# Patient Record
Sex: FEMALE | Race: WHITE | NOT HISPANIC OR LATINO | Employment: PART TIME | URBAN - NONMETROPOLITAN AREA
[De-identification: names, ages, dates, MRNs, and addresses within clinical notes are randomized per-mention and may not be internally consistent; named-entity substitution may affect disease eponyms.]

---

## 2017-01-09 ENCOUNTER — ANTICOAGULATION - GICH (OUTPATIENT)
Dept: INTERNAL MEDICINE | Facility: OTHER | Age: 57
End: 2017-01-09

## 2017-01-09 DIAGNOSIS — Z79.01 LONG TERM CURRENT USE OF ANTICOAGULANT: ICD-10-CM

## 2017-01-09 LAB — INR - HISTORICAL: 2.2

## 2017-01-11 ENCOUNTER — HISTORY (OUTPATIENT)
Dept: FAMILY MEDICINE | Facility: OTHER | Age: 57
End: 2017-01-11

## 2017-01-11 ENCOUNTER — OFFICE VISIT - GICH (OUTPATIENT)
Dept: FAMILY MEDICINE | Facility: OTHER | Age: 57
End: 2017-01-11

## 2017-01-11 DIAGNOSIS — R30.0 DYSURIA: ICD-10-CM

## 2017-01-11 DIAGNOSIS — R31.9 HEMATURIA: ICD-10-CM

## 2017-01-11 DIAGNOSIS — N39.0 URINARY TRACT INFECTION: ICD-10-CM

## 2017-01-11 LAB
BACTERIA URINE: ABNORMAL BACTERIA/HPF
BILIRUB UR QL: NEGATIVE
CLARITY, URINE: ABNORMAL CLARITY
COLOR UR: YELLOW COLOR
EPITHELIAL CELLS: ABNORMAL EPI/HPF
GLUCOSE URINE: NEGATIVE MG/DL
KETONES UR QL: NEGATIVE MG/DL
LEUKOCYTE ESTERASE URINE: ABNORMAL
NITRITE UR QL STRIP: POSITIVE
OCCULT BLOOD,URINE - HISTORICAL: ABNORMAL
PH UR: 6 [PH]
PROTEIN QUALITATIVE,URINE - HISTORICAL: NEGATIVE MG/DL
RBC - HISTORICAL: ABNORMAL /HPF
SP GR UR STRIP: >=1.03
UROBILINOGEN,QUALITATIVE - HISTORICAL: NORMAL EU/DL
WBC - HISTORICAL: ABNORMAL /HPF

## 2017-01-13 LAB
CULTURE - HISTORICAL: ABNORMAL
SUSCEPTIBILITY RESULT - HISTORICAL: ABNORMAL
SUSCEPTIBILITY RESULT - HISTORICAL: ABNORMAL

## 2017-01-19 ENCOUNTER — HOSPITAL ENCOUNTER (OUTPATIENT)
Dept: RADIOLOGY | Facility: OTHER | Age: 57
End: 2017-01-19
Attending: NURSE PRACTITIONER

## 2017-01-19 ENCOUNTER — HISTORY (OUTPATIENT)
Dept: FAMILY MEDICINE | Facility: OTHER | Age: 57
End: 2017-01-19

## 2017-01-19 ENCOUNTER — OFFICE VISIT - GICH (OUTPATIENT)
Dept: FAMILY MEDICINE | Facility: OTHER | Age: 57
End: 2017-01-19

## 2017-01-19 DIAGNOSIS — R35.0 FREQUENCY OF MICTURITION: ICD-10-CM

## 2017-01-19 DIAGNOSIS — N20.0 CALCULUS OF KIDNEY: ICD-10-CM

## 2017-01-19 DIAGNOSIS — N30.00 ACUTE CYSTITIS WITHOUT HEMATURIA: ICD-10-CM

## 2017-01-19 LAB
BACTERIA URINE: ABNORMAL BACTERIA/HPF
BILIRUB UR QL: NEGATIVE
CALCIUM OXALATE CRYSTALS - HISTORICAL: PRESENT
CLARITY, URINE: CLEAR CLARITY
COLOR UR: YELLOW COLOR
EPITHELIAL CELLS: ABNORMAL EPI/HPF
GLUCOSE URINE: NEGATIVE MG/DL
KETONES UR QL: NEGATIVE MG/DL
LEUKOCYTE ESTERASE URINE: NEGATIVE
NITRITE UR QL STRIP: NEGATIVE
OCCULT BLOOD,URINE - HISTORICAL: ABNORMAL
PH UR: 5.5 [PH]
PROTEIN QUALITATIVE,URINE - HISTORICAL: ABNORMAL MG/DL
RBC - HISTORICAL: ABNORMAL /HPF
SP GR UR STRIP: >=1.03
UROBILINOGEN,QUALITATIVE - HISTORICAL: NORMAL EU/DL
WBC - HISTORICAL: ABNORMAL /HPF

## 2017-01-25 ENCOUNTER — HISTORY (OUTPATIENT)
Dept: UROLOGY | Facility: OTHER | Age: 57
End: 2017-01-25

## 2017-01-25 ENCOUNTER — OFFICE VISIT - GICH (OUTPATIENT)
Dept: UROLOGY | Facility: OTHER | Age: 57
End: 2017-01-25

## 2017-01-25 ENCOUNTER — HOSPITAL ENCOUNTER (OUTPATIENT)
Dept: RADIOLOGY | Facility: OTHER | Age: 57
End: 2017-01-25
Attending: UROLOGY

## 2017-01-25 DIAGNOSIS — R31.29 OTHER MICROSCOPIC HEMATURIA: ICD-10-CM

## 2017-01-25 DIAGNOSIS — N20.0 CALCULUS OF KIDNEY: ICD-10-CM

## 2017-01-29 ENCOUNTER — COMMUNICATION - GICH (OUTPATIENT)
Dept: INTERNAL MEDICINE | Facility: OTHER | Age: 57
End: 2017-01-29

## 2017-01-29 DIAGNOSIS — I82.409 ACUTE EMBOLISM AND THROMBOSIS OF DEEP VEIN OF LOWER EXTREMITY (H): ICD-10-CM

## 2017-02-05 ENCOUNTER — HISTORY (OUTPATIENT)
Dept: FAMILY MEDICINE | Facility: OTHER | Age: 57
End: 2017-02-05

## 2017-02-05 ENCOUNTER — OFFICE VISIT - GICH (OUTPATIENT)
Dept: FAMILY MEDICINE | Facility: OTHER | Age: 57
End: 2017-02-05

## 2017-02-05 DIAGNOSIS — R30.0 DYSURIA: ICD-10-CM

## 2017-02-05 DIAGNOSIS — N20.0 CALCULUS OF KIDNEY: ICD-10-CM

## 2017-02-05 LAB
BACTERIA URINE: ABNORMAL BACTERIA/HPF
BILIRUB UR QL: NEGATIVE
CLARITY, URINE: ABNORMAL CLARITY
COLOR UR: YELLOW COLOR
EPITHELIAL CELLS: ABNORMAL EPI/HPF
GLUCOSE URINE: NEGATIVE MG/DL
KETONES UR QL: NEGATIVE MG/DL
LEUKOCYTE ESTERASE URINE: ABNORMAL
NITRITE UR QL STRIP: NEGATIVE
OCCULT BLOOD,URINE - HISTORICAL: ABNORMAL
PH UR: 6 [PH]
PROTEIN QUALITATIVE,URINE - HISTORICAL: NEGATIVE MG/DL
RBC - HISTORICAL: ABNORMAL /HPF
SP GR UR STRIP: 1.01
UROBILINOGEN,QUALITATIVE - HISTORICAL: NORMAL EU/DL
WBC - HISTORICAL: ABNORMAL /HPF

## 2017-02-06 ENCOUNTER — HISTORY (OUTPATIENT)
Dept: UROLOGY | Facility: OTHER | Age: 57
End: 2017-02-06

## 2017-02-06 ENCOUNTER — OFFICE VISIT - GICH (OUTPATIENT)
Dept: UROLOGY | Facility: OTHER | Age: 57
End: 2017-02-06

## 2017-02-06 ENCOUNTER — ANTICOAGULATION - GICH (OUTPATIENT)
Dept: INTERNAL MEDICINE | Facility: OTHER | Age: 57
End: 2017-02-06

## 2017-02-06 DIAGNOSIS — N39.0 URINARY TRACT INFECTION: ICD-10-CM

## 2017-02-06 DIAGNOSIS — N20.0 CALCULUS OF KIDNEY: ICD-10-CM

## 2017-02-06 DIAGNOSIS — R30.0 DYSURIA: ICD-10-CM

## 2017-02-06 DIAGNOSIS — Z79.01 LONG TERM CURRENT USE OF ANTICOAGULANT: ICD-10-CM

## 2017-02-06 LAB — INR - HISTORICAL: 2.4

## 2017-02-07 ENCOUNTER — COMMUNICATION - GICH (OUTPATIENT)
Dept: UROLOGY | Facility: OTHER | Age: 57
End: 2017-02-07

## 2017-02-07 LAB
CULTURE - HISTORICAL: ABNORMAL
CULTURE - HISTORICAL: ABNORMAL
SUSCEPTIBILITY RESULT - HISTORICAL: ABNORMAL

## 2017-02-15 ENCOUNTER — OFFICE VISIT - GICH (OUTPATIENT)
Dept: INTERNAL MEDICINE | Facility: OTHER | Age: 57
End: 2017-02-15

## 2017-02-15 ENCOUNTER — AMBULATORY - GICH (OUTPATIENT)
Dept: LAB | Facility: OTHER | Age: 57
End: 2017-02-15

## 2017-02-15 ENCOUNTER — COMMUNICATION - GICH (OUTPATIENT)
Dept: INTERNAL MEDICINE | Facility: OTHER | Age: 57
End: 2017-02-15

## 2017-02-15 ENCOUNTER — HISTORY (OUTPATIENT)
Dept: INTERNAL MEDICINE | Facility: OTHER | Age: 57
End: 2017-02-15

## 2017-02-15 DIAGNOSIS — N30.01 ACUTE CYSTITIS WITH HEMATURIA: ICD-10-CM

## 2017-02-15 DIAGNOSIS — R30.0 DYSURIA: ICD-10-CM

## 2017-02-15 LAB
BACTERIA URINE: ABNORMAL BACTERIA/HPF
BILIRUB UR QL: ABNORMAL
CALCIUM OXALATE CRYSTALS - HISTORICAL: PRESENT
CLARITY, URINE: CLEAR CLARITY
COLOR UR: ABNORMAL COLOR
EPITHELIAL CELLS: ABNORMAL EPI/HPF
GLUCOSE URINE: ABNORMAL MG/DL
KETONES UR QL: ABNORMAL MG/DL
LEUKOCYTE ESTERASE URINE: ABNORMAL
NITRITE UR QL STRIP: ABNORMAL
OCCULT BLOOD,URINE - HISTORICAL: ABNORMAL
PH UR: ABNORMAL [PH]
PROTEIN QUALITATIVE,URINE - HISTORICAL: ABNORMAL MG/DL
RBC - HISTORICAL: >100 /HPF
SP GR UR STRIP: ABNORMAL
UROBILINOGEN,QUALITATIVE - HISTORICAL: ABNORMAL EU/DL
WBC - HISTORICAL: ABNORMAL /HPF

## 2017-02-17 ENCOUNTER — HISTORY (OUTPATIENT)
Dept: INTERNAL MEDICINE | Facility: OTHER | Age: 57
End: 2017-02-17

## 2017-02-17 ENCOUNTER — OFFICE VISIT - GICH (OUTPATIENT)
Dept: INTERNAL MEDICINE | Facility: OTHER | Age: 57
End: 2017-02-17

## 2017-02-17 DIAGNOSIS — R30.0 DYSURIA: ICD-10-CM

## 2017-02-17 DIAGNOSIS — N76.0 ACUTE VAGINITIS: ICD-10-CM

## 2017-02-17 DIAGNOSIS — B96.89 OTHER SPECIFIED BACTERIAL AGENTS AS THE CAUSE OF DISEASES CLASSIFIED ELSEWHERE: ICD-10-CM

## 2017-02-17 DIAGNOSIS — Z12.4 ENCOUNTER FOR SCREENING FOR MALIGNANT NEOPLASM OF CERVIX: ICD-10-CM

## 2017-02-17 DIAGNOSIS — R10.2 PELVIC AND PERINEAL PAIN: ICD-10-CM

## 2017-02-17 LAB
BACTERIA URINE: ABNORMAL BACTERIA/HPF
BILIRUB UR QL: NEGATIVE
CLARITY, URINE: ABNORMAL CLARITY
COLOR UR: YELLOW COLOR
EPITHELIAL CELLS: ABNORMAL EPI/HPF
GLUCOSE URINE: NEGATIVE MG/DL
KETONES UR QL: NEGATIVE MG/DL
LEUKOCYTE ESTERASE URINE: NEGATIVE
NITRITE UR QL STRIP: NEGATIVE
OCCULT BLOOD,URINE - HISTORICAL: ABNORMAL
PH UR: 5.5 [PH]
PROTEIN QUALITATIVE,URINE - HISTORICAL: ABNORMAL MG/DL
RBC - HISTORICAL: >100 /HPF
SP GR UR STRIP: >=1.03
UROBILINOGEN,QUALITATIVE - HISTORICAL: NORMAL EU/DL
WBC - HISTORICAL: ABNORMAL /HPF

## 2017-02-17 ASSESSMENT — PATIENT HEALTH QUESTIONNAIRE - PHQ9: SUM OF ALL RESPONSES TO PHQ QUESTIONS 1-9: 6

## 2017-03-01 ENCOUNTER — COMMUNICATION - GICH (OUTPATIENT)
Dept: INTERNAL MEDICINE | Facility: OTHER | Age: 57
End: 2017-03-01

## 2017-03-01 DIAGNOSIS — Z00.00 ENCOUNTER FOR GENERAL ADULT MEDICAL EXAMINATION WITHOUT ABNORMAL FINDINGS: ICD-10-CM

## 2017-03-01 LAB — HPV RESULTS - HISTORICAL: NEGATIVE

## 2017-03-06 ENCOUNTER — ANTICOAGULATION - GICH (OUTPATIENT)
Dept: INTERNAL MEDICINE | Facility: OTHER | Age: 57
End: 2017-03-06

## 2017-03-06 ENCOUNTER — HISTORY (OUTPATIENT)
Dept: UROLOGY | Facility: OTHER | Age: 57
End: 2017-03-06

## 2017-03-06 ENCOUNTER — OFFICE VISIT - GICH (OUTPATIENT)
Dept: UROLOGY | Facility: OTHER | Age: 57
End: 2017-03-06

## 2017-03-06 DIAGNOSIS — R31.9 HEMATURIA: ICD-10-CM

## 2017-03-06 DIAGNOSIS — Z79.01 LONG TERM CURRENT USE OF ANTICOAGULANT: ICD-10-CM

## 2017-03-06 LAB
BACTERIA URINE: ABNORMAL BACTERIA/HPF
BILIRUB UR QL: ABNORMAL
CLARITY, URINE: ABNORMAL CLARITY
COLOR UR: YELLOW COLOR
EPITHELIAL CELLS: ABNORMAL EPI/HPF
GLUCOSE URINE: NEGATIVE MG/DL
INR - HISTORICAL: 2.1
KETONES UR QL: NEGATIVE MG/DL
LEUKOCYTE ESTERASE URINE: NEGATIVE
NITRITE UR QL STRIP: NEGATIVE
OCCULT BLOOD,URINE - HISTORICAL: NEGATIVE
PH UR: 5.5 [PH]
PROTEIN QUALITATIVE,URINE - HISTORICAL: NEGATIVE MG/DL
RBC - HISTORICAL: ABNORMAL /HPF
SP GR UR STRIP: >=1.03
UROBILINOGEN,QUALITATIVE - HISTORICAL: NORMAL EU/DL
WBC - HISTORICAL: ABNORMAL /HPF

## 2017-04-03 ENCOUNTER — ANTICOAGULATION - GICH (OUTPATIENT)
Dept: INTERNAL MEDICINE | Facility: OTHER | Age: 57
End: 2017-04-03

## 2017-04-03 DIAGNOSIS — Z79.01 LONG TERM CURRENT USE OF ANTICOAGULANT: ICD-10-CM

## 2017-04-03 LAB — INR - HISTORICAL: 2.2

## 2017-04-27 ENCOUNTER — COMMUNICATION - GICH (OUTPATIENT)
Dept: INTERNAL MEDICINE | Facility: OTHER | Age: 57
End: 2017-04-27

## 2017-04-27 DIAGNOSIS — I82.409 ACUTE EMBOLISM AND THROMBOSIS OF DEEP VEIN OF LOWER EXTREMITY (H): ICD-10-CM

## 2017-05-01 ENCOUNTER — ANTICOAGULATION - GICH (OUTPATIENT)
Dept: INTERNAL MEDICINE | Facility: OTHER | Age: 57
End: 2017-05-01

## 2017-05-01 DIAGNOSIS — Z79.01 LONG TERM CURRENT USE OF ANTICOAGULANT: ICD-10-CM

## 2017-05-01 LAB — INR - HISTORICAL: 2.1

## 2017-05-24 ENCOUNTER — AMBULATORY - GICH (OUTPATIENT)
Dept: PEDIATRICS | Facility: OTHER | Age: 57
End: 2017-05-24

## 2017-05-26 ENCOUNTER — COMMUNICATION - GICH (OUTPATIENT)
Dept: INTERNAL MEDICINE | Facility: OTHER | Age: 57
End: 2017-05-26

## 2017-05-26 DIAGNOSIS — F33.0 MAJOR DEPRESSIVE DISORDER, RECURRENT, MILD (H): ICD-10-CM

## 2017-06-05 ENCOUNTER — ANTICOAGULATION - GICH (OUTPATIENT)
Dept: INTERNAL MEDICINE | Facility: OTHER | Age: 57
End: 2017-06-05

## 2017-06-05 DIAGNOSIS — Z79.01 LONG TERM CURRENT USE OF ANTICOAGULANT: ICD-10-CM

## 2017-06-05 LAB — INR - HISTORICAL: 2.3

## 2017-06-07 ENCOUNTER — COMMUNICATION - GICH (OUTPATIENT)
Dept: INTERNAL MEDICINE | Facility: OTHER | Age: 57
End: 2017-06-07

## 2017-06-07 DIAGNOSIS — I10 ESSENTIAL (PRIMARY) HYPERTENSION: ICD-10-CM

## 2017-07-10 ENCOUNTER — ANTICOAGULATION - GICH (OUTPATIENT)
Dept: INTERNAL MEDICINE | Facility: OTHER | Age: 57
End: 2017-07-10

## 2017-07-10 DIAGNOSIS — Z79.01 LONG TERM CURRENT USE OF ANTICOAGULANT: ICD-10-CM

## 2017-07-10 LAB — INR - HISTORICAL: 2.1

## 2017-07-28 ENCOUNTER — COMMUNICATION - GICH (OUTPATIENT)
Dept: INTERNAL MEDICINE | Facility: OTHER | Age: 57
End: 2017-07-28

## 2017-07-28 DIAGNOSIS — I82.409 ACUTE EMBOLISM AND THROMBOSIS OF DEEP VEIN OF LOWER EXTREMITY (H): ICD-10-CM

## 2017-07-31 ENCOUNTER — OFFICE VISIT - GICH (OUTPATIENT)
Dept: INTERNAL MEDICINE | Facility: OTHER | Age: 57
End: 2017-07-31

## 2017-07-31 ENCOUNTER — HISTORY (OUTPATIENT)
Dept: INTERNAL MEDICINE | Facility: OTHER | Age: 57
End: 2017-07-31

## 2017-07-31 DIAGNOSIS — I71.20 THORACIC AORTIC ANEURYSM WITHOUT RUPTURE (H): ICD-10-CM

## 2017-07-31 DIAGNOSIS — L65.9 NONSCARRING HAIR LOSS: ICD-10-CM

## 2017-07-31 DIAGNOSIS — E83.52 HYPERCALCEMIA: ICD-10-CM

## 2017-07-31 DIAGNOSIS — R53.83 OTHER FATIGUE: ICD-10-CM

## 2017-07-31 DIAGNOSIS — E66.01 MORBID (SEVERE) OBESITY DUE TO EXCESS CALORIES (H): ICD-10-CM

## 2017-07-31 LAB
CALCIUM SERPL-MCNC: 9.8 MG/DL (ref 8.6–10.3)
HEMOGLOBIN: 14.8 G/DL (ref 12–16)
MCV RBC AUTO: 92 FL (ref 80–100)
TSH - HISTORICAL: 2 UIU/ML (ref 0.34–5.6)

## 2017-07-31 ASSESSMENT — ANXIETY QUESTIONNAIRES
6. BECOMING EASILY ANNOYED OR IRRITABLE: NOT AT ALL
5. BEING SO RESTLESS THAT IT IS HARD TO SIT STILL: NOT AT ALL
3. WORRYING TOO MUCH ABOUT DIFFERENT THINGS: SEVERAL DAYS
1. FEELING NERVOUS, ANXIOUS, OR ON EDGE: SEVERAL DAYS
2. NOT BEING ABLE TO STOP OR CONTROL WORRYING: NOT AT ALL
4. TROUBLE RELAXING: SEVERAL DAYS
7. FEELING AFRAID AS IF SOMETHING AWFUL MIGHT HAPPEN: NOT AT ALL
GAD7 TOTAL SCORE: 3

## 2017-08-04 ENCOUNTER — AMBULATORY - GICH (OUTPATIENT)
Dept: SCHEDULING | Facility: OTHER | Age: 57
End: 2017-08-04

## 2017-08-09 ENCOUNTER — COMMUNICATION - GICH (OUTPATIENT)
Dept: INTERNAL MEDICINE | Facility: OTHER | Age: 57
End: 2017-08-09

## 2017-08-09 ENCOUNTER — ANTICOAGULATION - GICH (OUTPATIENT)
Dept: INTERNAL MEDICINE | Facility: OTHER | Age: 57
End: 2017-08-09

## 2017-08-09 DIAGNOSIS — Z79.01 LONG TERM CURRENT USE OF ANTICOAGULANT: ICD-10-CM

## 2017-08-09 LAB — INR - HISTORICAL: 2.7

## 2017-08-17 ENCOUNTER — COMMUNICATION - GICH (OUTPATIENT)
Dept: INTERNAL MEDICINE | Facility: OTHER | Age: 57
End: 2017-08-17

## 2017-09-08 ENCOUNTER — COMMUNICATION - GICH (OUTPATIENT)
Dept: INTERNAL MEDICINE | Facility: OTHER | Age: 57
End: 2017-09-08

## 2017-09-08 DIAGNOSIS — E87.6 HYPOKALEMIA: ICD-10-CM

## 2017-09-08 DIAGNOSIS — R60.0 LOCALIZED EDEMA: ICD-10-CM

## 2017-09-21 ENCOUNTER — ANTICOAGULATION - GICH (OUTPATIENT)
Dept: INTERNAL MEDICINE | Facility: OTHER | Age: 57
End: 2017-09-21

## 2017-09-21 DIAGNOSIS — Z79.01 LONG TERM CURRENT USE OF ANTICOAGULANT: ICD-10-CM

## 2017-09-21 LAB — INR - HISTORICAL: 2.6

## 2017-11-02 ENCOUNTER — ANTICOAGULATION - GICH (OUTPATIENT)
Dept: INTERNAL MEDICINE | Facility: OTHER | Age: 57
End: 2017-11-02

## 2017-11-02 DIAGNOSIS — Z79.01 LONG TERM CURRENT USE OF ANTICOAGULANT: ICD-10-CM

## 2017-11-02 LAB — INR - HISTORICAL: 2.2

## 2017-11-03 ENCOUNTER — COMMUNICATION - GICH (OUTPATIENT)
Dept: INTERNAL MEDICINE | Facility: OTHER | Age: 57
End: 2017-11-03

## 2017-11-03 DIAGNOSIS — I82.409 ACUTE EMBOLISM AND THROMBOSIS OF DEEP VEIN OF LOWER EXTREMITY (H): ICD-10-CM

## 2017-11-07 ENCOUNTER — AMBULATORY - GICH (OUTPATIENT)
Dept: FAMILY MEDICINE | Facility: OTHER | Age: 57
End: 2017-11-07

## 2017-11-07 DIAGNOSIS — Z23 ENCOUNTER FOR IMMUNIZATION: ICD-10-CM

## 2017-11-25 ENCOUNTER — COMMUNICATION - GICH (OUTPATIENT)
Dept: INTERNAL MEDICINE | Facility: OTHER | Age: 57
End: 2017-11-25

## 2017-11-25 DIAGNOSIS — F33.0 MAJOR DEPRESSIVE DISORDER, RECURRENT, MILD (H): ICD-10-CM

## 2017-12-05 ENCOUNTER — COMMUNICATION - GICH (OUTPATIENT)
Dept: INTERNAL MEDICINE | Facility: OTHER | Age: 57
End: 2017-12-05

## 2017-12-05 DIAGNOSIS — I10 ESSENTIAL (PRIMARY) HYPERTENSION: ICD-10-CM

## 2017-12-10 ENCOUNTER — COMMUNICATION - GICH (OUTPATIENT)
Dept: INTERNAL MEDICINE | Facility: OTHER | Age: 57
End: 2017-12-10

## 2017-12-10 DIAGNOSIS — I10 ESSENTIAL (PRIMARY) HYPERTENSION: ICD-10-CM

## 2017-12-10 DIAGNOSIS — E87.6 HYPOKALEMIA: ICD-10-CM

## 2017-12-10 DIAGNOSIS — R60.0 LOCALIZED EDEMA: ICD-10-CM

## 2017-12-14 ENCOUNTER — ANTICOAGULATION - GICH (OUTPATIENT)
Dept: INTERNAL MEDICINE | Facility: OTHER | Age: 57
End: 2017-12-14

## 2017-12-14 DIAGNOSIS — Z79.01 LONG TERM CURRENT USE OF ANTICOAGULANT: ICD-10-CM

## 2017-12-14 LAB — INR - HISTORICAL: 2.3

## 2017-12-27 NOTE — PROGRESS NOTES
Patient Information     Patient Name MRN Trisha Vasquez [id number] Female 1960      Progress Notes by Maria Guadalupe Ramon DO at 2017  8:40 AM     Author:  Maria Guadalupe Ramon DO Service:  (none) Author Type:  PHYS- Osteopathic     Filed:  2017 10:50 AM Encounter Date:  2017 Status:  Signed     :  Maria Guadalupe Ramon DO (PHYS- Osteopathic)            Chief Complaint     Patient presents with       Follow Up      weight management, thyroid lab        Subjective:   Ms. Fernando is a 56 y.o. female  seen for the acute concern today of continued weight problems.  Although she is concerned she may have gained weight she is actually down 2 pounds.  We did discuss that this is likely stable.  She has been wanting to lose weight and has struggled with this.  She is interested in possibly trying medications.  She is hesitant about trying phentermine due to history of aortic aneurysm and the possibility of cardiac disease related to this.  She would like to try contrary if it is approved.  I do think this would be beneficial for her.     She also has some concerns about increased fatigue.  She has also noted some hair loss.  We have not checked her thyroid since .  Denies any changes in bowels.    She was noted to be hypercalcemic on her last labs in December.  Other than the fatigue she denies any symptoms of hypercalcemia although did have a kidney stone earlier this spring.    She  reports that she quit smoking about 26 years ago. Her smoking use included Cigarettes. She has a 12.00 pack-year smoking history. She has never used smokeless tobacco.    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    .      ROS:   Pertinent ROS was performed and was negative, including for fevers, chills, chest pain, shortness of breath, increased lower extremity edema, and changes in bowel or bladder. No other concerns, with exception of HPI above.      Objective:    /70  Pulse 76  Temp 97.3  F (36.3  C) (Tympanic)  Resp 20  Ht 1.727 m (5' 8\")  Wt (!) 159 kg (350 lb 9 oz)  Breastfeeding? No  BMI 53.3 kg/m2  GEN: Vitals reviewed.  Patient is in no acute distress. Cooperative with exam.  Obese.  HEENT: Normocephalic atraumatic.  Pupils equally round.  No scleral icterus, no conjunctival erythema. Oropharynx with no erythema or exudates. Dentition adequate.  CV: Heart regular in rate and rhythm with no murmur.    LUNGS: Lungs clear to auscultation bilaterally.  Chest rise equal bilaterally.  No accessory muscle use.  SKIN: Warm and dry to touch.  No rash on face, arms and legs.  EXT: No clubbing or cyanosis.  trace bilateral peripheral edema.    LABS: 7/31/2017 - Personally ordered/reviewed  Results for orders placed or performed in visit on 07/31/17      CALCIUM      Result  Value Ref Range    CALCIUM 9.8 8.6 - 10.3 mg/dL   HEMOGLOBIN      Result  Value Ref Range    HEMOGLOBIN                14.8 12.0 - 16.0 g/dL    MCV                       92 80 - 100 fL           Assessment/Plan:   1. Morbid obesity due to excess calories (HC)  - at this time we will try to start contrave.  This will likely require prior authorization.  I do think she needs the contrave instead of phentermine due to her history of aortic aneurysm and concern for " cardiac side effects.  We will also check a thyroid to be sure this is not contributing to her weight.  - naltrexone-bupropion 8-90 mg (CONTRAVE) 8-90 mg Extended-Release tablet; Take 2 tablets by mouth 2 times daily. Taper up  Dispense: 120 tablet; Refill: 0  - TSH    2. Ascending aortic aneurysm (HCC) - MRI with cards in 12/2016 and then based on size; if < 4.5 annual exam okay   - given her history of aneurysm she did have an MR that showed improvement when compared to the echo.  At that time no further recommendation for repeat imaging was made.  I will discuss this personally with cardiology locally to see if they would consider any need for repeat imaging this year.  If so we will repeat an MR in December.    3. Hair loss  - at this time it may be due to nutritional deficiency versus medications.  The thyroid is possible we will check a TSH today.  She is to call she has any new or changing symptoms.  - TSH    4. Fatigue, unspecified type  - etiology unknown although I suspect this is secondary to medications and wait.  Hemoglobin is normal.  TSH is still pending and thyroid may be contributing.  - HEMOGLOBIN    5. Hypercalcemia  - recheck today is normal.  Continue to monitor at least yearly.  - CALCIUM       BONITA OLIVER DO   7/31/2017 10:42 AM    This document was prepared using voice generated softwear. While every attempt was made for accuracy, grammatical errors may exist.

## 2017-12-28 NOTE — TELEPHONE ENCOUNTER
Patient Information     Patient Name MRN Trisha Vasquez [id number] Female 1960      Telephone Encounter by Teagan Harrison at 2017  4:13 PM     Author:  Teagan Harrison Service:  (none) Author Type:  (none)     Filed:  2017  4:16 PM Encounter Date:  2017 Status:  Signed     :  Teagan Harrison            Notified patient [employer] prior authorization specialist stated this will not be covered by her Insurance.  If she would like to check prices and have prescription sent to a different pharmacy, she will call and let us know where to send.    Teagan Harrison LPN..................2017  4:15 PM

## 2017-12-28 NOTE — TELEPHONE ENCOUNTER
Patient Information     Patient Name MRTrisha Umanzor [id number] Female 1960      Telephone Encounter by Brii Gates RN at 2017  9:04 AM     Author:  Brii Gates RN Service:  (none) Author Type:  NURS- Registered Nurse     Filed:  2017  9:05 AM Encounter Date:  2017 Status:  Signed     :  Brii Gates RN (NURS- Registered Nurse)            Depression-in adults 18 and over  Serotonin/Norepinephrine Reuptake Inhibitors    Office visit in the past 12 months or as indicated in chart.  Should have clinic visit 1-2 months after initial prescription.    Last visit with BONITA OLIVER was on: 2017 in [facility]  Next visit with BONITA OLIVER is on: No future appointment listed with this provider  Next visit with Internal Medicine is on: 2017 in [facility]    Max refills 12 months from last office visit or per providers notes.    PHQ Depression Screening 2017   Date of PHQ exam (doc flow) 2017   1. Lack of interest/pleasure 1 - Several days 1 - Several days   2. Feeling down/depressed 1 - Several days 1 - Several days   PHQ-2 TOTAL SCORE 2 2   3. Trouble sleeping 1 - Several days 1 - Several days   4. Decreased energy 1 - Several days 1 - Several days   5. Appetite change 1 - Several days 2 - More than half the days   6. Feelings of failure 1 - Several days 1 - Several days   7. Trouble concentrating 0 - Not at all 0 - Not at all   8. Activity level 0 - Not at all 0 - Not at all   9. Hurting yourself 0 - Not at all 0 - Not at all   PHQ-9 TOTAL SCORE 6 7   PHQ-9 Severity Level mild mild   Functional Impairment not difficult at all not difficult at all   Some recent data might be hidden         Prescription refilled per RN Medication Refill Policy.................... BRII GATES RN ....................  2017   9:05 AM

## 2017-12-28 NOTE — TELEPHONE ENCOUNTER
Patient Information     Patient Name MRN Trisha Vasquez [id number] Female 1960      Telephone Encounter by Smitha Nair RN at 11/3/2017  8:33 AM     Author:  Smitha Nair RN Service:  (none) Author Type:  NURS- Registered Nurse     Filed:  11/3/2017  8:34 AM Encounter Date:  11/3/2017 Status:  Signed     :  Smitha Nair RN (NURS- Registered Nurse)            Anticoagulant    Office visit in the past 12 months.    Last visit with BONITA OLIVER was on: 2017 in [facility]  Next visit with BONITA OLIVER is on: No future appointment listed with this provider  Next visit with Internal Medicine is on: 2017 in [facility]    Lab tests:  PT/INR at least monthly    INR (no units)    Date Value   2017 2.2 (H)     HEMOGLOBIN                (g/dL)    Date Value   2017 14.8         Prescription refilled per RN Medication Refill Policy.................... Smitha Nair RN ....................  11/3/2017   8:33 AM

## 2017-12-28 NOTE — TELEPHONE ENCOUNTER
Patient Information     Patient Name MRN Trisha Vasquez [id number] Female 1960      Telephone Encounter by Maria Guadalupe Ramon DO at 8/10/2017  7:42 AM     Author:  Maria Guadalupe Ramon DO Service:  (none) Author Type:  PHYS- Osteopathic     Filed:  8/10/2017  7:42 AM Encounter Date:  2017 Status:  Signed     :  Maria Guadalupe Ramon DO (PHYS- Osteopathic)            Noted.  Please have patient contact me if she has any interest in other medications than the contrave or has other questions or concerns.

## 2017-12-28 NOTE — TELEPHONE ENCOUNTER
Patient Information     Patient Name MRN Trisha Vasquez [id number] Female 1960      Telephone Encounter by Lisbeth Stanton at 2017 11:12 AM     Author:  Lisbeth Stanton Service:  (none) Author Type:  (none)     Filed:  2017 11:15 AM Encounter Date:  2017 Status:  Signed     :  Lisbeth Stanton            Called and left message for patient.  Need to confirm which Insurance company she has for her prescription medications.    Lisbeth Stanton LPN 2017 11:15 AM

## 2017-12-28 NOTE — TELEPHONE ENCOUNTER
Patient Information     Patient Name MRN Trisha Vasquez [id number] Female 1960      Telephone Encounter by Maria Guadalupe Ramon DO at 2017  2:38 PM     Author:  Maria Guadalupe Ramon DO Service:  (none) Author Type:  PHYS- Osteopathic     Filed:  2017  2:39 PM Encounter Date:  2017 Status:  Signed     :  Maria Guadalupe Ramon DO (PHYS- Osteopathic)            I attempted to call patient to discuss this option.  I am hesitant to give her the medication separately however would like to discuss it with her and the phone.  I will attempt to call her next week.

## 2017-12-28 NOTE — TELEPHONE ENCOUNTER
Patient Information     Patient Name MRN Trisha Vasquez [id number] Female 1960      Telephone Encounter by Teagan Harrisno at 2017  4:44 PM     Author:  Teagan Harrison Service:  (none) Author Type:  (none)     Filed:  2017  4:45 PM Encounter Date:  2017 Status:  Signed     :  Teagan Harrison            Did patient decide to follow thru with any medication choice.  Please advise.  Teagan Harrison LPN..................2017  4:45 PM

## 2017-12-28 NOTE — TELEPHONE ENCOUNTER
Patient Information     Patient Name MRN Trisha Vasquez [id number] Female 1960      Telephone Encounter by Lacey Zayas RN at 2017  8:27 AM     Author:  Lacey Zayas RN Service:  (none) Author Type:  NURS- Registered Nurse     Filed:  2017  8:28 AM Encounter Date:  2017 Status:  Signed     :  Lacey Zayas RN (NURS- Registered Nurse)            Anticoagulant    Office visit in the past 12 months.    Last visit with BONITA OLIVER was on: 2017 in [facility]  Next visit with BONITA OLIVER is on: 2017 in [facility]  Next visit with Internal Medicine is on: 2017 in [facility]    Lab tests:  PT/INR at least monthly    INR (no units)    Date Value   07/10/2017 2.1 (H)     HEMOGLOBIN                (g/dL)    Date Value   2016 14.4     Prescription refilled per RN Medication Refill Policy.................... LACEY ZAYAS RN ....................  2017   8:27 AM

## 2017-12-28 NOTE — TELEPHONE ENCOUNTER
Patient Information     Patient Name MRN Trisha Vasquez [id number] Female 1960      Telephone Encounter by Poonam Ervin RN at 2017  3:40 PM     Author:  Poonam Ervin RN Service:  (none) Author Type:  NURS- Registered Nurse     Filed:  2017  3:48 PM Encounter Date:  2017 Status:  Signed     :  Poonam Ervin RN (NURS- Registered Nurse)            Diuretics (may be prescribed for edema)    Office visit in the past 12 months or per provider note.    Last visit with BONITA OLIVER was on: 2017 in [facility]  Next visit with BONITA OLIVER is on: No future appointment listed with this provider  Next visit with Internal Medicine is on: 2017 in [facility]    Lab testing requirements:  Creatinine and Potassium annually, if ordering lab, order BMP.  CREATININE (mg/dL)    Date Value   2016 0.84     POTASSIUM (mmol/L)    Date Value   2016 3.5     BP Readings from Last 4 Encounters:    17 126/70   17 122/80   17 142/80   17 136/88       Review last provider visit note.  If BP reviewed and plan is noted, can refill.  Max refill for 12 months from last office visit or per provider note.  Office visit in the past 12 months or per provider note.    Last visit with BONITA OLIVER was on: 2017 in [facility]  Next visit with BONITA OLIVER is on: No future appointment listed with this provider  Next visit with Internal Medicine is on: 2017 in [facility]    Lab test requirements:  Annual potassium level.  POTASSIUM (mmol/L)    Date Value   2016 3.5       Max refill for 12 months from last office visit or per provider note.  Prescription refilled  Till 2017 due to lab work per RN Medication Refill Policy.................... Poonam Ervin RN ....................  2017   3:44 PM

## 2017-12-28 NOTE — TELEPHONE ENCOUNTER
Patient Information     Patient Name MRTrisha Umanzor [id number] Female 1960      Telephone Encounter by Ramona Araiza LPN at 2017 12:36 PM     Author:  Ramona Araiza LPN Service:  (none) Author Type:  NURS- Licensed Practical Nurse     Filed:  2017 12:37 PM Encounter Date:  2017 Status:  Signed     :  Ramona Araiza LPN (NURS- Licensed Practical Nurse)            Left a message for the patient to call back.  Ramona Araiza LPN......2017  12:37 PM

## 2017-12-28 NOTE — TELEPHONE ENCOUNTER
Patient Information     Patient Name MRN Trisha Vasquez [id number] Female 1960      Telephone Encounter by Lisbeth Stanton at 2017  2:27 PM     Author:  Lisbeth Stanton Service:  (none) Author Type:  (none)     Filed:  2017  2:34 PM Encounter Date:  2017 Status:  Signed     :  Lisbeth Stanton            Writer called [employer] and spoke with YURSA Martínez specialist, anti Obesity drugs are not covered by their plan

## 2017-12-28 NOTE — TELEPHONE ENCOUNTER
Patient Information     Patient Name MRN Trisha Vasquez [id number] Female 1960      Telephone Encounter by Lisbeth Stanton at 2017 11:35 AM     Author:  Lisbeth Stanton Service:  (none) Author Type:  (none)     Filed:  2017 11:37 AM Encounter Date:  2017 Status:  Signed     :  Lisbeth Stanton            Patient called back to clinic.  Writer confirmed insurance company - [facility]

## 2017-12-28 NOTE — PATIENT INSTRUCTIONS
Patient Information     Patient Name MRN Trisha Vasquez [id number] Female 1960      Patient Instructions by Smitha Nair RN at 2017 11:00 AM     Author:  Smitha Nair RN Service:  (none) Author Type:  NURS- Registered Nurse     Filed:  2017 11:03 AM Encounter Date:  2017 Status:  Signed     :  Smitha Nair RN (NURS- Registered Nurse)            2017 Details    Sun Mon Tue Wed Thu Fri Sat       1               2               3               4               5                 6               7               8               9      5 mg   See details      10      5 mg         11      5 mg         12      5 mg           13      5 mg         14      5 mg         15      5 mg         16      5 mg         17      5 mg         18      5 mg         19      5 mg           20      5 mg         21      5 mg         22      5 mg         23      5 mg         24      5 mg         25      5 mg         26      5 mg           27      5 mg         28      5 mg         29      5 mg         30      5 mg         31      5 mg            Date Details    This INR check               How to take your warfarin dose     To take:  5 mg Take one of the 5 mg tablets.           2017 Details    Sun Mon Tue Wed Thu Fri Sat          1      5 mg         2      5 mg           3      5 mg         4      5 mg         5      5 mg         6      5 mg         7      5 mg         8      5 mg         9      5 mg           10      5 mg         11      5 mg         12      5 mg         13      5 mg         14      5 mg         15      5 mg         16      5 mg           17      5 mg         18      5 mg         19      5 mg         20      5 mg         21               22               23                 24               25               26               27               28               29               30                Date Details   No additional details    Date of next INR:  2017          How to take your warfarin dose     To take:  5 mg Take one of the 5 mg tablets.             Description          Continue same Warfarin dose and recheck in 6 weeks. Smitha Nair RN    8/9/2017  11:03 AM                                                                Anticoagulation Summary as of 8/9/2017     INR goal 2.0-3.0    Today's INR 2.7    Next INR check 9/20/2017          Call your Anticoagulation Clinic at Dept: [phone]   if:   1. Any medications are started, stopped, or there is a change in dose.  2. You experience any bleeding that is not easily stopped or if it is recurrent.  3. You notice an increase in bruising or any bruising that does not heal.  4. You are scheduled for surgery, colonoscopy, dental extraction or any other procedure where you may need to stop your Coumadin (warfarin).

## 2017-12-28 NOTE — TELEPHONE ENCOUNTER
Patient Information     Patient Name MRN Trisha Vasquez [id number] Female 1960      Telephone Encounter by Maria Guadalupe Ramon DO at 2017  5:29 PM     Author:  Maria Guadalupe Ramon DO Service:  (none) Author Type:  PHYS- Osteopathic     Filed:  2017  5:29 PM Encounter Date:  2017 Status:  Signed     :  Maria Guadalupe Ramon DO (PHYS- Osteopathic)            I will look into the dosing of this and call patient on Monday.

## 2017-12-28 NOTE — TELEPHONE ENCOUNTER
Patient Information     Patient Name MRN Trisha Vasquez [id number] Female 1960      Telephone Encounter by Lyle Dial RN at 2017  9:56 AM     Author:  Lyle Dial RN Service:  (none) Author Type:  NURS- Registered Nurse     Filed:  2017  9:57 AM Encounter Date:  2017 Status:  Signed     :  Lyle Dial RN (NURS- Registered Nurse)            Angiotensin Receptor Blockers (ARB)    Office visit in the past 12 months or per provider note.    Last visit with BONITA OLIVER was on: 2017 in [facility]  Next visit with BONITA OLIVER is on: No future appointment listed with this provider  Next visit with Internal Medicine is on: 07/10/2017 in [facility]    Lab test requirements:  Creatinine and Potassium annually, if ordering lab, order BMP.  CREATININE (mg/dL)    Date Value   2016 0.84     POTASSIUM (mmol/L)    Date Value   2016 3.5       Max refill for 12 months from last office visit or per provider note  Prescription refilled per RN Medication Refill Policy.................... LYLE DIAL RN ....................  2017   9:56 AM

## 2017-12-28 NOTE — TELEPHONE ENCOUNTER
Patient Information     Patient Name MRN Trisha Vasquez [id number] Female 1960      Telephone Encounter by Maria Guadalupe Ramon DO at 2017  5:14 PM     Author:  Maria Guadalupe Ramon DO Service:  (none) Author Type:  PHYS- Osteopathic     Filed:  2017  5:22 PM Encounter Date:  2017 Status:  Signed     :  Maria Guadalupe Ramon DO (PHYS- Osteopathic)            Patient called.  She is looking to pay out of pocket for contrite and is not interested in any other medications.  She will call if she has any further questions or concerns.  She was instructed to set up a follow-up one month into the medication if she does take it.

## 2017-12-28 NOTE — TELEPHONE ENCOUNTER
Patient Information     Patient Name MRN Trisha Vasquez [id number] Female 1960      Telephone Encounter by Teagan Harrison at 8/10/2017 10:50 AM     Author:  Teagan Harrison Service:  (none) Author Type:  (none)     Filed:  8/10/2017 10:51 AM Encounter Date:  2017 Status:  Signed     :  Teagan Harrison            Notified patient if she would like refill or to try any other medications to please let us know.  She will call with any questions or concerns.  Teagan Harrison LPN..................8/10/2017  10:51 AM

## 2017-12-28 NOTE — TELEPHONE ENCOUNTER
Patient Information     Patient Name MRN Trisha Vasquez [id number] Female 1960      Telephone Encounter by Maria Guadalupe Ramon DO at 2017  2:05 PM     Author:  Maria Guadalupe Ramon DO Service:  (none) Author Type:  PHYS- Osteopathic     Filed:  2017  2:05 PM Encounter Date:  2017 Status:  Signed     :  Maria Guadalupe Ramon DO (PHYS- Osteopathic)            Please call [employer] and ask what other medications would be covered .  Then call patient and ask which she would prefer to do.  Options are to pay for it out of pocket or try a different medication.

## 2017-12-28 NOTE — TELEPHONE ENCOUNTER
Patient Information     Patient Name MRTrisha Umanzor [id number] Female 1960      Telephone Encounter by Ramona Araiza LPN at 2017 12:42 PM     Author:  Ramona Araiza LPN Service:  (none) Author Type:  NURS- Licensed Practical Nurse     Filed:  2017 12:43 PM Encounter Date:  2017 Status:  Signed     :  Ramona Araiza LPN (NURS- Licensed Practical Nurse)            Contacted the patient she stated after using a savings card for her contrave it will still cost her 200 dollars a month. She spoke to her pharmacist and he suggest that the two medications that make up contrave be ordered seperately and her insurance might pay for that instead.  Ramona Araiza LPN......2017  12:43 PM

## 2017-12-29 NOTE — PATIENT INSTRUCTIONS
Patient Information     Patient Name MRTrisha Umanzor [id number] Female 1960      Patient Instructions by Smitha Nair RN at 2017 12:30 PM     Author:  Smitha Nair RN Service:  (none) Author Type:  NURS- Registered Nurse     Filed:  2017 12:32 PM Encounter Date:  2017 Status:  Signed     :  Smitha Nair RN (NURS- Registered Nurse)            2017 Details    Sun Mon Tue Wed Thu Fri Sat          1               2                 3               4               5               6               7               8               9                 10               11               12               13               14               15               16                 17               18               19               20               21      5 mg   See details      22      5 mg         23      5 mg           24      5 mg         25      5 mg         26      5 mg         27      5 mg         28      5 mg         29      5 mg         30      5 mg          Date Details    This INR check               How to take your warfarin dose     To take:  5 mg Take one of the 5 mg tablets.           2017 Details    Sun Mon Tue Wed Thu Fri Sat     1      5 mg         2      5 mg         3      5 mg         4      5 mg         5      5 mg         6      5 mg         7      5 mg           8      5 mg         9      5 mg         10      5 mg         11      5 mg         12      5 mg         13      5 mg         14      5 mg           15      5 mg         16      5 mg         17      5 mg         18      5 mg         19      5 mg         20      5 mg         21      5 mg           22      5 mg         23      5 mg         24      5 mg         25      5 mg         26      5 mg         27      5 mg         28      5 mg           29      5 mg         30      5 mg         31      5 mg              Date Details   No additional details            How to take your warfarin  dose     To take:  5 mg Take one of the 5 mg tablets.           November 2017 Details    Sun Mon Tue Wed Thu Fri Sat        1      5 mg         2      5 mg         3               4                 5               6               7               8               9               10               11                 12               13               14               15               16               17               18                 19               20               21               22               23               24               25                 26               27               28               29               30                  Date Details   No additional details    Date of next INR:  11/2/2017         How to take your warfarin dose     To take:  5 mg Take one of the 5 mg tablets.             Description          Continue same Warfarin dose and recheck in 6 weeks. Smitha Nair RN    9/21/2017  12:32 PM                                                                  Anticoagulation Summary as of 9/21/2017     INR goal 2.0-3.0    Today's INR 2.6    Next INR check 11/2/2017          Call your Anticoagulation Clinic at Dept: [phone]   if:   1. Any medications are started, stopped, or there is a change in dose.  2. You experience any bleeding that is not easily stopped or if it is recurrent.  3. You notice an increase in bruising or any bruising that does not heal.  4. You are scheduled for surgery, colonoscopy, dental extraction or any other procedure where you may need to stop your Coumadin (warfarin).

## 2017-12-29 NOTE — PATIENT INSTRUCTIONS
Patient Information     Patient Name MRN Trisha Vasquez [id number] Female 1960      Patient Instructions by Smitha Nair RN at 2017 12:45 PM     Author:  Smitha Nair RN Service:  (none) Author Type:  NURS- Registered Nurse     Filed:  2017 12:53 PM Encounter Date:  2017 Status:  Signed     :  Smitha Nair RN (NURS- Registered Nurse)            2017 Details    Sun Mon Tue Wed Thu Fri Sat        1               2      5 mg   See details      3      5 mg         4      5 mg           5      5 mg         6      5 mg         7      5 mg         8      5 mg         9      5 mg         10      5 mg         11      5 mg           12      5 mg         13      5 mg         14      5 mg         15      5 mg         16      5 mg         17      5 mg         18      5 mg           19      5 mg         20      5 mg         21      5 mg         22      5 mg         23      5 mg         24      5 mg         25      5 mg           26      5 mg         27      5 mg         28      5 mg         29      5 mg         30      5 mg            Date Details    This INR check               How to take your warfarin dose     To take:  5 mg Take one of the 5 mg tablets.           2017 Details    Sun Mon Tue Wed Thu Fri Sat          1      5 mg         2      5 mg           3      5 mg         4      5 mg         5      5 mg         6      5 mg         7      5 mg         8      5 mg         9      5 mg           10      5 mg         11      5 mg         12      5 mg         13      5 mg         14      5 mg         15               16                 17               18               19               20               21               22               23                 24               25               26               27               28               29               30                 31                      Date Details   No additional details    Date of next INR:   12/14/2017         How to take your warfarin dose     To take:  5 mg Take one of the 5 mg tablets.             Description          Continue same Warfarin dose and recheck in 6 weeks.Smitha Nair RN    11/2/2017  12:53 PM                                                                Anticoagulation Summary as of 11/2/2017     INR goal 2.0-3.0    Today's INR 2.2    Next INR check 12/14/2017          Call your Anticoagulation Clinic at Dept: [phone]   if:   1. Any medications are started, stopped, or there is a change in dose.  2. You experience any bleeding that is not easily stopped or if it is recurrent.  3. You notice an increase in bruising or any bruising that does not heal.  4. You are scheduled for surgery, colonoscopy, dental extraction or any other procedure where you may need to stop your Coumadin (warfarin).

## 2017-12-29 NOTE — PATIENT INSTRUCTIONS
Patient Information     Patient Name MRTrisha Umanzor [id number] Female 1960      Patient Instructions by Brii Magaña RN at 2017 12:00 PM     Author:  Brii Magaña RN  Service:  (none) Author Type:  NURS- Registered Nurse     Filed:  2017 11:55 AM  Encounter Date:  2017 Status:  Addendum     :  Brii Magaña RN (NURS- Registered Nurse)        Related Notes: Original Note by Brii Magaña RN (NURS- Registered Nurse) filed at 2017 11:54 AM            2017 Details    Sun Mon Tue Wed Thu Fri Sat         1               2               3                 4               5      5 mg   See details      6      5 mg         7      5 mg         8      5 mg         9      5 mg         10      5 mg           11      5 mg         12      5 mg         13      5 mg         14      5 mg         15      5 mg         16      5 mg         17      5 mg           18      5 mg         19      5 mg         20      5 mg         21      5 mg         22      5 mg         23      5 mg         24      5 mg           25      5 mg         26      5 mg         27      5 mg         28      5 mg         29      5 mg         30      5 mg           Date Details    This INR check               How to take your warfarin dose     To take:  5 mg Take one of the 5 mg tablets.           2017 Details    Sun Mon Tue Wed Thu Fri Sat           1      5 mg           2      5 mg         3      5 mg         4      5 mg         5      5 mg         6      5 mg         7      5 mg         8      5 mg           9      5 mg         10      5 mg         11               12               13               14               15                 16               17               18               19               20               21               22                 23               24               25               26               27               28               29                 30               31                      Date Details   No additional details    Date of next INR:  7/10/2017         How to take your warfarin dose     To take:  5 mg Take one of the 5 mg tablets.             Description          Continue same Warfarin dose and recheck in 1 month. BULL GATES RN ....................  6/5/2017   11:53 AM                                                              Anticoagulation Summary as of 6/5/2017     INR goal 2.0-3.0    Today's INR 2.3    Next INR check 7/10/2017          Call your Anticoagulation Clinic at Dept: [phone]   if:   1. Any medications are started, stopped, or there is a change in dose.  2. You experience any bleeding that is not easily stopped or if it is recurrent.  3. You notice an increase in bruising or any bruising that does not heal.  4. You are scheduled for surgery, colonoscopy, dental extraction or any other procedure where you may need to stop your Coumadin (warfarin).

## 2017-12-29 NOTE — PATIENT INSTRUCTIONS
Patient Information     Patient Name MRN Trisha Vasquez [id number] Female 1960      Patient Instructions by Smitha Nair RN at 7/10/2017 11:15 AM     Author:  Smitha Nair RN Service:  (none) Author Type:  NURS- Registered Nurse     Filed:  7/10/2017 11:20 AM Encounter Date:  7/10/2017 Status:  Signed     :  Smitha Nair RN (NURS- Registered Nurse)            2017 Details    Sun Mon Tue Wed Thu Fri Sat           1                 2               3               4               5               6               7               8                 9               10      5 mg   See details      11      5 mg         12      5 mg         13      5 mg         14      5 mg         15      5 mg           16      5 mg         17      5 mg         18      5 mg         19      5 mg         20      5 mg         21      5 mg         22      5 mg           23      5 mg         24      5 mg         25      5 mg         26      5 mg         27      5 mg         28      5 mg         29      5 mg           30      5 mg         31      5 mg               Date Details   07/10 This INR check               How to take your warfarin dose     To take:  5 mg Take one of the 5 mg tablets.           2017 Details    Sun Mon Tue Wed Thu Fri Sat       1      5 mg         2      5 mg         3      5 mg         4      5 mg         5      5 mg           6      5 mg         7      5 mg         8               9               10               11               12                 13               14               15               16               17               18               19                 20               21               22               23               24               25               26                 27               28               29               30               31                  Date Details   No additional details    Date of next INR:  2017         How to take your warfarin  dose     To take:  5 mg Take one of the 5 mg tablets.             Description          Continue same Warfarin dose and recheck in 1 month.Smitha Nair RN    7/10/2017  11:20 AM                                                              Anticoagulation Summary as of 7/10/2017     INR goal 2.0-3.0    Today's INR 2.1    Next INR check 8/7/2017          Call your Anticoagulation Clinic at Dept: [phone]   if:   1. Any medications are started, stopped, or there is a change in dose.  2. You experience any bleeding that is not easily stopped or if it is recurrent.  3. You notice an increase in bruising or any bruising that does not heal.  4. You are scheduled for surgery, colonoscopy, dental extraction or any other procedure where you may need to stop your Coumadin (warfarin).

## 2017-12-30 NOTE — NURSING NOTE
Patient Information     Patient Name MRN Trisha Vasquez [id number] Female 1960      Nursing Note by Teagan Harrison at 2017  8:40 AM     Author:  Teagan Harrison Service:  (none) Author Type:  (none)     Filed:  2017  8:55 AM Encounter Date:  2017 Status:  Signed     :  Teagan Harrison            Patient presents to clinic for follow up with weight management.  Also, she would like thyroid levels checked today.    Teagan Harrison LPN..................2017  8:51 AM

## 2018-01-02 NOTE — NURSING NOTE
Patient Information     Patient Name MRN Trisha Vasquez [id number] Female 1960      Nursing Note by Angelique Vaughn at 2017 12:45 PM     Author:  Angelique Vaughn Service:  (none) Author Type:  (none)     Filed:  2017  1:01 PM Encounter Date:  2017 Status:  Signed     :  Angelique Vaughn            Patient presents to the clinic today for  Possible UTI.   Angelique Vaughn CMA ([facility])........2017 12:53 PM

## 2018-01-02 NOTE — PATIENT INSTRUCTIONS
Patient Information     Patient Name MRN Trisha Vasquez [id number] Female 1960      Patient Instructions by Smitha Nair RN at 2017 12:30 PM     Author:  Smitha Nair RN Service:  (none) Author Type:  NURS- Registered Nurse     Filed:  2017 12:23 PM Encounter Date:  2017 Status:  Signed     :  Smitha Nair RN (NURS- Registered Nurse)            2017 Details    Sun Mon Tue Wed Thu Fri Sat     1               2               3               4               5               6               7                 8               9      5 mg   See details      10      5 mg         11      5 mg         12      5 mg         13      5 mg         14      5 mg           15      5 mg         16      5 mg         17      5 mg         18      5 mg         19      5 mg         20      5 mg         21      5 mg           22      5 mg         23      5 mg         24      5 mg         25      5 mg         26      5 mg         27      5 mg         28      5 mg           29      5 mg         30      5 mg         31      5 mg              Date Details    This INR check               How to take your warfarin dose     To take:  5 mg Take one of the 5 mg tablets.           2017 Details    Sun Mon Tue Wed Thu Fri Sat        1      5 mg         2      5 mg         3      5 mg         4      5 mg           5      5 mg         6      5 mg         7               8               9               10               11                 12               13               14               15               16               17               18                 19               20               21               22               23               24               25                 26               27               28                    Date Details   No additional details    Date of next INR:  2017         How to take your warfarin dose     To take:  5 mg Take one of the 5 mg tablets.              Description          Continue same Warfarin dose and recheck in 1 month.  Smitha Nair RN   1/9/2017    12:23 PM                                                        Anticoagulation Summary as of 1/9/2017     INR goal 2.0-3.0    Today's INR 2.2    Next INR check 2/6/2017          Call your Anticoagulation Clinic at Dept: [phone]   if:   1. Any medications are started, stopped, or there is a change in dose.  2. You experience any bleeding that is not easily stopped or if it is recurrent.  3. You notice an increase in bruising or any bruising that does not heal.  You are scheduled for surgery, colonoscopy, dental extraction or any other procedure where you may need to stop your Coumadin (warfarin).

## 2018-01-03 NOTE — PROGRESS NOTES
Patient Information     Patient Name MRN Trisha Vasquez [id number] Female 1960      Progress Notes by Judith Bah NP at 2017  1:00 PM     Author:  Judith Bah NP Service:  (none) Author Type:  PHYS- Nurse Practitioner     Filed:  2017  4:20 PM Encounter Date:  2017 Status:  Signed     :  Judith Bah NP (PHYS- Nurse Practitioner)            HPI:    Trisha Fernando is a 56 y.o. female who presents to clinic today for dysuria. She was seen 2017 for UTI, tx with bactrim x5 days and UC showed susceptible to bactrim. She reports sx improved and then restarted 2 days later. She has not had recurrent UTI's. This past 6 months has had about 3 infections. Currently she is having burning with urination, decreased output, bladder spasms, frequency and pelvic/back pain. No fevers. She took some AZO yesterday for sx. Hx of kidney stones. No vaginal discharge or drainage.     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 Negative       Scan-ct interpretation   08     CT of abdomen  and pelvis. Fatty liver infiltration, 5 mm non-obstructing stone       Lipoma resection   05/24/2011     Right Forearm Lipoma Excision        Lithotripsy        lithotrypsy, year?       Skin/subcutaneous surgery   2008     Left foot soft tissue resection; dr. Lang       Colonoscopy   06/09/2011     polyp ( Dr. Sutton)  follow up 3 years       Knee arthroscopy   12/2013     Arthroscopy, Knee left Dr Jones       Pr lap cholecystectomy   12/12/2014             Social History        Substance Use Topics          Smoking status:   Former Smoker      Packs/day:  1.00      Years:  12.00      Types:  Cigarettes      Quit date:  9/4/1990      Smokeless tobacco:   Never Used      Alcohol use   1.2 oz/week     2 Standard drinks or equivalent per week        Comment: socially       Current Outpatient Prescriptions       Medication  Sig Dispense Refill     cholecalciferol (VITAMIN D3) 2,000 unit capsule Take 1 capsule by mouth once daily.  0     diphenhydrAMINE (BENADRYL) 25 mg capsule Take 1 capsule by mouth at bedtime.  0     EPINEPHrine (EPIPEN) 0.3 mg/0.3 mL (1:1,000) injection Inject 0.3 mg intramuscular one time if needed for Allergic Reaction. 1 Each 0     furosemide (LASIX) 20 mg tablet TAKE ONE TABLET BY MOUTH EVERY MORNING 90 tablet 2     KLOR-CON 10 10 mEq Controlled-Release tablet TAKE ONE TABLET BY MOUTH ONE TIME DAILY WITH MEALS 90 tablet 2     loratadine (CLARITIN) 10 mg tablet Take 10 mg by mouth once daily. Indications: ALLERGIC RHINITIS       LORazepam (ATIVAN) 0.5 mg tab        losartan (COZAAR) 25 mg tablet TAKE ONE TABLET BY MOUTH ONE TIME DAILY 90 tablet 2     metoprolol tartrate (LOPRESSOR) 25 mg tablet Take 0.5 tablets by mouth 2 times daily.  0     miscellaneous medical supply (BLOOD PRESSURE CUFF) misc As directed. 1 Each 0     MISCELLANEOUS MEDICAL SUPPLY (GRADUATED COMPRESSION STOCKINGS) For personal use. Length: calf Strength: 16-20 mmHg Circumference in cm: For calf: Ankle 15cm, Calf 30cm, Ankle  to calf length 40cm. 1 Packet 1     omega-3 fatty acids-vitamin E (FISH OIL) 1,000 mg cap Take  by mouth once daily.  0     trimethoprim-sulfamethoxazole, 160-800 mg, (BACTRIM DS, SEPTRA DS) tablet Take 1 tablet by mouth 2 times daily. 6 tablet 0     venlafaxine (EFFEXOR XR) 150 mg Extended-Release capsule        warfarin (COUMADIN) 5 mg tablet Take and 5 mg daily 108 tablet 0     No current facility-administered medications for this visit.      Medications have been reviewed by me and are current to the best of my knowledge and ability.    Allergies      Allergen   Reactions     Lisinopril  Angioedema     Swelling       Adhesive Tape-Silicones  Rash and Itching     Mastisol Adhesive [Gum Agpseq-Bakjth-Hnvr-Alcohol]  Rash and Itching       ROS:  Pertinent positives and negatives are noted in HPI.    EXAM:  General appearance: well appearing overweight female, in no acute distress  Respiratory: clear to auscultation bilaterally  Cardiac: RRR with no murmurs  Abdomen: soft, nontender, no no CVAT  Psychological: normal affect, alert and pleasant  Xray: xray independently reviewed and no acute fingings appreciated; pending radiology over-read  Lab:   Results for orders placed or performed in visit on 01/19/17      URINALYSIS W REFLEX MICROSCOPIC IF POSITIVE      Result  Value Ref Range    COLOR                     Yellow Yellow Color    CLARITY                   Clear Clear Clarity    SPECIFIC GRAVITY,URINE    >=1.030 (A) 1.010, 1.015, 1.020, 1.025                    PH,URINE                  5.5 6.0, 7.0, 8.0, 5.5, 6.5, 7.5, 8.5                    UROBILINOGEN,QUALITATIVE  Normal Normal EU/dl    PROTEIN, URINE Trace (A) Negative mg/dL    GLUCOSE, URINE Negative Negative mg/dL    KETONES,URINE             Negative Negative mg/dL    BILIRUBIN,URINE           Negative Negative                    OCCULT BLOOD,URINE        Small (A) Negative                    NITRITE                   Negative Negative                     LEUKOCYTE ESTERASE        Negative Negative                   URINALYSIS MICROSCOPIC      Result  Value Ref Range    RBC 3-5 (A) 0-2, None Seen /HPF    WBC 0-2 0-2, 3-5, None Seen /HPF    BACTERIA                  Few None Seen, Rare, Occasional, Few Bacteria/HPF    EPITHELIAL CELLS          Few None Seen, Few Epi/HPF    CALCIUM OXALATE CRYSTALS  Present (A) (none)                         ASSESSMENT/PLAN:    ICD-10-CM    1. Urine frequency R35.0 URINALYSIS W REFLEX MICROSCOPIC IF POSITIVE      URINALYSIS W REFLEX MICROSCOPIC IF POSITIVE      URINALYSIS MICROSCOPIC      URINALYSIS MICROSCOPIC      XR ABDOMEN 2 VIEW FLAT AND UPRIGHT OR DECUBITUS      URINE CULTURE      NC MEASURE POST VOID RESID US NON IMAGING      URINE CULTURE   2. Acute cystitis without hematuria N30.00 nitrofurantoin macrocrystals/monohydrate (MACROBID) 100 mg capsule      AMB CONSULT TO UROLOGY   3. Kidney stone on right side N20.0 AMB CONSULT TO UROLOGY   Xray appears normal, UA is improved from UA last week. Given sx, suspect possible infection continues. Will tx with macrobid and UC pending. After patient left, radiology report reviewed. I called patient and notified her of probable kidney stone. Encouraged her to continue with abx. Referred to urology for further evaluation of kidney stone. Reviewed need to complete all antibiotics. Discussed typical course of illness, symptomatic treatment and when to return to clinic. Patient in agreement with plan and all questions were answered.         PROCEDURE:  XR ABDOMEN 2 VIEW FLAT AND UPRIGHT OR DECUBITUS    HISTORY:  Urine frequency.    TECHNIQUE:  AP and supine radiographs of the abdomen.    COMPARISON:  CTA 07/29/2008.    FINDINGS:     The lung bases are clear. No subdiaphragmatic air is seen.    No dilated loops of small bowel or air-fluid levels are seen. Clips project over the right upper quadrant. A 9 mm calcification is questioned projecting over the right pelvis, not clearly seen on the  prior CT from 2008.    IMPRESSION:    No obstruction or free air. Question new right pelvic calcification measuring 9 mm. The appearance is nonspecific, however, a system calculus is in the differential. If there is hematuria, consider CT.    Electronically Signed By: Corey Salazar on 1/19/2017 2:26 PM  Patient Instructions   Antibiotics per order.  Notify protime clinic of antibiotic use  Drink plenty of fluids.   Return to clinic if any fevers, vomiting, or flank pain develops as this may be a sign the infection has moved to your kidney's.  We have initiated a urine culture. If any changes are needed in your antibiotics, we will notify you when the results have returned.

## 2018-01-03 NOTE — PROGRESS NOTES
Patient Information     Patient Name MRN Trisha Vasquez [id number] Female 1960      Progress Notes by Maria Guadalupe Ramon DO at 2/15/2017  1:10 PM     Author:  Maria Guadalupe Ramon DO Service:  (none) Author Type:  PHYS- Osteopathic     Filed:  2/15/2017  5:44 PM Encounter Date:  2/15/2017 Status:  Signed     :  Maria Guadalupe Ramon DO (PHYS- Osteopathic)            Chief Complaint     Patient presents with       Follow Up      pain with urination        Subjective:   Ms. Fernando is a 56 y.o. female  seen for the acute concern today of repeat urinary symptoms.  She has been seen multiple times in the last couple months for urinary tract infections.  She has had 5 infections in the past 2 months.  She was seen by [facility] for the majority of these.  She was followed up in urology clinic due to the recurrence of this and imaging was done that showed nonobstructive stones.  It was recommended that if she continues to have symptoms or other issues that she follow-up for possible cystoscopy.  She states that today she would like to have that done.  She will follow up with urology.  She has significant discomfort at the os of the urethra.  She is curious if other things could be occurring.  She has not had any vaginal exam.    She  reports that she quit smoking about 26 years ago. Her smoking use included Cigarettes. She has a 12.00 pack-year smoking history. She has never used smokeless tobacco.    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      ROS:   Pertinent ROS was performed and was negative, including for objective fever, chills, chest pain, shortness of breath, increased lower extremity edema. No other concerns, with exception of HPI above.      Objective:    Pulse 84  Temp 98.6  F (37  C) (Tympanic)  Resp 20  Breastfeeding? No  GEN: Vitals reviewed.  Patient is in no acute distress. Cooperative with exam.  ABD:  Obese, Soft, significantly tender in the suprapubic region, and nondistended.  No rebound. Bowel sounds positive.  SKIN: Warm and dry to touch.  No rash on face, arms and legs.  EXT: No clubbing or cyanosis.  trace peripheral edema.    UA done today and is unable to interpret due to likely use a pyridium.  It did show red blood cells, bacteria.     Assessment/Plan:   1. Acute cystitis with hematuria  - exam consistent with cystitis with suprapubic tenderness, significant pain and overall lethargy.  - will add on urine culture  - will treat with antibiotics; injection of Rocephin given today  - we will see her back on Friday to do a pelvic exam.  - educated on prevention including urination after intercourse, avoidance of bubble baths, douching, scented underware/pads/cosemetics  - may drink cranberry juice to help prevent  - Return/call as needed for follow-up should any new symptoms develop, for worsening of current symptoms or if symptoms do not resolve with above plan.  - cefTRIAXone injection (ROCEPHIN) 2 g; Inject 2 g intramuscular one time.      Return in about 2 days (around 2/17/2017) for urine symptoms. We will do a pelvic exam at that time with Pap smear to be sure that there is no other abnormality.       BONITA OLIVER, DO   2/15/2017 5:37 PM    This document was prepared using voice generated  softwear. While every attempt was made for accuracy, grammatical errors may exist.

## 2018-01-03 NOTE — PROGRESS NOTES
Patient Information     Patient Name MRN Sex Trisha Nj [id number] Female 1960      Progress Notes by Sandoval Self MD at 3/6/2017  1:30 PM     Author:  Sandoval Self MD Service:  (none) Author Type:  Physician     Filed:  3/6/2017  2:30 PM Encounter Date:  3/6/2017 Status:  Signed     :  Sandoval Self MD (Physician)            Type of Visit  EST    Chief Complaint  hematuria    HPI  Ms. Fernando is a 56 y.o. female who follows up with previous left lower abdominal/pelvic pain and hematuria.  I saw her last 6 weeks ago and we elected to follow up in 6 weeks with a UA to evaluate for indication for hematuria work up.  She was diagnosed with BV at that time and took medication.  She feels much better and has no complaints today.  No gross hematuria in the interim.  She feels better today than she has in months.      Family History  Family History       Problem   Relation Age of Onset     Other  Father      accidental death/ gallbladder disease       Other  Mother      HX of kidney stones and blood clots/ gallbladder disease; Alzhemier Disease       Cancer-colon  Maternal Uncle 50     Cancer-colon  Maternal Uncle 50     Other  Maternal Uncle      HX of kidney stones       Other  Brother      HX of kidney stones and blood clots       Heart Disease  Paternal Grandfather      ASCHD       Other  Paternal Aunt      Parkinson's         Review of Systems  I personally reviewed the ROS with the patient.    Nursing Notes:   Linda Davis  3/6/2017  1:33 PM  Signed  Here for 6 week follow up on U/A.  Review of Systems:    Weight loss:    No     Recent fever/chills:  No   Night sweats:   Yes  Current skin rash:  No   Recent hair loss:  No  Heat intolerance:  No   Cold intolerance:  No  Chest pain:   No   Palpitations:   No  Shortness of breath:  Yes   Wheezing:   Yes  Constipation:    No   Diarrhea:   No   Nausea:   No   Vomiting:   No   Kidney/side pain:  No   Back pain:   Yes  Frequent  headaches:  No   Dizziness:     No  Leg swelling:   No   Calf pain:    No      Linda Ryan LPN  3/6/2017  1:30 PM          Physical Exam  Vitals:     03/06/17 1329   BP: 122/80   Resp: 14   Weight: (!) 159.7 kg (352 lb)     Constitutional: NAD, WDWN  Head: NCAT  Eyes: Conjunctivae normal  Cardiovascular: Regular rate  Pulmonary/Chest: Respirations are even and non-labored bilaterally  Abdominal: Soft with no distension, tenderness, masses, guarding.    - left CVA tenderness    - right CVA tenderness  Neurological: A + O x 3,  cranial nerves II-XII grossly intact  Extremities: VICKIE x 4, warm, no clubbing, no cyanosis  Skin: Pink, warm, dry with no rash  Psychiatric:  Normal mood and affect  Genitourinary: Nonpalpable bladder    Labs  CREATININE (mg/dL)    Date Value   12/02/2016 0.84     Results for JULIETTE LANIER (MRN [id number]) as of 3/6/2017 13:37   3/6/2017 13:26   COLOR                     Yellow   CLARITY                   Slightly Cloudy (A)   SPECIFIC GRAVITY,URINE    >=1.030 (A)   PH,URINE                  5.5   UROBILINOGEN,QUALITATIVE  Normal   PROTEIN, URINE Negative   GLUCOSE, URINE Negative   KETONES,URINE             Negative   BILIRUBIN,URINE           Abnormal (A)   OCCULT BLOOD,URINE        Negative   NITRITE                   Negative   LEUKOCYTE ESTERASE        Negative   RBC 0-2   WBC 0-2   BACTERIA Few   EPITHELIAL CELLS Many (A)       Urine Culture  2/17/2017  Negative    Imaging  CT Stone  1/25/2017  IMPRESSION: Nonobstructive renal calculi bilaterally. No ureteral or bladder calculi. Specifically, the questioned calcification on prior x-rays is not identified on CT.    ^^^^^^^^^^^^^^^^^^^^^^^^^^^^^^^^^^^^^^^^^^^^^^^^^^^^^^^^^^^^^^^^^^    KUB  1/19/2017  I personally reviewed and interpreted the images and report.  IMPRESSION:  No obstruction or free air. Question new right pelvic calcification measuring 9 mm. The appearance is nonspecific, however, a system calculus is in the  differential. If there is hematuria, consider CT.    Assessment & Plan  Ms. Fernando is a 56 y.o. female who follows up with resolved urinary symptoms and a negative UA today.  Given the resolution of her symptoms and hematuria we will continue with observation at this point.  If she develops hematuria either gross hematuria or microhematuria in the future she will see me back to discuss a workup.

## 2018-01-03 NOTE — NURSING NOTE
Patient Information     Patient Name MRN Trisha Vasquez [id number] Female 1960      Nursing Note by Linda Davis at 2017  2:00 PM     Author:  Linda Davis Service:  (none) Author Type:  (none)     Filed:  2017  2:25 PM Encounter Date:  2017 Status:  Signed     :  Linda Davis            Here for kidney stone.  Review of Systems:    Weight loss:    No     Recent fever/chills:  Yes   Night sweats:   Yes  Current skin rash:  No   Recent hair loss:  No  Heat intolerance:  No   Cold intolerance:  No  Chest pain:   No   Palpitations:   No  Shortness of breath:  Yes   Wheezing:   Yes  Constipation:    No   Diarrhea:   No   Nausea:   Yes   Vomiting:   No   Kidney/side pain:  No   Back pain:   Yes  Frequent headaches:  No   Dizziness:     No  Leg swelling:   Yes   Calf pain:    No        Parents, brothers or sisters with history of kidney cancer?   No  Parents, brothers or sisters with history of bladder cancer: No      Linda Davis LPN  2017  2:22 PM

## 2018-01-03 NOTE — NURSING NOTE
Patient Information     Patient Name MRN Sex Trisha Nj [id number] Female 1960      Nursing Note by Teagan Harrison at 2017  2:30 PM     Author:  Teagan Harrison Service:  (none) Author Type:  (none)     Filed:  2017  2:40 PM Encounter Date:  2017 Status:  Signed     :  Teagan Harrison            Patient presents to clinic for follow up with ongoing urinary problems.  She has completed the Bactrim DS antibiotic treatment course.    Teagan Harrison LPN..................2017  2:34 PM

## 2018-01-03 NOTE — PATIENT INSTRUCTIONS
Patient Information     Patient Name MRN Trisha Vasquez [id number] Female 1960      Patient Instructions by Smitha Nair RN at 2017 12:30 PM     Author:  Smitha Nair RN  Service:  (none) Author Type:  NURS- Registered Nurse     Filed:  2017 12:16 PM  Encounter Date:  2017 Status:  Addendum     :  Smitha Nair RN (NURS- Registered Nurse)        Related Notes: Original Note by Smitha Nair RN (NURS- Registered Nurse) filed at 2017 12:15 PM            2017 Details    Sun Mon Tue Wed Thu Fri Sat        1               2               3               4                 5               6      5 mg   See details      7      5 mg         8      5 mg         9      5 mg         10      5 mg         11      5 mg           12      5 mg         13      5 mg         14      5 mg         15      5 mg         16      5 mg         17      5 mg         18      5 mg           19      5 mg         20      5 mg         21      5 mg         22      5 mg         23      5 mg         24      5 mg         25      5 mg           26      5 mg         27      5 mg         28      5 mg              Date Details    This INR check               How to take your warfarin dose     To take:  5 mg Take one of the 5 mg tablets.           2017 Details    Sun Mon Tue Wed Thu Fri Sat        1      5 mg         2      5 mg         3      5 mg         4      5 mg           5      5 mg         6      5 mg         7               8               9               10               11                 12               13               14               15               16               17               18                 19               20               21               22               23               24               25                 26               27               28               29               30               31                 Date Details   No additional details    Date  of next INR:  3/6/2017         How to take your warfarin dose     To take:  5 mg Take one of the 5 mg tablets.             Description          Continue same Warfarin dose and recheck in 1 month.  Smitha Nair RN    2/6/2017  12:13 PM                                                        Anticoagulation Summary as of 2/6/2017     INR goal 2.0-3.0    Today's INR 2.4    Next INR check 3/6/2017          Call your Anticoagulation Clinic at Dept: [phone]   if:   1. Any medications are started, stopped, or there is a change in dose.  2. You experience any bleeding that is not easily stopped or if it is recurrent.  3. You notice an increase in bruising or any bruising that does not heal.  You are scheduled for surgery, colonoscopy, dental extraction or any other procedure where you may need to stop your Coumadin (warfarin).

## 2018-01-03 NOTE — NURSING NOTE
Patient Information     Patient Name MRN Trisha Vasquez [id number] Female 1960      Nursing Note by Linda Davis at 3/6/2017  1:30 PM     Author:  Linda Davis Service:  (none) Author Type:  (none)     Filed:  3/6/2017  1:33 PM Encounter Date:  3/6/2017 Status:  Signed     :  Linda Davis            Here for 6 week follow up on U/A.  Review of Systems:    Weight loss:    No     Recent fever/chills:  No   Night sweats:   Yes  Current skin rash:  No   Recent hair loss:  No  Heat intolerance:  No   Cold intolerance:  No  Chest pain:   No   Palpitations:   No  Shortness of breath:  Yes   Wheezing:   Yes  Constipation:    No   Diarrhea:   No   Nausea:   No   Vomiting:   No   Kidney/side pain:  No   Back pain:   Yes  Frequent headaches:  No   Dizziness:     No  Leg swelling:   No   Calf pain:    No      Linda Davis LPN  3/6/2017  1:30 PM

## 2018-01-03 NOTE — PROGRESS NOTES
Patient Information     Patient Name MRN Sex Trisha Nj [id number] Female 1960      Progress Notes by Niecy Zapata NP at 2017 12:45 PM     Author:  Niecy Zapata NP Service:  (none) Author Type:  PHYS- Nurse Practitioner     Filed:  2017  2:16 PM Encounter Date:  2017 Status:  Signed     :  Niecy Zapata NP (PHYS- Nurse Practitioner)            Nursing Notes:   Angelique Vaughn  2017  1:01 PM  Signed  Patient presents to the clinic today for  Possible UTI.   Angelique Vaughn CMA ([facility])........2017 12:53 PM     HPI:  Trisha Fernando is a 56 y.o. female who presents for bladder concerns. Symptoms include bladder spasm, suprapubic discomfort and pressure, right low back pain, frequency, urgency, and malaise for the past 4-5 days.  No fevers.  No chills or sweats.  No nausea or vomiting.  Appetite normal.  No diarrhea or constipation.  No vaginal discharge or itching. No pregnancy concerns.  Tried cranberry juice.  She had a UTI a month ago, started on Macrobid, then switched to Bactrim.         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        Past Surgical History       Procedure   Laterality Date      section   ,      Tonsillectomy        Breast biopsy        Right for fiboadenoma        Breast reduction   06/30/03     Scan-ct interpretation   2008     CT of chest. Negative       Scan-ct interpretation   07/29/08     CT of abdomen and pelvis. Fatty liver infiltration, 5 mm non-obstructing stone       Lipoma resection   05/24/2011     Right Forearm Lipoma Excision        Lithotripsy        lithotrypsy, year?       Skin/subcutaneous surgery   2008     Left foot soft tissue resection; dr. Lang       Colonoscopy   06/09/2011     polyp ( Dr. Sutton)  follow up 3 years       Knee arthroscopy   12/2013     Arthroscopy, Knee left Dr Jones       Pr lap cholecystectomy   12/12/2014               Social History        Substance Use Topics          Smoking status:   Former Smoker      Packs/day:  1.00      Years:  12.00      Types:  Cigarettes      Quit date:  9/4/1990      Smokeless tobacco:   Never Used      Alcohol use   1.2 oz/week     2 Standard drinks or equivalent per week        Comment: socially         Current Outpatient Prescriptions       Medication  Sig Dispense Refill     cholecalciferol (VITAMIN D3) 2,000 unit capsule Take 1 capsule by mouth once daily.  0     diphenhydrAMINE (BENADRYL) 25 mg capsule Take 1 capsule by mouth at bedtime.  0     EPINEPHrine (EPIPEN) 0.3 mg/0.3 mL (1:1,000) injection Inject 0.3 mg intramuscular one time if needed for Allergic Reaction. 1 Each 0     furosemide (LASIX) 20 mg tablet TAKE ONE TABLET BY MOUTH EVERY MORNING 90 tablet 2     KLOR-CON 10 10 mEq Controlled-Release tablet TAKE ONE TABLET BY MOUTH ONE TIME DAILY WITH MEALS 90 tablet 2     loratadine (CLARITIN) 10 mg tablet Take 10 mg by mouth once daily. Indications: ALLERGIC RHINITIS       LORazepam (ATIVAN) 0.5 mg tab        losartan (COZAAR) 25 mg tablet TAKE ONE TABLET BY MOUTH ONE TIME DAILY 90 tablet 2     metoprolol tartrate (LOPRESSOR) 25 mg tablet Take 0.5 tablets by mouth 2 times daily.  0     miscellaneous medical supply (BLOOD PRESSURE CUFF) misc As directed. 1 Each 0     MISCELLANEOUS MEDICAL  "SUPPLY (GRADUATED COMPRESSION STOCKINGS) For personal use. Length: calf Strength: 16-20 mmHg Circumference in cm: For calf: Ankle 15cm, Calf 30cm, Ankle to calf length 40cm. 1 Packet 1     omega-3 fatty acids-vitamin E (FISH OIL) 1,000 mg cap Take  by mouth once daily.  0     trimethoprim-sulfamethoxazole, 160-800 mg, (BACTRIM DS, SEPTRA DS) tablet Take 1 tablet by mouth 2 times daily. 6 tablet 0     venlafaxine (EFFEXOR XR) 150 mg Extended-Release capsule        warfarin (COUMADIN) 5 mg tablet Take and 5 mg daily 108 tablet 0     No current facility-administered medications for this visit.      Medications have been reviewed by me and are current to the best of my knowledge and ability.      Allergies      Allergen   Reactions     Lisinopril  Angioedema     Swelling       Adhesive Tape-Silicones  Rash and Itching     Mastisol Adhesive [Gum Uzphgr-Yjhrjk-Uktc-Alcohol]  Rash and Itching       REVIEW OF SYSTEMS:  Refer to HPI.      EXAM:   Vitals:    /80  Pulse 68  Temp 97.2  F (36.2  C) (Tympanic)  Ht 1.727 m (5' 8\")  Breastfeeding? No    General Appearance: Pleasant, alert, appropriate appearance for age. No acute distress  Chest/Respiratory Exam: Normal chest wall and respirations. Clear to auscultation.  Cardiovascular Exam: Regular rate and rhythm. S1, S2, no murmur  Gastrointestinal Exam: Soft, no masses or organomegaly. Abdomen non-tender. Normal BS x 4. Suprapubic tenderness. No CVA tenderness to palpation. No rebound tenderness or guarding.  Psychiatric Exam: Alert and oriented - appropriate affect.    Labs:   Results for orders placed or performed in visit on 01/11/17      URINALYSIS W REFLEX MICROSCOPIC IF POSITIVE      Result  Value Ref Range    COLOR                     Yellow Yellow Color    CLARITY                   Slightly Cloudy (A) Clear Clarity    SPECIFIC GRAVITY,URINE    >=1.030 (A) 1.010, 1.015, 1.020, 1.025                    PH,URINE                  6.0 6.0, 7.0, 8.0, 5.5, 6.5, 7.5, 8.5 "                    UROBILINOGEN,QUALITATIVE  Normal Normal EU/dl    PROTEIN, URINE Negative Negative mg/dL    GLUCOSE, URINE Negative Negative mg/dL    KETONES,URINE             Negative Negative mg/dL    BILIRUBIN,URINE           Negative Negative                    OCCULT BLOOD,URINE        Trace (A) Negative                    NITRITE                   Positive (A) Negative                    LEUKOCYTE ESTERASE        Trace (A) Negative                   URINALYSIS MICROSCOPIC      Result  Value Ref Range    RBC 3-5 (A) 0-2, None Seen /HPF    WBC 6-10 (A) 0-2, 3-5, None Seen /HPF    BACTERIA                  Moderate (A) None Seen, Rare, Occasional, Few Bacteria/HPF    EPITHELIAL CELLS          Few None Seen, Few Epi/HPF       ASSESSMENT AND PLAN:      ICD-10-CM    1. Dysuria R30.0 URINALYSIS W REFLEX MICROSCOPIC IF POSITIVE      URINALYSIS W REFLEX MICROSCOPIC IF POSITIVE      URINALYSIS MICROSCOPIC      URINALYSIS MICROSCOPIC   2. Urinary tract infection with hematuria, site unspecified N39.0 trimethoprim-sulfamethoxazole, 160-800 mg, (BACTRIM DS, SEPTRA DS) tablet     R31.9 URINE CULTURE         Urinalysis - positive nitrite, WBCs, RBCs, and bacteria present  Urine culture pending  Bactrim 160-800 mg BID x 5 days   Encouraged fluids and frequent bladder emptying.  May use Pyridium OTC PRN.   Call or return to clinic PRN if these symptoms worsen or fail to improve as anticipated. Will call if culture warrants change of abx.         Patient Instructions   Antibiotic has been sent to pharmacy. Please take full course of antibiotic even if symptoms have completely resolved. This helps prevent against antibiotic resistance.     Bactrim twice daily x 5 days    We will culture the urine to see what bacteria grows out of the urine.  We will call the patient if a change of antibiotic is necessary per the culture.      Patient was instructed in increase fluids including water and cranberry juice.      Monitor for  fevers, chills, back pain.  Return to clinic after antibiotic completion if symptoms are not resolved.  Call clinic if symptoms change/worsen.          KALIA JACK NP..................1/11/2017 12:59 PM

## 2018-01-03 NOTE — TELEPHONE ENCOUNTER
Patient Information     Patient Name MRN Trisha Vasquez [id number] Female 1960      Telephone Encounter by Maria Guadalupe Ramon DO at 2/15/2017  3:38 PM     Author:  Maria Guadalupe Ramon DO Service:  (none) Author Type:  PHYS- Osteopathic     Filed:  2/15/2017  3:42 PM Encounter Date:  2/15/2017 Status:  Signed     :  Maria Guadalupe Ramon DO (PHYS- Osteopathic)            I would recommend that she come now and I can fit her in before the end of the day.  Order placed for a UA.

## 2018-01-03 NOTE — PATIENT INSTRUCTIONS
Patient Information     Patient Name MRN Trisha Vasquez [id number] Female 1960      Patient Instructions by Smitha Nair RN at 3/6/2017  1:00 PM     Author:  Smitha Nair RN Service:  (none) Author Type:  NURS- Registered Nurse     Filed:  3/6/2017  1:05 PM Encounter Date:  3/6/2017 Status:  Signed     :  Smitha Nair RN (NURS- Registered Nurse)            2017 Details    Sun Mon Tue Wed Thu Fri Sat        1               2               3               4                 5               6      5 mg   See details      7      5 mg         8      5 mg         9      5 mg         10      5 mg         11      5 mg           12      5 mg         13      5 mg         14      5 mg         15      5 mg         16      5 mg         17      5 mg         18      5 mg           19      5 mg         20      5 mg         21      5 mg         22      5 mg         23      5 mg         24      5 mg         25      5 mg           26      5 mg         27      5 mg         28      5 mg         29      5 mg         30      5 mg         31      5 mg           Date Details    This INR check               How to take your warfarin dose     To take:  5 mg Take one of the 5 mg tablets.           2017 Details    Sun Mon Tue Wed Thu Fri Sat           1      5 mg           2      5 mg         3      5 mg         4               5               6               7               8                 9               10               11               12               13               14               15                 16               17               18               19               20               21               22                 23               24               25               26               27               28               29                 30                      Date Details   No additional details    Date of next INR:  4/3/2017         How to take your warfarin dose     To take:  5  mg Take one of the 5 mg tablets.             Description          Continue same Warfarin dose and recheck in 1 month.  Smitha Nair RN    3/6/2017  1:05 PM                                                          Anticoagulation Summary as of 3/6/2017     INR goal 2.0-3.0    Today's INR 2.1    Next INR check 4/3/2017          Call your Anticoagulation Clinic at Dept: [phone]   if:   1. Any medications are started, stopped, or there is a change in dose.  2. You experience any bleeding that is not easily stopped or if it is recurrent.  3. You notice an increase in bruising or any bruising that does not heal.  You are scheduled for surgery, colonoscopy, dental extraction or any other procedure where you may need to stop your Coumadin (warfarin).

## 2018-01-03 NOTE — PATIENT INSTRUCTIONS
Patient Information     Patient Name MRN Trisha Vasquez [id number] Female 1960      Patient Instructions by Maria Guadalupe Ramon DO at 2/15/2017  1:10 PM     Author:  Maria Guadalupe Ramon DO Service:  (none) Author Type:  PHYS- Osteopathic     Filed:  2/15/2017  5:44 PM Encounter Date:  2/15/2017 Status:  Signed     :  Maria Guadalupe Ramon DO (PHYS- Osteopathic)            Your symptoms are most consistent with a bacterial bladder infection.    Increase water intake. May try cranberry juice daily to help prevent    To prevent UTI's I would recommend the following: urination after intercourse, avoidance of bubble baths, douching, avoid scented underware/pads/cosemetics    Call if symptoms do not improve in a couple days or if you develop any medication reactions.    General  - get extra rest  - drink plenty of fluid  - avoid tobacco products    You will need to be evaluated if you start to experience:   Fever higher than 102.5 F (39.2 C)   Worsening of current symptoms  Trouble breathing, chest pain or a stiff neck    * If you are a smoker, try to quit *

## 2018-01-03 NOTE — NURSING NOTE
Patient Information     Patient Name MRN Trisha Vasquez [id number] Female 1960      Nursing Note by Linda Davis at 2017 11:45 AM     Author:  Linda Davis Service:  (none) Author Type:  (none)     Filed:  2017 11:55 AM Encounter Date:  2017 Status:  Signed     :  Linda Davis            Here for Dysuria.  Review of Systems:    Weight loss:    No     Recent fever/chills:  No   Night sweats:   No  Current skin rash:  No   Recent hair loss:  No  Heat intolerance:  No   Cold intolerance:  No  Chest pain:   No   Palpitations:   No  Shortness of breath:  No   Wheezing:   Yes  Constipation:    No   Diarrhea:   No   Nausea:   Yes   Vomiting:   No   Kidney/side pain:  No   Back pain:   Yes  Frequent headaches:  No   Dizziness:     No  Leg swelling:   No   Calf pain:    No      Linda Davis LPN  2017  11:49 AM

## 2018-01-03 NOTE — PROGRESS NOTES
Patient Information     Patient Name MRN Sex Trisha Nj [id number] Female 1960      Progress Notes by Judith Bah NP at 2017  1:00 PM     Author:  Judith Bah NP Service:  (none) Author Type:  PHYS- Nurse Practitioner     Filed:  2017  3:02 PM Encounter Date:  2017 Status:  Signed     :  Judith Bah NP (PHYS- Nurse Practitioner)            HPI:    Trisha Fernando is a 56 y.o. female who presents to clinic today for urinary concerns. She has been seen multiple times fur urinary sx. She has had f/u with urology for kidney stones and CT scan done. Today is having pelvic pressure and low back pain. She has had ongoing sx, worse today and this past week. She does have burning with urinary. No fevers. She not taken anything for sx today. Has been using Aleve, tylenol and tylenol #3.  Has urology f/u on 3/6/2017.     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 Negative       Scan-ct interpretation   08     CT of abdomen and pelvis. Fatty liver infiltration, 5 mm non-obstructing  stone       Lipoma resection   05/24/2011     Right Forearm Lipoma Excision        Lithotripsy        lithotrypsy, year?       Skin/subcutaneous surgery   2008     Left foot soft tissue resection; dr. Lang       Colonoscopy   06/09/2011     polyp ( Dr. Sutton)  follow up 3 years       Knee arthroscopy   12/2013     Arthroscopy, Knee left Dr Jones       Pr lap cholecystectomy   12/12/2014             Social History        Substance Use Topics          Smoking status:   Former Smoker      Packs/day:  1.00      Years:  12.00      Types:  Cigarettes      Quit date:  9/4/1990      Smokeless tobacco:   Never Used      Alcohol use   1.2 oz/week     2 Standard drinks or equivalent per week        Comment: socially       Current Outpatient Prescriptions       Medication  Sig Dispense Refill     cholecalciferol (VITAMIN D3) 2,000 unit capsule Take 1 capsule by mouth once daily.  0     diphenhydrAMINE (BENADRYL) 25 mg capsule Take 1 capsule by mouth at bedtime.  0     EPINEPHrine (EPIPEN) 0.3 mg/0.3 mL (1:1,000) injection Inject 0.3 mg intramuscular one time if needed for Allergic Reaction. 1 Each 0     furosemide (LASIX) 20 mg tablet TAKE ONE TABLET BY MOUTH EVERY MORNING 90 tablet 2     KLOR-CON 10 10 mEq Controlled-Release tablet TAKE ONE TABLET BY MOUTH ONE TIME DAILY WITH MEALS 90 tablet 2     loratadine (CLARITIN) 10 mg tablet Take 10 mg by mouth once daily. Indications: ALLERGIC RHINITIS       LORazepam (ATIVAN) 0.5 mg tab        losartan (COZAAR) 25 mg tablet TAKE ONE TABLET BY MOUTH ONE TIME DAILY 90 tablet 2     metoprolol tartrate (LOPRESSOR) 25 mg tablet Take 0.5 tablets by mouth 2 times daily.  0     miscellaneous medical supply (BLOOD PRESSURE CUFF) misc As directed. 1 Each 0     MISCELLANEOUS MEDICAL SUPPLY (GRADUATED COMPRESSION STOCKINGS) For personal use. Length: calf Strength: 16-20 mmHg Circumference in cm: For calf: Ankle 15cm, Calf 30cm, Ankle to calf length 40cm. 1 Packet 1     omega-3 fatty  acids-vitamin E (FISH OIL) 1,000 mg cap Take  by mouth once daily.  0     venlafaxine (EFFEXOR XR) 150 mg Extended-Release capsule        warfarin (COUMADIN) 5 mg tablet Take 5 mg daily or as directed by the protime clinic. 90 tablet 0     warfarin (COUMADIN) 5 mg tablet Take and 5 mg daily 108 tablet 0     No current facility-administered medications for this visit.      Medications have been reviewed by me and are current to the best of my knowledge and ability.    Allergies      Allergen   Reactions     Lisinopril  Angioedema     Swelling       Adhesive Tape-Silicones  Rash and Itching     Mastisol Adhesive [Gum Soeyjh-Eevukz-Yxrz-Alcohol]  Rash and Itching       ROS:  Pertinent positives and negatives are noted in HPI.    EXAM:  General appearance: well appearing female in no acute distress  Respiratory: clear to auscultation bilaterally  Cardiac: RRR with no murmurs  Abdomen: soft, nontender, no masses or organomegally, no CVAT   Psychological: normal affect, alert and pleasant  Lab:   Results for orders placed or performed in visit on 02/05/17      URINALYSIS W REFLEX MICROSCOPIC IF POSITIVE      Result  Value Ref Range    COLOR                     Yellow Yellow Color    CLARITY                   Slightly Cloudy (A) Clear Clarity    SPECIFIC GRAVITY,URINE    1.010 1.010, 1.015, 1.020, 1.025                    PH,URINE                  6.0 6.0, 7.0, 8.0, 5.5, 6.5, 7.5, 8.5                    UROBILINOGEN,QUALITATIVE  Normal Normal EU/dl    PROTEIN, URINE Negative Negative mg/dL    GLUCOSE, URINE Negative Negative mg/dL    KETONES,URINE             Negative Negative mg/dL    BILIRUBIN,URINE           Negative Negative                    OCCULT BLOOD,URINE        Trace (A) Negative                    NITRITE                   Negative Negative                    LEUKOCYTE ESTERASE        Moderate (A) Negative                   URINALYSIS MICROSCOPIC      Result  Value Ref Range    RBC 0-2 0-2, None Seen /HPF     WBC 11-25 (A) 0-2, 3-5, None Seen /HPF    BACTERIA                  None Seen None Seen, Rare, Occasional, Few Bacteria/HPF    EPITHELIAL CELLS          None Seen None Seen, Few Epi/HPF         ASSESSMENT/PLAN:    ICD-10-CM    1. Dysuria R30.0 URINALYSIS W REFLEX MICROSCOPIC IF POSITIVE      URINALYSIS W REFLEX MICROSCOPIC IF POSITIVE      URINALYSIS MICROSCOPIC      URINALYSIS MICROSCOPIC      URINE CULTURE      phenazopyridine (PYRIDIUM) 200 mg tablet      AMB CONSULT TO UROLOGY      URINE CULTURE   2. Kidney stones N20.0 AMB CONSULT TO UROLOGY   UA with trace blood, this is consistent for her. She has no bacteria but shows WBC. UC ordered. Recommend UC results before tx with abx. Given Rx for pyridium for sx until results. She should f/u with urology this next week for ongoing sx. All questions were answered and she is in agreement with plan.     Patient Instructions   Urine culture pending  Use pyridium as ordered  Follow up with urology next week

## 2018-01-03 NOTE — NURSING NOTE
"Patient Information     Patient Name MRN Trisha Vasquez [id number] Female 1960      Nursing Note by Melissa Velazquez RN at 2017  2:00 PM     Author:  Melissa Velazquez RN Service:  (none) Author Type:  NURS- Registered Nurse     Filed:  2017  2:09 PM Encounter Date:  2017 Status:  Signed     :  Melissa Velazquez RN (NURS- Registered Nurse)            Per Sandoval Self MD: \"Slight change the plan is follow up in 6 weeks with urinalysis given her microhematuria- to assure it results.  Please call her and let her know\"  Patient notified of this and appointment scheduled for 3/6/2017 at 1:30.  Melissa Velazquez RN.........2017...2:09 PM         "

## 2018-01-03 NOTE — ADDENDUM NOTE
Patient Information     Patient Name MRN Trisha Vasquez [id number] Female 1960      Addendum Note by Sandoval Betancourt MD at 2017  3:08 PM     Author:  Sandoval Betancourt MD Service:  (none) Author Type:  Physician     Filed:  2017  3:08 PM Encounter Date:  2017 Status:  Signed     :  Sandoval Betancourt MD (Physician)       Addended by: SANDOVAL BETANCOURT on: 2017 03:08 PM        Modules accepted: Orders

## 2018-01-03 NOTE — TELEPHONE ENCOUNTER
Patient Information     Patient Name MRN Trisha Vasquez [id number] Female 1960      Telephone Encounter by Maria Guadalupe Ramon DO at 2/15/2017  4:11 PM     Author:  Maria Guadalupe Ramon DO Service:  (none) Author Type:  PHYS- Osteopathic     Filed:  2/15/2017  4:11 PM Encounter Date:  2/15/2017 Status:  Signed     :  Maria Guadalupe Ramon DO (PHYS- Osteopathic)            Noted.

## 2018-01-03 NOTE — PATIENT INSTRUCTIONS
Patient Information     Patient Name Trisha Burton [id number] Female 1960      Patient Instructions by Judith Bah NP at 2017  1:00 PM     Author:  Judith Bah NP Service:  (none) Author Type:  PHYS- Nurse Practitioner     Filed:  2017  1:34 PM Encounter Date:  2017 Status:  Signed     :  Judith Bah NP (PHYS- Nurse Practitioner)            Urine culture pending  Use pyridium as ordered  Follow up with urology next week

## 2018-01-03 NOTE — TELEPHONE ENCOUNTER
Patient Information     Patient Name MRN Trisha Vasquez [id number] Female 1960      Telephone Encounter by Maria Guadalupe Ramon DO at 3/1/2017  5:40 PM     Author:  Maria Guadalupe Ramon DO Service:  (none) Author Type:  PHYS- Osteopathic     Filed:  3/1/2017  5:44 PM Encounter Date:  3/1/2017 Status:  Signed     :  Maria Guadalupe Ramon DO (PHYS- Osteopathic)            Patient called with results of Pap smear.  She will need repeat in  given her positive cytology with negative HPV.    She is not having any symptoms of yeast at this time.  We will not treat currently but she is to call if any symptoms recur.

## 2018-01-03 NOTE — NURSING NOTE
Patient Information     Patient Name MRN Trisha Vasquez [id number] Female 1960      Nursing Note by Brii Sin at 2017  1:00 PM     Author:  Brii Sin Service:  (none) Author Type:  (none)     Filed:  2017  1:08 PM Encounter Date:  2017 Status:  Signed     :  Brii Sin            Patient presents today for burning with urination, urgency, bladder spasms. Patient states she finished her antibiotic  and symptoms returned Tuesday.  Brii Sin LPN ....................  2017   1:01 PM

## 2018-01-03 NOTE — TELEPHONE ENCOUNTER
Patient Information     Patient Name MRTrisha Umanzor [id number] Female 1960      Telephone Encounter by Linda Davis at 2017  3:16 PM     Author:  Linda Davis Service:  (none) Author Type:  (none)     Filed:  2017  3:18 PM Encounter Date:  2017 Status:  Signed     :  Linda Davis            Per Sandoval Self MD Positive U/C. RX sent to [facility]. Left message to call back.  Linda Davis LPN  2017  3:18 PM

## 2018-01-03 NOTE — NURSING NOTE
Patient Information     Patient Name MRTrisha Umanzor [id number] Female 1960      Nursing Note by Courtney Soni at 2017  1:00 PM     Author:  Courtney Soni Service:  (none) Author Type:  NURS- Licensed Practical Nurse     Filed:  2017  1:25 PM Encounter Date:  2017 Status:  Signed     :  Courtney Soni            Patient presents to the clinic today with complaints of frequency and urgency with urination, back pain and pelvic pain x 1 week.  Urine analysis initiated per verbal order that was read back and verified.  Courtney Soni LPN .............2017  1:15 PM

## 2018-01-03 NOTE — TELEPHONE ENCOUNTER
Patient Information     Patient Name MRN Trisha Vasquez [id number] Female 1960      Telephone Encounter by Maria D Darling at 2/15/2017  3:14 PM     Author:  Maria D Darling Service:  (none) Author Type:  (none)     Filed:  2/15/2017  3:16 PM Encounter Date:  2/15/2017 Status:  Signed     :  Maria D Darling            Patient has lower bladder and urethra pain.  Patient is requesting a work in appointment.

## 2018-01-03 NOTE — TELEPHONE ENCOUNTER
Patient Information     Patient Name MRN Sex Trisha Nj [id number] Female 1960      Telephone Encounter by Teagan Harrison at 2/15/2017  3:47 PM     Author:  Teagan Harrison Service:  (none) Author Type:  (none)     Filed:  2/15/2017  3:49 PM Encounter Date:  2/15/2017 Status:  Signed     :  Teagan Harrison            Patient requesting to be seen for urethra pain.  No openings in Maria Guadalupe Ramon DO schedule at this time.  Assisted with scheduling appointment for 7:50am tomorrow morning 17.  She will drop off urine sample today and be notified of results with possible antibiotic treatment.    Teagan Harrison LPN..................2/15/2017  3:49 PM

## 2018-01-03 NOTE — TELEPHONE ENCOUNTER
Patient Information     Patient Name MRTrisha Umanzor [id number] Female 1960      Telephone Encounter by Linda Davis at 2017  8:26 AM     Author:  Linda Davis Service:  (none) Author Type:  (none)     Filed:  2017  8:26 AM Encounter Date:  2017 Status:  Signed     :  Linda Davis            Spoke with Pt and she will  the RX.  Linda Davis LPN  2017  8:26 AM

## 2018-01-03 NOTE — PATIENT INSTRUCTIONS
Patient Information     Patient Name MRN Trisha Vasquez [id number] Female 1960      Patient Instructions by Niecy Zapata NP at 2017 12:45 PM     Author:  Niecy Zapata NP Service:  (none) Author Type:  PHYS- Nurse Practitioner     Filed:  2017  1:07 PM Encounter Date:  2017 Status:  Signed     :  Niecy Zapata NP (PHYS- Nurse Practitioner)            Antibiotic has been sent to pharmacy. Please take full course of antibiotic even if symptoms have completely resolved. This helps prevent against antibiotic resistance.     Bactrim twice daily x 5 days    We will culture the urine to see what bacteria grows out of the urine.  We will call the patient if a change of antibiotic is necessary per the culture.      Patient was instructed in increase fluids including water and cranberry juice.      Monitor for fevers, chills, back pain.  Return to clinic after antibiotic completion if symptoms are not resolved.  Call clinic if symptoms change/worsen.

## 2018-01-03 NOTE — PATIENT INSTRUCTIONS
Patient Information     Patient Name MRN Trisha Vasquez [id number] Female 1960      Patient Instructions by Maria Guadalupe Ramon DO at 2017  2:30 PM     Author:  Maria Guadalupe Ramon DO Service:  (none) Author Type:  PHYS- Osteopathic     Filed:  2017  3:01 PM Encounter Date:  2017 Status:  Signed     :  Maria Guadalupe Ramon DO (PHYS- Osteopathic)            - Return/call as needed for follow-up should any new symptoms develop, for worsening of current symptoms or if symptoms do not resolve

## 2018-01-03 NOTE — PATIENT INSTRUCTIONS
Patient Information     Patient Name MRTrisha Umanzor [id number] Female 1960      Patient Instructions by Judith Bah NP at 2017  1:00 PM     Author:  Judith Bah NP Service:  (none) Author Type:  PHYS- Nurse Practitioner     Filed:  2017  2:10 PM Encounter Date:  2017 Status:  Signed     :  Judith Bah NP (PHYS- Nurse Practitioner)            Antibiotics per order.  Notify protime clinic of antibiotic use  Drink plenty of fluids.   Return to clinic if any fevers, vomiting, or flank pain develops as this may be a sign the infection has moved to your kidney's.  We have initiated a urine culture. If any changes are needed in your antibiotics, we will notify you when the results have returned.

## 2018-01-03 NOTE — PROGRESS NOTES
Patient Information     Patient Name MRN Sex Trisha Nj [id number] Female 1960      Progress Notes by Sandoval Self MD at 2017  2:00 PM     Author:  Sandoval Self MD Service:  (none) Author Type:  Physician     Filed:  2017  4:08 PM Encounter Date:  2017 Status:  Signed     :  Sandoval Self MD (Physician)            I was asked to see this patient by Dr Bah and provide my opinion about the following:  Kidney stone    Type of Visit  Consult    Chief Complaint  Kidney stone    HPI  Ms. Fernando is a 56 y.o. female who presents with questionable right ureteral stone on KUB.  The patient initially presented to [facility] 1 week ago with multiple voiding complaints.  At that time the patient underwent a renal ultrasound which revealed a possible 9 mm stone.  The patient currently denies fevers or chills.  The patient currently denies nausea or vomiting.  Her urinary symptoms which include urgency and frequency have persisted over the course of the last 3 weeks in spite of being on culture proven sensitive antibiotic regimen.  She denies fevers or chills.  Her pain is bilateral low pelvic pain.      Past Medical History  She  has a past medical history of Daytime somnolence (2015); Depression, major, recurrent, mild (HC); DUB (dysfunctional uterine bleeding); Factor 5 Leiden mutation, heterozygous (HC); History of DVT (deep vein thrombosis) (2008); Hyperlipidemia; Hypertension; Lipoma (2011); Nephrolithiasis; Obesity; Postoperative pain, acute, knee (2013); Sarcoma (HC) (); and Thoracic aortic aneurysm (HC).  Patient Active Problem List     Diagnosis  Code     Anticoagulant long-term use Z79.01     Anticoagulation monitoring, INR range 2-3 Z79.01     Varicose veins of both legs with edema I83.893     Depression, major, recurrent, mild (HC) F33.0     Daytime somnolence R40.0     Health care maintenance Z00.00     Obesity E66.9     Hypertension I10      Ascending aortic aneurysm (HCC) - MRI with cards in 2016 and then based on size; if < 4.5 annual exam okay  I71.2       Past Surgical History  She  has a past surgical history that includes  section (, ); tonsillectomy; breast biopsy; breast reduction (03); scan-ct interpretation (); scan-ct interpretation (08); lipoma resection (2011); lithotripsy; skin/subcutaneous surgery (); colonoscopy (2011); knee arthroscopy (2013); and lap cholecystectomy (2014).    Medications  She has a current medication list which includes the following prescription(s): cholecalciferol, diphenhydramine, epinephrine, furosemide, klor-con 10, loratadine, lorazepam, losartan, metoprolol tartrate, miscellaneous medical supply, graduated compression stockings, nitrofurantoin macrocrystals/monohydrate, omega-3 fatty acids-vitamin e, venlafaxine, and warfarin.    Allergies  Allergies      Allergen   Reactions     Lisinopril  Angioedema     Swelling       Adhesive Tape-Silicones  Rash and Itching     Mastisol Adhesive [Gum Sychxv-Noyhjv-Tyjb-Alcohol]  Rash and Itching       Social History  She  reports that she quit smoking about 26 years ago. Her smoking use included Cigarettes. She has a 12.00 pack-year smoking history. She has never used smokeless tobacco. She reports that she drinks about 1.2 oz of alcohol per week  She reports that she does not use illicit drugs.  No drug abuse.    Family History  Family History       Problem   Relation Age of Onset     Other  Father      accidental death/ gallbladder disease       Other  Mother      HX of kidney stones and blood clots/ gallbladder disease; Alzhemier Disease       Cancer-colon  Maternal Uncle 50     Cancer-colon  Maternal Uncle 50     Other  Maternal Uncle      HX of kidney stones       Other  Brother      HX of kidney stones and blood clots       Heart Disease  Paternal Grandfather      ASCHD       Other  Paternal Aunt       "Parkinson's         Review of Systems  I personally reviewed the ROS with the patient.    Nursing Notes:   Ryan, Linda  1/25/2017  2:25 PM  Signed  Here for kidney stone.  Review of Systems:    Weight loss:    No     Recent fever/chills:  Yes   Night sweats:   Yes  Current skin rash:  No   Recent hair loss:  No  Heat intolerance:  No   Cold intolerance:  No  Chest pain:   No   Palpitations:   No  Shortness of breath:  Yes   Wheezing:   Yes  Constipation:    No   Diarrhea:   No   Nausea:   Yes   Vomiting:   No   Kidney/side pain:  No   Back pain:   Yes  Frequent headaches:  No   Dizziness:     No  Leg swelling:   Yes   Calf pain:    No        Parents, brothers or sisters with history of kidney cancer?   No  Parents, brothers or sisters with history of bladder cancer: No      Linda Davis LPN  1/25/2017  2:22 PM        Physical Exam  Vitals:     01/25/17 1421   BP: 152/94   Resp: 14   Weight: (!) 158.3 kg (349 lb)   Height: 1.73 m (5' 8.11\")     Constitutional: NAD, WDWN  Head: NCAT  Eyes: Conjunctivae normal  Cardiovascular: Regular rate  Pulmonary/Chest: Respirations are even and non-labored bilaterally  Abdominal: Soft with no distension, tenderness, masses, guarding.    - left CVA tenderness    - right CVA tenderness  Neurological: A + O x 3,  cranial nerves II-XII grossly intact  Extremities: VICKIE x 4, warm, no clubbing, no cyanosis  Skin: Pink, warm, dry with no rash  Psychiatric:  Normal mood and affect  Genitourinary: Nonpalpable bladder    Labs  CREATININE (mg/dL)    Date Value   12/02/2016 0.84     Results for JULIETTE LANIER (MRN [id number]) as of 1/25/2017 14:25   1/11/2017 12:37 1/19/2017 13:09   COLOR                     Yellow Yellow   CLARITY                   Slightly Cloudy (A) Clear   SPECIFIC GRAVITY,URINE    >=1.030 (A) >=1.030 (A)   PH,URINE                  6.0 5.5   UROBILINOGEN,QUALITATIVE  Normal Normal   PROTEIN, URINE Negative Trace (A)   GLUCOSE, URINE Negative Negative "   KETONES,URINE             Negative Negative   BILIRUBIN,URINE           Negative Negative   OCCULT BLOOD,URINE        Trace (A) Small (A)   NITRITE                   Positive (A) Negative   LEUKOCYTE ESTERASE        Trace (A) Negative   RBC 3-5 (A) 3-5 (A)   WBC 6-10 (A) 0-2   BACTERIA Moderate (A) Few   EPITHELIAL CELLS Few Few   CALCIUM OXALATE CRYSTALS   Present (A)       Imaging  KUB  1/19/2017  I personally reviewed and interpreted the images and report.  IMPRESSION:  No obstruction or free air. Question new right pelvic calcification measuring 9 mm. The appearance is nonspecific, however, a system calculus is in the differential. If there is hematuria, consider CT.    Assessment & Plan  Ms. Fernando is a 56 y.o. female who presents with persistent urinary symptoms, a KUB suggesting a possible ureteral stone however CT scan today rules it out.  She does have microhematuria on urinalysis today however she is low risk.  Reviewed her imaging today.  I explained my concern level for possible persistent infection is extremely well considering she was on culture specific antibiotics, follow-up culture was negative and follow-up urinalysis was negative for white blood cells.  She has a nonobstructing stone in the left kidney that is of moderate size.  The possible left ureteral stone on CT scan was a calcification in the small intestine.  There is a small calcification adjacent to the distal left ureter near the uterus.  This does not appear to be in the ureter.  Follow-up in 6 weeks with a urinalysis to assure hematuria resolves

## 2018-01-03 NOTE — PROGRESS NOTES
Patient Information     Patient Name MRN Trisha Vasquez [id number] Female 1960      Progress Notes by Maria Guadalupe Ramon DO at 2017  2:30 PM     Author:  Maria Guadalupe Ramon DO Service:  (none) Author Type:  PHYS- Osteopathic     Filed:  2017  3:36 PM Encounter Date:  2017 Status:  Signed     :  Maria Guadalupe Ramon DO (PHYS- Osteopathic)            Chief Complaint     Patient presents with       Follow Up      urinary problems        Subjective:   Ms. Fernando is a 56 y.o. female  seen for the acute concern today of continued issues with urinary symptoms.  She has been having significant suprapubic pain.  She also has dysuria primarily at the very end of her urination.  She has seen urology along with multiple visits to urgent care.  I saw her 2 days ago at which time she had significant symptoms of cystitis.  We did give her an injection of Rocephin.  She did notice some improvement in her symptoms through Wednesday night and all day Thursday however this morning feels that her symptoms are returning.  She did stop taking Pyridium after the last UA was unreadable.    We discussed 2 days ago that likely we should do a pelvic exam to be sure there is no abnormality.  She has returned today for this.    She is due for a pap smear.  It was last done in 2013 and was ASCUS with negative HPV.  She is post menopausal.    She  reports that she quit smoking about 26 years ago. Her smoking use included Cigarettes. She has a 12.00 pack-year smoking history. She has never used smokeless tobacco.    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      ROS:   Pertinent ROS was performed and was negative, including for fever, chills, hematuria. No other concerns, with exception of HPI above.      Objective:    /80  Pulse 84  Temp 97.9  F (36.6  C) (Tympanic)  Resp 20  Ht 1.727 m (5' 8\")  Wt (!) 158.3 kg (349 lb)  Breastfeeding? No  BMI 53.07 kg/m2  GEN: Vitals reviewed.  Patient is in no acute distress. Cooperative with exam.  ABD:  Obese, Soft, tender suprapubically, and nondistended.    : Normal female external genitalia.  No vulvar lesion or mass. Cervix visualized.  Pap smear obtained.  BUS wnl.  Normal urethra.  No significant abnormality of discharge.  SKIN: Warm and dry to touch.  No rash on face, arms and legs.  EXT: No clubbing or cyanosis.  No peripheral edema.    LABS: 2/17/2017 - Personally ordered/reviewed  Results for orders placed or performed in visit on 02/17/17      WET PREP GENITAL      Result  Value Ref Range    TRICHOMONAS               None Seen None Seen    YEAST                     None Seen None Seen    CLUE CELLS                Present (A) None Seen   URINALYSIS W REFLEX MICROSCOPIC IF POSITIVE      Result  Value Ref Range    COLOR                     Yellow Yellow Color    CLARITY                   Cloudy (A) Clear Clarity    SPECIFIC GRAVITY,URINE    >=1.030 (A) 1.010, 1.015, 1.020, 1.025                    PH,URINE                  5.5 6.0, 7.0, 8.0, 5.5, 6.5, 7.5, 8.5                    UROBILINOGEN,QUALITATIVE  Normal Normal EU/dl    PROTEIN, URINE Trace (A) Negative mg/dL    GLUCOSE, URINE Negative Negative mg/dL    KETONES,URINE             Negative Negative mg/dL    " BILIRUBIN,URINE           Negative Negative                    OCCULT BLOOD,URINE        Large (A) Negative                    NITRITE                   Negative Negative                    LEUKOCYTE ESTERASE        Negative Negative                   URINALYSIS MICROSCOPIC      Result  Value Ref Range    RBC >100 (A) 0-2, None Seen /HPF    WBC None Seen 0-2, 3-5, None Seen /HPF    BACTERIA                  Few None Seen, Rare, Occasional, Few Bacteria/HPF    EPITHELIAL CELLS          Many (A) None Seen, Few Epi/HPF            Assessment/Plan:   1. Suprapubic pain  - UA does not show any bacteria however does have significant red blood cells.  - Follow up with urology for possible cystoscopy.  - URINALYSIS W REFLEX MICROSCOPIC IF POSITIVE; Future    2. Dysuria  - wet prep obtained today and shows bacterial vaginosis.  No indication of yeast.  Metronidazole sent in for 7 days.  - WET PREP GENITAL    3. Encounter for Papanicolaou smear of cervix  - GYN THIN PREP PAP SCREEN IMAGED      Return in about 1 month (around 3/17/2017) for Discuss Weight.    Patient Instructions   - Return/call as needed for follow-up should any new symptoms develop, for worsening of current symptoms or if symptoms do not resolve          BONITA OLIVER,    2/17/2017 3:02 PM    This document was prepared using voice generated softwear. While every attempt was made for accuracy, grammatical errors may exist.

## 2018-01-03 NOTE — PROGRESS NOTES
Patient Information     Patient Name MRN Sex Trisha Nj [id number] Female 1960      Progress Notes by Sandoval Self MD at 2017 11:45 AM     Author:  Sandoval Self MD Service:  (none) Author Type:  Physician     Filed:  2017  2:13 PM Encounter Date:  2017 Status:  Signed     :  Sandoval Self MD (Physician)            Type of Visit  EST    Chief Complaint  Kidney stone    HPI  Ms. Fernando is a 56 y.o. female who follows up with persistent left lower abdominal/pelvic pain.  I saw her 2 weeks ago and CT scan at that time was negative.  There was a small calcification adjacent to the ureter in the pelvis however this did not appear to be within the ureter itself.  She was recently seen in clinic and underwent UA and urine culture.  She previously was having low-grade microhematuria over recent urinalysis was negative for blood.  Her urine culture is pending.  She denies fevers or chills.  Her pain is bilateral low pelvic pain (left greater than right).      Family History  Family History       Problem   Relation Age of Onset     Other  Father      accidental death/ gallbladder disease       Other  Mother      HX of kidney stones and blood clots/ gallbladder disease; Alzhemier Disease       Cancer-colon  Maternal Uncle 50     Cancer-colon  Maternal Uncle 50     Other  Maternal Uncle      HX of kidney stones       Other  Brother      HX of kidney stones and blood clots       Heart Disease  Paternal Grandfather      ASCHD       Other  Paternal Aunt      Parkinson's         Review of Systems  I personally reviewed the ROS with the patient.    Nursing Notes:   Linda Davis  2017 11:55 AM  Signed  Here for Dysuria.  Review of Systems:    Weight loss:    No     Recent fever/chills:  No   Night sweats:   No  Current skin rash:  No   Recent hair loss:  No  Heat intolerance:  No   Cold intolerance:  No  Chest pain:   No   Palpitations:   No  Shortness of  breath:  No   Wheezing:   Yes  Constipation:    No   Diarrhea:   No   Nausea:   Yes   Vomiting:   No   Kidney/side pain:  No   Back pain:   Yes  Frequent headaches:  No   Dizziness:     No  Leg swelling:   No   Calf pain:    No      Linda Davis LPN  2/6/2017  11:49 AM              Physical Exam  Vitals:     02/06/17 1148   BP: 136/88   Resp: 14   Weight: (!) 158.3 kg (349 lb)     Constitutional: NAD, WDWN  Head: NCAT  Eyes: Conjunctivae normal  Cardiovascular: Regular rate  Pulmonary/Chest: Respirations are even and non-labored bilaterally  Abdominal: Soft with no distension, tenderness, masses, guarding.    + left CVA tenderness    - right CVA tenderness  Neurological: A + O x 3,  cranial nerves II-XII grossly intact  Extremities: VICKIE x 4, warm, no clubbing, no cyanosis  Skin: Pink, warm, dry with no rash  Psychiatric:  Normal mood and affect  Genitourinary: Nonpalpable bladder    Labs  CREATININE (mg/dL)    Date Value   12/02/2016 0.84       Results for JULIETTE LANIER (MRN [id number]) as of 2/6/2017 14:11   2/5/2017 13:16   COLOR                     Yellow   CLARITY                   Slightly Cloudy (A)   SPECIFIC GRAVITY,URINE    1.010   PH,URINE                  6.0   UROBILINOGEN,QUALITATIVE  Normal   PROTEIN, URINE Negative   GLUCOSE, URINE Negative   KETONES,URINE             Negative   BILIRUBIN,URINE           Negative   OCCULT BLOOD,URINE        Trace (A)   NITRITE                   Negative   LEUKOCYTE ESTERASE        Moderate (A)   RBC 0-2   WBC 11-25 (A)   BACTERIA None Seen   EPITHELIAL CELLS None Seen       Imaging  CT Stone  1/25/2017  IMPRESSION: Nonobstructive renal calculi bilaterally. No ureteral or bladder calculi. Specifically, the questioned calcification on prior x-rays is not identified on CT.    ^^^^^^^^^^^^^^^^^^^^^^^^^^^^^^^^^^^^^^^^^^^^^^^^^^^^^^^^^^^^^^^^^^    KUB  1/19/2017  I personally reviewed and interpreted the images and report.  IMPRESSION:  No obstruction or free air.  Question new right pelvic calcification measuring 9 mm. The appearance is nonspecific, however, a system calculus is in the differential. If there is hematuria, consider CT.    Assessment & Plan  Ms. Fernando is a 56 y.o. female who presents with persistent urinary symptoms and a recent negative CT scan.  She previously had microhematuria and the most recent urinalysis revealed no blood present.  She is very concerned about her symptoms that she has had no symptom relief in the last few weeks.  We are currently awaiting results of urine culture which will likely be finalized tomorrow.  If her urine culture is negative I will consider cystoscopy with retrograde pyelogram, the calcification adjacent to the ureter previously seen may possibly be a ureteral stone.

## 2018-01-03 NOTE — NURSING NOTE
Patient Information     Patient Name MRN Trisha Vasquez [id number] Female 1960      Nursing Note by Teagan Harrison at 2/15/2017  1:10 PM     Author:  Teagan Harrison Service:  (none) Author Type:  (none)     Filed:  2/15/2017  5:33 PM Encounter Date:  2/15/2017 Status:  Signed     :  Teagan Harrison            Patient presents to clinic experiencing ongoing pain with urination.  Teagan Harrison LPN..................2/15/2017  5:24 PM

## 2018-01-04 NOTE — TELEPHONE ENCOUNTER
Patient Information     Patient Name MRN Trisha Vasquez [id number] Female 1960      Telephone Encounter by Lyle Dial RN at 2017  8:37 AM     Author:  Lyle Dial RN Service:  (none) Author Type:  NURS- Registered Nurse     Filed:  2017  8:37 AM Encounter Date:  2017 Status:  Signed     :  Lyle Dial RN (NURS- Registered Nurse)            Anticoagulant    Office visit in the past 12 months.    Last visit with BONITA OLIVER was on: 2017 in [facility]  Next visit with BONITA OLIVER is on: No future appointment listed with this provider  Next visit with Internal Medicine is on: 2017 in [facility]    Lab tests:  PT/INR at least monthly    INR (no units)    Date Value   2017 2.2 (H)     HEMOGLOBIN                (g/dL)    Date Value   2016 14.4     No components found for: CBC      Max refills 6 months.  Prescription refilled per RN Medication Refill Policy.................... LYLE DIAL RN ....................  2017   8:37 AM

## 2018-01-04 NOTE — PATIENT INSTRUCTIONS
Patient Information     Patient Name MRN Trisha Vasquez [id number] Female 1960      Patient Instructions by Smitha Nair RN at 4/3/2017 12:30 PM     Author:  Smitha Nair RN Service:  (none) Author Type:  NURS- Registered Nurse     Filed:  4/3/2017 12:25 PM Encounter Date:  4/3/2017 Status:  Signed     :  Smitha Nair RN (NURS- Registered Nurse)            2017 Details    Sun Mon Tue Wed Thu Fri Sat           1                 2               3      5 mg   See details      4      5 mg         5      5 mg         6      5 mg         7      5 mg         8      5 mg           9      5 mg         10      5 mg         11      5 mg         12      5 mg         13      5 mg         14      5 mg         15      5 mg           16      5 mg         17      5 mg         18      5 mg         19      5 mg         20      5 mg         21      5 mg         22      5 mg           23      5 mg         24      5 mg         25      5 mg         26      5 mg         27      5 mg         28      5 mg         29      5 mg           30      5 mg                Date Details    This INR check               How to take your warfarin dose     To take:  5 mg Take one of the 5 mg tablets.           May 2017 Details    Sun Mon Tue Wed Thu Fri Sat      1      5 mg         2               3               4               5               6                 7               8               9               10               11               12               13                 14               15               16               17               18               19               20                 21               22               23               24               25               26               27                 28               29               30               31                   Date Details   No additional details    Date of next INR:  2017         How to take your warfarin dose     To take:  5  mg Take one of the 5 mg tablets.             Description          Continue same Warfarin dose and recheck in 1 month.  Smitha Nair RN    4/3/2017  12:24 PM                                                          Anticoagulation Summary as of 4/3/2017     INR goal 2.0-3.0    Today's INR 2.2    Next INR check 5/1/2017          Call your Anticoagulation Clinic at Dept: [phone]   if:   1. Any medications are started, stopped, or there is a change in dose.  2. You experience any bleeding that is not easily stopped or if it is recurrent.  3. You notice an increase in bruising or any bruising that does not heal.  You are scheduled for surgery, colonoscopy, dental extraction or any other procedure where you may need to stop your Coumadin (warfarin).

## 2018-01-04 NOTE — PATIENT INSTRUCTIONS
Patient Information     Patient Name MRN Trisha Vasquez [id number] Female 1960      Patient Instructions by Smitha Nair RN at 2017 12:30 PM     Author:  Smitha Nair RN Service:  (none) Author Type:  NURS- Registered Nurse     Filed:  2017 12:30 PM Encounter Date:  2017 Status:  Signed     :  Smitha Nair RN (NURS- Registered Nurse)            May 2017 Details    Sun Mon Tue Wed Thu Fri Sat      1      5 mg   See details      2      5 mg         3      5 mg         4      5 mg         5      5 mg         6      5 mg           7      5 mg         8      5 mg         9      5 mg         10      5 mg         11      5 mg         12      5 mg         13      5 mg           14      5 mg         15      5 mg         16      5 mg         17      5 mg         18      5 mg         19      5 mg         20      5 mg           21      5 mg         22      5 mg         23      5 mg         24      5 mg         25      5 mg         26      5 mg         27      5 mg           28      5 mg         29      5 mg         30      5 mg         31      5 mg             Date Details    This INR check               How to take your warfarin dose     To take:  5 mg Take one of the 5 mg tablets.           2017 Details    Sun Mon Tue Wed Thu Fri Sat         1      5 mg         2      5 mg         3      5 mg           4      5 mg         5      5 mg         6               7               8               9               10                 11               12               13               14               15               16               17                 18               19               20               21               22               23               24                 25               26               27               28               29               30                 Date Details   No additional details    Date of next INR:  2017         How to take your warfarin dose      To take:  5 mg Take one of the 5 mg tablets.             Description          Continue same Warfarin dose and recheck in 1 month.  Smitha Nair RN    5/1/2017  12:30 PM                                                            Anticoagulation Summary as of 5/1/2017     INR goal 2.0-3.0    Today's INR 2.1    Next INR check 6/5/2017          Call your Anticoagulation Clinic at Dept: [phone]   if:   1. Any medications are started, stopped, or there is a change in dose.  2. You experience any bleeding that is not easily stopped or if it is recurrent.  3. You notice an increase in bruising or any bruising that does not heal.  4. You are scheduled for surgery, colonoscopy, dental extraction or any other procedure where you may need to stop your Coumadin (warfarin).

## 2018-01-05 NOTE — TELEPHONE ENCOUNTER
Patient Information     Patient Name MRN Trisha Vasquez [id number] Female 1960      Telephone Encounter by Héctor Agosto RN at 2017  1:49 PM     Author:  Héctor Agosto RN Service:  (none) Author Type:  NURS- Registered Nurse     Filed:  2017  1:57 PM Encounter Date:  2017 Status:  Signed     :  Héctor Agosto RN (NURS- Registered Nurse)            Writer contacted patient after chart review. It looks as though per chart review that patient and clinic staff have been working together to get dosing of her effexor clarified so that it can be sent in to patient's pharmacy by her PCP.     Patient reports that she is currently prescribed Effexor  mg capsule-Take one capsule by mouth daily.     Patient would like rx sent in to [facility] in [facility] in [place].     Patient reports that Dr. Ramon used to order this medication for her, however she was seeing a therapist who was ordering the rx for her recently. Patient was notified that that provider will no longer prescribe her rxs. So, patient would like PCP to refill her effexor. Patient states she has 8 days remaining. PCP is out of the office until 17. Patient feels as though this request can wait. Writer will edna up rx as requested and reported per patient. Rx is listed as historical in patient's chart on her active med list, so writer is unable to fill rx. Will route request to PCP for her consideration/approval when she returns. Patient happy with this plan.    This is a Refill request from: Patient  Name of Medication: Effexor ER  Quantity requested: 90 tabs with 1 refill  Last fill date: Unknown  Due for refill: Unknown  Last visit with BONITA RAMON was on: 2017 in [facility]  PCP:  BONITA RAMON DO  Controlled Substance Agreement:  N/A   Diagnosis r/t this medication request: Depression     Unable to complete prescription refill per RN Medication Refill Policy.................... Héctor Agosto RN  ....................  5/26/2017   1:53 PM

## 2018-01-05 NOTE — TELEPHONE ENCOUNTER
Patient Information     Patient Name MRN Trisha Vasquez [id number] Female 1960      Telephone Encounter by Maria Guadalupe Ramon DO at 2017  9:10 PM     Author:  Maria Guadalupe Ramon DO Service:  (none) Author Type:  PHYS- Osteopathic     Filed:  2017  9:14 PM Encounter Date:  2017 Status:  Signed     :  Maria Guadalupe Ramon DO (PHYS- Osteopathic)            Patient's medication is renewed.  Please have her set up a follow up of her depression in the next month or so since I will be prescribing this on going.  I believe we are also supposed to touch base on her weight and we can cover this at the same time I have new information after my recent conference.

## 2018-01-05 NOTE — TELEPHONE ENCOUNTER
Patient Information     Patient Name MRN Trisha Vasquez [id number] Female 1960      Telephone Encounter by Mari Foster at 2017  8:59 AM     Author:  Mari Foster Service:  (none) Author Type:  (none)     Filed:  2017  9:00 AM Encounter Date:  2017 Status:  Signed     :  Mari Foster            Spoke with patient informed of below. Mari Foster LPN .......................2017  9:00 AM

## 2018-01-05 NOTE — TELEPHONE ENCOUNTER
Patient Information     Patient Name MRN Trisha Vasquez [id number] Female 1960      Telephone Encounter by Mari Foster at 2017  8:54 AM     Author:  Mari Foster Service:  (none) Author Type:  (none)     Filed:  2017  8:54 AM Encounter Date:  2017 Status:  Signed     :  Mari Foster            LMTCB Mari Foster LPN .......................2017  8:54 AM

## 2018-01-05 NOTE — TELEPHONE ENCOUNTER
Patient Information     Patient Name MRN Trisha Vasquez [id number] Female 1960      Telephone Encounter by Héctor Agosto RN at 2017  1:58 PM     Author:  Héctor Agosto RN Service:  (none) Author Type:  NURS- Registered Nurse     Filed:  2017  1:58 PM Encounter Date:  2017 Status:  Signed     :  Héctor Agosto RN (NURS- Registered Nurse)            See refill encounter dated 17. Héctor Agosto RN ....................  2017   1:58 PM

## 2018-01-25 ENCOUNTER — OFFICE VISIT - GICH (OUTPATIENT)
Dept: INTERNAL MEDICINE | Facility: OTHER | Age: 58
End: 2018-01-25

## 2018-01-25 ENCOUNTER — AMBULATORY - GICH (OUTPATIENT)
Dept: SCHEDULING | Facility: OTHER | Age: 58
End: 2018-01-25

## 2018-01-25 ENCOUNTER — HISTORY (OUTPATIENT)
Dept: INTERNAL MEDICINE | Facility: OTHER | Age: 58
End: 2018-01-25

## 2018-01-25 DIAGNOSIS — R60.0 LOCALIZED EDEMA: ICD-10-CM

## 2018-01-25 DIAGNOSIS — Z12.31 ENCOUNTER FOR SCREENING MAMMOGRAM FOR MALIGNANT NEOPLASM OF BREAST: ICD-10-CM

## 2018-01-25 DIAGNOSIS — Z79.899 OTHER LONG TERM (CURRENT) DRUG THERAPY: ICD-10-CM

## 2018-01-25 DIAGNOSIS — E87.6 HYPOKALEMIA: ICD-10-CM

## 2018-01-25 DIAGNOSIS — F33.0 MAJOR DEPRESSIVE DISORDER, RECURRENT, MILD (H): ICD-10-CM

## 2018-01-25 DIAGNOSIS — E66.01 MORBID (SEVERE) OBESITY DUE TO EXCESS CALORIES (H): ICD-10-CM

## 2018-01-25 DIAGNOSIS — I10 ESSENTIAL (PRIMARY) HYPERTENSION: ICD-10-CM

## 2018-01-25 LAB
A/G RATIO - HISTORICAL: 1.6 (ref 1–2)
ALBUMIN SERPL-MCNC: 4.1 G/DL (ref 3.5–5.7)
ALP SERPL-CCNC: 79 IU/L (ref 34–104)
ALT (SGPT) - HISTORICAL: 25 IU/L (ref 7–52)
ANION GAP - HISTORICAL: 10 (ref 5–18)
AST SERPL-CCNC: 18 IU/L (ref 13–39)
BILIRUB SERPL-MCNC: 0.4 MG/DL (ref 0.3–1)
BUN SERPL-MCNC: 15 MG/DL (ref 7–25)
BUN/CREAT RATIO - HISTORICAL: 19
CALCIUM SERPL-MCNC: 9.7 MG/DL (ref 8.6–10.3)
CHLORIDE SERPLBLD-SCNC: 105 MMOL/L (ref 98–107)
CO2 SERPL-SCNC: 26 MMOL/L (ref 21–31)
CREAT SERPL-MCNC: 0.8 MG/DL (ref 0.7–1.3)
ERYTHROCYTE [DISTWIDTH] IN BLOOD BY AUTOMATED COUNT: 13.3 % (ref 11.5–15.5)
GFR IF NOT AFRICAN AMERICAN - HISTORICAL: >60 ML/MIN/1.73M2
GLOBULIN - HISTORICAL: 2.6 G/DL (ref 2–3.7)
GLUCOSE SERPL-MCNC: 133 MG/DL (ref 70–105)
HCT VFR BLD AUTO: 46.3 % (ref 33–51)
HEMOGLOBIN: 15.3 G/DL (ref 12–16)
MCH RBC QN AUTO: 31 PG (ref 26–34)
MCHC RBC AUTO-ENTMCNC: 33 G/DL (ref 32–36)
MCV RBC AUTO: 94 FL (ref 80–100)
PLATELET # BLD AUTO: 204 THOU/CU MM (ref 140–440)
PMV BLD: 11.2 FL (ref 6.5–11)
POTASSIUM SERPL-SCNC: 4 MMOL/L (ref 3.5–5.1)
PROT SERPL-MCNC: 6.7 G/DL (ref 6.4–8.9)
RED BLOOD COUNT - HISTORICAL: 4.93 MIL/CU MM (ref 4–5.2)
SODIUM SERPL-SCNC: 141 MMOL/L (ref 133–143)
WHITE BLOOD COUNT - HISTORICAL: 7.6 THOU/CU MM (ref 4.5–11)

## 2018-01-25 ASSESSMENT — ANXIETY QUESTIONNAIRES
3. WORRYING TOO MUCH ABOUT DIFFERENT THINGS: SEVERAL DAYS
7. FEELING AFRAID AS IF SOMETHING AWFUL MIGHT HAPPEN: NOT AT ALL
6. BECOMING EASILY ANNOYED OR IRRITABLE: SEVERAL DAYS
GAD7 TOTAL SCORE: 5
2. NOT BEING ABLE TO STOP OR CONTROL WORRYING: SEVERAL DAYS
4. TROUBLE RELAXING: SEVERAL DAYS
5. BEING SO RESTLESS THAT IT IS HARD TO SIT STILL: NOT AT ALL
1. FEELING NERVOUS, ANXIOUS, OR ON EDGE: SEVERAL DAYS

## 2018-01-25 ASSESSMENT — PATIENT HEALTH QUESTIONNAIRE - PHQ9: SUM OF ALL RESPONSES TO PHQ QUESTIONS 1-9: 12

## 2018-01-27 VITALS
TEMPERATURE: 97.9 F | HEART RATE: 84 BPM | RESPIRATION RATE: 20 BRPM | TEMPERATURE: 98.6 F | SYSTOLIC BLOOD PRESSURE: 115 MMHG | DIASTOLIC BLOOD PRESSURE: 94 MMHG | RESPIRATION RATE: 14 BRPM | SYSTOLIC BLOOD PRESSURE: 142 MMHG | WEIGHT: 293 LBS | SYSTOLIC BLOOD PRESSURE: 152 MMHG | TEMPERATURE: 98.6 F | RESPIRATION RATE: 20 BRPM | BODY MASS INDEX: 44.41 KG/M2 | HEART RATE: 64 BPM | WEIGHT: 293 LBS | DIASTOLIC BLOOD PRESSURE: 80 MMHG | DIASTOLIC BLOOD PRESSURE: 86 MMHG | BODY MASS INDEX: 44.41 KG/M2 | HEIGHT: 68 IN | HEIGHT: 68 IN | HEART RATE: 84 BPM

## 2018-01-27 VITALS
DIASTOLIC BLOOD PRESSURE: 88 MMHG | BODY MASS INDEX: 53.07 KG/M2 | SYSTOLIC BLOOD PRESSURE: 136 MMHG | WEIGHT: 293 LBS | RESPIRATION RATE: 14 BRPM

## 2018-01-27 VITALS
TEMPERATURE: 97.3 F | BODY MASS INDEX: 53.52 KG/M2 | WEIGHT: 293 LBS | BODY MASS INDEX: 44.41 KG/M2 | HEIGHT: 68 IN | DIASTOLIC BLOOD PRESSURE: 70 MMHG | WEIGHT: 293 LBS | TEMPERATURE: 98.9 F | BODY MASS INDEX: 44.41 KG/M2 | SYSTOLIC BLOOD PRESSURE: 134 MMHG | DIASTOLIC BLOOD PRESSURE: 86 MMHG | WEIGHT: 293 LBS | SYSTOLIC BLOOD PRESSURE: 122 MMHG | DIASTOLIC BLOOD PRESSURE: 80 MMHG | SYSTOLIC BLOOD PRESSURE: 126 MMHG | RESPIRATION RATE: 20 BRPM | RESPIRATION RATE: 14 BRPM | HEIGHT: 68 IN | HEART RATE: 76 BPM

## 2018-01-27 VITALS
DIASTOLIC BLOOD PRESSURE: 80 MMHG | HEIGHT: 68 IN | TEMPERATURE: 97.2 F | SYSTOLIC BLOOD PRESSURE: 138 MMHG | HEART RATE: 68 BPM

## 2018-01-31 ENCOUNTER — COMMUNICATION - GICH (OUTPATIENT)
Dept: INTERNAL MEDICINE | Facility: OTHER | Age: 58
End: 2018-01-31

## 2018-01-31 DIAGNOSIS — I82.409 ACUTE EMBOLISM AND THROMBOSIS OF DEEP VEIN OF LOWER EXTREMITY (H): ICD-10-CM

## 2018-01-31 ASSESSMENT — PATIENT HEALTH QUESTIONNAIRE - PHQ9
SUM OF ALL RESPONSES TO PHQ QUESTIONS 1-9: 6
SUM OF ALL RESPONSES TO PHQ QUESTIONS 1-9: 7

## 2018-01-31 ASSESSMENT — ANXIETY QUESTIONNAIRES: GAD7 TOTAL SCORE: 3

## 2018-02-01 ENCOUNTER — HOSPITAL ENCOUNTER (OUTPATIENT)
Dept: RADIOLOGY | Facility: OTHER | Age: 58
End: 2018-02-01
Attending: INTERNAL MEDICINE

## 2018-02-01 ENCOUNTER — ANTICOAGULATION - GICH (OUTPATIENT)
Dept: INTERNAL MEDICINE | Facility: OTHER | Age: 58
End: 2018-02-01

## 2018-02-01 ENCOUNTER — HISTORY (OUTPATIENT)
Dept: RADIOLOGY | Facility: OTHER | Age: 58
End: 2018-02-01

## 2018-02-01 DIAGNOSIS — Z79.01 LONG TERM CURRENT USE OF ANTICOAGULANT: ICD-10-CM

## 2018-02-01 DIAGNOSIS — Z12.31 ENCOUNTER FOR SCREENING MAMMOGRAM FOR MALIGNANT NEOPLASM OF BREAST: ICD-10-CM

## 2018-02-01 DIAGNOSIS — Z79.01 LONG-TERM (CURRENT) USE OF ANTICOAGULANTS: Primary | ICD-10-CM

## 2018-02-01 LAB — INR - HISTORICAL: 2.9

## 2018-02-04 ENCOUNTER — HEALTH MAINTENANCE LETTER (OUTPATIENT)
Age: 58
End: 2018-02-04

## 2018-02-08 ENCOUNTER — DOCUMENTATION ONLY (OUTPATIENT)
Dept: FAMILY MEDICINE | Facility: OTHER | Age: 58
End: 2018-02-08

## 2018-02-08 PROBLEM — E66.9 OBESITY: Status: ACTIVE | Noted: 2018-02-08

## 2018-02-08 PROBLEM — F33.0 DEPRESSION, MAJOR, RECURRENT, MILD (H): Status: ACTIVE | Noted: 2018-02-08

## 2018-02-08 RX ORDER — ACETAMINOPHEN 160 MG
2000 TABLET,DISINTEGRATING ORAL DAILY
COMMUNITY
Start: 2016-03-03

## 2018-02-08 RX ORDER — NEBULIZER AND COMPRESSOR
EACH MISCELLANEOUS
COMMUNITY
Start: 2016-05-26 | End: 2021-09-16

## 2018-02-08 RX ORDER — LORAZEPAM 0.5 MG/1
0.5 TABLET ORAL EVERY 6 HOURS PRN
COMMUNITY
Start: 2016-01-13

## 2018-02-08 RX ORDER — METOPROLOL TARTRATE 25 MG/1
0.5 TABLET, FILM COATED ORAL 2 TIMES DAILY
COMMUNITY
Start: 2017-12-08 | End: 2018-03-01

## 2018-02-08 RX ORDER — POTASSIUM CHLORIDE 750 MG/1
1 TABLET, EXTENDED RELEASE ORAL
COMMUNITY
Start: 2017-12-13 | End: 2019-03-08

## 2018-02-08 RX ORDER — CHLORAL HYDRATE 500 MG
1 CAPSULE ORAL DAILY
COMMUNITY
Start: 2012-08-27

## 2018-02-08 RX ORDER — DIPHENHYDRAMINE HCL 25 MG
25 CAPSULE ORAL AT BEDTIME
COMMUNITY
Start: 2016-03-03 | End: 2018-06-22

## 2018-02-08 RX ORDER — METOPROLOL TARTRATE 25 MG/1
12.5 TABLET, FILM COATED ORAL 2 TIMES DAILY
COMMUNITY
Start: 2016-10-06 | End: 2018-06-22

## 2018-02-08 RX ORDER — LOSARTAN POTASSIUM 25 MG/1
25 TABLET ORAL DAILY
COMMUNITY
Start: 2017-12-13 | End: 2018-06-22

## 2018-02-08 RX ORDER — FUROSEMIDE 20 MG
20 TABLET ORAL EVERY MORNING
COMMUNITY
Start: 2017-12-13 | End: 2018-06-22

## 2018-02-08 RX ORDER — EPINEPHRINE 0.3 MG/.3ML
0.3 INJECTION SUBCUTANEOUS
COMMUNITY
Start: 2015-09-08 | End: 2018-06-22

## 2018-02-08 RX ORDER — WARFARIN SODIUM 5 MG/1
5 TABLET ORAL DAILY
COMMUNITY
Start: 2017-11-03 | End: 2018-05-03

## 2018-02-08 RX ORDER — VENLAFAXINE HYDROCHLORIDE 150 MG/1
150 CAPSULE, EXTENDED RELEASE ORAL
COMMUNITY
Start: 2017-11-27 | End: 2019-04-01

## 2018-02-09 VITALS
BODY MASS INDEX: 44.41 KG/M2 | WEIGHT: 293 LBS | DIASTOLIC BLOOD PRESSURE: 96 MMHG | HEART RATE: 80 BPM | HEIGHT: 68 IN | SYSTOLIC BLOOD PRESSURE: 130 MMHG | TEMPERATURE: 97.4 F

## 2018-02-10 ASSESSMENT — PATIENT HEALTH QUESTIONNAIRE - PHQ9: SUM OF ALL RESPONSES TO PHQ QUESTIONS 1-9: 12

## 2018-02-10 ASSESSMENT — ANXIETY QUESTIONNAIRES: GAD7 TOTAL SCORE: 5

## 2018-02-11 ENCOUNTER — HEALTH MAINTENANCE LETTER (OUTPATIENT)
Age: 58
End: 2018-02-11

## 2018-02-11 PROBLEM — I82.409 ACUTE EMBOLISM AND THROMBOSIS OF UNSPECIFIED DEEP VEINS OF UNSPECIFIED LOWER EXTREMITY: Status: ACTIVE | Noted: 2018-02-11

## 2018-02-11 PROBLEM — Z79.01 LONG TERM (CURRENT) USE OF ANTICOAGULANTS: Status: ACTIVE | Noted: 2018-02-11

## 2018-02-12 NOTE — TELEPHONE ENCOUNTER
Patient Information     Patient Name MRTrisha Umanzor [id number] Female 1960      Telephone Encounter by Jil Gibbons RN at 2017 11:02 AM     Author:  Jil Gibbons RN Service:  (none) Author Type:  NURS- Registered Nurse     Filed:  2017 11:07 AM Encounter Date:  2017 Status:  Signed     :  Jil Gibbons RN (NURS- Registered Nurse)            metoprolol tartrate (LOPRESSOR) 25 mg tablet  TAKE 1 TAB BY MOUTH TWO TIMES A DAY.       Disp: 62 tablet Refills: 6    Class: eRx Start: 2017    To be filled at: [facility] [address] [place] [facility], [place] [address] [address].Phone: [phone]    Last visit with BONITA OLIVER was on: 2017 in [facility]  PCP:  BONITA OLIVER DO  Medication noted as historical on current medication list and instructions are noted as take 0.5 tablets by mouth 2 times daily  Attempted to contact patient to verify and left message to return call.    Unable to complete prescription refill per RN Medication Refill Policy.................... JIL GIBBONS RN ....................  2017   11:03 AM

## 2018-02-12 NOTE — TELEPHONE ENCOUNTER
Patient Information     Patient Name MRN Trisha Vasquez [id number] Female 1960      Telephone Encounter by Teagan Bah RN at 2017  9:04 AM     Author:  Teagan Bah RN  Service:  (none) Author Type:  NURS- Registered Nurse     Filed:  2017 11:19 AM  Encounter Date:  2017 Status:  Addendum     :  Teagan Bah RN (NURS- Registered Nurse)        Related Notes: Original Note by Teagan Bah RN (NURS- Registered Nurse) filed at 2017 11:17 AM            Left message for patient to call back on cell phone voicemail. Patient returned call and stated that medication was originally ordered by her cardiologist who has since moved practice and is no longer able to refill her cardiac medications. Per patient, she has always halved pills where scored and taken only 1/2 pill two times daily. Pharmacy request for 1 tab two times daily is incorrect, patient would like instructions changed to reflect amount she states cardiologist told her to take and she feels has been working for her. Per medication list in chart, medication refilled as reviewed by BONITA OLIVER DO at last office visit.   Teagan Bah RN ....................  2017   9:04 AM

## 2018-02-12 NOTE — PATIENT INSTRUCTIONS
Patient Information     Patient Name MRTrisha Umanzor [id number] Female 1960      Patient Instructions by Teagan Wharton RN at 2017 12:45 PM     Author:  Teagan Wharton RN Service:  (none) Author Type:  NURS- Registered Nurse     Filed:  2017 12:37 PM Encounter Date:  2017 Status:  Signed     :  Teagan Wharton RN (NURS- Registered Nurse)            2017 Details    Sun Mon Tue Wed Thu Fri Sat          1               2                 3               4               5               6               7               8               9                 10               11               12               13               14      5 mg   See details      15      5 mg         16      5 mg           17      5 mg         18      5 mg         19      5 mg         20      5 mg         21      5 mg         22      5 mg         23      5 mg           24      5 mg         25      5 mg         26      5 mg         27      5 mg         28      5 mg         29      5 mg         30      5 mg           31      5 mg                Date Details    This INR check               How to take your warfarin dose     To take:  5 mg Take one of the 5 mg tablets.           2018 Details    Sun Mon Tue Wed Thu Fri Sat      1      5 mg         2      5 mg         3      5 mg         4      5 mg         5      5 mg         6      5 mg           7      5 mg         8      5 mg         9      5 mg         10      5 mg         11      5 mg         12      5 mg         13      5 mg           14      5 mg         15      5 mg         16      5 mg         17      5 mg         18      5 mg         19      5 mg         20      5 mg           21      5 mg         22      5 mg         23      5 mg         24      5 mg       25      5 mg         26               27                 28               29               30               31                   Date Details   No additional details     Date of next INR:  1/25/2018         How to take your warfarin dose     To take:  5 mg Take one of the 5 mg tablets.             Description          Continue same Warfarin dose and recheck in 6 weeks.Teagan Wharton RN .............. 12/14/2017  12:37 PM                                                                  Anticoagulation Summary as of 12/14/2017     INR goal 2.0-3.0    Today's INR 2.3    Next INR check 1/25/2018          Call your Anticoagulation Clinic at Dept: [phone]   if:   1. Any medications are started, stopped, or there is a change in dose.  2. You experience any bleeding that is not easily stopped or if it is recurrent.  3. You notice an increase in bruising or any bruising that does not heal.  4. You are scheduled for surgery, colonoscopy, dental extraction or any other procedure where you may need to stop your Coumadin (warfarin).

## 2018-02-12 NOTE — TELEPHONE ENCOUNTER
Patient Information     Patient Name MRN Sex Trisha Nj [id number] Female 1960      Telephone Encounter by Teagan Bah RN at 2017 11:18 AM     Author:  Teagan Bah RN  Service:  (none) Author Type:  NURS- Registered Nurse     Filed:  2017  3:18 PM  Encounter Date:  12/10/2017 Status:  Addendum     :  Teagan Bah RN (NURS- Registered Nurse)        Related Notes: Original Note by Teagan Bah RN (NURS- Registered Nurse) filed at 2017 11:33 AM            Patient has not had an annual medication review with labs including potassium and renal function since 2016. The last office visit was 70 for follow up on weight management. Reminder letter sent to patient to set up appointment. Per RN protocol can not complete refills of these medications without annual labs, routed to BONITA OLIVER DO for consideration of requests.   Unable to complete prescription refill per RN Medication Refill Policy.................... Teagan Bah RN ....................  2017   11:29 AM    Refill request from: [facility]    Medication: KLOR-CON 10 10 mEq Controlled-Release tablet  Prescription #:[id number]    Qty:90 tablet   Ref:0  Start:2017   End:              Route:Oral                  EDDA:No   Class:eRx    Sig:TAKE ONE TABLET BY MOUTH ONE TIME DAILY WITH MEALS    Medication: Furosemide (LASIX) 20 mg tablet  Prescription #:[id number]    Qty:90 tablet   Ref:0  Start:2017   End:              Route:Oral                  EDDA:No   Class:eRx    Sig:TAKE ONE TABLET BY MOUTH EVERY MORNING    Medication: Losartan (COZAAR) 25 mg tablet  Prescription #:[id number]    Qty:90 tablet   Ref:1  Start:2017    End:              Route:Oral                  EDDA:No   Class:eRx    Sig:TAKE ONE TABLET BY MOUTH ONE TIME DAILY    Quantity requested: 90  Last visit with BONITA OLIVER was on: 2017 in [facility]  PCP:  BONITA OLIVER DO  Diagnosis r/t this medication  request: Hypokalemia/Bilateral lower edema/Hypertension    Unable to complete prescription refill per RN Medication Refill Policy.................... Teagan Bah RN ....................  12/12/2017   11:25 AM

## 2018-02-12 NOTE — TELEPHONE ENCOUNTER
Patient Information     Patient Name MRN Trisha Vasquez [id number] Female 1960      Telephone Encounter by Maria Guadalupe Ramon DO at 2017  3:04 PM     Author:  Maria Guadalupe Ramon DO Service:  (none) Author Type:  PHYS- Osteopathic     Filed:  2017  3:06 PM Encounter Date:  12/10/2017 Status:  Signed     :  Maria Guadalupe Ramon DO (PHYS- Osteopathic)            Meds refilled.  Patient should set up an appointment to see me in the next month or so for her physical.

## 2018-02-12 NOTE — TELEPHONE ENCOUNTER
Patient Information     Patient Name MRN Trisha Vasquez [id number] Female 1960      Telephone Encounter by Teagan Harrison at 12/15/2017  9:35 AM     Author:  Teagan Harrison Service:  (none) Author Type:  (none)     Filed:  12/15/2017  9:37 AM Encounter Date:  12/10/2017 Status:  Signed     :  Teagan Harrison            Patient notified refills have been sent to her pharmacy and she is due for her annual exam.  Assisted in scheduling physical appointment on  at 12:40.  Teagan Harrison LPN..................12/15/2017  9:37 AM

## 2018-02-13 NOTE — TELEPHONE ENCOUNTER
Patient Information     Patient Name MRN Trisha Vasquez [id number] Female 1960      Telephone Encounter by Lacey Zayas RN at 2018 10:39 AM     Author:  Lacey Zayas RN Service:  (none) Author Type:  NURS- Registered Nurse     Filed:  2018 10:40 AM Encounter Date:  2018 Status:  Signed     :  Lacey Zayas RN (NURS- Registered Nurse)            Anticoagulant    Office visit in the past 12 months.    Last visit with BONITA OLIVER was on: 2018 in [facility]  Next visit with BONITA OILVER is on: No future appointment listed with this provider  Next visit with Internal Medicine is on: 2018 in [facility]    Lab tests:  PT/INR at least monthly    INR (no units)    Date Value   2017 2.3 (H)     HEMOGLOBIN                (g/dL)    Date Value   2018 15.3     Prescription refilled per RN Medication Refill Policy.................... LACEY ZAYAS RN ....................  2018   10:40 AM

## 2018-02-13 NOTE — NURSING NOTE
Patient Information     Patient Name MRN Trisha Vasquez [id number] Female 1960      Nursing Note by Kerry Waddell at 2018  2:20 PM     Author:  Kerry Waddell Service:  (none) Author Type:  (none)     Filed:  2018  2:40 PM Encounter Date:  2018 Status:  Signed     :  Kerry Waddell            Patient is here today for her yearly physical. She said she has no concerns today. Kerry Waddell LPN......................2018 2:25 PM

## 2018-02-13 NOTE — PROGRESS NOTES
Patient Information     Patient Name MRN Trisha Vasquez [id number] Female 1960      Progress Notes by Maria Guadalupe Ramon DO at 2018  2:20 PM     Author:  Maria Guadalupe Ramon DO Service:  (none) Author Type:  PHYS- Osteopathic     Filed:  2018  5:23 PM Encounter Date:  2018 Status:  Signed     :  Maria Guadalupe Ramon DO (PHYS- Osteopathic)            Please see H&P from same date.

## 2018-02-13 NOTE — PATIENT INSTRUCTIONS
Patient Information     Patient Name MRN Trisha Vasquez [id number] Female 1960      Patient Instructions by Lacey Zayas RN at 2018  3:45 PM     Author:  Lacey Zayas RN Service:  (none) Author Type:  NURS- Registered Nurse     Filed:  2018  3:39 PM Encounter Date:  2018 Status:  Signed     :  Lacey Zayas RN (NURS- Registered Nurse)            2018 Details    Sun Mon  Fri Sat         1      5 mg   See details      2      5 mg         3      5 mg           4      5 mg         5      5 mg         6      5 mg         7      5 mg         8      5 mg         9      5 mg         10      5 mg           11      5 mg         12      5 mg         13      5 mg         14      5 mg         15      5 mg         16               17                 18               19               20               21               22               23               24                 25               26               27               28                   Date Details    This INR check       Date of next INR:  2/15/2018         How to take your warfarin dose     To take:  5 mg Take one of the 5 mg tablets.             Description          Continue same Warfarin dose and recheck in 2 weeks.  LACEY ZAYAS RN ....................  2018   3:39 PM                                                                Anticoagulation Summary as of 2018     INR goal 2.0-3.0    Today's INR 2.9    Next INR check 2/15/2018          Call your Anticoagulation Clinic at Dept: [phone]   if:   1. Any medications are started, stopped, or there is a change in dose.  2. You experience any bleeding that is not easily stopped or if it is recurrent.  3. You notice an increase in bruising or any bruising that does not heal.  4. You are scheduled for surgery, colonoscopy, dental extraction or any other procedure where you may need to stop your Coumadin (warfarin).

## 2018-02-13 NOTE — PROGRESS NOTES
Patient Information     Patient Name MRN Trisha Vasquez [id number] Female 1960      Progress Notes by Adrienne Vazquez R.T. ([facility]) at 2018  3:10 PM     Author:  [facility]. [facility] Service:  (none) Author Type:  [employer] - Registered Radiologic Technologist     Filed:  2018  3:10 PM Date of Service:  2018  3:10 PM Status:  Signed     :  Adrienne Vazquez R.T. ([facility]) ([facility] - Registered Radiologic Technologist)            Falls Risk Criteria:    Age 65 and older or under age 4        Sensory deficits    Poor vision    Use of ambulatory aides    Impaired judgment    Unable to walk independently    Meets High Risk criteria for falls:  no

## 2018-02-13 NOTE — H&P
Patient Information     Patient Name MRN Trisha Vasquez [id number] Female 1960      H&P by Maria Guadalupe Ramon DO at 2018  2:20 PM     Author:  Maria Guadalupe Ramon DO Service:  (none) Author Type:  PHYS- Osteopathic     Filed:  2018  5:23 PM Encounter Date:  2018 Status:  Signed     :  Maria Guadalupe Ramon DO (PHYS- Osteopathic)            Chief Complaint     Patient presents with       Physical      annual physical       HPI: Ms. Fernando is a 57 y.o. female who presents today for yearly physical.     She has continued to have significant depression.  Most of this is related to her weight.  She is currently on Effexor 150 mg daily.  She denies any side effects from this.  She has gained approximately 5 pounds over the last year.  We did discuss that this is not as significant as it could be.  We did discuss the importance however of losing weight.  She did get a prescription for contrave last summer however has not filled this yet.  It will cost her quite a bit of money however she is not wanting to do it despite this.  We did discuss options including cheaper options such as phentermine however due to the side affect profile of this she is not interested.    She does have a history of lower extremity edema.  She is on furosemide for this.  She does feel that this has been helpful.  She also has hypokalemia related to her furosemide.  She is taking potassium.  She denies any side effects.  For her hypertension she has been on losartan and metoprolol in addition to the furosemide.  She denies any side effects from these medications.  Her blood pressures at home have been good.    She is due for a mammogram.  She is up-to-date on her vaccines.  She does report today that her  was recently diagnosed with heart failure with an ejection fraction of 23%.  She has been having a lot of stress related to this.    History is discussed and updated on 2018 with patient.  It is current to the  best of my knowledge as below.    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         Past Surgical History:      Procedure  Laterality Date     BREAST BIOPSY      Right for fiboadenoma       BREAST REDUCTION  03      SECTION  ,      COLONOSCOPY  2011    polyp ( Dr. Sutton)  follow up 3 years       KNEE ARTHROSCOPY  2013    Arthroscopy, Knee left Dr Jones       LIPOMA RESECTION  2011    Right Forearm Lipoma Excision        LITHOTRIPSY      lithotrypsy, year?       SC LAP CHOLECYSTECTOMY  2014            SCAN-CT INTERPRETATION      CT of chest. Negative       SCAN-CT INTERPRETATION  08    CT of abdomen and pelvis. Fatty liver infiltration, 5 mm non-obstructing stone       SKIN/SUBCUTANEOUS SURGERY      Left foot soft tissue resection; dr. Lang       TONSILLECTOMY           Current Outpatient Prescriptions     Medication  Sig     cholecalciferol (VITAMIN D3) 2,000 unit capsule Take 1 capsule by mouth once daily.     diphenhydrAMINE (BENADRYL) 25 mg capsule Take 1 capsule by mouth at bedtime.     EPINEPHrine (EPIPEN) 0.3 mg/0.3 mL (1:1,000) injection Inject 0.3 mg intramuscular one time if needed for Allergic Reaction.     furosemide (LASIX) 20 mg tablet Take 1 tablet by mouth every morning.     LORazepam (ATIVAN) 0.5 mg tab       losartan (COZAAR) 25 mg tablet Take 1 tablet by mouth once daily.     metoprolol tartrate (LOPRESSOR) 25 mg tablet Take 1/2 tablet by mouth two times a day.     miscellaneous medical supply (BLOOD PRESSURE CUFF) misc As directed.     MISCELLANEOUS MEDICAL SUPPLY (GRADUATED COMPRESSION STOCKINGS) For personal use. Length: calf Strength: 16-20 mmHg Circumference in cm: For calf: Ankle 15cm, Calf 30cm, Ankle to calf length 40cm.     naltrexone-bupropion 8-90 mg (CONTRAVE) 8-90 mg Extended-Release tablet Take 2 tablets by mouth 2 times daily. Taper up     omega-3 fatty acids-vitamin E (FISH OIL) 1,000 mg cap Take  by mouth once daily.     potassium chloride (KLOR-CON 10) 10 mEq Controlled-Release tablet Take 1 tablet by mouth once daily with a meal.     venlafaxine (EFFEXOR XR) 150 mg Extended-Release capsule Take 1 capsule by mouth once daily with a meal.     warfarin (COUMADIN) 5 mg tablet TAKE 5 MG DAILY OR AS DIRECTED BY THE [facility].     No current facility-administered medications for this visit.      Medications have been reviewed by me and are current to the best of my knowledge and ability.       Allergies      Allergen   Reactions     Lisinopril  Angioedema and Edema     Caused Angioedema  Swelling       Adhesive Tape-Silicones  Rash and Itching     Mastisol Adhesive [Gum Eiggza-Mygcpo-Rpea-Alcohol]  Rash and Itching        Family History       Problem   Relation Age of Onset     Other  Father      accidental death/ gallbladder disease       Other  Mother      HX of kidney stones and blood clots/ gallbladder disease       Dementia  Mother      No Known Problems  Brother      No Known Problems  Daughter      No Known Problems  Daughter      Cancer-colon  Maternal Uncle 50     Cancer-colon  Maternal Uncle 50     Other  Maternal Uncle      HX of kidney stones       Other  Brother      HX of kidney stones and blood clots       Heart Disease  Paternal Grandfather      ASCHD       Other  Paternal Aunt       Parkinson's       No Known Problems  Brother      No Known Problems  Brother        Family Status     Relation  Status     Father     Accidental death, gall bladder disease      Mother Alive    gall bladder disease      Brother Alive     Maternal Uncle     with colon cancer, 1  in his 50's,1 diagnosed in 50's      Daughter Alive     Daughter Alive     Maternal Uncle      Maternal Uncle      Brother Alive     Paternal Grandfather      Paternal Aunt      Brother Alive     Brother Alive        Social History     Social History        Marital status:       Spouse name: N/A     Number of children:  N/A     Years of education:  N/A     Occupational History       [employer]     Social History Main Topics          Smoking status:   Former Smoker      Packs/day:  1.00      Years:  12.00      Types:  Cigarettes      Quit date:  1990      Smokeless tobacco:   Never Used      Alcohol use   1.2 oz/week     2 Standard drinks or equivalent per week        Comment: socially       Drug use:   No      Sexual activity:   Not Currently      Partners:  Male      Other Topics   Concern      Service  No     Blood Transfusions  No     Caffeine Concern  No     Occupational Exposure  No     Hobby Hazards  No     Sleep Concern  No     Stress Concern  Yes     Weight Concern  Yes     Special Diet  Yes     Back Care  No     Exercise  Yes     1-2 times weekly      Bike Helmet  No     Seat Belt  Yes     100%      Self-Exams  Yes     Social History Narrative     .  Two children, Shadia ([place]) and Deborah (graduate 2018, college at [employer]);  - [place]                          CARO  GEN: -fevers/-chills/-night sweats/+wt change - 5 pound increase in one year   EARS: -hearing changes/-tinnitus  NOSE: -drainage/-congestion  MOUTH/THROAT: - sore throat/-dysphagia/-sores  LUNGS: -sob/-cough; + she has had some wheezing off and on since being sick  "last fall   CARDIOVASCULAR: -cp/-palpitations  GI: -pain/-diarrhea/-constipation/-bloody stools  : -dysuria/-hematuria  ENDOCRINE: -skin or hair changes  HEMATOLOGIC/LYMPHATIC: -swollen nodes/-easy bruising  SKIN: -rashes/-lesions  MSK/RHEUM: + Occasional joint pain/-swelling  NEURO: -weakness/-parasthesias  PSYCH: -anhedonia/+depression/+anxiety       EXAM:   /92 (Cuff Site: Right Arm, Position: Sitting, Cuff Size: Adult Large)  Pulse 80  Temp 97.4  F (36.3  C) (Tympanic)  Ht 1.715 m (5' 7.5\")  Wt (!) 160.6 kg (354 lb)  Breastfeeding? No  BMI 54.63 kg/m2  Estimated body mass index is 54.63 kg/(m^2) as calculated from the following:    Height as of this encounter: 1.715 m (5' 7.5\").    Weight as of this encounter: 160.6 kg (354 lb).      GEN: Vitals reviewed.  Healthy appearing. Patient is in no acute distress. Cooperative with exam.  HEENT: Normocephalic atraumatic.  Normal external eye, conjunctiva, lids, cornea with no scleral icterus or conjunctival erythema. Pupils equally round. Oropharynx with no erythema or exudates. Dentition adequate.    NECK: Supple; no thyromegaly or masses noted.  No cervical or supraclavicular lymphadenopathy.  CV: Heart regular in rate and rhythm with no murmur.    LUNGS: Lungs clear to auscultation bilaterally.  Chest rise equal bilaterally.  No accessory muscle use.  ABD:  Obese, nondistended  SKIN: Warm and dry to touch.  No rash on face, arms and legs.  EXT: No clubbing or cyanosis.  1+ bilateral lower extremity peripheral edema.  NEURO: Alert and oriented to person, place, and time.  Cranial nerves II-XII grossly intact with no focal or lateralizing deficits.  Muscle tone normal.  Gait normal. No tremor. Sensation intact to light touch.  MSK: ROM of upper and lower ext symmetric and full.  PSYCH: Affect appropriate. Speech fluent. Answers questions appropriately and thought process normal.    LABS: 1/25/2018 - Personally ordered/reviewed  Results for orders placed " or performed in visit on 01/25/18      COMP METABOLIC PANEL      Result  Value Ref Range    SODIUM 141 133 - 143 mmol/L    POTASSIUM 4.0 3.5 - 5.1 mmol/L    CHLORIDE 105 98 - 107 mmol/L    CO2,TOTAL 26 21 - 31 mmol/L    ANION GAP 10 5 - 18                    GLUCOSE 133 (H) 70 - 105 mg/dL    CALCIUM 9.7 8.6 - 10.3 mg/dL    BUN 15 7 - 25 mg/dL    CREATININE 0.80 0.70 - 1.30 mg/dL    BUN/CREAT RATIO           19                    GFR if African American >60 >60 ml/min/1.73m2    GFR if not African American >60 >60 ml/min/1.73m2    ALBUMIN 4.1 3.5 - 5.7 g/dL    PROTEIN,TOTAL 6.7 6.4 - 8.9 g/dL    GLOBULIN                  2.6 2.0 - 3.7 g/dL    A/G RATIO 1.6 1.0 - 2.0                    BILIRUBIN,TOTAL 0.4 0.3 - 1.0 mg/dL    ALK PHOSPHATASE 79 34 - 104 IU/L    ALT (SGPT) 25 7 - 52 IU/L    AST (SGOT) 18 13 - 39 IU/L   CBC W PLT NO DIFF      Result  Value Ref Range    WHITE BLOOD COUNT         7.6 4.5 - 11.0 thou/cu mm    RED BLOOD COUNT           4.93 4.00 - 5.20 mil/cu mm    HEMOGLOBIN                15.3 12.0 - 16.0 g/dL    HEMATOCRIT                46.3 33.0 - 51.0 %    MCV                       94 80 - 100 fL    MCH                       31.0 26.0 - 34.0 pg    MCHC                      33.0 32.0 - 36.0 g/dL    RDW                       13.3 11.5 - 15.5 %    PLATELET COUNT            204 140 - 440 thou/cu mm    MPV                       11.2 (H) 6.5 - 11.0 fL             ASSESSMENT AND PLAN:    1. Depression, major, recurrent, mild (HC)  - at this time we will continue on her Effexor.  We did discuss that if her mood improves significantly with contrave we can consider addition of Wellbutrin long-term.  - venlafaxine (EFFEXOR XR) 150 mg Extended-Release capsule; Take 1 capsule by mouth once daily with a meal.  Dispense: 90 capsule; Refill: 4    2. Hypertension  - Blood pressure today of 130/96  is not at the goal of <130/80.  - Continue current regimen at this time.  Instructed to check BP at home.  - Cautioned  patient to monitor with antibiotics, herbals and any OTC medications  - electrolytes and renal function done and okay  - metoprolol tartrate (LOPRESSOR) 25 mg tablet; Take 1/2 tablet by mouth two times a day.  Dispense: 45 tablet; Refill: 4  - losartan (COZAAR) 25 mg tablet; Take 1 tablet by mouth once daily.  Dispense: 90 tablet; Refill: 2    3. Hypokalemia  - potassium is stable.  Medication renewed.  - potassium chloride (KLOR-CON 10) 10 mEq Controlled-Release tablet; Take 1 tablet by mouth once daily with a meal.  Dispense: 90 tablet; Refill: 4    4. Bilateral edema of lower extremity  - stable at this time.  She will continue on furosemide daily.  - furosemide (LASIX) 20 mg tablet; Take 1 tablet by mouth every morning.  Dispense: 90 tablet; Refill: 2    5. Morbid obesity due to excess calories (HC)  - we did talk about various options today.  She will start her contrary to give this a try.  We did discuss referral to gastric bypass.  She is not interested at this time however we did discuss that this may be the best way to get improved outcomes in the long run.  She will let me know if she is interested in any point.    6. Encounter for screening mammogram for breast cancer  - XR MAMMO BILAT SCREENING; Future    7. High risk medication use  - COMP METABOLIC PANEL  - CBC W PLT NO DIFF          Return in about 1 month (around 2/25/2018) for Recheck/Follow Up, Discuss Weight.        BONITA OLIVER,    1/25/2018 3:15 PM    This document was prepared using voice generated softwear. While every attempt was made for accuracy, grammatical errors may exist.

## 2018-02-15 ENCOUNTER — DOCUMENTATION ONLY (OUTPATIENT)
Dept: FAMILY MEDICINE | Facility: OTHER | Age: 58
End: 2018-02-15

## 2018-02-19 ENCOUNTER — ANTICOAGULATION THERAPY VISIT (OUTPATIENT)
Dept: ANTICOAGULATION | Facility: OTHER | Age: 58
End: 2018-02-19
Attending: INTERNAL MEDICINE
Payer: COMMERCIAL

## 2018-02-19 LAB — INR POINT OF CARE: 2.6 (ref 0.86–1.14)

## 2018-02-19 PROCEDURE — 36416 COLLJ CAPILLARY BLOOD SPEC: CPT

## 2018-02-19 PROCEDURE — 99207 ZZC NO CHARGE NURSE ONLY: CPT

## 2018-02-19 PROCEDURE — 85610 PROTHROMBIN TIME: CPT | Mod: QW

## 2018-02-19 NOTE — MR AVS SNAPSHOT
Trisha NATALIA Fernando   2/19/2018 11:30 AM   Anticoagulation Therapy Visit    Description:  57 year old female   Provider:  [facility]   Department:  [facility]           INR as of 2/19/2018     Today's INR 2.6      Anticoagulation Summary as of 2/19/2018     INR goal 2.0-3.0   Today's INR 2.6   Full instructions 5 mg every day   Next INR check 3/19/2018    Indications   Long term (current) use of anticoagulants [Z79.01]  Acute embolism and thrombosis of unspecified deep veins of unspecified lower extremity [I82.409]         Description     Continue same dose of Coumadin/Warfarinand recheck INR in 4 weeks.  Maria D Zayas ....................  2/19/2018   11:38 AM        February 2018 Details    Sun Mon Tue Wed Thu Fri Sat         1               2               3                 4               5               6               7               8               9               10                 11               12               13               14               15               16               17                 18               19      5 mg   See details      20      5 mg         21      5 mg         22      5 mg         23      5 mg         24      5 mg           25      5 mg         26      5 mg         27      5 mg         28      5 mg             Date Details   02/19 This INR check               How to take your warfarin dose     To take:  5 mg Take 1 of the 5 mg tablets.           March 2018 Details    Sun Mon Tue Wed Thu Fri Sat         1      5 mg         2      5 mg         3      5 mg           4      5 mg         5      5 mg         6      5 mg         7      5 mg         8      5 mg         9      5 mg         10      5 mg           11      5 mg         12      5 mg         13      5 mg         14      5 mg         15      5 mg         16      5 mg         17      5 mg           18      5 mg         19            20               21               22               23               24                  25               26               27               28               29               30               31                Date Details   No additional details    Date of next INR:  3/19/2018         How to take your warfarin dose     To take:  5 mg Take 1 of the 5 mg tablets.

## 2018-02-19 NOTE — PROGRESS NOTES
ANTICOAGULATION FOLLOW-UP CLINIC VISIT    Patient Name:  Trisha Fernando  Date:  2/19/2018  Contact Type:  Face to Face    SUBJECTIVE:     Patient Findings     Positives No Problem Findings           OBJECTIVE    INR Protime   Date Value Ref Range Status   02/19/2018 2.6 (A) 0.86 - 1.14 Final       ASSESSMENT / PLAN  INR assessment THER    Recheck INR In: 4 WEEKS    INR Location Clinic      Anticoagulation Summary as of 2/19/2018     INR goal 2.0-3.0   Today's INR 2.6   Maintenance plan 5 mg (5 mg x 1) every day   Full instructions 5 mg every day   Weekly total 35 mg   No change documented Maria D Zayas RN   Next INR check 3/19/2018   Priority INR   Target end date     Indications   Long term (current) use of anticoagulants [Z79.01]  Acute embolism and thrombosis of unspecified deep veins of unspecified lower extremity [I82.409]         Anticoagulation Episode Summary     INR check location     Preferred lab     Send INR reminders to [facility] INR    Comments             See the Encounter Report to view Anticoagulation Flowsheet and Dosing Calendar (Go to Encounters tab in chart review, and find the Anticoagulation Therapy Visit)        Maria D Zayas, SINDHU

## 2018-03-01 ENCOUNTER — OFFICE VISIT (OUTPATIENT)
Dept: INTERNAL MEDICINE | Facility: OTHER | Age: 58
End: 2018-03-01
Attending: FAMILY MEDICINE
Payer: COMMERCIAL

## 2018-03-01 VITALS
WEIGHT: 293 LBS | DIASTOLIC BLOOD PRESSURE: 82 MMHG | HEART RATE: 68 BPM | SYSTOLIC BLOOD PRESSURE: 128 MMHG | BODY MASS INDEX: 53.98 KG/M2

## 2018-03-01 PROCEDURE — 99213 OFFICE O/P EST LOW 20 MIN: CPT | Performed by: INTERNAL MEDICINE

## 2018-03-01 ASSESSMENT — PAIN SCALES - GENERAL: PAINLEVEL: NO PAIN (0)

## 2018-03-01 ASSESSMENT — PATIENT HEALTH QUESTIONNAIRE - PHQ9: 5. POOR APPETITE OR OVEREATING: NOT AT ALL

## 2018-03-01 ASSESSMENT — ANXIETY QUESTIONNAIRES
5. BEING SO RESTLESS THAT IT IS HARD TO SIT STILL: NOT AT ALL
6. BECOMING EASILY ANNOYED OR IRRITABLE: NOT AT ALL
3. WORRYING TOO MUCH ABOUT DIFFERENT THINGS: NOT AT ALL
IF YOU CHECKED OFF ANY PROBLEMS ON THIS QUESTIONNAIRE, HOW DIFFICULT HAVE THESE PROBLEMS MADE IT FOR YOU TO DO YOUR WORK, TAKE CARE OF THINGS AT HOME, OR GET ALONG WITH OTHER PEOPLE: NOT DIFFICULT AT ALL
GAD7 TOTAL SCORE: 1
7. FEELING AFRAID AS IF SOMETHING AWFUL MIGHT HAPPEN: NOT AT ALL
2. NOT BEING ABLE TO STOP OR CONTROL WORRYING: NOT AT ALL
1. FEELING NERVOUS, ANXIOUS, OR ON EDGE: SEVERAL DAYS

## 2018-03-01 NOTE — PROGRESS NOTES
Chief Complaint   Patient presents with     RECHECK     Weight         HPI: Ms. Fernando is a 57 year old female who presents today for follow up of weight.  She has started on her contraceptive approximately 1 month ago.  She has been on the max dose for the last 2 weeks.  She has not found that this is significantly curb her cravings.  She has not been checking her weight at home.  She has noted some dry mouth and fatigue with the medication.  She has lost approximately 5 pounds.      She  reports that she quit smoking about 27 years ago. Her smoking use included Cigarettes. She has a 12.00 pack-year smoking history. She has never used smokeless tobacco.    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      ROS  Pertinent ROS was performed and was negative, including for fever, chills, chest pain, shortness of breath, increased lower extremity edema, changes in bowel or bladder, blood in the stool, difficulty swallowing, sores in the mouth. No other concerns, with exception of HPI above.      EXAM:   /82 (BP Location: Right arm, Patient Position: Sitting, Cuff Size: Adult Large)  Pulse 68  Wt (!) 349 lb 12.8 oz (158.7 kg)  Breastfeeding? No  BMI 53.98  "kg/m2    Estimated body mass index is 53.98 kg/(m^2) as calculated from the following:    Height as of 1/25/18: 5' 7.5\" (1.715 m).    Weight as of this encounter: 349 lb 12.8 oz (158.7 kg).      GEN: Vitals reviewed. Healthy appearing. Patient is in no acute distress. Cooperative with exam.  HEENT: Normocephalic atraumatic.  Pupils equally round.  No scleral icterus, no conjunctivalerythema.   LUNGS: Chest rise equal bilaterally.  No accessory muscle use.  ABD:  Obese.  SKIN: Warm and dry to touch.  No rash on face, arms and legs.  EXT: No clubbing or cyanosis.   Trace bilateral lower extremity peripheral edema.  PSYCH: Mood is good.  Affect appropriate. Speech fluent. Answers questions appropriately and thought process normal.     ASSESSMENT AND PLAN:    1. Class 3 obesity due to excess calories without serious comorbidity in adult, unspecified BMI  -Patient would like to continue on her medication at this time.  She will continue for at least 1 more month.  She is provided a prescription.  She is to call if she has any new side effects.  We will plan to touch base in approximately 3-4 weeks and see how her weight is doing.  She is to call with any questions or concerns.  - naltrexone-bupropion (CONTRAVE) 8-90 MG per 12 hr tablet; Take 2 tablets by mouth 2 times daily . Taper up  Dispense: 120 tablet; Refill: 1              BONITA OLIVER DO   3/1/2018 5:53 PM    This document was prepared using voicegenerated softwear. While every attempt was made for accuracy, grammatical errors may exist.             "

## 2018-03-01 NOTE — NURSING NOTE
Patient presents today for follow up weight check.    Genoveva Chirinos LPN on 3/1/2018 at 3:46 PM

## 2018-03-01 NOTE — MR AVS SNAPSHOT
After Visit Summary   3/1/2018    Trisha Fernando    MRN: [id number]           Patient Information     Date Of Birth          1960        Visit Information        Provider Department      3/1/2018 3:40 PM Maria Guadalupe Ramon DO [facility]        Today's Diagnoses     Class 3 obesity due to excess calories without serious comorbidity in adult, unspecified BMI    -  1       Follow-ups after your visit        Your next 10 appointments already scheduled     Mar 19, 2018 12:15 PM CDT   Anticoagulation Visit with GH ANTI COAG 2   [facility] ([facility])    [address]  [place] [address] [phone]              Who to contact     If you have questions or need follow up information about today's clinic visit or your schedule please contact [facility] directly at [phone].  Normal or non-critical lab and imaging results will be communicated to you by [employer], letter or phone within 4 business days after the clinic has received the results. If you do not hear from us within 7 days, please contact the clinic through [employer] or phone. If you have a critical or abnormal lab result, we will notify you by phone as soon as possible.  Submit refill requests through [facility] or call your pharmacy and they will forward the refill request to us. Please allow 3 business days for your refill to be completed.          Additional Information About Your Visit        MyChart Information     [facility] gives you secure access to your electronic health record. If you see a primary care provider, you can also send messages to your care team and make appointments. If you have questions, please call your primary care clinic.  If you do not have a primary care provider, please call [phone] and they will assist you.        Care EveryWhere ID     This is your Care EveryWhere ID. This could be used by other organizations  to access your [facility] medical records  [id number]        Your Vitals Were     Pulse Breastfeeding? BMI (Body Mass Index)             68 No 53.98 kg/m2          Blood Pressure from Last 3 Encounters:   03/01/18 128/82   01/25/18 (!) 130/96   07/31/17 126/70    Weight from Last 3 Encounters:   03/01/18 (!) 349 lb 12.8 oz (158.7 kg)   01/25/18 (!) 354 lb (160.6 kg)   07/31/17 (!) 350 lb 9 oz (159 kg)              Today, you had the following     No orders found for display         Where to get your medicines      Some of these will need a paper prescription and others can be bought over the counter.  Ask your nurse if you have questions.     Bring a paper prescription for each of these medications     naltrexone-bupropion 8-90 MG per 12 hr tablet          Primary Care Provider Office Phone # Fax #    Maria Guadalupe Ramon  [phone] [address] [facility] [address]        Equal Access to Services     [facility]: Hadii aad ku hadasho Soomaali, waaxda luqadaha, qaybta kaalmada adeegyada, waxay daljit salgado . So [facility] [phone].    ATENCIÓN: Si habla español, tiene a rolon disposición servicios gratuitos de asistencia lingüística. Llame al [phone].    We comply with applicable federal civil rights laws and [place] laws. We do not discriminate on the basis of race, color, national origin, age, disability, sex, sexual orientation, or gender identity.            Thank you!     Thank you for choosing [facility] AND [facility]  for your care. Our goal is always to provide you with excellent care. Hearing back from our patients is one way we can continue to improve our services. Please take a few minutes to complete the written survey that you may receive in the mail after your visit with us. Thank you!             Your Updated Medication List - Protect others around you: Learn how to safely use, store and throw away your medicines at www.disposemymeds.org.           This list is accurate as of 3/1/18  4:23 PM.  Always use your most recent med list.                   Brand Name Dispense Instructions for use Diagnosis    Adult Blood Pressure Cuff Lg Kit      As directed.        diphenhydrAMINE 25 MG capsule    BENADRYL     Take 25 mg by mouth At Bedtime        EPINEPHrine 0.3 MG/0.3ML injection 2-pack    EPIPEN/ADRENACLICK/or ANY BX GENERIC EQUIV     Inject 0.3 mg into the muscle once as needed for allergic reaction.        fish oil-omega-3 fatty acids 1000 MG capsule      Take by mouth daily        furosemide 20 MG tablet    LASIX     Take 20 mg by mouth every morning        KLOR-CON 10 MEQ tablet   Generic drug:  potassium chloride      Take 1 tablet by mouth daily with food        LORazepam 0.5 MG tablet    ATIVAN          losartan 25 MG tablet    COZAAR     Take 25 mg by mouth daily        Medical Compression Stockings Misc      For personal use. Length: calf Strength: 16-20 mmHg Circumference in cm: For calf: Ankle 15cm, Calf 30cm, Ankle to calf length 40cm.        metoprolol tartrate 25 MG tablet    LOPRESSOR     Take 12.5 mg by mouth 2 times daily        naltrexone-bupropion 8-90 MG per 12 hr tablet    CONTRAVE    120 tablet    Take 2 tablets by mouth 2 times daily . Taper up    Class 3 obesity due to excess calories without serious comorbidity in adult, unspecified BMI       venlafaxine 150 MG 24 hr capsule    EFFEXOR-XR     Take 150 mg by mouth daily with food        vitamin D3 2000 UNITS Caps      Take 2,000 Units by mouth daily        warfarin 5 MG tablet    COUMADIN     Take 5 mg by mouth daily or as directed by protime clinic.

## 2018-03-02 ASSESSMENT — ANXIETY QUESTIONNAIRES: GAD7 TOTAL SCORE: 1

## 2018-03-02 ASSESSMENT — PATIENT HEALTH QUESTIONNAIRE - PHQ9: SUM OF ALL RESPONSES TO PHQ QUESTIONS 1-9: 10

## 2018-03-19 ENCOUNTER — ANTICOAGULATION THERAPY VISIT (OUTPATIENT)
Dept: ANTICOAGULATION | Facility: OTHER | Age: 58
End: 2018-03-19
Attending: INTERNAL MEDICINE
Payer: COMMERCIAL

## 2018-03-19 LAB — INR POINT OF CARE: 3 (ref 2–3)

## 2018-03-19 PROCEDURE — 36416 COLLJ CAPILLARY BLOOD SPEC: CPT

## 2018-03-19 PROCEDURE — 85610 PROTHROMBIN TIME: CPT | Mod: QW

## 2018-03-19 NOTE — PROGRESS NOTES
ANTICOAGULATION FOLLOW-UP CLINIC VISIT    Patient Name:  Trisha Fernando  Date:  3/19/2018  Contact Type:  Face to Face    SUBJECTIVE:     Patient Findings     Positives No Problem Findings           OBJECTIVE    INR Protime   Date Value Ref Range Status   03/19/2018 3.0 2.0 - 3.0 Final       ASSESSMENT / PLAN  INR assessment THER    Recheck INR In: 4 WEEKS    INR Location Clinic      Anticoagulation Summary as of 3/19/2018     INR goal 2.0-3.0   Today's INR 3.0   Maintenance plan 5 mg (5 mg x 1) every day   Full instructions 5 mg every day   Weekly total 35 mg   No change documented Maria D Zayas RN   Next INR check 4/16/2018   Priority INR   Target end date Indefinite    Indications   Long term (current) use of anticoagulants [Z79.01]  Acute embolism and thrombosis of unspecified deep veins of unspecified lower extremity [I82.409]         Anticoagulation Episode Summary     INR check location     Preferred lab     Send INR reminders to  INR    Comments       Anticoagulation Care Providers     Provider Role Specialty Phone number    Maria Guadalupe Ramon DO [facility] Internal Medicine [phone]            See the Encounter Report to view Anticoagulation Flowsheet and Dosing Calendar (Go to Encounters tab in chart review, and find the Anticoagulation Therapy Visit)        Maria D Zayas, SINDHU

## 2018-03-19 NOTE — MR AVS SNAPSHOT
rTisha FISHER Kassie   3/19/2018 12:15 PM   Anticoagulation Therapy Visit    Description:  57 year old female   Provider:  [facility]TI COAG 2   Department:  [facility]           INR as of 3/19/2018     Today's INR 3.0      Anticoagulation Summary as of 3/19/2018     INR goal 2.0-3.0   Today's INR 3.0   Full instructions 5 mg every day   Next INR check 4/16/2018    Indications   Long term (current) use of anticoagulants [Z79.01]  Acute embolism and thrombosis of unspecified deep veins of unspecified lower extremity [I82.409]         Description     Continue same dose of Coumadin/Warfarinand recheck INR in 4 weeks.  Maria D Zayas ....................  3/19/2018   12:28 PM          March 2018 Details    Sun Mon Tue Wed Thu Fri Sat         1               2               3                 4               5               6               7               8               9               10                 11               12               13               14               15               16               17                 18               19      5 mg   See details      20      5 mg         21      5 mg         22      5 mg         23      5 mg         24      5 mg           25      5 mg         26      5 mg         27      5 mg         28      5 mg         29      5 mg         30      5 mg         31      5 mg          Date Details   03/19 This INR check               How to take your warfarin dose     To take:  5 mg Take 1 of the 5 mg tablets.           April 2018 Details    Sun Mon Tue Wed Thu Fri Sat     1      5 mg         2      5 mg         3      5 mg         4      5 mg         5      5 mg         6      5 mg         7      5 mg           8      5 mg         9      5 mg         10      5 mg         11      5 mg         12      5 mg         13      5 mg         14      5 mg           15      5 mg         16            17               18               19               20               21                 22                23               24               25               26               27               28                 29               30                     Date Details   No additional details    Date of next INR:  4/16/2018         How to take your warfarin dose     To take:  5 mg Take 1 of the 5 mg tablets.

## 2018-04-16 ENCOUNTER — ANTICOAGULATION THERAPY VISIT (OUTPATIENT)
Dept: ANTICOAGULATION | Facility: OTHER | Age: 58
End: 2018-04-16
Attending: INTERNAL MEDICINE
Payer: COMMERCIAL

## 2018-04-16 LAB — INR POINT OF CARE: 2.9 (ref 2–3)

## 2018-04-16 PROCEDURE — 85610 PROTHROMBIN TIME: CPT | Mod: QW

## 2018-04-16 PROCEDURE — 36416 COLLJ CAPILLARY BLOOD SPEC: CPT

## 2018-04-16 NOTE — PROGRESS NOTES
ANTICOAGULATION FOLLOW-UP CLINIC VISIT    Patient Name:  Trisha Fernando  Date:  4/16/2018  Contact Type:  Face to Face    SUBJECTIVE:     Patient Findings     Positives Change in medications (stopped Contrave), No Problem Findings           OBJECTIVE    INR Protime   Date Value Ref Range Status   04/16/2018 2.9 2.0 - 3.0 Final       ASSESSMENT / PLAN  INR assessment THER    Recheck INR In: 4 WEEKS    INR Location Clinic      Anticoagulation Summary as of 4/16/2018     INR goal 2.0-3.0   Today's INR 2.9   Maintenance plan 5 mg (5 mg x 1) every day   Full instructions 5 mg every day   Weekly total 35 mg   No change documented Maria D Zayas RN   Next INR check 5/14/2018   Priority INR   Target end date Indefinite    Indications   Long term (current) use of anticoagulants [Z79.01]  Acute embolism and thrombosis of unspecified deep veins of unspecified lower extremity [I82.409]         Anticoagulation Episode Summary     INR check location     Preferred lab     Send INR reminders to  INR    Comments       Anticoagulation Care Providers     Provider Role Specialty Phone number    Maria Guadalupe Ramon,  [facility] Internal Medicine [phone]            See the Encounter Report to view Anticoagulation Flowsheet and Dosing Calendar (Go to Encounters tab in chart review, and find the Anticoagulation Therapy Visit)        Maria D Zayas, SINDHU

## 2018-04-16 NOTE — MR AVS SNAPSHOT
Trisha NATALIA Fernando   4/16/2018 12:15 PM   Anticoagulation Therapy Visit    Description:  57 year old female   Provider:  [facility] ANTI COAG 2   Department:  [facility]           INR as of 4/16/2018     Today's INR 2.9      Anticoagulation Summary as of 4/16/2018     INR goal 2.0-3.0   Today's INR 2.9   Full instructions 5 mg every day   Next INR check 5/14/2018    Indications   Long term (current) use of anticoagulants [Z79.01]  Acute embolism and thrombosis of unspecified deep veins of unspecified lower extremity [I82.409]         Description     Continue same dose of Coumadin/Warfarinand recheck INR in 4 weeks.  Maria D Zayas ....................  4/16/2018   12:25 PM          April 2018 Details    Sun Mon Tue Wed Thu Fri Sat     1               2               3               4               5               6               7                 8               9               10               11               12               13               14                 15               16      5 mg   See details      17      5 mg         18      5 mg         19      5 mg         20      5 mg         21      5 mg           22      5 mg         23      5 mg         24      5 mg         25      5 mg         26      5 mg         27      5 mg         28      5 mg           29      5 mg         30      5 mg               Date Details   04/16 This INR check               How to take your warfarin dose     To take:  5 mg Take 1 of the 5 mg tablets.           May 2018 Details    Sun Mon Tue Wed Thu Fri Sat       1      5 mg         2      5 mg         3      5 mg         4      5 mg         5      5 mg           6      5 mg         7      5 mg         8      5 mg         9      5 mg         10      5 mg         11      5 mg         12      5 mg           13      5 mg         14            15               16               17               18               19                 20               21               22               23                24               25               26                 27               28               29               30               31                  Date Details   No additional details    Date of next INR:  5/14/2018         How to take your warfarin dose     To take:  5 mg Take 1 of the 5 mg tablets.

## 2018-05-03 DIAGNOSIS — I74.9 EMBOLISM AND THROMBOSIS (H): Primary | ICD-10-CM

## 2018-05-03 DIAGNOSIS — Z79.01 ANTICOAGULATION MONITORING, INR RANGE 2-3: ICD-10-CM

## 2018-05-03 RX ORDER — WARFARIN SODIUM 5 MG/1
TABLET ORAL
Qty: 90 TABLET | Refills: 1 | Status: SHIPPED | OUTPATIENT
Start: 2018-05-03 | End: 2018-10-29

## 2018-05-03 NOTE — TELEPHONE ENCOUNTER
Last INR 2.9:  04/16/18  Last office visit with Dr. Ramon:  01/01/18  Prescription approved per [facility] Refill Protocol.

## 2018-05-21 ENCOUNTER — ANTICOAGULATION THERAPY VISIT (OUTPATIENT)
Dept: ANTICOAGULATION | Facility: OTHER | Age: 58
End: 2018-05-21
Attending: INTERNAL MEDICINE
Payer: COMMERCIAL

## 2018-05-21 DIAGNOSIS — I74.9 EMBOLISM AND THROMBOSIS (H): ICD-10-CM

## 2018-05-21 LAB — INR POINT OF CARE: 2.3 (ref 0.86–1.14)

## 2018-05-21 PROCEDURE — 85610 PROTHROMBIN TIME: CPT | Mod: QW

## 2018-05-21 PROCEDURE — 99207 ZZC NO CHARGE NURSE ONLY: CPT

## 2018-05-21 PROCEDURE — 36416 COLLJ CAPILLARY BLOOD SPEC: CPT

## 2018-05-21 NOTE — PROGRESS NOTES
ANTICOAGULATION FOLLOW-UP CLINIC VISIT    Patient Name:  Trisha Fernando  Date:  5/21/2018  Contact Type:  Face to Face    SUBJECTIVE:     Patient Findings     Positives OTC meds    Comments States she took Aleve this morning for a backache. Teagan Bah, RN on 5/21/2018 at 12:09 PM             OBJECTIVE    INR Protime   Date Value Ref Range Status   05/21/2018 2.3 (A) 0.86 - 1.14 Final       ASSESSMENT / PLAN  INR assessment THER    Recheck INR In: 4 WEEKS    INR Location Clinic      Anticoagulation Summary as of 5/21/2018     INR goal 2.0-3.0   Today's INR 2.3   Maintenance plan 5 mg (5 mg x 1) every day   Full instructions 5 mg every day   Weekly total 35 mg   No change documented Teagan Bah, RN   Next INR check 6/18/2018   Priority INR   Target end date Indefinite    Indications   Long term (current) use of anticoagulants [Z79.01]  Embolism and thrombosis (H) [I74.9]         Anticoagulation Episode Summary     INR check location     Preferred lab     Send INR reminders to  INR    Comments       Anticoagulation Care Providers     Provider Role Specialty Phone number    Maria Guadalupe Ramon DO [facility] Internal Medicine [phone]            See the Encounter Report to view Anticoagulation Flowsheet and Dosing Calendar (Go to Encounters tab in chart review, and find the Anticoagulation Therapy Visit)        Teagan Bah, RN

## 2018-05-21 NOTE — MR AVS SNAPSHOT
Trisha Fernando   5/21/2018 12:00 PM   Anticoagulation Therapy Visit    Description:  57 year old female   Provider:  [facility] ANTI COAG 2   Department:  [facility]           INR as of 5/21/2018     Today's INR 2.3      Anticoagulation Summary as of 5/21/2018     INR goal 2.0-3.0   Today's INR 2.3   Full instructions 5 mg every day   Next INR check 6/18/2018    Indications   Long term (current) use of anticoagulants [Z79.01]  Embolism and thrombosis (H) [I74.9]         Description     Continue same dose of 5mg daily, and return in 4 weeks for recheck of INR. Teagan Bah, RN on 5/21/2018 at 12:07 PM        May 2018 Details    Sun Mon Tue Wed Thu Fri Sat       1               2               3               4               5                 6               7               8               9               10               11               12                 13               14               15               16               17               18               19                 20               21      5 mg   See details      22      5 mg         23      5 mg         24      5 mg         25      5 mg         26      5 mg           27      5 mg         28      5 mg         29      5 mg         30      5 mg         31      5 mg            Date Details   05/21 This INR check               How to take your warfarin dose     To take:  5 mg Take 1 of the 5 mg tablets.           June 2018 Details    Sun Mon Tue Wed Thu Fri Sat          1      5 mg         2      5 mg           3      5 mg         4      5 mg         5      5 mg         6      5 mg         7      5 mg         8      5 mg         9      5 mg           10      5 mg         11      5 mg         12      5 mg         13      5 mg         14      5 mg         15      5 mg         16      5 mg           17      5 mg         18            19               20               21               22               23                 24               25               26                27               28               29               30                Date Details   No additional details    Date of next INR:  6/18/2018         How to take your warfarin dose     To take:  5 mg Take 1 of the 5 mg tablets.

## 2018-06-18 ENCOUNTER — ANTICOAGULATION THERAPY VISIT (OUTPATIENT)
Dept: ANTICOAGULATION | Facility: OTHER | Age: 58
End: 2018-06-18
Attending: INTERNAL MEDICINE
Payer: COMMERCIAL

## 2018-06-18 DIAGNOSIS — I74.9 EMBOLISM AND THROMBOSIS (H): ICD-10-CM

## 2018-06-18 LAB — INR POINT OF CARE: 2.5 (ref 2–3)

## 2018-06-18 PROCEDURE — 85610 PROTHROMBIN TIME: CPT | Mod: QW

## 2018-06-18 PROCEDURE — 36416 COLLJ CAPILLARY BLOOD SPEC: CPT

## 2018-06-18 NOTE — PROGRESS NOTES
ANTICOAGULATION FOLLOW-UP CLINIC VISIT    Patient Name:  Trisha Fernando  Date:  6/18/2018  Contact Type:  Face to Face    SUBJECTIVE:     Patient Findings     Positives No Problem Findings           OBJECTIVE    INR Protime   Date Value Ref Range Status   06/18/2018 2.5 2.0 - 3.0 Final       ASSESSMENT / PLAN  INR assessment THER    Recheck INR In: 6 WEEKS    INR Location Clinic      Anticoagulation Summary as of 6/18/2018     INR goal 2.0-3.0   Today's INR 2.5   Warfarin maintenance plan 5 mg (5 mg x 1) every day   Full warfarin instructions 5 mg every day   Weekly warfarin total 35 mg   No change documented Maria D Zayas RN   Next INR check 7/30/2018   Priority INR   Target end date Indefinite    Indications   Long term (current) use of anticoagulants [Z79.01]  Embolism and thrombosis (H) [I74.9]         Anticoagulation Episode Summary     INR check location     Preferred lab     Send INR reminders to  INR    Comments       Anticoagulation Care Providers     Provider Role Specialty Phone number    Maria Guadalupe Ramon DO [facility] Internal Medicine [phone]            See the Encounter Report to view Anticoagulation Flowsheet and Dosing Calendar (Go to Encounters tab in chart review, and find the Anticoagulation Therapy Visit)        Maria D Zayas RN

## 2018-06-18 NOTE — MR AVS SNAPSHOT
Trisha Fernando   6/18/2018 12:15 PM   Anticoagulation Therapy Visit    Description:  57 year old female   Provider:  [facility] ANTI COAG 2   Department:  [facility]           INR as of 6/18/2018     Today's INR 2.5      Anticoagulation Summary as of 6/18/2018     INR goal 2.0-3.0   Today's INR 2.5   Full warfarin instructions 5 mg every day   Next INR check 7/30/2018    Indications   Long term (current) use of anticoagulants [Z79.01]  Embolism and thrombosis (H) [I74.9]         Description     Continue same dose of Coumadin/Warfarinand recheck INR in 6 weeks.  Mraia D Zayas ....................  6/18/2018   12:21 PM  Reviewed reasons to check INR between appointments, such as medication changes.         June 2018 Details    Sun Mon Tue Wed Thu Fri Sat          1               2                 3               4               5               6               7               8               9                 10               11               12               13               14               15               16                 17               18      5 mg   See details      19      5 mg         20      5 mg         21      5 mg         22      5 mg         23      5 mg           24      5 mg         25      5 mg         26      5 mg         27      5 mg         28      5 mg         29      5 mg         30      5 mg          Date Details   06/18 This INR check               How to take your warfarin dose     To take:  5 mg Take 1 of the 5 mg tablets.           July 2018 Details    Sun Mon Tue Wed Thu Fri Sat     1      5 mg         2      5 mg         3      5 mg         4      5 mg         5      5 mg         6      5 mg         7      5 mg           8      5 mg         9      5 mg         10      5 mg         11      5 mg         12      5 mg         13      5 mg         14      5 mg           15      5 mg         16      5 mg         17      5 mg         18      5 mg         19      5 mg         20      5 mg          21      5 mg           22      5 mg         23      5 mg         24      5 mg         25      5 mg         26      5 mg         27      5 mg         28      5 mg           29      5 mg         30            31                    Date Details   No additional details    Date of next INR:  7/30/2018         How to take your warfarin dose     To take:  5 mg Take 1 of the 5 mg tablets.

## 2018-06-19 ENCOUNTER — HOSPITAL ENCOUNTER (EMERGENCY)
Facility: OTHER | Age: 58
Discharge: HOME | End: 2018-06-19
Attending: EMERGENCY MEDICINE | Admitting: EMERGENCY MEDICINE
Payer: COMMERCIAL

## 2018-06-19 ENCOUNTER — APPOINTMENT (OUTPATIENT)
Dept: GENERAL RADIOLOGY | Facility: OTHER | Age: 58
End: 2018-06-19
Attending: EMERGENCY MEDICINE
Payer: COMMERCIAL

## 2018-06-19 VITALS
WEIGHT: 293 LBS | SYSTOLIC BLOOD PRESSURE: 108 MMHG | HEART RATE: 73 BPM | DIASTOLIC BLOOD PRESSURE: 75 MMHG | OXYGEN SATURATION: 98 % | BODY MASS INDEX: 53.85 KG/M2 | TEMPERATURE: 97.4 F | RESPIRATION RATE: 13 BRPM

## 2018-06-19 DIAGNOSIS — R07.9 ACUTE CHEST PAIN: ICD-10-CM

## 2018-06-19 LAB
ALBUMIN SERPL-MCNC: 4.1 G/DL (ref 3.5–5.7)
ALP SERPL-CCNC: 72 U/L (ref 34–104)
ALT SERPL W P-5'-P-CCNC: 27 U/L (ref 7–52)
ANION GAP SERPL CALCULATED.3IONS-SCNC: 11 MMOL/L (ref 3–14)
AST SERPL W P-5'-P-CCNC: 20 U/L (ref 13–39)
BASOPHILS # BLD AUTO: 0 10E9/L (ref 0–0.2)
BASOPHILS NFR BLD AUTO: 0.5 %
BILIRUB SERPL-MCNC: 0.5 MG/DL (ref 0.3–1)
BUN SERPL-MCNC: 15 MG/DL (ref 7–25)
CALCIUM SERPL-MCNC: 9.8 MG/DL (ref 8.6–10.3)
CHLORIDE SERPL-SCNC: 102 MMOL/L (ref 98–107)
CO2 SERPL-SCNC: 25 MMOL/L (ref 21–31)
CREAT SERPL-MCNC: 0.78 MG/DL (ref 0.6–1.2)
DIFFERENTIAL METHOD BLD: ABNORMAL
EOSINOPHIL # BLD AUTO: 0 10E9/L (ref 0–0.7)
EOSINOPHIL NFR BLD AUTO: 0.2 %
ERYTHROCYTE [DISTWIDTH] IN BLOOD BY AUTOMATED COUNT: 13.6 % (ref 10–15)
GFR SERPL CREATININE-BSD FRML MDRD: 76 ML/MIN/1.7M2
GLUCOSE SERPL-MCNC: 99 MG/DL (ref 70–105)
HCT VFR BLD AUTO: 47.1 % (ref 35–47)
HGB BLD-MCNC: 15.5 G/DL (ref 11.7–15.7)
IMM GRANULOCYTES # BLD: 0 10E9/L (ref 0–0.4)
IMM GRANULOCYTES NFR BLD: 0.2 %
INR PPP: 2.1 (ref 0–1.3)
LYMPHOCYTES # BLD AUTO: 2.9 10E9/L (ref 0.8–5.3)
LYMPHOCYTES NFR BLD AUTO: 45.8 %
MCH RBC QN AUTO: 31.1 PG (ref 26.5–33)
MCHC RBC AUTO-ENTMCNC: 32.9 G/DL (ref 31.5–36.5)
MCV RBC AUTO: 94 FL (ref 78–100)
MONOCYTES # BLD AUTO: 0.6 10E9/L (ref 0–1.3)
MONOCYTES NFR BLD AUTO: 9.3 %
NEUTROPHILS # BLD AUTO: 2.8 10E9/L (ref 1.6–8.3)
NEUTROPHILS NFR BLD AUTO: 44 %
PLATELET # BLD AUTO: 207 10E9/L (ref 150–450)
POTASSIUM SERPL-SCNC: 4 MMOL/L (ref 3.5–5.1)
PROT SERPL-MCNC: 7.1 G/DL (ref 6.4–8.9)
RBC # BLD AUTO: 4.99 10E12/L (ref 3.8–5.2)
SODIUM SERPL-SCNC: 138 MMOL/L (ref 134–144)
TROPONIN I SERPL-MCNC: <0.03 UG/L (ref 0–0.03)
TROPONIN I SERPL-MCNC: <0.03 UG/L (ref 0–0.03)
WBC # BLD AUTO: 6.4 10E9/L (ref 4–11)

## 2018-06-19 PROCEDURE — 84484 ASSAY OF TROPONIN QUANT: CPT | Performed by: EMERGENCY MEDICINE

## 2018-06-19 PROCEDURE — 93005 ELECTROCARDIOGRAM TRACING: CPT | Performed by: EMERGENCY MEDICINE

## 2018-06-19 PROCEDURE — 99285 EMERGENCY DEPT VISIT HI MDM: CPT | Mod: Z6 | Performed by: EMERGENCY MEDICINE

## 2018-06-19 PROCEDURE — 93010 ELECTROCARDIOGRAM REPORT: CPT | Performed by: INTERNAL MEDICINE

## 2018-06-19 PROCEDURE — 99285 EMERGENCY DEPT VISIT HI MDM: CPT | Mod: 25 | Performed by: EMERGENCY MEDICINE

## 2018-06-19 PROCEDURE — 85610 PROTHROMBIN TIME: CPT | Performed by: EMERGENCY MEDICINE

## 2018-06-19 PROCEDURE — 85025 COMPLETE CBC W/AUTO DIFF WBC: CPT | Performed by: EMERGENCY MEDICINE

## 2018-06-19 PROCEDURE — 36415 COLL VENOUS BLD VENIPUNCTURE: CPT | Performed by: EMERGENCY MEDICINE

## 2018-06-19 PROCEDURE — 80053 COMPREHEN METABOLIC PANEL: CPT | Performed by: EMERGENCY MEDICINE

## 2018-06-19 PROCEDURE — 71046 X-RAY EXAM CHEST 2 VIEWS: CPT

## 2018-06-19 ASSESSMENT — ENCOUNTER SYMPTOMS
SHORTNESS OF BREATH: 0
ABDOMINAL PAIN: 0
NAUSEA: 0
COUGH: 0
VOMITING: 0
WEAKNESS: 0
FATIGUE: 0
LIGHT-HEADEDNESS: 0
DIZZINESS: 0
FEVER: 0

## 2018-06-19 NOTE — ED TRIAGE NOTES
Pt here by herself with c/o intermittent chest pain, last time at 11 AM, that lasted about 15 seconds, VSS, pt is currently pain free, pt brought back into ER to be evaluated

## 2018-06-19 NOTE — DISCHARGE INSTRUCTIONS
1.  If you are having persistent chest pain or shortness of breath return to ER  2.  Otherwise follow-up with your primary care physician in 1-2 days for further discussion regarding your recurrent chest pains

## 2018-06-19 NOTE — ED AVS SNAPSHOT
[facility]    [address]    [place] [address]    Phone:  [phone]    Fax:  [phone]                                       Trisha Fernando   MRN: [id number]    Department:  [facility]   Date of Visit:  6/19/2018           After Visit Summary Signature Page     I have received my discharge instructions, and my questions have been answered. I have discussed any challenges I see with this plan with the nurse or doctor.    ..........................................................................................................................................  Patient/Patient Representative Signature      ..........................................................................................................................................  Patient Representative Print Name and Relationship to Patient    ..................................................               ................................................  Date                                            Time    ..........................................................................................................................................  Reviewed by Signature/Title    ...................................................              ..............................................  Date                                                            Time

## 2018-06-19 NOTE — ED AVS SNAPSHOT
[facility]    [address] [facility] [address]    Phone:  [phone]    Fax:  [phone]                                       Trisha Fernando   MRN: [id number]    Department:  [facility]   Date of Visit:  6/19/2018           Patient Information     Date Of Birth          1960        Your diagnoses for this visit were:     Acute chest pain        You were seen by Yared Nelson MD.        Discharge Instructions       1.  If you are having persistent chest pain or shortness of breath return to ER  2.  Otherwise follow-up with your primary care physician in 1-2 days for further discussion regarding your recurrent chest pains    Your next 10 appointments already scheduled     Jul 30, 2018 12:15 PM CDT   Anticoagulation Visit with  ANTI COAG 2   [facility]    [address] [place] [id number] [phone]              24 Hour Appointment Hotline     To schedule an appointment at [facility], please call [phone]. If you don't have a family doctor or clinic, we will help you find one. [facility] are conveniently located to serve the needs of you and your family.           Review of your medicines      Our records show that you are taking the medicines listed below. If these are incorrect, please call your family doctor or clinic.        Dose / Directions Last dose taken    Adult Blood Pressure Cuff Lg Kit        As directed.   Refills:  0        diphenhydrAMINE 25 MG capsule   Commonly known as:  BENADRYL   Dose:  25 mg        Take 25 mg by mouth At Bedtime   Refills:  0        EPINEPHrine 0.3 MG/0.3ML injection 2-pack   Commonly known as:  EPIPEN/ADRENACLICK/or ANY BX GENERIC EQUIV   Dose:  0.3 mg        Inject 0.3 mg into the muscle once as needed for allergic reaction.   Refills:  0        fish oil-omega-3 fatty acids 1000 MG capsule        Take by mouth daily    Refills:  0        furosemide 20 MG tablet   Commonly known as:  LASIX   Dose:  20 mg        Take 20 mg by mouth every morning   Refills:  0        KLOR-CON 10 MEQ tablet   Dose:  1 tablet   Generic drug:  potassium chloride        Take 1 tablet by mouth daily with food   Refills:  0        LORazepam 0.5 MG tablet   Commonly known as:  ATIVAN        Refills:  0        losartan 25 MG tablet   Commonly known as:  COZAAR   Dose:  25 mg        Take 25 mg by mouth daily   Refills:  0        Medical Compression Stockings Misc        For personal use. Length: calf Strength: 16-20 mmHg Circumference in cm: For calf: Ankle 15cm, Calf 30cm, Ankle to calf length 40cm.   Refills:  0        metoprolol tartrate 25 MG tablet   Commonly known as:  LOPRESSOR   Dose:  12.5 mg        Take 12.5 mg by mouth 2 times daily   Refills:  0        naltrexone-bupropion 8-90 MG per 12 hr tablet   Commonly known as:  CONTRAVE   Dose:  2 tablet   Quantity:  120 tablet        Take 2 tablets by mouth 2 times daily . Taper up   Refills:  1        venlafaxine 150 MG 24 hr capsule   Commonly known as:  EFFEXOR-XR   Dose:  150 mg        Take 150 mg by mouth daily with food   Refills:  0        vitamin D3 2000 units Caps   Dose:  2000 Units        Take 2,000 Units by mouth daily   Refills:  0        warfarin 5 MG tablet   Commonly known as:  COUMADIN   Quantity:  90 tablet        TAKE 5 MG DAILY OR AS DIRECTED BY THE PROTIME CLINIC.   Refills:  1                Procedures and tests performed during your visit     Procedure/Test Number of Times Performed    CBC with platelets differential 1    Comprehensive metabolic panel 1    EKG 12-lead, tracing only 1    INR 1    Troponin I 2    XR Chest 2 Views 1      Orders Needing Specimen Collection     None      Pending Results     No orders found from 6/17/2018 to 6/20/2018.            Pending Culture Results     No orders found from 6/17/2018 to 6/20/2018.            Pending Results Instructions     If you  had any lab results that were not finalized at the time of your Discharge, you can call the ED Lab Result RN at [phone]. You will be contacted by this team for any positive Lab results or changes in treatment. The nurses are available 7 days a week from 10A to 6:30P.  You can leave a message 24 hours per day and they will return your call.        Thank you for choosing [facility]       Thank you for choosing [facility] for your care. Our goal is always to provide you with excellent care. Hearing back from our patients is one way we can continue to improve our services. Please take a few minutes to complete the written survey that you may receive in the mail after you visit with us. Thank you!        [employer] Information     Powered gives you secure access to your electronic health record. If you see a primary care provider, you can also send messages to your care team and make appointments. If you have questions, please call your primary care clinic.  If you do not have a primary care provider, please call [phone] and they will assist you.        Care EveryWhere ID     This is your Care EveryWhere ID. This could be used by other organizations to access your [facility] medical records  [id number]        Equal Access to Services     SHELBY KENNEDY : Nayan Mirza, johnson mccord, izabella goss. So [facility] [phone].    ATENCIÓN: Si habla español, tiene a rolon disposición servicios gratuitos de asistencia lingüística. Stephanie al [phone].    We comply with applicable federal civil rights laws and [place] laws. We do not discriminate on the basis of race, color, national origin, age, disability, sex, sexual orientation, or gender identity.            After Visit Summary       This is your record. Keep this with you and show to your community pharmacist(s) and doctor(s) at your next visit.

## 2018-06-19 NOTE — ED PROVIDER NOTES
History   No chief complaint on file.    HPI Comments: This is a 57-year-old female who denies any significant past medical history coming into the emergency room with her  with complaint of acute but brief (lasting only 15 seconds) substernal low chest pain which started around 11 AM this morning while sitting which she described as heaviness or someone sitting on her chest associated with sweatiness but no shortness of breath, nausea or vomiting, palpitations or fatigue.  She states she had a similar episode or heavy substernal chest pain again while sitting on Sunday.  After the pain dissipated she did not feel fatigued noted she develop exertional shortness of breath.  Patient denies history of arrhythmia.  She does report she was told once patient on echocardiography that she had ascending aortic aneurysm approximately a year and half ago.  However she says since then she went to [facility] and had a MRI of her chest and was told that she did not have aortic aneurysm and that the echocardiography on which the report was patient was poor one.  No fever, cough or chills reported.       Problem List:    Patient Active Problem List    Diagnosis Date Noted     Long term (current) use of anticoagulants 02/11/2018     Priority: Medium     Embolism and thrombosis (H) 02/11/2018     Priority: Medium     Depression, major, recurrent, mild (H) 02/08/2018     Priority: Medium     Obesity 02/08/2018     Priority: Medium     Hypertension 05/26/2016     Priority: Medium     Health care maintenance 03/03/2016     Priority: Medium     Overview:   Colonoscopy: Done in 2013 and positive for precancerous polyp, repeat 2016   Breast Exam/Mammography: last mammogram September 2015 and normal, repeat fall of 2016   Pap smear: ASCUS and neg HPV in 2/2017, repeat in 3 years   DEXA:  age 64  Immunizations: Up-to-date  Lipids/Annual Exam: Done last in December 2014 and normal, repeat this year and yearly with obesity  Hepatitis C  screen: will discuss       Daytime somnolence 2015     Priority: Medium     Varicose veins of both legs with edema 2015     Priority: Medium     Anticoagulation monitoring, INR range 2-3 06/10/2015     Priority: Medium     Anticoagulant long-term use 2014     Priority: Medium     Overview:   For factor V;  History of recurrent DVT          Past Medical History:    Past Medical History:   Diagnosis Date     Activated protein C resistance (H)      Benign lipomatous neoplasm      Calculus of kidney      Essential (primary) hypertension      Hyperlipidemia      Major depressive disorder, recurrent, mild (H)      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, unspecified (CODE) (H)      Obesity      Pain in knee      Personal history of other venous thrombosis and embolism (CODE)      Somnolence      Thoracic aortic aneurysm without rupture (H)        Past Surgical History:    Past Surgical History:   Procedure Laterality Date     ARTHROSCOPY KNEE      2013,Arthroscopy, Knee left Dr Jones     BIOPSY BREAST      Right for fiboadenoma      SECTION      ,      COLONOSCOPY      2011,polyp ( Dr. Sutton)  follow up 3 years     MAMMOPLASTY REDUCTION      03     OTHER SURGICAL HISTORY      ,2073,SCAN-CT INTERPRETATION,CT of chest. Negative     OTHER SURGICAL HISTORY      08,684938,SCAN-CT INTERPRETATION,CT of abdomen and pelvis. Fatty liver infiltration, 5 mm non-obstructing stone     OTHER SURGICAL HISTORY      2011,QVI199,LIPOMA RESECTION,Right Forearm Lipoma Excision     OTHER SURGICAL HISTORY      ,LITHOTRIPSY,lithotrypsy, year?     OTHER SURGICAL HISTORY      ,00704.0,SKIN/SUBCUTANEOUS SURGERY,Left foot soft tissue resection; dr. Lang     OTHER SURGICAL HISTORY      2014,28179.0,AK LAP CHOLECYSTECTOMY     TONSILLECTOMY      No Comments Provided       Family History:    Family History   Problem Relation Age of Onset     Other - See  Comments Father      accidental death/ gallbladder disease     Other - See Comments Mother      HX of kidney stones and blood clots/ gallbladder disease     Dementia Mother      Dementia     Colon Cancer Maternal Uncle 50     Cancer-colon     Colon Cancer Maternal Uncle 50     Cancer-colon     Other - See Comments Maternal Uncle      HX of kidney stones     HEART DISEASE Paternal Grandfather      Heart Disease,ASCHD     Other - See Comments Brother      No Known Problems     Other - See Comments Daughter      No Known Problems     Other - See Comments Daughter      No Known Problems     Other - See Comments Brother      HX of kidney stones and blood clots     Other - See Comments Paternal Aunt      Parkinson's     Other - See Comments Brother      No Known Problems     Other - See Comments Brother      No Known Problems       Social History:  Marital Status:   [2]  Social History   Substance Use Topics     Smoking status: Former Smoker     Packs/day: 1.00     Years: 12.00     Types: Cigarettes     Quit date: 9/4/1990     Smokeless tobacco: Never Used     Alcohol use 1.2 oz/week      Comment: Alcoholic Drinks/day: socially        Medications:      Blood Pressure Monitoring (ADULT BLOOD PRESSURE CUFF LG) KIT   Cholecalciferol (VITAMIN D3) 2000 UNITS CAPS   diphenhydrAMINE (BENADRYL) 25 MG capsule   Elastic Bandages & Supports (MEDICAL COMPRESSION STOCKINGS) MISC   EPINEPHrine (EPIPEN/ADRENACLICK/OR ANY BX GENERIC EQUIV) 0.3 MG/0.3ML injection 2-pack   fish oil-omega-3 fatty acids 1000 MG capsule   furosemide (LASIX) 20 MG tablet   LORazepam (ATIVAN) 0.5 MG tablet   losartan (COZAAR) 25 MG tablet   metoprolol tartrate (LOPRESSOR) 25 MG tablet   naltrexone-bupropion (CONTRAVE) 8-90 MG per 12 hr tablet   potassium chloride (KLOR-CON) 10 MEQ tablet   venlafaxine (EFFEXOR-XR) 150 MG 24 hr capsule   warfarin (COUMADIN) 5 MG tablet         Review of Systems   Constitutional: Negative for fatigue and fever.    Respiratory: Negative for cough and shortness of breath.    Cardiovascular: Positive for chest pain.   Gastrointestinal: Negative for abdominal pain, nausea and vomiting.   Neurological: Negative for dizziness, weakness and light-headedness.   All other systems reviewed and are negative.      Physical Exam   BP: (!) 174/103  Pulse: 73  Heart Rate: 71  Temp: 97.4  F (36.3  C)  Resp: 16  Weight: (!) 158.3 kg (349 lb)  SpO2: 97 %      Physical Exam   Constitutional: She is oriented to person, place, and time. She appears well-developed and well-nourished. No distress.   Cardiovascular: Normal rate, regular rhythm, normal heart sounds and intact distal pulses.    Pulmonary/Chest: Effort normal and breath sounds normal. No respiratory distress. She has no wheezes. She has no rales. She exhibits no tenderness.   Abdominal: Soft. Bowel sounds are normal. She exhibits no distension. There is no tenderness. There is no rebound and no guarding.   Musculoskeletal: Normal range of motion. She exhibits no edema or tenderness.   Neurological: She is oriented to person, place, and time.       ED Course     This is a 57-year-old female who denies any known coronary artery disease or prior history of acute myocardial infarction but who has history of DVT who is on home and then coming into the emergency room concerning about brief acute heavy lower substernal chest pain associated with diaphoresis earlier today.  She had a similar episode lasting approximately 10-15 seconds on this past Sunday as well.  The duration of the symptoms do not suggest acute coronary syndrome.  However the character of the symptoms i.e. chest heaviness with diaphoresis are consistent with acute coronary syndrome.  Therefore will obtain basic labs including troponin as well as EKG and chest x-ray.  EKG reveals no ST changes or arrhythmia.  Initial troponin and other basic labs are unremarkable.  Chest x-ray is completely normal.  Delta troponin is also  normal patient advised to consult with her primary care physician in 1-2 days to consider provocative testing and further ACS risk stratification.  Should she develop again persistent shortness of breath or chest pain she was advised to return to ER.    Patient had echocardiography which we feel possible 4.1 cm ascending aortic aneurysm in 2016.  However, MRI angiography done at [facility] in [place] in December 2016 showed the following results: The aortic root is normal in size. The ascending aorta, arch, and descending aorta are normal in diameter. There is no coarctation or dissection seen. The widest diameter is at proximal ascending aorta at 37 mm, 14.8 mm/m2 indexed to BSA, Normal 14.8   - 22.1 mm/m2).  Therefore he does not appear patient has aortic aneurysm.     ED Course     Procedures               Critical Care time:  none               Results for orders placed or performed during the hospital encounter of 06/19/18 (from the past 24 hour(s))   CBC with platelets differential   Result Value Ref Range    WBC 6.4 4.0 - 11.0 10e9/L    RBC Count 4.99 3.8 - 5.2 10e12/L    Hemoglobin 15.5 11.7 - 15.7 g/dL    Hematocrit 47.1 (H) 35.0 - 47.0 %    MCV 94 78 - 100 fl    MCH 31.1 26.5 - 33.0 pg    MCHC 32.9 31.5 - 36.5 g/dL    RDW 13.6 10.0 - 15.0 %    Platelet Count 207 150 - 450 10e9/L    Diff Method Automated Method     % Neutrophils 44.0 %    % Lymphocytes 45.8 %    % Monocytes 9.3 %    % Eosinophils 0.2 %    % Basophils 0.5 %    % Immature Granulocytes 0.2 %    Absolute Neutrophil 2.8 1.6 - 8.3 10e9/L    Absolute Lymphocytes 2.9 0.8 - 5.3 10e9/L    Absolute Monocytes 0.6 0.0 - 1.3 10e9/L    Absolute Eosinophils 0.0 0.0 - 0.7 10e9/L    Absolute Basophils 0.0 0.0 - 0.2 10e9/L    Abs Immature Granulocytes 0.0 0 - 0.4 10e9/L   Comprehensive metabolic panel   Result Value Ref Range    Sodium 138 134 - 144 mmol/L    Potassium 4.0 3.5 - 5.1 mmol/L    Chloride 102 98 - 107 mmol/L    Carbon Dioxide 25 21 - 31  mmol/L    Anion Gap 11 3 - 14 mmol/L    Glucose 99 70 - 105 mg/dL    Urea Nitrogen 15 7 - 25 mg/dL    Creatinine 0.78 0.60 - 1.20 mg/dL    GFR Estimate 76 >60 mL/min/1.7m2    GFR Estimate If Black >90 >60 mL/min/1.7m2    Calcium 9.8 8.6 - 10.3 mg/dL    Bilirubin Total 0.5 0.3 - 1.0 mg/dL    Albumin 4.1 3.5 - 5.7 g/dL    Protein Total 7.1 6.4 - 8.9 g/dL    Alkaline Phosphatase 72 34 - 104 U/L    ALT 27 7 - 52 U/L    AST 20 13 - 39 U/L   Troponin I   Result Value Ref Range    Troponin I ES <0.030 0.000 - 0.034 ug/L   INR   Result Value Ref Range    INR 2.10 (H) 0 - 1.3   XR Chest 2 Views    Narrative    PROCEDURE:  XR CHEST 2 VW    HISTORY:  Substernal chest pain; .     COMPARISON:  11/10/2016    FINDINGS:   The cardiac silhouette is normal in size. The pulmonary vasculature is  normal.  The lungs are clear. No pleural effusion or pneumothorax.      Impression    IMPRESSION:  No acute cardiopulmonary disease.      MARYLOU BLAKE MD   Troponin I   Result Value Ref Range    Troponin I ES <0.030 0.000 - 0.034 ug/L       Medications - No data to display    Assessments & Plan (with Medical Decision Making)     I have reviewed the nursing notes.    I have reviewed the findings, diagnosis, plan and need for follow up with the patient.       New Prescriptions    No medications on file       Final diagnoses:   Acute chest pain       6/19/2018   [facility] AND [facility]     Yared Nelson MD  06/19/18 2170

## 2018-06-22 ENCOUNTER — OFFICE VISIT (OUTPATIENT)
Dept: INTERNAL MEDICINE | Facility: OTHER | Age: 58
End: 2018-06-22
Attending: INTERNAL MEDICINE
Payer: COMMERCIAL

## 2018-06-22 VITALS
WEIGHT: 293 LBS | SYSTOLIC BLOOD PRESSURE: 126 MMHG | BODY MASS INDEX: 44.41 KG/M2 | HEIGHT: 68 IN | HEART RATE: 76 BPM | RESPIRATION RATE: 20 BRPM | DIASTOLIC BLOOD PRESSURE: 78 MMHG | TEMPERATURE: 96.7 F

## 2018-06-22 DIAGNOSIS — R07.89 ATYPICAL CHEST PAIN: ICD-10-CM

## 2018-06-22 DIAGNOSIS — R60.0 BILATERAL LOWER EXTREMITY EDEMA: Primary | ICD-10-CM

## 2018-06-22 DIAGNOSIS — I10 ESSENTIAL HYPERTENSION: ICD-10-CM

## 2018-06-22 PROCEDURE — 99214 OFFICE O/P EST MOD 30 MIN: CPT | Performed by: INTERNAL MEDICINE

## 2018-06-22 RX ORDER — LOSARTAN POTASSIUM 25 MG/1
25 TABLET ORAL DAILY
Qty: 90 TABLET | Refills: 2 | Status: SHIPPED | OUTPATIENT
Start: 2018-06-22 | End: 2019-04-01

## 2018-06-22 RX ORDER — FUROSEMIDE 20 MG
20 TABLET ORAL EVERY MORNING
Qty: 90 TABLET | Refills: 2 | Status: SHIPPED | OUTPATIENT
Start: 2018-06-22 | End: 2019-04-01

## 2018-06-22 RX ORDER — METOPROLOL TARTRATE 25 MG/1
12.5 TABLET, FILM COATED ORAL 2 TIMES DAILY
Qty: 180 TABLET | Refills: 2 | Status: SHIPPED | OUTPATIENT
Start: 2018-06-22 | End: 2019-04-01

## 2018-06-22 ASSESSMENT — ANXIETY QUESTIONNAIRES
3. WORRYING TOO MUCH ABOUT DIFFERENT THINGS: MORE THAN HALF THE DAYS
2. NOT BEING ABLE TO STOP OR CONTROL WORRYING: MORE THAN HALF THE DAYS
1. FEELING NERVOUS, ANXIOUS, OR ON EDGE: MORE THAN HALF THE DAYS
6. BECOMING EASILY ANNOYED OR IRRITABLE: MORE THAN HALF THE DAYS
5. BEING SO RESTLESS THAT IT IS HARD TO SIT STILL: NOT AT ALL
GAD7 TOTAL SCORE: 10
IF YOU CHECKED OFF ANY PROBLEMS ON THIS QUESTIONNAIRE, HOW DIFFICULT HAVE THESE PROBLEMS MADE IT FOR YOU TO DO YOUR WORK, TAKE CARE OF THINGS AT HOME, OR GET ALONG WITH OTHER PEOPLE: SOMEWHAT DIFFICULT
7. FEELING AFRAID AS IF SOMETHING AWFUL MIGHT HAPPEN: SEVERAL DAYS

## 2018-06-22 ASSESSMENT — PATIENT HEALTH QUESTIONNAIRE - PHQ9: 5. POOR APPETITE OR OVEREATING: SEVERAL DAYS

## 2018-06-22 ASSESSMENT — PAIN SCALES - GENERAL: PAINLEVEL: NO PAIN (0)

## 2018-06-22 NOTE — NURSING NOTE
Patient presents to clinic after ER visit on 06/19/18 with chest pain.  Teagan Harrison LPN............6/22/2018 1:45 PM

## 2018-06-22 NOTE — PROGRESS NOTES
Chief Complaint   Patient presents with     Follow Up For     ER visit 06/19/18 chest pain         HPI: Ms. Fernando is a 57 year old female who presents today for follow up of recent ER visit.  She was seen in the ER on June 19 for atypical chest pain.  She reports that these episodes happened on Sunday and then again on Tuesday of this past week.  They occurred between 1030 and 11 AM.  She reports that the first time it happened she was doing the dishes.  The second time she was sitting with her daughter.  It did feel like someone was stepping on her lower sternum.  It lasted 10-15 seconds.  She did become diaphoretic both times.  She also had residual feelings of illness for at least an hour or so afterward.  She did check her pulse and noted a normal pulse and normal rhythm at the time.  She denies any issues with breathing.    She does have some risk factors for cardiac disease including obesity, hypertension.  She did have a stress test done in 2016 that was normal.  She saw cardiology due to an aortic aneurysm and MR was repeated that also look good.    She does have lower extremity edema and does take furosemide daily.  She also takes her Effexor, losartan and metoprolol on the morning.  She has not noted any significant drop in blood pressure however has not checked recently.  She is due for refills on some of her medications.    She  reports that she quit smoking about 27 years ago. Her smoking use included Cigarettes. She has a 12.00 pack-year smoking history. She has never used smokeless tobacco.    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   .      ROS  Pertinent ROS was performed and was negative, including for fever, chills, chest pain, shortness of breath, increased lower extremity edema, changes in bowel or bladder, blood in the stool, difficulty swallowing, sores in the mouth. No other concerns, with exception of HPI above.      EXAM:   /78 (BP Location: Right arm, Patient Position: Sitting, Cuff Size: Adult Large)  Pulse 76  Temp 96.7  F (35.9  C) (Tympanic)  Resp 20  Ht 5' 7.5\" (1.715 m)  Wt (!) 350 lb 9 oz (159 kg)  Breastfeeding? No  BMI 54.1 kg/m2    Estimated body mass index is 54.1 kg/(m^2) as calculated from the following:    Height as of this encounter: 5' 7.5\" (1.715 m).    Weight as of this encounter: 350 lb 9 oz (159 kg).      GEN: Vitals reviewed. Healthy appearing. Patient is in no acute distress. Cooperative with exam.  HEENT: Normocephalic atraumatic.  Pupils equally round.  No scleral icterus, no conjunctival erythema. Oropharynx with no erythema or exudates. Dentition adequate.  CV: Heart regular in rate and rhythm with no murmur.    LUNGS: Lungs clear to auscultation bilaterally.  Chest rise equal bilaterally.  No accessory muscle use.  ABD:  Obese.  SKIN: Warm and dry to touch.  No rash on face, arms and legs.  EXT: No clubbing or cyanosis.   1+ bilateral lower extremity peripheral edema.  PSYCH: Mood is good.  Affect appropriate. Speech fluent. Answers questions appropriately and thought process normal.    LABS: 6/22/2018 - Personally reviewed  Results for orders placed or performed during the hospital encounter of 06/19/18   XR Chest 2 Views    Narrative    PROCEDURE:  XR CHEST 2 VW    HISTORY:  Substernal chest " pain; .     COMPARISON:  11/10/2016    FINDINGS:   The cardiac silhouette is normal in size. The pulmonary vasculature is  normal.  The lungs are clear. No pleural effusion or pneumothorax.      Impression    IMPRESSION:  No acute cardiopulmonary disease.      MARYLOU BLAKE MD   CBC with platelets differential   Result Value Ref Range    WBC 6.4 4.0 - 11.0 10e9/L    RBC Count 4.99 3.8 - 5.2 10e12/L    Hemoglobin 15.5 11.7 - 15.7 g/dL    Hematocrit 47.1 (H) 35.0 - 47.0 %    MCV 94 78 - 100 fl    MCH 31.1 26.5 - 33.0 pg    MCHC 32.9 31.5 - 36.5 g/dL    RDW 13.6 10.0 - 15.0 %    Platelet Count 207 150 - 450 10e9/L    Diff Method Automated Method     % Neutrophils 44.0 %    % Lymphocytes 45.8 %    % Monocytes 9.3 %    % Eosinophils 0.2 %    % Basophils 0.5 %    % Immature Granulocytes 0.2 %    Absolute Neutrophil 2.8 1.6 - 8.3 10e9/L    Absolute Lymphocytes 2.9 0.8 - 5.3 10e9/L    Absolute Monocytes 0.6 0.0 - 1.3 10e9/L    Absolute Eosinophils 0.0 0.0 - 0.7 10e9/L    Absolute Basophils 0.0 0.0 - 0.2 10e9/L    Abs Immature Granulocytes 0.0 0 - 0.4 10e9/L   Comprehensive metabolic panel   Result Value Ref Range    Sodium 138 134 - 144 mmol/L    Potassium 4.0 3.5 - 5.1 mmol/L    Chloride 102 98 - 107 mmol/L    Carbon Dioxide 25 21 - 31 mmol/L    Anion Gap 11 3 - 14 mmol/L    Glucose 99 70 - 105 mg/dL    Urea Nitrogen 15 7 - 25 mg/dL    Creatinine 0.78 0.60 - 1.20 mg/dL    GFR Estimate 76 >60 mL/min/1.7m2    GFR Estimate If Black >90 >60 mL/min/1.7m2    Calcium 9.8 8.6 - 10.3 mg/dL    Bilirubin Total 0.5 0.3 - 1.0 mg/dL    Albumin 4.1 3.5 - 5.7 g/dL    Protein Total 7.1 6.4 - 8.9 g/dL    Alkaline Phosphatase 72 34 - 104 U/L    ALT 27 7 - 52 U/L    AST 20 13 - 39 U/L   Troponin I   Result Value Ref Range    Troponin I ES <0.030 0.000 - 0.034 ug/L   INR   Result Value Ref Range    INR 2.10 (H) 0 - 1.3   Troponin I   Result Value Ref Range    Troponin I ES <0.030 0.000 - 0.034 ug/L   EKG 12-lead, tracing only    Narrative     EKG Interpretation:    Rhythm: Normal Sinus   Rate: 66  Axis: Borderline leftward  QRS interval: Normal  Conduction: Normal  ST Segments: Normal  MI: None  QTc interval: Normal  APC's Present: No  VPC's Present: No    Impression: Borderline EKG  When compared to previous tracing dated November 10, 2016, unchanged.    Maria Guadalupe Ramon, DO  Internal Medicine             ASSESSMENT AND PLAN:    Bilateral lower extremity edema  -Stable at this time.  Medication renewed.  Renal function has been stable.  - furosemide (LASIX) 20 MG tablet  Dispense: 90 tablet; Refill: 2    Atypical chest pain  -We discussed today various options regarding her chest pain.  We can pursue a CTA, consult with cardiology or watch and wait.  We discussed that the etiology is potentially underlining cardiac disease, acid reflux, musculoskeletal, medication side effects.  At this time she would like to change her meds slightly and monitor.  She will move some of her blood pressure meds to bedtime to be sure she is not dropping low immediately after taking them.  If she does have recurrent symptoms she will call and we can refer her to cardiology.  She would like to see cardiology here at [facility].    Essential hypertension  - Blood pressure today of 126/78   is at the goal of <140/90.  - Continue current regimen at this time other than changing the timing of medication.  Instructed to check BP at home.  - Cautioned patient to monitor with antibiotics, herbals and any OTC medications  - electrolytes and renal function done recently in the ER and stable  - metoprolol tartrate (LOPRESSOR) 25 MG tablet  Dispense: 180 tablet; Refill: 2  - losartan (COZAAR) 25 MG tablet  Dispense: 90 tablet; Refill: 2      Follow-up in January for annual review or sooner as needed       Patient Instructions   Move Losartan to bedtime.  Move effexor to mid day.      - Return/call as needed for follow-up should any new symptoms develop, for worsening of current symptoms  or if symptoms do not resolve with above plan.    Clinic : [phone]  Appointment line: [phone]             BONITA OLIVER DO   6/22/2018 3:16 PM    This document was prepared using voice generated softwear. While every attempt was made for accuracy, grammatical errors may exist.

## 2018-06-22 NOTE — PATIENT INSTRUCTIONS
Move Losartan to bedtime.  Move effexor to mid day.      - Return/call as needed for follow-up should any new symptoms develop, for worsening of current symptoms or if symptoms do not resolve with above plan.    Clinic : [phone]  Appointment line: [phone]

## 2018-06-22 NOTE — MR AVS SNAPSHOT
After Visit Summary   6/22/2018    Trisha Fernando    MRN: [id number]           Patient Information     Date Of Birth          1960        Visit Information        Provider Department      6/22/2018 1:40 PM Maria Guadalupe Ramon DO [facility]        Today's Diagnoses     Bilateral lower extremity edema    -  1    Atypical chest pain        Essential hypertension          Care Instructions    Move Losartan to bedtime.  Move effexor to mid day.      - Return/call as needed for follow-up should any new symptoms develop, for worsening of current symptoms or if symptoms do not resolve with above plan.    Clinic : [phone]  Appointment line: [phone]               Follow-ups after your visit        Follow-up notes from your care team     Return in about 7 months (around 1/22/2019) for Annual Review.      Your next 10 appointments already scheduled     Jul 30, 2018 12:15 PM CDT   Anticoagulation Visit with  [facility]   [facility] ([facility]    [address]  [facility] [address] [phone]              Who to contact     If you have questions or need follow up information about today's clinic visit or your schedule please contact [facility] directly at [phone].  Normal or non-critical lab and imaging results will be communicated to you by Latiohart, letter or phone within 4 business days after the clinic has received the results. If you do not hear from us within 7 days, please contact the clinic through Latiohart or phone. If you have a critical or abnormal lab result, we will notify you by phone as soon as possible.  Submit refill requests through CleverMiles or call your pharmacy and they will forward the refill request to us. Please allow 3 business days for your refill to be completed.          Additional Information About Your Visit        Latiohart Information     CleverMiles gives you  "secure access to your electronic health record. If you see a primary care provider, you can also send messages to your care team and make appointments. If you have questions, please call your primary care clinic.  If you do not have a primary care provider, please call [phone] and they will assist you.        Care EveryWhere ID     This is your Care EveryWhere ID. This could be used by other organizations to access your [facility] medical records  [id number]        Your Vitals Were     Pulse Temperature Respirations Height Breastfeeding? BMI (Body Mass Index)    76 96.7  F (35.9  C) (Tympanic) 20 5' 7.5\" (1.715 m) No 54.1 kg/m2       Blood Pressure from Last 3 Encounters:   06/22/18 126/78   06/19/18 108/75   03/01/18 128/82    Weight from Last 3 Encounters:   06/22/18 (!) 350 lb 9 oz (159 kg)   06/19/18 (!) 349 lb (158.3 kg)   03/01/18 (!) 349 lb 12.8 oz (158.7 kg)              Today, you had the following     No orders found for display         Where to get your medicines      These medications were sent to [facility] [id number] [facility] - [place], [place] - [address]  [address] [facility] [address]     Phone:  [phone]     furosemide 20 MG tablet    losartan 25 MG tablet    metoprolol tartrate 25 MG tablet          Primary Care Provider Office Phone # Fax #    Maria Guadalupe ORR DO Yenny [phone]       [address] [facility] [address]        Equal Access to Services     [facility]: Hadii santi wakefield hadasho Soomaali, waaxda luqadaha, qaybta kaalmaizabella wilder. So [facility] [phone].    ATENCIÓN: Si habla español, tiene a rolon disposición servicios gratuitos de asistencia lingüística. Llame al [phone].    We comply with applicable federal civil rights laws and [place] laws. We do not discriminate on the basis of race, color, national origin, age, disability, sex, sexual orientation, or gender identity.            Thank you!     " Thank you for choosing [facility]  for your care. Our goal is always to provide you with excellent care. Hearing back from our patients is one way we can continue to improve our services. Please take a few minutes to complete the written survey that you may receive in the mail after your visit with us. Thank you!             Your Updated Medication List - Protect others around you: Learn how to safely use, store and throw away your medicines at www.disposemymeds.org.          This list is accurate as of 6/22/18  2:17 PM.  Always use your most recent med list.                   Brand Name Dispense Instructions for use Diagnosis    Adult Blood Pressure Cuff Lg Kit      As directed.        fish oil-omega-3 fatty acids 1000 MG capsule      Take by mouth daily        furosemide 20 MG tablet    LASIX    90 tablet    Take 1 tablet (20 mg) by mouth every morning    Bilateral lower extremity edema       KLOR-CON 10 MEQ tablet   Generic drug:  potassium chloride      Take 1 tablet by mouth daily with food        LORazepam 0.5 MG tablet    ATIVAN          losartan 25 MG tablet    COZAAR    90 tablet    Take 1 tablet (25 mg) by mouth daily    Essential hypertension       Medical Compression Stockings Misc      For personal use. Length: calf Strength: 16-20 mmHg Circumference in cm: For calf: Ankle 15cm, Calf 30cm, Ankle to calf length 40cm.        metoprolol tartrate 25 MG tablet    LOPRESSOR    180 tablet    Take 0.5 tablets (12.5 mg) by mouth 2 times daily    Essential hypertension       venlafaxine 150 MG 24 hr capsule    EFFEXOR-XR     Take 150 mg by mouth daily with food        vitamin D3 2000 units Caps      Take 2,000 Units by mouth daily        warfarin 5 MG tablet    COUMADIN    90 tablet    TAKE 5 MG DAILY OR AS DIRECTED BY THE PROTIME CLINIC.    Anticoagulation monitoring, INR range 2-3, Embolism and thrombosis (H)

## 2018-06-23 ASSESSMENT — PATIENT HEALTH QUESTIONNAIRE - PHQ9: SUM OF ALL RESPONSES TO PHQ QUESTIONS 1-9: 6

## 2018-06-23 ASSESSMENT — ANXIETY QUESTIONNAIRES: GAD7 TOTAL SCORE: 10

## 2018-07-23 NOTE — PROGRESS NOTES
Patient Information     Patient Name  Trisah Fernando MRN  [id number] Sex  Female   1960      Letter by Bonita Ramon DO at      Author:  Bonita Ramon DO Service:  (none) Author Type:  (none)    Filed:   Encounter Date:  12/10/2017 Status:  (Other)           Trisha Fernando  [address] [facility] [address]          2017      Dear Ms. Fernando:    This letter is to remind you that you are due for your annual exam and labs with BONITA RAMON DO . Your last comprehensive medication visit was more than 12 months ago on 16.     A LIMITED refill of        losartan (COZAAR) 25 mg tablet      furosemide (LASIX) 20 mg tablet      KLOR-CON 10 10 mEq Controlled-Release tablet has been called into your pharmacy.    Additional refills require an annual medication management appointment with BONITA RAMON DO. Please call the clinic at [phone] to schedule your appointment.    Thank you,        The Refill Nurse  [facility]

## 2018-07-24 NOTE — PROGRESS NOTES
Patient Information     Patient Name  Trisha Fernando MRN  [id number] Sex  Female   1960      Letter by Joon Salazar MD at      Author:  Joon Salazar MD Service:  (none) Author Type:  (none)    Filed:   Date of Service:   Status:  (Other)       [facility]  [address]  [place] [address]  [phone]         Trisha Fernando   [address]  [facility] [address]      2018  Date of Breast Imagin2018  3:28 PM    Dear Ms. Fernando:    We are pleased to inform you that the result of your recent breast imaging examination is normal/benign (not cancer).    A report of your results was sent to your health care provider(s).    Your images will become part of your medical file here at [facility] and will be available for your continuing care. You are responsible for informing any new health care provider or breast imaging facility of the date and location of this examination.    Although mammography is the most accurate method for early detection, not all cancers are found through mammography. If you notice any new changes in your breast(s) please inform your health care provider without delay.    Thank you for choosing [facility] to participate in your healthcare needs.         [facility] Recommendations for Early Breast Cancer Detection   in Women without Symptoms  When to start having mammograms to screen for breast cancer, and how often to have them, is a personal decision. It should be based on your preferences, your values and your risk for developing breast cancer. [facility] recommends that you and your health care provider together determine when mammograms are right for you.    [facility] recommends the following guidelines for women who have an average risk for breast cancer, based on American Cancer Society guidelines:    Age 40 to 44:  Mammograms are optional.     Age 45 to 54: Have a mammogram every year.           Age 55 and older: Have a mammogram every year, or transition to having one every 2 years. Continue to have mammograms as long as your health is good.    If you have a higher than average risk for breast cancer, your health care provider may recommend a different schedule.

## 2018-07-30 ENCOUNTER — ANTICOAGULATION THERAPY VISIT (OUTPATIENT)
Dept: ANTICOAGULATION | Facility: OTHER | Age: 58
End: 2018-07-30
Attending: INTERNAL MEDICINE
Payer: COMMERCIAL

## 2018-07-30 DIAGNOSIS — I74.9 EMBOLISM AND THROMBOSIS (H): ICD-10-CM

## 2018-07-30 LAB — INR POINT OF CARE: 2.8 (ref 0.86–1.14)

## 2018-07-30 PROCEDURE — 36416 COLLJ CAPILLARY BLOOD SPEC: CPT

## 2018-07-30 PROCEDURE — 99207 ZZC NO CHARGE NURSE ONLY: CPT

## 2018-07-30 PROCEDURE — 85610 PROTHROMBIN TIME: CPT | Mod: QW

## 2018-07-30 NOTE — MR AVS SNAPSHOT
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 ...............Smitha Nair RN    7/30/2018    12:24 PM        July 2018 Details    Sun Mon Tue Wed Thu Fri Sat     1               2               3               4               5               6               7                 8               9               10               11               12               13               14                 15               16               17               18               19               20               21                 22               23               24               25               26               27               28                 29               30      5 mg   See details      31      5 mg              Date Details   07/30 This INR check               How to take your warfarin dose     To take:  5 mg Take 1 of the 5 mg tablets.           August 2018 Details    Sun Mon Tue Wed Thu Fri Sat        1      5 mg         2      5 mg         3      5 mg         4      5 mg           5      5 mg         6      5 mg         7      5 mg         8      5 mg         9      5 mg         10      5 mg         11      5 mg           12      5 mg         13      5 mg         14      5 mg         15      5 mg         16      5 mg         17      5 mg         18      5 mg           19      5 mg         20      5 mg         21      5 mg         22      5 mg         23      5 mg         24      5 mg         25      5 mg           26      5 mg          27      5 mg         28      5 mg         29      5 mg         30      5 mg         31      5 mg           Date Details   No additional details            How to take your warfarin dose     To take:  5 mg Take 1 of the 5 mg tablets.           September 2018 Details    Sun Mon Tue Wed Thu Fri Sat           1      5 mg           2      5 mg         3      5 mg         4      5 mg         5      5 mg         6      5 mg         7      5 mg         8      5 mg           9      5 mg         10            11               12               13               14               15                 16               17               18               19               20               21               22                 23               24               25               26               27               28               29                 30                      Date Details   No additional details    Date of next INR:  9/10/2018         How to take your warfarin dose     To take:  5 mg Take 1 of the 5 mg tablets.

## 2018-07-30 NOTE — PROGRESS NOTES
ANTICOAGULATION FOLLOW-UP CLINIC VISIT    Patient Name:  Trisha Fernando  Date:  7/30/2018  Contact Type:  Face to Face    SUBJECTIVE:     Patient Findings     Positives No Problem Findings           OBJECTIVE    INR Protime   Date Value Ref Range Status   07/30/2018 2.8 (A) 0.86 - 1.14 Final       ASSESSMENT / PLAN  INR assessment THER    Recheck INR In: 6 WEEKS    INR Location Clinic      Anticoagulation Summary as of 7/30/2018     INR goal 2.0-3.0   Today's INR 2.8   Warfarin maintenance plan 5 mg (5 mg x 1) every day   Full warfarin instructions 5 mg every day   Weekly warfarin total 35 mg   No change documented Smitha Nair, RN   Next INR check 9/10/2018   Priority INR   Target end date Indefinite    Indications   Long term (current) use of anticoagulants [Z79.01]  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

## 2018-09-10 ENCOUNTER — ANTICOAGULATION THERAPY VISIT (OUTPATIENT)
Dept: ANTICOAGULATION | Facility: OTHER | Age: 58
End: 2018-09-10
Attending: INTERNAL MEDICINE
Payer: COMMERCIAL

## 2018-09-10 DIAGNOSIS — I74.9 EMBOLISM AND THROMBOSIS (H): ICD-10-CM

## 2018-09-10 LAB — INR POINT OF CARE: 2.1 (ref 2–3)

## 2018-09-10 PROCEDURE — 85610 PROTHROMBIN TIME: CPT | Mod: QW

## 2018-09-10 PROCEDURE — 36416 COLLJ CAPILLARY BLOOD SPEC: CPT

## 2018-09-10 NOTE — PROGRESS NOTES
ANTICOAGULATION FOLLOW-UP CLINIC VISIT    Patient Name:  Trisha Fernando  Date:  9/10/2018  Contact Type:  Face to Face    SUBJECTIVE:     Patient Findings     Positives No Problem Findings           OBJECTIVE    INR Protime   Date Value Ref Range Status   09/10/2018 2.1 2.0 - 3.0 Final       ASSESSMENT / PLAN  INR assessment THER    Recheck INR In: 4 WEEKS    INR Location Clinic      Anticoagulation Summary as of 9/10/2018     INR goal 2.0-3.0   Today's INR 2.1   Warfarin maintenance plan 5 mg (5 mg x 1) every day   Full warfarin instructions 9/10: 7.5 mg; Otherwise 5 mg every day   Weekly warfarin total 35 mg   Next INR check 10/8/2018   Priority INR   Target end date Indefinite    Indications   Long term (current) use of anticoagulants [Z79.01]  Embolism and thrombosis (H) [I74.9]         Anticoagulation Episode Summary     INR check location     Preferred lab     Send INR reminders to  INR    Comments       Anticoagulation Care Providers     Provider Role Specialty Phone number    Maria Guadalupe Ramon DO [facility] Internal Medicine [phone]            See the Encounter Report to view Anticoagulation Flowsheet and Dosing Calendar (Go to Encounters tab in chart review, and find the Anticoagulation Therapy Visit)        Maria D Zayas RN

## 2018-09-10 NOTE — MR AVS SNAPSHOT
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  Maria D Zayas ....................  9/10/2018   12:18 PM          September 2018 Details    Sun Mon Tue Wed Thu Fri Sat           1                 2               3               4               5               6               7               8                 9               10      5 mg   See details      11      5 mg         12      5 mg         13      5 mg         14      5 mg         15      5 mg           16      5 mg         17      5 mg         18      5 mg         19      5 mg         20      5 mg         21      5 mg         22      5 mg           23      5 mg         24      5 mg         25      5 mg         26      5 mg         27      5 mg         28      5 mg         29      5 mg           30      5 mg                Date Details   09/10 This INR check               How to take your warfarin dose     To take:  5 mg Take 1 of the 5 mg tablets.           October 2018 Details    Sun Mon Tue Wed Thu Fri Sat      1      5 mg         2      5 mg         3      5 mg         4      5 mg         5      5 mg         6      5 mg           7      5 mg         8            9               10               11               12               13                 14               15               16               17               18               19               20                 21               22               23               24               25                26               27                 28               29               30               31                   Date Details   No additional details    Date of next INR:  10/8/2018         How to take your warfarin dose     To take:  5 mg Take 1 of the 5 mg tablets.

## 2018-10-08 ENCOUNTER — ANTICOAGULATION THERAPY VISIT (OUTPATIENT)
Dept: ANTICOAGULATION | Facility: OTHER | Age: 58
End: 2018-10-08
Attending: INTERNAL MEDICINE
Payer: COMMERCIAL

## 2018-10-08 DIAGNOSIS — I74.9 EMBOLISM AND THROMBOSIS (H): ICD-10-CM

## 2018-10-08 LAB — INR POINT OF CARE: 2.6 (ref 2–3)

## 2018-10-08 PROCEDURE — 85610 PROTHROMBIN TIME: CPT | Mod: QW

## 2018-10-08 PROCEDURE — 36416 COLLJ CAPILLARY BLOOD SPEC: CPT

## 2018-10-08 NOTE — PROGRESS NOTES
ANTICOAGULATION FOLLOW-UP CLINIC VISIT    Patient Name:  Trisha Fernando  Date:  10/8/2018  Contact Type:  Face to Face    SUBJECTIVE:     Patient Findings     Positives No Problem Findings           OBJECTIVE    INR Protime   Date Value Ref Range Status   10/08/2018 2.6 2.0 - 3.0 Final       ASSESSMENT / PLAN  INR assessment THER    Recheck INR In: 6 WEEKS    INR Location Clinic      Anticoagulation Summary as of 10/8/2018     INR goal 2.0-3.0   Today's INR 2.6   Warfarin maintenance plan 5 mg (5 mg x 1) every day   Full warfarin instructions 5 mg every day   Weekly warfarin total 35 mg   No change documented Maria D Zayas RN   Next INR check 11/19/2018   Priority INR   Target end date Indefinite    Indications   Long term (current) use of anticoagulants [Z79.01]  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

## 2018-10-08 NOTE — MR AVS SNAPSHOT
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  Maria D Zayas ....................  10/8/2018   12:14 PM  Reviewed reasons to check INR between appointments, such as medication changes.         October 2018 Details    Sun Mon Tue Wed Thu Fri Sat      1               2               3               4               5               6                 7               8      5 mg   See details      9      5 mg         10      5 mg         11      5 mg         12      5 mg         13      5 mg           14      5 mg         15      5 mg         16      5 mg         17      5 mg         18      5 mg         19      5 mg         20      5 mg           21      5 mg         22      5 mg         23      5 mg         24      5 mg         25      5 mg         26      5 mg         27      5 mg           28      5 mg         29      5 mg         30      5 mg         31      5 mg             Date Details   10/08 This INR check               How to take your warfarin dose     To take:  5 mg Take 1 of the 5 mg tablets.           November 2018 Details    Sun Mon Tue Wed Thu Fri Sat         1      5 mg         2      5 mg         3      5 mg           4      5 mg         5      5 mg         6      5 mg         7      5 mg         8      5 mg         9      5 mg         10      5 mg           11      5 mg         12      5 mg         13      5 mg         14      5 mg         15      5 mg         16      5 mg         17       5 mg           18      5 mg         19            20               21               22               23               24                 25               26               27               28               29               30                 Date Details   No additional details    Date of next INR:  11/19/2018         How to take your warfarin dose     To take:  5 mg Take 1 of the 5 mg tablets.

## 2018-10-29 DIAGNOSIS — I74.9 EMBOLISM AND THROMBOSIS (H): ICD-10-CM

## 2018-10-29 DIAGNOSIS — Z79.01 ANTICOAGULATION MONITORING, INR RANGE 2-3: ICD-10-CM

## 2018-10-29 RX ORDER — WARFARIN SODIUM 5 MG/1
TABLET ORAL
Qty: 90 TABLET | Refills: 1 | Status: SHIPPED | OUTPATIENT
Start: 2018-10-29 | End: 2019-04-28

## 2018-10-29 NOTE — TELEPHONE ENCOUNTER
Prescription approved per [facility] Refill Protocol.  Smitha Nair RN    10/29/2018  12:39 PM

## 2018-11-03 ENCOUNTER — OFFICE VISIT (OUTPATIENT)
Dept: FAMILY MEDICINE | Facility: OTHER | Age: 58
End: 2018-11-03
Payer: COMMERCIAL

## 2018-11-03 VITALS
WEIGHT: 293 LBS | OXYGEN SATURATION: 96 % | DIASTOLIC BLOOD PRESSURE: 88 MMHG | TEMPERATURE: 97.5 F | HEART RATE: 66 BPM | SYSTOLIC BLOOD PRESSURE: 120 MMHG | BODY MASS INDEX: 54.69 KG/M2

## 2018-11-03 DIAGNOSIS — M54.2 NECK PAIN: Primary | ICD-10-CM

## 2018-11-03 DIAGNOSIS — M54.50 ACUTE BILATERAL LOW BACK PAIN WITHOUT SCIATICA: ICD-10-CM

## 2018-11-03 LAB
ALBUMIN UR-MCNC: NEGATIVE MG/DL
APPEARANCE UR: CLEAR
BILIRUB UR QL STRIP: NEGATIVE
COLOR UR AUTO: YELLOW
GLUCOSE UR STRIP-MCNC: NEGATIVE MG/DL
HGB UR QL STRIP: NEGATIVE
KETONES UR STRIP-MCNC: ABNORMAL MG/DL
LEUKOCYTE ESTERASE UR QL STRIP: NEGATIVE
NITRATE UR QL: NEGATIVE
PH UR STRIP: 5.5 PH (ref 5–9)
SOURCE: ABNORMAL
SP GR UR STRIP: 1.02 (ref 1–1.03)
UROBILINOGEN UR STRIP-ACNC: 0.2 EU/DL (ref 0.2–1)

## 2018-11-03 PROCEDURE — 99213 OFFICE O/P EST LOW 20 MIN: CPT | Performed by: PHYSICIAN ASSISTANT

## 2018-11-03 PROCEDURE — 81003 URINALYSIS AUTO W/O SCOPE: CPT | Performed by: PHYSICIAN ASSISTANT

## 2018-11-03 RX ORDER — CYCLOBENZAPRINE HCL 10 MG
5-10 TABLET ORAL 3 TIMES DAILY PRN
Qty: 12 TABLET | Refills: 0 | Status: SHIPPED | OUTPATIENT
Start: 2018-11-03 | End: 2019-04-01

## 2018-11-03 RX ORDER — METHYLPREDNISOLONE 4 MG
TABLET, DOSE PACK ORAL
Qty: 21 TABLET | Refills: 0 | Status: SHIPPED | OUTPATIENT
Start: 2018-11-03 | End: 2019-04-01

## 2018-11-03 ASSESSMENT — PAIN SCALES - GENERAL: PAINLEVEL: MODERATE PAIN (5)

## 2018-11-03 NOTE — PATIENT INSTRUCTIONS
Neck and low back pain  Urinalysis today is negative for infection or blood  Gentle ROM and stretches  Tylenol 1000 mg every 4-6 hours, max 4000 mg/day  Flexeril 5 - 10 mg oral tablet, can take 3 times day as needed. This can cause drowsiness, do not drive or use alcohol while on this medication  Apply ice 10-20 minutes every 1-2 hours, as needed for pain. Use this for 2 days then can switch to heat. Then can alternate or use whichever feels the best  OTC Salonpas, OTC icy hot as directed if needed.   Follow up with PCP if symptoms persist or worsen  Seek immediate care for    Pain becomes worse or spreads into your arms or legs    Weakness, numbness or pain in one or both arms or legs    Numbness in the groin area    Difficulty walking  Fever of 100.4 F (38 C) or higher, or as directed by your healthcare provider    General Neck and Back Pain    Both neck and back pain are usually caused by injury to the muscles or ligaments of the spine. Sometimes the disks that separate each bone of the spine may cause pain by pressing on a nearby nerve. Back and neck pain may appear after a sudden twisting or bending force (such as in a car accident), or sometimes after a simple awkward movement. In either case, muscle spasm is often present and adds to the pain.  Acute neck and back pain usually gets better in 1 to 2 weeks. Pain related to disk disease, arthritis in the spinal joints or spinal stenosis (narrowing of the spinal canal) can become chronic and last for months or years.  Back and neck pain are common problems. Most people feel better in 1 or 2 weeks, and most of the rest in 1 to 2 months. Most people can remain active.  People have and describe pain differently.    Pain can be sharp, stabbing, shooting, aching, cramping, or burning    Movement, standing, bending, lifting, sitting, or walking may worsen the pain    Pain can be localized to one spot or area, or it can be more generalized    Pain can spread or radiate  upwards, downwards, to the front, or go down your arms    Muscle spasm may occur.  Most of the time mechanical problems with the muscles or spine cause the pain. it is usually caused by an injury, whether known or not, to the muscles or ligaments. While illnesses can cause back pain, it is usually not caused by a serious illness. Pain is usually related to physical activity, whether sports, exercise, work, or normal activity. Sometimes it can occur without an identifiable cause. This can happen simply by stretching or moving wrong, without noting pain at the time. Other causes include:    Overexertion, lifting, pushing, pulling incorrectly or too aggressively.    Sudden twisting, bending or stretching from an accident (car or fall), or accidental movement.    Poor posture    Poor conditioning, lack of regular exercise    Spinal disc disease or arthritis    Stress    Pregnancy, or illness like appendicitis, bladder or kidney infection, pelvic infections   Home care    For neck pain: Use a comfortable pillow that supports the head and keeps the spine in a neutral position. The position of the head should not be tilted forward or backward.    When in bed, try to find a position of comfort. A firm mattress is best. Try lying flat on your back with pillows under your knees. You can also try lying on your side with your knees bent up towards your chest and a pillow between your knees.    At first, do not try to stretch out the sore spots. If there is a strain, it is not like the good soreness you get after exercising without an injury. In this case, stretching may make it worse.    Don't sit for long periods, as in long car rides or other travel. This puts more stress on the lower back than standing or walking.    During the first 24 to 72 hours after an injury, apply an ice pack to the painful area for 20 minutes and then remove it for 20 minutes over a period of 60 to 90 minutes or several times a day.     You can  alternate ice and heat therapies. Talk with your healthcare provider about the best treatment for your back or neck pain. As a safety precaution, do not use a heating pad at bedtime. Sleeping with a heating pad can lead to skin burns or tissue damage.    Therapeutic massage can help relax the back and neck muscles without stretching them.    Be aware of safe lifting methods and do not lift anything over 15 pounds until all the pain is gone.  Medicines  Talk to your healthcare provider before using medicine, especially if you have other medical problems or are taking other medicines.    You may use over-the-counter medicine to control pain, unless another pain medicine was prescribed. If you have chronic conditions like diabetes, liver or kidney disease, stomach ulcers,  gastrointestinal bleeding, or are taking blood thinner medicines.    Be careful if you are given pain medicines, narcotics, or medicine for muscle spasm. They can cause drowsiness, and can affect your coordination, reflexes, and judgment. Do not drive or operate heavy machinery.  Follow-up care  Follow up with your healthcare provider, or as advised. Physical therapy or further tests may be needed.  If X-rays were taken, you will be notified of any new findings that may affect your care.  Call 911  Call 911 if any of the following occur:    Trouble breathing    Confusion    Very drowsy or trouble awakening    Fainting or loss of consciousness    Rapid or very slow heart rate    Loss of bowel or bladder control  When to seek medical advice  Call your healthcare provider right away if any of these occur:    Pain becomes worse or spreads into your arms or legs    Weakness, numbness or pain in one or both arms or legs    Numbness in the groin area    Difficulty walking    Fever of 100.4 F (38 C) or higher, or as directed by your healthcare provider  Date Last Reviewed: 7/1/2016 2000-2017 [employer]. [address] [facility] [facility], [place]  [address]. All rights reserved. This information is not intended as a substitute for professional medical care. Always follow your healthcare professional's instructions.

## 2018-11-03 NOTE — PROGRESS NOTES
SUBJECTIVE:   Trisha Fernando is a 57 year old female who complains of low back pain onset 2 weeks ago. She woke up one morning with this. Denies injury or overuse. She is a . Recently had a URI and this is now resolved. Over the past week she started to have right sided neck pain, and yesterday she had a headache. She does acknowledge increased stress related to her husbands heart health.     Symptoms:   Quality: Constant ache low back and neck without radiation  Severity 4-5/10 severity  Aggravated by movement  Alleviated by rest    One prior episode of low back pain about 6 years ago.     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     Current Outpatient Prescriptions   Medication     Blood Pressure Monitoring (ADULT BLOOD PRESSURE CUFF LG) KIT     Cholecalciferol (VITAMIN D3) 2000 UNITS CAPS     cyclobenzaprine (FLEXERIL) 10 MG tablet     Elastic Bandages & Supports (MEDICAL COMPRESSION STOCKINGS) MISC     fish oil-omega-3 fatty acids 1000 MG capsule     furosemide (LASIX) 20 MG tablet     LORazepam (ATIVAN)  0.5 MG tablet     losartan (COZAAR) 25 MG tablet     methylPREDNISolone (MEDROL DOSEPAK) 4 MG tablet     metoprolol tartrate (LOPRESSOR) 25 MG tablet     potassium chloride (KLOR-CON) 10 MEQ tablet     venlafaxine (EFFEXOR-XR) 150 MG 24 hr capsule     warfarin (COUMADIN) 5 MG tablet     No current facility-administered medications for this visit.      ALLERGIES:  Lisinopril; Adhesive tape; Shohola; and Wound dressing adhesive      ROS  General: feels well, no fever  Musculoskeletal: neck and back pain    OBJECTIVE:  Vitals:    11/03/18 1153   BP: 120/88   BP Location: Left arm   Patient Position: Sitting   Cuff Size: Adult Regular   Pulse: 66   Temp: 97.5  F (36.4  C)   TempSrc: Tympanic   SpO2: 96%   Weight: (!) 354 lb 6.4 oz (160.8 kg)     General: alert and active, NAD  Skin: no rashes  Spine: no noted abnormality.   Palpation:   TTP about C6 and paraspinals and trapezius on right. Painful and limited c-spine rotation and lateral bending. Normal flex and extension.   TTP lumbar paraspinals bilaterally. Limited ROM due to pain  Neurovascular: normal sensation to touch upper and lower extremities, reflexes symmetrical right and left, normal strength upper and lower extremities. Straight leg is negative bilaterally.       Results for orders placed or performed in visit on 11/03/18   UA reflex to Microscopic and Culture   Result Value Ref Range    Color Urine Yellow     Appearance Urine Clear     Glucose Urine Negative NEG^Negative mg/dL    Bilirubin Urine Negative NEG^Negative    Ketones Urine Trace (A) NEG^Negative mg/dL    Specific Gravity Urine 1.025 1.000 - 1.030    Blood Urine Negative NEG^Negative    pH Urine 5.5 5.0 - 9.0 pH    Protein Albumin Urine Negative NEG^Negative mg/dL    Urobilinogen Urine 0.2 0.2 - 1.0 EU/dL    Nitrite Urine Negative NEG^Negative    Leukocyte Esterase Urine Negative NEG^Negative    Source Midstream Urine          ASSESSMENT:   (M54.2) Neck pain  (primary encounter diagnosis)  (M54.5)  Acute bilateral low back pain without sciatica    Plan: UA reflex to Microscopic and Culture,         methylPREDNISolone (MEDROL DOSEPAK) 4 MG         tablet, cyclobenzaprine (FLEXERIL) 10 MG tablet    Neck and low back pain  Urinalysis today is negative for infection or blood  Will treat symptomatically as a low back strain. C-spine possibly from stress, trigger point tenderness on right trapezius.   Gentle ROM and stretches  Tylenol 1000 mg every 4-6 hours, max 4000 mg/day  Flexeril 5 - 10 mg oral tablet, can take 3 times day as needed. This can cause drowsiness, do not drive or use alcohol while on this medication  Medrol dose pack, take as directed  Apply ice 10-20 minutes every 1-2 hours, as needed for pain. Use this for 2 days then can switch to heat. Then can alternate or use whichever feels the best  OTC Salonpas, OTC icy hot as directed if needed.   Follow up with PCP in 1 week if symptoms persist, sooner for worsening  Patient received verbal and written instruction including review of warning signs    Colleen Ramey PA-C on 11/3/2018 at 6:19 PM

## 2018-11-03 NOTE — MR AVS SNAPSHOT
After Visit Summary   11/3/2018    Trisha Fernando    MRN: [id number]           Patient Information     Date Of Birth          1960        Visit Information        Provider Department      11/3/2018 11:45 AM Colleen Ramey PA-C [facility]        Today's Diagnoses     Neck pain    -  1    Acute bilateral low back pain without sciatica          Care Instructions    Neck and low back pain  Urinalysis today is negative for infection or blood  Gentle ROM and stretches  Tylenol 1000 mg every 4-6 hours, max 4000 mg/day  Flexeril 5 - 10 mg oral tablet, can take 3 times day as needed. This can cause drowsiness, do not drive or use alcohol while on this medication  Apply ice 10-20 minutes every 1-2 hours, as needed for pain. Use this for 2 days then can switch to heat. Then can alternate or use whichever feels the best  OTC Salonpas, OTC icy hot as directed if needed.   Follow up with PCP if symptoms persist or worsen  Seek immediate care for    Pain becomes worse or spreads into your arms or legs    Weakness, numbness or pain in one or both arms or legs    Numbness in the groin area    Difficulty walking  Fever of 100.4 F (38 C) or higher, or as directed by your healthcare provider    General Neck and Back Pain    Both neck and back pain are usually caused by injury to the muscles or ligaments of the spine. Sometimes the disks that separate each bone of the spine may cause pain by pressing on a nearby nerve. Back and neck pain may appear after a sudden twisting or bending force (such as in a car accident), or sometimes after a simple awkward movement. In either case, muscle spasm is often present and adds to the pain.  Acute neck and back pain usually gets better in 1 to 2 weeks. Pain related to disk disease, arthritis in the spinal joints or spinal stenosis (narrowing of the spinal canal) can become chronic and last for months or years.  Back and neck pain are common problems. Most  people feel better in 1 or 2 weeks, and most of the rest in 1 to 2 months. Most people can remain active.  People have and describe pain differently.    Pain can be sharp, stabbing, shooting, aching, cramping, or burning    Movement, standing, bending, lifting, sitting, or walking may worsen the pain    Pain can be localized to one spot or area, or it can be more generalized    Pain can spread or radiate upwards, downwards, to the front, or go down your arms    Muscle spasm may occur.  Most of the time mechanical problems with the muscles or spine cause the pain. it is usually caused by an injury, whether known or not, to the muscles or ligaments. While illnesses can cause back pain, it is usually not caused by a serious illness. Pain is usually related to physical activity, whether sports, exercise, work, or normal activity. Sometimes it can occur without an identifiable cause. This can happen simply by stretching or moving wrong, without noting pain at the time. Other causes include:    Overexertion, lifting, pushing, pulling incorrectly or too aggressively.    Sudden twisting, bending or stretching from an accident (car or fall), or accidental movement.    Poor posture    Poor conditioning, lack of regular exercise    Spinal disc disease or arthritis    Stress    Pregnancy, or illness like appendicitis, bladder or kidney infection, pelvic infections   Home care    For neck pain: Use a comfortable pillow that supports the head and keeps the spine in a neutral position. The position of the head should not be tilted forward or backward.    When in bed, try to find a position of comfort. A firm mattress is best. Try lying flat on your back with pillows under your knees. You can also try lying on your side with your knees bent up towards your chest and a pillow between your knees.    At first, do not try to stretch out the sore spots. If there is a strain, it is not like the good soreness you get after exercising  without an injury. In this case, stretching may make it worse.    Don't sit for long periods, as in long car rides or other travel. This puts more stress on the lower back than standing or walking.    During the first 24 to 72 hours after an injury, apply an ice pack to the painful area for 20 minutes and then remove it for 20 minutes over a period of 60 to 90 minutes or several times a day.     You can alternate ice and heat therapies. Talk with your healthcare provider about the best treatment for your back or neck pain. As a safety precaution, do not use a heating pad at bedtime. Sleeping with a heating pad can lead to skin burns or tissue damage.    Therapeutic massage can help relax the back and neck muscles without stretching them.    Be aware of safe lifting methods and do not lift anything over 15 pounds until all the pain is gone.  Medicines  Talk to your healthcare provider before using medicine, especially if you have other medical problems or are taking other medicines.    You may use over-the-counter medicine to control pain, unless another pain medicine was prescribed. If you have chronic conditions like diabetes, liver or kidney disease, stomach ulcers,  gastrointestinal bleeding, or are taking blood thinner medicines.    Be careful if you are given pain medicines, narcotics, or medicine for muscle spasm. They can cause drowsiness, and can affect your coordination, reflexes, and judgment. Do not drive or operate heavy machinery.  Follow-up care  Follow up with your healthcare provider, or as advised. Physical therapy or further tests may be needed.  If X-rays were taken, you will be notified of any new findings that may affect your care.  Call 911  Call 911 if any of the following occur:    Trouble breathing    Confusion    Very drowsy or trouble awakening    Fainting or loss of consciousness    Rapid or very slow heart rate    Loss of bowel or bladder control  When to seek medical advice  Call your  healthcare provider right away if any of these occur:    Pain becomes worse or spreads into your arms or legs    Weakness, numbness or pain in one or both arms or legs    Numbness in the groin area    Difficulty walking    Fever of 100.4 F (38 C) or higher, or as directed by your healthcare provider  Date Last Reviewed: 7/1/2016 2000-2017 [employer]. [address] [place] [address]. All rights reserved. This information is not intended as a substitute for professional medical care. Always follow your healthcare professional's instructions.                Follow-ups after your visit        Follow-up notes from your care team     Return if symptoms worsen or fail to improve.      Your next 10 appointments already scheduled     Nov 19, 2018 12:15 PM CST   Anticoagulation Visit with [facility]   [facility]    [address]  [place] [address] [phone]            Jan 30, 2019 12:20 PM CST   PHYSICAL with Maria Guadalupe Ramon DO   [facility] ([facility])    [address]  [place] [address] [phone]              Who to contact     If you have questions or need follow up information about today's clinic visit or your schedule please contact [facility] directly at [phone].  Normal or non-critical lab and imaging results will be communicated to you by MyChart, letter or phone within 4 business days after the clinic has received the results. If you do not hear from us within 7 days, please contact the clinic through MyChart or phone. If you have a critical or abnormal lab result, we will notify you by phone as soon as possible.  Submit refill requests through [employer] or call your pharmacy and they will forward the refill request to us. Please allow 3 business days for your refill to be completed.          Additional Information About  Your Visit        [employer] Information     [employer] gives you secure access to your electronic health record. If you see a primary care provider, you can also send messages to your care team and make appointments. If you have questions, please call your primary care clinic.  If you do not have a primary care provider, please call [phone] and they will assist you.        Care EveryWhere ID     This is your Care EveryWhere ID. This could be used by other organizations to access your [facility] medical records  [id number]        Your Vitals Were     Pulse Temperature Pulse Oximetry Breastfeeding? BMI (Body Mass Index)       66 97.5  F (36.4  C) (Tympanic) 96% No 54.69 kg/m2        Blood Pressure from Last 3 Encounters:   11/03/18 120/88   06/22/18 126/78   06/19/18 108/75    Weight from Last 3 Encounters:   11/03/18 (!) 354 lb 6.4 oz (160.8 kg)   06/22/18 (!) 350 lb 9 oz (159 kg)   06/19/18 (!) 349 lb (158.3 kg)              We Performed the Following     UA reflex to Microscopic and Culture          Today's Medication Changes          These changes are accurate as of 11/3/18 12:57 PM.  If you have any questions, ask your nurse or doctor.               Start taking these medicines.        Dose/Directions    cyclobenzaprine 10 MG tablet   Commonly known as:  FLEXERIL   Used for:  Acute bilateral low back pain without sciatica, Neck pain   Started by:  Colleen Ramey PA-C        Dose:  5-10 mg   Take 0.5-1 tablets (5-10 mg) by mouth 3 times daily as needed for muscle spasms   Quantity:  12 tablet   Refills:  0       methylPREDNISolone 4 MG tablet   Commonly known as:  MEDROL DOSEPAK   Used for:  Acute bilateral low back pain without sciatica, Neck pain   Started by:  Colleen Ramey PA-C        Follow package instructions   Quantity:  21 tablet   Refills:  0            Where to get your medicines      These medications were sent to [facility] [address] [place], [place] - [address]  [address]  [facility] [address]     Phone:  [phone]     cyclobenzaprine 10 MG tablet    methylPREDNISolone 4 MG tablet                Primary Care Provider Office Phone # Fax #    Maria Guadalupe Ramon [phone] [address]  [facility] [address]        Equal Access to Services     SHELBY KENNEDY : Hadii aad ku hadasho Soomaali, waaxda luqadaha, qaybta kaalmada adeegyada, waxbakari shannonwallacemisael hernandez. So [facility] [phone].    ATENCIÓN: Si habla español, tiene a rolon disposición servicios gratuitos de asistencia lingüística. Stephanie al [phone].    We comply with applicable federal civil rights laws and [place] laws. We do not discriminate on the basis of race, color, national origin, age, disability, sex, sexual orientation, or gender identity.            Thank you!     Thank you for choosing [facility] AND [facility]  for your care. Our goal is always to provide you with excellent care. Hearing back from our patients is one way we can continue to improve our services. Please take a few minutes to complete the written survey that you may receive in the mail after your visit with us. Thank you!             Your Updated Medication List - Protect others around you: Learn how to safely use, store and throw away your medicines at www.disposemymeds.org.          This list is accurate as of 11/3/18 12:57 PM.  Always use your most recent med list.                   Brand Name Dispense Instructions for use Diagnosis    Adult Blood Pressure Cuff Lg Kit      As directed.        cyclobenzaprine 10 MG tablet    FLEXERIL    12 tablet    Take 0.5-1 tablets (5-10 mg) by mouth 3 times daily as needed for muscle spasms    Acute bilateral low back pain without sciatica, Neck pain       fish oil-omega-3 fatty acids 1000 MG capsule      Take by mouth daily        furosemide 20 MG tablet    LASIX    90 tablet    Take 1 tablet (20 mg) by mouth every morning    Bilateral lower extremity edema        KLOR-CON 10 MEQ tablet   Generic drug:  potassium chloride      Take 1 tablet by mouth daily with food        LORazepam 0.5 MG tablet    ATIVAN          losartan 25 MG tablet    COZAAR    90 tablet    Take 1 tablet (25 mg) by mouth daily    Essential hypertension       Medical Compression Stockings Misc      For personal use. Length: calf Strength: 16-20 mmHg Circumference in cm: For calf: Ankle 15cm, Calf 30cm, Ankle to calf length 40cm.        methylPREDNISolone 4 MG tablet    MEDROL DOSEPAK    21 tablet    Follow package instructions    Acute bilateral low back pain without sciatica, Neck pain       metoprolol tartrate 25 MG tablet    LOPRESSOR    180 tablet    Take 0.5 tablets (12.5 mg) by mouth 2 times daily    Essential hypertension       venlafaxine 150 MG 24 hr capsule    EFFEXOR-XR     Take 150 mg by mouth daily with food        vitamin D3 2000 units Caps      Take 2,000 Units by mouth daily        warfarin 5 MG tablet    COUMADIN    90 tablet    TAKE 5 MG DAILY OR AS DIRECTED BY THE [facility].    Anticoagulation monitoring, INR range 2-3, Embolism and thrombosis (H)

## 2018-11-03 NOTE — NURSING NOTE
Patient states that she had back pain now for 2 week and 2 days.  Patient states that it went to her neck, and yesterday she had a headache.  Patient states that she had a cold with a cough too.  Eli Parham LPN 11/3/2018   11:51 AM    Med rec-complete

## 2018-11-06 ENCOUNTER — ANTICOAGULATION THERAPY VISIT (OUTPATIENT)
Dept: ANTICOAGULATION | Facility: OTHER | Age: 58
End: 2018-11-06
Attending: INTERNAL MEDICINE
Payer: COMMERCIAL

## 2018-11-06 DIAGNOSIS — I74.9 EMBOLISM AND THROMBOSIS (H): ICD-10-CM

## 2018-11-06 LAB — INR POINT OF CARE: 2.7 (ref 0.86–1.14)

## 2018-11-06 PROCEDURE — 85610 PROTHROMBIN TIME: CPT | Mod: QW

## 2018-11-06 PROCEDURE — 36416 COLLJ CAPILLARY BLOOD SPEC: CPT

## 2018-11-06 NOTE — MR AVS SNAPSHOT
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 ...............Smitha Nair RN    11/6/2018    12:34 PM      Your next Anticoagulation Clinic appointment(s)     Nov 06, 2018 12:45 PM CST   Anticoagulation Visit with RANGEL ANTI COAG 1   [facility] ([facility])    [address]  [facility] [address]   [phone]              November 2018 Details    Sun Mon Tue Wed Thu Fri Sat         1               2               3                 4               5               6      5 mg   See details      7      5 mg         8      5 mg         9      5 mg         10      5 mg           11      5 mg         12      5 mg         13      5 mg         14      5 mg         15      5 mg         16      5 mg         17      5 mg           18      5 mg         19      5 mg         20      5 mg         21      5 mg         22      5 mg         23      5 mg         24      5 mg           25      5 mg         26      5 mg         27      5 mg         28      5 mg         29      5 mg         30      5 mg           Date Details   11/06 This INR check               How to take your warfarin dose     To take:  5 mg Take 1 of the 5 mg tablets.           December 2018 Details    Sun Mon Tue Wed Thu Fri Sat           1      5 mg           2      5 mg         3      5 mg         4      5 mg         5      5 mg         6      5 mg         7      5 mg         8      5 mg           9      5 mg          10      5 mg         11      5 mg         12      5 mg         13      5 mg         14      5 mg         15      5 mg           16      5 mg         17            18               19               20               21               22                 23               24               25               26               27               28               29                 30               31                     Date Details   No additional details    Date of next INR:  12/17/2018         How to take your warfarin dose     To take:  5 mg Take 1 of the 5 mg tablets.

## 2018-11-06 NOTE — PROGRESS NOTES
ANTICOAGULATION FOLLOW-UP CLINIC VISIT    Patient Name:  Trisha Fernando  Date:  11/6/2018  Contact Type:  Face to Face    SUBJECTIVE:     Patient Findings     Positives Change in medications (taking medrol maribel and increased tylenol for back pain)           OBJECTIVE    INR Protime   Date Value Ref Range Status   11/06/2018 2.7 (A) 0.86 - 1.14 Final       ASSESSMENT / PLAN  INR assessment THER    Recheck INR In: 6 WEEKS    INR Location Clinic      Anticoagulation Summary as of 11/6/2018     INR goal 2.0-3.0   Today's INR 2.7   Warfarin maintenance plan 5 mg (5 mg x 1) every day   Full warfarin instructions 5 mg every day   Weekly warfarin total 35 mg   No change documented Smitha Nair, RN   Next INR check 12/17/2018   Priority INR   Target end date Indefinite    Indications   Long term (current) use of anticoagulants [Z79.01]  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

## 2018-11-21 ENCOUNTER — TELEPHONE (OUTPATIENT)
Dept: INTERNAL MEDICINE | Facility: OTHER | Age: 58
End: 2018-11-21

## 2018-12-17 ENCOUNTER — ANTICOAGULATION THERAPY VISIT (OUTPATIENT)
Dept: ANTICOAGULATION | Facility: OTHER | Age: 58
End: 2018-12-17
Attending: INTERNAL MEDICINE
Payer: COMMERCIAL

## 2018-12-17 DIAGNOSIS — I74.9 EMBOLISM AND THROMBOSIS (H): ICD-10-CM

## 2018-12-17 LAB — INR POINT OF CARE: 2.4 (ref 2–3)

## 2018-12-17 PROCEDURE — 85610 PROTHROMBIN TIME: CPT | Mod: QW

## 2018-12-17 PROCEDURE — 36416 COLLJ CAPILLARY BLOOD SPEC: CPT

## 2018-12-17 NOTE — PROGRESS NOTES
ANTICOAGULATION FOLLOW-UP CLINIC VISIT    Patient Name:  Trisha Fernando  Date:  2018  Contact Type:  Face to Face    SUBJECTIVE:     Patient Findings     Positives:   No Problem Findings           OBJECTIVE    INR Protime   Date Value Ref Range Status   2018 2.4 (A) 2.0 - 3.0 Final       ASSESSMENT / PLAN  INR assessment THER    Recheck INR In: 6 WEEKS    INR Location Clinic      Anticoagulation Summary  As of 2018    INR goal:   2.0-3.0   TTR:   75.4 % (6 y)   INR used for dosin.4 (2018)   Warfarin maintenance plan:   5 mg (5 mg x 1) every day   Full warfarin instructions:   5 mg every day   Weekly warfarin total:   35 mg   No change documented:   Maria D Zayas RN   Next INR check:   2019   Priority:   INR   Target end date:   Indefinite    Indications    Long term (current) use of anticoagulants [Z79.01]  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

## 2019-01-12 NOTE — ADDENDUM NOTE
Patient Information     Patient Name MRN Trisha Vasquez [id number] Female 1960      Addendum Note by Vane Cam at 2017  8:00 AM     Author:  Vane Cam Service:  (none) Author Type:  (none)     Filed:  2017  8:00 AM Encounter Date:  2017 Status:  Signed     :  Vane Cam       Addended by: VANE CAM on: 2017 08:00 AM        Modules accepted: Orders           
The patient is a 52y Male complaining of see chief complaint quote.

## 2019-01-28 ENCOUNTER — ANTICOAGULATION THERAPY VISIT (OUTPATIENT)
Dept: ANTICOAGULATION | Facility: OTHER | Age: 59
End: 2019-01-28
Attending: INTERNAL MEDICINE
Payer: COMMERCIAL

## 2019-01-28 DIAGNOSIS — I74.9 EMBOLISM AND THROMBOSIS (H): ICD-10-CM

## 2019-01-28 LAB — INR POINT OF CARE: 2.7 (ref 2–3)

## 2019-01-28 PROCEDURE — 85610 PROTHROMBIN TIME: CPT | Mod: QW

## 2019-01-28 PROCEDURE — 36416 COLLJ CAPILLARY BLOOD SPEC: CPT

## 2019-01-28 NOTE — PROGRESS NOTES
ANTICOAGULATION FOLLOW-UP CLINIC VISIT    Patient Name:  Trisha Fernando  Date:  2019  Contact Type:  Face to Face    SUBJECTIVE:     Patient Findings     Positives:   No Problem Findings           OBJECTIVE    INR Protime   Date Value Ref Range Status   2019 2.7 2.0 - 3.0 Final       ASSESSMENT / PLAN  INR assessment THER    Recheck INR In: 6 WEEKS    INR Location Clinic      Anticoagulation Summary  As of 2019    INR goal:   2.0-3.0   TTR:   75.9 % (6.1 y)   INR used for dosin.7 (2019)   Warfarin maintenance plan:   5 mg (5 mg x 1) every day   Full warfarin instructions:   5 mg every day   Weekly warfarin total:   35 mg   No change documented:   Maria D Zayas RN   Next INR check:   3/11/2019   Priority:   INR   Target end date:   Indefinite    Indications    Long term (current) use of anticoagulants [Z79.01]  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

## 2019-03-04 ENCOUNTER — ANTICOAGULATION THERAPY VISIT (OUTPATIENT)
Dept: ANTICOAGULATION | Facility: OTHER | Age: 59
End: 2019-03-04
Attending: INTERNAL MEDICINE
Payer: COMMERCIAL

## 2019-03-04 ENCOUNTER — HOSPITAL ENCOUNTER (EMERGENCY)
Facility: OTHER | Age: 59
Discharge: HOME | End: 2019-03-04
Attending: FAMILY MEDICINE | Admitting: FAMILY MEDICINE
Payer: COMMERCIAL

## 2019-03-04 VITALS
WEIGHT: 293 LBS | BODY MASS INDEX: 44.41 KG/M2 | HEART RATE: 76 BPM | OXYGEN SATURATION: 98 % | HEIGHT: 68 IN | RESPIRATION RATE: 16 BRPM | SYSTOLIC BLOOD PRESSURE: 155 MMHG | TEMPERATURE: 96.5 F | DIASTOLIC BLOOD PRESSURE: 98 MMHG

## 2019-03-04 DIAGNOSIS — I74.9 EMBOLISM AND THROMBOSIS (H): Primary | ICD-10-CM

## 2019-03-04 DIAGNOSIS — S81.811A LACERATION OF RIGHT LOWER LEG WITH COMPLICATION, INITIAL ENCOUNTER: ICD-10-CM

## 2019-03-04 LAB — INR POINT OF CARE: 2.1 (ref 0.86–1.14)

## 2019-03-04 PROCEDURE — 36416 COLLJ CAPILLARY BLOOD SPEC: CPT

## 2019-03-04 PROCEDURE — 85610 PROTHROMBIN TIME: CPT | Mod: QW

## 2019-03-04 PROCEDURE — 99283 EMERGENCY DEPT VISIT LOW MDM: CPT | Mod: Z6 | Performed by: FAMILY MEDICINE

## 2019-03-04 PROCEDURE — 99282 EMERGENCY DEPT VISIT SF MDM: CPT | Performed by: FAMILY MEDICINE

## 2019-03-04 ASSESSMENT — MIFFLIN-ST. JEOR: SCORE: 2211.09

## 2019-03-04 ASSESSMENT — ENCOUNTER SYMPTOMS: WOUND: 1

## 2019-03-04 NOTE — PROGRESS NOTES
ANTICOAGULATION FOLLOW-UP CLINIC VISIT    Patient Name:  Trisha Fernando  Date:  3/4/2019  Contact Type:  Face to Face    SUBJECTIVE:     Patient Findings     Positives:   OTC meds (tylenol x 5 times yesterday), Bleed (ER/Hosp visit) (cut herself shaving this morning hard to stop bleeding )           OBJECTIVE    INR Protime   Date Value Ref Range Status   2019 2.1 (A) 0.86 - 1.14 Final       ASSESSMENT / PLAN  INR assessment THER    Recheck INR In: 6 WEEKS    INR Location Clinic      Anticoagulation Summary  As of 3/4/2019    INR goal:   2.0-3.0   TTR:   76.3 % (6.2 y)   INR used for dosin.1 (3/4/2019)   Warfarin maintenance plan:   5 mg (5 mg x 1) every day   Full warfarin instructions:   5 mg every day   Weekly warfarin total:   35 mg   No change documented:   Smitha Nair RN   Next INR check:   2019   Priority:   INR   Target end date:   Indefinite    Indications    Long term (current) use of anticoagulants [Z79.01]  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

## 2019-03-04 NOTE — ED TRIAGE NOTES
Patient cut right lower leg while shaving this morning. Patient on warfarin for factor 5. Patient has bandage around leg, bleeding controlled. Laceration not visualized. Patient resting comfortably in room,  at bedside.

## 2019-03-04 NOTE — ED PROVIDER NOTES
History     Chief Complaint   Patient presents with     Laceration     HPI  Trisha Fernando is a 58 year old female who is on Coumadin for history of VTE and has a history of varicosities or lower extremities.  She was shaving her legs this morning and nicked a tiny varicose vein that continued to bleed.  She arrives with the right lower leg wrapped in gauze and duct tape without any active bleeding at this time.  She knows that she took a fair bit of Tylenol this weekend and wonders if her INR is elevated.  She is due for another INR check in a week.    Allergies:  Allergies   Allergen Reactions     Lisinopril      Other reaction(s): Angioedema, Edema  Caused Angioedema  Swelling      Adhesive Tape Itching and Rash     [place] Itching and Rash     Wound Dressing Adhesive Rash       Problem List:    Patient Active Problem List    Diagnosis Date Noted     Long term (current) use of anticoagulants 02/11/2018     Priority: Medium     Embolism and thrombosis (H) 02/11/2018     Priority: Medium     Depression, major, recurrent, mild (H) 02/08/2018     Priority: Medium     Obesity 02/08/2018     Priority: Medium     Hypertension 05/26/2016     Priority: Medium     Health care maintenance 03/03/2016     Priority: Medium     Overview:   Colonoscopy: Done in 2013 and positive for precancerous polyp, repeat 2016   Breast Exam/Mammography: last mammogram September 2015 and normal, repeat fall of 2016   Pap smear: ASCUS and neg HPV in 2/2017, repeat in 3 years   DEXA:  age 64  Immunizations: Up-to-date  Lipids/Annual Exam: Done last in December 2014 and normal, repeat this year and yearly with obesity  Hepatitis C screen: will discuss       Daytime somnolence 09/08/2015     Priority: Medium     Varicose veins of both legs with edema 09/08/2015     Priority: Medium     Anticoagulation monitoring, INR range 2-3 06/10/2015     Priority: Medium     Anticoagulant long-term use 09/25/2014     Priority: Medium     Overview:   For  factor V;  History of recurrent DVT          Past Medical History:    Past Medical History:   Diagnosis Date     Activated protein C resistance (H)      Benign lipomatous neoplasm      Calculus of kidney      Essential (primary) hypertension      Heterozygous factor V Leiden mutation (H) 2008     Hyperlipidemia      Major depressive disorder, recurrent, mild (H)      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, unspecified (CODE)      Obesity      Pain in knee 2013     Thoracic aortic aneurysm without rupture (H)        Past Surgical History:    Past Surgical History:   Procedure Laterality Date     ARTHROSCOPY KNEE      2013,Arthroscopy, Knee left Dr Jones     BIOPSY BREAST      Right for fiboadenoma      SECTION      ,      COLONOSCOPY  2011    polyp ( Dr. Sutton)  follow up 3 years     MAMMOPLASTY REDUCTION      03     OTHER SURGICAL HISTORY      ,2073,SCAN-CT INTERPRETATION,CT of chest. Negative     OTHER SURGICAL HISTORY      08,2073,SCAN-CT INTERPRETATION,CT of abdomen and pelvis. Fatty liver infiltration, 5 mm non-obstructing stone     OTHER SURGICAL HISTORY      2011,BEN205,LIPOMA RESECTION,Right Forearm Lipoma Excision     OTHER SURGICAL HISTORY      ,LITHOTRIPSY,lithotrypsy, year?     OTHER SURGICAL HISTORY      ,54968.0,SKIN/SUBCUTANEOUS SURGERY,Left foot soft tissue resection; dr. Lang     OTHER SURGICAL HISTORY      2014,25214.0,[place] LAP CHOLECYSTECTOMY     TONSILLECTOMY      No Comments Provided       Family History:    Family History   Problem Relation Age of Onset     Other - See Comments Father         accidental death/ gallbladder disease     Other - See Comments Mother         HX of kidney stones and blood clots/ gallbladder disease     Dementia Mother         Dementia     Colon Cancer Maternal Uncle 50        Cancer-colon     Colon Cancer Maternal Uncle 50        Cancer-colon     Other - See Comments Maternal  "Uncle         HX of kidney stones     Heart Disease Paternal Grandfather         Heart Disease,ASCHD     Other - See Comments Brother         No Known Problems     Other - See Comments Daughter         No Known Problems     Other - See Comments Daughter         No Known Problems     Other - See Comments Brother         HX of kidney stones and blood clots     Other - See Comments Paternal Aunt         Parkinson's     Other - See Comments Brother         No Known Problems     Other - See Comments Brother         No Known Problems       Social History:  Marital Status:   [2]  Social History     Tobacco Use     Smoking status: Former Smoker     Packs/day: 1.00     Years: 12.00     Pack years: 12.00     Types: Cigarettes     Last attempt to quit: 1990     Years since quittin.5     Smokeless tobacco: Never Used   Substance Use Topics     Alcohol use: No     Alcohol/week: 1.2 oz     Drug use: No        Medications:      furosemide (LASIX) 20 MG tablet   losartan (COZAAR) 25 MG tablet   methylPREDNISolone (MEDROL DOSEPAK) 4 MG tablet   metoprolol tartrate (LOPRESSOR) 25 MG tablet   potassium chloride (KLOR-CON) 10 MEQ tablet   venlafaxine (EFFEXOR-XR) 150 MG 24 hr capsule   warfarin (COUMADIN) 5 MG tablet   Blood Pressure Monitoring (ADULT BLOOD PRESSURE CUFF LG) KIT   Cholecalciferol (VITAMIN D3) 2000 UNITS CAPS   cyclobenzaprine (FLEXERIL) 10 MG tablet   Elastic Bandages & Supports (MEDICAL COMPRESSION STOCKINGS) MISC   fish oil-omega-3 fatty acids 1000 MG capsule   LORazepam (ATIVAN) 0.5 MG tablet         Review of Systems   Skin: Positive for wound.   All other systems reviewed and are negative.      Physical Exam   BP: (!) 155/101  Pulse: 73  Temp: 95.6  F (35.3  C)  Resp: 16  Height: 172.7 cm (5' 8\")  Weight: (!) 158.8 kg (350 lb)  SpO2: 94 %      Physical Exam   Constitutional: She appears well-developed.   HENT:   Head: Normocephalic and atraumatic.   Pulmonary/Chest: No respiratory distress. "   Musculoskeletal: She exhibits edema (Bilateral trace dependent edema and bilateral multiple spider nevi and superficial varicosities both lower legs.).   Skin:   Right lower leg with currently hemostatic tiny 1mm nick in a superficial varicose vein.  Exofin skin adhesive is placed and wound remains hemostatic.       Nursing note and vitals reviewed.      ED Course        Procedures               Critical Care time:  none               No results found for this or any previous visit (from the past 24 hour(s)).    Medications - No data to display    Assessments & Plan (with Medical Decision Making)   88-year-old female on Coumadin and history of varicosities has a tiny 1mm nick while shaving to a superficial varicose vein on the right lower leg that is currently hemostatic and reinforced with skin adhesive.  Recent increased use of Tylenol and she will go to Coumadin clinic after discharge from the ED emergency department for repeat INR.    I have reviewed the nursing notes.    I have reviewed the findings, diagnosis, plan and need for follow up with the patient.          Medication List      There are no discharge medications for this visit.         Final diagnoses:   Laceration of right lower leg with complication, initial encounter       3/4/2019   [facility] Hospital[facility]imal knapp MD  03/04/19 0820

## 2019-03-04 NOTE — ED AVS SNAPSHOT
[facility]  [address]  [place] [address]  Phone:  [phone]  Fax:  [phone]                                    Trisha Fernando   MRN: [id number]    Department:  [facility]   Date of Visit:  3/4/2019           After Visit Summary Signature Page    I have received my discharge instructions, and my questions have been answered. I have discussed any challenges I see with this plan with the nurse or doctor.    ..........................................................................................................................................  Patient/Patient Representative Signature      ..........................................................................................................................................  Patient Representative Print Name and Relationship to Patient    ..................................................               ................................................  Date                                   Time    ..........................................................................................................................................  Reviewed by Signature/Title    ...................................................              ..............................................  Date                                               Time          22EPIC Rev 08/18

## 2019-03-08 DIAGNOSIS — E87.6 HYPOKALEMIA: Primary | ICD-10-CM

## 2019-03-08 RX ORDER — POTASSIUM CHLORIDE 750 MG/1
TABLET, FILM COATED, EXTENDED RELEASE ORAL
Qty: 90 TABLET | Refills: 0 | Status: SHIPPED | OUTPATIENT
Start: 2019-03-08 | End: 2019-04-01

## 2019-03-08 NOTE — TELEPHONE ENCOUNTER
Routing refill request to provider for review/approval because:  Medication is reported/historical    LOV: 6/22/18  Labs: 6/19/18  potassium chloride (KLOR-CON 10) 10 mEq Controlled-Release tablet    Indications: Hypokalemia Take 1 tablet by mouth once daily with a meal. 90 tablet   4 01/25/2018       Jil Hull RN on 3/8/2019 at 12:25 PM

## 2019-04-01 ENCOUNTER — OFFICE VISIT (OUTPATIENT)
Dept: INTERNAL MEDICINE | Facility: OTHER | Age: 59
End: 2019-04-01
Attending: INTERNAL MEDICINE
Payer: COMMERCIAL

## 2019-04-01 VITALS
HEART RATE: 74 BPM | BODY MASS INDEX: 44.41 KG/M2 | WEIGHT: 293 LBS | HEIGHT: 68 IN | TEMPERATURE: 98.5 F | SYSTOLIC BLOOD PRESSURE: 138 MMHG | RESPIRATION RATE: 18 BRPM | DIASTOLIC BLOOD PRESSURE: 82 MMHG

## 2019-04-01 DIAGNOSIS — F33.0 DEPRESSION, MAJOR, RECURRENT, MILD (H): Primary | ICD-10-CM

## 2019-04-01 DIAGNOSIS — E87.6 HYPOKALEMIA: ICD-10-CM

## 2019-04-01 DIAGNOSIS — Z12.11 SCREENING FOR COLON CANCER: ICD-10-CM

## 2019-04-01 DIAGNOSIS — Z13.220 SCREENING FOR HYPERLIPIDEMIA: ICD-10-CM

## 2019-04-01 DIAGNOSIS — R73.9 ELEVATED BLOOD SUGAR: ICD-10-CM

## 2019-04-01 DIAGNOSIS — K43.2 INCISIONAL HERNIA, WITHOUT OBSTRUCTION OR GANGRENE: ICD-10-CM

## 2019-04-01 DIAGNOSIS — I10 ESSENTIAL HYPERTENSION: ICD-10-CM

## 2019-04-01 DIAGNOSIS — Z12.31 VISIT FOR SCREENING MAMMOGRAM: ICD-10-CM

## 2019-04-01 DIAGNOSIS — R60.0 BILATERAL LOWER EXTREMITY EDEMA: ICD-10-CM

## 2019-04-01 PROCEDURE — 99396 PREV VISIT EST AGE 40-64: CPT | Performed by: INTERNAL MEDICINE

## 2019-04-01 RX ORDER — LOSARTAN POTASSIUM 25 MG/1
25 TABLET ORAL DAILY
Qty: 90 TABLET | Refills: 4 | Status: SHIPPED | OUTPATIENT
Start: 2019-04-01 | End: 2020-06-03

## 2019-04-01 RX ORDER — LORATADINE 10 MG/1
10 TABLET ORAL
COMMUNITY
Start: 2013-10-03 | End: 2019-09-19

## 2019-04-01 RX ORDER — VENLAFAXINE HYDROCHLORIDE 150 MG/1
150 CAPSULE, EXTENDED RELEASE ORAL
Qty: 90 CAPSULE | Refills: 4 | Status: SHIPPED | OUTPATIENT
Start: 2019-04-01 | End: 2020-04-02

## 2019-04-01 RX ORDER — POTASSIUM CHLORIDE 750 MG/1
10 TABLET, EXTENDED RELEASE ORAL DAILY
Qty: 90 TABLET | Refills: 4 | Status: SHIPPED | OUTPATIENT
Start: 2019-04-01 | End: 2020-05-19

## 2019-04-01 RX ORDER — METOPROLOL TARTRATE 25 MG/1
12.5 TABLET, FILM COATED ORAL 2 TIMES DAILY
Qty: 180 TABLET | Refills: 4 | Status: SHIPPED | OUTPATIENT
Start: 2019-04-01 | End: 2020-08-10

## 2019-04-01 RX ORDER — INFLUENZA A VIRUS A/NEBRASKA/14/2019 (H1N1) ANTIGEN (MDCK CELL DERIVED, PROPIOLACTONE INACTIVATED), INFLUENZA A VIRUS A/DELAWARE/39/2019 (H3N2) ANTIGEN (MDCK CELL DERIVED, PROPIOLACTONE INACTIVATED), INFLUENZA B VIRUS B/SINGAPORE/INFTT-16-0610/2016 ANTIGEN (MDCK CELL DERIVED, PROPIOLACTONE INACTIVATED), INFLUENZA B VIRUS B/DARWIN/7/2019 ANTIGEN (MDCK CELL DERIVED, PROPIOLACTONE INACTIVATED) 15; 15; 15; 15 UG/.5ML; UG/.5ML; UG/.5ML; UG/.5ML
INJECTION, SUSPENSION INTRAMUSCULAR
Refills: 0 | COMMUNITY
Start: 2018-11-19 | End: 2019-04-01

## 2019-04-01 RX ORDER — FUROSEMIDE 20 MG
20-40 TABLET ORAL DAILY
Qty: 135 TABLET | Refills: 3 | Status: SHIPPED | OUTPATIENT
Start: 2019-04-01 | End: 2020-04-08

## 2019-04-01 ASSESSMENT — PATIENT HEALTH QUESTIONNAIRE - PHQ9: SUM OF ALL RESPONSES TO PHQ QUESTIONS 1-9: 14

## 2019-04-01 ASSESSMENT — MIFFLIN-ST. JEOR: SCORE: 2256

## 2019-04-01 ASSESSMENT — PAIN SCALES - GENERAL: PAINLEVEL: NO PAIN (0)

## 2019-04-01 NOTE — PROGRESS NOTES
Chief Complaint   Patient presents with     Annual Visit         HPI: Ms. Fernando is a 58 year old female who presents today for yearly physical.  She overall is feeling good.      She does need renewal of her medications.  She is on furosemide which she takes 1-2 tablets daily for lower extremity edema.  This has been beneficial.  With use of furosemide she does have a low potassium.  She is due for renewal and recheck of this.  She is watching her salt intake.  For her blood pressure she is on metoprolol, losartan and denies any obvious side effects.  Her blood pressure at home has been well controlled.  For her mood she is on Effexor 150 mg daily.  She denies any side effects and feels this has been beneficial for her mood.    She does have a history of factor V Leiden and is on warfarin.  She is due for recheck of her hemoglobin.    She is postmenopausal.  She is due for colon cancer screening.  She is due for cholesterol screening.  Her mammogram is also due.  She is up-to-date on her vaccines other than the new shingles vaccine.    Her only concern today is that she would like her hernia looked at.  She does have an anterior abdominal hernia.  She has not had any bowel changes or blood in her stool.  She has not had any significant pain with this.      History is discussed and updated on 4/1/2019 with patient.  It is current to the best of my knowledge as below.    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 Sutton)       LITHOTRIPSY       MAMMOPLASTY REDUCTION  2003     OTHER SURGICAL HISTORY Right 2011    Forearm Lipoma Excision     OTHER SURGICAL HISTORY      Left foot soft tissue resection; dr. Lang     TONSILLECTOMY           Current Outpatient Medications   Medication Sig Dispense Refill     Cholecalciferol (VITAMIN D3) 2000 UNITS CAPS Take 2,000 Units by mouth daily       Elastic Bandages & Supports (MEDICAL COMPRESSION STOCKINGS) MISC For personal use. Length: calf Strength: 16-20 mmHg Circumference in cm: For calf: Ankle 15cm, Calf 30cm, Ankle to calf length 40cm.       fish oil-omega-3 fatty acids 1000 MG capsule Take by mouth daily       furosemide (LASIX) 20 MG tablet Take 1-2 tablets (20-40 mg) by mouth daily ; take second dose 6 hours after first 135 tablet 3     loratadine (CLARITIN) 10 MG tablet Take 10 mg by mouth       LORazepam (ATIVAN) 0.5 MG tablet        losartan (COZAAR) 25 MG tablet Take 1 tablet (25 mg) by mouth daily 90 tablet 4     metoprolol tartrate (LOPRESSOR) 25 MG tablet Take 0.5 tablets (12.5 mg) by mouth 2 times daily 180 tablet 4     potassium chloride ER (KLOR-CON) 10 MEQ CR tablet Take 1 tablet (10 mEq) by mouth daily 90 tablet 4     venlafaxine (EFFEXOR-XR) 150 MG 24 hr capsule Take 1 capsule (150 mg) by mouth daily with food 90 capsule 4     warfarin (COUMADIN) 5 MG tablet TAKE 5 MG DAILY OR AS DIRECTED BY THE [facility] CLIN[facility]. 90 tablet 1     Blood Pressure Monitoring (ADULT BLOOD PRESSURE CUFF LG) KIT As directed.         Allergies   Allergen Reactions     Lisinopril  Swelling     Angioedema, Edema       Adhesive Tape Itching and Rash     [place] Itching and Rash     Wound Dressing Adhesive Rash        Family History   Problem Relation Age of Onset     Other - See Comments Father         accidental death/ gallbladder disease     Other - See Comments Mother         HX of kidney stones and blood clots/ gallbladder disease     Dementia Mother      Other - See Comments Brother         HX of kidney stones and blood clots     No Known Problems Daughter      No Known Problems Daughter      Colon Cancer Maternal Uncle 50        Cancer-colon     Colon Cancer Maternal Uncle 50        Cancer-colon     Other - See Comments Maternal Uncle         HX of kidney stones     No Known Problems Brother      Heart Disease Paternal Grandfather         Heart Disease,ASCHD     No Known Problems Brother      No Known Problems Brother      Other - See Comments Paternal Aunt         Parkinson's       Family Status   Relation Name Status     Fa          Accidental death, gall bladder disease     Mo          gall bladder disease     Dimitri Hunter Alive     MUnc          with colon cancer, 1  in his 50's,1 diagnosed in 50's     Nicol Reno Alive     Nicol Cabrera Alive     [facility]      MUnc 2      Dimitri Mazariegos Alive     PGFa  (Not Specified)     Dimitri Antonio Alive     Bro Julian Alive     PAunt  (Not Specified)        Social History     Tobacco Use     Smoking status: Former Smoker     Packs/day: 1.00     Years: 12.00     Pack years: 12.00     Types: Cigarettes     Last attempt to quit: 1990     Years since quittin.5     Smokeless tobacco: Never Used   Substance Use Topics     Alcohol use: No     Alcohol/week: 1.2 oz       Social History     Social History Narrative    .  Two children, Shadia ([place]) and Deborah ([place], college at [employer]);  - [place]              ROS  GEN: -fevers/-chills/+ chronic night sweats/+wt change - 10 lb gain  NEURO:  "-headaches/-vision changes  EARS: -hearing changes/-tinnitus  NOSE: -drainage/-congestion  MOUTH/THROAT: - sore throat/-dysphagia/-sores  LUNGS: -sob/-cough  CARDIOVASCULAR: -cp/-palpitations  GI: -pain/-diarrhea/-constipation/-bloody stools  :-dysuria/-hematuria/-sores/-vaginal discharge or bleeding  ENDOCRINE: -skin or hair changes  HEMATOLOGIC/LYMPHATIC: -swollen nodes  SKIN: -rashes/-lesions/-breast or nipple changes  MSK/RHEUM: +joint pain occasionally/-swelling  NEURO: -weakness/-parasthesias  PSYCH: -anhedonia/+depression/-anxiety  SLEEP: -daytime somnolence         EXAM:   /82 (BP Location: Right arm, Patient Position: Sitting, Cuff Size: Adult Large)   Pulse 74   Temp 98.5  F (36.9  C) (Tympanic)   Resp 18   Ht 1.727 m (5' 8\")   Wt (!) 163.2 kg (359 lb 14.4 oz)   Breastfeeding? No   BMI 54.72 kg/m    Estimated body mass index is 54.72 kg/m  as calculated from the following:    Height as of this encounter: 1.727 m (5' 8\").    Weight as of this encounter: 163.2 kg (359 lb 14.4 oz).      GEN: Vitals reviewed. Healthy appearing. Patient is in no acute distress. Cooperative with exam.  HEENT: Normocephalic atraumatic.  Normal external eye, conjunctiva, lids, cornea with no scleral icterus or conjunctival erythema. Pupils equally round. Oropharynx with no erythema or exudates. Dentition adequate.  EACs clear bilat, TM gray with normal landmarks.  NECK: Supple; no thyromegaly or masses noted.  No cervical or supraclavicular lymphadenopathy.  CV: Heart regular in rate and rhythm with no murmur.    LUNGS: Lungs clear to auscultation bilaterally.  Chest rise equal bilaterally.  No accessory muscle use.  ABD:  Obese, Soft, nontender, and nondistended.  No rebound. Bowel sounds positive.  Incisional hernia is noted just inferior to her umbilicus.  Easily reducible, no significant skin changes, no pain with palpation.  SKIN: Warm and dry to touch.  No rash on face, arms and legs.  EXT: No clubbing or " cyanosis.   Trace to 1+ bilateral lower extremity peripheral edema.  NEURO: Alert and oriented to person, place, and time.  Cranial nerves II-XII grossly intact with no focal or lateralizing deficits.  Muscle tone normal.  Gait normal. No tremor. Sensation intact to light touch.  MSK: ROM of upper and lower ext symmetric and full.  PSYCH: Mood is good.  Affect appropriate. Speech fluent. Answers questions appropriately and thought process normal.       ASSESSMENT AND PLAN:    1. Bilateral lower extremity edema  - instructed on taking 2 doses of medication 6 hours apart and not at same time for better effect  - furosemide (LASIX) 20 MG tablet; Take 1-2 tablets (20-40 mg) by mouth daily ; take second dose 6 hours after first  Dispense: 135 tablet; Refill: 3    2. Hypokalemia  - Stable.  Continue current regimen.    - potassium chloride ER (KLOR-CON) 10 MEQ CR tablet; Take 1 tablet (10 mEq) by mouth daily  Dispense: 90 tablet; Refill: 4    3. Essential hypertension  - Blood pressure today of 138/82   is at the goal of <140/90 with no exacerbation.  - Continue current regimen at this time.  Instructed to check BP at home.  - Cautioned patient to monitor with antibiotics, herbals and any OTC medications  - electrolytes and renal function done and ok  - losartan (COZAAR) 25 MG tablet; Take 1 tablet (25 mg) by mouth daily  Dispense: 90 tablet; Refill: 4  - metoprolol tartrate (LOPRESSOR) 25 MG tablet; Take 0.5 tablets (12.5 mg) by mouth 2 times daily  Dispense: 180 tablet; Refill: 4  - Comprehensive Metabolic Panel; Future  - CBC W PLT No Diff; Future    4. Depression, major, recurrent, mild (H)  - Stable.  Continue current regimen.    - venlafaxine (EFFEXOR-XR) 150 MG 24 hr capsule; Take 1 capsule (150 mg) by mouth daily with food  Dispense: 90 capsule; Refill: 4    5. Visit for screening mammogram  - MA Screen Bilateral w/Favio; Future    6. Screening for colon cancer  - GASTROENTEROLOGY ADULT REF PROCEDURE ONLY Other  ([facility]); ([facility])    7. Screening for hyperlipidemia  - Lipid Panel; Future    8. Incisional hernia, without obstruction or gangrene  - reassurance given, continue to monitor  - encouraged to reduce intrabdominal pressure with decreased weight, avoiding constipation and bearing down, caution with lifting heavy objects       Return in about 1 year (around 4/1/2020) for Annual Review.      BONITA OLIVER DO   4/1/2019 4:14 PM    This document was prepared using voice generated softwear. While every attempt was made for accuracy, spelling and grammatical errors may exist.

## 2019-04-01 NOTE — NURSING NOTE
Patient presents to the clinic for annual visit, medication discussion and review.     Medication Reconciliation: complete   Anum Aquino LPN............. April 1, 2019 3:23 PM

## 2019-04-05 DIAGNOSIS — I10 ESSENTIAL HYPERTENSION: ICD-10-CM

## 2019-04-05 DIAGNOSIS — Z13.220 SCREENING FOR HYPERLIPIDEMIA: ICD-10-CM

## 2019-04-05 LAB
ALBUMIN SERPL-MCNC: 4 G/DL (ref 3.5–5.7)
ALP SERPL-CCNC: 67 U/L (ref 34–104)
ALT SERPL W P-5'-P-CCNC: 22 U/L (ref 7–52)
ANION GAP SERPL CALCULATED.3IONS-SCNC: 6 MMOL/L (ref 3–14)
AST SERPL W P-5'-P-CCNC: 15 U/L (ref 13–39)
BILIRUB SERPL-MCNC: 0.6 MG/DL (ref 0.3–1)
BUN SERPL-MCNC: 13 MG/DL (ref 7–25)
CALCIUM SERPL-MCNC: 9.6 MG/DL (ref 8.6–10.3)
CHLORIDE SERPL-SCNC: 104 MMOL/L (ref 98–107)
CHOLEST SERPL-MCNC: 202 MG/DL
CO2 SERPL-SCNC: 30 MMOL/L (ref 21–31)
CREAT SERPL-MCNC: 0.76 MG/DL (ref 0.6–1.2)
ERYTHROCYTE [DISTWIDTH] IN BLOOD BY AUTOMATED COUNT: 13.4 % (ref 10–15)
GFR SERPL CREATININE-BSD FRML MDRD: 78 ML/MIN/{1.73_M2}
GLUCOSE SERPL-MCNC: 106 MG/DL (ref 70–105)
HCT VFR BLD AUTO: 46.8 % (ref 35–47)
HDLC SERPL-MCNC: 56 MG/DL (ref 23–92)
HGB BLD-MCNC: 15.1 G/DL (ref 11.7–15.7)
LDLC SERPL CALC-MCNC: 122 MG/DL
MCH RBC QN AUTO: 30.6 PG (ref 26.5–33)
MCHC RBC AUTO-ENTMCNC: 32.3 G/DL (ref 31.5–36.5)
MCV RBC AUTO: 95 FL (ref 78–100)
NONHDLC SERPL-MCNC: 146 MG/DL
PLATELET # BLD AUTO: 200 10E9/L (ref 150–450)
POTASSIUM SERPL-SCNC: 4.3 MMOL/L (ref 3.5–5.1)
PROT SERPL-MCNC: 6.7 G/DL (ref 6.4–8.9)
RBC # BLD AUTO: 4.94 10E12/L (ref 3.8–5.2)
SODIUM SERPL-SCNC: 140 MMOL/L (ref 134–144)
TRIGL SERPL-MCNC: 120 MG/DL
WBC # BLD AUTO: 6.2 10E9/L (ref 4–11)

## 2019-04-05 PROCEDURE — 80061 LIPID PANEL: CPT | Performed by: INTERNAL MEDICINE

## 2019-04-05 PROCEDURE — 85027 COMPLETE CBC AUTOMATED: CPT | Performed by: INTERNAL MEDICINE

## 2019-04-05 PROCEDURE — 80053 COMPREHEN METABOLIC PANEL: CPT | Performed by: INTERNAL MEDICINE

## 2019-04-05 PROCEDURE — 36415 COLL VENOUS BLD VENIPUNCTURE: CPT | Performed by: INTERNAL MEDICINE

## 2019-04-11 ENCOUNTER — HOSPITAL ENCOUNTER (OUTPATIENT)
Dept: MAMMOGRAPHY | Facility: OTHER | Age: 59
Discharge: HOME | End: 2019-04-11
Attending: INTERNAL MEDICINE | Admitting: INTERNAL MEDICINE
Payer: COMMERCIAL

## 2019-04-11 ENCOUNTER — TELEPHONE (OUTPATIENT)
Dept: INTERNAL MEDICINE | Facility: OTHER | Age: 59
End: 2019-04-11

## 2019-04-11 ENCOUNTER — ANESTHESIA EVENT (OUTPATIENT)
Dept: ANESTHESIOLOGY | Facility: OTHER | Age: 59
End: 2019-04-11

## 2019-04-11 ENCOUNTER — ANESTHESIA (OUTPATIENT)
Dept: ANESTHESIOLOGY | Facility: OTHER | Age: 59
End: 2019-04-11

## 2019-04-11 DIAGNOSIS — I74.9 EMBOLISM AND THROMBOSIS (H): ICD-10-CM

## 2019-04-11 DIAGNOSIS — Z12.11 SPECIAL SCREENING FOR MALIGNANT NEOPLASMS, COLON: Primary | ICD-10-CM

## 2019-04-11 DIAGNOSIS — Z12.31 VISIT FOR SCREENING MAMMOGRAM: ICD-10-CM

## 2019-04-11 DIAGNOSIS — Z79.01 ANTICOAGULATION MONITORING, INR RANGE 2-3: Primary | ICD-10-CM

## 2019-04-11 PROCEDURE — 77063 BREAST TOMOSYNTHESIS BI: CPT

## 2019-04-11 NOTE — TELEPHONE ENCOUNTER
Screening Questions for the Scheduling of Screening Colonoscopies   (If Colonoscopy is diagnostic, Provider should review the chart before scheduling.)  Are you younger than 50 or older than 80?  NO   Do you take aspirin or fish oil?  YES - FISH OIL    (if yes, tell patient to stop 1 week prior to Colonoscopy)  Do you take warfarin (Coumadin), clopidogrel (Plavix), apixaban (Eliquis), dabigatram (Pradaxa), rivaroxaban (Xarelto) or any blood thinner? YES - COUMADIN   Do you use oxygen at home?  NO   Do you have kidney disease? NO   Are you on dialysis? NO   Have you had a stroke or heart attack in the last year? NO   Have you had a stent in your heart or any blood vessel in the last year? NO  Have you had a transplant of any organ? NO   Have you had a colonoscopy or upper endoscopy (EGD) before? YES          When?  2011  -  DR BERRY   Date of scheduled Colonoscopy. 05/24/2019  Provider JOSUE Osborn [facility]

## 2019-04-11 NOTE — TELEPHONE ENCOUNTER
Patient is scheduled for a colonoscopy  on 05/24 with Dr. Ford.  She is on Coumadin and will need to know when she should  stop before procedure.  She stated that in the past she has had to bridge with Lovonox.   Please advise.  Thank you. Kenna Barnes on 4/11/2019 at 3:09 PM

## 2019-04-12 NOTE — TELEPHONE ENCOUNTER
5 days before surgery: Stop warfarin.  No Lovenox.   4 days before surgery: No warfarin.  No Lovenox.  3 days before surgery: No warfarin, begin Lovenox shots  SQ every 12 hours.  2 days before surgery: No warfarin, Lovenox shots  SQ every 12 hours.  1 day before surgery: No warfarin, Lovenox shot in AM only. No PM dose.  Check INR.  Day of surgery: No Lovenox, warfarin 7.5 mg in the PM after surgery/procedure.  POD 1: warfarin 7.5 mg, Lovenox  mg SQ every 12 hours.  POD 2: warfarin 7.5 mg, Lovenox  mg SQ every 12 hours.  POD 3: warfarin 7.5 mg, Lovenox  SQ every 12 hours.    Appts for INR testing made for 5/28/2019.  Written instructions with the above information, were provided to the patient.

## 2019-04-16 RX ORDER — POLYETHYLENE GLYCOL 3350, SODIUM CHLORIDE, SODIUM BICARBONATE, POTASSIUM CHLORIDE 420; 11.2; 5.72; 1.48 G/4L; G/4L; G/4L; G/4L
4000 POWDER, FOR SOLUTION ORAL ONCE
Qty: 4000 ML | Refills: 0 | Status: ON HOLD | OUTPATIENT
Start: 2019-04-16 | End: 2019-05-24

## 2019-04-16 RX ORDER — BISACODYL 5 MG
TABLET, DELAYED RELEASE (ENTERIC COATED) ORAL
Qty: 2 TABLET | Refills: 0 | Status: ON HOLD | OUTPATIENT
Start: 2019-04-16 | End: 2019-05-24

## 2019-04-23 ENCOUNTER — ANTICOAGULATION THERAPY VISIT (OUTPATIENT)
Dept: ANTICOAGULATION | Facility: OTHER | Age: 59
End: 2019-04-23
Attending: INTERNAL MEDICINE
Payer: COMMERCIAL

## 2019-04-23 DIAGNOSIS — I74.9 EMBOLISM AND THROMBOSIS (H): ICD-10-CM

## 2019-04-23 LAB — INR POINT OF CARE: 2.5 (ref 0.86–1.14)

## 2019-04-23 PROCEDURE — 36416 COLLJ CAPILLARY BLOOD SPEC: CPT

## 2019-04-23 PROCEDURE — 85610 PROTHROMBIN TIME: CPT | Mod: QW

## 2019-04-24 ENCOUNTER — TELEPHONE (OUTPATIENT)
Dept: INTERNAL MEDICINE | Facility: OTHER | Age: 59
End: 2019-04-24

## 2019-04-24 DIAGNOSIS — Z79.01 ANTICOAGULATION MONITORING, INR RANGE 2-3: ICD-10-CM

## 2019-04-24 DIAGNOSIS — I74.9 EMBOLISM AND THROMBOSIS (H): ICD-10-CM

## 2019-04-24 NOTE — TELEPHONE ENCOUNTER
Called and spoke with patient after proper verification. Dony at [facility] states that to get this medication to work with the current dosage, patient would only be using 1/6 of a syring. Dony is requesting that mg being changed to 150 instead of 165 if it all possible. Also doesn't have the 150 in stock so would have to order it, wondering how urgent it is. Please advise.   Anum Aquino LPN............. April 24, 2019 10:06 AM

## 2019-04-28 DIAGNOSIS — I74.9 EMBOLISM AND THROMBOSIS (H): ICD-10-CM

## 2019-04-28 DIAGNOSIS — Z79.01 ANTICOAGULATION MONITORING, INR RANGE 2-3: ICD-10-CM

## 2019-04-29 RX ORDER — WARFARIN SODIUM 5 MG/1
TABLET ORAL
Qty: 90 TABLET | Refills: 1 | Status: SHIPPED | OUTPATIENT
Start: 2019-04-29 | End: 2019-10-21

## 2019-04-29 NOTE — TELEPHONE ENCOUNTER
"Requested Prescriptions   Pending Prescriptions Disp Refills     warfarin (COUMADIN) 5 MG tablet [Pharmacy Med Name: WARFARIN SODIUM 5 MG TABLET] 90 tablet 1     Sig: TAKE 5 MG DAILY OR AS DIRECTED BY THE [facility] CL[facility].       Vitamin K Antagonists Failed - 4/28/2019 12:18 AM        Failed - INR is within goal in the past 6 weeks     Confirm INR is within goal in the past 6 weeks.     Recent Labs   Lab Test 04/23/19   INR 2.5*                       Passed - Recent (12 mo) or future (30 days) visit within the authorizing provider's specialty     Patient had office visit in the last 12 months or has a visit in the next 30 days with authorizing provider or within the authorizing provider's specialty.  See \"Patient Info\" tab in inbasket, or \"Choose Columns\" in Meds & Orders section of the refill encounter.              Passed - Medication is active on med list        Passed - Patient is 18 years of age or older        Passed - Patient is not pregnant        Passed - No positive pregnancy on file in past 12 months        Prescription approved per [facility] Refill Protocol.    "

## 2019-05-24 ENCOUNTER — ANESTHESIA (OUTPATIENT)
Dept: SURGERY | Facility: OTHER | Age: 59
End: 2019-05-24
Payer: COMMERCIAL

## 2019-05-24 ENCOUNTER — TRANSFERRED RECORDS (OUTPATIENT)
Dept: MULTI SPECIALTY CLINIC | Facility: CLINIC | Age: 59
End: 2019-05-24

## 2019-05-24 ENCOUNTER — HOSPITAL ENCOUNTER (OUTPATIENT)
Facility: OTHER | Age: 59
Discharge: HOME | End: 2019-05-24
Attending: SURGERY | Admitting: SURGERY
Payer: COMMERCIAL

## 2019-05-24 ENCOUNTER — ANESTHESIA EVENT (OUTPATIENT)
Dept: SURGERY | Facility: OTHER | Age: 59
End: 2019-05-24
Payer: COMMERCIAL

## 2019-05-24 VITALS
WEIGHT: 293 LBS | HEART RATE: 74 BPM | TEMPERATURE: 98.4 F | OXYGEN SATURATION: 95 % | RESPIRATION RATE: 18 BRPM | DIASTOLIC BLOOD PRESSURE: 79 MMHG | SYSTOLIC BLOOD PRESSURE: 131 MMHG | BODY MASS INDEX: 54.19 KG/M2

## 2019-05-24 DIAGNOSIS — K57.30 DIVERTICULOSIS OF COLON WITHOUT DIVERTICULITIS: ICD-10-CM

## 2019-05-24 DIAGNOSIS — Z86.0100 HISTORY OF COLON POLYPS: ICD-10-CM

## 2019-05-24 DIAGNOSIS — Z12.11 SPECIAL SCREENING FOR MALIGNANT NEOPLASMS, COLON: Primary | ICD-10-CM

## 2019-05-24 LAB — INR PPP: 1.02 (ref 0–1.3)

## 2019-05-24 PROCEDURE — G0121 COLON CA SCRN NOT HI RSK IND: HCPCS | Performed by: SURGERY

## 2019-05-24 PROCEDURE — 45378 DIAGNOSTIC COLONOSCOPY: CPT | Performed by: SURGERY

## 2019-05-24 PROCEDURE — 25800030 ZZH RX IP 258 OP 636: Performed by: SURGERY

## 2019-05-24 PROCEDURE — 25000125 ZZHC RX 250: Performed by: NURSE ANESTHETIST, CERTIFIED REGISTERED

## 2019-05-24 PROCEDURE — 36415 COLL VENOUS BLD VENIPUNCTURE: CPT | Performed by: SURGERY

## 2019-05-24 PROCEDURE — 85610 PROTHROMBIN TIME: CPT | Performed by: SURGERY

## 2019-05-24 PROCEDURE — 40000010 ZZH STATISTIC ANES STAT CODE-CRNA PER MINUTE: Performed by: SURGERY

## 2019-05-24 PROCEDURE — 25000128 H RX IP 250 OP 636: Performed by: NURSE ANESTHETIST, CERTIFIED REGISTERED

## 2019-05-24 PROCEDURE — 45378 DIAGNOSTIC COLONOSCOPY: CPT | Performed by: NURSE ANESTHETIST, CERTIFIED REGISTERED

## 2019-05-24 PROCEDURE — 25000125 ZZHC RX 250: Performed by: SURGERY

## 2019-05-24 RX ORDER — ONDANSETRON 2 MG/ML
4 INJECTION INTRAMUSCULAR; INTRAVENOUS
Status: DISCONTINUED | OUTPATIENT
Start: 2019-05-24 | End: 2019-05-24 | Stop reason: HOSPADM

## 2019-05-24 RX ORDER — FLUMAZENIL 0.1 MG/ML
0.2 INJECTION, SOLUTION INTRAVENOUS
Status: DISCONTINUED | OUTPATIENT
Start: 2019-05-24 | End: 2019-05-24 | Stop reason: HOSPADM

## 2019-05-24 RX ORDER — LIDOCAINE HYDROCHLORIDE 20 MG/ML
INJECTION, SOLUTION INFILTRATION; PERINEURAL PRN
Status: DISCONTINUED | OUTPATIENT
Start: 2019-05-24 | End: 2019-05-24

## 2019-05-24 RX ORDER — PROPOFOL 10 MG/ML
INJECTION, EMULSION INTRAVENOUS PRN
Status: DISCONTINUED | OUTPATIENT
Start: 2019-05-24 | End: 2019-05-24

## 2019-05-24 RX ORDER — ONDANSETRON 4 MG/1
4 TABLET, ORALLY DISINTEGRATING ORAL EVERY 6 HOURS PRN
Status: DISCONTINUED | OUTPATIENT
Start: 2019-05-24 | End: 2019-05-24 | Stop reason: HOSPADM

## 2019-05-24 RX ORDER — ONDANSETRON 2 MG/ML
4 INJECTION INTRAMUSCULAR; INTRAVENOUS EVERY 6 HOURS PRN
Status: DISCONTINUED | OUTPATIENT
Start: 2019-05-24 | End: 2019-05-24 | Stop reason: HOSPADM

## 2019-05-24 RX ORDER — LIDOCAINE 40 MG/G
CREAM TOPICAL
Status: DISCONTINUED | OUTPATIENT
Start: 2019-05-24 | End: 2019-05-24 | Stop reason: HOSPADM

## 2019-05-24 RX ORDER — NALOXONE HYDROCHLORIDE 0.4 MG/ML
.1-.4 INJECTION, SOLUTION INTRAMUSCULAR; INTRAVENOUS; SUBCUTANEOUS
Status: DISCONTINUED | OUTPATIENT
Start: 2019-05-24 | End: 2019-05-24 | Stop reason: HOSPADM

## 2019-05-24 RX ORDER — PROPOFOL 10 MG/ML
INJECTION, EMULSION INTRAVENOUS CONTINUOUS PRN
Status: DISCONTINUED | OUTPATIENT
Start: 2019-05-24 | End: 2019-05-24

## 2019-05-24 RX ORDER — SODIUM CHLORIDE, SODIUM LACTATE, POTASSIUM CHLORIDE, CALCIUM CHLORIDE 600; 310; 30; 20 MG/100ML; MG/100ML; MG/100ML; MG/100ML
INJECTION, SOLUTION INTRAVENOUS CONTINUOUS
Status: DISCONTINUED | OUTPATIENT
Start: 2019-05-24 | End: 2019-05-24 | Stop reason: HOSPADM

## 2019-05-24 RX ADMIN — LIDOCAINE HYDROCHLORIDE 40 MG: 20 INJECTION, SOLUTION INFILTRATION; PERINEURAL at 10:35

## 2019-05-24 RX ADMIN — PROPOFOL 140 MCG/KG/MIN: 10 INJECTION, EMULSION INTRAVENOUS at 10:35

## 2019-05-24 RX ADMIN — PROPOFOL 150 MG: 10 INJECTION, EMULSION INTRAVENOUS at 10:35

## 2019-05-24 RX ADMIN — LIDOCAINE HYDROCHLORIDE 0.1 ML: 10 INJECTION, SOLUTION EPIDURAL; INFILTRATION; INTRACAUDAL; PERINEURAL at 10:05

## 2019-05-24 RX ADMIN — SODIUM CHLORIDE, POTASSIUM CHLORIDE, SODIUM LACTATE AND CALCIUM CHLORIDE: 600; 310; 30; 20 INJECTION, SOLUTION INTRAVENOUS at 10:05

## 2019-05-24 NOTE — OP NOTE
PROCEDURE NOTE    SURGEON:Sandhya Ford MD.    PRE-OP DIAGNOSIS:  Screening Colonoscopy      POST-OP DIAGNOSIS: normal colon, diverticula    PROCEDURE:  Screening colonoscopy    ESTIMATED BLOODLOSS: none    COMPLICATIONS:  None    SPECIMEN:  none    ANESTHESIA:  See anesthesia note, anesthesia requested due to: BMI more than 40    INDICATION FOR THE PROCEDURE: The patient is a 58 year old female. The patient has no complaints. I explained to the patient the risks, benefits and alternatives to screening colonoscopy for evaluating the colon for colon polyps and colon cancer. We specifically discussed the risks of bleeding, infection, perforation, potential inability to reach the cecum and the risks of sedation. The patient's questions were answered and the patient wished to proceed. Informed consent paperwork was completed.    PROCEDURE: The patient was taken to the endoscopy suite. Appropriate monitors were attached. The patient was placed in the left lateral decubitus position.Timeout was performed confirming the patient's identity and procedure to be performed. After appropriate sedation was confirmed, digital rectal exam was performed. There was normal tone and no gross abnormality was noted. The lubricated colonoscope was introduced into the anus the colon was insufflated with air. The prep quality was adequate. Under direct visualization the scope was advanced to the cecum. The ileocecal valve was intubated and the terminal ileum inspected. No gross abnormality was noted. The scope was withdrawn back into the cecum. The mucosa of colon was inspected while withdrawing the scope. No abnormalities were noted other than diverticula. The scope was retroflexed in the rectum and the anorectal junction was inspected. No abnormalities were noted. The scope was returned to a neutral position and the colon was decompressed. The scope was removed. The patient tolerated the procedure with no immediately apparent  complication. The patient was taken to recovery in stable condition.    FOLLOW UP:RECOMMEND high fiber diet, follow up: 10 year screening colonoscopy.

## 2019-05-24 NOTE — DISCHARGE INSTRUCTIONS
Procedure you had done: colonoscopy  Your health care provider is:  Maria Guadalupe Ramon  Your surgeon is Dr. Sandhya Ford.   Please call your health care provider or surgeon at [phone] if:    - you feel you are getting worse or having an increase in problems    - fever greater than 101 degrees  - increasing shortness of breath or chest pain  - any signs of infection (increasing redness, swelling, tenderness, warmth, change in appearance, or  increased drainage)  - blood in your urine or stool  - coughing or vomiting blood  - nausea (upset stomach) and vomiting and/or diarrhea that will not stop  - severe pain that is not relieved by medicine, rest or ice  You have had medications for sedation. Please be aware that this can cause drowsiness and impaired judgment for up to 24 hours after your procedure. Do not drive, operate power tools or drink alcohol for 24 hours.  If samples were taken-you will get a phone call and a letter with your results in the next 7-10 days. If you don't get results, please call and let us know!

## 2019-05-24 NOTE — ANESTHESIA CARE TRANSFER NOTE
Patient: Trisha Fernando    Procedure(s):  COLONOSCOPY    Diagnosis: screening  Diagnosis Additional Information: No value filed.    Anesthesia Type:   MAC     Note:  Airway :Room Air  Patient transferred to:Phase II  Handoff Report: Identifed the Patient, Identified the Reponsible Provider, Reviewed the pertinent medical history, Discussed the surgical course, Reviewed Intra-OP anesthesia mangement and issues during anesthesia, Set expectations for post-procedure period and Allowed opportunity for questions and acknowledgement of understanding      Vitals: (Last set prior to Anesthesia Care Transfer)    CRNA VITALS  5/24/2019 1027 - 5/24/2019 1106      5/24/2019             Resp Rate (set):  10                Electronically Signed By: BELLA Mckeon CRNA  May 24, 2019  11:06 AM

## 2019-05-24 NOTE — ANESTHESIA PREPROCEDURE EVALUATION
Anesthesia Pre-Procedure Evaluation    Patient: Trisha Fernando   MRN: [id number] : 1960          Preoperative Diagnosis: screening    Procedure(s):  COLONOSCOPY    Past Medical History:   Diagnosis Date     Activated protein C resistance (H)      Benign lipomatous neoplasm 2011    Right forearm     Calculus of kidney      Essential (primary) hypertension      Heterozygous factor V Leiden mutation (H) 2008     History of fatty infiltration of liver      Hyperlipidemia      Major depressive disorder, recurrent, mild (H)      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, unspecified (CODE)     Left foot acromyxoid fibroblastic, 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 Sutton)       LITHOTRIPSY       MAMMOPLASTY REDUCTION  2003     OTHER SURGICAL HISTORY Right 2011    Forearm Lipoma Excision     OTHER SURGICAL HISTORY      Left foot soft tissue resection; dr. Lang     TONSILLECTOMY         Anesthesia Evaluation     . Pt has had prior anesthetic.     No history of anesthetic complications          ROS/MED HX    ENT/Pulmonary:     (+)JIM risk factors hypertension, obese, , . .    Neurologic:  - neg neurologic ROS     Cardiovascular:     (+) hypertension----. Taking blood thinners : . . . :. .       METS/Exercise Tolerance:  4 - Raking leaves, gardening   Hematologic: Comments: Factor V, off of coumadin and bridging with lovenox    (+) History of blood clots pt is anticoagulated, -      Musculoskeletal:  - neg  "musculoskeletal ROS       GI/Hepatic:  - neg GI/hepatic ROS       Renal/Genitourinary:     (+) chronic renal disease,       Endo:     (+) Obesity, .      Psychiatric:     (+) psychiatric history depression      Infectious Disease:  - neg infectious disease ROS       Malignancy:      - no malignancy   Other:    (+) No chance of pregnancy C-spine cleared: N/A, no H/O Chronic Pain,no other significant disability                         Physical Exam  Normal systems: cardiovascular, pulmonary and dental    Airway   Mallampati: II  TM distance: >3 FB  Neck ROM: full    Dental     Cardiovascular   Rhythm and rate: regular and normal      Pulmonary             Lab Results   Component Value Date    WBC 6.2 04/05/2019    HGB 15.1 04/05/2019    HCT 46.8 04/05/2019     04/05/2019     04/05/2019    POTASSIUM 4.3 04/05/2019    CHLORIDE 104 04/05/2019    CO2 30 04/05/2019    BUN 13 04/05/2019    CR 0.76 04/05/2019     (H) 04/05/2019    TREE 9.6 04/05/2019    PHOS 2.2 (L) 06/01/2016    ALBUMIN 4.0 04/05/2019    PROTTOTAL 6.7 04/05/2019    ALT 22 04/05/2019    AST 15 04/05/2019    ALKPHOS 67 04/05/2019    BILITOTAL 0.6 04/05/2019    BILIDIRECT 0.10 12/12/2014    LIPASE 39.0 11/30/2014    AMYLASE 36 09/06/2014    INR 1.02 05/24/2019       Preop Vitals  BP Readings from Last 3 Encounters:   05/24/19 149/88   04/01/19 138/82   03/04/19 (!) 155/98    Pulse Readings from Last 3 Encounters:   04/01/19 74   03/04/19 76   11/03/18 66      Resp Readings from Last 3 Encounters:   05/24/19 18   04/01/19 18   03/04/19 16    SpO2 Readings from Last 3 Encounters:   05/24/19 95%   03/04/19 98%   11/03/18 96%      Temp Readings from Last 1 Encounters:   05/24/19 98.4  F (36.9  C) (Tympanic)    Ht Readings from Last 1 Encounters:   04/01/19 1.727 m (5' 8\")      Wt Readings from Last 1 Encounters:   05/24/19 (!) 161.7 kg (356 lb 6 oz)    Estimated body mass index is 54.19 kg/m  as calculated from the following:    Height as of " "4/1/19: 1.727 m (5' 8\").    Weight as of this encounter: 161.7 kg (356 lb 6 oz).       Anesthesia Plan      History & Physical Review      ASA Status:  2 .    NPO Status:  > 8 hours    Plan for MAC          Postoperative Care      Consents  Anesthetic plan, risks, benefits and alternatives discussed with:  Patient and Spouse.  Use of blood products discussed: No .   .                 BELLA Reid CRNA  "

## 2019-05-24 NOTE — ANESTHESIA POSTPROCEDURE EVALUATION
Patient: Trisha Fernando    Procedure(s):  COLONOSCOPY    Diagnosis:screening  Diagnosis Additional Information: No value filed.    Anesthesia Type:  MAC    Note:  Anesthesia Post Evaluation    Patient location during evaluation: Phase 2 and Endoscopy Recovery  Patient participation: Able to fully participate in evaluation  Level of consciousness: awake and alert  Pain management: adequate  Airway patency: patent  Cardiovascular status: acceptable  Respiratory status: acceptable  Hydration status: acceptable  PONV: none     Anesthetic complications: None          Last vitals:  Vitals:    05/24/19 0936 05/24/19 1100   BP: 149/88    Resp: 18 18   Temp: 98.4  F (36.9  C)    SpO2: 95%          Electronically Signed By: BELLA Mckeon CRNA  May 24, 2019  11:06 AM

## 2019-05-24 NOTE — H&P
PRE-PROCEDURE NOTE    CHIEF COMPLAINT / REASON FORPROCEDURE:  Need for screening colonoscopy.    PERTINENT HISTORY   Patient with no complaints. Previous colonoscopy 8 years ago-had a polyp-Dr. Sutton did the scope. No diarrhea, constipation, abdominal pain or rectal bleeding. Family history of colon cancer- uncles.  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 Sutton)       LITHOTRIPSY       MAMMOPLASTY REDUCTION  2003     OTHER SURGICAL HISTORY Right 2011    Forearm Lipoma Excision     OTHER SURGICAL HISTORY      Left foot soft tissue resection; dr. Lang     TONSILLECTOMY       Other:  None  Bleeding tendencies:  No    Relevant Family History:  yes, as above    Relevant Social History:  none    A relevant review of systems was performed and was Negative.    ALLERGIES/SENSITIVITIES:   Allergies   Allergen Reactions     Lisinopril Swelling     Angioedema, Edema       Adhesive Tape Itching and Rash     Tustin Itching and Rash     Wound Dressing Adhesive Rash         CURRENTMEDICATIONS:      No current facility-administered medications on file prior to encounter.   Current Outpatient Medications on File Prior to Encounter:  bisacodyl (DULCOLAX) 5 MG EC tablet Use as directed per colonoscopy prep.   Cholecalciferol (VITAMIN D3) 2000 UNITS CAPS Take 2,000 Units by mouth daily   fish oil-omega-3 fatty acids 1000 MG capsule Take by mouth daily   losartan (COZAAR) 25 MG tablet Take 1 tablet (25 mg) by mouth daily   metoprolol tartrate (LOPRESSOR) 25 MG tablet Take 0.5 tablets (12.5 mg) by mouth 2 times daily   potassium chloride ER (KLOR-CON) 10 MEQ CR tablet Take 1 tablet (10 mEq) by mouth daily   venlafaxine (EFFEXOR-XR) 150 MG 24 hr capsule Take 1 capsule (150 mg) by mouth daily with food   vitamin B-Complex Take 1 tablet by mouth daily   Blood Pressure Monitoring (ADULT BLOOD PRESSURE CUFF LG) KIT As directed.   Elastic Bandages & Supports (MEDICAL COMPRESSION STOCKINGS) MISC For personal use. Length: calf Strength: 16-20 mmHg Circumference in cm: For calf: Ankle 15cm, Calf 30cm, Ankle to calf length 40cm.   furosemide (LASIX) 20 MG tablet Take 1-2 tablets (20-40 mg) by mouth daily ; take second dose 6 hours after first   loratadine (CLARITIN) 10 MG tablet Take 10 mg by mouth   LORazepam (ATIVAN) 0.5 MG tablet    [] polyethylene glycol-electrolytes (NULYTELY) 420 g solution Take 4,000 mLs by mouth once for 1 dose     Current Facility-Administered Medications   Medication     lactated ringers infusion     lidocaine (LMX4) cream     lidocaine 1 % 0.1-1 mL     ondansetron (ZOFRAN) injection 4 mg     sodium chloride (PF) 0.9% PF flush 3 mL     sodium chloride (PF) 0.9% PF flush 3 mL       PRE-SEDATION ASSESSMENT:    /88   Temp 98.4  F (36.9  C) (Tympanic)   Resp 18   Wt (!) 161.7 kg (356 lb 6 oz)   SpO2 95%   BMI 54.19 kg/m    Lung Exam:  Normal  Heart Exam:  Normal    Comment(s):      IMPRESSION:  Need for screening colonoscopy.    PLAN:  I discussed screening  colonoscopy with the patient. Anesthesia coverage requested due to BMI over 40 (54).

## 2019-05-28 ENCOUNTER — ANTICOAGULATION THERAPY VISIT (OUTPATIENT)
Dept: ANTICOAGULATION | Facility: OTHER | Age: 59
End: 2019-05-28
Attending: INTERNAL MEDICINE
Payer: COMMERCIAL

## 2019-05-28 DIAGNOSIS — I74.9 EMBOLISM AND THROMBOSIS (H): ICD-10-CM

## 2019-05-28 LAB — INR POINT OF CARE: 1.8 (ref 0.86–1.14)

## 2019-05-28 PROCEDURE — 36416 COLLJ CAPILLARY BLOOD SPEC: CPT

## 2019-05-28 PROCEDURE — 85610 PROTHROMBIN TIME: CPT | Mod: QW

## 2019-05-29 ENCOUNTER — ANTICOAGULATION THERAPY VISIT (OUTPATIENT)
Dept: ANTICOAGULATION | Facility: OTHER | Age: 59
End: 2019-05-29
Attending: INTERNAL MEDICINE
Payer: COMMERCIAL

## 2019-05-29 DIAGNOSIS — I74.9 EMBOLISM AND THROMBOSIS (H): ICD-10-CM

## 2019-05-29 LAB — INR POINT OF CARE: 2 (ref 0.86–1.14)

## 2019-05-29 PROCEDURE — 85610 PROTHROMBIN TIME: CPT | Mod: QW

## 2019-05-29 PROCEDURE — 36416 COLLJ CAPILLARY BLOOD SPEC: CPT

## 2019-05-29 NOTE — PROGRESS NOTES
ANTICOAGULATION FOLLOW-UP CLINIC VISIT    Patient Name:  Trisha Fernando  Date:  2019  Contact Type:  Face to Face    SUBJECTIVE:  Patient Findings     Positives:   Change in medications (taking Lovenox)             OBJECTIVE    INR Protime   Date Value Ref Range Status   2019 2.0 (A) 0.86 - 1.14 Final       ASSESSMENT / PLAN  INR assessment THER    Recheck INR In: 1 WEEK    INR Location Clinic      Anticoagulation Summary  As of 2019    INR goal:   2.0-3.0   TTR:   76.1 % (6.5 y)   INR used for dosin.0 (2019)   Warfarin maintenance plan:   5 mg (5 mg x 1) every day   Full warfarin instructions:   5 mg every day   Weekly warfarin total:   35 mg   Next INR check:   2019   Priority:   INR   Target end date:   Indefinite    Indications    Long term (current) use of anticoagulants [Z79.01]  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

## 2019-06-05 ENCOUNTER — ANTICOAGULATION THERAPY VISIT (OUTPATIENT)
Dept: ANTICOAGULATION | Facility: OTHER | Age: 59
End: 2019-06-05
Attending: INTERNAL MEDICINE
Payer: COMMERCIAL

## 2019-06-05 DIAGNOSIS — I74.9 EMBOLISM AND THROMBOSIS (H): ICD-10-CM

## 2019-06-05 LAB — INR POINT OF CARE: 2.4 (ref 0.86–1.14)

## 2019-06-05 PROCEDURE — 36416 COLLJ CAPILLARY BLOOD SPEC: CPT

## 2019-06-05 PROCEDURE — 85610 PROTHROMBIN TIME: CPT | Mod: QW

## 2019-06-05 NOTE — PROGRESS NOTES
ANTICOAGULATION FOLLOW-UP CLINIC VISIT    Patient Name:  Trisha Fernando  Date:  2019  Contact Type:  Face to Face    SUBJECTIVE:  Patient Findings         Clinical Outcomes     Negatives:   Major bleeding event, Thromboembolic event, Anticoagulation-related hospital admission, Anticoagulation-related ED visit, Anticoagulation-related fatality           OBJECTIVE    INR Protime   Date Value Ref Range Status   2019 2.4 (A) 0.86 - 1.14 Final       ASSESSMENT / PLAN  INR assessment THER    Recheck INR In: 3 WEEKS    INR Location Clinic      Anticoagulation Summary  As of 2019    INR goal:   2.0-3.0   TTR:   76.1 % (6.5 y)   INR used for dosin.4 (2019)   Warfarin maintenance plan:   5 mg (5 mg x 1) every day   Full warfarin instructions:   5 mg every day   Weekly warfarin total:   35 mg   No change documented:   Maria D Zayas RN   Next INR check:   2019   Priority:   INR   Target end date:   Indefinite    Indications    Long term (current) use of anticoagulants [Z79.01]  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

## 2019-06-20 ENCOUNTER — TELEPHONE (OUTPATIENT)
Dept: INTERNAL MEDICINE | Facility: OTHER | Age: 59
End: 2019-06-20

## 2019-06-26 ENCOUNTER — ANTICOAGULATION THERAPY VISIT (OUTPATIENT)
Dept: ANTICOAGULATION | Facility: OTHER | Age: 59
End: 2019-06-26
Attending: INTERNAL MEDICINE
Payer: COMMERCIAL

## 2019-06-26 DIAGNOSIS — I74.9 EMBOLISM AND THROMBOSIS (H): ICD-10-CM

## 2019-06-26 LAB — INR POINT OF CARE: 2.3 (ref 0.86–1.14)

## 2019-06-26 PROCEDURE — 85610 PROTHROMBIN TIME: CPT | Mod: QW

## 2019-06-26 PROCEDURE — 36416 COLLJ CAPILLARY BLOOD SPEC: CPT

## 2019-06-26 NOTE — PROGRESS NOTES
ANTICOAGULATION FOLLOW-UP CLINIC VISIT    Patient Name:  Trisha Fernando  Date:  2019  Contact Type:  Face to Face    SUBJECTIVE:  Patient Findings     Comments:   The patient was assessed for diet, medication, and activity level changes, missed or extra doses, bruising or bleeding, with no problem findings.          Clinical Outcomes     Negatives:   Major bleeding event, Thromboembolic event, Anticoagulation-related hospital admission, Anticoagulation-related ED visit, Anticoagulation-related fatality    Comments:   The patient was assessed for diet, medication, and activity level changes, missed or extra doses, bruising or bleeding, with no problem findings.             OBJECTIVE    INR Protime   Date Value Ref Range Status   2019 2.3 (A) 0.86 - 1.14 Final       ASSESSMENT / PLAN  INR assessment THER    Recheck INR In: 6 WEEKS    INR Location Clinic      Anticoagulation Summary  As of 2019    INR goal:   2.0-3.0   TTR:   76.3 % (6.5 y)   INR used for dosin.3 (2019)   Warfarin maintenance plan:   5 mg (5 mg x 1) every day   Full warfarin instructions:   5 mg every day   Weekly warfarin total:   35 mg   No change documented:   Smitha Nair, RN   Next INR check:   2019   Priority:   INR   Target end date:   Indefinite    Indications    Long term (current) use of anticoagulants [Z79.01]  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

## 2019-08-01 DIAGNOSIS — I10 ESSENTIAL HYPERTENSION: ICD-10-CM

## 2019-08-01 RX ORDER — METOPROLOL TARTRATE 25 MG/1
12.5 TABLET, FILM COATED ORAL 2 TIMES DAILY
Qty: 180 TABLET | Refills: 1 | Status: SHIPPED | OUTPATIENT
Start: 2019-08-01 | End: 2019-09-19

## 2019-08-01 NOTE — TELEPHONE ENCOUNTER
"Requested Prescriptions   Pending Prescriptions Disp Refills     metoprolol tartrate (LOPRESSOR) 25 MG tablet [Pharmacy Med Name: METOPROLOL TARTRATE 25 MG TAB] 180 tablet 2     Sig: TAKE 0.5 TABLETS (12.5 MG) BY MOUTH 2 TIMES DAILY       Beta-Blockers Protocol Passed - 8/1/2019  1:27 AM        Passed - Blood pressure under 140/90 in past 12 months     BP Readings from Last 3 Encounters:   05/24/19 131/79   04/01/19 138/82   03/04/19 (!) 155/98                 Passed - Patient is age 6 or older        Passed - Recent (12 mo) or future (30 days) visit within the authorizing provider's specialty     Patient had office visit in the last 12 months or has a visit in the next 30 days with authorizing provider or within the authorizing provider's specialty.  See \"Patient Info\" tab in inbasket, or \"Choose Columns\" in Meds & Orders section of the refill encounter.              Passed - Medication is active on med list          "

## 2019-08-07 ENCOUNTER — ANTICOAGULATION THERAPY VISIT (OUTPATIENT)
Dept: ANTICOAGULATION | Facility: OTHER | Age: 59
End: 2019-08-07
Attending: INTERNAL MEDICINE
Payer: COMMERCIAL

## 2019-08-07 DIAGNOSIS — I74.9 EMBOLISM AND THROMBOSIS (H): ICD-10-CM

## 2019-08-07 LAB — INR POINT OF CARE: 2.7 (ref 0.86–1.14)

## 2019-08-07 PROCEDURE — 85610 PROTHROMBIN TIME: CPT | Mod: QW

## 2019-08-07 PROCEDURE — 36416 COLLJ CAPILLARY BLOOD SPEC: CPT

## 2019-08-07 NOTE — PROGRESS NOTES
ANTICOAGULATION FOLLOW-UP CLINIC VISIT    Patient Name:  Trisha Fernando  Date:  2019  Contact Type:  Face to Face    SUBJECTIVE:         OBJECTIVE    INR Protime   Date Value Ref Range Status   2019 2.7 (A) 0.86 - 1.14 Final       ASSESSMENT / PLAN  INR assessment THER    Recheck INR In: 6 WEEKS    INR Location Clinic      Anticoagulation Summary  As of 2019    INR goal:   2.0-3.0   TTR:   76.8 % (6.6 y)   INR used for dosin.7 (2019)   Warfarin maintenance plan:   5 mg (5 mg x 1) every day   Full warfarin instructions:   5 mg every day   Weekly warfarin total:   35 mg   No change documented:   Smitha Nair, SINDHU   Next INR check:   2019   Priority:   INR   Target end date:   Indefinite    Indications    Long term (current) use of anticoagulants [Z79.01]  Embolism and thrombosis (H) [I74.9]             Anticoagulation Episode Summary     INR check location:       Preferred lab:       Send INR reminders to:    INR    Comments:         Anticoagulation Care Providers     Provider Role Specialty Phone number    Maria Guadalupe Ramon,  [facility] Internal Medicine [phone]            See the Encounter Report to view Anticoagulation Flowsheet and Dosing Calendar (Go to Encounters tab in chart review, and find the Anticoagulation Therapy Visit)        Smitha Nair, RN

## 2019-08-21 ENCOUNTER — TELEPHONE (OUTPATIENT)
Dept: INTERNAL MEDICINE | Facility: OTHER | Age: 59
End: 2019-08-21

## 2019-08-21 DIAGNOSIS — F33.0 DEPRESSION, MAJOR, RECURRENT, MILD (H): Primary | ICD-10-CM

## 2019-08-21 RX ORDER — BUPROPION HYDROCHLORIDE 150 MG/1
150 TABLET ORAL EVERY MORNING
Qty: 30 TABLET | Refills: 1 | Status: SHIPPED | OUTPATIENT
Start: 2019-08-21 | End: 2019-09-19

## 2019-08-21 NOTE — TELEPHONE ENCOUNTER
Patient called today requesting to speak to [facility]'s nurse regarding her depression.  Please call patient at [phone], ok to leave message if no answer.     Thanks!  Katherin Roldan on 8/21/2019 at 12:39 PM

## 2019-08-21 NOTE — TELEPHONE ENCOUNTER
After verifying last name and birth date, patient states she has been struggling with depression this summer. Patient states she needs to add a new medication or change something. Patient is inquiring about a work in. States last year, Maria Guadalupe Ramon, DO added Wellbutrin and that seemed to help a bit, per patient.     Please advise. Thanks!    Leesa Cabral, LPN on 8/21/2019 at 12:43 PM

## 2019-08-21 NOTE — TELEPHONE ENCOUNTER
After verifying last name and birth date, patient was notified of message. Patient is in agreement and was transferred to appointment desk to schedule follow up med check. Leesa Cabral LPN on 8/21/2019 at 1:07 PM

## 2019-08-21 NOTE — TELEPHONE ENCOUNTER
At this time I would recommend that we restart the Wellbutrin.  I will send in a prescription for this and she should follow-up as scheduled in September to see how she is doing on the medication.  Please have her call with any questions, concerns or side effects.

## 2019-09-19 ENCOUNTER — OFFICE VISIT (OUTPATIENT)
Dept: INTERNAL MEDICINE | Facility: OTHER | Age: 59
End: 2019-09-19
Attending: INTERNAL MEDICINE
Payer: COMMERCIAL

## 2019-09-19 ENCOUNTER — ANTICOAGULATION THERAPY VISIT (OUTPATIENT)
Dept: ANTICOAGULATION | Facility: OTHER | Age: 59
End: 2019-09-19
Attending: INTERNAL MEDICINE
Payer: COMMERCIAL

## 2019-09-19 VITALS
SYSTOLIC BLOOD PRESSURE: 128 MMHG | DIASTOLIC BLOOD PRESSURE: 80 MMHG | WEIGHT: 293 LBS | BODY MASS INDEX: 44.41 KG/M2 | HEIGHT: 68 IN | RESPIRATION RATE: 20 BRPM | TEMPERATURE: 96.9 F | OXYGEN SATURATION: 95 % | HEART RATE: 71 BPM

## 2019-09-19 DIAGNOSIS — I74.9 EMBOLISM AND THROMBOSIS (H): ICD-10-CM

## 2019-09-19 DIAGNOSIS — F33.0 DEPRESSION, MAJOR, RECURRENT, MILD (H): ICD-10-CM

## 2019-09-19 LAB — INR POINT OF CARE: 3.4 (ref 0.86–1.14)

## 2019-09-19 PROCEDURE — 85610 PROTHROMBIN TIME: CPT | Mod: QW

## 2019-09-19 PROCEDURE — 99213 OFFICE O/P EST LOW 20 MIN: CPT | Performed by: INTERNAL MEDICINE

## 2019-09-19 PROCEDURE — 36416 COLLJ CAPILLARY BLOOD SPEC: CPT

## 2019-09-19 RX ORDER — BUPROPION HYDROCHLORIDE 150 MG/1
150 TABLET ORAL EVERY MORNING
Qty: 90 TABLET | Refills: 1 | Status: SHIPPED | OUTPATIENT
Start: 2019-09-19 | End: 2020-04-15

## 2019-09-19 ASSESSMENT — PATIENT HEALTH QUESTIONNAIRE - PHQ9: SUM OF ALL RESPONSES TO PHQ QUESTIONS 1-9: 7

## 2019-09-19 ASSESSMENT — MIFFLIN-ST. JEOR: SCORE: 2257.81

## 2019-09-19 ASSESSMENT — PAIN SCALES - GENERAL: PAINLEVEL: NO PAIN (0)

## 2019-09-19 NOTE — PROGRESS NOTES
"Chief Complaint   Patient presents with     Recheck Medication         HPI: Ms. Fernando is a 58 year old female who presents today for follow up of anxiety and depression.    She was started on Wellbutrin 150 mg daily in addition to her Effexor.  She reports that overall she has done well with the medication.  Reports a little bit of difficulty sleeping but not enough to change the medication. She reports that most of her depression is related to her daughter leaving for college and dealing with empty nest syndrome.  She denies any new concerns.  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    She  reports that she quit smoking about 29 years ago. Her smoking use included cigarettes. She has a 12.00 pack-year smoking history. She has never used smokeless tobacco.    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      ROS  Pertinent ROS was performed and was negative, including for fever, chills. No other concerns, with exception of HPI above.      EXAM:   /80 (BP Location: Right arm, Patient Position: Sitting, Cuff Size: Adult Large)   Pulse 71   Temp 96.9  F (36.1  C) (Tympanic)   Resp 20   Ht 1.727 m (5' 8\")   Wt (!) 163.4 kg (360 lb 4.8 oz)   SpO2 95%   " "Breastfeeding? No   BMI 54.78 kg/m      Estimated body mass index is 54.78 kg/m  as calculated from the following:    Height as of this encounter: 1.727 m (5' 8\").    Weight as of this encounter: 163.4 kg (360 lb 4.8 oz).      GEN: Vitals reviewed. Healthy appearing. Patient is in no acute distress. Cooperative with exam.  HEENT: Normocephalic atraumatic.  Pupils equally round.  No scleral icterus, no conjunctival erythema. Oropharynx with no erythema or exudates. Dentition adequate.  NECK: Supple; no thyromegaly.  No neck, cervical LAD.   CV: Heart regular in rate and rhythm with no murmur.    LUNGS: Lungs clear to auscultation bilaterally.  Chest rise equal bilaterally.  No accessory muscle use.  ABD:  Obese.  SKIN: Warm and dry to touch.  No rash on face, arms and legs.  EXT: No clubbing or cyanosis.   Trace to 1+ bilateral lower extrema peripheral edema.  PSYCH: Mood is good.  Affect appropriate. Speech fluent. Answers questions appropriately and thought process normal.     ASSESSMENT AND PLAN:    Depression, major, recurrent, mild (H)  Continue at this time with Wellbutrin.  If she has worsening issues with sleep can consider switching to bupropion SA.  She is to call with any concerns or side effects in the future.  - buPROPion (WELLBUTRIN XL) 150 MG 24 hr tablet  Dispense: 90 tablet; Refill: 1       Return in about 7 months (around 4/19/2020) for Annual Review.    A total of 18 minutes spent with in face-to-face consultation of this patient with greater than 50% spent in counseling and care coordination of above listed medical problems including diagnosis, treatment options with emphasis on risks and benefits of each,prognosis and importance of compliance for each.      BONITA OLIVER DO   9/19/2019 7:18 AM    This document was prepared using voice generated softwear. While every attempt was made for accuracy, grammatical errors may exist.           "

## 2019-09-19 NOTE — PROGRESS NOTES
ANTICOAGULATION FOLLOW-UP CLINIC VISIT    Patient Name:  Trisha Fernando  Date:  9/19/2019  Contact Type:  Face to Face    SUBJECTIVE:  Patient Findings         Clinical Outcomes     Negatives:   Major bleeding event, Thromboembolic event, Anticoagulation-related hospital admission, Anticoagulation-related ED visit, Anticoagulation-related fatality           OBJECTIVE    INR Protime   Date Value Ref Range Status   09/19/2019 3.4 (A) 0.86 - 1.14 Final       ASSESSMENT / PLAN  INR assessment SUPRA    Recheck INR In: 3 WEEKS    INR Location Clinic      Anticoagulation Summary  As of 9/19/2019    INR goal:   2.0-3.0   TTR:   76.2 % (6.8 y)   INR used for dosing:   3.4! (9/19/2019)   Warfarin maintenance plan:   5 mg (5 mg x 1) every day   Full warfarin instructions:   9/19: 2.5 mg; Otherwise 5 mg every day   Weekly warfarin total:   35 mg   Next INR check:   10/10/2019   Priority:   INR   Target end date:   Indefinite    Indications    Long term (current) use of anticoagulants [Z79.01]  Embolism and thrombosis (H) [I74.9]             Anticoagulation Episode Summary     INR check location:       Preferred lab:       Send INR reminders to:    INR    Comments:         Anticoagulation Care Providers     Provider Role Specialty Phone number    Maria Guadalupe Ramon DO [facility] Internal Medicine [phone]            See the Encounter Report to view Anticoagulation Flowsheet and Dosing Calendar (Go to Encounters tab in chart review, and find the Anticoagulation Therapy Visit)        Maria D Zayas RN

## 2019-09-19 NOTE — NURSING NOTE
Patient presents to the clinic for follow-up on new medication.     Medication Reconciliation: complete   Anum Aquino LPN............. September 19, 2019 3:42 PM

## 2019-10-10 ENCOUNTER — ANTICOAGULATION THERAPY VISIT (OUTPATIENT)
Dept: ANTICOAGULATION | Facility: OTHER | Age: 59
End: 2019-10-10
Attending: INTERNAL MEDICINE
Payer: COMMERCIAL

## 2019-10-10 DIAGNOSIS — I74.9 EMBOLISM AND THROMBOSIS (H): ICD-10-CM

## 2019-10-10 LAB — INR POINT OF CARE: 2.9 (ref 0.86–1.14)

## 2019-10-10 PROCEDURE — 85610 PROTHROMBIN TIME: CPT | Mod: QW

## 2019-10-10 PROCEDURE — 36416 COLLJ CAPILLARY BLOOD SPEC: CPT

## 2019-10-10 NOTE — PROGRESS NOTES
ANTICOAGULATION FOLLOW-UP CLINIC VISIT    Patient Name:  Trisha Fernando  Date:  10/10/2019  Contact Type:  Face to Face    SUBJECTIVE:  Patient Findings         Clinical Outcomes     Negatives:   Major bleeding event, Thromboembolic event, Anticoagulation-related hospital admission, Anticoagulation-related ED visit, Anticoagulation-related fatality           OBJECTIVE    INR Protime   Date Value Ref Range Status   10/10/2019 2.9 (A) 0.86 - 1.14 Final       ASSESSMENT / PLAN  INR assessment THER    Recheck INR In: 4 WEEKS    INR Location Clinic      Anticoagulation Summary  As of 10/10/2019    INR goal:   2.0-3.0   TTR:   75.7 % (6.8 y)   INR used for dosin.9 (10/10/2019)   Warfarin maintenance plan:   5 mg (5 mg x 1) every day   Full warfarin instructions:   5 mg every day   Weekly warfarin total:   35 mg   Next INR check:   2019   Priority:   INR   Target end date:   Indefinite    Indications    Long term (current) use of anticoagulants [Z79.01]  Embolism and thrombosis (H) [I74.9]             Anticoagulation Episode Summary     INR check location:       Preferred lab:       Send INR reminders to:    INR    Comments:         Anticoagulation Care Providers     Provider Role Specialty Phone number    Maria Guadalupe Ramon DO [facility] Internal Medicine [phone]            See the Encounter Report to view Anticoagulation Flowsheet and Dosing Calendar (Go to Encounters tab in chart review, and find the Anticoagulation Therapy Visit)        Maria D Zayas RN

## 2019-10-21 DIAGNOSIS — Z79.01 ANTICOAGULATION MONITORING, INR RANGE 2-3: ICD-10-CM

## 2019-10-21 DIAGNOSIS — I74.9 EMBOLISM AND THROMBOSIS (H): ICD-10-CM

## 2019-10-21 RX ORDER — WARFARIN SODIUM 5 MG/1
TABLET ORAL
Qty: 90 TABLET | Refills: 1 | Status: SHIPPED | OUTPATIENT
Start: 2019-10-21 | End: 2019-11-18

## 2019-10-21 NOTE — TELEPHONE ENCOUNTER
"Requested Prescriptions   Pending Prescriptions Disp Refills     warfarin ANTICOAGULANT (COUMADIN) 5 MG tablet [Pharmacy Med Name: WARFARIN SODIUM 5 MG TABLET] 90 tablet 1     Sig: TAKE 5 MG DAILY [facility]       Vitamin K Antagonists Failed - 10/21/2019  1:23 AM        Failed - INR is within goal in the past 6 weeks     Confirm INR is within goal in the past 6 weeks.     Recent Labs   Lab Test 10/10/19   INR 2.9*                       Failed - Medication is active on med list        Passed - Recent (12 mo) or future (30 days) visit within the authorizing provider's specialty     Patient has had an office visit with the authorizing provider or a provider within the authorizing providers department within the previous 12 mos or has a future within next 30 days. See \"Patient Info\" tab in inbasket, or \"Choose Columns\" in Meds & Orders section of the refill encounter.              Passed - Patient is 18 years of age or older        Passed - Patient is not pregnant        Passed - No positive pregnancy on file in past 12 months        Prescription approved per [facility] [place] [facility] according to the [facility] Refill Protocol.    "

## 2019-11-07 ENCOUNTER — ANTICOAGULATION THERAPY VISIT (OUTPATIENT)
Dept: ANTICOAGULATION | Facility: OTHER | Age: 59
End: 2019-11-07
Attending: INTERNAL MEDICINE
Payer: COMMERCIAL

## 2019-11-07 ENCOUNTER — ALLIED HEALTH/NURSE VISIT (OUTPATIENT)
Dept: FAMILY MEDICINE | Facility: OTHER | Age: 59
End: 2019-11-07
Attending: INTERNAL MEDICINE
Payer: COMMERCIAL

## 2019-11-07 DIAGNOSIS — Z23 NEED FOR INFLUENZA VACCINATION: Primary | ICD-10-CM

## 2019-11-07 DIAGNOSIS — I74.9 EMBOLISM AND THROMBOSIS (H): ICD-10-CM

## 2019-11-07 LAB — INR POINT OF CARE: 3.2 (ref 0.86–1.14)

## 2019-11-07 PROCEDURE — 85610 PROTHROMBIN TIME: CPT | Mod: QW

## 2019-11-07 PROCEDURE — 90471 IMMUNIZATION ADMIN: CPT

## 2019-11-07 PROCEDURE — 90686 IIV4 VACC NO PRSV 0.5 ML IM: CPT

## 2019-11-07 PROCEDURE — 36416 COLLJ CAPILLARY BLOOD SPEC: CPT

## 2019-11-07 NOTE — NURSING NOTE
Patient presents to the clinic for flu shot.   Anum Aquino LPN............. November 7, 2019 12:28 PM       Immunization Documentation    Prior to Immunization administration, verified patients identity using patient's name and date of birth. Please see IMMUNIZATIONS  and order for additional information.  Patient / Parent instructed to remain in clinic for 15 minutes and report any adverse reaction to staff immediately.    Was entire vial of medication used? Yes  Vial/Syringe: Syringe    Anum Aquino LPN  11/7/2019   12:30 PM

## 2019-11-07 NOTE — PROGRESS NOTES
ANTICOAGULATION FOLLOW-UP CLINIC VISIT    Patient Name:  Trisha Fernando  Date:  11/7/2019  Contact Type:  Face to Face    SUBJECTIVE:  Patient Findings         Clinical Outcomes     Negatives:   Major bleeding event, Thromboembolic event, Anticoagulation-related hospital admission, Anticoagulation-related ED visit, Anticoagulation-related fatality           OBJECTIVE    INR Protime   Date Value Ref Range Status   11/07/2019 3.2 (A) 0.86 - 1.14 Final       ASSESSMENT / PLAN  INR assessment THER    Recheck INR In: 4 WEEKS    INR Location Clinic      Anticoagulation Summary  As of 11/7/2019    INR goal:   2.0-3.0   TTR:   75.2 % (6.9 y)   INR used for dosing:   3.2! (11/7/2019)   Warfarin maintenance plan:   5 mg (5 mg x 1) every day   Full warfarin instructions:   11/7: 2.5 mg; Otherwise 5 mg every day   Weekly warfarin total:   35 mg   Plan last modified:   Maria D Zayas RN (11/7/2019)   Next INR check:   12/5/2019   Priority:   INR   Target end date:   Indefinite    Indications    Long term (current) use of anticoagulants [Z79.01]  Embolism and thrombosis (H) [I74.9]             Anticoagulation Episode Summary     INR check location:       Preferred lab:       Send INR reminders to:    INR    Comments:         Anticoagulation Care Providers     Provider Role Specialty Phone number    Maria Guadalupe Ramon,  [facility] Internal Medicine [phone]            See the Encounter Report to view Anticoagulation Flowsheet and Dosing Calendar (Go to Encounters tab in chart review, and find the Anticoagulation Therapy Visit)        Maria D Zayas RN

## 2019-11-08 ENCOUNTER — HEALTH MAINTENANCE LETTER (OUTPATIENT)
Age: 59
End: 2019-11-08

## 2019-11-14 ENCOUNTER — OFFICE VISIT (OUTPATIENT)
Dept: FAMILY MEDICINE | Facility: OTHER | Age: 59
End: 2019-11-14
Attending: NURSE PRACTITIONER
Payer: COMMERCIAL

## 2019-11-14 VITALS
DIASTOLIC BLOOD PRESSURE: 84 MMHG | RESPIRATION RATE: 20 BRPM | HEIGHT: 67 IN | TEMPERATURE: 97 F | OXYGEN SATURATION: 97 % | SYSTOLIC BLOOD PRESSURE: 130 MMHG | HEART RATE: 76 BPM | BODY MASS INDEX: 55.89 KG/M2

## 2019-11-14 DIAGNOSIS — M79.661 PAIN OF RIGHT LOWER LEG: Primary | ICD-10-CM

## 2019-11-14 PROCEDURE — 99213 OFFICE O/P EST LOW 20 MIN: CPT | Performed by: NURSE PRACTITIONER

## 2019-11-14 ASSESSMENT — PATIENT HEALTH QUESTIONNAIRE - PHQ9: SUM OF ALL RESPONSES TO PHQ QUESTIONS 1-9: 4

## 2019-11-14 ASSESSMENT — PAIN SCALES - GENERAL: PAINLEVEL: SEVERE PAIN (6)

## 2019-11-14 NOTE — NURSING NOTE
"Chief Complaint   Patient presents with     Musculoskeletal Problem     right x 3 mounths no injury       Initial /84   Pulse 76   Temp 97  F (36.1  C) (Tympanic)   Resp 20   Ht 1.71 m (5' 7.32\")   SpO2 97%   BMI 55.89 kg/m   Estimated body mass index is 55.89 kg/m  as calculated from the following:    Height as of this encounter: 1.71 m (5' 7.32\").    Weight as of 9/19/19: 163.4 kg (360 lb 4.8 oz).  Medication Reconciliation: complete    Gladys Sandy LPN  "

## 2019-11-14 NOTE — PROGRESS NOTES
Subjective     Trisha Fernando is a 58 year old female who presents to clinic today for the following health issues:    HPI   Joint Pain    Onset: 3 months or longer    Description:   Location: right knee  Character: Stabbing and Burning    Intensity: moderate, severe    Progression of Symptoms: worse    Accompanying Signs & Symptoms:  Other symptoms: radiation of pain to mid shin and swelling    History:   Previous similar pain: no       Precipitating factors:   Trauma or overuse: no     Alleviating factors:  Improved by: rest/inactivity    Therapies Tried and outcome: ice, rest, tylenol    Patient Active Problem List   Diagnosis     Anticoagulant long-term use     Anticoagulation monitoring, INR range 2-3     Daytime somnolence     Depression, major, recurrent, mild (H)     Health care maintenance     Hypertension     Obesity     Varicose veins of both legs with edema     Long term (current) use of anticoagulants     Embolism and thrombosis (H)     Past Surgical History:   Procedure Laterality Date     ARTHROSCOPY KNEE Left 2013    Dr Jones     BIOPSY BREAST Right     fiboadenoma      SECTION      ,      CHOLECYSTECTOMY  2014     COLONOSCOPY  2011    polyp ( Dr. Sutton)       COLONOSCOPY N/A 2019    Normal exam, 10 year follow up     LITHOTRIPSY       MAMMOPLASTY REDUCTION  2003     OTHER SURGICAL HISTORY Right 2011    Forearm Lipoma Excision     OTHER SURGICAL HISTORY      Left foot soft tissue resection; dr. Lang     TONSILLECTOMY         Social History     Tobacco Use     Smoking status: Former Smoker     Packs/day: 1.00     Years: 12.00     Pack years: 12.00     Types: Cigarettes     Last attempt to quit: 1990     Years since quittin.2     Smokeless tobacco: Never Used   Substance Use Topics     Alcohol use: Yes     Alcohol/week: 2.0 standard drinks     Comment: occasionally     Family History   Problem Relation Age of Onset     Other - See  Comments Father         accidental death/ gallbladder disease     Other - See Comments Mother         HX of kidney stones and blood clots/ gallbladder disease     Dementia Mother      Other - See Comments Brother         HX of kidney stones and blood clots     No Known Problems Daughter      No Known Problems Daughter      Colon Cancer Maternal Uncle 50        Cancer-colon     Colon Cancer Maternal Uncle 50        Cancer-colon     Other - See Comments Maternal Uncle         HX of kidney stones     No Known Problems Brother      Heart Disease Paternal Grandfather         Heart Disease,ASCHD     No Known Problems Brother      No Known Problems Brother      Other - See Comments Paternal Aunt         Parkinson's         Current Outpatient Medications   Medication Sig Dispense Refill     Blood Pressure Monitoring (ADULT BLOOD PRESSURE CUFF LG) KIT As directed.       buPROPion (WELLBUTRIN XL) 150 MG 24 hr tablet Take 1 tablet (150 mg) by mouth every morning 90 tablet 1     Cholecalciferol (VITAMIN D3) 2000 UNITS CAPS Take 2,000 Units by mouth daily       Elastic Bandages & Supports (MEDICAL COMPRESSION STOCKINGS) MISC For personal use. Length: calf Strength: 16-20 mmHg Circumference in cm: For calf: Ankle 15cm, Calf 30cm, Ankle to calf length 40cm.       fish oil-omega-3 fatty acids 1000 MG capsule Take by mouth daily       furosemide (LASIX) 20 MG tablet Take 1-2 tablets (20-40 mg) by mouth daily ; take second dose 6 hours after first 135 tablet 3     LORazepam (ATIVAN) 0.5 MG tablet        losartan (COZAAR) 25 MG tablet Take 1 tablet (25 mg) by mouth daily 90 tablet 4     metoprolol tartrate (LOPRESSOR) 25 MG tablet Take 0.5 tablets (12.5 mg) by mouth 2 times daily 180 tablet 4     potassium chloride ER (KLOR-CON) 10 MEQ CR tablet Take 1 tablet (10 mEq) by mouth daily 90 tablet 4     venlafaxine (EFFEXOR-XR) 150 MG 24 hr capsule Take 1 capsule (150 mg) by mouth daily with food 90 capsule 4     vitamin B-Complex Take 1  "tablet by mouth daily       warfarin ANTICOAGULANT (COUMADIN) 5 MG tablet TAKE 5 MG DAILY OR AS DIRECTED BY THE PROTIME CLINIC. 90 tablet 1     Allergies   Allergen Reactions     Lisinopril Swelling     Angioedema, Edema       Adhesive Tape Itching and Rash     [place] Itching and Rash     Wound Dressing Adhesive Rash     Reviewed and updated as needed this visit by Provider         Review of Systems   ROS COMP: As above      Objective    /84   Pulse 76   Temp 97  F (36.1  C) (Tympanic)   Resp 20   Ht 1.71 m (5' 7.32\")   SpO2 97%   BMI 55.89 kg/m    Body mass index is 55.89 kg/m .  Physical Exam   GENERAL: alert, no distress and obese  MS: R knee ROM intact, no obvious assymetry, no pain with palpation around patella though does walk with a slightly antalgic gait  SKIN: mild edema and tenderness to palpation approx 8cm distal to and medial of patella, no surrounding erythema or warmth  PSYCH: mentation appears normal, affect normal/bright    Diagnostic Test Results:  Labs reviewed in [facility]  Will schedule US as outpatient for further evaluation.        Assessment & Plan     1. Pain of right lower leg  Has been experiencing a burning pain with intermittent hot poker stabbing pains in the RLE for at least the last 3 months or longer. Does not recall any injury to area or overuse of the extremity, though states the pains are worsening for about the last 3 weeks and are now interfering with sleep to a certain degree. There is an area of edema and tenderness to palpation approx 8cm distal to and medial of patella, no surrounding warmth or erythema present. No concerns for DVT due to position of edema, she is also on warfarin with a recent INR of 3.2 for Factor V Leiden. Discussed lack of concerning findings today to indicate urgent imaging. Will schedule an US of the area of concern as an outpatient and follow up with Dr Ramon following same. If any concerning symptoms should arise, she will return for prompt " evaluation. May use topical creams, tylenol, ice and heat as needed.   - US Extremity Non Vascular Bilateral; Future       Liliana Lazaro NP  [facility]

## 2019-11-15 ENCOUNTER — TELEPHONE (OUTPATIENT)
Dept: ANTICOAGULATION | Facility: OTHER | Age: 59
End: 2019-11-15

## 2019-11-15 NOTE — TELEPHONE ENCOUNTER
Trisha called and said she had a nose bleed, which has now stopped.  She took half doses of Warfarin/Coumadin the last two nights.  She denies any changes that would cause INR to increase.  Discussed ways to prevent and manage nose bleeds such as humidity and Neosenfrin.  She will have have INR checked 11/18/19.  She will call if further problems. If further bleeding she will come in for INR today.  We didn't think INR would be increased because she has taken 2 half doses and has been eating large salads.

## 2019-11-18 ENCOUNTER — ANTICOAGULATION THERAPY VISIT (OUTPATIENT)
Dept: ANTICOAGULATION | Facility: OTHER | Age: 59
End: 2019-11-18
Attending: INTERNAL MEDICINE
Payer: COMMERCIAL

## 2019-11-18 DIAGNOSIS — I74.9 EMBOLISM AND THROMBOSIS (H): ICD-10-CM

## 2019-11-18 DIAGNOSIS — Z79.01 ANTICOAGULATION MONITORING, INR RANGE 2-3: ICD-10-CM

## 2019-11-18 LAB — INR POINT OF CARE: 2.4 (ref 0.86–1.14)

## 2019-11-18 PROCEDURE — 85610 PROTHROMBIN TIME: CPT | Mod: QW

## 2019-11-18 PROCEDURE — 36416 COLLJ CAPILLARY BLOOD SPEC: CPT

## 2019-11-18 RX ORDER — WARFARIN SODIUM 5 MG/1
TABLET ORAL
Qty: 90 TABLET | Refills: 1
Start: 2019-11-18 | End: 2020-04-14

## 2019-11-18 NOTE — PROGRESS NOTES
ANTICOAGULATION FOLLOW-UP CLINIC VISIT    Patient Name:  Trisha Fernando  Date:  2019  Contact Type:  Face to Face    SUBJECTIVE:  Patient Findings     Positives:   Signs/symptoms of bleeding (had a bloody nose last week)             OBJECTIVE    INR Protime   Date Value Ref Range Status   2019 2.4 (A) 0.86 - 1.14 Final       ASSESSMENT / PLAN  INR assessment THER    Recheck INR In: 2 WEEKS    INR Location Clinic      Anticoagulation Summary  As of 2019    INR goal:   2.0-3.0   TTR:   75.2 % (6.9 y)   INR used for dosin.4 (2019)   Warfarin maintenance plan:   2.5 mg (5 mg x 0.5) every Thu; 5 mg (5 mg x 1) all other days   Full warfarin instructions:   2.5 mg every Thu; 5 mg all other days   Weekly warfarin total:   32.5 mg   Plan last modified:   Smitha Nair RN (2019)   Next INR check:   2019   Priority:   Maintenance   Target end date:   Indefinite    Indications    Long term (current) use of anticoagulants [Z79.01]  Embolism and thrombosis (H) [I74.9]             Anticoagulation Episode Summary     INR check location:       Preferred lab:       Send INR reminders to:    INR    Comments:         Anticoagulation Care Providers     Provider Role Specialty Phone number    Maria Guadalupe Ramon DO [facility] Internal Medicine [phone]            See the Encounter Report to view Anticoagulation Flowsheet and Dosing Calendar (Go to Encounters tab in chart review, and find the Anticoagulation Therapy Visit)        Smitha Nair RN

## 2019-11-19 ENCOUNTER — TELEPHONE (OUTPATIENT)
Dept: INTERNAL MEDICINE | Facility: OTHER | Age: 59
End: 2019-11-19

## 2019-11-19 NOTE — TELEPHONE ENCOUNTER
Please advise if patient can be seen sooner.  Josiane Foy LPN LPN....................  11/19/2019   3:47 PM

## 2019-11-19 NOTE — TELEPHONE ENCOUNTER
Patient called stating she has an appointment with [facility] on 12/09 for a [facility] follow up on knee and lower leg pain, she states its so bad she is not able to wait this long and is wondering If she would be able to be worked in this Friday 11/22.    Please call patient at [phone]    Marlene Wilson on 11/19/2019 at 3:30 PM

## 2019-11-20 NOTE — TELEPHONE ENCOUNTER
Called and spoke with patient after proper verification. Informed patient of below message. Patient stated understanding and no further questions at this time. Patient placed in schedule.     Anum Aquino LPN............. November 20, 2019 10:03 AM

## 2019-11-22 ENCOUNTER — HOSPITAL ENCOUNTER (OUTPATIENT)
Dept: GENERAL RADIOLOGY | Facility: OTHER | Age: 59
Discharge: HOME | End: 2019-11-22
Attending: INTERNAL MEDICINE | Admitting: INTERNAL MEDICINE
Payer: COMMERCIAL

## 2019-11-22 ENCOUNTER — OFFICE VISIT (OUTPATIENT)
Dept: INTERNAL MEDICINE | Facility: OTHER | Age: 59
End: 2019-11-22
Attending: INTERNAL MEDICINE
Payer: COMMERCIAL

## 2019-11-22 VITALS
WEIGHT: 293 LBS | HEART RATE: 72 BPM | HEIGHT: 67 IN | RESPIRATION RATE: 16 BRPM | TEMPERATURE: 97.3 F | BODY MASS INDEX: 45.99 KG/M2 | OXYGEN SATURATION: 98 % | DIASTOLIC BLOOD PRESSURE: 76 MMHG | SYSTOLIC BLOOD PRESSURE: 122 MMHG

## 2019-11-22 DIAGNOSIS — M25.461 PAIN AND SWELLING OF RIGHT KNEE: ICD-10-CM

## 2019-11-22 DIAGNOSIS — M25.561 PAIN AND SWELLING OF RIGHT KNEE: Primary | ICD-10-CM

## 2019-11-22 DIAGNOSIS — M25.461 PAIN AND SWELLING OF RIGHT KNEE: Primary | ICD-10-CM

## 2019-11-22 DIAGNOSIS — A69.20 JOINT PAIN AND SWELLING DUE TO LYME DISEASE: ICD-10-CM

## 2019-11-22 DIAGNOSIS — M25.561 PAIN AND SWELLING OF RIGHT KNEE: ICD-10-CM

## 2019-11-22 LAB
CRP SERPL-MCNC: 1.2 MG/L
ERYTHROCYTE [SEDIMENTATION RATE] IN BLOOD BY WESTERGREN METHOD: 17 MM/H (ref 1–15)

## 2019-11-22 PROCEDURE — 86617 LYME DISEASE ANTIBODY: CPT | Mod: ZL,91 | Performed by: INTERNAL MEDICINE

## 2019-11-22 PROCEDURE — 86140 C-REACTIVE PROTEIN: CPT | Mod: ZL | Performed by: INTERNAL MEDICINE

## 2019-11-22 PROCEDURE — 36415 COLL VENOUS BLD VENIPUNCTURE: CPT | Mod: ZL | Performed by: INTERNAL MEDICINE

## 2019-11-22 PROCEDURE — 85652 RBC SED RATE AUTOMATED: CPT | Mod: ZL | Performed by: INTERNAL MEDICINE

## 2019-11-22 PROCEDURE — 99213 OFFICE O/P EST LOW 20 MIN: CPT | Performed by: INTERNAL MEDICINE

## 2019-11-22 PROCEDURE — 73562 X-RAY EXAM OF KNEE 3: CPT | Mod: RT

## 2019-11-22 PROCEDURE — 86617 LYME DISEASE ANTIBODY: CPT | Mod: ZL | Performed by: INTERNAL MEDICINE

## 2019-11-22 PROCEDURE — 86618 LYME DISEASE ANTIBODY: CPT | Mod: ZL | Performed by: INTERNAL MEDICINE

## 2019-11-22 ASSESSMENT — MIFFLIN-ST. JEOR: SCORE: 2241.49

## 2019-11-22 ASSESSMENT — PAIN SCALES - GENERAL: PAINLEVEL: MODERATE PAIN (4)

## 2019-11-22 NOTE — NURSING NOTE
Patient presents to the clinic for RC follow-up.     Medication Reconciliation: complete   Anum Aquino LPN............. November 22, 2019 3:43 PM

## 2019-11-27 RX ORDER — DOXYCYCLINE 100 MG/1
100 CAPSULE ORAL 2 TIMES DAILY
Qty: 56 CAPSULE | Refills: 0 | Status: SHIPPED | OUTPATIENT
Start: 2019-11-27 | End: 2020-02-10

## 2019-11-28 LAB
B BURGDOR IGG SER QL IB: NEGATIVE
B BURGDOR IGG+IGM SER QL: 2.17 (ref 0–0.89)
B BURGDOR IGM SER QL IB: NEGATIVE

## 2019-11-29 NOTE — PROGRESS NOTES
Chief Complaint   Patient presents with     RECHECK          Subjective:   Ms. Fernando is a 59 year old female  seen for the acute concern today of knee pain.      She reports that this started in the late summer.  It is localized to the right knee.  She has had some swelling with this.    She feels like it has progressed since then and is causing her significant discomfort.  She was seen recently in the [facility] and she was encouraged to use conservative measures.  And they commented that there was no concern for DVT as she is on warfarin for history of factor V Leiden.  She has been using ice on it.  She does use Tylenol as needed.      She denies any pains like this prior.  She does feel and improves with rest.  She has noted significant swelling around the knee off and on.    She denies any specific tick bites.  She has not had any recent travel.  She denies any trauma.      She  reports that she quit smoking about 29 years ago. Her smoking use included cigarettes. She has a 12.00 pack-year smoking history. She has never used smokeless tobacco.    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      ROS:   Pertinent  ROS was performed and was negative, including for fevers, chills. No  "other concerns, with exception of HPI above.      Objective:    /76 (BP Location: Right arm, Patient Position: Sitting, Cuff Size: Adult Large)   Pulse 72   Temp 97.3  F (36.3  C) (Tympanic)   Resp 16   Ht 1.702 m (5' 7\")   Wt (!) 163.4 kg (360 lb 3.2 oz)   SpO2 98%   Breastfeeding No   BMI 56.42 kg/m    GEN: Vitals reviewed.  Patient is in no acute distress. Cooperative with exam.  HEENT: Normocephalic atraumatic.  Pupils equally round.  No scleral icterus, no conjunctival erythema. Lungs: Chest rise equal bilaterally.  No accessory muscle use.  SKIN: Warm and dry to touch.  No rash on face, arms and legs.  EXT: No clubbing or cyanosis.  No peripheral edema.  Right knee with some swelling, no erythema, changes of osteoarthritis.     Assessment/Plan:   Pain and swelling of right knee  -Etiology is unknown.  X-ray was obtained and does show advanced degenerative disease.  However with sudden onset of worsening pain, swelling during the summer in an area known for endemic Lyme it would be reasonable to treat with doxycycline.  She is going out of town soon and is interested in pursuing this.  Additional lab testing is obtained and she will be contacted with results.  - XR Knee Right 3 Views  - Lyme Disease Rukhsana with reflex to WB Serum  - Sedimentation Rate (ESR)  - CRP inflammation  - Lyme Conf IgG and IgM by Immunoblot    Joint pain and swelling due to Lyme disease  -Prescription sent in for 28-day course of doxycycline.  If she does not notice improvement after 7 to 10 days she can discontinue the course.  She is to call with any side effects from the medication.  She will need to monitor her INR with this.  - doxycycline monohydrate (MONODOX) 100 MG capsule  Dispense: 56 capsule; Refill: 0      - Return/call as needed for follow-up should any new symptoms develop, for worsening of current symptoms or if symptoms do not resolve with above plan.    Return if symptoms worsen or fail to improve.     BONITA " KIRBY OLIVER DO   11/29/2019 5:59 AM    This document was prepared using voice generated softwear. While every attempt was made for accuracy, grammatical errors may exist.

## 2019-12-05 ENCOUNTER — ANTICOAGULATION THERAPY VISIT (OUTPATIENT)
Dept: ANTICOAGULATION | Facility: OTHER | Age: 59
End: 2019-12-05
Attending: INTERNAL MEDICINE
Payer: COMMERCIAL

## 2019-12-05 DIAGNOSIS — I74.9 EMBOLISM AND THROMBOSIS (H): ICD-10-CM

## 2019-12-05 LAB — INR POINT OF CARE: 2.9 (ref 0.86–1.14)

## 2019-12-05 PROCEDURE — 36416 COLLJ CAPILLARY BLOOD SPEC: CPT | Performed by: INTERNAL MEDICINE

## 2019-12-05 PROCEDURE — 85610 PROTHROMBIN TIME: CPT | Mod: QW | Performed by: INTERNAL MEDICINE

## 2019-12-05 NOTE — PROGRESS NOTES
ANTICOAGULATION FOLLOW-UP CLINIC VISIT    Patient Name:  Trisha Fernando  Date:  2019  Contact Type:  Face to Face    SUBJECTIVE:  Patient Findings     Positives:   Change in medications (Doxycycline x 30 days)        Clinical Outcomes     Negatives:   Major bleeding event, Thromboembolic event, Anticoagulation-related hospital admission, Anticoagulation-related ED visit, Anticoagulation-related fatality           OBJECTIVE    INR Protime   Date Value Ref Range Status   2019 2.9 (A) 0.86 - 1.14 Final       ASSESSMENT / PLAN  INR assessment THER    Recheck INR In: 4 WEEKS    INR Location Clinic      Anticoagulation Summary  As of 2019    INR goal:   2.0-3.0   TTR:   75.8 % (1 y)   INR used for dosin.9 (2019)   Warfarin maintenance plan:   2.5 mg (5 mg x 0.5) every Thu; 5 mg (5 mg x 1) all other days   Full warfarin instructions:   2.5 mg every Thu; 5 mg all other days   Weekly warfarin total:   32.5 mg   No change documented:   Maria D Zayas RN   Plan last modified:   Smitha Nair RN (2019)   Next INR check:   2020   Priority:   Maintenance   Target end date:   Indefinite    Indications    Long term (current) use of anticoagulants [Z79.01]  Embolism and thrombosis (H) [I74.9]             Anticoagulation Episode Summary     INR check location:       Preferred lab:       Send INR reminders to:    INR    Comments:         Anticoagulation Care Providers     Provider Role Specialty Phone number    Maria Guadalupe Ramon DO [facility] Internal Medicine [phone]            See the Encounter Report to view Anticoagulation Flowsheet and Dosing Calendar (Go to Encounters tab in chart review, and find the Anticoagulation Therapy Visit)        Maria D Zayas RN

## 2019-12-18 DIAGNOSIS — A69.20 JOINT PAIN AND SWELLING DUE TO LYME DISEASE: ICD-10-CM

## 2019-12-20 RX ORDER — DOXYCYCLINE 100 MG/1
100 CAPSULE ORAL 2 TIMES DAILY
Qty: 56 CAPSULE | Refills: 0 | OUTPATIENT
Start: 2019-12-20 | End: 2020-01-17

## 2019-12-20 NOTE — TELEPHONE ENCOUNTER
Called and spoke with patient after proper verification. Informed patient of below message. No further questions, patient states she is feeling much better.     Anum Aquino LPN............. December 20, 2019 10:25 AM

## 2019-12-30 ENCOUNTER — TELEPHONE (OUTPATIENT)
Dept: INTERNAL MEDICINE | Facility: OTHER | Age: 59
End: 2019-12-30

## 2019-12-30 NOTE — TELEPHONE ENCOUNTER
Incoming fax requesting Doxy, in previous refill request-patient was notified to follow up with an office visit prior to any further refills.      Leesa Wang LPN 12/30/2019 9:13 AM

## 2020-01-07 ENCOUNTER — ANTICOAGULATION THERAPY VISIT (OUTPATIENT)
Dept: ANTICOAGULATION | Facility: OTHER | Age: 60
End: 2020-01-07
Attending: INTERNAL MEDICINE
Payer: COMMERCIAL

## 2020-01-07 DIAGNOSIS — I74.9 EMBOLISM AND THROMBOSIS (H): ICD-10-CM

## 2020-01-07 LAB — INR POINT OF CARE: 2.6 (ref 0.86–1.14)

## 2020-01-07 PROCEDURE — 36416 COLLJ CAPILLARY BLOOD SPEC: CPT | Performed by: INTERNAL MEDICINE

## 2020-01-07 PROCEDURE — 85610 PROTHROMBIN TIME: CPT | Mod: QW | Performed by: INTERNAL MEDICINE

## 2020-01-07 NOTE — PROGRESS NOTES
ANTICOAGULATION FOLLOW-UP CLINIC VISIT    Patient Name:  Trisha Fernando  Date:  2020  Contact Type:  Face to Face    SUBJECTIVE:  Patient Findings         Clinical Outcomes     Negatives:   Major bleeding event, Thromboembolic event, Anticoagulation-related hospital admission, Anticoagulation-related ED visit, Anticoagulation-related fatality           OBJECTIVE    INR Protime   Date Value Ref Range Status   2020 2.6 (A) 0.86 - 1.14 Final       ASSESSMENT / PLAN  INR assessment THER    Recheck INR In: 4 WEEKS    INR Location Clinic      Anticoagulation Summary  As of 2020    INR goal:   2.0-3.0   TTR:   75.8 % (1 y)   INR used for dosin.6 (2020)   Warfarin maintenance plan:   2.5 mg (5 mg x 0.5) every Thu; 5 mg (5 mg x 1) all other days   Full warfarin instructions:   2.5 mg every Thu; 5 mg all other days   Weekly warfarin total:   32.5 mg   No change documented:   Smitha Nair RN   Plan last modified:   Smitha Nair RN (2019)   Next INR check:   2020   Priority:   Maintenance   Target end date:   Indefinite    Indications    Long term (current) use of anticoagulants [Z79.01]  Embolism and thrombosis (H) [I74.9]             Anticoagulation Episode Summary     INR check location:       Preferred lab:       Send INR reminders to:    INR    Comments:         Anticoagulation Care Providers     Provider Role Specialty Phone number    Maria Guadalupe Ramon DO [facility] Internal Medicine [phone]            See the Encounter Report to view Anticoagulation Flowsheet and Dosing Calendar (Go to Encounters tab in chart review, and find the Anticoagulation Therapy Visit)        Smitha Nair RN

## 2020-02-06 ENCOUNTER — ANTICOAGULATION THERAPY VISIT (OUTPATIENT)
Dept: ANTICOAGULATION | Facility: OTHER | Age: 60
End: 2020-02-06
Attending: INTERNAL MEDICINE
Payer: COMMERCIAL

## 2020-02-06 DIAGNOSIS — I74.9 EMBOLISM AND THROMBOSIS (H): ICD-10-CM

## 2020-02-06 LAB — INR POINT OF CARE: 2.6 (ref 0.86–1.14)

## 2020-02-06 PROCEDURE — 36416 COLLJ CAPILLARY BLOOD SPEC: CPT

## 2020-02-06 PROCEDURE — 85610 PROTHROMBIN TIME: CPT | Mod: QW

## 2020-02-06 NOTE — PROGRESS NOTES
ANTICOAGULATION FOLLOW-UP CLINIC VISIT    Patient Name:  Trisha Fernando  Date:  2020  Contact Type:  Face to Face    SUBJECTIVE:  Patient Findings         Clinical Outcomes     Negatives:   Major bleeding event, Thromboembolic event, Anticoagulation-related hospital admission, Anticoagulation-related ED visit, Anticoagulation-related fatality           OBJECTIVE    INR Protime   Date Value Ref Range Status   2020 2.6 (A) 0.86 - 1.14 Final       ASSESSMENT / PLAN  INR assessment THER    Recheck INR In: 6 WEEKS    INR Location Clinic      Anticoagulation Summary  As of 2020    INR goal:   2.0-3.0   TTR:   75.8 % (1 y)   INR used for dosin.6 (2020)   Warfarin maintenance plan:   2.5 mg (5 mg x 0.5) every Thu; 5 mg (5 mg x 1) all other days   Full warfarin instructions:   2.5 mg every Thu; 5 mg all other days   Weekly warfarin total:   32.5 mg   No change documented:   Josee Walker RN   Plan last modified:   Smitha Nair RN (2019)   Next INR check:   3/19/2020   Priority:   Maintenance   Target end date:   Indefinite    Indications    Long term (current) use of anticoagulants [Z79.01]  Embolism and thrombosis (H) [I74.9]             Anticoagulation Episode Summary     INR check location:       Preferred lab:       Send INR reminders to:    INR    Comments:         Anticoagulation Care Providers     Provider Role Specialty Phone number    Maria Guadalupe Ramon DO [facility] Internal Medicine [phone]            See the Encounter Report to view Anticoagulation Flowsheet and Dosing Calendar (Go to Encounters tab in chart review, and find the Anticoagulation Therapy Visit)        Josee Walker, SINDHU

## 2020-02-10 ENCOUNTER — OFFICE VISIT (OUTPATIENT)
Dept: INTERNAL MEDICINE | Facility: OTHER | Age: 60
End: 2020-02-10
Attending: INTERNAL MEDICINE
Payer: COMMERCIAL

## 2020-02-10 VITALS
SYSTOLIC BLOOD PRESSURE: 130 MMHG | OXYGEN SATURATION: 93 % | WEIGHT: 293 LBS | BODY MASS INDEX: 57.01 KG/M2 | HEART RATE: 97 BPM | TEMPERATURE: 97.9 F | RESPIRATION RATE: 16 BRPM | DIASTOLIC BLOOD PRESSURE: 80 MMHG

## 2020-02-10 DIAGNOSIS — R73.9 ELEVATED BLOOD SUGAR: ICD-10-CM

## 2020-02-10 DIAGNOSIS — R50.9 FEVER, UNSPECIFIED FEVER CAUSE: ICD-10-CM

## 2020-02-10 DIAGNOSIS — R10.32 ABDOMINAL PAIN, LEFT LOWER QUADRANT: Primary | ICD-10-CM

## 2020-02-10 LAB
ALBUMIN SERPL-MCNC: 4.1 G/DL (ref 3.5–5.7)
ALBUMIN UR-MCNC: 10 MG/DL
ALP SERPL-CCNC: 65 U/L (ref 34–104)
ALT SERPL W P-5'-P-CCNC: 32 U/L (ref 7–52)
ANION GAP SERPL CALCULATED.3IONS-SCNC: 6 MMOL/L (ref 3–14)
APPEARANCE UR: CLEAR
AST SERPL W P-5'-P-CCNC: 22 U/L (ref 13–39)
BACTERIA #/AREA URNS HPF: ABNORMAL /HPF
BASOPHILS # BLD AUTO: 0 10E9/L (ref 0–0.2)
BASOPHILS NFR BLD AUTO: 0.4 %
BILIRUB SERPL-MCNC: 0.5 MG/DL (ref 0.3–1)
BILIRUB UR QL STRIP: NEGATIVE
BUN SERPL-MCNC: 18 MG/DL (ref 7–25)
CALCIUM SERPL-MCNC: 9.7 MG/DL (ref 8.6–10.3)
CAOX CRY #/AREA URNS HPF: ABNORMAL /HPF
CHLORIDE SERPL-SCNC: 103 MMOL/L (ref 98–107)
CO2 SERPL-SCNC: 29 MMOL/L (ref 21–31)
COLOR UR AUTO: YELLOW
CREAT SERPL-MCNC: 0.82 MG/DL (ref 0.6–1.2)
DIFFERENTIAL METHOD BLD: ABNORMAL
EOSINOPHIL # BLD AUTO: 0.1 10E9/L (ref 0–0.7)
EOSINOPHIL NFR BLD AUTO: 1 %
ERYTHROCYTE [DISTWIDTH] IN BLOOD BY AUTOMATED COUNT: 13.6 % (ref 10–15)
GFR SERPL CREATININE-BSD FRML MDRD: 71 ML/MIN/{1.73_M2}
GLUCOSE SERPL-MCNC: 99 MG/DL (ref 70–105)
GLUCOSE UR STRIP-MCNC: NEGATIVE MG/DL
HCT VFR BLD AUTO: 49.2 % (ref 35–47)
HGB BLD-MCNC: 15.6 G/DL (ref 11.7–15.7)
HGB UR QL STRIP: NEGATIVE
IMM GRANULOCYTES # BLD: 0 10E9/L (ref 0–0.4)
IMM GRANULOCYTES NFR BLD: 0.3 %
KETONES UR STRIP-MCNC: NEGATIVE MG/DL
LEUKOCYTE ESTERASE UR QL STRIP: NEGATIVE
LIPASE SERPL-CCNC: 40 U/L (ref 11–82)
LYMPHOCYTES # BLD AUTO: 2.6 10E9/L (ref 0.8–5.3)
LYMPHOCYTES NFR BLD AUTO: 28.6 %
MCH RBC QN AUTO: 30.6 PG (ref 26.5–33)
MCHC RBC AUTO-ENTMCNC: 31.7 G/DL (ref 31.5–36.5)
MCV RBC AUTO: 97 FL (ref 78–100)
MONOCYTES # BLD AUTO: 0.6 10E9/L (ref 0–1.3)
MONOCYTES NFR BLD AUTO: 6.9 %
MUCOUS THREADS #/AREA URNS LPF: PRESENT /LPF
NEUTROPHILS # BLD AUTO: 5.7 10E9/L (ref 1.6–8.3)
NEUTROPHILS NFR BLD AUTO: 62.8 %
NITRATE UR QL: NEGATIVE
PH UR STRIP: 5.5 PH (ref 5–7)
PLATELET # BLD AUTO: 233 10E9/L (ref 150–450)
POTASSIUM SERPL-SCNC: 3.8 MMOL/L (ref 3.5–5.1)
PROT SERPL-MCNC: 6.6 G/DL (ref 6.4–8.9)
RBC # BLD AUTO: 5.09 10E12/L (ref 3.8–5.2)
RBC #/AREA URNS AUTO: 8 /HPF
SODIUM SERPL-SCNC: 138 MMOL/L (ref 134–144)
SOURCE: ABNORMAL
SP GR UR STRIP: 1.03 (ref 1–1.03)
SQUAMOUS #/AREA URNS AUTO: 2 /HPF (ref 0–1)
UROBILINOGEN UR STRIP-MCNC: NORMAL MG/DL (ref 0–2)
WBC # BLD AUTO: 9.1 10E9/L (ref 4–11)
WBC #/AREA URNS AUTO: 3 /HPF

## 2020-02-10 PROCEDURE — 85025 COMPLETE CBC W/AUTO DIFF WBC: CPT | Mod: ZL | Performed by: INTERNAL MEDICINE

## 2020-02-10 PROCEDURE — 36415 COLL VENOUS BLD VENIPUNCTURE: CPT | Mod: ZL | Performed by: INTERNAL MEDICINE

## 2020-02-10 PROCEDURE — 81001 URINALYSIS AUTO W/SCOPE: CPT | Mod: ZL | Performed by: INTERNAL MEDICINE

## 2020-02-10 PROCEDURE — 99214 OFFICE O/P EST MOD 30 MIN: CPT | Performed by: INTERNAL MEDICINE

## 2020-02-10 PROCEDURE — 80053 COMPREHEN METABOLIC PANEL: CPT | Mod: ZL | Performed by: INTERNAL MEDICINE

## 2020-02-10 PROCEDURE — 83690 ASSAY OF LIPASE: CPT | Mod: ZL | Performed by: INTERNAL MEDICINE

## 2020-02-10 SDOH — HEALTH STABILITY: MENTAL HEALTH: HOW OFTEN DO YOU HAVE A DRINK CONTAINING ALCOHOL?: MONTHLY OR LESS

## 2020-02-10 ASSESSMENT — PAIN SCALES - GENERAL: PAINLEVEL: NO PAIN (1)

## 2020-02-10 ASSESSMENT — PATIENT HEALTH QUESTIONNAIRE - PHQ9: SUM OF ALL RESPONSES TO PHQ QUESTIONS 1-9: 8

## 2020-02-10 NOTE — NURSING NOTE
Chief Complaint   Patient presents with     Fever       Medication Reconciliation complete.   Patient presents with bloating, shortness of breath, fevers, belching all weekend. Patient has been taking gasx with no relief. Patient had a bowel movement this morning. Patient states she has had nausea.  Pippa Arellano LPN  ..................2/10/2020   2:54 PM

## 2020-02-10 NOTE — PROGRESS NOTES
Chief Complaint   Patient presents with     Fever          Subjective:   Ms. Fernando is a 59 year old female  seen for the acute concern today of fever and abdominal pain.    Fever of 101 over the weekend.  Started having nausea 1 week ago and the abdominal pain started shortly after.  No vomiting.  No diarrhea but slight constipation.  No blood in the stool.  She has decreased appetite.  No sick contacts and no travel recently.  Pain with pressure on the abdomen.  She reports that most of the pain is in the left lower quadrant.    She has a history of fatty liver disease, umbilical hernia and factor V Leiden.  She has had surgery in the past with multiple C-sections, cholecystectomy but no appendectomy.    She  reports that she quit smoking about 29 years ago. Her smoking use included cigarettes. She has a 12.00 pack-year smoking history. She has never used smokeless tobacco.    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      ROS:   Pertinent  ROS was performed and was negative, including for chest pain, shortness of breath, increased lower extremity edema, changes in bowel or bladder, blood in the stool, difficulty swallowing, sores in the mouth. No other concerns, with exception of HPI  above.      Objective:    /80 (BP Location: Right arm, Patient Position: Sitting, Cuff Size: Adult Large)   Pulse 97   Temp 97.9  F (36.6  C) (Tympanic)   Resp 16   Wt (!) 165.1 kg (364 lb)   SpO2 93%   BMI 57.01 kg/m    GEN: Vitals reviewed.  Patient is in no acute distress. Cooperative with exam.  HEENT: Normocephalic atraumatic.  Pupils equally round.  No scleral icterus, no conjunctival erythema. Oropharynx with no erythema or exudates. Dentition adequate.  NECK: Supple; no thyromegaly.  No neck, cervical LAD.    CV: Heart regular in rate and rhythm with no murmur.   LUNGS: Lungs clear to auscultation bilaterally.  Chest rise equal bilaterally.  No accessory muscle use.  ABD:  Obese, Soft, tender in the left lower quadrant with palpation.  Mildly distended.  No rebound. Bowel sounds positive.  SKIN: Warm and dry to touch.  No rash on face, arms and legs.  EXT: No clubbing or cyanosis.  No peripheral edema.     Assessment/Plan:   Abdominal pain, left lower quadrant  Etiology is unknown however with her pain in her left lower quadrant along with fevers I do suspect possible diverticular disease.  She will be set up for CT imaging.  Labs are done today and pending.  She will be contacted tonight with results and was cautioned that she may be encouraged to come to the ER depending results.  - Comprehensive metabolic panel  - CT Abdomen Pelvis w Contrast  - CBC and Differential  - UA with Microscopic    Fever, unspecified fever cause  See above  - Comprehensive metabolic panel  - Lipase  - CBC and Differential  - Comprehensive metabolic panel    - Return/call as needed for follow-up should any new symptoms develop, for worsening of current symptoms or if symptoms do not resolve with above plan.    Return if symptoms worsen or fail to improve.     BONITA OLIVER DO   2/10/2020 3:20 PM    This document was prepared using voice generated softwear. While every attempt was made for accuracy, grammatical errors  may exist.

## 2020-02-10 NOTE — PATIENT INSTRUCTIONS
- Return/call as needed for follow-up should any new symptoms develop, for worsening of current symptoms or if symptoms do not resolve with above plan.    Clinic : [phone]  Appointment line: [phone]

## 2020-02-11 ENCOUNTER — HOSPITAL ENCOUNTER (OUTPATIENT)
Dept: CT IMAGING | Facility: OTHER | Age: 60
Discharge: HOME | End: 2020-02-11
Attending: INTERNAL MEDICINE | Admitting: INTERNAL MEDICINE
Payer: COMMERCIAL

## 2020-02-11 DIAGNOSIS — R50.9 FEVER, UNSPECIFIED FEVER CAUSE: ICD-10-CM

## 2020-02-11 DIAGNOSIS — R10.32 ABDOMINAL PAIN, LEFT LOWER QUADRANT: ICD-10-CM

## 2020-02-11 PROCEDURE — 74177 CT ABD & PELVIS W/CONTRAST: CPT

## 2020-02-11 PROCEDURE — 25500064 ZZH RX 255 OP 636: Performed by: INTERNAL MEDICINE

## 2020-02-11 RX ADMIN — IOHEXOL 500 ML: 350 INJECTION, SOLUTION INTRAVENOUS at 15:19

## 2020-03-19 ENCOUNTER — ANTICOAGULATION THERAPY VISIT (OUTPATIENT)
Dept: ANTICOAGULATION | Facility: OTHER | Age: 60
End: 2020-03-19
Attending: INTERNAL MEDICINE
Payer: COMMERCIAL

## 2020-03-19 DIAGNOSIS — Z79.01 ANTICOAGULATION MONITORING, INR RANGE 2-3: ICD-10-CM

## 2020-03-19 DIAGNOSIS — I74.9 EMBOLISM AND THROMBOSIS (H): Primary | ICD-10-CM

## 2020-03-19 LAB — INR POINT OF CARE: 3.4 (ref 0.86–1.14)

## 2020-03-19 PROCEDURE — 36416 COLLJ CAPILLARY BLOOD SPEC: CPT

## 2020-03-19 PROCEDURE — 85610 PROTHROMBIN TIME: CPT | Mod: QW

## 2020-03-19 NOTE — PROGRESS NOTES
ANTICOAGULATION FOLLOW-UP CLINIC VISIT    Patient Name:  Trisha Fernando  Date:  3/19/2020  Contact Type:  Face to Face    SUBJECTIVE:  Patient Findings     Positives:   Change in medications (2 Ibuprofen yesterday)        Clinical Outcomes     Negatives:   Major bleeding event, Thromboembolic event, Anticoagulation-related hospital admission, Anticoagulation-related ED visit, Anticoagulation-related fatality           OBJECTIVE    INR Protime   Date Value Ref Range Status   03/19/2020 3.4 (A) 0.86 - 1.14 Final       ASSESSMENT / PLAN  INR assessment SUPRA    Recheck INR In: 4 WEEKS    INR Location Clinic      Anticoagulation Summary  As of 3/19/2020    INR goal:   2.0-3.0   TTR:   70.1 % (1 y)   Prior goal:   2.0-3.0   INR used for dosing:   3.4! (3/19/2020)   Warfarin maintenance plan:   2.5 mg (5 mg x 0.5) every Thu; 5 mg (5 mg x 1) all other days   Full warfarin instructions:   3/20: 2.5 mg; Otherwise 2.5 mg every Thu; 5 mg all other days   Weekly warfarin total:   32.5 mg   Plan last modified:   Smitha Nair RN (11/18/2019)   Next INR check:   4/16/2020   Priority:   Maintenance   Target end date:   Indefinite    Indications    Long term (current) use of anticoagulants [Z79.01]  Embolism and thrombosis (H) [I74.9]  Anticoagulation monitoring  INR range 2-3 [Z79.01]             Anticoagulation Episode Summary     INR check location:       Preferred lab:       Send INR reminders to:    INR    Comments:         Anticoagulation Care Providers     Provider Role Specialty Phone number    Maria Guadalupe Ramon DO [facility] [phone]            See the Encounter Report to view Anticoagulation Flowsheet and Dosing Calendar (Go to Encounters tab in chart review, and find the Anticoagulation Therapy Visit)        Maria D Zayas, SINDHU

## 2020-04-01 DIAGNOSIS — F33.0 DEPRESSION, MAJOR, RECURRENT, MILD (H): ICD-10-CM

## 2020-04-02 RX ORDER — VENLAFAXINE HYDROCHLORIDE 150 MG/1
CAPSULE, EXTENDED RELEASE ORAL
Qty: 90 CAPSULE | Refills: 4 | Status: SHIPPED | OUTPATIENT
Start: 2020-04-02 | End: 2021-06-14

## 2020-04-02 NOTE — TELEPHONE ENCOUNTER
Routing refill request to provider for review/approval because:  Labs out of range:  PHQ 9: score 8 on 2/10/2020    Jil Hull RN on 4/2/2020 at 9:29 AM

## 2020-04-07 DIAGNOSIS — R60.0 BILATERAL LOWER EXTREMITY EDEMA: ICD-10-CM

## 2020-04-08 RX ORDER — FUROSEMIDE 20 MG
20-40 TABLET ORAL DAILY
Qty: 135 TABLET | Refills: 3 | Status: SHIPPED | OUTPATIENT
Start: 2020-04-08 | End: 2021-05-25

## 2020-04-08 NOTE — TELEPHONE ENCOUNTER
[facility] sent Rx request for the following:      FUROSEMIDE 20 MG TABLET   Sig: Take 1-2 tablets (20-40 mg) by mouth daily ; take second dose 6 hours after first      Last Prescription Date:   4/1/2019  Last Fill Qty/Refills:         135, R-3    Last Office Visit:              2/10/2020   Future Office visit:           8/3/2020      Prescription refilled per RN Medication Refill Policy.................... Filippo Zaragoza RN ....................  4/8/2020   9:11 AM

## 2020-04-14 DIAGNOSIS — Z79.01 ANTICOAGULATION MONITORING, INR RANGE 2-3: ICD-10-CM

## 2020-04-14 DIAGNOSIS — I74.9 EMBOLISM AND THROMBOSIS (H): ICD-10-CM

## 2020-04-14 DIAGNOSIS — F33.0 DEPRESSION, MAJOR, RECURRENT, MILD (H): ICD-10-CM

## 2020-04-14 RX ORDER — WARFARIN SODIUM 5 MG/1
TABLET ORAL
Qty: 90 TABLET | Refills: 1 | Status: SHIPPED | OUTPATIENT
Start: 2020-04-14 | End: 2020-10-01

## 2020-04-14 NOTE — TELEPHONE ENCOUNTER
Last INR done 3/19/20. Last OV with PCP done 2/10/20. Prescription approved per [facility] Refill Protocol.

## 2020-04-15 RX ORDER — BUPROPION HYDROCHLORIDE 150 MG/1
TABLET ORAL
Qty: 90 TABLET | Refills: 1 | Status: SHIPPED | OUTPATIENT
Start: 2020-04-15 | End: 2020-08-03

## 2020-04-15 NOTE — TELEPHONE ENCOUNTER
[facility] sent Rx request for the following:    buPROPion (WELLBUTRIN XL) 150 MG 24 hr tablet   Sig: TAKE 1 TABLET BY MOUTH EVERY MORNING     Last Prescription Date:   09/19/2019  Last Fill Qty/Refills:         90, R-1    Last Office Visit:              02/10/2020  Future Office visit:           08/03/2020    Routing refill request to provider for review/approval because:  Requested Prescriptions   Pending Prescriptions Disp Refills     buPROPion (WELLBUTRIN XL) 150 MG 24 hr tablet [Pharmacy Med Name: BUPROPION HCL  MG TABLET] 90 tablet 1     Sig: TAKE 1 TABLET BY MOUTH EVERY MORNING       SSRIs Protocol Failed -         Failed - PHQ-9 score less than 5 in past 6 months     Please review last PHQ-9 score.             Appointment for April Cancelled.     Josee Walker RN on 4/15/2020 at 10:28 AM

## 2020-04-16 ENCOUNTER — ANTICOAGULATION THERAPY VISIT (OUTPATIENT)
Dept: ANTICOAGULATION | Facility: OTHER | Age: 60
End: 2020-04-16
Attending: INTERNAL MEDICINE
Payer: COMMERCIAL

## 2020-04-16 DIAGNOSIS — I74.9 EMBOLISM AND THROMBOSIS (H): ICD-10-CM

## 2020-04-16 DIAGNOSIS — Z79.01 ANTICOAGULATION MONITORING, INR RANGE 2-3: ICD-10-CM

## 2020-04-16 LAB — INR POINT OF CARE: 2.8 (ref 0.86–1.14)

## 2020-04-16 PROCEDURE — 36416 COLLJ CAPILLARY BLOOD SPEC: CPT

## 2020-04-16 PROCEDURE — 85610 PROTHROMBIN TIME: CPT | Mod: QW

## 2020-04-16 NOTE — PROGRESS NOTES
ANTICOAGULATION FOLLOW-UP CLINIC VISIT    Patient Name:  Trisha Fernando  Date:  2020  Contact Type:  Face to Face    SUBJECTIVE:  Patient Findings         Clinical Outcomes     Negatives:   Major bleeding event, Thromboembolic event, Anticoagulation-related hospital admission, Anticoagulation-related ED visit, Anticoagulation-related fatality           OBJECTIVE    INR Protime   Date Value Ref Range Status   2020 2.8 (A) 0.86 - 1.14 Final       ASSESSMENT / PLAN  INR assessment THER    Recheck INR In: 6 WEEKS    INR Location Clinic      Anticoagulation Summary  As of 2020    INR goal:   2.0-3.0   TTR:   65.0 % (1 y)   INR used for dosin.8 (2020)   Warfarin maintenance plan:   2.5 mg (5 mg x 0.5) every Thu; 5 mg (5 mg x 1) all other days   Full warfarin instructions:   2.5 mg every Thu; 5 mg all other days   Weekly warfarin total:   32.5 mg   No change documented:   Smitha Nair RN   Plan last modified:   Smitha Nair RN (2019)   Next INR check:   2020   Priority:   Maintenance   Target end date:   Indefinite    Indications    Long term (current) use of anticoagulants [Z79.01]  Embolism and thrombosis (H) [I74.9]  Anticoagulation monitoring  INR range 2-3 [Z79.01]             Anticoagulation Episode Summary     INR check location:       Preferred lab:       Send INR reminders to:    INR    Comments:         Anticoagulation Care Providers     Provider Role Specialty Phone number    Maria Guadalupe Ramon DO [facility] Internal Medicine [phone]            See the Encounter Report to view Anticoagulation Flowsheet and Dosing Calendar (Go to Encounters tab in chart review, and find the Anticoagulation Therapy Visit)        Smitha Nair RN

## 2020-05-18 DIAGNOSIS — E87.6 HYPOKALEMIA: ICD-10-CM

## 2020-05-19 RX ORDER — POTASSIUM CHLORIDE 750 MG/1
10 TABLET, EXTENDED RELEASE ORAL DAILY
Qty: 90 TABLET | Refills: 2 | Status: SHIPPED | OUTPATIENT
Start: 2020-05-19 | End: 2021-02-25

## 2020-05-19 NOTE — TELEPHONE ENCOUNTER
Prescription approved per [facility] Refill Protocol.  LOV and labs: 2/10/2020    Jil Hull RN on 5/19/2020 at 3:58 PM

## 2020-05-28 ENCOUNTER — ANTICOAGULATION THERAPY VISIT (OUTPATIENT)
Dept: ANTICOAGULATION | Facility: OTHER | Age: 60
End: 2020-05-28
Payer: COMMERCIAL

## 2020-05-28 DIAGNOSIS — I74.9 EMBOLISM AND THROMBOSIS (H): ICD-10-CM

## 2020-05-28 DIAGNOSIS — Z79.01 ANTICOAGULATION MONITORING, INR RANGE 2-3: ICD-10-CM

## 2020-05-28 LAB — INR POINT OF CARE: 2.4 (ref 0.86–1.14)

## 2020-05-28 PROCEDURE — 85610 PROTHROMBIN TIME: CPT | Mod: QW

## 2020-05-28 PROCEDURE — 36416 COLLJ CAPILLARY BLOOD SPEC: CPT

## 2020-05-28 NOTE — PROGRESS NOTES
ANTICOAGULATION FOLLOW-UP CLINIC VISIT    Patient Name:  Trisha Fernando  Date:  2020  Contact Type:  Face to Face    SUBJECTIVE:  Patient Findings         Clinical Outcomes     Negatives:   Major bleeding event, Thromboembolic event, Anticoagulation-related hospital admission, Anticoagulation-related ED visit, Anticoagulation-related fatality           OBJECTIVE    Recent labs: (last 7 days)     20   INR 2.4*       ASSESSMENT / PLAN  INR assessment THER    Recheck INR In: 6 WEEKS    INR Location Clinic      Anticoagulation Summary  As of 2020    INR goal:   2.0-3.0   TTR:   71.9 % (1 y)   INR used for dosin.4 (2020)   Warfarin maintenance plan:   2.5 mg (5 mg x 0.5) every Thu; 5 mg (5 mg x 1) all other days   Full warfarin instructions:   2.5 mg every Thu; 5 mg all other days   Weekly warfarin total:   32.5 mg   No change documented:   Smitha Nair RN   Plan last modified:   Smitha Nair RN (2019)   Next INR check:   2020   Priority:   Maintenance   Target end date:   Indefinite    Indications    Long term (current) use of anticoagulants [Z79.01]  Embolism and thrombosis (H) [I74.9]  Anticoagulation monitoring  INR range 2-3 [Z79.01]             Anticoagulation Episode Summary     INR check location:       Preferred lab:       Send INR reminders to:   [facility]    Comments:         Anticoagulation Care Providers     Provider Role Specialty Phone number    Maria Guadalupe Ramon DO [facility] Internal Medicine [phone]            See the Encounter Report to view Anticoagulation Flowsheet and Dosing Calendar (Go to Encounters tab in chart review, and find the Anticoagulation Therapy Visit)        Smitha Nair RN

## 2020-06-03 DIAGNOSIS — I10 ESSENTIAL HYPERTENSION: ICD-10-CM

## 2020-06-03 RX ORDER — LOSARTAN POTASSIUM 25 MG/1
TABLET ORAL
Qty: 90 TABLET | Refills: 0 | Status: SHIPPED | OUTPATIENT
Start: 2020-06-03 | End: 2020-07-10

## 2020-06-03 NOTE — TELEPHONE ENCOUNTER
Prescription approved per [facility] Refill Protocol.  Last refill  Losartan 25mg tablet  4/1/2019  90# 4 Refill    LOV:2/10/2020  Future 8/3/2020  Warnings Override History for losartan (COZAAR) 25 MG tablet [[id number]]     Overridden by Sandhya Ford MD on May 24, 2019 11:57 AM    Allergy/Contraindication    1. LISINOPRIL [Level: Cross-sensitive Class Match]       Overridden by Maria Guadalupe Ramon DO on Apr 1, 2019 3:43 PM    Allergy/Contraindication    1. LISINOPRIL [Level: Cross-sensitive Class Match] [Reason: Tolerated medication/side effects in past]      Yocasta Pham RN on 6/3/2020 at 12:11 PM

## 2020-07-08 DIAGNOSIS — I10 ESSENTIAL HYPERTENSION: ICD-10-CM

## 2020-07-09 ENCOUNTER — ANTICOAGULATION THERAPY VISIT (OUTPATIENT)
Dept: ANTICOAGULATION | Facility: OTHER | Age: 60
End: 2020-07-09
Attending: INTERNAL MEDICINE
Payer: COMMERCIAL

## 2020-07-09 DIAGNOSIS — I74.9 EMBOLISM AND THROMBOSIS (H): ICD-10-CM

## 2020-07-09 DIAGNOSIS — Z79.01 ANTICOAGULATION MONITORING, INR RANGE 2-3: ICD-10-CM

## 2020-07-09 LAB — INR POINT OF CARE: 2.9 (ref 0.86–1.14)

## 2020-07-09 PROCEDURE — 85610 PROTHROMBIN TIME: CPT | Mod: QW

## 2020-07-09 PROCEDURE — 36416 COLLJ CAPILLARY BLOOD SPEC: CPT

## 2020-07-09 NOTE — PROGRESS NOTES
ANTICOAGULATION FOLLOW-UP CLINIC VISIT    Patient Name:  Trisha Fernando  Date:  2020  Contact Type:      SUBJECTIVE:  Patient Findings         Clinical Outcomes     Negatives:   Major bleeding event, Thromboembolic event, Anticoagulation-related hospital admission, Anticoagulation-related ED visit, Anticoagulation-related fatality           OBJECTIVE    Recent labs: (last 7 days)     20   INR 2.9*       ASSESSMENT / PLAN  INR assessment THER    Recheck INR In: 6 WEEKS    INR Location Clinic      Anticoagulation Summary  As of 2020    INR goal:   2.0-3.0   TTR:   71.9 % (1 y)   INR used for dosin.9 (2020)   Warfarin maintenance plan:   2.5 mg (5 mg x 0.5) every Thu; 5 mg (5 mg x 1) all other days   Full warfarin instructions:   2.5 mg every Thu; 5 mg all other days   Weekly warfarin total:   32.5 mg   No change documented:   Smitha Nair RN   Plan last modified:   Smitha Nair RN (2019)   Next INR check:   2020   Priority:   Maintenance   Target end date:   Indefinite    Indications    Long term (current) use of anticoagulants [Z79.01]  Embolism and thrombosis (H) [I74.9]  Anticoagulation monitoring  INR range 2-3 [Z79.01]             Anticoagulation Episode Summary     INR check location:       Preferred lab:       Send INR reminders to:   [facility]    Comments:         Anticoagulation Care Providers     Provider Role Specialty Phone number    Maria Guadalupe Ramon DO [facility] Internal Medicine [phone]            See the Encounter Report to view Anticoagulation Flowsheet and Dosing Calendar (Go to Encounters tab in chart review, and find the Anticoagulation Therapy Visit)        Smitha Nair RN

## 2020-07-10 RX ORDER — LOSARTAN POTASSIUM 25 MG/1
TABLET ORAL
Qty: 90 TABLET | Refills: 1 | Status: SHIPPED | OUTPATIENT
Start: 2020-07-10 | End: 2020-10-27

## 2020-08-03 ENCOUNTER — OFFICE VISIT (OUTPATIENT)
Dept: INTERNAL MEDICINE | Facility: OTHER | Age: 60
End: 2020-08-03
Attending: INTERNAL MEDICINE
Payer: COMMERCIAL

## 2020-08-03 VITALS
TEMPERATURE: 97.4 F | SYSTOLIC BLOOD PRESSURE: 130 MMHG | DIASTOLIC BLOOD PRESSURE: 80 MMHG | WEIGHT: 293 LBS | BODY MASS INDEX: 45.99 KG/M2 | RESPIRATION RATE: 16 BRPM | OXYGEN SATURATION: 97 % | HEART RATE: 73 BPM | HEIGHT: 67 IN

## 2020-08-03 DIAGNOSIS — Z12.31 ENCOUNTER FOR SCREENING MAMMOGRAM FOR BREAST CANCER: ICD-10-CM

## 2020-08-03 DIAGNOSIS — F33.0 DEPRESSION, MAJOR, RECURRENT, MILD (H): ICD-10-CM

## 2020-08-03 DIAGNOSIS — G89.29 CHRONIC MIDLINE LOW BACK PAIN WITHOUT SCIATICA: ICD-10-CM

## 2020-08-03 DIAGNOSIS — M54.50 CHRONIC MIDLINE LOW BACK PAIN WITHOUT SCIATICA: ICD-10-CM

## 2020-08-03 DIAGNOSIS — I10 ESSENTIAL HYPERTENSION: Primary | ICD-10-CM

## 2020-08-03 PROCEDURE — 99396 PREV VISIT EST AGE 40-64: CPT | Performed by: INTERNAL MEDICINE

## 2020-08-03 RX ORDER — MULTIVITAMIN WITH IRON
1 TABLET ORAL DAILY
COMMUNITY
End: 2020-11-05

## 2020-08-03 RX ORDER — BUPROPION HYDROCHLORIDE 150 MG/1
150 TABLET ORAL EVERY MORNING
Qty: 90 TABLET | Refills: 3 | Status: SHIPPED | OUTPATIENT
Start: 2020-08-03 | End: 2021-09-16

## 2020-08-03 ASSESSMENT — PAIN SCALES - GENERAL: PAINLEVEL: NO PAIN (0)

## 2020-08-03 ASSESSMENT — PATIENT HEALTH QUESTIONNAIRE - PHQ9: SUM OF ALL RESPONSES TO PHQ QUESTIONS 1-9: 17

## 2020-08-03 ASSESSMENT — MIFFLIN-ST. JEOR: SCORE: 2279.81

## 2020-08-03 NOTE — PROGRESS NOTES
Chief Complaint   Patient presents with     Annual Visit         HPI: Ms. Fernando is a 59 year old female who presents today for yearly physical.  She overall is feeling good.      She has been under increased stress with COVID-19.  She has gained approximately 6 pounds over the last few months.  She has noted some increased low back pain with this.  It is worse with exercise.    She has a history of hypertension.  She is on the furosemide, metoprolol and losartan for this.  She denies any obvious side effects.  She is due for a refill on her metoprolol.     She has a history of depression.  She is on Effexor and wellbutrin for this.  She denies any issues with this.  She does feel it continues to help with her mood and denies any need for an increase.    She is due for mammogram.  She is up-to-date on her screening otherwise.  She is interested in being on the shingles vaccine wait list.    History is discussed and updated on 8/3/2020 with patient.  It is current to the best of my knowledge as below.    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 Sutton)       COLONOSCOPY N/A 5/24/2019    Normal exam, 10 year follow up     LITHOTRIPSY       MAMMOPLASTY REDUCTION  06/30/2003     OTHER SURGICAL HISTORY Right 05/24/2011    Forearm Lipoma Excision     OTHER SURGICAL HISTORY  2008    Left foot soft tissue resection; dr. Lang     TONSILLECTOMY           Current Outpatient Medications   Medication Sig Dispense Refill     buPROPion (WELLBUTRIN XL) 150 MG 24 hr tablet Take 1 tablet (150 mg) by mouth every morning 90 tablet 3     magnesium 250 MG tablet Take 1 tablet by mouth daily       metoprolol tartrate (LOPRESSOR) 25 MG tablet Take 0.5 tablets (12.5 mg) by mouth 2 times daily 180 tablet 4     Blood Pressure Monitoring (ADULT BLOOD PRESSURE CUFF LG) KIT As directed.       Cholecalciferol (VITAMIN D3) 2000 UNITS CAPS Take 2,000 Units by mouth daily       Elastic Bandages & Supports (MEDICAL COMPRESSION STOCKINGS) MISC For personal use. Length: calf Strength: 16-20 mmHg Circumference in cm: For calf: Ankle 15cm, Calf 30cm, Ankle to calf length 40cm.       fish oil-omega-3 fatty acids 1000 MG capsule Take by mouth daily       furosemide (LASIX) 20 MG tablet TAKE 1-2 TABLETS (20-40 MG) BY MOUTH DAILY TAKE SECOND DOSE 6 HOURS AFTER FIRST 135 tablet 3     LORazepam (ATIVAN) 0.5 MG tablet        losartan (COZAAR) 25 MG tablet TAKE 1 TABLET BY MOUTH EVERY DAY 90 tablet 1     potassium chloride ER (K-TAB/KLOR-CON) 10 MEQ CR tablet TAKE 1 TABLET (10 MEQ) BY MOUTH DAILY 90 tablet 2     venlafaxine (EFFEXOR-XR) 150 MG 24 hr capsule TAKE 1 CAPSULE BY MOUTH EVERY DAY WITH FOOD 90 capsule 4     vitamin B-Complex Take 1 tablet by mouth daily       warfarin ANTICOAGULANT (COUMADIN) 5 MG tablet TAKE 5 MG x 6 days and 2.5 mg x 1 day/week OR AS DIRECTED BY THE [facility]. 90 tablet 1       Allergies   Allergen Reactions     Lisinopril Swelling     Angioedema, Edema       Adhesive Tape Itching and Rash      [place] Itching and Rash     Wound Dressing Adhesive Rash        Family History   Problem Relation Age of Onset     Other - See Comments Father         accidental death/ gallbladder disease     Other - See Comments Mother         HX of kidney stones and blood clots/ gallbladder disease     Dementia Mother      Other - See Comments Brother         HX of kidney stones and blood clots     No Known Problems Daughter      No Known Problems Daughter      Colon Cancer Maternal Uncle 50        Cancer-colon     Colon Cancer Maternal Uncle 50        Cancer-colon     Other - See Comments Maternal Uncle         HX of kidney stones     No Known Problems Brother      Heart Disease Paternal Grandfather         Heart Disease,ASCHD     No Known Problems Brother      No Known Problems Brother      Other - See Comments Paternal Aunt         Parkinson's       Family Status   Relation Name Status     Fa          Accidental death, gall bladder disease     Mo          gall bladder disease     Dimitri Hunter Alive     MUnc          with colon cancer, 1  in his 50's,1 diagnosed in 50's     Nicol Shadia Alive     Nicol Cabrera Alive     MUnc 1      MUnc 2      Dimitri Mazariegos Alive     PGFa  (Not Specified)     Dimitri Antonio Alive     Dimitri Jordan Alive     PAunt  (Not Specified)        Social History     Tobacco Use     Smoking status: Former Smoker     Packs/day: 1.00     Years: 12.00     Pack years: 12.00     Types: Cigarettes     Last attempt to quit: 1990     Years since quittin.9     Smokeless tobacco: Never Used     Tobacco comment: no ecig   Substance Use Topics     Alcohol use: Yes     Alcohol/week: 2.0 standard drinks     Frequency: Monthly or less       Social History     Social History Narrative    .  Two children, Shadia ([place]) and Deborah ([place], college at [employer]); - [facility]             ROS  GEN:-fevers/-chills/+chronic night sweats/+wt change -slight  "increase  NEURO: -headaches/-vision changes  EARS: -hearing changes/+tinnitus  NOSE: -drainage/+congestion  MOUTH/THROAT: - sore throat/-dysphagia/-sores  LUNGS: -sob/-cough  CARDIOVASCULAR: -cp/-palpitations  GI: -pain/-change in bowels/-bloody stools  : -change in bladder/-vaginal discharge or bleeding  ENDOCRINE: -skin or hair changes  HEMATOLOGIC/LYMPHATIC: -swollen nodes  SKIN: -rashes/-lesions  MSK/RHEUM: +joint pain/-swelling/+chronic back pain  NEURO: -weakness/-parasthesias  PSYCH:+depression/+anxiety     EXAM:   /80 (BP Location: Right arm, Patient Position: Sitting, Cuff Size: Adult Large)   Pulse 73   Temp 97.4  F (36.3  C) (Tympanic)   Resp 16   Ht 1.7 m (5' 6.93\")   Wt (!) 167.8 kg (370 lb)   SpO2 97%   Breastfeeding No   BMI 58.07 kg/m    Estimated body mass index is 58.07 kg/m  as calculated from the following:    Height as of this encounter: 1.7 m (5' 6.93\").    Weight as of this encounter: 167.8 kg (370 lb).      GEN: Vitals reviewed. Healthy appearing. Patient is in no acute distress. Cooperative with exam.  HEENT: Normocephalic atraumatic.  Eyes grossly normal to inspection.  No discharge or erythema, or obvious scleral/conjunctival abnormalities.   NECK: Supple; no thyromegaly or masses noted.  No cervical or supraclavicular lymphadenopathy.  CV: Heart regular in rate and rhythm with no murmur.    LUNGS: No audible wheeze, cough, or visible cyanosis.  No visible retractions or increased work of breathing.  Lungs clear to auscultation bilaterally.    ABD:  Obese, nondistended  SKIN: Warm and dry to touch.  Visible skin clear. No significant rash, abnormal pigmentation or lesions.  EXT: No clubbing or cyanosis.  Trace lower ext peripheral edema.  NEURO: Alert and oriented to person, place, and time.  Cranial nerves II-XII grossly intact with no focal or lateralizing deficits.  Muscle tone normal.  Gait normal. No tremor.   MSK: ROM of upper and lower ext symmetric and full.  PSYCH: " Mood is good.  Mentation appears normal, affect normal/bright, judgement and insight intact, normal speech and appearance well-groomed.       ASSESSMENT AND PLAN:    Essential hypertension  - Blood pressure today of 130/80   is at the goal of <140/90 with no exacerbation.  - Continue current regimen.  Instructed to check BP at home.  - Cautioned patient to monitor with antibiotics, herbals and any OTC medications  - electrolytes and renal function done and ok  - metoprolol tartrate (LOPRESSOR) 25 MG tablet  Dispense: 180 tablet; Refill: 4    Encounter for screening mammogram for breast cancer  - MA Screen Bilateral w/Favio    Depression, major, recurrent, mild (H)  - Stable.  Continue current regimen.    - buPROPion (WELLBUTRIN XL) 150 MG 24 hr tablet  Dispense: 90 tablet; Refill: 3    Chronic midline low back pain without sciatica  - Ice, elevation, gentle movement/rest as tolerated  - Tylenol as needed  - offered PT ,patient will call if interested  - patient is to call if she has additional problems with this or if new symptoms develop          Return in about 6 months (around 2/3/2021) for BP Recheck.      BONITA OLIVER DO   8/3/2020 2:05 PM    This document was prepared using voice generated softwear. While every attempt was made for accuracy, spelling and grammatical errors may exist.

## 2020-08-03 NOTE — PATIENT INSTRUCTIONS
Ice every 3-4 hours, gentle exercise/rest as much as possible.    Caution with NSAIDS (ibuprofen, aspirin, naproxen, aleve, advil) due to risk for increased blood pressure,stomach pain/nausea/ulcers and kidney damage; use minimal amount necessary    Tylenol 1000mg-1300mg (2-2.5- extra strength 500mg tablets or 3-4 regular strength 325mg tablets or 2 tylenol arthritis tabs) three times a day as needed; Maximum of 4000mg daily    - Return/call as needed for follow-up should any new symptoms develop, for worsening of current symptoms or if symptoms do notresolve with above plan.

## 2020-08-03 NOTE — NURSING NOTE
Chief Complaint   Patient presents with     Annual Visit     Patient has no concerns.  Medication Reconciliation complete.   Pippa Arellano LPN  ..................8/3/2020   1:35 PM

## 2020-08-10 RX ORDER — METOPROLOL TARTRATE 25 MG/1
12.5 TABLET, FILM COATED ORAL 2 TIMES DAILY
Qty: 180 TABLET | Refills: 4 | Status: SHIPPED | OUTPATIENT
Start: 2020-08-10 | End: 2020-12-14

## 2020-08-20 ENCOUNTER — ANTICOAGULATION THERAPY VISIT (OUTPATIENT)
Dept: ANTICOAGULATION | Facility: OTHER | Age: 60
End: 2020-08-20
Attending: INTERNAL MEDICINE
Payer: COMMERCIAL

## 2020-08-20 DIAGNOSIS — I74.9 EMBOLISM AND THROMBOSIS (H): ICD-10-CM

## 2020-08-20 DIAGNOSIS — Z79.01 ANTICOAGULATION MONITORING, INR RANGE 2-3: ICD-10-CM

## 2020-08-20 LAB — INR POINT OF CARE: 2.8 (ref 0.86–1.14)

## 2020-08-20 PROCEDURE — 36416 COLLJ CAPILLARY BLOOD SPEC: CPT

## 2020-08-20 PROCEDURE — 85610 PROTHROMBIN TIME: CPT | Mod: QW

## 2020-08-20 NOTE — PROGRESS NOTES
ANTICOAGULATION FOLLOW-UP CLINIC VISIT    Patient Name:  Trisha Fernando  Date:  2020  Contact Type:  Face to Face    SUBJECTIVE:  Patient Findings         Clinical Outcomes     Negatives:   Major bleeding event, Thromboembolic event, Anticoagulation-related hospital admission, Anticoagulation-related ED visit, Anticoagulation-related fatality           OBJECTIVE    Recent labs: (last 7 days)     20   INR 2.8*       ASSESSMENT / PLAN  INR assessment THER    Recheck INR In: 6 WEEKS    INR Location Clinic      Anticoagulation Summary  As of 2020    INR goal:   2.0-3.0   TTR:   71.9 % (1 y)   INR used for dosin.8 (2020)   Warfarin maintenance plan:   2.5 mg (5 mg x 0.5) every Thu; 5 mg (5 mg x 1) all other days   Full warfarin instructions:   2.5 mg every Thu; 5 mg all other days   Weekly warfarin total:   32.5 mg   No change documented:   Smitha Nair RN   Plan last modified:   Smitha Nair RN (2019)   Next INR check:   10/1/2020   Priority:   Maintenance   Target end date:   Indefinite    Indications    Long term (current) use of anticoagulants [Z79.01]  Embolism and thrombosis (H) [I74.9]  Anticoagulation monitoring  INR range 2-3 [Z79.01]             Anticoagulation Episode Summary     INR check location:       Preferred lab:       Send INR reminders to:   [facility]    Comments:         Anticoagulation Care Providers     Provider Role Specialty Phone number    Maria Guadalupe Ramon DO [facility] Internal Medicine [phone]            See the Encounter Report to view Anticoagulation Flowsheet and Dosing Calendar (Go to Encounters tab in chart review, and find the Anticoagulation Therapy Visit)        Smitha Nair RN

## 2020-09-10 ENCOUNTER — TRANSFERRED RECORDS (OUTPATIENT)
Dept: HEALTH INFORMATION MANAGEMENT | Facility: OTHER | Age: 60
End: 2020-09-10

## 2020-09-10 ENCOUNTER — ANTICOAGULATION THERAPY VISIT (OUTPATIENT)
Dept: ANTICOAGULATION | Facility: OTHER | Age: 60
End: 2020-09-10
Attending: INTERNAL MEDICINE
Payer: COMMERCIAL

## 2020-09-10 DIAGNOSIS — I74.9 EMBOLISM AND THROMBOSIS (H): ICD-10-CM

## 2020-09-10 DIAGNOSIS — Z79.01 ANTICOAGULATION MONITORING, INR RANGE 2-3: ICD-10-CM

## 2020-09-10 LAB — INR PPP: 2.5 (ref 0.9–1.1)

## 2020-09-10 NOTE — PROGRESS NOTES
ANTICOAGULATION FOLLOW-UP CLINIC VISIT    Patient Name:  Trisha Fernando  Date:  9/10/2020  Contact Type:  no call needed patient to continue same dose    SUBJECTIVE:  Patient Findings         Clinical Outcomes     Negatives:   Major bleeding event, Thromboembolic event, Anticoagulation-related hospital admission, Anticoagulation-related ED visit, Anticoagulation-related fatality           OBJECTIVE    Recent labs: (last 7 days)     09/10/20   INR 2.5*       ASSESSMENT / PLAN  INR assessment THER    Recheck INR In: 1 WEEK    INR Location Home INR      Anticoagulation Summary  As of 9/10/2020    INR goal:   2.0-3.0   TTR:   76.5 % (1 y)   INR used for dosin.5 (9/10/2020)   Warfarin maintenance plan:   2.5 mg (5 mg x 0.5) every Thu; 5 mg (5 mg x 1) all other days   Full warfarin instructions:   2.5 mg every Thu; 5 mg all other days   Weekly warfarin total:   32.5 mg   No change documented:   Smitha Nair RN   Plan last modified:   Smitha Nair RN (2019)   Next INR check:   2020   Priority:   Maintenance   Target end date:   Indefinite    Indications    Long term (current) use of anticoagulants [Z79.01]  Embolism and thrombosis (H) [I74.9]  Anticoagulation monitoring  INR range 2-3 [Z79.01]             Anticoagulation Episode Summary     INR check location:       Preferred lab:       Send INR reminders to:   [facility]    Comments:         Anticoagulation Care Providers     Provider Role Specialty Phone number    Maria Guadalupe Ramon DO [facility] Internal Medicine [phone]            See the Encounter Report to view Anticoagulation Flowsheet and Dosing Calendar (Go to Encounters tab in chart review, and find the Anticoagulation Therapy Visit)        Smitha Nair RN

## 2020-09-17 ENCOUNTER — TRANSFERRED RECORDS (OUTPATIENT)
Dept: HEALTH INFORMATION MANAGEMENT | Facility: OTHER | Age: 60
End: 2020-09-17

## 2020-09-17 LAB — INR PPP: 3.2 (ref 0.9–1.1)

## 2020-09-21 ENCOUNTER — ANTICOAGULATION THERAPY VISIT (OUTPATIENT)
Dept: ANTICOAGULATION | Facility: OTHER | Age: 60
End: 2020-09-21
Attending: INTERNAL MEDICINE
Payer: COMMERCIAL

## 2020-09-21 DIAGNOSIS — I74.9 EMBOLISM AND THROMBOSIS (H): ICD-10-CM

## 2020-09-21 DIAGNOSIS — Z79.01 ANTICOAGULATION MONITORING, INR RANGE 2-3: ICD-10-CM

## 2020-09-21 NOTE — PROGRESS NOTES
ANTICOAGULATION FOLLOW-UP CLINIC VISIT    Patient Name:  Trisha Fernando  Date:  9/21/2020  Contact Type:  no call needed patient to continue same dose    SUBJECTIVE:  Patient Findings         Clinical Outcomes     Negatives:   Major bleeding event, Thromboembolic event, Anticoagulation-related hospital admission, Anticoagulation-related ED visit, Anticoagulation-related fatality           OBJECTIVE    Recent labs: (last 7 days)     09/17/20   INR 3.2*       ASSESSMENT / PLAN  INR assessment SUPRA    Recheck INR In: 1 WEEK    INR Location Home INR      Anticoagulation Summary  As of 9/21/2020    INR goal:   2.0-3.0   TTR:   78.7 % (1 y)   INR used for dosing:   3.2! (9/17/2020)   Warfarin maintenance plan:   2.5 mg (5 mg x 0.5) every Thu; 5 mg (5 mg x 1) all other days   Full warfarin instructions:   2.5 mg every Thu; 5 mg all other days   Weekly warfarin total:   32.5 mg   No change documented:   Smitha Nair RN   Plan last modified:   Smitha Nair RN (11/18/2019)   Next INR check:   9/28/2020   Priority:   High   Target end date:   Indefinite    Indications    Long term (current) use of anticoagulants [Z79.01]  Embolism and thrombosis (H) [I74.9]  Anticoagulation monitoring  INR range 2-3 [Z79.01]             Anticoagulation Episode Summary     INR check location:       Preferred lab:       Send INR reminders to:   [facility]    Comments:         Anticoagulation Care Providers     Provider Role Specialty Phone number    Maria Guadalupe Ramon DO [facility] Internal Medicine [phone]            See the Encounter Report to view Anticoagulation Flowsheet and Dosing Calendar (Go to Encounters tab in chart review, and find the Anticoagulation Therapy Visit)        Smitha Nair RN

## 2020-09-24 ENCOUNTER — ANTICOAGULATION THERAPY VISIT (OUTPATIENT)
Dept: ANTICOAGULATION | Facility: OTHER | Age: 60
End: 2020-09-24
Attending: INTERNAL MEDICINE
Payer: COMMERCIAL

## 2020-09-24 ENCOUNTER — TRANSFERRED RECORDS (OUTPATIENT)
Dept: HEALTH INFORMATION MANAGEMENT | Facility: OTHER | Age: 60
End: 2020-09-24

## 2020-09-24 DIAGNOSIS — I74.9 EMBOLISM AND THROMBOSIS (H): ICD-10-CM

## 2020-09-24 DIAGNOSIS — Z79.01 ANTICOAGULATION MONITORING, INR RANGE 2-3: ICD-10-CM

## 2020-09-24 LAB — INR PPP: 2.9 (ref 0.9–1.1)

## 2020-09-25 NOTE — PROGRESS NOTES
ANTICOAGULATION FOLLOW-UP CLINIC VISIT    Patient Name:  Trisha Fernando  Date:  2020  Contact Type:  no call needed patient to continue same dose    SUBJECTIVE:  Patient Findings         Clinical Outcomes     Negatives:   Major bleeding event, Thromboembolic event, Anticoagulation-related hospital admission, Anticoagulation-related ED visit, Anticoagulation-related fatality           OBJECTIVE    Recent labs: (last 7 days)     20   INR 2.9*       ASSESSMENT / PLAN  INR assessment THER    Recheck INR In: 1 WEEK    INR Location Home INR      Anticoagulation Summary  As of 2020    INR goal:   2.0-3.0   TTR:   78.7 % (1 y)   INR used for dosin.9 (2020)   Warfarin maintenance plan:   2.5 mg (5 mg x 0.5) every Thu; 5 mg (5 mg x 1) all other days   Full warfarin instructions:   2.5 mg every Thu; 5 mg all other days   Weekly warfarin total:   32.5 mg   No change documented:   Smitha Nair RN   Plan last modified:   Smitha Nair RN (2019)   Next INR check:   10/1/2020   Priority:   High   Target end date:   Indefinite    Indications    Long term (current) use of anticoagulants [Z79.01]  Embolism and thrombosis (H) [I74.9]  Anticoagulation monitoring  INR range 2-3 [Z79.01]             Anticoagulation Episode Summary     INR check location:       Preferred lab:       Send INR reminders to:   [facility]    Comments:         Anticoagulation Care Providers     Provider Role Specialty Phone number    Maria Guadalupe Ramon DO [facility] Internal Medicine [phone]            See the Encounter Report to view Anticoagulation Flowsheet and Dosing Calendar (Go to Encounters tab in chart review, and find the Anticoagulation Therapy Visit)        Smitha Nair RN

## 2020-09-30 DIAGNOSIS — Z79.01 ANTICOAGULATION MONITORING, INR RANGE 2-3: ICD-10-CM

## 2020-09-30 DIAGNOSIS — I74.9 EMBOLISM AND THROMBOSIS (H): ICD-10-CM

## 2020-10-01 ENCOUNTER — ANTICOAGULATION THERAPY VISIT (OUTPATIENT)
Dept: ANTICOAGULATION | Facility: OTHER | Age: 60
End: 2020-10-01
Attending: INTERNAL MEDICINE
Payer: COMMERCIAL

## 2020-10-01 ENCOUNTER — TRANSFERRED RECORDS (OUTPATIENT)
Dept: HEALTH INFORMATION MANAGEMENT | Facility: OTHER | Age: 60
End: 2020-10-01

## 2020-10-01 DIAGNOSIS — Z79.01 ANTICOAGULATION MONITORING, INR RANGE 2-3: ICD-10-CM

## 2020-10-01 DIAGNOSIS — I74.9 EMBOLISM AND THROMBOSIS (H): ICD-10-CM

## 2020-10-01 LAB — INR PPP: 3.4 (ref 0.9–1.1)

## 2020-10-01 RX ORDER — WARFARIN SODIUM 5 MG/1
TABLET ORAL
Qty: 90 TABLET | Refills: 1 | Status: SHIPPED | OUTPATIENT
Start: 2020-10-01 | End: 2020-10-26

## 2020-10-01 NOTE — TELEPHONE ENCOUNTER
"Requested Prescriptions   Pending Prescriptions Disp Refills     warfarin ANTICOAGULANT (COUMADIN) 5 MG tablet [Pharmacy Med Name: WARFARIN SODIUM 5 MG TABLET] 90 tablet 1     Sig: TAKE 5 MG X 6 DAYS AND 2.5 MG X 1 DAY/WEEK [facility].       Vitamin K Antagonists Failed - 9/30/2020 12:14 AM        Failed - INR is within goal in the past 6 weeks     Confirm INR is within goal in the past 6 weeks.     Recent Labs   Lab Test 09/24/20   INR 2.9*                       Passed - Recent (12 mo) or future (30 days) visit within the authorizing provider's specialty     Patient has had an office visit with the authorizing provider or a provider within the authorizing providers department within the previous 12 mos or has a future within next 30 days. See \"Patient Info\" tab in inbasket, or \"Choose Columns\" in Meds & Orders section of the refill encounter.              Passed - Medication is active on med list        Passed - Patient is 18 years of age or older        Passed - Patient is not pregnant        Passed - No positive pregnancy on file in past 12 months           Prescription approved per [facility] Refill Protocol.    "

## 2020-10-01 NOTE — PROGRESS NOTES
ANTICOAGULATION FOLLOW-UP CLINIC VISIT    Patient Name:  Trisha Fernando  Date:  10/1/2020  Contact Type:  Telephone/ spoke with patient reviewed dosing    SUBJECTIVE:  Patient Findings     Positives:  Change in medications (states she has been using voltren cream this last week)        Clinical Outcomes     Negatives:  Major bleeding event, Thromboembolic event, Anticoagulation-related hospital admission, Anticoagulation-related ED visit, Anticoagulation-related fatality           OBJECTIVE    Recent labs: (last 7 days)     10/01/20   INR 3.4*       ASSESSMENT / PLAN  INR assessment SUPRA    Recheck INR In: 1 WEEK    INR Location Home INR      Anticoagulation Summary  As of 10/1/2020    INR goal:  2.0-3.0   TTR:  78.9 % (1 y)   INR used for dosing:  3.4 (10/1/2020)   Warfarin maintenance plan:  2.5 mg (5 mg x 0.5) every Thu; 5 mg (5 mg x 1) all other days   Full warfarin instructions:  2.5 mg every Thu; 5 mg all other days   Weekly warfarin total:  32.5 mg   No change documented:  Smitha Nair RN   Plan last modified:  Smitha Nair RN (11/18/2019)   Next INR check:  10/8/2020   Priority:  High   Target end date:  Indefinite    Indications    Long term (current) use of anticoagulants [Z79.01]  Embolism and thrombosis (H) [I74.9]  Anticoagulation monitoring  INR range 2-3 [Z79.01]             Anticoagulation Episode Summary     INR check location:      Preferred lab:      Send INR reminders to:  [facility]    Comments:        Anticoagulation Care Providers     Provider Role Specialty Phone number    Maria Guadalupe Ramon,  [facility] Internal Medicine [phone]            See the Encounter Report to view Anticoagulation Flowsheet and Dosing Calendar (Go to Encounters tab in chart review, and find the Anticoagulation Therapy Visit)        Smitha Nair RN

## 2020-10-08 ENCOUNTER — TRANSFERRED RECORDS (OUTPATIENT)
Dept: HEALTH INFORMATION MANAGEMENT | Facility: OTHER | Age: 60
End: 2020-10-08

## 2020-10-08 ENCOUNTER — ANTICOAGULATION THERAPY VISIT (OUTPATIENT)
Dept: ANTICOAGULATION | Facility: OTHER | Age: 60
End: 2020-10-08
Attending: INTERNAL MEDICINE
Payer: COMMERCIAL

## 2020-10-08 DIAGNOSIS — I74.9 EMBOLISM AND THROMBOSIS (H): ICD-10-CM

## 2020-10-08 DIAGNOSIS — Z79.01 ANTICOAGULATION MONITORING, INR RANGE 2-3: ICD-10-CM

## 2020-10-08 LAB — INR PPP: 3.2 (ref 0.9–1.1)

## 2020-10-08 NOTE — PROGRESS NOTES
ANTICOAGULATION FOLLOW-UP CLINIC VISIT    Patient Name:  Trisha Fernando  Date:  10/8/2020  Contact Type:  Telephone left message regarding dosing    SUBJECTIVE:  Patient Findings         Clinical Outcomes     Negatives:  Major bleeding event, Thromboembolic event, Anticoagulation-related hospital admission, Anticoagulation-related ED visit, Anticoagulation-related fatality           OBJECTIVE    Recent labs: (last 7 days)     10/08/20   INR 3.2*       ASSESSMENT / PLAN  INR assessment SUPRA    Recheck INR In: 1 WEEK    INR Location Home INR      Anticoagulation Summary  As of 10/8/2020    INR goal:  2.0-3.0   TTR:  78.0 % (1 y)   INR used for dosing:  3.2 (10/8/2020)   Warfarin maintenance plan:  2.5 mg (5 mg x 0.5) every Thu; 5 mg (5 mg x 1) all other days   Full warfarin instructions:  2.5 mg every Thu; 5 mg all other days   Weekly warfarin total:  32.5 mg   No change documented:  Smitha Nair RN   Plan last modified:  Smitha Nair RN (11/18/2019)   Next INR check:  10/15/2020   Priority:  High   Target end date:  Indefinite    Indications    Long term (current) use of anticoagulants [Z79.01]  Embolism and thrombosis (H) [I74.9]  Anticoagulation monitoring  INR range 2-3 [Z79.01]             Anticoagulation Episode Summary     INR check location:      Preferred lab:      Send INR reminders to:  [facility]    Comments:        Anticoagulation Care Providers     Provider Role Specialty Phone number    Maria Guadalupe Ramon DO [facility] Internal Medicine [phone]            See the Encounter Report to view Anticoagulation Flowsheet and Dosing Calendar (Go to Encounters tab in chart review, and find the Anticoagulation Therapy Visit)        Smitha Nair RN

## 2020-10-15 ENCOUNTER — ANTICOAGULATION THERAPY VISIT (OUTPATIENT)
Dept: ANTICOAGULATION | Facility: OTHER | Age: 60
End: 2020-10-15
Attending: INTERNAL MEDICINE
Payer: COMMERCIAL

## 2020-10-15 ENCOUNTER — TRANSFERRED RECORDS (OUTPATIENT)
Dept: HEALTH INFORMATION MANAGEMENT | Facility: OTHER | Age: 60
End: 2020-10-15

## 2020-10-15 DIAGNOSIS — Z79.01 ANTICOAGULATION MONITORING, INR RANGE 2-3: ICD-10-CM

## 2020-10-15 DIAGNOSIS — I74.9 EMBOLISM AND THROMBOSIS (H): ICD-10-CM

## 2020-10-15 LAB — INR PPP: 3.1 (ref 0.9–1.1)

## 2020-10-15 NOTE — PROGRESS NOTES
ANTICOAGULATION FOLLOW-UP CLINIC VISIT    Patient Name:  Trisha Fernando  Date:  10/15/2020  Contact Type:  no call patient to continue same dose    SUBJECTIVE:  Patient Findings         Clinical Outcomes     Negatives:  Major bleeding event, Thromboembolic event, Anticoagulation-related hospital admission, Anticoagulation-related ED visit, Anticoagulation-related fatality           OBJECTIVE    Recent labs: (last 7 days)     10/15/20   INR 3.1*       ASSESSMENT / PLAN  INR assessment SUPRA    Recheck INR In: 1 WEEK    INR Location Home INR      Anticoagulation Summary  As of 10/15/2020    INR goal:  2.0-3.0   TTR:  76.1 % (1 y)   INR used for dosing:  3.1 (10/15/2020)   Warfarin maintenance plan:  2.5 mg (5 mg x 0.5) every Thu; 5 mg (5 mg x 1) all other days   Full warfarin instructions:  2.5 mg every Thu; 5 mg all other days   Weekly warfarin total:  32.5 mg   No change documented:  Smitha Nair RN   Plan last modified:  Smitha Nair RN (11/18/2019)   Next INR check:  10/22/2020   Priority:  High   Target end date:  Indefinite    Indications    Long term (current) use of anticoagulants [Z79.01]  Embolism and thrombosis (H) [I74.9]  Anticoagulation monitoring  INR range 2-3 [Z79.01]             Anticoagulation Episode Summary     INR check location:      Preferred lab:      Send INR reminders to:  [facility]    Comments:        Anticoagulation Care Providers     Provider Role Specialty Phone number    Maria Guadalupe Ramon DO [facility] Internal Medicine [phone]            See the Encounter Report to view Anticoagulation Flowsheet and Dosing Calendar (Go to Encounters tab in chart review, and find the Anticoagulation Therapy Visit)        Smitha Nair RN

## 2020-10-22 ENCOUNTER — TRANSFERRED RECORDS (OUTPATIENT)
Dept: HEALTH INFORMATION MANAGEMENT | Facility: OTHER | Age: 60
End: 2020-10-22

## 2020-10-26 ENCOUNTER — ANTICOAGULATION THERAPY VISIT (OUTPATIENT)
Dept: ANTICOAGULATION | Facility: OTHER | Age: 60
End: 2020-10-26
Attending: INTERNAL MEDICINE
Payer: COMMERCIAL

## 2020-10-26 DIAGNOSIS — I74.9 EMBOLISM AND THROMBOSIS (H): ICD-10-CM

## 2020-10-26 DIAGNOSIS — Z79.01 ANTICOAGULATION MONITORING, INR RANGE 2-3: ICD-10-CM

## 2020-10-26 DIAGNOSIS — I10 ESSENTIAL HYPERTENSION: ICD-10-CM

## 2020-10-26 LAB — INR PPP: 3.1 (ref 0.9–1.1)

## 2020-10-26 RX ORDER — WARFARIN SODIUM 5 MG/1
TABLET ORAL
Qty: 90 TABLET | Refills: 1 | COMMUNITY
Start: 2020-10-26 | End: 2021-03-29

## 2020-10-26 NOTE — PROGRESS NOTES
ANTICOAGULATION FOLLOW-UP CLINIC VISIT    Patient Name:  Trisha Fernando  Date:  10/26/2020  Contact Type:  fax from mdINR/phone call with patient    SUBJECTIVE:  Patient Findings     Positives:  Change in medications (Tylenol prn usually daily, Voltarin prn  about weekly)        Clinical Outcomes     Negatives:  Major bleeding event, Thromboembolic event, Anticoagulation-related hospital admission, Anticoagulation-related ED visit, Anticoagulation-related fatality           OBJECTIVE    Recent labs: (last 7 days)     10/22/20   INR 3.1*       ASSESSMENT / PLAN  INR assessment SUPRA    Recheck INR In: 2 WEEKS    INR Location Home INR      Anticoagulation Summary  As of 10/26/2020    INR goal:  2.0-3.0   TTR:  76.0 % (1 y)   INR used for dosing:  3.1 (10/22/2020)   Warfarin maintenance plan:  2.5 mg (5 mg x 0.5) every Mon, Thu; 5 mg (5 mg x 1) all other days   Full warfarin instructions:  2.5 mg every Mon, Thu; 5 mg all other days   Weekly warfarin total:  30 mg   Plan last modified:  Maria D Zayas RN (10/26/2020)   Next INR check:  11/9/2020   Priority:  High   Target end date:  Indefinite    Indications    Long term (current) use of anticoagulants [Z79.01]  Embolism and thrombosis (H) [I74.9]  Anticoagulation monitoring  INR range 2-3 [Z79.01]             Anticoagulation Episode Summary     INR check location:      Preferred lab:      Send INR reminders to:  [facility]    Comments:        Anticoagulation Care Providers     Provider Role Specialty Phone number    YennyMaria Guadalupe KIRBY,  [facility] Internal Medicine [phone]            See the Encounter Report to view Anticoagulation Flowsheet and Dosing Calendar (Go to Encounters tab in chart review, and find the Anticoagulation Therapy Visit)    No encounter, INR  received via fax.  Assessment and istructions via phone call with patient. Patient verbalized understanding.      Maria D Zayas RN

## 2020-10-27 ENCOUNTER — ALLIED HEALTH/NURSE VISIT (OUTPATIENT)
Dept: FAMILY MEDICINE | Facility: OTHER | Age: 60
End: 2020-10-27
Payer: COMMERCIAL

## 2020-10-27 ENCOUNTER — TELEPHONE (OUTPATIENT)
Dept: INTERNAL MEDICINE | Facility: OTHER | Age: 60
End: 2020-10-27

## 2020-10-27 DIAGNOSIS — R05.9 COUGH: Primary | ICD-10-CM

## 2020-10-27 DIAGNOSIS — R68.83 CHILLS: ICD-10-CM

## 2020-10-27 PROCEDURE — U0003 INFECTIOUS AGENT DETECTION BY NUCLEIC ACID (DNA OR RNA); SEVERE ACUTE RESPIRATORY SYNDROME CORONAVIRUS 2 (SARS-COV-2) (CORONAVIRUS DISEASE [COVID-19]), AMPLIFIED PROBE TECHNIQUE, MAKING USE OF HIGH THROUGHPUT TECHNOLOGIES AS DESCRIBED BY CMS-2020-01-R: HCPCS | Mod: ZL

## 2020-10-27 PROCEDURE — 99207 PR NO CHARGE NURSE ONLY: CPT

## 2020-10-27 PROCEDURE — C9803 HOPD COVID-19 SPEC COLLECT: HCPCS

## 2020-10-27 RX ORDER — LOSARTAN POTASSIUM 25 MG/1
TABLET ORAL
Qty: 90 TABLET | Refills: 1 | Status: SHIPPED | OUTPATIENT
Start: 2020-10-27 | End: 2021-03-05

## 2020-10-27 RX ORDER — ALBUTEROL SULFATE 90 UG/1
2 AEROSOL, METERED RESPIRATORY (INHALATION) EVERY 4 HOURS PRN
Qty: 1 INHALER | Refills: 1 | Status: SHIPPED | OUTPATIENT
Start: 2020-10-27 | End: 2022-05-05

## 2020-10-27 NOTE — TELEPHONE ENCOUNTER
Patient called.  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  She currently is able to talk in full sentences.  She feels a little better now although states that her oxygen while on the phone is 93%.  She was given instructions should her symptoms worsen to come in through the ER and contact them ahead of time to let her know she may possibly have Covid.  She does have a test pending.

## 2020-10-27 NOTE — TELEPHONE ENCOUNTER
Prescription refilled per RN Medication Refill Policy..................Kaitlynn Patrick RN 10/27/2020 8:05 AM

## 2020-10-27 NOTE — TELEPHONE ENCOUNTER
Patient called in regards to getting something to help her breathe. She is getting tested for covid today and would like an inhaler if possible. Please call her back in regards to this. Thank you. Genoveva Chase on 10/27/2020 at 7:21 AM

## 2020-10-27 NOTE — TELEPHONE ENCOUNTER
She had a COVID test this morning due to some symptoms.     She had a temp last night over 100.0. She is coughing and has body aches. She says it hit her last night real fast.  Her O2 is around 91-95%. She was a little worried this morning but is breathing a little better now.    She used an inhaler and a nebulizer in the past and has a history of Pneumonia. She has had a Pneumovax in 2013.    She wonders if you would send in an inhaler in case she gets into another bad episode and starts to panic again.    [facility].  Also see Medical Message from 10/25/20.  Marlene Harrison [facility])..................10/27/2020   12:50 PM

## 2020-10-27 NOTE — PROGRESS NOTES
Patient swabbed for COVID-19 testing. Cough and chills.Josefina Tse RN on 10/27/2020 at 12:07 PM

## 2020-10-28 ENCOUNTER — TELEPHONE (OUTPATIENT)
Dept: NURSING | Facility: CLINIC | Age: 60
End: 2020-10-28

## 2020-10-28 ENCOUNTER — NURSE TRIAGE (OUTPATIENT)
Dept: NURSING | Facility: CLINIC | Age: 60
End: 2020-10-28

## 2020-10-28 LAB
SARS-COV-2 RNA SPEC QL NAA+PROBE: ABNORMAL
SPECIMEN SOURCE: ABNORMAL

## 2020-10-28 NOTE — TELEPHONE ENCOUNTER
"Coronavirus (COVID-19) Notification    Caller Name (Patient, parent, daughter/son, grandparent, etc)  patient    Reason for call  Notify of Positive Coronavirus (COVID-19) lab results, assess symptoms,  review [facility] recommendations    Lab Result    Lab test:  2019-nCoV rRt-PCR or SARS-CoV-2 PCR    Oropharyngeal AND/OR nasopharyngeal swabs is POSITIVE for 2019-nCoV RNA/SARS-COV-2 PCR (COVID-19 virus)    RN Recommendations/Instructions per [facility] Coronavirus COVID-19 recommendations    Brief introduction script  Introduce self then review script:  \"I am calling on behalf of [employer].  We were notified that your Coronavirus test (COVID-19) for was POSITIVE for the virus.  I have some information to relay to you but first I wanted to mention that the [place] Dept of Health will be contacting you shortly [it's possible MD already called Patient] to talk to you more about how you are feeling and other people you have had contact with who might now also have the virus.  Also, [facility] is Partnering with the [facility] for Covid-19 research, you may be contacted directly by research staff.\"    Assessment (Inquire about Patient's current symptoms)   Assessment   Current Symptoms at time of phone call: (if no symptoms, document No symptoms] Fever, shortness of breath joint pain nos smell ortste   Symptoms onset (if applicable) 10/26/20     If at time of call, Patients symptoms hare worsened, the Patient should contact 911 or have someone drive them to Emergency Dept promptly:      If Patient calling 911, inform 911 personal that you have tested positive for the Coronavirus (COVID-19).  Place mask on and await 911 to arrive.    If Emergency Dept, If possible, please have another adult drive you to the Emergency Dept but you need to wear mask when in contact with other people.      Review information with Patient    Your result was positive. This means you have COVID-19 (coronavirus).  " We have sent you a letter that reviews the information that I'll be reviewing with you now.    How can I protect others?    If you have symptoms: stay home and away from others (self-isolate) until:    You've had no fever--and no medicine that reduces fever--for 1 full day (24 hours). And       Your other symptoms have gotten better. For example, your cough or breathing has improved. And     At least 10 days have passed since your symptoms started. (If you've been told by a doctor that you have a weak immune system, wait 20 days.)     If you don't have symptoms: Stay home and away from others (self-isolate) until at least 10 days have passed since your first positive COVID-19 test. (Date test collected)    During this time:    Stay in your own room, including for meals. Use your own bathroom if you can.    Stay away from others in your home. No hugging, kissing or shaking hands. No visitors.     Don't go to work, school or anywhere else.     Clean  high touch  surfaces often (doorknobs, counters, handles, etc.). Use a household cleaning spray or wipes. You'll find a full list on the EPA website at www.epa.gov/pesticide-registration/list-n-disinfectants-use-against-sars-cov-2.     Cover your mouth and nose with a mask, tissue or other face covering to avoid spreading germs.    Wash your hands and face often with soap and water.    Caregivers in these groups are at risk for severe illness due to COVID-19:  o People 65 years and older  o People who live in a nursing home or long-term care facility  o People with chronic disease (lung, heart, cancer, diabetes, kidney, liver, immunologic)  o People who have a weakened immune system, including those who:  - Are in cancer treatment  - Take medicine that weakens the immune system, such as corticosteroids  - Had a bone marrow or organ transplant  - Have an immune deficiency  - Have poorly controlled HIV or AIDS  - Are obese (body mass index of 40 or higher)  - Smoke  regularly    Caregivers should wear gloves while washing dishes, handling laundry and cleaning bedrooms and bathrooms.    Wash and dry laundry with special caution. Don't shake dirty laundry, and use the warmest water setting you can.    If you have a weakened immune system, ask your doctor about other actions you should take.    For more tips, go to www.cdc.gov/coronavirus/2019-ncov/downloads/10Things.pdf.    You should not go back to work until you meet the guidelines above for ending your home isolation. You don't need to be retested for COVID-19 before going back to work--studies show that you won't spread the virus if it's been at least 10 days since your symptoms started (or 20 days, if you have a weak immune system).    Employers: This document serves as formal notice of your employee's medical guidelines for going back to work. They must meet the above guidelines before going back to work in person.    How can I take care of myself?    1. Get lots of rest. Drink extra fluids (unless a doctor has told you not to).    2. Take Tylenol (acetaminophen) for fever or pain. If you have liver or kidney problems, ask your family doctor if it's okay to take Tylenol.     Take either:     650 mg (two 325 mg pills) every 4 to 6 hours, or     1,000 mg (two 500 mg pills) every 8 hours as needed.     Note: Don't take more than 3,000 mg in one day. Acetaminophen is found in many medicines (both prescribed and over-the-counter medicines). Read all labels to be sure you don't take too much.    For children, check the Tylenol bottle for the right dose (based on their age or weight).    3. If you have other health problems (like cancer, heart failure, an organ transplant or severe kidney disease): Call your specialty clinic if you don't feel better in the next 2 days.    4. Know when to call 911: Emergency warning signs include:    Trouble breathing or shortness of breath    Pain or pressure in the chest that doesn't go  away    Feeling confused like you haven't felt before, or not being able to wake up    Bluish-colored lips or face    5. Sign up for [employer]. We know it's scary to hear that you have COVID-19. We want to track your symptoms to make sure you're okay over the next 2 weeks. Please look for an email from [employer]--this is a free, online program that we'll use to keep in touch. To sign up, follow the link in the email. Learn more at [email or web address].    Where can I get more information?    [facility]: [email or web address]    Coronavirus Basics: www.health.[facility].[place]./diseases/coronavirus/basics.html    What to Do If You're Sick: www.cdc.gov/coronavirus/2019-ncov/about/steps-when-sick.html    Ending Home Isolation: www.cdc.gov/coronavirus/2019-ncov/hcp/disposition-in-home-patients.html     Caring for Someone with COVID-19: www.cdc.gov/coronavirus/2019-ncov/if-you-are-sick/care-for-someone.html     [facility] clinical trials (COVID-19 research studies): [email or web address].[email or web address]-clinical-trials     A Positive COVID-19 letter will be sent via [employer] or the mail. (Exception, no letters sent to Presurgerical/Preprocedure Patients)    [Name]  Maria De Jesus Munson RN  FNA

## 2020-10-28 NOTE — TELEPHONE ENCOUNTER
Coronavirus (COVID-19) Notification    Reason for call  Notify of POSITIVE  COVID-19 lab result, assess symptoms,  review [facility] recommendations    Lab Result   Lab test for 2019-nCoV rRt-PCR or SARS-COV-2 PCR  Oropharyngeal AND/OR nasopharyngeal swabs were POSITIVE for 2019-nCoV RNA [OR] SARS-COV-2 RNA (COVID-19) RNA     We have been unable to reach Patient by phone at this time to notify of their Positive COVID-19 result.  Left voicemail message requesting a call back to [phone] [facility] for results.        POSITIVE COVID-19 Letter sent.    [Name]  Earl Woodard RN/[facility], 10/28/20, 5:10 PM.

## 2020-10-29 ENCOUNTER — MYC MEDICAL ADVICE (OUTPATIENT)
Dept: INTERNAL MEDICINE | Facility: OTHER | Age: 60
End: 2020-10-29

## 2020-10-29 DIAGNOSIS — U07.1 INFECTION DUE TO 2019 NOVEL CORONAVIRUS: Primary | ICD-10-CM

## 2020-10-31 ENCOUNTER — TRANSFERRED RECORDS (OUTPATIENT)
Dept: HEALTH INFORMATION MANAGEMENT | Facility: OTHER | Age: 60
End: 2020-10-31

## 2020-10-31 LAB — INR PPP: 2.1 (ref 0.9–1.1)

## 2020-11-02 ENCOUNTER — ANTICOAGULATION THERAPY VISIT (OUTPATIENT)
Dept: ANTICOAGULATION | Facility: OTHER | Age: 60
End: 2020-11-02
Attending: INTERNAL MEDICINE
Payer: COMMERCIAL

## 2020-11-02 DIAGNOSIS — Z79.01 ANTICOAGULATION MONITORING, INR RANGE 2-3: ICD-10-CM

## 2020-11-02 DIAGNOSIS — I74.9 EMBOLISM AND THROMBOSIS (H): ICD-10-CM

## 2020-11-02 RX ORDER — CODEINE PHOSPHATE AND GUAIFENESIN 10; 100 MG/5ML; MG/5ML
2 SOLUTION ORAL EVERY 6 HOURS PRN
Qty: 236 ML | Refills: 1 | Status: SHIPPED | OUTPATIENT
Start: 2020-11-02 | End: 2021-02-04

## 2020-11-02 NOTE — PROGRESS NOTES
ANTICOAGULATION FOLLOW-UP CLINIC VISIT    Patient Name:  Trisha Fernando  Date:  2020  Contact Type:  no call needed patient to continue same dose    SUBJECTIVE:  Patient Findings         Clinical Outcomes     Negatives:  Major bleeding event, Thromboembolic event, Anticoagulation-related hospital admission, Anticoagulation-related ED visit, Anticoagulation-related fatality           OBJECTIVE    Recent labs: (last 7 days)     10/31/20   INR 2.1*       ASSESSMENT / PLAN  INR assessment THER    Recheck INR In: 1 WEEK    INR Location Home INR      Anticoagulation Summary  As of 2020    INR goal:  2.0-3.0   TTR:  77.8 % (1 y)   INR used for dosin.1 (10/31/2020)   Warfarin maintenance plan:  2.5 mg (5 mg x 0.5) every Mon, Thu; 5 mg (5 mg x 1) all other days   Full warfarin instructions:  2.5 mg every Mon, Thu; 5 mg all other days   Weekly warfarin total:  30 mg   No change documented:  Smitha Nair RN   Plan last modified:  Maria D Zayas RN (10/26/2020)   Next INR check:  2020   Priority:  High   Target end date:  Indefinite    Indications    Long term (current) use of anticoagulants [Z79.01]  Embolism and thrombosis (H) [I74.9]  Anticoagulation monitoring  INR range 2-3 [Z79.01]             Anticoagulation Episode Summary     INR check location:      Preferred lab:      Send INR reminders to:  [facility]    Comments:        Anticoagulation Care Providers     Provider Role Specialty Phone number    Maria Guadalupe Ramon DO [facility] Internal Medicine [phone]            See the Encounter Report to view Anticoagulation Flowsheet and Dosing Calendar (Go to Encounters tab in chart review, and find the Anticoagulation Therapy Visit)        Smitha Nair, RN

## 2020-11-05 ENCOUNTER — TELEPHONE (OUTPATIENT)
Dept: INTERNAL MEDICINE | Facility: OTHER | Age: 60
End: 2020-11-05

## 2020-11-05 ENCOUNTER — APPOINTMENT (OUTPATIENT)
Dept: CT IMAGING | Facility: OTHER | Age: 60
DRG: 177 | End: 2020-11-05
Attending: PHYSICIAN ASSISTANT
Payer: COMMERCIAL

## 2020-11-05 ENCOUNTER — HOSPITAL ENCOUNTER (INPATIENT)
Facility: OTHER | Age: 60
LOS: 2 days | Discharge: HOME | DRG: 177 | End: 2020-11-07
Attending: PHYSICIAN ASSISTANT | Admitting: FAMILY MEDICINE
Payer: COMMERCIAL

## 2020-11-05 DIAGNOSIS — J96.01 ACUTE RESPIRATORY FAILURE WITH HYPOXIA (H): ICD-10-CM

## 2020-11-05 DIAGNOSIS — Z87.891 PERSONAL HISTORY OF TOBACCO USE, PRESENTING HAZARDS TO HEALTH: ICD-10-CM

## 2020-11-05 DIAGNOSIS — I10 ESSENTIAL (PRIMARY) HYPERTENSION: ICD-10-CM

## 2020-11-05 DIAGNOSIS — J12.82 PNEUMONIA DUE TO 2019 NOVEL CORONAVIRUS: ICD-10-CM

## 2020-11-05 DIAGNOSIS — Z79.899 NEED FOR PROPHYLACTIC CHEMOTHERAPY: ICD-10-CM

## 2020-11-05 DIAGNOSIS — I71.20 THORACIC AORTIC ANEURYSM WITHOUT RUPTURE (H): ICD-10-CM

## 2020-11-05 DIAGNOSIS — Z79.01 LONG TERM (CURRENT) USE OF ANTICOAGULANTS: ICD-10-CM

## 2020-11-05 DIAGNOSIS — Z79.51 LONG TERM CURRENT USE OF INHALED STEROID: ICD-10-CM

## 2020-11-05 DIAGNOSIS — U07.1 PNEUMONIA DUE TO 2019 NOVEL CORONAVIRUS: ICD-10-CM

## 2020-11-05 DIAGNOSIS — D68.51 ACTIVATED PROTEIN C RESISTANCE (H): ICD-10-CM

## 2020-11-05 LAB
ABO + RH BLD: NORMAL
ABO + RH BLD: NORMAL
ALBUMIN SERPL-MCNC: 3.8 G/DL (ref 3.5–5.7)
ALBUMIN UR-MCNC: NEGATIVE MG/DL
ALP SERPL-CCNC: 62 U/L (ref 34–104)
ALT SERPL W P-5'-P-CCNC: 40 U/L (ref 7–52)
ANION GAP SERPL CALCULATED.3IONS-SCNC: 7 MMOL/L (ref 3–14)
APPEARANCE UR: CLEAR
APTT PPP: 43 SEC (ref 22–37)
AST SERPL W P-5'-P-CCNC: 38 U/L (ref 13–39)
BASOPHILS # BLD AUTO: 0 10E9/L (ref 0–0.2)
BASOPHILS NFR BLD AUTO: 0.4 %
BILIRUB SERPL-MCNC: 0.5 MG/DL (ref 0.3–1)
BILIRUB UR QL STRIP: NEGATIVE
BUN SERPL-MCNC: 10 MG/DL (ref 7–25)
CALCIUM SERPL-MCNC: 8.9 MG/DL (ref 8.6–10.3)
CHLORIDE SERPL-SCNC: 99 MMOL/L (ref 98–107)
CK SERPL-CCNC: 193 U/L (ref 30–223)
CO2 SERPL-SCNC: 30 MMOL/L (ref 21–31)
COLOR UR AUTO: NORMAL
CREAT SERPL-MCNC: 0.91 MG/DL (ref 0.6–1.2)
CRP SERPL-MCNC: 26.8 MG/L
D DIMER PPP FEU-MCNC: 0.4 UG/ML FEU (ref 0–0.5)
D DIMER PPP FEU-MCNC: 0.5 UG/ML FEU (ref 0–0.5)
DIFFERENTIAL METHOD BLD: ABNORMAL
EOSINOPHIL # BLD AUTO: 0 10E9/L (ref 0–0.7)
EOSINOPHIL NFR BLD AUTO: 0.2 %
ERYTHROCYTE [DISTWIDTH] IN BLOOD BY AUTOMATED COUNT: 14.1 % (ref 10–15)
FERRITIN SERPL-MCNC: 408 NG/ML (ref 23.9–336.2)
FIBRINOGEN PPP-MCNC: 562 MG/DL (ref 200–420)
FLUAV+FLUBV RNA SPEC QL NAA+PROBE: NEGATIVE
FLUAV+FLUBV RNA SPEC QL NAA+PROBE: NEGATIVE
GFR SERPL CREATININE-BSD FRML MDRD: 63 ML/MIN/{1.73_M2}
GLUCOSE SERPL-MCNC: 138 MG/DL (ref 70–105)
GLUCOSE UR STRIP-MCNC: NEGATIVE MG/DL
HCO3 BLD-SCNC: 25 MMOL/L (ref 21–28)
HCT VFR BLD AUTO: 47.9 % (ref 35–47)
HGB BLD-MCNC: 15.9 G/DL (ref 11.7–15.7)
HGB UR QL STRIP: NEGATIVE
IMM GRANULOCYTES # BLD: 0 10E9/L (ref 0–0.4)
IMM GRANULOCYTES NFR BLD: 0.2 %
INR PPP: 2.66 (ref 0.86–1.14)
KETONES UR STRIP-MCNC: NEGATIVE MG/DL
LACTATE BLD-SCNC: 1.2 MMOL/L (ref 0.7–2)
LDH SERPL L TO P-CCNC: 258 U/L (ref 140–271)
LEUKOCYTE ESTERASE UR QL STRIP: NEGATIVE
LYMPHOCYTES # BLD AUTO: 1.4 10E9/L (ref 0.8–5.3)
LYMPHOCYTES NFR BLD AUTO: 31.7 %
MCH RBC QN AUTO: 31.2 PG (ref 26.5–33)
MCHC RBC AUTO-ENTMCNC: 33.2 G/DL (ref 31.5–36.5)
MCV RBC AUTO: 94 FL (ref 78–100)
MONOCYTES # BLD AUTO: 0.3 10E9/L (ref 0–1.3)
MONOCYTES NFR BLD AUTO: 6 %
NEUTROPHILS # BLD AUTO: 2.8 10E9/L (ref 1.6–8.3)
NEUTROPHILS NFR BLD AUTO: 61.5 %
NITRATE UR QL: NEGATIVE
O2/TOTAL GAS SETTING VFR VENT: 0 %
OXYHGB MFR BLD: 94 % (ref 92–100)
PCO2 BLD: 36 MM HG (ref 35–45)
PH BLD: 7.46 PH (ref 7.35–7.45)
PH UR STRIP: 6.5 PH (ref 5–7)
PLATELET # BLD AUTO: 149 10E9/L (ref 150–450)
PO2 BLD: 63 MM HG (ref 80–105)
POTASSIUM SERPL-SCNC: 3.3 MMOL/L (ref 3.5–5.1)
PROCALCITONIN SERPL-MCNC: <0.5 NG/ML
PROT SERPL-MCNC: 7.2 G/DL (ref 6.4–8.9)
RBC # BLD AUTO: 5.09 10E12/L (ref 3.8–5.2)
RETICS # AUTO: 44.1 10E9/L (ref 25–95)
RETICS/RBC NFR AUTO: 0.9 % (ref 0.5–2)
RSV RNA SPEC NAA+PROBE: NEGATIVE
SODIUM SERPL-SCNC: 136 MMOL/L (ref 134–144)
SOURCE: NORMAL
SP GR UR STRIP: 1.01 (ref 1–1.03)
SPECIMEN EXP DATE BLD: NORMAL
SPECIMEN SOURCE: NORMAL
TROPONIN I SERPL-MCNC: 5.5 PG/ML
UROBILINOGEN UR STRIP-MCNC: NORMAL MG/DL (ref 0–2)
WBC # BLD AUTO: 4.5 10E9/L (ref 4–11)

## 2020-11-05 PROCEDURE — 93010 ELECTROCARDIOGRAM REPORT: CPT | Performed by: INTERNAL MEDICINE

## 2020-11-05 PROCEDURE — 250N000011 HC RX IP 250 OP 636: Performed by: PHYSICIAN ASSISTANT

## 2020-11-05 PROCEDURE — 85045 AUTOMATED RETICULOCYTE COUNT: CPT | Performed by: FAMILY MEDICINE

## 2020-11-05 PROCEDURE — 87631 RESP VIRUS 3-5 TARGETS: CPT | Performed by: PHYSICIAN ASSISTANT

## 2020-11-05 PROCEDURE — 85730 THROMBOPLASTIN TIME PARTIAL: CPT | Performed by: FAMILY MEDICINE

## 2020-11-05 PROCEDURE — 80053 COMPREHEN METABOLIC PANEL: CPT | Performed by: PHYSICIAN ASSISTANT

## 2020-11-05 PROCEDURE — 84484 ASSAY OF TROPONIN QUANT: CPT | Performed by: PHYSICIAN ASSISTANT

## 2020-11-05 PROCEDURE — 85025 COMPLETE CBC W/AUTO DIFF WBC: CPT | Performed by: PHYSICIAN ASSISTANT

## 2020-11-05 PROCEDURE — 120N000001 HC R&B MED SURG/OB

## 2020-11-05 PROCEDURE — 99222 1ST HOSP IP/OBS MODERATE 55: CPT | Mod: AI | Performed by: FAMILY MEDICINE

## 2020-11-05 PROCEDURE — 36600 WITHDRAWAL OF ARTERIAL BLOOD: CPT | Performed by: PHYSICIAN ASSISTANT

## 2020-11-05 PROCEDURE — 99285 EMERGENCY DEPT VISIT HI MDM: CPT | Performed by: PHYSICIAN ASSISTANT

## 2020-11-05 PROCEDURE — 87040 BLOOD CULTURE FOR BACTERIA: CPT | Performed by: PHYSICIAN ASSISTANT

## 2020-11-05 PROCEDURE — 86900 BLOOD TYPING SEROLOGIC ABO: CPT | Performed by: FAMILY MEDICINE

## 2020-11-05 PROCEDURE — 93005 ELECTROCARDIOGRAM TRACING: CPT | Performed by: PHYSICIAN ASSISTANT

## 2020-11-05 PROCEDURE — 82550 ASSAY OF CK (CPK): CPT | Performed by: FAMILY MEDICINE

## 2020-11-05 PROCEDURE — 82728 ASSAY OF FERRITIN: CPT | Performed by: PHYSICIAN ASSISTANT

## 2020-11-05 PROCEDURE — 250N000009 HC RX 250: Performed by: FAMILY MEDICINE

## 2020-11-05 PROCEDURE — 71275 CT ANGIOGRAPHY CHEST: CPT

## 2020-11-05 PROCEDURE — 258N000003 HC RX IP 258 OP 636: Performed by: FAMILY MEDICINE

## 2020-11-05 PROCEDURE — 83520 IMMUNOASSAY QUANT NOS NONAB: CPT | Performed by: FAMILY MEDICINE

## 2020-11-05 PROCEDURE — 250N000013 HC RX MED GY IP 250 OP 250 PS 637: Performed by: FAMILY MEDICINE

## 2020-11-05 PROCEDURE — 96374 THER/PROPH/DIAG INJ IV PUSH: CPT | Performed by: PHYSICIAN ASSISTANT

## 2020-11-05 PROCEDURE — 83615 LACTATE (LD) (LDH) ENZYME: CPT | Performed by: PHYSICIAN ASSISTANT

## 2020-11-05 PROCEDURE — 84145 PROCALCITONIN (PCT): CPT | Performed by: PHYSICIAN ASSISTANT

## 2020-11-05 PROCEDURE — 85379 FIBRIN DEGRADATION QUANT: CPT | Performed by: FAMILY MEDICINE

## 2020-11-05 PROCEDURE — 83605 ASSAY OF LACTIC ACID: CPT | Performed by: PHYSICIAN ASSISTANT

## 2020-11-05 PROCEDURE — 99291 CRITICAL CARE FIRST HOUR: CPT | Mod: 25 | Performed by: PHYSICIAN ASSISTANT

## 2020-11-05 PROCEDURE — 85384 FIBRINOGEN ACTIVITY: CPT | Performed by: FAMILY MEDICINE

## 2020-11-05 PROCEDURE — 86901 BLOOD TYPING SEROLOGIC RH(D): CPT | Performed by: FAMILY MEDICINE

## 2020-11-05 PROCEDURE — 86140 C-REACTIVE PROTEIN: CPT | Performed by: PHYSICIAN ASSISTANT

## 2020-11-05 PROCEDURE — 250N000011 HC RX IP 250 OP 636: Performed by: FAMILY MEDICINE

## 2020-11-05 PROCEDURE — 81003 URINALYSIS AUTO W/O SCOPE: CPT | Performed by: PHYSICIAN ASSISTANT

## 2020-11-05 PROCEDURE — 36415 COLL VENOUS BLD VENIPUNCTURE: CPT | Performed by: PHYSICIAN ASSISTANT

## 2020-11-05 PROCEDURE — 85610 PROTHROMBIN TIME: CPT | Performed by: PHYSICIAN ASSISTANT

## 2020-11-05 PROCEDURE — 82805 BLOOD GASES W/O2 SATURATION: CPT | Performed by: PHYSICIAN ASSISTANT

## 2020-11-05 PROCEDURE — 85300 ANTITHROMBIN III ACTIVITY: CPT | Performed by: FAMILY MEDICINE

## 2020-11-05 PROCEDURE — 255N000002 HC RX 255 OP 636: Performed by: PHYSICIAN ASSISTANT

## 2020-11-05 PROCEDURE — 85379 FIBRIN DEGRADATION QUANT: CPT | Performed by: PHYSICIAN ASSISTANT

## 2020-11-05 RX ORDER — POLYETHYLENE GLYCOL 3350 17 G/17G
17 POWDER, FOR SOLUTION ORAL DAILY
Status: DISCONTINUED | OUTPATIENT
Start: 2020-11-05 | End: 2020-11-07 | Stop reason: HOSPADM

## 2020-11-05 RX ORDER — AMOXICILLIN 250 MG
1 CAPSULE ORAL 2 TIMES DAILY
Status: DISCONTINUED | OUTPATIENT
Start: 2020-11-05 | End: 2020-11-07 | Stop reason: HOSPADM

## 2020-11-05 RX ORDER — ACETAMINOPHEN 500 MG
500-1000 TABLET ORAL EVERY 6 HOURS PRN
COMMUNITY
End: 2021-02-04

## 2020-11-05 RX ORDER — LOSARTAN POTASSIUM 25 MG/1
25 TABLET ORAL DAILY
Status: DISCONTINUED | OUTPATIENT
Start: 2020-11-05 | End: 2020-11-07 | Stop reason: HOSPADM

## 2020-11-05 RX ORDER — AMOXICILLIN 250 MG
2 CAPSULE ORAL 2 TIMES DAILY
Status: DISCONTINUED | OUTPATIENT
Start: 2020-11-05 | End: 2020-11-07 | Stop reason: HOSPADM

## 2020-11-05 RX ORDER — AMOXICILLIN 250 MG
1 CAPSULE ORAL 2 TIMES DAILY PRN
Status: DISCONTINUED | OUTPATIENT
Start: 2020-11-05 | End: 2020-11-07 | Stop reason: HOSPADM

## 2020-11-05 RX ORDER — ALBUTEROL SULFATE 90 UG/1
2 AEROSOL, METERED RESPIRATORY (INHALATION) EVERY 4 HOURS PRN
Status: DISCONTINUED | OUTPATIENT
Start: 2020-11-05 | End: 2020-11-07 | Stop reason: HOSPADM

## 2020-11-05 RX ORDER — AZITHROMYCIN 500 MG/5ML
500 INJECTION, POWDER, LYOPHILIZED, FOR SOLUTION INTRAVENOUS EVERY 24 HOURS
Status: DISCONTINUED | OUTPATIENT
Start: 2020-11-05 | End: 2020-11-07 | Stop reason: HOSPADM

## 2020-11-05 RX ORDER — LIDOCAINE 40 MG/G
CREAM TOPICAL
Status: DISCONTINUED | OUTPATIENT
Start: 2020-11-05 | End: 2020-11-07 | Stop reason: HOSPADM

## 2020-11-05 RX ORDER — AMOXICILLIN 250 MG
2 CAPSULE ORAL 2 TIMES DAILY PRN
Status: DISCONTINUED | OUTPATIENT
Start: 2020-11-05 | End: 2020-11-07 | Stop reason: HOSPADM

## 2020-11-05 RX ORDER — WARFARIN SODIUM 2.5 MG/1
2.5 TABLET ORAL
Status: COMPLETED | OUTPATIENT
Start: 2020-11-05 | End: 2020-11-05

## 2020-11-05 RX ORDER — VENLAFAXINE HYDROCHLORIDE 75 MG/1
150 CAPSULE, EXTENDED RELEASE ORAL
Status: DISCONTINUED | OUTPATIENT
Start: 2020-11-06 | End: 2020-11-07 | Stop reason: HOSPADM

## 2020-11-05 RX ORDER — BUPROPION HYDROCHLORIDE 150 MG/1
150 TABLET ORAL EVERY MORNING
Status: DISCONTINUED | OUTPATIENT
Start: 2020-11-06 | End: 2020-11-07 | Stop reason: HOSPADM

## 2020-11-05 RX ORDER — FUROSEMIDE 20 MG
20-40 TABLET ORAL DAILY
Status: DISCONTINUED | OUTPATIENT
Start: 2020-11-06 | End: 2020-11-06

## 2020-11-05 RX ORDER — SODIUM CHLORIDE 9 MG/ML
INJECTION, SOLUTION INTRAVENOUS DAILY PRN
Status: DISCONTINUED | OUTPATIENT
Start: 2020-11-05 | End: 2020-11-06 | Stop reason: CLARIF

## 2020-11-05 RX ORDER — DEXAMETHASONE SODIUM PHOSPHATE 10 MG/ML
10 INJECTION, SOLUTION INTRAMUSCULAR; INTRAVENOUS ONCE
Status: COMPLETED | OUTPATIENT
Start: 2020-11-05 | End: 2020-11-05

## 2020-11-05 RX ORDER — CODEINE PHOSPHATE AND GUAIFENESIN 10; 100 MG/5ML; MG/5ML
10 SOLUTION ORAL EVERY 6 HOURS PRN
Status: DISCONTINUED | OUTPATIENT
Start: 2020-11-05 | End: 2020-11-07 | Stop reason: HOSPADM

## 2020-11-05 RX ORDER — LORAZEPAM 0.5 MG/1
0.5 TABLET ORAL EVERY 6 HOURS PRN
Status: DISCONTINUED | OUTPATIENT
Start: 2020-11-05 | End: 2020-11-07 | Stop reason: HOSPADM

## 2020-11-05 RX ORDER — DEXAMETHASONE SODIUM PHOSPHATE 4 MG/ML
6 INJECTION, SOLUTION INTRA-ARTICULAR; INTRALESIONAL; INTRAMUSCULAR; INTRAVENOUS; SOFT TISSUE EVERY 24 HOURS
Status: DISCONTINUED | OUTPATIENT
Start: 2020-11-06 | End: 2020-11-07 | Stop reason: HOSPADM

## 2020-11-05 RX ADMIN — SODIUM CHLORIDE 30 ML: 9 INJECTION, SOLUTION INTRAVENOUS at 16:25

## 2020-11-05 RX ADMIN — REMDESIVIR 200 MG: 100 INJECTION, POWDER, LYOPHILIZED, FOR SOLUTION INTRAVENOUS at 16:25

## 2020-11-05 RX ADMIN — WARFARIN SODIUM 2.5 MG: 2.5 TABLET ORAL at 17:45

## 2020-11-05 RX ADMIN — IOHEXOL 100 ML: 350 INJECTION, SOLUTION INTRAVENOUS at 14:03

## 2020-11-05 RX ADMIN — LOSARTAN POTASSIUM 25 MG: 25 TABLET ORAL at 20:13

## 2020-11-05 RX ADMIN — METOPROLOL TARTRATE 12.5 MG: 25 TABLET, FILM COATED ORAL at 21:59

## 2020-11-05 RX ADMIN — AZITHROMYCIN 500 MG: 500 INJECTION, POWDER, LYOPHILIZED, FOR SOLUTION INTRAVENOUS at 21:58

## 2020-11-05 RX ADMIN — DEXAMETHASONE SODIUM PHOSPHATE 10 MG: 10 INJECTION, SOLUTION INTRAMUSCULAR; INTRAVENOUS at 13:24

## 2020-11-05 ASSESSMENT — MIFFLIN-ST. JEOR
SCORE: 2367.57
SCORE: 2257.35

## 2020-11-05 ASSESSMENT — ACTIVITIES OF DAILY LIVING (ADL)
ADLS_ACUITY_SCORE: 13
ADLS_ACUITY_SCORE: 13

## 2020-11-05 NOTE — PHARMACY-ANTICOAGULATION SERVICE
Pharmacy Consult- Initial Warfarin Assessment    Trisha Fernando is a 59 year old female admitted on 11/5/2020 with COVID-19 infection.     Primary Indication(s) for Anticoagulation: history of thrombosis, factor V Leiden    Goal INR:2-3    FYI, patient is followed by the Anticoagulation/Protime Clinic at: [facility].    Patient Active Problem List   Diagnosis     Anticoagulant long-term use     Anticoagulation monitoring, INR range 2-3     Depression, major, recurrent, mild (H)     Health care maintenance     Hypertension     Obesity     Varicose veins of both legs with edema     Long term (current) use of anticoagulants     Embolism and thrombosis (H)     Acute respiratory failure with hypoxia (H)     Pneumonia due to 2019 novel coronavirus       Patient previously anticoagulated on Warfarin at a dose of 30 mg/week; dosed as: 2.5 mg on Monday and Thursday, 5 mg rest of week.     Factors that may increase patient's sensitivity to warfarin (Coumadin) include: COVID-19 infection, fever, dexamethasone.    Factors that may decrease patient's sensitivity to warfarin (Coumadin) include: COVID-19 infection, dexamethasone    Recent Labs   Lab Test 11/05/20  1313 11/05/20  1311 10/31/20 10/22/20 10/15/20 02/10/20  1545 02/10/20  1545 04/05/19  0737 04/05/19  0737 06/19/18  1230 06/19/18  1230   HGB  --  15.9*  --   --   --   --  15.6  --  15.1  --  15.5   INR 2.66*  --  2.1* 3.1* 3.1*   < >  --    < >  --    < > 2.10*   PLT  --  149*  --   --   --   --  233  --  200  --  207    < > = values in this interval not displayed.        Plan: Continue home dose of warfarin 2.5 mg today. Check INR tomorrow in the morning. COVID-19 is a hypercoagulable disease state and thus it is important to remain in therapeutic range, no dose reduction warranted at this time.     Thank You for the Consult. Will continue to follow.    Carlos Stout, Pharmacy Intern on 11/5/2020 at 3:46 PM   Violetta De Jesus, [facility] on 11/5/2020 at 3:53 PM

## 2020-11-05 NOTE — TELEPHONE ENCOUNTER
Patient is on her 10th day after being diagnosed with COVID. She is having more symptoms and would like to speak with someone regarding this.

## 2020-11-05 NOTE — TELEPHONE ENCOUNTER
After proper verification, patient stated she is on her 10th day of symptoms from covid,  Experiencing non productive cough, fatigue, weakness, cannot catch her breath after coughing states her O2 level was in her 80s yesterday has not taken it this am.This writer instructed her to call [facility] and let them know she would be coming in.   Barbie Ngo LPN on 11/5/2020 at 10:49 AM

## 2020-11-05 NOTE — PROGRESS NOTES
" NS ADMISSION NOTE    Patient admitted to room 355 at approximately 1515 via cart from emergency room. Patient was accompanied by nurse.     Verbal SBAR report received from SINDHU Woods prior to patient arrival.     Patient ambulated to bed independently. Patient alert and oriented X 3. The patient is not having any pain. 0-10 Pain Scale: 0. Admission vital signs: Blood pressure (!) 128/98, pulse 81, temperature 100.3  F (37.9  C), temperature source Tympanic, resp. rate 20, height 1.727 m (5' 8\"), weight (!) 174.9 kg (385 lb 9.6 oz), SpO2 90 %, not currently breastfeeding. Patient was oriented to plan of care, call light, bed controls, tv, telephone, bathroom and visiting hours.           Cherise Pang RN    "

## 2020-11-05 NOTE — PLAN OF CARE
"Pt is A&O x4, independent in room. LS diminished. Dyspnea on exertion, pt complain of SOB, placed on 1L of O2 to maintain SpO2 above 92%.   BP (!) 128/98   Pulse 81   Temp 100.3  F (37.9  C) (Tympanic)   Resp 20   Ht 1.727 m (5' 8\")   Wt (!) 174.9 kg (385 lb 9.6 oz)   SpO2 95% 1L  BMI 58.63 kg/m      Cherise Pang RN on 11/5/2020 at 5:15 PM     "

## 2020-11-05 NOTE — PHARMACY-ADMISSION MEDICATION HISTORY
Pharmacy -- Admission Medication Reconciliation    Prior to admission (PTA) medications were reviewed and the patient's PTA medication list was updated.    Sources Consulted: , Phone conversation with patient, [employer] data.    The reliability of this Medication Reconciliation is: Reliability: Reliable    The following significant changes were made:  1. Added acetaminophen.   2. Updated fish oil directions.   3. Updated last doses.     **Patient has lorazepam at home, has not been filled in over a year, uses PRN.     In addition, the patient's allergies were reviewed with the patient and confirmed as follows:   Allergies: Lisinopril, Adhesive tape, [place], and Wound dressing adhesive    The pharmacist has reviewed with the patient that all personal medications should be removed from the building or locked in the belongings safe.  Patient shall only take medications ordered by the physician and administered by the nursing staff.       Medication barriers identified: no barriers identified.    Medication adherence concerns: none.    Understanding of emergency medications: patient understands use of albuterol inhaler.     Carlos Stout, Pharmacy Intern on 11/5/2020 at 4:11 PM  Violetta De Jesus, [facility] on 11/5/2020 at 4:19 PM

## 2020-11-05 NOTE — ED PROVIDER NOTES
History     Chief Complaint   Patient presents with     Shortness of Breath     HPI  Trisha Fernando is a 59 year old female who presents to the emergency department this evening for evaluation just recently diagnosed with COVID-19 on October 27, 2020 she has been having some shortness of breath fever and cough since then is my understanding the patient has a pulse oximetry at home she has not been monitoring very closely however she did find that her oxygen saturations yesterday were in the 80s she is currently on Coumadin for factor V.  She has not fallen and has been no trauma.  She presents tonight with mild symptoms.  The patient is not tachypneic she is not tachycardic she is febrile she does not have any headaches there is a number disturbances rashes or trauma.    Allergies:  Allergies   Allergen Reactions     Lisinopril Swelling     Angioedema, Edema       Adhesive Tape Itching and Rash     Candor Itching and Rash     Wound Dressing Adhesive Rash       Problem List:    Patient Active Problem List    Diagnosis Date Noted     Long term (current) use of anticoagulants 02/11/2018     Priority: Medium     Embolism and thrombosis (H) 02/11/2018     Priority: Medium     Depression, major, recurrent, mild (H) 02/08/2018     Priority: Medium     Obesity 02/08/2018     Priority: Medium     Hypertension 05/26/2016     Priority: Medium     Health care maintenance 03/03/2016     Priority: Medium     Overview:   Colonoscopy: Done in 2013 and positive for precancerous polyp, repeat 2016   Breast Exam/Mammography: last mammogram September 2015 and normal, repeat fall of 2016   Pap smear: ASCUS and neg HPV in 2/2017, repeat in 3 years   DEXA:  age 64  Immunizations: Up-to-date  Lipids/Annual Exam: Done last in December 2014 and normal, repeat this year and yearly with obesity  Hepatitis C screen: will discuss       Varicose veins of both legs with edema 09/08/2015     Priority: Medium     Anticoagulation monitoring, INR  range 2-3 06/10/2015     Priority: Medium     Anticoagulant long-term use 2014     Priority: Medium     Overview:   For factor V;  History of recurrent DVT          Past Medical History:    Past Medical History:   Diagnosis Date     Activated protein C resistance (H)      Benign lipomatous neoplasm 2011     Calculus of kidney      Essential (primary) hypertension      Heterozygous factor V Leiden mutation (H) 2008     History of fatty infiltration of liver      Hyperlipidemia      Major depressive disorder, recurrent, mild (H)      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, unspecified (CODE)      Obesity      Pain in knee 2013     Thoracic aortic aneurysm without rupture (H)      Umbilical hernia        Past Surgical History:    Past Surgical History:   Procedure Laterality Date     ARTHROSCOPY KNEE Left 2013    Dr Karen SEPULVEDA BREAST Right     fiboadenoma      SECTION      ,      CHOLECYSTECTOMY  2014     COLONOSCOPY  2011    polyp ( Dr. Sutton)       COLONOSCOPY N/A 2019    Normal exam, 10 year follow up     LITHOTRIPSY       MAMMOPLASTY REDUCTION  2003     OTHER SURGICAL HISTORY Right 2011    Forearm Lipoma Excision     OTHER SURGICAL HISTORY      Left foot soft tissue resection; dr. Lang     TONSILLECTOMY         Family History:    Family History   Problem Relation Age of Onset     Other - See Comments Father         accidental death/ gallbladder disease     Other - See Comments Mother         HX of kidney stones and blood clots/ gallbladder disease     Dementia Mother      Other - See Comments Brother         HX of kidney stones and blood clots     No Known Problems Daughter      No Known Problems Daughter      Colon Cancer Maternal Uncle 50        Cancer-colon     Colon Cancer Maternal Uncle 50        Cancer-colon     Other - See Comments Maternal Uncle         HX of kidney stones     No Known Problems Brother       "Heart Disease Paternal Grandfather         Heart Disease,ASCHD     No Known Problems Brother      No Known Problems Brother      Other - See Comments Paternal Aunt         Parkinson's       Social History:  Marital Status:   [2]  Social History     Tobacco Use     Smoking status: Former Smoker     Packs/day: 1.00     Years: 12.00     Pack years: 12.00     Types: Cigarettes     Quit date: 1990     Years since quittin.1     Smokeless tobacco: Never Used     Tobacco comment: no ecig   Substance Use Topics     Alcohol use: Not Currently     Alcohol/week: 2.0 standard drinks     Frequency: Monthly or less     Drug use: No        Medications:         albuterol (PROAIR HFA/PROVENTIL HFA/VENTOLIN HFA) 108 (90 Base) MCG/ACT inhaler       buPROPion (WELLBUTRIN XL) 150 MG 24 hr tablet       Cholecalciferol (VITAMIN D3) 2000 UNITS CAPS       fish oil-omega-3 fatty acids 1000 MG capsule       furosemide (LASIX) 20 MG tablet       guaiFENesin-codeine (ROBITUSSIN AC) 100-10 MG/5ML solution       losartan (COZAAR) 25 MG tablet       metoprolol tartrate (LOPRESSOR) 25 MG tablet       potassium chloride ER (K-TAB/KLOR-CON) 10 MEQ CR tablet       venlafaxine (EFFEXOR-XR) 150 MG 24 hr capsule       vitamin B-Complex       warfarin ANTICOAGULANT (COUMADIN) 5 MG tablet       Blood Pressure Monitoring (ADULT BLOOD PRESSURE CUFF LG) KIT       Elastic Bandages & Supports (MEDICAL COMPRESSION STOCKINGS) MISC       LORazepam (ATIVAN) 0.5 MG tablet          Review of Systems     Pertinent positives and negatives are as above in the HPI. 10 point review of systems is otherwise negative.      Physical Exam   BP: (!) 159/84  Pulse: 78  Temp: 99.2  F (37.3  C)  Resp: 28  Height: 172.7 cm (5' 8\")  Weight: (!) 163.9 kg (361 lb 4.8 oz)  SpO2: 92 %      Physical Exam   Exam:  Constitutional: healthy, alert and no distress  Head: Normocephalic. No masses, lesions, tenderness or abnormalities  Neck: Neck supple. No adenopathy. Thyroid " symmetric, normal size,, Carotids without bruits.  ENT: ENT exam normal, no neck nodes or sinus tenderness  Cardiovascular: negative, PMI normal. No lifts, heaves, or thrills. RRR. No murmurs, clicks gallops or rub  Respiratory: negative, Percussion normal. Good diaphragmatic excursion. Lungs clear  Gastrointestinal: Abdomen soft, non-tender. BS normal. No masses, organomegaly  : Deferred  Musculoskeletal: extremities normal- no gross deformities noted, gait normal and normal muscle tone  Skin: no suspicious lesions or rashes  Neurologic: Gait normal. Reflexes normal and symmetric. Sensation grossly WNL.  Psychiatric: mentation appears normal and affect normal/bright  Hematologic/Lymphatic/Immunologic: Normal cervical lymph nodes      ED Course        Procedures               EKG Interpretation:      Interpreted by Donell Naik PA-C  Time reviewed: 12:55  Symptoms at time of EKG: SOB   Rhythm: normal sinus   Rate: 76  ST Segments/ T Waves: No ST-T wave changes  Q Waves: none  Clinical Impression: normal EKG        Results for orders placed or performed during the hospital encounter of 11/05/20 (from the past 24 hour(s))   CBC with platelets differential   Result Value Ref Range    WBC 4.5 4.0 - 11.0 10e9/L    RBC Count 5.09 3.8 - 5.2 10e12/L    Hemoglobin 15.9 (H) 11.7 - 15.7 g/dL    Hematocrit 47.9 (H) 35.0 - 47.0 %    MCV 94 78 - 100 fl    MCH 31.2 26.5 - 33.0 pg    MCHC 33.2 31.5 - 36.5 g/dL    RDW 14.1 10.0 - 15.0 %    Platelet Count 149 (L) 150 - 450 10e9/L    Diff Method Automated Method     % Neutrophils 61.5 %    % Lymphocytes 31.7 %    % Monocytes 6.0 %    % Eosinophils 0.2 %    % Basophils 0.4 %    % Immature Granulocytes 0.2 %    Absolute Neutrophil 2.8 1.6 - 8.3 10e9/L    Absolute Lymphocytes 1.4 0.8 - 5.3 10e9/L    Absolute Monocytes 0.3 0.0 - 1.3 10e9/L    Absolute Eosinophils 0.0 0.0 - 0.7 10e9/L    Absolute Basophils 0.0 0.0 - 0.2 10e9/L    Abs Immature Granulocytes 0.0 0 - 0.4 10e9/L    Comprehensive metabolic panel   Result Value Ref Range    Sodium 136 134 - 144 mmol/L    Potassium 3.3 (L) 3.5 - 5.1 mmol/L    Chloride 99 98 - 107 mmol/L    Carbon Dioxide 30 21 - 31 mmol/L    Anion Gap 7 3 - 14 mmol/L    Glucose 138 (H) 70 - 105 mg/dL    Urea Nitrogen 10 7 - 25 mg/dL    Creatinine 0.91 0.60 - 1.20 mg/dL    GFR Estimate 63 >60 mL/min/[1.73_m2]    GFR Estimate If Black 76 >60 mL/min/[1.73_m2]    Calcium 8.9 8.6 - 10.3 mg/dL    Bilirubin Total 0.5 0.3 - 1.0 mg/dL    Albumin 3.8 3.5 - 5.7 g/dL    Protein Total 7.2 6.4 - 8.9 g/dL    Alkaline Phosphatase 62 34 - 104 U/L    ALT 40 7 - 52 U/L    AST 38 13 - 39 U/L   D dimer quantitative   Result Value Ref Range    D Dimer 0.4 0.0 - 0.50 ug/ml FEU   Ferritin   Result Value Ref Range    Ferritin 408 (H) 23.9 - 336.2 ng/mL   Lactate Dehydrogenase   Result Value Ref Range    Lactate Dehydrogenase 258 140 - 271 U/L   Procalcitonin   Result Value Ref Range    Procalcitonin <0.50 ng/ml   CRP inflammation   Result Value Ref Range    CRP Inflammation 26.8 (H) <10.0 mg/L   Lactic acid   Result Value Ref Range    Lactic Acid 1.2 0.7 - 2.0 mmol/L   INR   Result Value Ref Range    INR 2.66 (H) 0.86 - 1.14   CT Chest Pulmonary Embolism w Contrast    Narrative    PROCEDURE: CT CHEST PULMONARY EMBOLISM W CONTRAST 11/5/2020 2:02 PM    HISTORY: sob covid pos    COMPARISONS: None.    Meds/Dose Given:    TECHNIQUE: CT angiogram of the chest with sagittal coronal mip  reconstructions.    FINDINGS: CT angiogram reveals no pulmonary emboli. The thoracic aorta  appears normal. The heart is normal in size. There are no hilar or  mediastinal masses or lymphadenopathy. Axillary and supraclavicular  lymph nodes appear normal.    Multiple peripheral areas of alveolar opacity are noted in both lungs  suspicious for widespread pneumonia.    The upper portion of the liver spleen and pancreas appear normal. The  adrenal glands are normal. Degenerative changes are present in  the  thoracic spine.         Impression    IMPRESSION: Widespread patchy opacities in both lungs suspicious for  widespread pneumonia    PETE SINHA MD   UA reflex to Microscopic and Culture    Specimen: Urine Midstream; Unspecified Urine   Result Value Ref Range    Color Urine Light Yellow     Appearance Urine Clear     Glucose Urine Negative NEG^Negative mg/dL    Bilirubin Urine Negative NEG^Negative    Ketones Urine Negative NEG^Negative mg/dL    Specific Gravity Urine 1.008 1.003 - 1.035    Blood Urine Negative NEG^Negative    pH Urine 6.5 5.0 - 7.0 pH    Protein Albumin Urine Negative NEG^Negative mg/dL    Urobilinogen mg/dL Normal 0.0 - 2.0 mg/dL    Nitrite Urine Negative NEG^Negative    Leukocyte Esterase Urine Negative NEG^Negative    Source Unspecified Urine    Blood gas arterial and oxyhgb   Result Value Ref Range    pH Arterial 7.46 (H) 7.35 - 7.45 pH    pCO2 Arterial 36 35 - 45 mm Hg    pO2 Arterial 63 (L) 80 - 105 mm Hg    Bicarbonate Arterial 25 21 - 28 mmol/L    FIO2 0     Oxyhemoglobin Arterial 94 92 - 100 %       Medications   dexamethasone PF (DECADRON) injection 10 mg (10 mg Intravenous Given 11/5/20 1324)   iohexol (OMNIPAQUE) 350 mg/mL solution 100 mL (100 mLs Intravenous Given 11/5/20 1403)       Assessments & Plan (with Medical Decision Making)     There is a high probability of clinically significant deterioration this patient's condition was quite the highest on my preparedness to intervene urgently    I have reviewed the nursing notes.    I have reviewed the findings, diagnosis, plan and need for follow up with the patient.  Differential diagnosis at this point include: asthma, COPD exacerbation, bronchitis, pulmonary edema, acute coronary syndromes, pulmonary embolism, pneumonia, pneumothorax, Covid pneumonia.  Patient who presents department this evening for evaluation just recently diagnosed with COVID-19 a week ago now presents with Covid pneumonia On CT.  No evidence of any  pulmonary embolus.  Laboratory studies are as noted.  She also received Decadron.  Patient presents for evaluation just recently diagnosed with Covid 1 week ago expected course 7 to 10 days it appears that she is now worsening becoming more hypoxic she has oxygen saturations in the 80s at home 92 to 94% here with a PO2 as noted on ABG and this 63.  We will continue oxygenation via nasal cannula 2 to 4 L to maintain oxygenation.  She did receive Decadron IV.  Her imaging shows widespread Covid pneumonia.  I discussed this with Dr. Albert hospitalist service for admission as I am concerned in the next 24 to 48 hours there may be further deterioration the patient will be admitted to Medical bed for further evaluation and observation.  Remdesivir will be deferred to hospitalist.  Trisha was evaluated in the Emergency Department on 11/05/2020 for the symptoms described in the history of present illness. Patient was evaluated in the context of the global COVID-19 pandemic, which necessitated consideration that the patient might be at risk for infection with the SARS-CoV-2 virus that causes COVID-19. Institutional protocols and algorithms that pertain to the evaluation of patients at risk for COVID-19 are in a state of rapid change based on information released by multiple regulatory bodies including the CDC and Federal and State Organizations. These policies and algorithms were followed during the patient's care in the ED.  I explained my diagnostic considerations and recommendations and the patient voiced an understanding and was in agreement with the treatment plan. All questions were answered. We discussed potential side effects of any prescribed or recommended therapies, as well as expectations for response to treatments.  Independently viewed the radiographs EKG coordinate care for the patient reviewed patient's medical record.  Spoke with the hospitalist service as well as her primary care physician  Aggregate  Critical Care Time is 35 minutes.  This was the time seeing the patient at the bedside while the patient was critical.  My time did not include any pertinent procedures or activities that did not contribute to the patient's care while the patient was critical.           ED to Inpatient Handoff:    Discussed with Roosevelt at 14:35  Patient accepted for Inpatient Stay  Pending studies include Cultures  Code Status: Not Addressed           New Prescriptions    No medications on file       Final diagnoses:   Pneumonia due to 2019 novel coronavirus   Acute respiratory failure with hypoxia (H)       11/5/2020   [facility]     Donell Naik PA-C  11/05/20 1449

## 2020-11-05 NOTE — ED TRIAGE NOTES
Patient presents to ED after speaking with triage nurse.  She was diagnosed with Covid on 10/27 and has had SOB, fever and cough since then.  She says she decided to come in today because her symptoms are not getting better and she is having chest pain that she says is all across her ribs.  Yesterday she notes her O2 was in the 80's.  She is on warfarin for Factor V and she says her INR range is 2-3.  She has not missed any doses and has not fallen below 2, however her last results was 2.1.    Patient assisted into building from private vehicle.  She is A/O, is coughing and states her pain is only with coughing.  She is only slight tachypneic, O2 on arrival 92% on RA.  After settling in bed it has climbed to 94-96%.

## 2020-11-06 LAB
ANION GAP SERPL CALCULATED.3IONS-SCNC: 10 MMOL/L (ref 3–14)
BASOPHILS # BLD AUTO: 0 10E9/L (ref 0–0.2)
BASOPHILS NFR BLD AUTO: 0.3 %
BUN SERPL-MCNC: 12 MG/DL (ref 7–25)
CALCIUM SERPL-MCNC: 8.7 MG/DL (ref 8.6–10.3)
CHLORIDE SERPL-SCNC: 104 MMOL/L (ref 98–107)
CO2 SERPL-SCNC: 25 MMOL/L (ref 21–31)
CREAT SERPL-MCNC: 0.75 MG/DL (ref 0.6–1.2)
CRP SERPL-MCNC: 24 MG/L
D DIMER PPP FEU-MCNC: 0.5 UG/ML FEU (ref 0–0.5)
DIFFERENTIAL METHOD BLD: ABNORMAL
EOSINOPHIL # BLD AUTO: 0 10E9/L (ref 0–0.7)
EOSINOPHIL NFR BLD AUTO: 0 %
ERYTHROCYTE [DISTWIDTH] IN BLOOD BY AUTOMATED COUNT: 13.9 % (ref 10–15)
FIBRINOGEN PPP-MCNC: 543 MG/DL (ref 200–420)
GFR SERPL CREATININE-BSD FRML MDRD: 79 ML/MIN/{1.73_M2}
GLUCOSE SERPL-MCNC: 154 MG/DL (ref 70–105)
HCT VFR BLD AUTO: 46.9 % (ref 35–47)
HGB BLD-MCNC: 15.4 G/DL (ref 11.7–15.7)
IMM GRANULOCYTES # BLD: 0 10E9/L (ref 0–0.4)
IMM GRANULOCYTES NFR BLD: 0.3 %
INR PPP: 2.86 (ref 0.86–1.14)
INTERPRETATION ECG - MUSE: NORMAL
LDH SERPL L TO P-CCNC: 259 U/L (ref 140–271)
LYMPHOCYTES # BLD AUTO: 1.2 10E9/L (ref 0.8–5.3)
LYMPHOCYTES NFR BLD AUTO: 35.3 %
MCH RBC QN AUTO: 30.4 PG (ref 26.5–33)
MCHC RBC AUTO-ENTMCNC: 32.8 G/DL (ref 31.5–36.5)
MCV RBC AUTO: 93 FL (ref 78–100)
MONOCYTES # BLD AUTO: 0.3 10E9/L (ref 0–1.3)
MONOCYTES NFR BLD AUTO: 7.6 %
NEUTROPHILS # BLD AUTO: 1.9 10E9/L (ref 1.6–8.3)
NEUTROPHILS NFR BLD AUTO: 56.5 %
PLATELET # BLD AUTO: 171 10E9/L (ref 150–450)
POTASSIUM SERPL-SCNC: 3.7 MMOL/L (ref 3.5–5.1)
RBC # BLD AUTO: 5.06 10E12/L (ref 3.8–5.2)
RETICS # AUTO: 36.4 10E9/L (ref 25–95)
RETICS/RBC NFR AUTO: 0.7 % (ref 0.5–2)
SODIUM SERPL-SCNC: 139 MMOL/L (ref 134–144)
TROPONIN I SERPL-MCNC: 3.7 PG/ML
WBC # BLD AUTO: 3.3 10E9/L (ref 4–11)

## 2020-11-06 PROCEDURE — 85045 AUTOMATED RETICULOCYTE COUNT: CPT | Performed by: FAMILY MEDICINE

## 2020-11-06 PROCEDURE — 250N000011 HC RX IP 250 OP 636: Performed by: FAMILY MEDICINE

## 2020-11-06 PROCEDURE — 250N000013 HC RX MED GY IP 250 OP 250 PS 637: Performed by: FAMILY MEDICINE

## 2020-11-06 PROCEDURE — 85384 FIBRINOGEN ACTIVITY: CPT | Performed by: FAMILY MEDICINE

## 2020-11-06 PROCEDURE — 85379 FIBRIN DEGRADATION QUANT: CPT | Performed by: FAMILY MEDICINE

## 2020-11-06 PROCEDURE — 120N000001 HC R&B MED SURG/OB

## 2020-11-06 PROCEDURE — 258N000003 HC RX IP 258 OP 636: Performed by: FAMILY MEDICINE

## 2020-11-06 PROCEDURE — 36415 COLL VENOUS BLD VENIPUNCTURE: CPT | Performed by: FAMILY MEDICINE

## 2020-11-06 PROCEDURE — 80048 BASIC METABOLIC PNL TOTAL CA: CPT | Performed by: FAMILY MEDICINE

## 2020-11-06 PROCEDURE — 85025 COMPLETE CBC W/AUTO DIFF WBC: CPT | Performed by: FAMILY MEDICINE

## 2020-11-06 PROCEDURE — 85610 PROTHROMBIN TIME: CPT | Performed by: FAMILY MEDICINE

## 2020-11-06 PROCEDURE — 86140 C-REACTIVE PROTEIN: CPT | Performed by: FAMILY MEDICINE

## 2020-11-06 PROCEDURE — 99232 SBSQ HOSP IP/OBS MODERATE 35: CPT | Performed by: FAMILY MEDICINE

## 2020-11-06 PROCEDURE — 84484 ASSAY OF TROPONIN QUANT: CPT | Performed by: FAMILY MEDICINE

## 2020-11-06 PROCEDURE — 250N000009 HC RX 250: Performed by: FAMILY MEDICINE

## 2020-11-06 PROCEDURE — 83615 LACTATE (LD) (LDH) ENZYME: CPT | Performed by: FAMILY MEDICINE

## 2020-11-06 RX ORDER — CEFTRIAXONE SODIUM 1 G/50ML
1 INJECTION, SOLUTION INTRAVENOUS EVERY 24 HOURS
Status: DISCONTINUED | OUTPATIENT
Start: 2020-11-06 | End: 2020-11-06

## 2020-11-06 RX ORDER — FUROSEMIDE 20 MG
20 TABLET ORAL DAILY
Status: DISCONTINUED | OUTPATIENT
Start: 2020-11-06 | End: 2020-11-07 | Stop reason: HOSPADM

## 2020-11-06 RX ORDER — WARFARIN SODIUM 2.5 MG/1
2.5 TABLET ORAL
Status: COMPLETED | OUTPATIENT
Start: 2020-11-06 | End: 2020-11-06

## 2020-11-06 RX ORDER — CEFTRIAXONE SODIUM 2 G/50ML
2 INJECTION, SOLUTION INTRAVENOUS EVERY 24 HOURS
Status: DISCONTINUED | OUTPATIENT
Start: 2020-11-06 | End: 2020-11-07 | Stop reason: HOSPADM

## 2020-11-06 RX ADMIN — BUPROPION HYDROCHLORIDE 150 MG: 150 TABLET, FILM COATED, EXTENDED RELEASE ORAL at 10:21

## 2020-11-06 RX ADMIN — GUAIFENESIN AND CODEINE PHOSPHATE 10 ML: 10; 100 LIQUID ORAL at 21:34

## 2020-11-06 RX ADMIN — DEXAMETHASONE SODIUM PHOSPHATE 6 MG: 4 INJECTION, SOLUTION INTRAMUSCULAR; INTRAVENOUS at 12:14

## 2020-11-06 RX ADMIN — METOPROLOL TARTRATE 12.5 MG: 25 TABLET, FILM COATED ORAL at 21:34

## 2020-11-06 RX ADMIN — REMDESIVIR 100 MG: 100 INJECTION, POWDER, LYOPHILIZED, FOR SOLUTION INTRAVENOUS at 10:15

## 2020-11-06 RX ADMIN — CEFTRIAXONE SODIUM 1 G: 1 INJECTION, SOLUTION INTRAVENOUS at 04:40

## 2020-11-06 RX ADMIN — CEFTRIAXONE SODIUM 2 G: 2 INJECTION, SOLUTION INTRAVENOUS at 12:14

## 2020-11-06 RX ADMIN — VENLAFAXINE HYDROCHLORIDE 150 MG: 75 CAPSULE, EXTENDED RELEASE ORAL at 08:02

## 2020-11-06 RX ADMIN — GUAIFENESIN AND CODEINE PHOSPHATE 10 ML: 10; 100 LIQUID ORAL at 12:15

## 2020-11-06 RX ADMIN — WARFARIN SODIUM 2.5 MG: 2.5 TABLET ORAL at 17:18

## 2020-11-06 RX ADMIN — LOSARTAN POTASSIUM 25 MG: 25 TABLET ORAL at 19:56

## 2020-11-06 RX ADMIN — AZITHROMYCIN 500 MG: 500 INJECTION, POWDER, LYOPHILIZED, FOR SOLUTION INTRAVENOUS at 19:57

## 2020-11-06 RX ADMIN — METOPROLOL TARTRATE 12.5 MG: 25 TABLET, FILM COATED ORAL at 10:23

## 2020-11-06 RX ADMIN — DOCUSATE SODIUM 50 MG AND SENNOSIDES 8.6 MG 1 TABLET: 8.6; 5 TABLET, FILM COATED ORAL at 17:18

## 2020-11-06 RX ADMIN — FUROSEMIDE 20 MG: 20 TABLET ORAL at 10:21

## 2020-11-06 ASSESSMENT — ACTIVITIES OF DAILY LIVING (ADL)
ADLS_ACUITY_SCORE: 14
ADLS_ACUITY_SCORE: 13
ADLS_ACUITY_SCORE: 14
ADLS_ACUITY_SCORE: 13

## 2020-11-06 NOTE — PHARMACY-ANTICOAGULATION SERVICE
Pharmacy Consult- Warfarin Follow-Up    Trisha Fernando is a 59 year old female admitted on 11/5/2020 with Acute respiratory failure with hypoxia (H)    Primary Indication(s) for Anticoagulation: history of thrombosis, Heterozygous factor V Leiden mutation    Goal INR:2 to 3    FYI, patient is followed by the Anticoagulation[facility]time Clinic at: [facility]    Patient Active Problem List   Diagnosis     Anticoagulant long-term use     Anticoagulation monitoring, INR range 2-3     Depression, major, recurrent, mild (H)     Health care maintenance     Hypertension     Obesity     Varicose veins of both legs with edema     Long term (current) use of anticoagulants     Embolism and thrombosis (H)     Acute respiratory failure with hypoxia (H)     Pneumonia due to 2019 novel coronavirus       New factors that may increase patient's sensitivity to warfarin (Coumadin) include: infection, remdesivir, dexamethasone, ceftriaxone, azithromycin    New factors that may decrease patient's sensitivity to warfarin (Coumadin) include: COVID 19, dexamethasone    Dietary Intake: regular    Recent Labs   Lab Test 11/06/20  0600 11/05/20  1313 11/05/20  1311 10/31/20 10/22/20 02/10/20  1545 02/10/20  1545 04/05/19  0737 04/05/19  0737   HGB 15.4  --  15.9*  --   --   --  15.6  --  15.1   INR 2.86* 2.66*  --  2.1* 3.1*   < >  --    < >  --      --  149*  --   --   --  233  --  200    < > = values in this interval not displayed.        Recent Dosing:  Anticoagulation Dose History     Recent Dosing and Labs Latest Ref Rng & Units 10/1/2020 10/8/2020 10/15/2020 10/22/2020 10/31/2020 11/5/2020 11/6/2020    Warfarin 2.5 mg - - - - - - 2.5 mg -    INR 0.86 - 1.14 3.4(A) 3.2(A) 3.1(A) 3.1(A) 2.1(A) 2.66(H) 2.86(H)    INR 0.86 - 1.14 - - - - - - -        Plan: Give warfarin 2.5 mg po today x1. Further dosing per INR. INR in AM.    Thank You for the Consult. Will continue to follow.    Violetta De Jesus formerly [facility] ....................  11/6/2020   10:34  AM

## 2020-11-06 NOTE — PLAN OF CARE
Pt A&O x4. Pleasant and calling appropriately. Denies pain. Up independently in room. Currently using 1LO2 satting 94 %. VSS. LS clear/diminished. BS active all quadrants. IV patent, antiviral infused per order. Offers no complaints at this time. Will continue to monitor. Temp: 97.6  F (36.4  C) Temp src: Tympanic BP: 135/80 Pulse: 74   Resp: 20 SpO2: 94 % O2 Device: Nasal cannula Oxygen Delivery: 1 LPM

## 2020-11-06 NOTE — PLAN OF CARE
VSS ,afebrile, patients sats at 95% RA. Denies pain, able to sleep most of the night. Lung sounds clear, dim at bases. Sharla Amado RN 11/6/2020 5:00 AM

## 2020-11-06 NOTE — H&P
[facility]    History and Physical - Hospitalist Service       Date of Admission:  11/5/2020    Assessment & Plan   Trisha Fernando is a 59 year old female admitted on 11/5/2020. She presented to the emergency department due to cough, shortness of breath and hypoxia noted on home oximeter.    Patient was diagnosed with COVID-19 on October 27, 2020.  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    Patient has known factor V Leiden and is currently therapeutic on Coumadin.    She will be admitted and treated with remdesivir, dexamethasone and oxygen support.  No current fevers and normal procalcitonin but will start azithromycin and Rocephin given radiographic findings and risk for progression of respiratory failure.      Principal Problem:    Acute respiratory failure with hypoxia (H)    Assessment: Most likely due to late effects of COVID-19.    Plan: Remdesivir, dexamethasone and oxygen support.  Nebulizers if evidence of bronchospasm.  Also suggest azithromycin and Rocephin.  Active Problems:    Anticoagulant long-term use    Assessment: Currently therapeutic on Coumadin for factor V Leiden.    Plan: Does not require additional anticoagulation at this time.    Hypertension    Assessment: Blood pressure stable.    Plan: Continue home medications    Obesity    Assessment: Chronic, does increase risk for decompensation    Plan: Monitor closely.    Pneumonia due to 2019 novel coronavirus    Assessment: Currently afebrile with relatively reassuring labs.    Plan: Rocephin/azithromycin         Diet: Combination Diet Regular Diet Adult    DVT Prophylaxis: Warfarin  Rivera Catheter: not present  Code Status: Full Code           Disposition Plan   Expected discharge: 2 - 3 days, recommended to prior living arrangement once O2 use less than 2 liters/minute.  Entered: Benjamin Albert MD 11/06/2020, 12:04 AM     The patient's care was discussed with the Patient.    Benjamin  GAEL Albert MD  [facility]  Contact information available via [facility] Paging/Directory      ______________________________________________________________________    Chief Complaint   Cough, shortness of breath, hypoxia    History is obtained from the patient    History of Present Illness   Trisha Feranndo is a 59 year old female who was diagnosed with COVID-19 on October 27.  She has been being careful and wearing a mask.  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    Patient reports her and symptoms were initially mild.  After about 3 days started to improve but then on day 5 symptoms worsened.  Started to have more of a harsh cough and some shortness of breath with activity.  Felt very weak and had fever.  Since then symptoms have been steadily worsening although fever seems to be improving.  Yesterday oxygen saturations were persistently in the 80s and she struggled to regain her breath after any activity.    She denies any chest pain.  No abdominal pain.  She has been eating and drinking well.  No issues with diarrhea.  Urination has been normal.     also has been positive for COVID-19 but reports his symptoms are more mild.    Review of Systems    CONSTITUTIONAL: See HPI  INTEGUMENTARY/SKIN: NEGATIVE for worrisome rashes, moles or lesions  EYES: NEGATIVE for vision changes or irritation  ENT/MOUTH: NEGATIVE for ear, mouth and throat problems  RESP: See HPI  CV: NEGATIVE for chest pain, palpitations or peripheral edema  GI: NEGATIVE for nausea, abdominal pain, heartburn, or change in bowel habits  : NEGATIVE for frequency, dysuria, or hematuria  MUSCULOSKELETAL: NEGATIVE for significant arthralgias or myalgia  NEURO: NEGATIVE for transient or persistent neurologic deficits, dizziness or paresthesias  ENDOCRINE: NEGATIVE for temperature intolerance, skin/hair changes  HEME: NEGATIVE for bleeding  problems  PSYCHIATRIC: NEGATIVE for changes in mood or affect    Past Medical History    I have reviewed this patient's medical history and updated it with pertinent information if needed.   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     [facility]        Past Surgical History   I have reviewed this patient's surgical history and updated it with pertinent information if needed.  Past Surgical History:   Procedure Laterality Date     ARTHROSCOPY KNEE Left 2013    Dr Jones     BIOPSY BREAST Right     fiboadenoma      SECTION      ,      CHOLECYSTECTOMY  2014     COLONOSCOPY  2011    polyp ( Dr. Sutton)       COLONOSCOPY N/A 2019    Normal exam, 10 year follow up     LITHOTRIPSY       MAMMOPLASTY REDUCTION  2003     OTHER SURGICAL HISTORY Right 2011    Forearm Lipoma Excision     OTHER SURGICAL HISTORY      Left foot soft tissue resection; dr. Lang     TONSILLECTOMY         Social History   I have reviewed this patient's social history and updated it with pertinent information if needed.  Social History     Tobacco Use     Smoking status: Former Smoker     Packs/day: 1.00     Years: 12.00     Pack years: 12.00     Types: Cigarettes     Quit date: 1990     Years since quittin.1     Smokeless  tobacco: Never Used     Tobacco comment: no ecig   Substance Use Topics     Alcohol use: Not Currently     Alcohol/week: 2.0 standard drinks     Frequency: Monthly or less     Drug use: No       Family History   I have reviewed this patient's family history and updated it with pertinent information if needed.  Family History   Problem Relation Age of Onset     Other - See Comments Father         accidental death/ gallbladder disease     Other - See Comments Mother         HX of kidney stones and blood clots/ gallbladder disease     Dementia Mother      Other - See Comments Brother         HX of kidney stones and blood clots     No Known Problems Daughter      No Known Problems Daughter      Colon Cancer Maternal Uncle 50        Cancer-colon     Colon Cancer Maternal Uncle 50        Cancer-colon     Other - See Comments Maternal Uncle         HX of kidney stones     No Known Problems Brother      Heart Disease Paternal Grandfather         Heart Disease,ASCHD     No Known Problems Brother      No Known Problems Brother      Other - See Comments Paternal Aunt         Parkinson's       Prior to Admission Medications   Prior to Admission Medications   Prescriptions Last Dose Informant Patient Reported? Taking?   Blood Pressure Monitoring (ADULT BLOOD PRESSURE CUFF LG) KIT  Self Yes No   Sig: As directed.   Cholecalciferol (VITAMIN D3) 2000 UNITS CAPS 11/5/2020 at 0800 Self Yes Yes   Sig: Take 2,000 Units by mouth daily   Elastic Bandages & Supports (MEDICAL COMPRESSION STOCKINGS) MISC  Self Yes No   Sig: For personal use. Length: calf Strength: 16-20 mmHg Circumference in cm: For calf: Ankle 15cm, Calf 30cm, Ankle to calf length 40cm.   LORazepam (ATIVAN) 0.5 MG tablet More than a month at Unknown time Self Yes No   Sig: Take 0.5 mg by mouth every 6 hours as needed    acetaminophen (TYLENOL) 500 MG tablet 11/4/2020 at PM Self Yes Yes   Sig: Take 500-1,000 mg by mouth every 6 hours as needed for mild pain or fever    albuterol (PROAIR HFA/PROVENTIL HFA/VENTOLIN HFA) 108 (90 Base) MCG/ACT inhaler 11/4/2020 at pm Self No Yes   Sig: Inhale 2 puffs into the lungs every 4 hours as needed for shortness of breath / dyspnea or wheezing   buPROPion (WELLBUTRIN XL) 150 MG 24 hr tablet 11/5/2020 at 0800 Self No Yes   Sig: Take 1 tablet (150 mg) by mouth every morning   fish oil-omega-3 fatty acids 1000 MG capsule 11/4/2020 at pm Self Yes Yes   Sig: Take 1 capsule by mouth daily    furosemide (LASIX) 20 MG tablet 11/5/2020 at 0800 Self No Yes   Sig: TAKE 1-2 TABLETS (20-40 MG) BY MOUTH DAILY TAKE SECOND DOSE 6 HOURS AFTER FIRST   guaiFENesin-codeine (ROBITUSSIN AC) 100-10 MG/5ML solution 11/4/2020 at pm Self No Yes   Sig: Take 10 mLs by mouth every 6 hours as needed for cough   losartan (COZAAR) 25 MG tablet 11/4/2020 at pm Self No Yes   Sig: TAKE 1 TABLET BY MOUTH EVERY DAY   metoprolol tartrate (LOPRESSOR) 25 MG tablet 11/5/2020 at 0800 Self No Yes   Sig: Take 0.5 tablets (12.5 mg) by mouth 2 times daily   potassium chloride ER (K-TAB/KLOR-CON) 10 MEQ CR tablet 11/5/2020 at 0800 Self No Yes   Sig: TAKE 1 TABLET (10 MEQ) BY MOUTH DAILY   venlafaxine (EFFEXOR-XR) 150 MG 24 hr capsule 11/5/2020 at 0800 Self No Yes   Sig: TAKE 1 CAPSULE BY MOUTH EVERY DAY WITH FOOD   vitamin B-Complex 11/5/2020 at 0800 Self Yes Yes   Sig: Take 1 tablet by mouth daily   warfarin ANTICOAGULANT (COUMADIN) 5 MG tablet 11/4/2020 at pm Self Yes Yes   Sig: TAKE 5 MG X 5 DAYS AND 2.5 MG X 2 DAYS/WEEK OR AS DIRECTED BY THE [facility].      Facility-Administered Medications: None     Allergies   Allergies   Allergen Reactions     Lisinopril Swelling     Angioedema, Edema       Adhesive Tape Itching and Rash     Brethren Itching and Rash     Wound Dressing Adhesive Rash       Physical Exam   Vital Signs: Temp: 98.4  F (36.9  C) Temp src: Tympanic BP: (!) 146/96 Pulse: 99   Resp: 20 SpO2: 92 % O2 Device: Nasal cannula Oxygen Delivery: 1 LPM  Weight: 385 lbs 9.6  oz    Constitutional: awake, alert, cooperative, no apparent distress, and appears stated age  Eyes: Lids and lashes normal, pupils equal, round and reactive to light, extra ocular muscles intact, sclera clear, conjunctiva normal  ENT: Normocephalic, without obvious abnormality, atraumatic, sinuses nontender on palpation, external ears without lesions, oral pharynx with moist mucous membranes  Hematologic / Lymphatic: No lymphadenopathy  Respiratory: Lung fields are currently clear to auscultation bilaterally.  She does not have any increased respiratory effort.  She does have a nonproductive cough.  No wheezing.  Cardiovascular: Regular rate and rhythm.  No murmurs rubs or gallops.  GI: Abdomen is soft, nontender.  Genitounirinary: No suprapubic or CVA tenderness.  Skin: No rashes or abnormal skin lesions  Musculoskeletal: No synovitis.  Muscle strength equal and even  Neurologic: No neurologic deficits  Neuropsychiatric: General: normal, calm and normal eye contact  Orientation: oriented to self, place, time and situation  Memory and insight: memory for past and recent events intact and thought process normal    Data   Data reviewed today: I reviewed all medications, new labs and imaging results over the last 24 hours. I personally reviewed the chest CT image(s) showing No pulmonary embolus but patient does have evidence of multifocal pneumonia.    Recent Labs   Lab 11/05/20  1313 11/05/20  1311 10/31/20   WBC  --  4.5  --    HGB  --  15.9*  --    MCV  --  94  --    PLT  --  149*  --    INR 2.66*  --  2.1*   NA  --  136  --    POTASSIUM  --  3.3*  --    CHLORIDE  --  99  --    CO2  --  30  --    BUN  --  10  --    CR  --  0.91  --    ANIONGAP  --  7  --    TREE  --  8.9  --    GLC  --  138*  --    ALBUMIN  --  3.8  --    PROTTOTAL  --  7.2  --    BILITOTAL  --  0.5  --    ALKPHOS  --  62  --    ALT  --  40  --    AST  --  38  --      Recent Results (from the past 24 hour(s))   CT Chest Pulmonary Embolism w Contrast     Narrative    PROCEDURE: CT CHEST PULMONARY EMBOLISM W CONTRAST 11/5/2020 2:02 PM    HISTORY: sob covid pos    COMPARISONS: None.    Meds/Dose Given:    TECHNIQUE: CT angiogram of the chest with sagittal coronal mip  reconstructions.    FINDINGS: CT angiogram reveals no pulmonary emboli. The thoracic aorta  appears normal. The heart is normal in size. There are no hilar or  mediastinal masses or lymphadenopathy. Axillary and supraclavicular  lymph nodes appear normal.    Multiple peripheral areas of alveolar opacity are noted in both lungs  suspicious for widespread pneumonia.    The upper portion of the liver spleen and pancreas appear normal. The  adrenal glands are normal. Degenerative changes are present in the  thoracic spine.         Impression    IMPRESSION: Widespread patchy opacities in both lungs suspicious for  widespread pneumonia    PETE SINHA MD

## 2020-11-06 NOTE — PROGRESS NOTES
[facility]    Hospitalist Progress Note      Assessment & Plan   Trisha Fernando is a 59 year old female who was admitted on 11/5/2020. She presented to the emergency department due to cough, shortness of breath and hypoxia noted on home oximeter. Patient was diagnosed with COVID-19 on October 27, 2020.  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     Patient has known factor V Leiden and is currently therapeutic on Coumadin.     She was admitted and treated with remdesivir, dexamethasone and oxygen support.  No current fevers and normal procalcitonin but will start azithromycin and Rocephin given radiographic findings and risk for progression of respiratory failure.    Principal Problem:    Acute respiratory failure with hypoxia (H)    Assessment: Most likely due to late effects of COVID-19.    Plan: Remdesivir day 2, dexamethasone day2 and oxygen support.  Nebulizers if evidence of bronchospasm.  Day 2  azithromycin and Rocephin.    Active Problems:    Anticoagulant long-term use    Assessment: Currently therapeutic on Coumadin for factor V Leiden.    Plan: Does not require additional anticoagulation at this time. Monitor INR on antibiotics.       Hypertension    Assessment: Blood pressure stable.    Plan: Continue home medications      Obesity    Assessment: Chronic, does increase risk for decompensation    Plan: Monitor closely.      Pneumonia due to 2019 novel coronavirus    Assessment: Currently afebrile with relatively reassuring labs. Procalcitonin negative     Plan: Rocephin/azithromycin day 2, but will only use during hospitalization.    DVT Prophylaxis: Warfarin  Code Status: Full Code    Ryan Kern    Interval History   Feeling better but more dry cough today. Feels a bit more winded but O2 has been weaned off today and she is maintaining sats thus far.     -Data reviewed today: I reviewed all new labs and imaging results over the last 24 hours.  I personally reviewed no images or EKG's today.    Physical Exam   Temp: 97.6  F (36.4  C) Temp src: Tympanic BP: 135/80 Pulse: 74   Resp: 20 SpO2: 93 % O2 Device: None (Room air) Oxygen Delivery: 1 LPM  Vitals:    11/05/20 1245 11/05/20 1527   Weight: (!) 163.9 kg (361 lb 4.8 oz) (!) 174.9 kg (385 lb 9.6 oz)     Vital Signs with Ranges  Temp:  [97  F (36.1  C)-98.4  F (36.9  C)] 97.6  F (36.4  C)  Pulse:  [68-99] 74  Resp:  [18-20] 20  BP: (135-146)/(80-96) 135/80  SpO2:  [92 %-95 %] 93 %  I/O last 3 completed shifts:  In: -   Out: 850 [Urine:850]    Constitutional: Alert and cooperative, no distress.  Not dyspneic at rest.  Frequent dry cough is noted  Respiratory: Lungs are clear, no rales rhonchi or wheezes are heard  Cardiovascular: RRR without murmur  GI: Abdomen is obese, soft, nontender  Skin/Integumen: No open areas or rashes.       Medications     - MEDICATION INSTRUCTIONS -       Warfarin Therapy Reminder         azithromycin  500 mg Intravenous Q24H     buPROPion  150 mg Oral QAM     cefTRIAXone  2 g Intravenous Q24H     dexamethasone  6 mg Intravenous Q24H     furosemide  20 mg Oral Daily     losartan  25 mg Oral Daily     metoprolol tartrate  12.5 mg Oral BID     polyethylene glycol  17 g Oral Daily     remdesivir  100 mg Intravenous Q24H     senna-docusate  1 tablet Oral BID    Or     senna-docusate  2 tablet Oral BID     sodium chloride (PF)  3 mL Intracatheter Q8H     venlafaxine  150 mg Oral Daily with breakfast     warfarin ANTICOAGULANT  2.5 mg Oral ONCE at 18:00       Data   Recent Labs   Lab 11/06/20  0600 11/05/20  1313 11/05/20  1311 10/31/20   WBC 3.3*  --  4.5  --    HGB 15.4  --  15.9*  --    MCV 93  --  94  --      --  149*  --    INR 2.86* 2.66*  --  2.1*     --  136  --    POTASSIUM 3.7  --  3.3*  --    CHLORIDE 104  --  99  --    CO2 25  --  30  --    BUN 12  --  10  --    CR 0.75  --  0.91  --    ANIONGAP 10  --  7  --    TREE 8.7  --  8.9  --    *  --  138*  --     ALBUMIN  --   --  3.8  --    PROTTOTAL  --   --  7.2  --    BILITOTAL  --   --  0.5  --    ALKPHOS  --   --  62  --    ALT  --   --  40  --    AST  --   --  38  --        No results found for this or any previous visit (from the past 24 hour(s)).

## 2020-11-07 ENCOUNTER — DOCUMENTATION ONLY (OUTPATIENT)
Dept: OTHER | Facility: CLINIC | Age: 60
End: 2020-11-07

## 2020-11-07 VITALS
OXYGEN SATURATION: 93 % | HEART RATE: 88 BPM | TEMPERATURE: 97.5 F | BODY MASS INDEX: 44.41 KG/M2 | RESPIRATION RATE: 19 BRPM | SYSTOLIC BLOOD PRESSURE: 128 MMHG | HEIGHT: 68 IN | WEIGHT: 293 LBS | DIASTOLIC BLOOD PRESSURE: 86 MMHG

## 2020-11-07 LAB
ANION GAP SERPL CALCULATED.3IONS-SCNC: 7 MMOL/L (ref 3–14)
AT III ACT/NOR PPP CHRO: 85 % (ref 85–135)
BASOPHILS # BLD AUTO: 0 10E9/L (ref 0–0.2)
BASOPHILS NFR BLD AUTO: 0 %
BUN SERPL-MCNC: 15 MG/DL (ref 7–25)
CALCIUM SERPL-MCNC: 9.1 MG/DL (ref 8.6–10.3)
CHLORIDE SERPL-SCNC: 106 MMOL/L (ref 98–107)
CO2 SERPL-SCNC: 28 MMOL/L (ref 21–31)
CREAT SERPL-MCNC: 0.68 MG/DL (ref 0.6–1.2)
CRP SERPL-MCNC: 10.6 MG/L
D DIMER PPP FEU-MCNC: <0.3 UG/ML FEU (ref 0–0.5)
DIFFERENTIAL METHOD BLD: NORMAL
EOSINOPHIL # BLD AUTO: 0 10E9/L (ref 0–0.7)
EOSINOPHIL NFR BLD AUTO: 0 %
ERYTHROCYTE [DISTWIDTH] IN BLOOD BY AUTOMATED COUNT: 13.9 % (ref 10–15)
FIBRINOGEN PPP-MCNC: 464 MG/DL (ref 200–420)
GFR SERPL CREATININE-BSD FRML MDRD: 88 ML/MIN/{1.73_M2}
GLUCOSE SERPL-MCNC: 119 MG/DL (ref 70–105)
HCT VFR BLD AUTO: 44.9 % (ref 35–47)
HGB BLD-MCNC: 14.5 G/DL (ref 11.7–15.7)
IL6 SERPL-MCNC: <0.7 PG/ML
IMM GRANULOCYTES # BLD: 0 10E9/L (ref 0–0.4)
IMM GRANULOCYTES NFR BLD: 0.8 %
INR PPP: 2.88 (ref 0.86–1.14)
LDH SERPL L TO P-CCNC: 205 U/L (ref 140–271)
LYMPHOCYTES # BLD AUTO: 1.3 10E9/L (ref 0.8–5.3)
LYMPHOCYTES NFR BLD AUTO: 27 %
MCH RBC QN AUTO: 30.3 PG (ref 26.5–33)
MCHC RBC AUTO-ENTMCNC: 32.3 G/DL (ref 31.5–36.5)
MCV RBC AUTO: 94 FL (ref 78–100)
MONOCYTES # BLD AUTO: 0.4 10E9/L (ref 0–1.3)
MONOCYTES NFR BLD AUTO: 7.5 %
NEUTROPHILS # BLD AUTO: 3.2 10E9/L (ref 1.6–8.3)
NEUTROPHILS NFR BLD AUTO: 64.7 %
PLATELET # BLD AUTO: 188 10E9/L (ref 150–450)
POTASSIUM SERPL-SCNC: 3.5 MMOL/L (ref 3.5–5.1)
RBC # BLD AUTO: 4.79 10E12/L (ref 3.8–5.2)
RETICS # AUTO: 55.6 10E9/L (ref 25–95)
RETICS/RBC NFR AUTO: 1.2 % (ref 0.5–2)
SODIUM SERPL-SCNC: 141 MMOL/L (ref 134–144)
WBC # BLD AUTO: 4.9 10E9/L (ref 4–11)

## 2020-11-07 PROCEDURE — 85610 PROTHROMBIN TIME: CPT | Performed by: FAMILY MEDICINE

## 2020-11-07 PROCEDURE — 85384 FIBRINOGEN ACTIVITY: CPT | Performed by: FAMILY MEDICINE

## 2020-11-07 PROCEDURE — 85025 COMPLETE CBC W/AUTO DIFF WBC: CPT | Performed by: FAMILY MEDICINE

## 2020-11-07 PROCEDURE — 250N000013 HC RX MED GY IP 250 OP 250 PS 637: Performed by: FAMILY MEDICINE

## 2020-11-07 PROCEDURE — 250N000009 HC RX 250: Performed by: FAMILY MEDICINE

## 2020-11-07 PROCEDURE — 86140 C-REACTIVE PROTEIN: CPT | Performed by: FAMILY MEDICINE

## 2020-11-07 PROCEDURE — 85379 FIBRIN DEGRADATION QUANT: CPT | Performed by: FAMILY MEDICINE

## 2020-11-07 PROCEDURE — 99239 HOSP IP/OBS DSCHRG MGMT >30: CPT | Performed by: FAMILY MEDICINE

## 2020-11-07 PROCEDURE — 258N000003 HC RX IP 258 OP 636: Performed by: FAMILY MEDICINE

## 2020-11-07 PROCEDURE — 36415 COLL VENOUS BLD VENIPUNCTURE: CPT | Performed by: FAMILY MEDICINE

## 2020-11-07 PROCEDURE — 250N000011 HC RX IP 250 OP 636: Performed by: FAMILY MEDICINE

## 2020-11-07 PROCEDURE — 83615 LACTATE (LD) (LDH) ENZYME: CPT | Performed by: FAMILY MEDICINE

## 2020-11-07 PROCEDURE — 80048 BASIC METABOLIC PNL TOTAL CA: CPT | Performed by: FAMILY MEDICINE

## 2020-11-07 PROCEDURE — 85045 AUTOMATED RETICULOCYTE COUNT: CPT | Performed by: FAMILY MEDICINE

## 2020-11-07 RX ADMIN — METOPROLOL TARTRATE 12.5 MG: 25 TABLET, FILM COATED ORAL at 10:05

## 2020-11-07 RX ADMIN — FUROSEMIDE 20 MG: 20 TABLET ORAL at 10:06

## 2020-11-07 RX ADMIN — REMDESIVIR 100 MG: 100 INJECTION, POWDER, LYOPHILIZED, FOR SOLUTION INTRAVENOUS at 10:15

## 2020-11-07 RX ADMIN — VENLAFAXINE HYDROCHLORIDE 150 MG: 75 CAPSULE, EXTENDED RELEASE ORAL at 08:55

## 2020-11-07 RX ADMIN — CEFTRIAXONE SODIUM 2 G: 2 INJECTION, SOLUTION INTRAVENOUS at 11:44

## 2020-11-07 RX ADMIN — BUPROPION HYDROCHLORIDE 150 MG: 150 TABLET, FILM COATED, EXTENDED RELEASE ORAL at 10:06

## 2020-11-07 RX ADMIN — DEXAMETHASONE SODIUM PHOSPHATE 6 MG: 4 INJECTION, SOLUTION INTRAMUSCULAR; INTRAVENOUS at 11:39

## 2020-11-07 ASSESSMENT — ACTIVITIES OF DAILY LIVING (ADL)
ADLS_ACUITY_SCORE: 13

## 2020-11-07 NOTE — PROGRESS NOTES
SAFETY CHECKLIST  ID Bands and Risk clasps correct and in place (DNR, Fall risk, Allergy, Latex, Limb):  Yes  All Lines Reconciled and labeled correctly: Yes  Whiteboard updated:Yes  Environmental interventions (bed/chair alarm on, call light, side rails, restraints, sitter....): Yes    Marlene Btut RN on 11/6/2020 at 7:32 PM

## 2020-11-07 NOTE — PLAN OF CARE
Problem: Adult Inpatient Plan of Care  Goal: Plan of Care Review  Outcome: Adequate for Discharge  Flowsheets (Taken 11/7/2020 0845)  Plan of Care Reviewed With: patient  Goal: Patient-Specific Goal (Individualized)  Outcome: Adequate for Discharge  Goal: Absence of Hospital-Acquired Illness or Injury  Outcome: Adequate for Discharge  Intervention: Identify and Manage Fall Risk  Recent Flowsheet Documentation  Taken 11/7/2020 0845 by Leonor Sweet RN  Safety Promotion/Fall Prevention: safety round/check completed  Intervention: Prevent Skin Injury  Recent Flowsheet Documentation  Taken 11/7/2020 0845 by Leonor Sweet RN  Body Position: position changed independently  Intervention: Prevent and Manage VTE (Venous Thromboembolism) Risk  Recent Flowsheet Documentation  Taken 11/7/2020 0845 by Leonor Sweet RN  VTE Prevention/Management:    anticoagulant therapy maintained    ambulation promoted    fluids promoted  Intervention: Prevent Infection  Recent Flowsheet Documentation  Taken 11/7/2020 0845 by Leonor Sweet RN  Infection Prevention:    hand hygiene promoted    single patient room provided  Goal: Optimal Comfort and Wellbeing  Outcome: Adequate for Discharge  Intervention: Provide Person-Centered Care  Recent Flowsheet Documentation  Taken 11/7/2020 0845 by Leonor Sweet RN  Trust Relationship/Rapport:    care explained    choices provided    questions answered    questions encouraged    thoughts/feelings acknowledged  Goal: Readiness for Transition of Care  Outcome: Adequate for Discharge     Problem: Infection (Pneumonia)  Goal: Resolution of Infection Signs and Symptoms  Outcome: Adequate for Discharge  Intervention: Prevent Infection Progression  Recent Flowsheet Documentation  Taken 11/7/2020 0845 by Leonor Sweet RN  Infection Management: aseptic technique maintained  Isolation Precautions:    contact precautions maintained    droplet precautions maintained     Problem: Respiratory Compromise  (Pneumonia)  Goal: Effective Oxygenation and Ventilation  Outcome: Adequate for Discharge  Intervention: Promote Airway Secretion Clearance  Recent Flowsheet Documentation  Taken 11/7/2020 0845 by Leonor Sweet RN  Cough And Deep Breathing: done independently per patient  Intervention: Optimize Oxygenation and Ventilation  Recent Flowsheet Documentation  Taken 11/7/2020 0845 by Leonor Sweet RN  Head of Bed (HOB) Positioning: HOB at 15 degrees   Patient is discharging home today. VS WNL. Denies any pain. Dyspnea with exertion. LS diminished but clear. O2 93% on RA. Cough is frequent and congested. Encouraged fluids, ambulation and deep breaths. Alert and oriented. She will go home with her .

## 2020-11-07 NOTE — DISCHARGE SUMMARY
[facility]    Discharge Summary  Hospitalist    Date of Admission:  11/5/2020  Date of Discharge:  11/7/2020  Discharging Provider: Ryan Kern  Date of Service (when I saw the patient): 11/07/20    Discharge Diagnoses   Principal Problem:    Acute respiratory failure with hypoxia (H) (11/5/2020)  Active Problems:    Anticoagulant long-term use (9/25/2014)    Anticoagulation monitoring, INR range 2-3 (6/10/2015)    Hypertension (5/26/2016)    Obesity (2/8/2018)    Pneumonia due to 2019 novel coronavirus (11/5/2020)      History of Present Illness   Trisha Fernando is an 59 year old female who was diagnosed with COVID-19 on October 27.  She has been being careful and wearing a mask.  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     Patient reports her and symptoms were initially mild.  After about 3 days started to improve but then on day 5 symptoms worsened.  Started to have more of a harsh cough and some shortness of breath with activity.  Felt very weak and had fever.  Since then symptoms have been steadily worsening although fever seems to be improving.  Yesterday oxygen saturations were persistently in the 80s and she struggled to regain her breath after any activity.     She denies any chest pain.  No abdominal pain.  She has been eating and drinking well.  No issues with diarrhea.  Urination has been normal.      also has been positive for COVID-19 but reports his symptoms are more mild.    Hospital Course   Trisha Fernando was admitted on 11/5/2020.  She was placed on IV remdesivir, dexamethasone, and oxygen support.  She had no current fever and normal pro calcitonin but a azithromycin and Rocephin were started given radiographic findings of diffuse pneumonia on CT scan.  She had negative scan for PE.  She is on long-term anticoagulation with Coumadin for factor V Leiden mutation.    Patient improved.  Her  need for oxygen decreased and she was able to be weaned off.  She was off oxygen for 18 hours and maintained her saturation with activity in the room.  She continued with a dry cough but appetite was okay and she was feeling better.  She was discharged home in improved condition.  She will continue to quarantine.  She has follow-up scheduled with Edith Ramirez NP 11/17/20 @1320.  We will continue to use Robitussin with codeine which she already has at home.  She was instructed on use of an albuterol inhaler which she also has at home.  She was advised to follow-up sooner than her scheduled appointment if she had further problems or questions.  She will obtain a release to go back to work at her follow-up appointment.    Ryan Kern MD    Significant Results and Procedures       Pending Results   These results will be followed up by outpatient provider  Unresulted Labs Ordered in the Past 30 Days of this Admission     Date and Time Order Name Status Description    11/5/2020 1510 Interleukin 6 Blood In process     11/5/2020 1510 Antithrombin III In process     11/5/2020 1255 Blood culture Preliminary     11/5/2020 1255 Blood culture Preliminary           Code Status   Full Code       Primary Care Physician   Maria Guadalupe Ramon    Physical Exam   Temp: 97.5  F (36.4  C) Temp src: Tympanic BP: 128/86 Pulse: 66   Resp: 19 SpO2: 93 % O2 Device: None (Room air) Oxygen Delivery: 1 LPM  Vitals:    11/05/20 1245 11/05/20 1527   Weight: (!) 163.9 kg (361 lb 4.8 oz) (!) 174.9 kg (385 lb 9.6 oz)     Vital Signs with Ranges  Temp:  [97.5  F (36.4  C)-97.7  F (36.5  C)] 97.5  F (36.4  C)  Pulse:  [62-88] 66  Resp:  [18-20] 19  BP: (122-135)/(80-90) 128/86  SpO2:  [92 %-94 %] 93 %  I/O last 3 completed shifts:  In: -   Out: 1250 [Urine:1250]    Constitutional: Alert and cooperative, no distress.  Not dyspneic at rest.  Frequent dry cough is noted  Respiratory:     Lungs are clear, no rales rhonchi or wheezes are  heard  Cardiovascular: RRR without murmur  GI: Abdomen is obese, soft, nontender  Skin/Integumen: No open areas or rashes.         Discharge Disposition   Discharged to home  Condition at discharge: Stable    Consultations This Hospital Stay   PHARMACY TO DOSE WARFARIN    Time Spent on this Encounter   IRyan MD, personally saw the patient today and spent greater than 30 minutes discharging this patient.    Discharge Orders      Reason for your hospital stay    Pneumonia with hypoxemia due to Covid 19     Follow-up and recommended labs and tests     Follow up with Edith Ramirez NP 11/17/20 @1320 at [facility]     Activity    Your activity upon discharge: activity as tolerated and continue to quarantine as discussed.     Discharge Instructions     Full Code     Diet    Follow this diet upon discharge: Orders Placed This Encounter      Combination Diet Regular Diet Adult     Discharge Medications   Current Discharge Medication List      CONTINUE these medications which have NOT CHANGED    Details   acetaminophen (TYLENOL) 500 MG tablet Take 500-1,000 mg by mouth every 6 hours as needed for mild pain or fever      albuterol (PROAIR HFA/PROVENTIL HFA/VENTOLIN HFA) 108 (90 Base) MCG/ACT inhaler Inhale 2 puffs into the lungs every 4 hours as needed for shortness of breath / dyspnea or wheezing  Qty: 1 Inhaler, Refills: 1    Comments: Pharmacy may dispense brand covered by insurance (Proair, or proventil or ventolin or generic albuterol inhaler)  Associated Diagnoses: Cough      buPROPion (WELLBUTRIN XL) 150 MG 24 hr tablet Take 1 tablet (150 mg) by mouth every morning  Qty: 90 tablet, Refills: 3    Comments: Renewal only - patient will call for refill  Associated Diagnoses: Depression, major, recurrent, mild (H)      Cholecalciferol (VITAMIN D3) 2000 UNITS CAPS Take 2,000 Units by mouth daily      fish oil-omega-3 fatty acids 1000 MG capsule Take 1 capsule by mouth daily        furosemide (LASIX) 20 MG tablet TAKE 1-2 TABLETS (20-40 MG) BY MOUTH DAILY TAKE SECOND DOSE 6 HOURS AFTER FIRST  Qty: 135 tablet, Refills: 3    Associated Diagnoses: Bilateral lower extremity edema      guaiFENesin-codeine (ROBITUSSIN AC) 100-10 MG/5ML solution Take 10 mLs by mouth every 6 hours as needed for cough  Qty: 236 mL, Refills: 1    Associated Diagnoses: Infection due to 2019 novel coronavirus      losartan (COZAAR) 25 MG tablet TAKE 1 TABLET BY MOUTH EVERY DAY  Qty: 90 tablet, Refills: 1    Associated Diagnoses: Essential hypertension      metoprolol tartrate (LOPRESSOR) 25 MG tablet Take 0.5 tablets (12.5 mg) by mouth 2 times daily  Qty: 180 tablet, Refills: 4    Comments: Renewal only - patient will call for refill  Associated Diagnoses: Essential hypertension      potassium chloride ER (K-TAB/KLOR-CON) 10 MEQ CR tablet TAKE 1 TABLET (10 MEQ) BY MOUTH DAILY  Qty: 90 tablet, Refills: 2    Associated Diagnoses: Hypokalemia      venlafaxine (EFFEXOR-XR) 150 MG 24 hr capsule TAKE 1 CAPSULE BY MOUTH EVERY DAY WITH FOOD  Qty: 90 capsule, Refills: 4    Comments: DX Code Needed  PATIENT HAS A FEW DAYS LEFT.  Associated Diagnoses: Depression, major, recurrent, mild (H)      vitamin B-Complex Take 1 tablet by mouth daily      warfarin ANTICOAGULANT (COUMADIN) 5 MG tablet TAKE 5 MG X 5 DAYS AND 2.5 MG X 2 DAYS/WEEK OR AS DIRECTED BY THE PROTIME CLINIC.  Qty: 90 tablet, Refills: 1    Associated Diagnoses: Anticoagulation monitoring, INR range 2-3; Embolism and thrombosis (H)      Blood Pressure Monitoring (ADULT BLOOD PRESSURE CUFF LG) KIT As directed.      Elastic Bandages & Supports (MEDICAL COMPRESSION STOCKINGS) MISC For personal use. Length: calf Strength: 16-20 mmHg Circumference in cm: For calf: Ankle 15cm, Calf 30cm, Ankle to calf length 40cm.      LORazepam (ATIVAN) 0.5 MG tablet Take 0.5 mg by mouth every 6 hours as needed            Allergies   Allergies   Allergen Reactions     Lisinopril Swelling      Angioedema, Edema       Adhesive Tape Itching and Rash     Woodland Itching and Rash     Wound Dressing Adhesive Rash     Data   Most Recent 3 CBC's:  Recent Labs   Lab Test 11/07/20  0517 11/06/20  0600 11/05/20  1311   WBC 4.9 3.3* 4.5   HGB 14.5 15.4 15.9*   MCV 94 93 94    171 149*      Most Recent 3 BMP's:  Recent Labs   Lab Test 11/07/20  0517 11/06/20  0600 11/05/20  1311    139 136   POTASSIUM 3.5 3.7 3.3*   CHLORIDE 106 104 99   CO2 28 25 30   BUN 15 12 10   CR 0.68 0.75 0.91   ANIONGAP 7 10 7   TREE 9.1 8.7 8.9   * 154* 138*     Most Recent 2 LFT's:  Recent Labs   Lab Test 11/05/20  1311 02/10/20  1545   AST 38 22   ALT 40 32   ALKPHOS 62 65   BILITOTAL 0.5 0.5     Most Recent INR's and Anticoagulation Dosing History:  Anticoagulation Dose History     Recent Dosing and Labs Latest Ref Rng & Units 10/8/2020 10/15/2020 10/22/2020 10/31/2020 11/5/2020 11/6/2020 11/7/2020    Warfarin 2.5 mg - - - - - 2.5 mg 2.5 mg -    INR 0.86 - 1.14 3.2(A) 3.1(A) 3.1(A) 2.1(A) 2.66(H) 2.86(H) 2.88(H)    INR 0.86 - 1.14 - - - - - - -        Most Recent 3 Troponin's:  Recent Labs   Lab Test 06/19/18  1501 06/19/18  1230   TROPI <0.030 <0.030     Most Recent Cholesterol Panel:  Recent Labs   Lab Test 04/05/19  0737   CHOL 202*   *   HDL 56   TRIG 120     Most Recent 6 Bacteria Isolates From Any Culture (See EPIC Reports for Culture Details):  Recent Labs   Lab Test 11/05/20  1312   CULT No growth after 2 days  No growth after 2 days     Most Recent TSH, T4 and A1c Labs:No lab results found.  Results for orders placed or performed during the hospital encounter of 11/05/20   CT Chest Pulmonary Embolism w Contrast    Narrative    PROCEDURE: CT CHEST PULMONARY EMBOLISM W CONTRAST 11/5/2020 2:02 PM    HISTORY: sob covid pos    COMPARISONS: None.    Meds/Dose Given:    TECHNIQUE: CT angiogram of the chest with sagittal coronal mip  reconstructions.    FINDINGS: CT angiogram reveals no pulmonary emboli. The  thoracic aorta  appears normal. The heart is normal in size. There are no hilar or  mediastinal masses or lymphadenopathy. Axillary and supraclavicular  lymph nodes appear normal.    Multiple peripheral areas of alveolar opacity are noted in both lungs  suspicious for widespread pneumonia.    The upper portion of the liver spleen and pancreas appear normal. The  adrenal glands are normal. Degenerative changes are present in the  thoracic spine.         Impression    IMPRESSION: Widespread patchy opacities in both lungs suspicious for  widespread pneumonia    PETE SINHA MD

## 2020-11-07 NOTE — PLAN OF CARE
"Patient is alert and oriented x 4. Denies pain. Lung sounds are clear in upper lobes and diminished in the bases. Dyspnea with exertion. SpO2 92-93% RA. Frequent and dry cough. Robitussin administered per MAR. Patient ambulates in room independently. Voiding without difficulty. Vital signs:  Temp: 97.5  F (36.4  C) Temp src: Tympanic BP: (!) 124/90 Pulse: 62   Resp: 20 SpO2: 93 % O2 Device: None (Room air) Oxygen Delivery: 1 LPM Height: 172.7 cm (5' 8\") Weight: (!) 174.9 kg (385 lb 9.6 oz)  Estimated body mass index is 58.63 kg/m  as calculated from the following:    Height as of this encounter: 1.727 m (5' 8\").    Weight as of this encounter: 174.9 kg (385 lb 9.6 oz).      Marlene Butt RN on 11/7/2020 at 6:00 AM    "

## 2020-11-07 NOTE — PROGRESS NOTES
"/84   Pulse 75   Temp 97.7  F (36.5  C) (Tympanic)   Resp 18   Ht 1.727 m (5' 8\")   Wt (!) 174.9 kg (385 lb 9.6 oz)   SpO2 93%   BMI 58.63 kg/m    Pt denies pain. Pt remains on RA,  93 % on RA. Adequate urine output. Will continue to monitor.   "

## 2020-11-07 NOTE — PROGRESS NOTES
[facility] DISCHARGE NOTE    Patient discharged to home at 1:04 PM via wheel chair. Accompanied by spouse and staff. Discharge instructions reviewed with patient, opportunity offered to ask questions. Prescriptions sent to patients preferred pharmacy. All belongings sent with patient.    JEFFREY PUGH RN

## 2020-11-07 NOTE — PHARMACY
Pharmacy:  Discharge Counseling and Medication Reconciliation    Trisha Fernando  [address] [place] [address]  [phone] (home)   59 year old female  PCP: Maria Guadalupe Ramon    Allergies: Lisinopril, Adhesive tape, Alden, and Wound dressing adhesive    Discharge Counseling:    Pharmacist called patient today via telephone today to review the medication portion of the After Visit Summary and to address patient's questions/concerns.    Summary of Education: Counseled patient that there were no new medications prescribed, and no changes made to her medication list at discharge.    Per Warfarin consult, I recommended that patient take Warfarin 2.5 mg this evening after discharge (half of home dose) due to INR in upper range of normal and recent hospitalization. After that, I recommended that she continue her home dosing, with multiple INR checks this week (patient reports that she has machine to monitor her own INR at home). Patient verbalized understanding, and had no questions at this time.    Materials Provided:  MedCounselor sheets printed from Clinical Pharmacology on: N/A- no new meds prescribed.    Discharge Medication Reconciliation:    It has been determined that the patient has an adequate supply of medications available or which can be obtained from the patient's preferred pharmacy.    Thank you for the consult.    Kael Wells [facility]........November 7, 2020 5:58 PM

## 2020-11-07 NOTE — PROGRESS NOTES
Unable to get a hold of care team members if home oxygen is still needed for discharge. If home oxygen is needed please call [facility] at  to confirm.

## 2020-11-08 ENCOUNTER — TRANSFERRED RECORDS (OUTPATIENT)
Dept: HEALTH INFORMATION MANAGEMENT | Facility: OTHER | Age: 60
End: 2020-11-08

## 2020-11-08 NOTE — PHARMACY
"Pharmacy Consult- Coumadin Education    PharmD. Counseled patient regarding warfarin (Coumadin) therapy today.     Trisha Fernando is a 59 year old female admitted on 11/5/2020 for/with Acute respiratory failure with hypoxia (H).    Patient's Primary Indication for Anticoagulation: Heterozygous factor V Leiden mutation    Goal INR: 2 to 3    Patient Active Problem List   Diagnosis     Anticoagulant long-term use     Anticoagulation monitoring, INR range 2-3     Depression, major, recurrent, mild (H)     Health care maintenance     Hypertension     Obesity     Varicose veins of both legs with edema     Long term (current) use of anticoagulants     Embolism and thrombosis (H)     Acute respiratory failure with hypoxia (H)     Pneumonia due to 2019 novel coronavirus       Recent Labs   Lab Test 11/07/20  0517 11/06/20  0600 11/05/20  1313 11/05/20  1311 10/31/20 02/10/20  1545 02/10/20  1545   HGB 14.5 15.4  --  15.9*  --   --  15.6   INR 2.88* 2.86* 2.66*  --  2.1*   < >  --     171  --  149*  --   --  233    < > = values in this interval not displayed.        Plan: Counseled patient to take half of home dose this evening (Warfarin 2.5 mg), and then to resume home dosing. Patient reports that she is able to take her own INR with a machine at home- I counseled her that due to dosage deviations while in hospital, she should take her INR at least twice this week. Warfarin was agreeable to plan, and verbalized understanding.    The patient was provided with a pink protime card and a \"Guide to Coumadin/Warfarin Therapy\".    Patient stated understanding and all questions were addressed.     Thank You.    Kael Wells [facility] ....................  11/7/2020   6:02 PM  "

## 2020-11-09 ENCOUNTER — ANTICOAGULATION THERAPY VISIT (OUTPATIENT)
Dept: ANTICOAGULATION | Facility: OTHER | Age: 60
End: 2020-11-09
Attending: INTERNAL MEDICINE
Payer: COMMERCIAL

## 2020-11-09 ENCOUNTER — PATIENT OUTREACH (OUTPATIENT)
Dept: CARE COORDINATION | Facility: CLINIC | Age: 60
End: 2020-11-09

## 2020-11-09 DIAGNOSIS — Z79.01 ANTICOAGULATION MONITORING, INR RANGE 2-3: ICD-10-CM

## 2020-11-09 DIAGNOSIS — I74.9 EMBOLISM AND THROMBOSIS (H): ICD-10-CM

## 2020-11-09 LAB — INR PPP: 2.4 (ref 0.9–1.1)

## 2020-11-09 NOTE — PROGRESS NOTES
ANTICOAGULATION FOLLOW-UP CLINIC VISIT    Patient Name:  Trisha Fernando  Date:  2020  Contact Type:  fax from [employer] home monitoring service/phone call with patient    SUBJECTIVE:  Patient Findings     Positives:  Change in medications (hospital meds Remdisivir, dexamethazon, antibiotics, none on discharge), Hospital admission ( to 20 for covid and ARF)    Comments:  Per phone call with patient's .        Clinical Outcomes     Negatives:  Major bleeding event, Thromboembolic event, Anticoagulation-related hospital admission, Anticoagulation-related ED visit, Anticoagulation-related fatality    Comments:  Per phone call with patient's .           OBJECTIVE    Recent labs: (last 7 days)     20   INR 1.9*       ASSESSMENT / PLAN  INR assessment SUB    Recheck INR In: 1 WEEK    INR Location Home INR      Anticoagulation Summary  As of 2020    INR goal:  2.0-3.0   TTR:  79.6 % (1 y)   INR used for dosin.9 (2020)   Warfarin maintenance plan:  2.5 mg (5 mg x 0.5) every Mon, Thu; 5 mg (5 mg x 1) all other days   Full warfarin instructions:  2.5 mg every Mon, Thu; 5 mg all other days   Weekly warfarin total:  30 mg   No change documented:  Maria D Zayas RN   Plan last modified:  Maria D Zayas RN (10/26/2020)   Next INR check:  2020   Priority:  High   Target end date:  Indefinite    Indications    Long term (current) use of anticoagulants [Z79.01]  Embolism and thrombosis (H) [I74.9]  Anticoagulation monitoring  INR range 2-3 [Z79.01]             Anticoagulation Episode Summary     INR check location:      Preferred lab:      Send INR reminders to:  [facility]    Comments:        Anticoagulation Care Providers     Provider Role Specialty Phone number    Maria Guadalupe Ramon DO [facility] Internal Medicine [phone]            See the Encounter Report to view Anticoagulation Flowsheet and Dosing Calendar (Go to Encounters tab in chart review, and  find the Anticoagulation Therapy Visit)    No encounter, INR  received via fax.  Assessment and istructions via phone call with patient. Patient verbalized understanding.      Maria D Zayas RN

## 2020-11-09 NOTE — PROGRESS NOTES
Clinic Care Coordination Contact    Patient was resting when writer reached out, spoke with spouse Atif.  He was appreciative of the call out to touch base and check in on patient.    States she is resting now and will hopefully have a bowel movement in the near future.  Does have some abdominal cramping.    Spouse of patient states they feel comfortable reaching out to the clinic if and when needed and coming in if concerns arise.    Have scheduled follow-up appointment on 11/17/2020.    Transitional Care Management Phone Call    Summary of hospitalization:  [facility] discharge summary reviewed    DISCHARGE DIAGNOSIS: pneumonia, COVID19    DATE OF DISCHARGE: 11/07/2020    Diagnostic Tests/Treatments reviewed.  Follow up needed: PCP 11/17/2020    Post Discharge Medication Reconciliation: discharge medications reconciled, continue medications without change.    Medications reviewed by: by myself    Problems taking medications regularly:  None    Problems adhering to non-medication therapy:  None    Other Healthcare Providers Involved in Patient s Care:         None  Update since discharge: stable.     Plan of care communicated with patient and family    Just a friendly reminder that you appointment is   Next 5 appointments (look out 90 days)    Nov 17, 2020  1:20 PM  [facility] NP  [facility] [address]  [place] [address]  [phone]   Feb 04, 2021  1:20 PM  SHORT with Maria Guadalupe Ramon DO  [facility] [address]  [place] [address]  [phone]      .  We encourage you to keep this appointment.  Please remember to bring all of your pills in their bottles (including any vitamins or over the counter pills) with you to your appointment.     The patient indicates understanding of these issues and agrees with the plan of care.    Yes    Was the patient contacted within the 2 business days or other approved timeframe?  Yes     Was the Medication reconciliation and management done since the patient was discharged? Yes    DRAKE Redmond  11/9/2020 10:49 AM

## 2020-11-11 LAB
BACTERIA SPEC CULT: NORMAL
BACTERIA SPEC CULT: NORMAL
SPECIMEN SOURCE: NORMAL
SPECIMEN SOURCE: NORMAL

## 2020-11-12 ENCOUNTER — TRANSFERRED RECORDS (OUTPATIENT)
Dept: HEALTH INFORMATION MANAGEMENT | Facility: OTHER | Age: 60
End: 2020-11-12

## 2020-11-12 ENCOUNTER — ANTICOAGULATION THERAPY VISIT (OUTPATIENT)
Dept: ANTICOAGULATION | Facility: OTHER | Age: 60
End: 2020-11-12
Attending: INTERNAL MEDICINE
Payer: COMMERCIAL

## 2020-11-12 DIAGNOSIS — I74.9 EMBOLISM AND THROMBOSIS (H): ICD-10-CM

## 2020-11-12 DIAGNOSIS — Z79.01 ANTICOAGULATION MONITORING, INR RANGE 2-3: ICD-10-CM

## 2020-11-12 LAB — INR PPP: 4.1 (ref 0.9–1.1)

## 2020-11-12 NOTE — PROGRESS NOTES
ANTICOAGULATION FOLLOW-UP CLINIC VISIT    Patient Name:  Trisha Fernando  Date:  2020  Contact Type:  Telephone/ spoke with patient reviewed dosing    SUBJECTIVE:  Patient Findings     Positives:  Other complaints (still recovering from hospital stay when she had two different antibiotics and dexamethasone)        Clinical Outcomes     Negatives:  Major bleeding event, Thromboembolic event, Anticoagulation-related hospital admission, Anticoagulation-related ED visit, Anticoagulation-related fatality           OBJECTIVE    Recent labs: (last 7 days)     20   INR 4.1*       ASSESSMENT / PLAN  INR assessment SUPRA    Recheck INR In: 6 DAYS    INR Location Home INR      Anticoagulation Summary  As of 2020    INR goal:  2.0-3.0   TTR:  78.9 % (1 y)   INR used for dosin.1 (2020)   Warfarin maintenance plan:  2.5 mg (5 mg x 0.5) every Mon, Thu; 5 mg (5 mg x 1) all other days   Full warfarin instructions:  : Hold; : 2.5 mg; Otherwise 2.5 mg every Mon, Thu; 5 mg all other days   Weekly warfarin total:  30 mg   Plan last modified:  Maria D Zayas RN (10/26/2020)   Next INR check:     Priority:  High   Target end date:  Indefinite    Indications    Long term (current) use of anticoagulants [Z79.01]  Embolism and thrombosis (H) [I74.9]  Anticoagulation monitoring  INR range 2-3 [Z79.01]             Anticoagulation Episode Summary     INR check location:      Preferred lab:      Send INR reminders to:  [facility]    Comments:        Anticoagulation Care Providers     Provider Role Specialty Phone number    Maria Guadalupe Ramon,  [facility] Internal Medicine [phone]            See the Encounter Report to view Anticoagulation Flowsheet and Dosing Calendar (Go to Encounters tab in chart review, and find the Anticoagulation Therapy Visit)        Smitha Nair, RN

## 2020-11-16 PROBLEM — Z09 HOSPITAL DISCHARGE FOLLOW-UP: Status: ACTIVE | Noted: 2020-11-16

## 2020-11-16 NOTE — PROGRESS NOTES
Nursing Notes:   Barbie Ngo LPN  11/17/2020  1:36 PM  Sign at exiting of workspace  Chief Complaint   Patient presents with     Hospital F/U   Patient presents to the clinic today for hospital follow up. Patient is having symptoms currently of no taste or smell, coughing, dizzy, SOB, Below the belly button pain when urinating or having a BM and fatigue.     Medication Reconciliation: completed   Barbie Ngo LPN  11/17/2020 1:34 PM       Nursing note reviewed with patient.  Accuracy and completeness verified.     Patient presents for Hospital Followup Visit:    Hospital:  [facility]      Date of Admission: 11/05/20  Date of Discharge: 11/07/20  Transitional Care Management Phone Call: 11/09/20  Reason(s) for Admission:   Principal Problem:    Acute respiratory failure with hypoxia (H) (11/5/2020)  Active Problems:    Anticoagulant long-term use (9/25/2014)    Anticoagulation monitoring, INR range 2-3 (6/10/2015)    Hypertension (5/26/2016)    Obesity (2/8/2018)    Pneumonia due to 2019 novel coronavirus (11/5/2020)             Problems taking medications regularly:  None       Medication changes since discharge: None       Problems adhering to non-medication therapy:  None      Summary of hospitalization:  [facility] discharge summary reviewed. Diagnosed with COVID-19 on October 27.  She has been being careful and wearing a mask.  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  Patient reports her and symptoms were initially mild.  After about 3 days started to improve but then on day 5 symptoms worsened.  Started to have more of a harsh cough and some shortness of breath with activity.  Felt very weak and had fever.  Since then symptoms have been steadily worsening although fever seems to be improving.  Yesterday oxygen saturations were persistently in the 80s and she struggled to regain her breath after any  activity.  She denies any chest pain.  No abdominal pain.  She has been eating and drinking well.  No issues with diarrhea.  Urination has been normal.  also has been positive for COVID-19 but reports his symptoms are more mild.  She was placed on IV remdesivir, dexamethasone, and oxygen support.  She had no current fever and normal pro calcitonin but a azithromycin and Rocephin were started given radiographic findings of diffuse pneumonia on CT scan.  She had negative scan for PE.  She is on long-term anticoagulation with Coumadin for factor V Leiden mutation.  Patient improved.  Her need for oxygen decreased and she was able to be weaned off.  She was off oxygen for 18 hours and maintained her saturation with activity in the room.  She continued with a dry cough but appetite was okay and she was feeling better.  She was discharged home in improved condition.  She will continue to quarantine.  She has follow-up scheduled with Edith Ramirez NP 11/17/20 @1320.  We will continue to use Robitussin with codeine which she already has at home.  She was instructed on use of an albuterol inhaler which she also has at home.  She was advised to follow-up sooner than her scheduled appointment if she had further problems or questions.  She will obtain a release to go back to work at her follow-up appointment.    Diagnostic Tests/Treatments reviewed.      Follow up needed: none    Other Healthcare Providers Involved in Patient s Care:  None    Update since discharge: fluctuating course.  Patient reports she feels she is 80% to 85% improved.  Reports her oxygen saturations have been running right around 94% on room air.  She does report that when she showers, she notes her oxygen to drop below 90% and she is noticeably short of breath.  She reports she still does not have a sense of smell or taste however can now tell if foods are salty, sweet or sour, and reports that food burns her mouth.  She reports she is barely  "eating for nutrition.  She does report she is having some urinary issues and she is not sure what to make of that, reports low abdomen pain and pressure.  Reports that she has no edema that she has noticed and feels that her ankles have actually improved in regards to the edema.  She has been using NyQuil and feels that allows her to rest as she stopped using the cough medicine with codeine as it was causing her constipation.  She is just incredibly fatigued, very teary, feels like she needs to get back to work but does not feel she is ready.    SUBJECTIVE:                                                      REVIEW OF SYSTEMS:  Review of Systems   Constitutional: Positive for activity change and fatigue. Negative for chills and fever.   HENT:        Loss of smell and taste.  Able to sense salty foods, sweet foods, sour foods.   Respiratory: Positive for cough (Occasional) and shortness of breath.    Cardiovascular: Negative for leg swelling.   Gastrointestinal: Positive for constipation (Due to codeine which has resolved.).   Genitourinary: Positive for frequency and pelvic pain.   Musculoskeletal: Positive for myalgias.   Psychiatric/Behavioral: Negative for sleep disturbance. The patient is nervous/anxious.         Reports she has some guilt feelings as to why she is still alive and so many others have  from Covid.   All other systems reviewed and are negative.      OBJECTIVE:                                                       PHYSICAL EXAM:   /70 (BP Location: Right arm, Patient Position: Sitting, Cuff Size: Adult Large)   Pulse 74   Temp 98.1  F (36.7  C) (Tympanic)   Resp 16   Wt (!) 161.7 kg (356 lb 6.4 oz)   SpO2 96%   BMI 54.19 kg/m      Estimated body mass index is 54.19 kg/m  as calculated from the following:    Height as of 20: 1.727 m (5' 8\").    Weight as of this encounter: 161.7 kg (356 lb 6.4 oz).     Physical Exam  Vitals signs and nursing note reviewed.   Constitutional:     "   Appearance: Normal appearance. She is obese.   HENT:      Head: Normocephalic and atraumatic.      Mouth/Throat:      Mouth: Mucous membranes are moist.      Pharynx: Oropharynx is clear.   Eyes:      Extraocular Movements: Extraocular movements intact.      Conjunctiva/sclera: Conjunctivae normal.      Pupils: Pupils are equal, round, and reactive to light.   Cardiovascular:      Rate and Rhythm: Normal rate and regular rhythm.      Heart sounds: Normal heart sounds.   Pulmonary:      Breath sounds: Normal breath sounds.   Abdominal:      General: Bowel sounds are normal.      Palpations: Abdomen is soft.   Neurological:      Mental Status: She is alert and oriented to person, place, and time. Mental status is at baseline.   Psychiatric:         Attention and Perception: Attention and perception normal.         Mood and Affect: Affect is tearful.         Speech: Speech normal.         Behavior: Behavior normal. Behavior is cooperative.         Thought Content: Thought content normal.         Cognition and Memory: Cognition and memory normal.         Judgment: Judgment normal.       Results for orders placed or performed in visit on 11/17/20   D dimer quantitative     Status: Abnormal   Result Value Ref Range    D Dimer 1.2 (H) 0.0 - 0.50 ug/ml FEU   CBC with platelets differential     Status: None   Result Value Ref Range    WBC 7.2 4.0 - 11.0 10e9/L    RBC Count 4.87 3.8 - 5.2 10e12/L    Hemoglobin 14.8 11.7 - 15.7 g/dL    Hematocrit 46.0 35.0 - 47.0 %    MCV 95 78 - 100 fl    MCH 30.4 26.5 - 33.0 pg    MCHC 32.2 31.5 - 36.5 g/dL    RDW 13.8 10.0 - 15.0 %    Platelet Count 296 150 - 450 10e9/L    Diff Method Automated Method     % Neutrophils 59.5 %    % Lymphocytes 31.3 %    % Monocytes 7.2 %    % Eosinophils 1.0 %    % Basophils 0.6 %    % Immature Granulocytes 0.4 %    Absolute Neutrophil 4.3 1.6 - 8.3 10e9/L    Absolute Lymphocytes 2.3 0.8 - 5.3 10e9/L    Absolute Monocytes 0.5 0.0 - 1.3 10e9/L    Absolute  Eosinophils 0.1 0.0 - 0.7 10e9/L    Absolute Basophils 0.0 0.0 - 0.2 10e9/L    Abs Immature Granulocytes 0.0 0 - 0.4 10e9/L   UA reflex to Microscopic     Status: None   Result Value Ref Range    Color Urine Yellow     Appearance Urine Clear     Glucose Urine Negative NEG^Negative mg/dL    Bilirubin Urine Negative NEG^Negative    Ketones Urine Negative NEG^Negative mg/dL    Specific Gravity Urine 1.015 1.003 - 1.035    Blood Urine Negative NEG^Negative    pH Urine 6.0 5.0 - 7.0 pH    Protein Albumin Urine Negative NEG^Negative mg/dL    Urobilinogen mg/dL Normal 0.0 - 2.0 mg/dL    Nitrite Urine Negative NEG^Negative    Leukocyte Esterase Urine Negative NEG^Negative    Source Midstream Urine          ASSESSMENT/PLAN:                                                      Post Discharge Medication Reconciliation: discharge medications reconciled, continue medications without change.  Issues to address: No issues identified.  Plan of care communicated with patient    Hospital discharge follow-up  - D dimer quantitative, elevated at 1.2.  CT ordered for evaluation however radiology department is unable to be reached.  Schedule appears to be light tomorrow so will call again first thing in the morning.  Patient was notified of elevated D-dimer, she was instructed to recheck her INR at home as she has been doing, instructed we would call her first thing in the morning when we have a time for her and she will come in to the clinic for a CT.  She was also advised that if she would to develop any sudden onset of shortness of breath or chest pain, she needs to present immediately to the ED.  - CBC with platelets differential within normal limits    Acute respiratory failure with hypoxia (H)  Resolving slowly.  Patient encouraged to take things slowly and not rush back to work.  We discussed that she can go back to work November 30, letter given stating such.  We will follow-up in 1 week via telephone visit to see how she is  doing.    Morbid obesity (H)  Patient encouraged to eat nutritional foods, lots of fluids exercise as tolerated.  She should work on increasing her endurance day by day.    Urinary symptom or sign  - UA reflex to Microscopic, unremarkable for infection    Pneumonia due to 2019 novel coronavirus  - XR Chest 2 Views, per recommendations of BMJ status post Covid hospital discharge, 12-week follow-up chest x-ray.  - CBC with platelets differential, unremarkable  - D dimer quantitative elevated, at 1.2, see above  - fluticasone-salmeterol (ADVAIR) 250-50 MCG/DOSE inhaler  Dispense: 1 Inhaler; Refill: 1     Patient verbalizes her understanding and is agreeable to plan of care.    BELLA Hansen, AGNP-C  Internal Medicine  [facility]    Nov 17, 2020 4:47 PM     Type of Medical Decision Making Face-to-Face Visit within 7 Days Face-to-Face Visit within 14 days   Moderate Complexity [id number]   High Complexity [id number]     Portions of this note were dictated using speech recognition software. The note has been proofread but errors in the text may have been overlooked. Please contact me if there are any concerns regarding the accuracy of the dictation.

## 2020-11-17 ENCOUNTER — OFFICE VISIT (OUTPATIENT)
Dept: INTERNAL MEDICINE | Facility: OTHER | Age: 60
End: 2020-11-17
Attending: NURSE PRACTITIONER
Payer: COMMERCIAL

## 2020-11-17 VITALS
WEIGHT: 293 LBS | HEART RATE: 74 BPM | BODY MASS INDEX: 54.19 KG/M2 | SYSTOLIC BLOOD PRESSURE: 104 MMHG | DIASTOLIC BLOOD PRESSURE: 70 MMHG | RESPIRATION RATE: 16 BRPM | TEMPERATURE: 98.1 F | OXYGEN SATURATION: 96 %

## 2020-11-17 DIAGNOSIS — Z09 HOSPITAL DISCHARGE FOLLOW-UP: Primary | ICD-10-CM

## 2020-11-17 DIAGNOSIS — J12.82 PNEUMONIA DUE TO 2019 NOVEL CORONAVIRUS: ICD-10-CM

## 2020-11-17 DIAGNOSIS — R39.9 URINARY SYMPTOM OR SIGN: ICD-10-CM

## 2020-11-17 DIAGNOSIS — U07.1 PNEUMONIA DUE TO 2019 NOVEL CORONAVIRUS: ICD-10-CM

## 2020-11-17 DIAGNOSIS — R06.02 SOB (SHORTNESS OF BREATH): ICD-10-CM

## 2020-11-17 DIAGNOSIS — R79.89 POSITIVE D-DIMER: ICD-10-CM

## 2020-11-17 DIAGNOSIS — E66.01 MORBID OBESITY (H): ICD-10-CM

## 2020-11-17 DIAGNOSIS — J96.01 ACUTE RESPIRATORY FAILURE WITH HYPOXIA (H): ICD-10-CM

## 2020-11-17 LAB
ALBUMIN UR-MCNC: NEGATIVE MG/DL
APPEARANCE UR: CLEAR
BASOPHILS # BLD AUTO: 0 10E9/L (ref 0–0.2)
BASOPHILS NFR BLD AUTO: 0.6 %
BILIRUB UR QL STRIP: NEGATIVE
COLOR UR AUTO: YELLOW
D DIMER PPP FEU-MCNC: 1.2 UG/ML FEU (ref 0–0.5)
DIFFERENTIAL METHOD BLD: NORMAL
EOSINOPHIL # BLD AUTO: 0.1 10E9/L (ref 0–0.7)
EOSINOPHIL NFR BLD AUTO: 1 %
ERYTHROCYTE [DISTWIDTH] IN BLOOD BY AUTOMATED COUNT: 13.8 % (ref 10–15)
GLUCOSE UR STRIP-MCNC: NEGATIVE MG/DL
HCT VFR BLD AUTO: 46 % (ref 35–47)
HGB BLD-MCNC: 14.8 G/DL (ref 11.7–15.7)
HGB UR QL STRIP: NEGATIVE
IMM GRANULOCYTES # BLD: 0 10E9/L (ref 0–0.4)
IMM GRANULOCYTES NFR BLD: 0.4 %
KETONES UR STRIP-MCNC: NEGATIVE MG/DL
LEUKOCYTE ESTERASE UR QL STRIP: NEGATIVE
LYMPHOCYTES # BLD AUTO: 2.3 10E9/L (ref 0.8–5.3)
LYMPHOCYTES NFR BLD AUTO: 31.3 %
MCH RBC QN AUTO: 30.4 PG (ref 26.5–33)
MCHC RBC AUTO-ENTMCNC: 32.2 G/DL (ref 31.5–36.5)
MCV RBC AUTO: 95 FL (ref 78–100)
MONOCYTES # BLD AUTO: 0.5 10E9/L (ref 0–1.3)
MONOCYTES NFR BLD AUTO: 7.2 %
NEUTROPHILS # BLD AUTO: 4.3 10E9/L (ref 1.6–8.3)
NEUTROPHILS NFR BLD AUTO: 59.5 %
NITRATE UR QL: NEGATIVE
PH UR STRIP: 6 PH (ref 5–7)
PLATELET # BLD AUTO: 296 10E9/L (ref 150–450)
RBC # BLD AUTO: 4.87 10E12/L (ref 3.8–5.2)
SOURCE: NORMAL
SP GR UR STRIP: 1.01 (ref 1–1.03)
UROBILINOGEN UR STRIP-MCNC: NORMAL MG/DL (ref 0–2)
WBC # BLD AUTO: 7.2 10E9/L (ref 4–11)

## 2020-11-17 PROCEDURE — 85025 COMPLETE CBC W/AUTO DIFF WBC: CPT | Mod: ZL | Performed by: NURSE PRACTITIONER

## 2020-11-17 PROCEDURE — 85379 FIBRIN DEGRADATION QUANT: CPT | Mod: ZL | Performed by: NURSE PRACTITIONER

## 2020-11-17 PROCEDURE — 36415 COLL VENOUS BLD VENIPUNCTURE: CPT | Mod: ZL | Performed by: NURSE PRACTITIONER

## 2020-11-17 PROCEDURE — 81003 URINALYSIS AUTO W/O SCOPE: CPT | Mod: ZL | Performed by: NURSE PRACTITIONER

## 2020-11-17 PROCEDURE — 99495 TRANSJ CARE MGMT MOD F2F 14D: CPT | Performed by: NURSE PRACTITIONER

## 2020-11-17 ASSESSMENT — ENCOUNTER SYMPTOMS
CONSTIPATION: 1
FREQUENCY: 1
COUGH: 1
ACTIVITY CHANGE: 1
MYALGIAS: 1
SLEEP DISTURBANCE: 0
FATIGUE: 1
FEVER: 0
SHORTNESS OF BREATH: 1
NERVOUS/ANXIOUS: 1
CHILLS: 0

## 2020-11-17 ASSESSMENT — PAIN SCALES - GENERAL: PAINLEVEL: NO PAIN (0)

## 2020-11-17 ASSESSMENT — PATIENT HEALTH QUESTIONNAIRE - PHQ9: SUM OF ALL RESPONSES TO PHQ QUESTIONS 1-9: 9

## 2020-11-17 NOTE — LETTER
November 17, 2020      [place]isha Fernando  [address] [address] [place] [address]        To Whom It May Concern:    Trisha Fernando was seen in our clinic. She may return to work without restrictions on Monday, November 30, 2020    Sincerely,        Edith Ramirez NP

## 2020-11-17 NOTE — NURSING NOTE
Chief Complaint   Patient presents with     Hospital F/U   Patient presents to the clinic today for hospital follow up. Patient is having symptoms currently of no taste or smell, coughing, dizzy, SOB, Below the belly button pain when urinating or having a BM and fatigue.     Medication Reconciliation: completed   Barbie Ngo LPN  11/17/2020 1:34 PM

## 2020-11-18 ENCOUNTER — HOSPITAL ENCOUNTER (OUTPATIENT)
Dept: CT IMAGING | Facility: OTHER | Age: 60
Discharge: HOME | End: 2020-11-18
Attending: NURSE PRACTITIONER | Admitting: NURSE PRACTITIONER
Payer: COMMERCIAL

## 2020-11-18 DIAGNOSIS — U07.1 PNEUMONIA DUE TO 2019 NOVEL CORONAVIRUS: ICD-10-CM

## 2020-11-18 DIAGNOSIS — R79.89 POSITIVE D-DIMER: ICD-10-CM

## 2020-11-18 DIAGNOSIS — J12.82 PNEUMONIA DUE TO 2019 NOVEL CORONAVIRUS: ICD-10-CM

## 2020-11-18 DIAGNOSIS — R06.02 SOB (SHORTNESS OF BREATH): ICD-10-CM

## 2020-11-18 PROCEDURE — 71275 CT ANGIOGRAPHY CHEST: CPT

## 2020-11-18 PROCEDURE — 255N000002 HC RX 255 OP 636: Performed by: NURSE PRACTITIONER

## 2020-11-18 RX ADMIN — IOHEXOL 100 ML: 350 INJECTION, SOLUTION INTRAVENOUS at 09:53

## 2020-11-18 NOTE — PROGRESS NOTES
IV Contrast- Discharge Instructions After Your CT Scan      The IV contrast you received today will be filtered from your bloodstream by your kidneys during the next 24 hours and pass from the body in urine.  You will not be aware of this process and your urine will not change in color.  To help this process you should drink at least 4 additional glasses of water or juice today.  This reduces stress on your kidneys.    Most contrast reactions are immediate.  Should you develop symptoms of concern after discharge, contact the department at the number below.  After hours you should contact your personal physician.  If you develop breathing distress or wheezing, call 911.      1.  Has the patient had a previous reaction to IV contrast? n    2.  Does the patient have kidney disease? n    3.  Is the patient on dialysis? n    If YES to any of these questions, exam will be reviewed with a Radiologist before administering contrast.    e

## 2020-11-19 ENCOUNTER — TRANSFERRED RECORDS (OUTPATIENT)
Dept: HEALTH INFORMATION MANAGEMENT | Facility: OTHER | Age: 60
End: 2020-11-19

## 2020-11-19 ENCOUNTER — ANTICOAGULATION THERAPY VISIT (OUTPATIENT)
Dept: ANTICOAGULATION | Facility: OTHER | Age: 60
End: 2020-11-19
Attending: INTERNAL MEDICINE
Payer: COMMERCIAL

## 2020-11-19 DIAGNOSIS — I74.9 EMBOLISM AND THROMBOSIS (H): ICD-10-CM

## 2020-11-19 DIAGNOSIS — Z79.01 ANTICOAGULATION MONITORING, INR RANGE 2-3: ICD-10-CM

## 2020-11-19 LAB — INR POINT OF CARE: 2.7 (ref 0.86–1.14)

## 2020-11-19 PROCEDURE — 85610 PROTHROMBIN TIME: CPT | Mod: ZL

## 2020-11-19 NOTE — PROGRESS NOTES
ANTICOAGULATION FOLLOW-UP CLINIC VISIT    Patient Name:  Trisha Fernando  Date:  2020  Contact Type:  fax from [facility]R self testing service/phone call with the patient    SUBJECTIVE:  Patient Findings     Positives:  Change in medications (Will start Advair)    Comments:  Per phone call with the patient        Clinical Outcomes     Negatives:  Major bleeding event, Thromboembolic event, Anticoagulation-related hospital admission, Anticoagulation-related ED visit, Anticoagulation-related fatality    Comments:  Per phone call with the patient           OBJECTIVE    Recent labs: (last 7 days)     20   INR 2.7*       ASSESSMENT / PLAN  INR assessment THER    Recheck INR In: 1 WEEK    INR Location Clinic      Anticoagulation Summary  As of 2020    INR goal:  2.0-3.0   TTR:  77.4 % (1 y)   INR used for dosin.7 (2020)   Warfarin maintenance plan:  5 mg (5 mg x 1) every Mon, Wed, Fri; 2.5 mg (5 mg x 0.5) all other days   Full warfarin instructions:  5 mg every Mon, Wed, Fri; 2.5 mg all other days   Weekly warfarin total:  25 mg   Plan last modified:  Maria D Zayas RN (2020)   Next INR check:  2020   Priority:  High   Target end date:  Indefinite    Indications    Long term (current) use of anticoagulants [Z79.01]  Embolism and thrombosis (H) [I74.9]  Anticoagulation monitoring  INR range 2-3 [Z79.01]             Anticoagulation Episode Summary     INR check location:      Preferred lab:      Send INR reminders to:  [facility]    Comments:        Anticoagulation Care Providers     Provider Role Specialty Phone number    Maria Guadalupe Ramon DO [facility] Internal Medicine [phone]            See the Encounter Report to view Anticoagulation Flowsheet and Dosing Calendar (Go to Encounters tab in chart review, and find the Anticoagulation Therapy Visit)    No encounter, INR  received via fax.  Assessment and istructions via phone call with patient. Patient verbalized  understanding.      Maria D Zayas RN

## 2020-11-25 ENCOUNTER — VIRTUAL VISIT (OUTPATIENT)
Dept: INTERNAL MEDICINE | Facility: OTHER | Age: 60
End: 2020-11-25
Attending: NURSE PRACTITIONER
Payer: COMMERCIAL

## 2020-11-25 VITALS — HEART RATE: 71 BPM | OXYGEN SATURATION: 94 % | TEMPERATURE: 98.5 F

## 2020-11-25 DIAGNOSIS — U07.1 PNEUMONIA DUE TO 2019 NOVEL CORONAVIRUS: ICD-10-CM

## 2020-11-25 DIAGNOSIS — U07.1 2019 NOVEL CORONAVIRUS DISEASE (COVID-19): ICD-10-CM

## 2020-11-25 DIAGNOSIS — J12.82 PNEUMONIA DUE TO 2019 NOVEL CORONAVIRUS: ICD-10-CM

## 2020-11-25 DIAGNOSIS — M54.50 LUMBAR SPINE PAIN: Primary | ICD-10-CM

## 2020-11-25 PROBLEM — J96.01 ACUTE RESPIRATORY FAILURE WITH HYPOXIA (H): Status: RESOLVED | Noted: 2020-11-05 | Resolved: 2020-11-25

## 2020-11-25 PROCEDURE — 99213 OFFICE O/P EST LOW 20 MIN: CPT | Mod: TEL | Performed by: NURSE PRACTITIONER

## 2020-11-25 ASSESSMENT — PATIENT HEALTH QUESTIONNAIRE - PHQ9: SUM OF ALL RESPONSES TO PHQ QUESTIONS 1-9: 10

## 2020-11-25 ASSESSMENT — PAIN SCALES - GENERAL: PAINLEVEL: EXTREME PAIN (8)

## 2020-11-25 NOTE — LETTER
11/25/2020      RE: Trisha Fernando  [address] [place] [address]      Dear Trisha:    At your prior appointment we had arranged to have you have an x-ray of your lumbar spine.  I got a notice today that this has not been completed yet.  Please let me know if you have changed your mind and I can cancel the order - otherwise if you need to reschedule to have this x-ray completed you can do that by calling [phone].    Sincerely,        Edith Ramirez NP

## 2020-11-25 NOTE — NURSING NOTE
Patient is having a telephone visit to follow up on covid,  hospital stay and symptoms.  States she has been having low back pain, is not sure if it is related to her covid.     No LMP recorded. Patient is postmenopausal.  Medication Reconciliation: complete    Lana Solomon LPN  11/25/2020 1:24 PM

## 2020-11-25 NOTE — PROGRESS NOTES
"Bria Fernando is a 60 year old female who is being evaluated via a billable telephone visit.      The patient has been notified of following:    \"This telephone visit will be conducted via a call between you and your physician/provider. We have found that certain health care needs can be provided without the need for a physical exam.  This service lets us provide the care you need with a short phone conversation.  If a prescription is necessary we can send it directly to your pharmacy.  If lab work is needed we can place an order for that and you can then stop by our lab to have the test done at a later time. Telephone visits are billed at different rates depending on your insurance coverage. During this emergency period, for some insurers they may be billed the same as an in-person visit.  Please reach out to your insurance provider with any questions. If during the course of the call the physician/provider feels a telephone visit is not appropriate, you will not be charged for this service.\"    Patient has given verbal consent for Telephone visit?  Yes    How would you like to obtain your AVS? Hiren Fernando is being assessed via telephone visit with the following concerns:  Chief Complaint   Patient presents with     Follow Up     coivd      Allergies   Allergen Reactions     Lisinopril Swelling     Angioedema, Edema       Adhesive Tape Itching and Rash     [place] Itching and Rash     Wound Dressing Adhesive Rash       Reviewed and updated as needed this visit by Provider.    HPI:   Reports she thinks she would be doing better with the COVID but her back is way worse. She has not been taking ibuprofen yesterday or today as she wanted to see what the pain was like without medication. She is only getting relief with heating pad. Her back pain started one week ago and she initially used ice. Reports she is supposed to go back to work.  Still has some episodes of shortness of breath but " overall feels her breathing is okay.  She still has no taste or smell since.    ROS:  Denies any fever, chills, chest pain,lower extremity edema, changes in bowel or bladder.     Objective   Reported vitals:  LMP  (LMP Unknown) reports that her oxygen sats have been running 92 to 94% on room air  Psych: Alert and oriented times 3; coherent speech, normal rate and volume, able to articulate logical thoughts, able to abstract reason, no tangential thoughts, no hallucinations or delusions  Affect is teary, but appropriate for situation of current health.   Unable to complete examination due to telephone visit.    Assessment/Plan:  Lumbar spine pain  Worsening, affecting her mood.  She will get back on ibuprofen and continue to use the heat.  We did discuss going to physical therapy and she will let us know if she decides she wants to do this after giving it some more time, at which time we will just place a referral..  - XR Lumbar Spine 2/3 Views to be completed on December 4 appointment    Pneumonia due to 2019 novel coronavirus  Greatly improved.  Patient will have a chest x-ray done in 12 weeks for reevaluation.  - COVID-19 GetWell Loop Referral    2019 novel coronavirus disease (COVID-19)  Set her up for the get well loop referral.  Instructed her to stay positive, she survived, and that this too shall pass.  Did discuss that if she feels she needs further counseling, we will place referral for that.  Encouraged for her to talk with her family and friends.      Return if symptoms worsen or fail to improve, for Keep Scheduled Appointment Already Made on December 4 with Yenny.    Patient verbalizes understanding and is agreeable to plan of care.    Phone call duration: 14 minutes    Edith Ramirez NP  11/25/2020 1:53 PM

## 2020-11-26 ENCOUNTER — TRANSFERRED RECORDS (OUTPATIENT)
Dept: HEALTH INFORMATION MANAGEMENT | Facility: OTHER | Age: 60
End: 2020-11-26

## 2020-11-26 LAB — INR PPP: 1.8 (ref 0.9–1.1)

## 2020-11-27 ENCOUNTER — ANTICOAGULATION THERAPY VISIT (OUTPATIENT)
Dept: ANTICOAGULATION | Facility: OTHER | Age: 60
End: 2020-11-27
Attending: INTERNAL MEDICINE
Payer: COMMERCIAL

## 2020-11-27 DIAGNOSIS — I74.9 EMBOLISM AND THROMBOSIS (H): ICD-10-CM

## 2020-11-27 DIAGNOSIS — Z79.01 ANTICOAGULATION MONITORING, INR RANGE 2-3: ICD-10-CM

## 2020-11-27 NOTE — PROGRESS NOTES
ANTICOAGULATION FOLLOW-UP CLINIC VISIT    Patient Name:  Trisha Fernando  Date:  2020  Contact Type:    Testing performed at home by patient      SUBJECTIVE:  Patient Findings     Positives:  Change in medications (Patient stated she started volteren 2 days ago and she has been taking Ibuprofen )        Clinical Outcomes     Negatives:  Major bleeding event, Thromboembolic event, Anticoagulation-related hospital admission, Anticoagulation-related ED visit, Anticoagulation-related fatality           OBJECTIVE    Recent labs: (last 7 days)     20   INR 1.8*       ASSESSMENT / PLAN  INR assessment SUB    Recheck INR In: 3 DAYS    INR Location Home INR      Anticoagulation Summary  As of 2020    INR goal:  2.0-3.0   TTR:  76.9 % (1 y)   INR used for dosin.8 (2020)   Warfarin maintenance plan:  5 mg (5 mg x 1) every Mon, Wed, Fri; 2.5 mg (5 mg x 0.5) all other days   Full warfarin instructions:  5 mg every Mon, Wed, Fri; 2.5 mg all other days   Weekly warfarin total:  25 mg   Plan last modified:  Genoveva Lawrence RN (2020)   Next INR check:  2020   Priority:  High   Target end date:  Indefinite    Indications    Long term (current) use of anticoagulants [Z79.01]  Embolism and thrombosis (H) [I74.9]  Anticoagulation monitoring  INR range 2-3 [Z79.01]             Anticoagulation Episode Summary     INR check location:      Preferred lab:      Send INR reminders to:  [facility]    Comments:        Anticoagulation Care Providers     Provider Role Specialty Phone number    Maria Guadalupe Ramon DO [facility] Internal Medicine [phone]            See the Encounter Report to view Anticoagulation Flowsheet and Dosing Calendar (Go to Encounters tab in chart review, and find the Anticoagulation Therapy Visit)         INR received via fax. Called made to patient  regarding INR results. Patient does have some temporary/correctable factors and will continue on current dose. Patient  to recheck in 3 days for further dosing instructions. Patient is to call INR clinic if any further changes affecting INR.         Genoveva Lawrence RN

## 2020-11-30 ENCOUNTER — TRANSFERRED RECORDS (OUTPATIENT)
Dept: HEALTH INFORMATION MANAGEMENT | Facility: OTHER | Age: 60
End: 2020-11-30

## 2020-12-01 ENCOUNTER — ANTICOAGULATION THERAPY VISIT (OUTPATIENT)
Dept: ANTICOAGULATION | Facility: OTHER | Age: 60
End: 2020-12-01
Attending: INTERNAL MEDICINE
Payer: COMMERCIAL

## 2020-12-01 DIAGNOSIS — Z79.01 ANTICOAGULATION MONITORING, INR RANGE 2-3: ICD-10-CM

## 2020-12-01 DIAGNOSIS — I74.9 EMBOLISM AND THROMBOSIS (H): ICD-10-CM

## 2020-12-01 LAB — INR PPP: 1.8 (ref 0.9–1.1)

## 2020-12-01 NOTE — PROGRESS NOTES
ANTICOAGULATION FOLLOW-UP CLINIC VISIT    Patient Name:  Trisha Fernando  Date:  2020  Contact Type:  fax from [facility]INR/phone call with patient    SUBJECTIVE:  Patient Findings     Comments:  Per phone call with patient.        Clinical Outcomes     Negatives:  Major bleeding event, Thromboembolic event, Anticoagulation-related hospital admission, Anticoagulation-related ED visit, Anticoagulation-related fatality    Comments:  Per phone call with patient.           OBJECTIVE    Recent labs: (last 7 days)     20   INR 1.8*       ASSESSMENT / PLAN  INR assessment SUB    Recheck INR In: 1 WEEK    INR Location Home INR      Anticoagulation Summary  As of 2020    INR goal:  2.0-3.0   TTR:  75.8 % (1 y)   INR used for dosin.8 (2020)   Warfarin maintenance plan:  2.5 mg (5 mg x 0.5) every Mon, Wed, Fri; 5 mg (5 mg x 1) all other days   Full warfarin instructions:  2.5 mg every Mon, Wed, Fri; 5 mg all other days   Weekly warfarin total:  27.5 mg   Plan last modified:  Maria D Zayas RN (2020)   Next INR check:  2020   Priority:  Maintenance   Target end date:  Indefinite    Indications    Long term (current) use of anticoagulants [Z79.01]  Embolism and thrombosis (H) [I74.9]  Anticoagulation monitoring  INR range 2-3 [Z79.01]             Anticoagulation Episode Summary     INR check location:      Preferred lab:      Send INR reminders to:  [facility]    Comments:        Anticoagulation Care Providers     Provider Role Specialty Phone number    YennyMaria Guadalupe KIRBY,  [facility] Internal Medicine [phone]            See the Encounter Report to view Anticoagulation Flowsheet and Dosing Calendar (Go to Encounters tab in chart review, and find the Anticoagulation Therapy Visit)        Maria D Zayas RN

## 2020-12-07 ENCOUNTER — TRANSFERRED RECORDS (OUTPATIENT)
Dept: HEALTH INFORMATION MANAGEMENT | Facility: OTHER | Age: 60
End: 2020-12-07

## 2020-12-07 ENCOUNTER — ANTICOAGULATION THERAPY VISIT (OUTPATIENT)
Dept: ANTICOAGULATION | Facility: OTHER | Age: 60
End: 2020-12-07
Attending: INTERNAL MEDICINE
Payer: COMMERCIAL

## 2020-12-07 DIAGNOSIS — I74.9 EMBOLISM AND THROMBOSIS (H): ICD-10-CM

## 2020-12-07 DIAGNOSIS — Z79.01 ANTICOAGULATION MONITORING, INR RANGE 2-3: ICD-10-CM

## 2020-12-07 LAB — INR PPP: 2 (ref 0.9–1.1)

## 2020-12-07 NOTE — PROGRESS NOTES
ANTICOAGULATION FOLLOW-UP CLINIC VISIT    Patient Name:  Trisha Fernando  Date:  2020  Contact Type:  Telephone/ left message regarding dosing    SUBJECTIVE:  Patient Findings         Clinical Outcomes     Negatives:  Major bleeding event, Thromboembolic event, Anticoagulation-related hospital admission, Anticoagulation-related ED visit, Anticoagulation-related fatality           OBJECTIVE    Recent labs: (last 7 days)     20   INR 2.0*       ASSESSMENT / PLAN  INR assessment THER    Recheck INR In: 1 WEEK    INR Location Clinic      Anticoagulation Summary  As of 2020    INR goal:  2.0-3.0   TTR:  73.9 % (1 y)   INR used for dosin.0 (2020)   Warfarin maintenance plan:  2.5 mg (5 mg x 0.5) every Wed, Sat; 5 mg (5 mg x 1) all other days   Full warfarin instructions:  2.5 mg every Wed, Sat; 5 mg all other days   Weekly warfarin total:  30 mg   Plan last modified:  Smitha Nair RN (2020)   Next INR check:  2020   Priority:  Maintenance   Target end date:  Indefinite    Indications    Long term (current) use of anticoagulants [Z79.01]  Embolism and thrombosis (H) [I74.9]  Anticoagulation monitoring  INR range 2-3 [Z79.01]             Anticoagulation Episode Summary     INR check location:      Preferred lab:      Send INR reminders to:  [facility]    Comments:        Anticoagulation Care Providers     Provider Role Specialty Phone number    YennyMaria Guadalupe DO KIRBY [facility] [phone]            See the Encounter Report to view Anticoagulation Flowsheet and Dosing Calendar (Go to Encounters tab in chart review, and find the Anticoagulation Therapy Visit)        Smitha Nair, RN

## 2020-12-13 DIAGNOSIS — I10 ESSENTIAL HYPERTENSION: ICD-10-CM

## 2020-12-14 ENCOUNTER — TRANSFERRED RECORDS (OUTPATIENT)
Dept: HEALTH INFORMATION MANAGEMENT | Facility: OTHER | Age: 60
End: 2020-12-14

## 2020-12-14 ENCOUNTER — ANTICOAGULATION THERAPY VISIT (OUTPATIENT)
Dept: ANTICOAGULATION | Facility: OTHER | Age: 60
End: 2020-12-14
Attending: INTERNAL MEDICINE
Payer: COMMERCIAL

## 2020-12-14 DIAGNOSIS — I74.9 EMBOLISM AND THROMBOSIS (H): ICD-10-CM

## 2020-12-14 DIAGNOSIS — Z79.01 ANTICOAGULATION MONITORING, INR RANGE 2-3: ICD-10-CM

## 2020-12-14 LAB — INR PPP: 1.8 (ref 0.9–1.1)

## 2020-12-14 RX ORDER — METOPROLOL TARTRATE 25 MG/1
12.5 TABLET, FILM COATED ORAL 2 TIMES DAILY
Qty: 180 TABLET | Refills: 1 | Status: SHIPPED | OUTPATIENT
Start: 2020-12-14 | End: 2021-08-23

## 2020-12-14 NOTE — TELEPHONE ENCOUNTER
Prescription refilled per RN Medication Refill Policy..................Kaitlynn [facility] RN 12/14/2020 9:53 AM

## 2020-12-14 NOTE — PROGRESS NOTES
ANTICOAGULATION FOLLOW-UP CLINIC VISIT    Patient Name:  Trisha Fernando  Date:  2020  Contact Type:  Telephone/ spoke with patient reviewed dosing    SUBJECTIVE:  Patient Findings         Clinical Outcomes     Negatives:  Major bleeding event, Thromboembolic event, Anticoagulation-related hospital admission, Anticoagulation-related ED visit, Anticoagulation-related fatality           OBJECTIVE    Recent labs: (last 7 days)     20   INR 1.8*       ASSESSMENT / PLAN  INR assessment SUB    Recheck INR In: 1 WEEK    INR Location Home INR      Anticoagulation Summary  As of 2020    INR goal:  2.0-3.0   TTR:  72.0 % (1 y)   INR used for dosin.8 (2020)   Warfarin maintenance plan:  2.5 mg (5 mg x 0.5) every Wed; 5 mg (5 mg x 1) all other days   Full warfarin instructions:  2.5 mg every Wed; 5 mg all other days   Weekly warfarin total:  32.5 mg   Plan last modified:  Smitha Nair RN (2020)   Next INR check:  2020   Priority:  High   Target end date:  Indefinite    Indications    Long term (current) use of anticoagulants [Z79.01]  Embolism and thrombosis (H) [I74.9]  Anticoagulation monitoring  INR range 2-3 [Z79.01]             Anticoagulation Episode Summary     INR check location:      Preferred lab:      Send INR reminders to:  [facility]    Comments:        Anticoagulation Care Providers     Provider Role Specialty Phone number    YennyMaria Guadalupe DO KIRBY [facility] Internal Medicine [phone]            See the Encounter Report to view Anticoagulation Flowsheet and Dosing Calendar (Go to Encounters tab in chart review, and find the Anticoagulation Therapy Visit)        Smitha Nair, RN

## 2020-12-21 ENCOUNTER — TRANSFERRED RECORDS (OUTPATIENT)
Dept: HEALTH INFORMATION MANAGEMENT | Facility: OTHER | Age: 60
End: 2020-12-21

## 2020-12-21 ENCOUNTER — ANTICOAGULATION THERAPY VISIT (OUTPATIENT)
Dept: ANTICOAGULATION | Facility: OTHER | Age: 60
End: 2020-12-21
Attending: INTERNAL MEDICINE
Payer: COMMERCIAL

## 2020-12-21 DIAGNOSIS — Z79.01 ANTICOAGULATION MONITORING, INR RANGE 2-3: ICD-10-CM

## 2020-12-21 DIAGNOSIS — I74.9 EMBOLISM AND THROMBOSIS (H): ICD-10-CM

## 2020-12-21 LAB — INR PPP: 1.9 (ref 0.9–1.1)

## 2020-12-21 NOTE — PROGRESS NOTES
ANTICOAGULATION FOLLOW-UP CLINIC VISIT    Patient Name:  Trisha Fernando  Date:  2020  Contact Type:  Telephone/ left message for patient regarding dosing    SUBJECTIVE:  Patient Findings         Clinical Outcomes     Negatives:  Major bleeding event, Thromboembolic event, Anticoagulation-related hospital admission, Anticoagulation-related ED visit, Anticoagulation-related fatality           OBJECTIVE    Recent labs: (last 7 days)     20   INR 1.9*       ASSESSMENT / PLAN  INR assessment SUB    Recheck INR In: 1 WEEK    INR Location Home INR      Anticoagulation Summary  As of 2020    INR goal:  2.0-3.0   TTR:  70.1 % (1 y)   INR used for dosin.9 (2020)   Warfarin maintenance plan:  2.5 mg (5 mg x 0.5) every Wed; 5 mg (5 mg x 1) all other days   Full warfarin instructions:  2.5 mg every Wed; 5 mg all other days   Weekly warfarin total:  32.5 mg   No change documented:  Smitha Nair RN   Plan last modified:  Smitha Nair RN (2020)   Next INR check:  2020   Priority:  High   Target end date:  Indefinite    Indications    Long term (current) use of anticoagulants [Z79.01]  Embolism and thrombosis (H) [I74.9]  Anticoagulation monitoring  INR range 2-3 [Z79.01]             Anticoagulation Episode Summary     INR check location:      Preferred lab:      Send INR reminders to:  [facility]    Comments:        Anticoagulation Care Providers     Provider Role Specialty Phone number    Maria Guadalupe Ramon DO [facility] Internal Medicine [phone]            See the Encounter Report to view Anticoagulation Flowsheet and Dosing Calendar (Go to Encounters tab in chart review, and find the Anticoagulation Therapy Visit)        Smitha Nair RN

## 2020-12-28 ENCOUNTER — TRANSFERRED RECORDS (OUTPATIENT)
Dept: HEALTH INFORMATION MANAGEMENT | Facility: OTHER | Age: 60
End: 2020-12-28

## 2020-12-28 ENCOUNTER — ANTICOAGULATION THERAPY VISIT (OUTPATIENT)
Dept: ANTICOAGULATION | Facility: OTHER | Age: 60
End: 2020-12-28
Attending: INTERNAL MEDICINE
Payer: COMMERCIAL

## 2020-12-28 DIAGNOSIS — Z79.01 ANTICOAGULATION MONITORING, INR RANGE 2-3: ICD-10-CM

## 2020-12-28 DIAGNOSIS — I74.9 EMBOLISM AND THROMBOSIS (H): ICD-10-CM

## 2020-12-28 LAB — INR PPP: 2 (ref 0.9–1.1)

## 2020-12-28 NOTE — PROGRESS NOTES
ANTICOAGULATION FOLLOW-UP CLINIC VISIT    Patient Name:  Trisha Fernando  Date:  2020  Contact Type:  no call needed patient to continue same dose    SUBJECTIVE:  Patient Findings         Clinical Outcomes     Negatives:  Major bleeding event, Thromboembolic event, Anticoagulation-related hospital admission, Anticoagulation-related ED visit, Anticoagulation-related fatality           OBJECTIVE    Recent labs: (last 7 days)     20   INR 2.0*       ASSESSMENT / PLAN  INR assessment THER    Recheck INR In: 1 WEEK    INR Location Home INR      Anticoagulation Summary  As of 2020    INR goal:  2.0-3.0   TTR:  68.1 % (1 y)   INR used for dosin.0 (2020)   Warfarin maintenance plan:  2.5 mg (5 mg x 0.5) every Wed; 5 mg (5 mg x 1) all other days   Full warfarin instructions:  2.5 mg every Wed; 5 mg all other days   Weekly warfarin total:  32.5 mg   No change documented:  Smitha Nair RN   Plan last modified:  Smitha Nair RN (2020)   Next INR check:  2021   Priority:  High   Target end date:  Indefinite    Indications    Long term (current) use of anticoagulants [Z79.01]  Embolism and thrombosis (H) [I74.9]  Anticoagulation monitoring  INR range 2-3 [Z79.01]             Anticoagulation Episode Summary     INR check location:      Preferred lab:      Send INR reminders to:  [facility]    Comments:        Anticoagulation Care Providers     Provider Role Specialty Phone number    Maria Guadalupe Ramon DO [facility] Internal Medicine [phone]            See the Encounter Report to view Anticoagulation Flowsheet and Dosing Calendar (Go to Encounters tab in chart review, and find the Anticoagulation Therapy Visit)        Smitha Nair RN

## 2021-01-04 ENCOUNTER — TRANSFERRED RECORDS (OUTPATIENT)
Dept: HEALTH INFORMATION MANAGEMENT | Facility: OTHER | Age: 61
End: 2021-01-04

## 2021-01-04 LAB — INR PPP: 2.5 (ref 0.9–1.1)

## 2021-01-05 ENCOUNTER — ANTICOAGULATION THERAPY VISIT (OUTPATIENT)
Dept: ANTICOAGULATION | Facility: OTHER | Age: 61
End: 2021-01-05
Attending: INTERNAL MEDICINE
Payer: COMMERCIAL

## 2021-01-05 DIAGNOSIS — I74.9 EMBOLISM AND THROMBOSIS (H): ICD-10-CM

## 2021-01-05 DIAGNOSIS — Z79.01 ANTICOAGULATION MONITORING, INR RANGE 2-3: ICD-10-CM

## 2021-01-05 NOTE — PROGRESS NOTES
ANTICOAGULATION FOLLOW-UP CLINIC VISIT    Patient Name:  Trisha Fernando  Date:  2021  Contact Type:  no call needed patient to continue same dose    SUBJECTIVE:  Patient Findings         Clinical Outcomes     Negatives:  Major bleeding event, Thromboembolic event, Anticoagulation-related hospital admission, Anticoagulation-related ED visit, Anticoagulation-related fatality           OBJECTIVE    Recent labs: (last 7 days)     21   INR 2.5*       ASSESSMENT / PLAN  INR assessment THER    Recheck INR In: 1 WEEK    INR Location Home INR      Anticoagulation Summary  As of 2021    INR goal:  2.0-3.0   TTR:  68.1 % (1 y)   INR used for dosin.5 (2021)   Warfarin maintenance plan:  2.5 mg (5 mg x 0.5) every Wed; 5 mg (5 mg x 1) all other days   Full warfarin instructions:  2.5 mg every Wed; 5 mg all other days   Weekly warfarin total:  32.5 mg   No change documented:  Smitha Nair RN   Plan last modified:  Smitha Nair RN (2020)   Next INR check:  2021   Priority:  High   Target end date:  Indefinite    Indications    Long term (current) use of anticoagulants [Z79.01]  Embolism and thrombosis (H) [I74.9]  Anticoagulation monitoring  INR range 2-3 [Z79.01]             Anticoagulation Episode Summary     INR check location:      Preferred lab:      Send INR reminders to:  [facility]    Comments:        Anticoagulation Care Providers     Provider Role Specialty Phone number    Maria Guadalupe Ramon DO [facility] Internal Medicine [phone]            See the Encounter Report to view Anticoagulation Flowsheet and Dosing Calendar (Go to Encounters tab in chart review, and find the Anticoagulation Therapy Visit)        Smitha Nair RN

## 2021-01-11 ENCOUNTER — TRANSFERRED RECORDS (OUTPATIENT)
Dept: HEALTH INFORMATION MANAGEMENT | Facility: OTHER | Age: 61
End: 2021-01-11

## 2021-01-11 DIAGNOSIS — U07.1 PNEUMONIA DUE TO 2019 NOVEL CORONAVIRUS: ICD-10-CM

## 2021-01-11 DIAGNOSIS — J12.82 PNEUMONIA DUE TO 2019 NOVEL CORONAVIRUS: ICD-10-CM

## 2021-01-11 LAB — INR PPP: 2.4 (ref 0.9–1.1)

## 2021-01-12 NOTE — TELEPHONE ENCOUNTER
Refill request for Advair Diskus, last office visit and refill with [facility] on 11/17/2020, all RN protocol details pass except for the following:      Long-Acting Beta Agonist Inhalers Protocol [id number]/12/2021 08:35 AM   Order for Serevent, Striverdi, or Foradil and pt has steroid inhaler     Routing to PCP to address refill since this one doesn't pass.  Unable to complete prescription refill per RN Medication Refill Policy. Kaitlynn [facility] RN 1/12/2021 8:37 AM

## 2021-01-18 ENCOUNTER — TRANSFERRED RECORDS (OUTPATIENT)
Dept: HEALTH INFORMATION MANAGEMENT | Facility: OTHER | Age: 61
End: 2021-01-18

## 2021-01-18 ENCOUNTER — ANTICOAGULATION THERAPY VISIT (OUTPATIENT)
Dept: ANTICOAGULATION | Facility: OTHER | Age: 61
End: 2021-01-18
Attending: INTERNAL MEDICINE
Payer: COMMERCIAL

## 2021-01-18 DIAGNOSIS — Z79.01 ANTICOAGULATION MONITORING, INR RANGE 2-3: ICD-10-CM

## 2021-01-18 DIAGNOSIS — I74.9 EMBOLISM AND THROMBOSIS (H): ICD-10-CM

## 2021-01-18 LAB — INR PPP: 2.2 (ref 0.9–1.1)

## 2021-01-18 NOTE — PROGRESS NOTES
ANTICOAGULATION FOLLOW-UP CLINIC VISIT    Patient Name:  Trisha Fernando  Date:  2021  Contact Type:  fax from [facility] self testing service    SUBJECTIVE:  Patient Findings     Comments:  No phone call due to INR being in range.  Patient has been instructed to call with new medications or changes.          Clinical Outcomes     Negatives:  Major bleeding event, Thromboembolic event, Anticoagulation-related hospital admission, Anticoagulation-related ED visit, Anticoagulation-related fatality    Comments:  No phone call due to INR being in range.  Patient has been instructed to call with new medications or changes.             OBJECTIVE    Recent labs: (last 7 days)     21   INR 2.2*       ASSESSMENT / PLAN  INR assessment THER    Recheck INR In: 1 WEEK    INR Location Home INR      Anticoagulation Summary  As of 2021    INR goal:  2.0-3.0   TTR:  68.1 % (1 y)   INR used for dosin.2 (2021)   Warfarin maintenance plan:  2.5 mg (5 mg x 0.5) every Wed; 5 mg (5 mg x 1) all other days   Full warfarin instructions:  2.5 mg every Wed; 5 mg all other days   Weekly warfarin total:  32.5 mg   No change documented:  Maria D Zayas RN   Plan last modified:  Smitha Nair RN (2020)   Next INR check:  2021   Priority:  High   Target end date:  Indefinite    Indications    Long term (current) use of anticoagulants [Z79.01]  Embolism and thrombosis (H) [I74.9]  Anticoagulation monitoring  INR range 2-3 [Z79.01]             Anticoagulation Episode Summary     INR check location:      Preferred lab:      Send INR reminders to:  [facility]    Comments:        Anticoagulation Care Providers     Provider Role Specialty Phone number    Maria Guadalupe Ramon DO [facility] Internal Medicine [phone]            See the Encounter Report to view Anticoagulation Flowsheet and Dosing Calendar (Go to Encounters tab in chart review, and find the Anticoagulation Therapy Visit)    No encounter,  INR received via fax.  INR in range so no telephone call.  Patient is to call INR clinic if any changes affecting INR.       Maria D Zayas RN

## 2021-01-25 ENCOUNTER — TRANSFERRED RECORDS (OUTPATIENT)
Dept: HEALTH INFORMATION MANAGEMENT | Facility: OTHER | Age: 61
End: 2021-01-25

## 2021-01-25 LAB — INR PPP: 2.5 (ref 0.9–1.1)

## 2021-01-26 ENCOUNTER — ANTICOAGULATION THERAPY VISIT (OUTPATIENT)
Dept: ANTICOAGULATION | Facility: OTHER | Age: 61
End: 2021-01-26
Attending: INTERNAL MEDICINE
Payer: COMMERCIAL

## 2021-01-26 DIAGNOSIS — Z79.01 ANTICOAGULATION MONITORING, INR RANGE 2-3: ICD-10-CM

## 2021-01-26 DIAGNOSIS — I74.9 EMBOLISM AND THROMBOSIS (H): ICD-10-CM

## 2021-01-26 NOTE — PROGRESS NOTES
ANTICOAGULATION FOLLOW-UP CLINIC VISIT    Patient Name:  Trisha Fernando  Date:  2021  Contact Type:  no call needed patient to continue same dose    SUBJECTIVE:  Patient Findings         Clinical Outcomes     Negatives:  Major bleeding event, Thromboembolic event, Anticoagulation-related hospital admission, Anticoagulation-related ED visit, Anticoagulation-related fatality           OBJECTIVE    Recent labs: (last 7 days)     21   INR 2.5*       ASSESSMENT / PLAN  INR assessment THER    Recheck INR In: 1 WEEK    INR Location Home INR      Anticoagulation Summary  As of 2021    INR goal:  2.0-3.0   TTR:  68.1 % (1 y)   INR used for dosin.5 (2021)   Warfarin maintenance plan:  2.5 mg (5 mg x 0.5) every Wed; 5 mg (5 mg x 1) all other days   Full warfarin instructions:  2.5 mg every Wed; 5 mg all other days   Weekly warfarin total:  32.5 mg   No change documented:  Smitha Nair RN   Plan last modified:  Smitha Nair RN (2020)   Next INR check:  2021   Priority:  High   Target end date:  Indefinite    Indications    Long term (current) use of anticoagulants [Z79.01]  Embolism and thrombosis (H) [I74.9]  Anticoagulation monitoring  INR range 2-3 [Z79.01]             Anticoagulation Episode Summary     INR check location:      Preferred lab:      Send INR reminders to:  [facility]    Comments:        Anticoagulation Care Providers     Provider Role Specialty Phone number    Maria Guadalupe Ramon DO [facility] Internal Medicine [phone]            See the Encounter Report to view Anticoagulation Flowsheet and Dosing Calendar (Go to Encounters tab in chart review, and find the Anticoagulation Therapy Visit)        Smitha Nair RN

## 2021-02-01 ENCOUNTER — TRANSFERRED RECORDS (OUTPATIENT)
Dept: HEALTH INFORMATION MANAGEMENT | Facility: OTHER | Age: 61
End: 2021-02-01

## 2021-02-02 ENCOUNTER — ANTICOAGULATION THERAPY VISIT (OUTPATIENT)
Dept: ANTICOAGULATION | Facility: OTHER | Age: 61
End: 2021-02-02
Attending: INTERNAL MEDICINE
Payer: COMMERCIAL

## 2021-02-02 DIAGNOSIS — Z79.01 ANTICOAGULATION MONITORING, INR RANGE 2-3: ICD-10-CM

## 2021-02-02 DIAGNOSIS — I74.9 EMBOLISM AND THROMBOSIS (H): ICD-10-CM

## 2021-02-02 LAB — INR PPP: 2.2 (ref 0.9–1.1)

## 2021-02-02 NOTE — PROGRESS NOTES
ANTICOAGULATION FOLLOW-UP CLINIC VISIT    Patient Name:  Trisha Fernando  Date:  2021  Contact Type:  fax from [facility] self testing service    SUBJECTIVE:  Patient Findings     Comments:  No phone call due to INR being in range.  Patient has been instructed to call with new medications or changes.          Clinical Outcomes     Negatives:  Major bleeding event, Thromboembolic event, Anticoagulation-related hospital admission, Anticoagulation-related ED visit, Anticoagulation-related fatality    Comments:  No phone call due to INR being in range.  Patient has been instructed to call with new medications or changes.             OBJECTIVE    Recent labs: (last 7 days)     21   INR 2.2*       ASSESSMENT / PLAN  INR assessment THER    Recheck INR In: 1 WEEK    INR Location Home INR      Anticoagulation Summary  As of 2021    INR goal:  2.0-3.0   TTR:  68.1 % (1 y)   INR used for dosin.2 (2021)   Warfarin maintenance plan:  2.5 mg (5 mg x 0.5) every Wed; 5 mg (5 mg x 1) all other days   Full warfarin instructions:  2.5 mg every Wed; 5 mg all other days   Weekly warfarin total:  32.5 mg   No change documented:  Maria D Zayas RN   Plan last modified:  Smitha Nair RN (2020)   Next INR check:  2021   Priority:  High   Target end date:  Indefinite    Indications    Long term (current) use of anticoagulants [Z79.01]  Embolism and thrombosis (H) [I74.9]  Anticoagulation monitoring  INR range 2-3 [Z79.01]             Anticoagulation Episode Summary     INR check location:      Preferred lab:      Send INR reminders to:  [facility]    Comments:        Anticoagulation Care Providers     Provider Role Specialty Phone number    Maria Guadalupe Ramon DO [facility] Internal Medicine [phone]            See the Encounter Report to view Anticoagulation Flowsheet and Dosing Calendar (Go to Encounters tab in chart review, and find the Anticoagulation Therapy Visit)     No encounter, INR  received via fax.  INR in range so no telephone call.  Patient is to call INR clinic if any changes affecting INR.       Maria D Zayas RN

## 2021-02-04 ENCOUNTER — HOSPITAL ENCOUNTER (OUTPATIENT)
Dept: GENERAL RADIOLOGY | Facility: OTHER | Age: 61
End: 2021-02-04
Attending: NURSE PRACTITIONER
Payer: COMMERCIAL

## 2021-02-04 ENCOUNTER — OFFICE VISIT (OUTPATIENT)
Dept: INTERNAL MEDICINE | Facility: OTHER | Age: 61
End: 2021-02-04
Attending: INTERNAL MEDICINE
Payer: COMMERCIAL

## 2021-02-04 VITALS
TEMPERATURE: 97.9 F | OXYGEN SATURATION: 97 % | DIASTOLIC BLOOD PRESSURE: 82 MMHG | HEART RATE: 56 BPM | SYSTOLIC BLOOD PRESSURE: 138 MMHG

## 2021-02-04 DIAGNOSIS — J12.82 PNEUMONIA DUE TO 2019 NOVEL CORONAVIRUS: ICD-10-CM

## 2021-02-04 DIAGNOSIS — R21 RASH OF FACE: Primary | ICD-10-CM

## 2021-02-04 DIAGNOSIS — Z86.16 HISTORY OF COVID-19: ICD-10-CM

## 2021-02-04 DIAGNOSIS — I10 ESSENTIAL HYPERTENSION: ICD-10-CM

## 2021-02-04 DIAGNOSIS — R41.3 MEMORY PROBLEM: ICD-10-CM

## 2021-02-04 DIAGNOSIS — U07.1 PNEUMONIA DUE TO 2019 NOVEL CORONAVIRUS: ICD-10-CM

## 2021-02-04 LAB
ANION GAP SERPL CALCULATED.3IONS-SCNC: 5 MMOL/L (ref 3–14)
BUN SERPL-MCNC: 14 MG/DL (ref 7–25)
CALCIUM SERPL-MCNC: 10 MG/DL (ref 8.6–10.3)
CHLORIDE SERPL-SCNC: 104 MMOL/L (ref 98–107)
CO2 SERPL-SCNC: 29 MMOL/L (ref 21–31)
CREAT SERPL-MCNC: 0.87 MG/DL (ref 0.6–1.2)
GFR SERPL CREATININE-BSD FRML MDRD: 66 ML/MIN/{1.73_M2}
GLUCOSE SERPL-MCNC: 95 MG/DL (ref 70–105)
POTASSIUM SERPL-SCNC: 3.7 MMOL/L (ref 3.5–5.1)
SODIUM SERPL-SCNC: 138 MMOL/L (ref 134–144)
TSH SERPL DL<=0.05 MIU/L-ACNC: 1.48 IU/ML (ref 0.34–5.6)
VIT B12 SERPL-MCNC: 597 PG/ML (ref 180–914)

## 2021-02-04 PROCEDURE — 84443 ASSAY THYROID STIM HORMONE: CPT | Mod: ZL | Performed by: INTERNAL MEDICINE

## 2021-02-04 PROCEDURE — 99214 OFFICE O/P EST MOD 30 MIN: CPT | Performed by: INTERNAL MEDICINE

## 2021-02-04 PROCEDURE — 82607 VITAMIN B-12: CPT | Mod: ZL | Performed by: INTERNAL MEDICINE

## 2021-02-04 PROCEDURE — 80048 BASIC METABOLIC PNL TOTAL CA: CPT | Mod: ZL | Performed by: INTERNAL MEDICINE

## 2021-02-04 PROCEDURE — 36415 COLL VENOUS BLD VENIPUNCTURE: CPT | Mod: ZL | Performed by: INTERNAL MEDICINE

## 2021-02-04 PROCEDURE — 71046 X-RAY EXAM CHEST 2 VIEWS: CPT

## 2021-02-04 RX ORDER — TRIAMCINOLONE ACETONIDE 1 MG/G
CREAM TOPICAL 2 TIMES DAILY
Qty: 15 G | Refills: 0 | Status: SHIPPED | OUTPATIENT
Start: 2021-02-04 | End: 2024-08-02

## 2021-02-04 RX ORDER — ADHESIVE BANDAGE 3/4"
1 BANDAGE TOPICAL DAILY
Qty: 1 EACH | Refills: 0 | Status: SHIPPED | OUTPATIENT
Start: 2021-02-04 | End: 2021-09-16

## 2021-02-04 ASSESSMENT — PATIENT HEALTH QUESTIONNAIRE - PHQ9: SUM OF ALL RESPONSES TO PHQ QUESTIONS 1-9: 9

## 2021-02-04 ASSESSMENT — PAIN SCALES - GENERAL: PAINLEVEL: NO PAIN (0)

## 2021-02-04 NOTE — PATIENT INSTRUCTIONS
"Please check your blood pressure daily at various times, recordthis and bring it to your follow up appointment.  Your blood pressure goal is greater than 110/50 and less than 135/90.  Please call if it is consistently outside this range.       High Blood Pressure: Essential Hypertension   ________________________________________________________________________  KEY POINTS    High blood pressure means that your blood pressure is higher than normal. It is called essential hypertension when no cause for it can be found.    Weight loss, changes in your diet, and exercise may be the only treatment you need. If lifestyle changes don t lower your blood pressure enough, yourhealthcare provider may prescribe medicine. Many people need to take 2 or more medicines to bring their blood pressure down to a healthy level.    Talk to your healthcare provider aboutyour personal and family medical history and your lifestyle habits. This will help you know what you can do to lower your risk for high blood pressure.  ________________________________________________________________________  What is high blood pressure?  Blood pressure is the force of bloodagainst artery walls as the heart pumps blood through the body. You may be told that you have high blood pressure (hypertension) if your blood pressure is higher than normal. Hypertension is called essential when no causefor it can be found. When the cause of hypertension is known, such as from kidney disease or a tumor, it is called secondary hypertension.  Blood pressure can rise and fall with exercise, rest, or emotions.    Normal resting blood pressure ranges up to 120/80 (\"120 over 80\"). The first number (120 in this example) is thepressure when the heart beats and pushes blood out to the rest of the body. The second number (80 in this example) is the pressure when the heart rests between beats.    Blood pressureis borderline high if it is 120/80 or higher but less than " 140/90.    High blood pressure is 140/90 or higher for most people. If you have chronic kidney disease, 130/80 or higher isconsidered high blood pressure.    Why is high blood pressure a problem?  High blood pressure is a problem in many ways.    Your heart has to work harder to pumpblood through your body. The added workload on the heart causes thickening of the heart muscle. Over time, the thickening damages the heart muscle so that it can no longer pump normally. This can lead to a disease calledheart failure.    The higher pressure in your arteries may cause them to weaken and bleed, resulting in a stroke.    As you get older, bloodvessels may become hardened. High blood pressure speeds up this process. Hardened or narrowed arteries may not be able to supply enough blood to all parts of your body.    High bloodpressure may lead to atherosclerosis, which is when deposits of cholesterol, fatty substances, and blood cells clog up an artery. Atherosclerosis is the leading cause of heart attacks. It can also cause strokes.    Your kidneys, brain, and eyes may be damaged.  You may need treatment for high blood pressure for the rest of your life.However, proper treatment can control your blood pressure and help prevent heart disease, heart attack, or stroke. It can also help prevent long-term health problems, such as heart failure, kidney failure, blindness, anddementia.  If you already have some complications, such as breathing problems or chest pain, lowering your blood pressure may make these problems less severe.    What is the cause?  There are no clear causes of essential hypertension. However, many things can increase blood pressure, such as:    Being overweight    Smoking    Eating a diet high in salt    Drinking a lot of alcohol  Other important factors include:    Race.  Americans are more likely to have high blood pressure.    Gender. Males have a greater chance of developing high blood pressure  thanwomen until age 55. After the age of 75, women are more likely to develop high blood pressure than men.    Heredity. If you have parents with high blood pressure, you are more at risk.    Age. The older you get, the more likely you are to have high blood pressure.  Also, some medicines increase bloodpressure.  Stress and drinking caffeine can make blood pressure go up temporarily, but it s not clear that they have any long-term effects on blood pressure.    What are the symptoms?  You may have high blood pressure for a long time without symptoms. You may not be able to tell by the way you feel that your bloodpressure is high. The only way to find out if your blood pressure is high is to have it measured. That's why it s important to have your blood pressure checked at least once a year.  When high blood pressure does cause symptoms, they may include:    Headaches    Nosebleeds    Getting tired easily    Blurred vision    Dizziness    Lightheadedness    Feeling like your heart is racing or fluttering    Shortness of breath    Chest pain    Memory problems    Daytime sleepiness    How is it diagnosed?  Blood pressure is checked at most healthcare visits. High blood pressure is usually discovered during one of these visits. Ifyour blood pressure is high, you will be asked to return for follow-up checks. Your healthcare provider will ask about your personal and family medical history and examine you.  Tests to look for a possible cause of highblood pressure may include:    Urine and blood tests    ChestX-ray    Electrocardiogram (ECG), which measures and records your heartbeat  You may be asked to use a portableblood-pressure measuring device, which will take your pressure at different times during day and night.    How is it treated?  If your bloodpressure is borderline high, you may be able to bring it down to a normal level without medicine. Weight loss, changes in your diet, and exercise may be the only  treatment you need.  If lifestyle changes don t lower your blood pressure enough, your healthcare provider may prescribe medicine. Many people need to take 2 or more medicines to bring their blood pressure down to a healthy level. It may takeseveral weeks or months to find the best treatment for you.    How can I take care of myself?  If you have high blood pressure, there are thingsyou can do now to take care of yourself and to prevent problems in the future:    Follow your treatmentplan and know how to take your medicines.    Work with your healthcare provider to find what lifestylechanges and medicines are right for you.    Follow the directions that come with your medicine, including information about food or alcohol. Make sure you know how and when to take yourmedicine. Do not take more or less than you are supposed to take.    Many medicines have side effects. A side effect is a symptom or problem that is caused by the medicine. Ask yourhealthcare provider or pharmacist what side effects your medicine may cause and what you should do if you have side effects. Ask if you should avoid some nonprescription medicines.    Be careful with nonprescription medicines or herbal supplements. Some can raise blood pressure. This includes diet pills, cold and pain medicines, and energy boosters. Read labels or ask your pharmacist if the medicine orsupplement affects blood pressure. Some illegal drugs, like cocaine, can also affect blood pressure.    Check your blood pressure (or have it checked) as often as your provider advises.Keep a diary of the readings. A diary is also a good place to note your exercise, weight, salt intake, types of food you are eating, and your feelings. This can help you learn how these things can affect your blood pressure.Take your diary with you when you visit your provider. It may help you and your provider manage your blood pressure and adjust your medicines if needed.    Don t smoke.    Eat a  healthy diet that is low in salt, saturated fat, trans fat, andcholesterol. Include lots of fruits, vegetables, and fat-free or low-fat milk and milk products.    Get regular exercise, according to your healthcare provider's advice. For example,you might walk, bike, or swim at least 30 minutes 3 to 5 times a week.    Limit the amount of alcohol you drink. Moderate drinking is up to 1 drink a day for women and up to 2 drinksfor men.    Lose weight if you need to.    Try to reduce the stress in your life or learn how to deal better with situations that make you feelanxious.    Ask your healthcare provider:    How and when youwill get your test results    How long it will take to recover    If there are activities you should avoid and when you can return to your normalactivities    How to take care of yourself at home    What symptoms or problems you should watch for and what to do if you have them    Make sure you know when you should come back for a checkup. Keep all appointments for provider visits or tests.    How can I help prevent high blood pressure?  You can helpprevent this disease with a heart-healthy lifestyle:    Eat a healthy diet and keep a healthy weight.    Stay fit with the right kind of exercise for you.    Decrease stress.    Don t smoke.    Limit your use of alcohol.  Talk to your healthcare provider about your personal and familymedical history and your lifestyle habits. This will help you know what you can do to lower your risk for high blood pressure.    Developed by [employer].  Adult Advisor 2016.3 published by [employer].  Lastmodified: 2016-04-18  Last reviewed: 2016-04-15  This content is reviewed periodically and is subject to change as new health information becomes available. The information is intended to inform and educate and is nota replacement for medical evaluation, advice, diagnosis or treatment by a healthcare professional.  References   Adult Advisor 2016.3 Index    Copyright    2016 [employer], a division of [employer]. All rights reserved.

## 2021-02-04 NOTE — NURSING NOTE
Patient presents to clinic today for follow up on blood pressure and chest X-ray. She had COVID three months ago and still has some possible issues due to that.    Medication reconciliation completed.  Marlene Harrison CMA([facility])..................2/4/2021   1:33 PM

## 2021-02-04 NOTE — PROGRESS NOTES
Chief Complaint   Patient presents with     Hypertension         HPI: Ms. Fernando is a 60 year old female who presents today for follow up of blood pressure and recent hospitalization for COVID-19.    She has not been checking her blood pressure at home.  It is slightly elevated today initially.  She continues on losartan and metoprolol.  She also takes furosemide in the evenings.  She denies any side effects from her medications.    She was diagnosed with COVID-19 approximately 3 months ago.  She was hospitalized with pneumonia for a few days.  She has gradually improved however continues to have issues with short-term memory.  She is forgetting to put things back where they belong in the kitchen and is concerned about this long-term.  She also has not regained her ability to smell or taste yet.  She has had a scaly rash above her eye.  She has tried some moisturizing cream along with some over-the-counter hydrocortisone which have not made it go away.  She is curious if all these things are related to her COVID-19.    She  reports that she quit smoking about 30 years ago. Her smoking use included cigarettes. She has a 12.00 pack-year smoking history. She has never used smokeless tobacco.    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    .      ROS  Pertinent ROS was performed and was negative, including for fever, chills, chest pain, shortness of breath, increased lower extremity edema, changes in bowel or bladder, blood in the stool. No other concerns, with exception of HPI above.      EXAM:   BP (!) 142/90 (BP Location: Right arm, Patient Position: Sitting, Cuff Size: Adult Large)   Pulse 56   Temp 97.9  F (36.6  C) (Tympanic)   LMP  (LMP Unknown)   SpO2 97%     Estimated body mass index is 54.19 kg/m  as calculated from the following:    Height as of 11/5/20: 1.727 m (5' 8\").    Weight as of 11/17/20: 161.7 kg (356 lb 6.4 oz).      GEN: Vitals reviewed. Healthy appearing. Patient is in no acute distress. Cooperative with exam.  HEENT: Normocephalic atraumatic.  Eyes grossly normal to inspection.  No discharge or erythema, or obvious scleral/conjunctival abnormalities.   CV: Heart regular in rate and rhythm with no murmur.    LUNGS: No audible wheeze, cough, or visible cyanosis.  No visible retractions or increased work of breathing.  Lungs clear to auscultation bilaterally.    ABD:  Obese.  SKIN: Warm and dry to touch.  Visible skin clear. No significant rash, abnormal pigmentation or lesions.  EXT: No clubbing or cyanosis.  1+ chronic peripheral edema.  PSYCH: Mood is good.  Mentation appears normal, affect normal/bright, judgement and insight intact, normal speech and appearance well-groomed.     ASSESSMENT AND PLAN:    Rash of face  -She can continue with moisturizing cream.  Low-dose triamcinolone is sent in and she can try this in small amounts.  She is to call with any worsening of her symptoms.  - triamcinolone (KENALOG) 0.1 % external cream  Dispense: 15 g; Refill: 0    Essential hypertension  - Blood pressure today of 138/82   is at the goal of <140/90 with no exacerbation.  - " Continue current regimen.  Instructed to check BP at home.  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  We discussed doing things to help her memory.  We will check TSH and B12 today with additional labs to rule out any obvious abnormality.  She is to let us know if she notices any worsening.  - TSH Reflex GH  - Vitamin B12    History of COVID-19  -Call with any new or changing symptoms         Return in about 6 months (around 8/4/2021) for Annual Review.      BONITA OLIVER, DO   2/4/2021 1:32 PM    This document was prepared using voice generated softwear. While every attempt was made for accuracy, grammatical errors may exist.

## 2021-02-08 ENCOUNTER — TRANSFERRED RECORDS (OUTPATIENT)
Dept: HEALTH INFORMATION MANAGEMENT | Facility: OTHER | Age: 61
End: 2021-02-08

## 2021-02-08 LAB — INR PPP: 2.1 (ref 0.9–1.1)

## 2021-02-09 ENCOUNTER — ANTICOAGULATION THERAPY VISIT (OUTPATIENT)
Dept: ANTICOAGULATION | Facility: OTHER | Age: 61
End: 2021-02-09
Attending: INTERNAL MEDICINE
Payer: COMMERCIAL

## 2021-02-09 DIAGNOSIS — Z79.01 ANTICOAGULATION MONITORING, INR RANGE 2-3: ICD-10-CM

## 2021-02-09 DIAGNOSIS — I74.9 EMBOLISM AND THROMBOSIS (H): ICD-10-CM

## 2021-02-09 NOTE — PROGRESS NOTES
ANTICOAGULATION FOLLOW-UP CLINIC VISIT    Patient Name:  Trisha Fernando  Date:  2021  Contact Type:  No phone call needed. Patient within range. Continue same dose.    SUBJECTIVE:  Patient Findings         Clinical Outcomes     Negatives:  Major bleeding event, Thromboembolic event, Anticoagulation-related hospital admission, Anticoagulation-related ED visit, Anticoagulation-related fatality           OBJECTIVE    Recent labs: (last 7 days)     21   INR 2.1*       ASSESSMENT / PLAN  INR assessment THER    Recheck INR In: 1 WEEK    INR Location Home INR      Anticoagulation Summary  As of 2021    INR goal:  2.0-3.0   TTR:  68.1 % (1 y)   INR used for dosin.1 (2021)   Warfarin maintenance plan:  2.5 mg (5 mg x 0.5) every Wed; 5 mg (5 mg x 1) all other days   Full warfarin instructions:  2.5 mg every Wed; 5 mg all other days   Weekly warfarin total:  32.5 mg   Plan last modified:  Smitha Nair RN (2020)   Next INR check:     Priority:  High   Target end date:  Indefinite    Indications    Long term (current) use of anticoagulants [Z79.01]  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  INR in range so no telephone call.  Patient is to call INR clinic if any changes affecting INR.     Dione Julian, SINDHU

## 2021-02-15 ENCOUNTER — TRANSFERRED RECORDS (OUTPATIENT)
Dept: HEALTH INFORMATION MANAGEMENT | Facility: OTHER | Age: 61
End: 2021-02-15

## 2021-02-15 LAB — INR PPP: 2.2 (ref 0.9–1.1)

## 2021-02-16 ENCOUNTER — ANTICOAGULATION THERAPY VISIT (OUTPATIENT)
Dept: ANTICOAGULATION | Facility: OTHER | Age: 61
End: 2021-02-16
Attending: INTERNAL MEDICINE
Payer: COMMERCIAL

## 2021-02-16 DIAGNOSIS — I74.9 EMBOLISM AND THROMBOSIS (H): Primary | ICD-10-CM

## 2021-02-16 DIAGNOSIS — Z79.01 ANTICOAGULATION MONITORING, INR RANGE 2-3: ICD-10-CM

## 2021-02-16 NOTE — PROGRESS NOTES
ANTICOAGULATION FOLLOW-UP CLINIC VISIT    Patient Name:  Trisha Fernando  Date:  2021  Contact Type:  no call needed patient to continue same dose    SUBJECTIVE:  Patient Findings         Clinical Outcomes     Negatives:  Major bleeding event, Thromboembolic event, Anticoagulation-related hospital admission, Anticoagulation-related ED visit, Anticoagulation-related fatality           OBJECTIVE    Recent labs: (last 7 days)     02/15/21   INR 2.2*       ASSESSMENT / PLAN  INR assessment THER    Recheck INR In: 1 WEEK    INR Location Home INR      Anticoagulation Summary  As of 2021    INR goal:  2.0-3.0   TTR:  68.1 % (1 y)   INR used for dosin.2 (2/15/2021)   Warfarin maintenance plan:  2.5 mg (5 mg x 0.5) every Wed; 5 mg (5 mg x 1) all other days   Full warfarin instructions:  2.5 mg every Wed; 5 mg all other days   Weekly warfarin total:  32.5 mg   No change documented:  Smitha Nair RN   Plan last modified:  Smitha Nair RN (2020)   Next INR check:  2021   Priority:  High   Target end date:  Indefinite    Indications    Long term (current) use of anticoagulants [Z79.01]  Embolism and thrombosis (H) [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

## 2021-02-22 ENCOUNTER — TRANSFERRED RECORDS (OUTPATIENT)
Dept: HEALTH INFORMATION MANAGEMENT | Facility: OTHER | Age: 61
End: 2021-02-22

## 2021-02-22 LAB — INR PPP: 2 (ref 0.9–1.1)

## 2021-02-23 ENCOUNTER — ANTICOAGULATION THERAPY VISIT (OUTPATIENT)
Dept: ANTICOAGULATION | Facility: OTHER | Age: 61
End: 2021-02-23
Attending: INTERNAL MEDICINE
Payer: COMMERCIAL

## 2021-02-23 NOTE — PROGRESS NOTES
ANTICOAGULATION FOLLOW-UP CLINIC VISIT    Patient Name:  Trisha Fernando  Date:  2021  Contact Type:  No phone call needed, no dosing change    SUBJECTIVE:  Patient Findings         Clinical Outcomes     Negatives:  Major bleeding event, Thromboembolic event, Anticoagulation-related hospital admission, Anticoagulation-related ED visit, Anticoagulation-related fatality           OBJECTIVE    Recent labs: (last 7 days)     21   INR 2.0*       ASSESSMENT / PLAN  INR assessment THER    Recheck INR In: 1 WEEK    INR Location Home INR      Anticoagulation Summary  As of 2021    INR goal:  2.0-3.0   TTR:  68.1 % (1 y)   INR used for dosin.0 (2021)   Warfarin maintenance plan:  2.5 mg (5 mg x 0.5) every Wed; 5 mg (5 mg x 1) all other days   Full warfarin instructions:  2.5 mg every Wed; 5 mg all other days   Weekly warfarin total:  32.5 mg   No change documented:  Priscila Marie RN   Plan last modified:  Smitha Nair RN (2020)   Next INR check:  3/2/2021   Priority:  High   Target end date:  Indefinite    Indications    Long term (current) use of anticoagulants [Z79.01]  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

## 2021-02-24 DIAGNOSIS — E87.6 HYPOKALEMIA: ICD-10-CM

## 2021-02-25 RX ORDER — POTASSIUM CHLORIDE 750 MG/1
10 TABLET, EXTENDED RELEASE ORAL DAILY
Qty: 90 TABLET | Refills: 1 | Status: SHIPPED | OUTPATIENT
Start: 2021-02-25 | End: 2021-08-10

## 2021-02-25 NOTE — TELEPHONE ENCOUNTER
"[facility] [id number]  sent Rx request for the following:   potassium chloride ER (K-TAB/KLOR-CON) 10 MEQ CR tablet  SigTAKE 1 TABLET (10 MEQ) BY MOUTH DAILY   Last Prescription Date:   5/19/2020  Last Fill Qty/Refills:         90, R-2   Last Office Visit:              2/4/2021  Future Office visit:           none       Potassium Supplements Protocol Passed - 2/24/2021  7:17 PM        Passed - Recent (12 mo) or future (30 days) visit within the authorizing provider's department     Patient has had an office visit with the authorizing provider or a provider within the authorizing providers department within the previous 12 mos or has a future within next 30 days. See \"Patient Info\" tab in inbasket, or \"Choose Columns\" in Meds & Orders section of the refill encounter.              Passed - Medication is active on med list        Passed - Patient is age 18 or older        Passed - Normal serum potassium in past 12 months     Recent Labs   Lab Test 02/04/21  1410   POTASSIUM 3.7                 Due for follow up in August for annual with PCP.   Prescription approved per [facility] Refill Protocol.  Nedra Zayas RN ....................  2/25/2021   2:45 PM        "

## 2021-03-01 ENCOUNTER — TRANSFERRED RECORDS (OUTPATIENT)
Dept: HEALTH INFORMATION MANAGEMENT | Facility: OTHER | Age: 61
End: 2021-03-01

## 2021-03-02 ENCOUNTER — ANTICOAGULATION THERAPY VISIT (OUTPATIENT)
Dept: ANTICOAGULATION | Facility: OTHER | Age: 61
End: 2021-03-02
Attending: INTERNAL MEDICINE
Payer: COMMERCIAL

## 2021-03-02 DIAGNOSIS — Z79.01 ANTICOAGULATION MONITORING, INR RANGE 2-3: ICD-10-CM

## 2021-03-02 DIAGNOSIS — I74.9 EMBOLISM AND THROMBOSIS (H): ICD-10-CM

## 2021-03-02 LAB — INR PPP: 2.1 (ref 0.9–1.1)

## 2021-03-02 NOTE — PROGRESS NOTES
ANTICOAGULATION FOLLOW-UP CLINIC VISIT    Patient Name:  Trisha Fernando  Date:  3/2/2021  Contact Type:  fax from [facility] self testing service    SUBJECTIVE:  Patient Findings     Comments:  No phone call due to INR being in range.  Patient has been instructed to call with new medications or changes.          Clinical Outcomes     Negatives:  Major bleeding event, Thromboembolic event, Anticoagulation-related hospital admission, Anticoagulation-related ED visit, Anticoagulation-related fatality    Comments:  No phone call due to INR being in range.  Patient has been instructed to call with new medications or changes.             OBJECTIVE    Recent labs: (last 7 days)     21   INR 2.1*       ASSESSMENT / PLAN  INR assessment THER    Recheck INR In: 1 WEEK    INR Location Home INR      Anticoagulation Summary  As of 3/2/2021    INR goal:  2.0-3.0   TTR:  69.2 % (1 y)   INR used for dosin.1 (3/1/2021)   Warfarin maintenance plan:  2.5 mg (5 mg x 0.5) every Wed; 5 mg (5 mg x 1) all other days   Full warfarin instructions:  2.5 mg every Wed; 5 mg all other days   Weekly warfarin total:  32.5 mg   No change documented:  Maria D Zayas RN   Plan last modified:  Smitha Nair RN (2020)   Next INR check:  3/9/2021   Priority:  High   Target end date:  Indefinite    Indications    Long term (current) use of anticoagulants [Z79.01]  Embolism and thrombosis (H) [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  INR in range so no telephone call.  Patient is to call INR clinic if any changes affecting INR.       Maria D Zayas RN

## 2021-03-05 DIAGNOSIS — I10 ESSENTIAL HYPERTENSION: ICD-10-CM

## 2021-03-05 RX ORDER — LOSARTAN POTASSIUM 50 MG/1
50 TABLET ORAL DAILY
COMMUNITY
Start: 2021-03-05 | End: 2021-04-29

## 2021-03-08 ENCOUNTER — TRANSFERRED RECORDS (OUTPATIENT)
Dept: HEALTH INFORMATION MANAGEMENT | Facility: OTHER | Age: 61
End: 2021-03-08

## 2021-03-09 ENCOUNTER — ANTICOAGULATION THERAPY VISIT (OUTPATIENT)
Dept: ANTICOAGULATION | Facility: OTHER | Age: 61
End: 2021-03-09
Attending: INTERNAL MEDICINE
Payer: COMMERCIAL

## 2021-03-09 DIAGNOSIS — Z79.01 ANTICOAGULATION MONITORING, INR RANGE 2-3: ICD-10-CM

## 2021-03-09 DIAGNOSIS — I74.9 EMBOLISM AND THROMBOSIS (H): ICD-10-CM

## 2021-03-09 LAB — INR PPP: 2.2 (ref 0.9–1.1)

## 2021-03-09 NOTE — PROGRESS NOTES
ANTICOAGULATION FOLLOW-UP CLINIC VISIT    Patient Name:  Trisha Fernando  Date:  3/9/2021  Contact Type:  fax from [facility]INR self testing service    SUBJECTIVE:  Patient Findings     Comments:  No phone call due to INR being in range.  Patient has been instructed to call with new medications or changes.          Clinical Outcomes     Negatives:  Major bleeding event, Thromboembolic event, Anticoagulation-related hospital admission, Anticoagulation-related ED visit, Anticoagulation-related fatality    Comments:  No phone call due to INR being in range.  Patient has been instructed to call with new medications or changes.             OBJECTIVE    Recent labs: (last 7 days)     21   INR 2.2*       ASSESSMENT / PLAN  INR assessment THER    Recheck INR In: 1 WEEK    INR Location Home INR      Anticoagulation Summary  As of 3/9/2021    INR goal:  2.0-3.0   TTR:  71.1 % (1 y)   INR used for dosin.2 (3/8/2021)   Warfarin maintenance plan:  2.5 mg (5 mg x 0.5) every Wed; 5 mg (5 mg x 1) all other days   Full warfarin instructions:  2.5 mg every Wed; 5 mg all other days   Weekly warfarin total:  32.5 mg   No change documented:  Maria D Zayas RN   Plan last modified:  Smitha Nair RN (2020)   Next INR check:  3/9/2021   Priority:  High   Target end date:  Indefinite    Indications    Long term (current) use of anticoagulants [Z79.01]  Embolism and thrombosis (H) [I74.9]  Anticoagulation monitoring  INR range 2-3 [Z79.01]             Anticoagulation Episode Summary     INR check location:      Preferred lab:      Send INR reminders to:  [facility]    Comments:  [facility] needs weekly INR done      Anticoagulation Care Providers     Provider Role Specialty Phone number    Maria Guadalupe Ramon DO Referring Internal Medicine [phone]            See the Encounter Report to view Anticoagulation Flowsheet and Dosing Calendar (Go to Encounters tab in chart review, and find the Anticoagulation Therapy  Visit)    No encounter, INR received via fax.  INR in range so no telephone call.  Patient is to call INR clinic if any changes affecting INR.       Maria D Zayas RN

## 2021-03-15 ENCOUNTER — TRANSFERRED RECORDS (OUTPATIENT)
Dept: HEALTH INFORMATION MANAGEMENT | Facility: OTHER | Age: 61
End: 2021-03-15

## 2021-03-16 ENCOUNTER — ANTICOAGULATION THERAPY VISIT (OUTPATIENT)
Dept: ANTICOAGULATION | Facility: OTHER | Age: 61
End: 2021-03-16
Attending: INTERNAL MEDICINE
Payer: COMMERCIAL

## 2021-03-16 DIAGNOSIS — I74.9 EMBOLISM AND THROMBOSIS (H): ICD-10-CM

## 2021-03-16 DIAGNOSIS — Z79.01 ANTICOAGULATION MONITORING, INR RANGE 2-3: ICD-10-CM

## 2021-03-16 LAB — INR PPP: 1.9 (ref 0.9–1.1)

## 2021-03-16 NOTE — PROGRESS NOTES
ANTICOAGULATION FOLLOW-UP CLINIC VISIT    Patient Name:  Trisha Fernando  Date:  3/16/2021  Contact Type:  fax from [facility]INR self testing service/phone call with patient.    SUBJECTIVE:  Patient Findings     Comments:  Phone call to patient.          Clinical Outcomes     Negatives:  Major bleeding event, Thromboembolic event, Anticoagulation-related hospital admission, Anticoagulation-related ED visit, Anticoagulation-related fatality    Comments:  Phone call to patient.             OBJECTIVE    Recent labs: (last 7 days)     03/15/21   INR 1.9*       ASSESSMENT / PLAN  INR assessment SUB    Recheck INR In: 1 WEEK    INR Location Home INR      Anticoagulation Summary  As of 3/16/2021    INR goal:  2.0-3.0   TTR:  72.4 % (1 y)   INR used for dosin.9 (3/15/2021)   Warfarin maintenance plan:  5 mg (5 mg x 1) every day   Full warfarin instructions:  5 mg every day   Weekly warfarin total:  35 mg   Plan last modified:  Maria D Zayas RN (3/16/2021)   Next INR check:  3/23/2021   Priority:  High   Target end date:  Indefinite    Indications    Long term (current) use of anticoagulants [Z79.01]  Embolism and thrombosis (H) [I74.9]  Anticoagulation monitoring  INR range 2-3 [Z79.01]             Anticoagulation Episode Summary     INR check location:      Preferred lab:      Send INR reminders to:  [facility]    Comments:  [facility] needs weekly INR done      Anticoagulation Care Providers     Provider Role Specialty Phone number    Maria Guadalupe RamonDO Referring Internal Medicine [phone]            See the Encounter Report to view Anticoagulation Flowsheet and Dosing Calendar (Go to Encounters tab in chart review, and find the Anticoagulation Therapy Visit)    No encounter, INR  received via fax.  Assessment and istructions via phone call with patient. Patient verbalized understanding.      Maria D Zayas RN

## 2021-03-22 ENCOUNTER — TRANSFERRED RECORDS (OUTPATIENT)
Dept: HEALTH INFORMATION MANAGEMENT | Facility: OTHER | Age: 61
End: 2021-03-22

## 2021-03-23 ENCOUNTER — ANTICOAGULATION THERAPY VISIT (OUTPATIENT)
Dept: ANTICOAGULATION | Facility: OTHER | Age: 61
End: 2021-03-23
Attending: INTERNAL MEDICINE
Payer: COMMERCIAL

## 2021-03-23 DIAGNOSIS — I74.9 EMBOLISM AND THROMBOSIS (H): ICD-10-CM

## 2021-03-23 DIAGNOSIS — Z79.01 ANTICOAGULATION MONITORING, INR RANGE 2-3: ICD-10-CM

## 2021-03-23 LAB — INR PPP: 2.4 (ref 0.9–1.1)

## 2021-03-23 NOTE — PROGRESS NOTES
ANTICOAGULATION FOLLOW-UP CLINIC VISIT    Patient Name:  Trisha Fernando  Date:  3/23/2021  Contact Type: Fax received from MD/INR Self Testing Home Monitoring.  SUBJECTIVE:  Patient Findings         Clinical Outcomes     Negatives:  Major bleeding event, Thromboembolic event, Anticoagulation-related hospital admission, Anticoagulation-related ED visit, Anticoagulation-related fatality           OBJECTIVE    Recent labs: (last 7 days)     21   INR 2.4*       ASSESSMENT / PLAN  No question data found.  Anticoagulation Summary  As of 3/23/2021    INR goal:  2.0-3.0   TTR:  73.9 % (1 y)   INR used for dosin.4 (3/22/2021)   Warfarin maintenance plan:  5 mg (5 mg x 1) every day   Full warfarin instructions:  5 mg every day   Weekly warfarin total:  35 mg   No change documented:  Dione Julian RN   Plan last modified:  Maria D Zayas RN (3/16/2021)   Next INR check:  3/30/2021   Priority:  High   Target end date:  Indefinite    Indications    Long term (current) use of anticoagulants [Z79.01]  Embolism and thrombosis (H) [I74.9]  Anticoagulation monitoring  INR range 2-3 [Z79.01]             Anticoagulation Episode Summary     INR check location:      Preferred lab:      Send INR reminders to:  [facility]    Comments:  [facility] needs weekly INR done      Anticoagulation Care Providers     Provider Role Specialty Phone number    YennyMaria Guadalupe DO KIRBY Referring Internal Medicine [phone]            See the Encounter Report to view Anticoagulation Flowsheet and Dosing Calendar (Go to Encounters tab in chart review, and find the Anticoagulation Therapy Visit)     No encounter, INR received via fax.  INR in range so no telephone call.  Patient is to call INR clinic if any changes affecting INR.       Dione Julian RN

## 2021-03-29 ENCOUNTER — TRANSFERRED RECORDS (OUTPATIENT)
Dept: HEALTH INFORMATION MANAGEMENT | Facility: OTHER | Age: 61
End: 2021-03-29

## 2021-03-29 DIAGNOSIS — Z79.01 ANTICOAGULATION MONITORING, INR RANGE 2-3: ICD-10-CM

## 2021-03-29 DIAGNOSIS — I74.9 EMBOLISM AND THROMBOSIS (H): ICD-10-CM

## 2021-03-29 LAB — INR PPP: 2.3 (ref 0.9–1.1)

## 2021-03-29 RX ORDER — WARFARIN SODIUM 5 MG/1
TABLET ORAL
Qty: 90 TABLET | Refills: 1 | Status: SHIPPED | OUTPATIENT
Start: 2021-03-29 | End: 2021-08-30

## 2021-03-30 ENCOUNTER — ANTICOAGULATION THERAPY VISIT (OUTPATIENT)
Dept: ANTICOAGULATION | Facility: OTHER | Age: 61
End: 2021-03-30
Attending: INTERNAL MEDICINE
Payer: COMMERCIAL

## 2021-03-30 DIAGNOSIS — Z79.01 ANTICOAGULATION MONITORING, INR RANGE 2-3: ICD-10-CM

## 2021-03-30 DIAGNOSIS — I74.9 EMBOLISM AND THROMBOSIS (H): ICD-10-CM

## 2021-04-05 ENCOUNTER — TRANSFERRED RECORDS (OUTPATIENT)
Dept: HEALTH INFORMATION MANAGEMENT | Facility: OTHER | Age: 61
End: 2021-04-05

## 2021-04-06 ENCOUNTER — ANTICOAGULATION THERAPY VISIT (OUTPATIENT)
Dept: ANTICOAGULATION | Facility: OTHER | Age: 61
End: 2021-04-06
Attending: INTERNAL MEDICINE
Payer: COMMERCIAL

## 2021-04-06 DIAGNOSIS — I74.9 EMBOLISM AND THROMBOSIS (H): ICD-10-CM

## 2021-04-06 DIAGNOSIS — Z79.01 ANTICOAGULATION MONITORING, INR RANGE 2-3: ICD-10-CM

## 2021-04-06 LAB — INR PPP: 2.4 (ref 0.9–1.1)

## 2021-04-06 NOTE — PROGRESS NOTES
ANTICOAGULATION FOLLOW-UP CLINIC VISIT    Patient Name:  Trisha Fernando  Date:  2021  Contact Type:  No phone call needed. Patient remains within range.    SUBJECTIVE:  Patient Findings         Clinical Outcomes     Negatives:  Major bleeding event, Thromboembolic event, Anticoagulation-related hospital admission, Anticoagulation-related ED visit, Anticoagulation-related fatality           OBJECTIVE    Recent labs: (last 7 days)     21   INR 2.4*       ASSESSMENT / PLAN  INR assessment THER    Recheck INR In: 1 WEEK    INR Location Home INR      Anticoagulation Summary  As of 2021    INR goal:  2.0-3.0   TTR:  77.8 % (1 y)   INR used for dosin.4 (2021)   Warfarin maintenance plan:  5 mg (5 mg x 1) every day   Full warfarin instructions:  5 mg every day   Weekly warfarin total:  35 mg   No change documented:  Dione Julian RN   Plan last modified:  Maria D Zayas RN (3/16/2021)   Next INR check:  2021   Priority:  High   Target end date:  Indefinite    Indications    Long term (current) use of anticoagulants [Z79.01]  Embolism and thrombosis (H) [I74.9]  Anticoagulation monitoring  INR range 2-3 [Z79.01]             Anticoagulation Episode Summary     INR check location:      Preferred lab:      Send INR reminders to:  [facility]    Comments:  [facility] needs weekly INR done      Anticoagulation Care Providers     Provider Role Specialty Phone number    Maria Guadalupe RamonDO Referring Internal Medicine [phone]            See the Encounter Report to view Anticoagulation Flowsheet and Dosing Calendar (Go to Encounters tab in chart review, and find the Anticoagulation Therapy Visit)     No encounter, INR received via fax.  INR in range so no telephone call.  Patient is to call INR clinic if any changes affecting INR.       Dione Julian, SINDHU

## 2021-04-12 ENCOUNTER — TRANSFERRED RECORDS (OUTPATIENT)
Dept: HEALTH INFORMATION MANAGEMENT | Facility: OTHER | Age: 61
End: 2021-04-12

## 2021-04-13 ENCOUNTER — ANTICOAGULATION THERAPY VISIT (OUTPATIENT)
Dept: ANTICOAGULATION | Facility: OTHER | Age: 61
End: 2021-04-13
Attending: INTERNAL MEDICINE
Payer: COMMERCIAL

## 2021-04-13 DIAGNOSIS — I74.9 EMBOLISM AND THROMBOSIS (H): ICD-10-CM

## 2021-04-13 DIAGNOSIS — Z79.01 ANTICOAGULATION MONITORING, INR RANGE 2-3: ICD-10-CM

## 2021-04-13 LAB — INR PPP: 2.5 (ref 0.9–1.1)

## 2021-04-13 NOTE — PROGRESS NOTES
ANTICOAGULATION FOLLOW-UP CLINIC VISIT    Patient Name:  Trisha Fernando  Date:  2021  Contact Type: No phone call needed. Patient remains within range     SUBJECTIVE:  Patient Findings         Clinical Outcomes     Negatives:  Major bleeding event, Thromboembolic event, Anticoagulation-related hospital admission, Anticoagulation-related ED visit, Anticoagulation-related fatality           OBJECTIVE    Recent labs: (last 7 days)     21   INR 2.5*       ASSESSMENT / PLAN  INR assessment THER    Recheck INR In: 1 WEEK    INR Location Home INR      Anticoagulation Summary  As of 2021    INR goal:  2.0-3.0   TTR:  78.0 % (1 y)   INR used for dosin.5 (2021)   Warfarin maintenance plan:  5 mg (5 mg x 1) every day   Full warfarin instructions:  5 mg every day   Weekly warfarin total:  35 mg   No change documented:  Dione Julian RN   Plan last modified:  Maria D Zayas RN (3/16/2021)   Next INR check:     Priority:  High   Target end date:  Indefinite    Indications    Long term (current) use of anticoagulants [Z79.01]  Embolism and thrombosis (H) [I74.9]  Anticoagulation monitoring  INR range 2-3 [Z79.01]             Anticoagulation Episode Summary     INR check location:      Preferred lab:      Send INR reminders to:  [facility]    Comments:  MD[facility] needs weekly INR done      Anticoagulation Care Providers     Provider Role Specialty Phone number    YennyMaria GuadalupeDO Referring Internal Medicine [phone]            See the Encounter Report to view Anticoagulation Flowsheet and Dosing Calendar (Go to Encounters tab in chart review, and find the Anticoagulation Therapy Visit)     No encounter, INR received via fax.  INR in range so no telephone call.  Patient is to call INR clinic if any changes affecting INR.     Dione Julian, SINDHU

## 2021-04-19 ENCOUNTER — ANTICOAGULATION THERAPY VISIT (OUTPATIENT)
Dept: ANTICOAGULATION | Facility: OTHER | Age: 61
End: 2021-04-19
Attending: INTERNAL MEDICINE
Payer: COMMERCIAL

## 2021-04-19 ENCOUNTER — TRANSFERRED RECORDS (OUTPATIENT)
Dept: HEALTH INFORMATION MANAGEMENT | Facility: OTHER | Age: 61
End: 2021-04-19

## 2021-04-19 DIAGNOSIS — Z79.01 ANTICOAGULATION MONITORING, INR RANGE 2-3: ICD-10-CM

## 2021-04-19 DIAGNOSIS — I74.9 EMBOLISM AND THROMBOSIS (H): ICD-10-CM

## 2021-04-19 LAB — INR PPP: 2.4 (ref 0.9–1.1)

## 2021-04-19 NOTE — PROGRESS NOTES
ANTICOAGULATION FOLLOW-UP CLINIC VISIT    Patient Name:  Trisha Fernando  Date:  2021  Contact Type:  no call needed, patient to continue current dose    SUBJECTIVE:  Patient Findings         Clinical Outcomes     Negatives:  Major bleeding event, Thromboembolic event, Anticoagulation-related hospital admission, Anticoagulation-related ED visit, Anticoagulation-related fatality           OBJECTIVE    Recent labs: (last 7 days)     21   INR 2.4*       ASSESSMENT / PLAN  INR assessment THER    Recheck INR In: 1 WEEK    INR Location Home INR      Anticoagulation Summary  As of 2021    INR goal:  2.0-3.0   TTR:  78.1 % (1 y)   INR used for dosin.4 (2021)   Warfarin maintenance plan:  5 mg (5 mg x 1) every day   Full warfarin instructions:  5 mg every day   Weekly warfarin total:  35 mg   No change documented:  Filippo Zaragoza RN   Plan last modified:  Maria D Zayas RN (3/16/2021)   Next INR check:  2021   Priority:  Maintenance   Target end date:  Indefinite    Indications    Long term (current) use of anticoagulants [Z79.01]  Embolism and thrombosis (H) [I74.9]  Anticoagulation monitoring  INR range 2-3 [Z79.01]             Anticoagulation Episode Summary     INR check location:      Preferred lab:      Send INR reminders to:  [facility]    Comments:  MDINR needs weekly INR done      Anticoagulation Care Providers     Provider Role Specialty Phone number    Maria Guadalupe Ramon DO Referring Internal Medicine [phone]            See the Encounter Report to view Anticoagulation Flowsheet and Dosing Calendar (Go to Encounters tab in chart review, and find the Anticoagulation Therapy Visit)    Filippo Zaragoza, SINDHU

## 2021-04-26 ENCOUNTER — TRANSFERRED RECORDS (OUTPATIENT)
Dept: HEALTH INFORMATION MANAGEMENT | Facility: OTHER | Age: 61
End: 2021-04-26

## 2021-04-27 ENCOUNTER — ANTICOAGULATION THERAPY VISIT (OUTPATIENT)
Dept: ANTICOAGULATION | Facility: OTHER | Age: 61
End: 2021-04-27
Attending: INTERNAL MEDICINE
Payer: COMMERCIAL

## 2021-04-27 DIAGNOSIS — Z79.01 ANTICOAGULATION MONITORING, INR RANGE 2-3: ICD-10-CM

## 2021-04-27 DIAGNOSIS — I74.9 EMBOLISM AND THROMBOSIS (H): ICD-10-CM

## 2021-04-27 LAB — INR PPP: 2 (ref 0.9–1.1)

## 2021-04-27 NOTE — PROGRESS NOTES
ANTICOAGULATION FOLLOW-UP CLINIC VISIT    Patient Name:  Trisha Fernando  Date:  2021  Contact Type:  No phone call needed. INR within range.    SUBJECTIVE:  Patient Findings         Clinical Outcomes     Negatives:  Major bleeding event, Thromboembolic event, Anticoagulation-related hospital admission, Anticoagulation-related ED visit, Anticoagulation-related fatality           OBJECTIVE    Recent labs: (last 7 days)     21   INR 2.0*       ASSESSMENT / PLAN  INR assessment THER    Recheck INR In: 1 WEEK    INR Location Home INR      Anticoagulation Summary  As of 2021    INR goal:  2.0-3.0   TTR:  78.0 % (1 y)   INR used for dosin.0 (2021)   Warfarin maintenance plan:  5 mg (5 mg x 1) every day   Full warfarin instructions:  5 mg every day   Weekly warfarin total:  35 mg   No change documented:  Dione Julian RN   Plan last modified:  Maria D Zayas RN (3/16/2021)   Next INR check:  5/3/2021   Priority:  Maintenance   Target end date:  Indefinite    Indications    Long term (current) use of anticoagulants [Z79.01]  Embolism and thrombosis (H) [I74.9]  Anticoagulation monitoring  INR range 2-3 [Z79.01]             Anticoagulation Episode Summary     INR check location:      Preferred lab:      Send INR reminders to:  [facility]    Comments:  MDIN[facility] needs weekly INR done      Anticoagulation Care Providers     Provider Role Specialty Phone number    Maria Guadalupe RamonDO Referring Internal Medicine [phone]            See the Encounter Report to view Anticoagulation Flowsheet and Dosing Calendar (Go to Encounters tab in chart review, and find the Anticoagulation Therapy Visit)     No encounter, INR received via fax.  INR in range so no telephone call.  Patient is to call INR clinic if any changes affecting INR.       Dione Julian, SINDHU

## 2021-04-28 ENCOUNTER — MYC MEDICAL ADVICE (OUTPATIENT)
Dept: INTERNAL MEDICINE | Facility: OTHER | Age: 61
End: 2021-04-28

## 2021-04-28 DIAGNOSIS — H91.93 DECREASED HEARING OF BOTH EARS: ICD-10-CM

## 2021-04-28 DIAGNOSIS — I10 ESSENTIAL HYPERTENSION: Primary | ICD-10-CM

## 2021-04-28 DIAGNOSIS — H93.13 TINNITUS, BILATERAL: ICD-10-CM

## 2021-04-29 RX ORDER — LOSARTAN POTASSIUM 25 MG/1
25 TABLET ORAL DAILY
COMMUNITY
Start: 2021-03-18 | End: 2021-04-29

## 2021-04-29 RX ORDER — LOSARTAN POTASSIUM 100 MG/1
100 TABLET ORAL DAILY
Qty: 90 TABLET | Refills: 1 | Status: SHIPPED | OUTPATIENT
Start: 2021-04-29 | End: 2021-08-04 | Stop reason: SINTOL

## 2021-04-29 NOTE — TELEPHONE ENCOUNTER
Patient requesting refill on [place] and a referral for an ENT.  Kyung Polanco LPN on 4/29/2021 at 9:34 AM

## 2021-04-30 DIAGNOSIS — H91.93 DECREASED HEARING OF BOTH EARS: Primary | ICD-10-CM

## 2021-05-03 ENCOUNTER — TRANSFERRED RECORDS (OUTPATIENT)
Dept: HEALTH INFORMATION MANAGEMENT | Facility: OTHER | Age: 61
End: 2021-05-03

## 2021-05-03 LAB — INR PPP: 2.6 (ref 0.9–1.1)

## 2021-05-04 ENCOUNTER — ANTICOAGULATION THERAPY VISIT (OUTPATIENT)
Dept: ANTICOAGULATION | Facility: OTHER | Age: 61
End: 2021-05-04
Attending: INTERNAL MEDICINE
Payer: COMMERCIAL

## 2021-05-04 DIAGNOSIS — Z79.01 ANTICOAGULATION MONITORING, INR RANGE 2-3: ICD-10-CM

## 2021-05-04 DIAGNOSIS — I74.9 EMBOLISM AND THROMBOSIS (H): ICD-10-CM

## 2021-05-04 NOTE — PROGRESS NOTES
ANTICOAGULATION FOLLOW-UP CLINIC VISIT    Patient Name:  Trisha Fernando  Date:  2021  Contact Type:  no call needed patient to continue same dose    SUBJECTIVE:  Patient Findings         Clinical Outcomes     Negatives:  Major bleeding event, Thromboembolic event, Anticoagulation-related hospital admission, Anticoagulation-related ED visit, Anticoagulation-related fatality           OBJECTIVE    Recent labs: (last 7 days)     21   INR 2.6*       ASSESSMENT / PLAN  INR assessment THER    Recheck INR In: 1 WEEK    INR Location Home INR      Anticoagulation Summary  As of 2021    INR goal:  2.0-3.0   TTR:  78.0 % (1 y)   INR used for dosin.6 (5/3/2021)   Warfarin maintenance plan:  5 mg (5 mg x 1) every day   Full warfarin instructions:  5 mg every day   Weekly warfarin total:  35 mg   No change documented:  Smitha Nair RN   Plan last modified:  Maria D Zayas RN (3/16/2021)   Next INR check:  2021   Priority:  High   Target end date:  Indefinite    Indications    Long term (current) use of anticoagulants [Z79.01]  Embolism and thrombosis (H) [I74.9]  Anticoagulation monitoring  INR range 2-3 [Z79.01]             Anticoagulation Episode Summary     INR check location:      Preferred lab:      Send INR reminders to:  [facility]    Comments:  MDIN[facility] needs weekly INR done      Anticoagulation Care Providers     Provider Role Specialty Phone number    Maria Guadalupe Ramon DO Referring Internal Medicine [phone]            See the Encounter Report to view Anticoagulation Flowsheet and Dosing Calendar (Go to Encounters tab in chart review, and find the Anticoagulation Therapy Visit)        Smitha Nair, RN

## 2021-05-10 ENCOUNTER — TRANSFERRED RECORDS (OUTPATIENT)
Dept: HEALTH INFORMATION MANAGEMENT | Facility: OTHER | Age: 61
End: 2021-05-10

## 2021-05-10 LAB — INR PPP: 2.6 (ref 0.9–1.1)

## 2021-05-11 ENCOUNTER — ANTICOAGULATION THERAPY VISIT (OUTPATIENT)
Dept: ANTICOAGULATION | Facility: OTHER | Age: 61
End: 2021-05-11
Attending: INTERNAL MEDICINE
Payer: COMMERCIAL

## 2021-05-11 DIAGNOSIS — I74.9 EMBOLISM AND THROMBOSIS (H): ICD-10-CM

## 2021-05-11 DIAGNOSIS — Z79.01 ANTICOAGULATION MONITORING, INR RANGE 2-3: ICD-10-CM

## 2021-05-11 NOTE — PROGRESS NOTES
ANTICOAGULATION FOLLOW-UP CLINIC VISIT    Patient Name:  Trisha Fernando  Date:  2021  Contact Type:  Telephone/ spoke with patient regarding concerns and reviewed dosing    SUBJECTIVE:  Patient Findings     Positives:  Signs/symptoms of bleeding (states she has been having issues with bloody noses everday)        Clinical Outcomes     Negatives:  Major bleeding event, Thromboembolic event, Anticoagulation-related hospital admission, Anticoagulation-related ED visit, Anticoagulation-related fatality           OBJECTIVE    Recent labs: (last 7 days)     05/10/21   INR 2.6*       ASSESSMENT / PLAN  INR assessment THER    Recheck INR In: 1 WEEK    INR Location Home INR      Anticoagulation Summary  As of 2021    INR goal:  2.0-3.0   TTR:  78.0 % (1 y)   INR used for dosin.6 (5/10/2021)   Warfarin maintenance plan:  5 mg (5 mg x 1) every day   Full warfarin instructions:  : 2.5 mg; Otherwise 5 mg every day   Weekly warfarin total:  35 mg   Plan last modified:  Maria D Zayas RN (3/16/2021)   Next INR check:  2021   Priority:  High   Target end date:  Indefinite    Indications    Long term (current) use of anticoagulants [Z79.01]  Embolism and thrombosis (H) [I74.9]  Anticoagulation monitoring  INR range 2-3 [Z79.01]             Anticoagulation Episode Summary     INR check location:      Preferred lab:      Send INR reminders to:  [facility]    Comments:  MD[facility] needs weekly INR done      Anticoagulation Care Providers     Provider Role Specialty Phone number    Maria Guadalupe Ramon DO Referring Internal Medicine [phone]            See the Encounter Report to view Anticoagulation Flowsheet and Dosing Calendar (Go to Encounters tab in chart review, and find the Anticoagulation Therapy Visit)        Smitha Nair, RN

## 2021-05-17 ENCOUNTER — ANTICOAGULATION THERAPY VISIT (OUTPATIENT)
Dept: ANTICOAGULATION | Facility: OTHER | Age: 61
End: 2021-05-17
Attending: INTERNAL MEDICINE
Payer: COMMERCIAL

## 2021-05-17 ENCOUNTER — TRANSFERRED RECORDS (OUTPATIENT)
Dept: HEALTH INFORMATION MANAGEMENT | Facility: OTHER | Age: 61
End: 2021-05-17

## 2021-05-17 DIAGNOSIS — Z79.01 ANTICOAGULATION MONITORING, INR RANGE 2-3: ICD-10-CM

## 2021-05-17 DIAGNOSIS — I74.9 EMBOLISM AND THROMBOSIS (H): ICD-10-CM

## 2021-05-17 LAB — INR PPP: 2.1 (ref 0.9–1.1)

## 2021-05-17 NOTE — PROGRESS NOTES
"ANTICOAGULATION FOLLOW-UP CLINIC VISIT    Patient Name:  Trisha Fernando  Date:  5/17/2021  Contact Type:  No phone call needed. INR within range.    SUBJECTIVE:  Patient Findings     Comments:  Refer to 05/11/2021 anticoagulant encounter. Dose decreased to 2.5 mg on 05/11/2021 due to reports of nose bleeds.  Called patient today who states nose bleeds have stopped. Last nose bleed was on Saturday. I don't know how the nose bleeds last to tell you the truth. I put a tissue in my nose for a while and then I blow my nose. I can't believe how much my INR dropped with that decrease to 2.5 mg. I feel a lot more comfortable with my  INR around  2.5 clementina. I will continue the 5 mg every day and will call if I have any more nose bleeds\". Writer also recommended a referral if nosebleeds are bothersome. \" I have to get into see Dr. Ramon soon anyway\". Writer will also route to  for review. Dione Julian RN on 5/17/2021 at 4:20 PM           Clinical Outcomes     Negatives:  Major bleeding event, Thromboembolic event, Anticoagulation-related hospital admission, Anticoagulation-related ED visit, Anticoagulation-related fatality    Comments:  Refer to 05/11/2021 anticoagulant encounter. Dose decreased to 2.5 mg on 05/11/2021 due to reports of nose bleeds.  Called patient today who states nose bleeds have stopped. Last nose bleed was on Saturday. I don't know how the nose bleeds last to tell you the truth. I put a tissue in my nose for a while and then I blow my nose. I can't believe how much my INR dropped with that decrease to 2.5 mg. I feel a lot more comfortable with my  INR around  2.5 clementina. I will continue the 5 mg every day and will call if I have any more nose bleeds\". Writer also recommended a referral if nosebleeds are bothersome. \" I have to get into see Dr. Ramon soon anyway\". Writer will also route to  for review. Dione Julian RN on 5/17/2021 at 4:20 PM              OBJECTIVE    Recent labs: (last 7 days) "     21   INR 2.1*       ASSESSMENT / PLAN  INR assessment THER    Recheck INR In: 1 WEEK    INR Location Home INR      Anticoagulation Summary  As of 2021    INR goal:  2.0-3.0   TTR:  78.1 % (1 y)   INR used for dosin.1 (2021)   Warfarin maintenance plan:  5 mg (5 mg x 1) every day   Full warfarin instructions:  5 mg every day   Weekly warfarin total:  35 mg   No change documented:  Dione Julian RN   Plan last modified:  Maria D Zayas RN (3/16/2021)   Next INR check:  2021   Priority:  High   Target end date:  Indefinite    Indications    Long term (current) use of anticoagulants [Z79.01]  Embolism and thrombosis (H) [I74.9]  Anticoagulation monitoring  INR range 2-3 [Z79.01]             Anticoagulation Episode Summary     INR check location:      Preferred lab:      Send INR reminders to:  [facility]    Comments:  MDINR needs weekly INR done      Anticoagulation Care Providers     Provider Role Specialty Phone number    Maria Guadalupe Ramon DO Referring Internal Medicine [phone]            See the Encounter Report to view Anticoagulation Flowsheet and Dosing Calendar (Go to Encounters tab in chart review, and find the Anticoagulation Therapy Visit)     No encounter, INR received via fax.  INR in range so no telephone call.  Patient is to call INR clinic if any changes affecting INR.       Dione Julian, RN

## 2021-05-17 NOTE — PROGRESS NOTES
Noted.  If she has continued issues with nose bleeds, would recommend being seen by myself or any available provider in the the next week or two.

## 2021-05-18 NOTE — PROGRESS NOTES
"Contacted patient and informed of physician's response. Patient verbalized understanding and intent to comply. 'I sure will. Thank you so much\". Dione Julian RN on 5/18/2021 at 7:34 AM    "

## 2021-05-23 DIAGNOSIS — R60.0 BILATERAL LOWER EXTREMITY EDEMA: ICD-10-CM

## 2021-05-24 ENCOUNTER — TRANSFERRED RECORDS (OUTPATIENT)
Dept: HEALTH INFORMATION MANAGEMENT | Facility: OTHER | Age: 61
End: 2021-05-24

## 2021-05-25 ENCOUNTER — ANTICOAGULATION THERAPY VISIT (OUTPATIENT)
Dept: ANTICOAGULATION | Facility: OTHER | Age: 61
End: 2021-05-25
Attending: INTERNAL MEDICINE
Payer: COMMERCIAL

## 2021-05-25 DIAGNOSIS — I74.9 EMBOLISM AND THROMBOSIS (H): ICD-10-CM

## 2021-05-25 DIAGNOSIS — Z79.01 ANTICOAGULATION MONITORING, INR RANGE 2-3: ICD-10-CM

## 2021-05-25 LAB — INR PPP: 1.9 (ref 0.9–1.1)

## 2021-05-25 RX ORDER — FUROSEMIDE 20 MG
20-40 TABLET ORAL DAILY
Qty: 135 TABLET | Refills: 3 | Status: SHIPPED | OUTPATIENT
Start: 2021-05-25 | End: 2021-09-01 | Stop reason: ALTCHOICE

## 2021-05-25 NOTE — PROGRESS NOTES
ANTICOAGULATION FOLLOW-UP CLINIC VISIT    Patient Name:  Trisha Fernando  Date:  2021  Contact Type:  Multiply attempts made today to reach patient and review dosing. Left message for patient recommending she continue the 5 mg daily and call back and let us know of any changes, questions and or concerns.     SUBJECTIVE:  Patient Findings         Clinical Outcomes     Negatives:  Major bleeding event, Thromboembolic event, Anticoagulation-related hospital admission, Anticoagulation-related ED visit, Anticoagulation-related fatality           OBJECTIVE    Recent labs: (last 7 days)     21   INR 1.9*       ASSESSMENT / PLAN  INR assessment SUB    Recheck INR In: 1 WEEK    INR Location Home INR      Anticoagulation Summary  As of 2021    INR goal:  2.0-3.0   TTR:  77.0 % (1 y)   INR used for dosin.9 (2021)   Warfarin maintenance plan:  5 mg (5 mg x 1) every day   Full warfarin instructions:  5 mg every day   Weekly warfarin total:  35 mg   No change documented:  Dione Julian RN   Plan last modified:  Maria D Zayas RN (3/16/2021)   Next INR check:  2021   Priority:  High   Target end date:  Indefinite    Indications    Long term (current) use of anticoagulants [Z79.01]  Embolism and thrombosis (H) [I74.9]  Anticoagulation monitoring  INR range 2-3 [Z79.01]             Anticoagulation Episode Summary     INR check location:      Preferred lab:      Send INR reminders to:  [facility]    Comments:  MDINR needs weekly INR done      Anticoagulation Care Providers     Provider Role Specialty Phone number    Maria Guadalupe Ramon DO Referring Internal Medicine [phone]            See the Encounter Report to view Anticoagulation Flowsheet and Dosing Calendar (Go to Encounters tab in chart review, and find the Anticoagulation Therapy Visit)     No encounter, INR received via fax.Patient is to call INR clinic if any changes affecting INR.       Dione Julian, SINDHU

## 2021-05-25 NOTE — TELEPHONE ENCOUNTER
[facility] in #[id number] in [facility] [place] sent Rx request for the following:      Requested Prescriptions   Pending Prescriptions Disp Refills     furosemide (LASIX) 20 MG tablet [Pharmacy Med Name: FUROSEMIDE 20 MG TABLET] 135 tablet 3     Sig: TAKE 1-2 TABLETS (20-40 MG) BY MOUTH DAILY TAKE SECOND DOSE 6 HOURS AFTER FIRST       Diuretics (Including Combos) Protocol Passed - 5/23/2021  7:22 PM     Last Prescription Date:   4/8/2020  Last Fill Qty/Refills:         135, R-3    Last Office Visit:              2/4/2021   Future Office visit:           None noted   Prescription approved per [facility] Refill Protocol.  Genoveva Lawrence RN ....................  5/25/2021   9:36 AM

## 2021-05-31 ENCOUNTER — TRANSFERRED RECORDS (OUTPATIENT)
Dept: HEALTH INFORMATION MANAGEMENT | Facility: OTHER | Age: 61
End: 2021-05-31

## 2021-05-31 LAB — INR PPP: 2.2 (ref 0.9–1.1)

## 2021-06-01 ENCOUNTER — ANTICOAGULATION THERAPY VISIT (OUTPATIENT)
Dept: ANTICOAGULATION | Facility: OTHER | Age: 61
End: 2021-06-01
Attending: INTERNAL MEDICINE
Payer: COMMERCIAL

## 2021-06-01 DIAGNOSIS — I74.9 EMBOLISM AND THROMBOSIS (H): ICD-10-CM

## 2021-06-01 DIAGNOSIS — Z79.01 ANTICOAGULATION MONITORING, INR RANGE 2-3: ICD-10-CM

## 2021-06-01 NOTE — PROGRESS NOTES
ANTICOAGULATION FOLLOW-UP CLINIC VISIT    Patient Name:  Trisha Fernando  Date:  2021  Contact Type:  no changes needed, no phone call required.     SUBJECTIVE:  Patient Findings     Comments:  Per chart review, no findings.         Clinical Outcomes     Negatives:  Major bleeding event, Thromboembolic event, Anticoagulation-related hospital admission, Anticoagulation-related ED visit, Anticoagulation-related fatality    Comments:  Per chart review, no findings.            OBJECTIVE    Recent labs: (last 7 days)     21   INR 2.2*       ASSESSMENT / PLAN  INR assessment THER    Recheck INR In: 1 WEEK    INR Location Home INR      Anticoagulation Summary  As of 2021    INR goal:  2.0-3.0   TTR:  76.4 % (1 y)   INR used for dosin.2 (2021)   Warfarin maintenance plan:  5 mg (5 mg x 1) every day   Full warfarin instructions:  5 mg every day   Weekly warfarin total:  35 mg   No change documented:  Nedra Zayas RN   Plan last modified:  Maria D Zayas RN (3/16/2021)   Next INR check:  2021   Priority:  High   Target end date:  Indefinite    Indications    Long term (current) use of anticoagulants [Z79.01]  Embolism and thrombosis (H) [I74.9]  Anticoagulation monitoring  INR range 2-3 [Z79.01]             Anticoagulation Episode Summary     INR check location:      Preferred lab:      Send INR reminders to:  [facility]    Comments:  MDIN[facility] needs weekly INR done      Anticoagulation Care Providers     Provider Role Specialty Phone number    Maria Guadalupe Ramon DO Referring Internal Medicine [phone]            See the Encounter Report to view Anticoagulation Flowsheet and Dosing Calendar (Go to Encounters tab in chart review, and find the Anticoagulation Therapy Visit)      Nedra Zayas RN

## 2021-06-03 ENCOUNTER — OFFICE VISIT (OUTPATIENT)
Dept: AUDIOLOGY | Facility: OTHER | Age: 61
End: 2021-06-03
Attending: AUDIOLOGIST
Payer: COMMERCIAL

## 2021-06-03 ENCOUNTER — OFFICE VISIT (OUTPATIENT)
Dept: OTOLARYNGOLOGY | Facility: OTHER | Age: 61
End: 2021-06-03
Attending: AUDIOLOGIST
Payer: COMMERCIAL

## 2021-06-03 VITALS
OXYGEN SATURATION: 93 % | BODY MASS INDEX: 44.41 KG/M2 | SYSTOLIC BLOOD PRESSURE: 130 MMHG | DIASTOLIC BLOOD PRESSURE: 72 MMHG | WEIGHT: 293 LBS | TEMPERATURE: 96.9 F | HEIGHT: 68 IN | HEART RATE: 76 BPM

## 2021-06-03 DIAGNOSIS — H90.3 SENSORINEURAL HEARING LOSS (SNHL) OF BOTH EARS: Primary | ICD-10-CM

## 2021-06-03 DIAGNOSIS — R40.0 DAYTIME SOMNOLENCE: ICD-10-CM

## 2021-06-03 DIAGNOSIS — R53.83 OTHER FATIGUE: ICD-10-CM

## 2021-06-03 DIAGNOSIS — R59.1 LYMPHADENOPATHY: ICD-10-CM

## 2021-06-03 DIAGNOSIS — E66.01 MORBID OBESITY (H): ICD-10-CM

## 2021-06-03 DIAGNOSIS — H91.93 DECREASED HEARING OF BOTH EARS: ICD-10-CM

## 2021-06-03 DIAGNOSIS — H93.13 TINNITUS, BILATERAL: ICD-10-CM

## 2021-06-03 DIAGNOSIS — H93.13 TINNITUS, BILATERAL: Primary | ICD-10-CM

## 2021-06-03 DIAGNOSIS — I10 ESSENTIAL HYPERTENSION: ICD-10-CM

## 2021-06-03 PROCEDURE — 92504 EAR MICROSCOPY EXAMINATION: CPT | Performed by: PHYSICIAN ASSISTANT

## 2021-06-03 PROCEDURE — 99203 OFFICE O/P NEW LOW 30 MIN: CPT | Mod: 25 | Performed by: PHYSICIAN ASSISTANT

## 2021-06-03 PROCEDURE — 92550 TYMPANOMETRY & REFLEX THRESH: CPT | Performed by: AUDIOLOGIST

## 2021-06-03 PROCEDURE — 92557 COMPREHENSIVE HEARING TEST: CPT | Performed by: AUDIOLOGIST

## 2021-06-03 ASSESSMENT — PAIN SCALES - GENERAL: PAINLEVEL: NO PAIN (0)

## 2021-06-03 ASSESSMENT — MIFFLIN-ST. JEOR: SCORE: 2228.31

## 2021-06-03 NOTE — LETTER
6/3/2021         RE: Trisha Fernando  [address] [place] [address]        Dear Colleague,    Thank you for referring your patient, Trisha Fernando, to the [facility]. Please see a copy of my visit note below.    Otolaryngology Consultation    Patient: Trisha Fernando  : 1960    Patient presents with:  Consult: Pt has been referred by Dr. Ramon for bilateral tinnitus and decreased hearing bilateral.      HPI:  Trisha Fernando is a 60 year old female seen today for bilateral tinnitus, hearing changes. She has felt her hearing has been worsening over the last few years.   She has felt symptoms bilateral, constant tinnitus. Describes as a buzzing, reports intermittent presence of heartbeat sensation.   Trisha has noticed a decreased in her hearing over sometime. Reports that her family has noticed her volume on the tv raised.   Last 1 year has worsened and remains present.     Denies COM or otologic surgeries.   Denies otalgia, otorrhea.  Denies worrisome tinnitus.  Denies fluctuating hearing loss or tinnitus.   Denies vertigo or facial paraesthesia. Seldom reported dizziness- sensation of off balance. No rotary vertigo.   Denies migraines, headaches.   Family hx of HL- None.   Noise Exposure- Concerts.   2 cups of coffee daily.   Blood pressure was elevated recently and recently increased medication with response. Occurred within the last month   She is on Lasix. Not monitoring sodium in diet.   Sleep without concern.   +Fatigue       Audiogram  Type A tympanograms  Thresholds are slight to mild SNHL bilaterally  SRT=PTA  WRS-  Right- 100%@65dB  Left- 100% @65 dB        Current Outpatient Rx   Medication Sig Dispense Refill     albuterol (PROAIR HFA/PROVENTIL HFA/VENTOLIN HFA) 108 (90 Base) MCG/ACT inhaler Inhale 2 puffs into the lungs every 4 hours as needed for shortness of breath / dyspnea or wheezing 1 Inhaler 1     Blood Pressure Monitoring (ADULT BLOOD PRESSURE  CUFF LG) KIT As directed.       Blood Pressure Monitoring (BLOOD PRESSURE CUFF) MISC 1 each daily 1 each 0     buPROPion (WELLBUTRIN XL) 150 MG 24 hr tablet Take 1 tablet (150 mg) by mouth every morning 90 tablet 3     Cholecalciferol (VITAMIN D3) 2000 UNITS CAPS Take 2,000 Units by mouth daily       Elastic Bandages & Supports (MEDICAL COMPRESSION STOCKINGS) MISC For personal use. Length: calf Strength: 16-20 mmHg Circumference in cm: For calf: Ankle 15cm, Calf 30cm, Ankle to calf length 40cm.       fish oil-omega-3 fatty acids 1000 MG capsule Take 1 capsule by mouth daily        furosemide (LASIX) 20 MG tablet TAKE 1-2 TABLETS (20-40 MG) BY MOUTH DAILY TAKE SECOND DOSE 6 HOURS AFTER FIRST 135 tablet 3     LORazepam (ATIVAN) 0.5 MG tablet Take 0.5 mg by mouth every 6 hours as needed        losartan (COZAAR) 100 MG tablet Take 1 tablet (100 mg) by mouth daily 90 tablet 1     metoprolol tartrate (LOPRESSOR) 25 MG tablet TAKE 0.5 TABLETS (12.5 MG) BY MOUTH 2 TIMES DAILY 180 tablet 1     potassium chloride ER (K-TAB/KLOR-CON) 10 MEQ CR tablet TAKE 1 TABLET (10 MEQ) BY MOUTH DAILY 90 tablet 1     triamcinolone (KENALOG) 0.1 % external cream Apply topically 2 times daily As needed for rash 15 g 0     venlafaxine (EFFEXOR-XR) 150 MG 24 hr capsule TAKE 1 CAPSULE BY MOUTH EVERY DAY WITH FOOD 90 capsule 4     vitamin B-Complex Take 1 tablet by mouth daily       warfarin ANTICOAGULANT (COUMADIN) 5 MG tablet TAKE 5 MG daily OR AS DIRECTED BY THE [facility]. 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    Right forearm     Calculus of kidney      Essential (primary) hypertension      Heterozygous factor V Leiden mutation (H) 09/01/2008     History of fatty infiltration of liver      Hyperlipidemia   "    Major depressive disorder, recurrent, mild (H)      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, unspecified (CODE)     Left foot acromyxoid fibroblastic, 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 Sutton)       COLONOSCOPY N/A 2019    Normal exam, 10 year follow up     LITHOTRIPSY       MAMMOPLASTY REDUCTION  2003     OTHER SURGICAL HISTORY Right 2011    Forearm Lipoma Excision     OTHER SURGICAL HISTORY      Left foot soft tissue resection; dr. Lang     TONSILLECTOMY         ENT family history reviewed    Social History     Tobacco Use     Smoking status: Former Smoker     Packs/day: 1.00     Years: 12.00     Pack years: 12.00     Types: Cigarettes     Quit date: 1990     Years since quittin.7     Smokeless tobacco: Never Used     Tobacco comment: no ecig   Substance Use Topics     Alcohol use: Yes     Alcohol/week: 2.0 standard drinks     Frequency: Monthly or less     Comment: occ     Drug use: No       Review of Systems  ROS: 10 point ROS neg other than the symptoms noted above in the HPI     Physical Exam  /72 (BP Location: Right arm, Patient Position: Sitting, Cuff Size: Adult Large)   Pulse 76   Temp 96.9  F (36.1  C) (Tympanic)   Ht 1.727 m (5' 8\")   Wt (!) 161.5 kg (356 lb)   LMP  (LMP Unknown)   SpO2 93%   BMI 54.13 kg/m    General - The patient is well nourished and well developed, and appears to have good nutritional status.  Alert and " oriented to person and place, answers questions and cooperates with examination appropriately.   Head and Face - Normocephalic and atraumatic, with no gross asymmetry noted.  The facial nerve is intact, with strong symmetric movements.  Voice and Breathing - The patient was breathing comfortably without the use of accessory muscles. There was no wheezing, stridor, or stertor.  The patients voice was clear and strong, and had appropriate pitch and quality.  Ears - Ears examined with otoscope and under otologic microscopy. The external auditory canals are patent, the tympanic membranes are intact without effusion, retraction or mass.  Bony landmarks are intact.  Eyes - Extraocular movements intact, and the pupils were reactive to light.  Sclera were not icteric or injected, conjunctiva were pink and moist.  Mouth - Examination of the oral cavity showed pink, healthy oral mucosa. No lesions or ulcerations noted.  The tongue was mobile and midline, and the dentition were in good condition.  FTP II  Throat - The walls of the oropharynx were smooth, pink, moist, symmetric, and had no lesions or ulcerations.  The tonsillar pillars and soft palate were symmetric.  The uvula was midline on elevation.    Neck - Normal midline excursion of the laryngotracheal complex during swallowing.  Full range of motion on passive movement.  Palpation of the occipital, submental, submandibular, internal jugular chain, and supraclavicular nodes did not demonstrate any abnormal lymph nodes or masses. Large neck. Small palpable mobile node on left supraclavicular.  Palpation of the thyroid was soft and smooth, with no nodules or goiter appreciated.  The trachea was mobile and midline.  Nose - External contour is symmetric, no gross deflection or scars.  Nasal mucosa is pink and moist with no abnormal mucus.       ASSESSMENT:      ICD-10-CM    1. Sensorineural hearing loss (SNHL) of both ears  H90.3     slight to mild   2. Tinnitus, bilateral   H93.13    3. Daytime somnolence  R40.0 SLEEP EVALUATION & MANAGEMENT REFERRAL - [facility] and [facility] [phone] (Age 13 and up)   4. Other fatigue  R53.83 SLEEP EVALUATION & MANAGEMENT REFERRAL - [facility] and [facility] [phone] (Age 13 and up)     US Head Neck Soft Tissue   5. Lymphadenopathy  R59.1 US Head Neck Soft Tissue   6. Morbid obesity (H)  E66.01 SLEEP EVALUATION & MANAGEMENT REFERRAL - [facility] and [facility] [phone] (Age 13 and up)   7. Essential hypertension  I10 SLEEP EVALUATION & MANAGEMENT REFERRAL - [facility] and [place] [phone] (Age 13 and up)       Sleep study to be completed. I am concerned after discussion and examination she has has JIM. Would recommend testing.       The possible diagnosis of sleep apnea, appropriate work-up and possible options were discussed.  A nocturnal polysomnogram was ordered with a possible CPAP trial.  A pamphlet reviewing the diagnosis was also reviewed and given to the patient to take home.  Highlighted recommendations included loss of weight, regular exercise, avoiding alcohol, sleeping pills or sedatives, use of CPAP and various surgical options.  The risk, benefit and surgical success rates of these options were relayed as well.        Tinnitus information provided. Tinnitus likely exacerbated due to COVID.   Annual audiogram.   Hearing protection.   Tinnitus education was provided.  Tinnitus is widely considered a disorder of cental auditory processing.     Hearing preservation was reinforced   I also cautioned the patient against investing in any oral supplements advertised to cure tinnitus.     I have also recommended yearly audiograms, masking devices or apps.  For worsening symptoms, I recommend online or in person cognitive behavioral therapy (CBT) referral.     The patient will follow up as necessary for worsening  symptoms or changes in symptoms.       Complete Neck Us- Left palpable node.   Complete imaging and follow up pending results.     Lana Reis PA-C  [facility], [place]  [phone]        Again, thank you for allowing me to participate in the care of your patient.        Sincerely,        Lana Reis PA-C

## 2021-06-03 NOTE — NURSING NOTE
"Chief Complaint   Patient presents with     Consult     Pt has been referred by Dr. Ramon for bilateral tinnitus and decreased hearing bilateral.       Initial /72 (BP Location: Right arm, Patient Position: Sitting, Cuff Size: Adult Large)   Pulse 76   Temp 96.9  F (36.1  C) (Tympanic)   Ht 1.727 m (5' 8\")   Wt (!) 161.5 kg (356 lb)   LMP  (LMP Unknown)   SpO2 93%   BMI 54.13 kg/m   Estimated body mass index is 54.13 kg/m  as calculated from the following:    Height as of this encounter: 1.727 m (5' 8\").    Weight as of this encounter: 161.5 kg (356 lb).  Medication Reconciliation: complete  Sangeetha Kline LPN    "

## 2021-06-03 NOTE — PATIENT INSTRUCTIONS
Complete Neck Us- Left palpable node.   Sleep study to be completed.   Tinnitus information provided. Tinnitus likely exacerbated due to COVID.   Annual audiogram.   Hearing protection.     Tinnitus education was provided.  Tinnitus is widely considered a disorder of cental auditory processing.     Hearing preservation was reinforced   I also cautioned the patient against investing in any oral supplements advertised to cure tinnitus.   I have also recommended yearly audiograms, masking devices or apps.  For worsening symptoms, I recommend online or in person cognitive behavioral therapy (CBT) referral.   The patient will follow up as necessary for worsening symptoms or changes in symptoms.       Thank you for allowing Lana Reis PA-C and our ENT team to participate in your care.  If your medications are too expensive, please give the nurse a call.  We can possibly change this medication.  If you have a scheduling or an appointment question please contact our Health Unit Coordinator at [phone], Ext. [phone].    ALL nursing questions or concerns can be directed to your ENT nurse at: [phone] Maggie

## 2021-06-03 NOTE — PROGRESS NOTES
Otolaryngology Consultation    Patient: Trisha Fernando  : 1960    Patient presents with:  Consult: Pt has been referred by Dr. Ramon for bilateral tinnitus and decreased hearing bilateral.      HPI:  Trisha Fernando is a 60 year old female seen today for bilateral tinnitus, hearing changes. She has felt her hearing has been worsening over the last few years.   She has felt symptoms bilateral, constant tinnitus. Describes as a buzzing, reports intermittent presence of heartbeat sensation.   Trisha has noticed a decreased in her hearing over sometime. Reports that her family has noticed her volume on the tv raised.   Last 1 year has worsened and remains present.     Denies COM or otologic surgeries.   Denies otalgia, otorrhea.  Denies worrisome tinnitus.  Denies fluctuating hearing loss or tinnitus.   Denies vertigo or facial paraesthesia. Seldom reported dizziness- sensation of off balance. No rotary vertigo.   Denies migraines, headaches.   Family hx of HL- None.   Noise Exposure- Concerts.   2 cups of coffee daily.   Blood pressure was elevated recently and recently increased medication with response. Occurred within the last month   She is on Lasix. Not monitoring sodium in diet.   Sleep without concern.   +Fatigue       Audiogram  Type A tympanograms  Thresholds are slight to mild SNHL bilaterally  SRT=PTA  WRS-  Right- 100%@65dB  Left- 100% @65 dB        Current Outpatient Rx   Medication Sig Dispense Refill     albuterol (PROAIR HFA/PROVENTIL HFA/VENTOLIN HFA) 108 (90 Base) MCG/ACT inhaler Inhale 2 puffs into the lungs every 4 hours as needed for shortness of breath / dyspnea or wheezing 1 Inhaler 1     Blood Pressure Monitoring (ADULT BLOOD PRESSURE CUFF LG) KIT As directed.       Blood Pressure Monitoring (BLOOD PRESSURE CUFF) MISC 1 each daily 1 each 0     buPROPion (WELLBUTRIN XL) 150 MG 24 hr tablet Take 1 tablet (150 mg) by mouth every morning 90 tablet 3     Cholecalciferol (VITAMIN D3)   UNITS CAPS Take 2,000 Units by mouth daily       Elastic Bandages & Supports (MEDICAL COMPRESSION STOCKINGS) MISC For personal use. Length: calf Strength: 16-20 mmHg Circumference in cm: For calf: Ankle 15cm, Calf 30cm, Ankle to calf length 40cm.       fish oil-omega-3 fatty acids 1000 MG capsule Take 1 capsule by mouth daily        furosemide (LASIX) 20 MG tablet TAKE 1-2 TABLETS (20-40 MG) BY MOUTH DAILY TAKE SECOND DOSE 6 HOURS AFTER FIRST 135 tablet 3     LORazepam (ATIVAN) 0.5 MG tablet Take 0.5 mg by mouth every 6 hours as needed        losartan (COZAAR) 100 MG tablet Take 1 tablet (100 mg) by mouth daily 90 tablet 1     metoprolol tartrate (LOPRESSOR) 25 MG tablet TAKE 0.5 TABLETS (12.5 MG) BY MOUTH 2 TIMES DAILY 180 tablet 1     potassium chloride ER (K-TAB/KLOR-CON) 10 MEQ CR tablet TAKE 1 TABLET (10 MEQ) BY MOUTH DAILY 90 tablet 1     triamcinolone (KENALOG) 0.1 % external cream Apply topically 2 times daily As needed for rash 15 g 0     venlafaxine (EFFEXOR-XR) 150 MG 24 hr capsule TAKE 1 CAPSULE BY MOUTH EVERY DAY WITH FOOD 90 capsule 4     vitamin B-Complex Take 1 tablet by mouth daily       warfarin ANTICOAGULANT (COUMADIN) 5 MG tablet TAKE 5 MG daily OR AS DIRECTED BY THE [facility]. 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    Right forearm     Calculus of kidney      Essential (primary) hypertension      Heterozygous factor V Leiden mutation (H) 09/01/2008     History of fatty infiltration of liver      Hyperlipidemia      Major depressive disorder, recurrent, mild (H)      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, unspecified (CODE) 2007    Left foot acromyxoid fibroblastic, 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 Sutton)       COLONOSCOPY N/A 2019    Normal exam, 10 year follow up     LITHOTRIPSY       MAMMOPLASTY REDUCTION  2003     OTHER SURGICAL HISTORY Right 2011    Forearm Lipoma Excision     OTHER SURGICAL HISTORY      Left foot soft tissue resection; dr. Lang     TONSILLECTOMY         ENT family history reviewed    Social History     Tobacco Use     Smoking status: Former Smoker     Packs/day: 1.00     Years: 12.00     Pack years: 12.00     Types: Cigarettes     Quit date: 1990     Years since quittin.7     Smokeless tobacco: Never Used     Tobacco comment: no ecig   Substance Use Topics     Alcohol use: Yes     Alcohol/week: 2.0 standard drinks     Frequency: Monthly or less     Comment: occ     Drug use: No       Review of Systems  ROS: 10 point ROS neg other than the symptoms noted above in the HPI     Physical Exam  /72 (BP Location: Right arm, Patient Position: Sitting, Cuff Size: Adult Large)   Pulse 76   Temp 96.9  F (36.1  C) (Tympanic)   Ht 1.727 m (5' 8\")   Wt (!) 161.5 kg (356 lb)   LMP  (LMP Unknown)   SpO2 93%   BMI 54.13 kg/m    General - The patient is well nourished and well developed, and appears to have good nutritional status.  Alert and oriented to person and place, answers questions and cooperates with examination appropriately.   Head and Face - Normocephalic and atraumatic, with no gross asymmetry noted.  The facial nerve is intact, with strong symmetric movements.  Voice and Breathing - " The patient was breathing comfortably without the use of accessory muscles. There was no wheezing, stridor, or stertor.  The patients voice was clear and strong, and had appropriate pitch and quality.  Ears - Ears examined with otoscope and under otologic microscopy. The external auditory canals are patent, the tympanic membranes are intact without effusion, retraction or mass.  Bony landmarks are intact.  Eyes - Extraocular movements intact, and the pupils were reactive to light.  Sclera were not icteric or injected, conjunctiva were pink and moist.  Mouth - Examination of the oral cavity showed pink, healthy oral mucosa. No lesions or ulcerations noted.  The tongue was mobile and midline, and the dentition were in good condition.  FTP II  Throat - The walls of the oropharynx were smooth, pink, moist, symmetric, and had no lesions or ulcerations.  The tonsillar pillars and soft palate were symmetric.  The uvula was midline on elevation.    Neck - Normal midline excursion of the laryngotracheal complex during swallowing.  Full range of motion on passive movement.  Palpation of the occipital, submental, submandibular, internal jugular chain, and supraclavicular nodes did not demonstrate any abnormal lymph nodes or masses. Large neck. Small palpable mobile node on left supraclavicular.  Palpation of the thyroid was soft and smooth, with no nodules or goiter appreciated.  The trachea was mobile and midline.  Nose - External contour is symmetric, no gross deflection or scars.  Nasal mucosa is pink and moist with no abnormal mucus.       ASSESSMENT:      ICD-10-CM    1. Sensorineural hearing loss (SNHL) of both ears  H90.3     slight to mild   2. Tinnitus, bilateral  H93.13    3. Daytime somnolence  R40.0 SLEEP EVALUATION & MANAGEMENT REFERRAL - [facility] and [facility] [phone] (Age 13 and up)   4. Other fatigue  R53.83 SLEEP EVALUATION & MANAGEMENT REFERRAL - [facility]  and [place] [phone] (Age 13 and up)     US Head Neck Soft Tissue   5. Lymphadenopathy  R59.1 US Head Neck Soft Tissue   6. Morbid obesity (H)  E66.01 SLEEP EVALUATION & MANAGEMENT REFERRAL - [facility] and [place] [phone] (Age 13 and up)   7. Essential hypertension  I10 SLEEP EVALUATION & MANAGEMENT REFERRAL - [facility] and [place] [phone] (Age 13 and up)       Sleep study to be completed. I am concerned after discussion and examination she has has JIM. Would recommend testing.       The possible diagnosis of sleep apnea, appropriate work-up and possible options were discussed.  A nocturnal polysomnogram was ordered with a possible CPAP trial.  A pamphlet reviewing the diagnosis was also reviewed and given to the patient to take home.  Highlighted recommendations included loss of weight, regular exercise, avoiding alcohol, sleeping pills or sedatives, use of CPAP and various surgical options.  The risk, benefit and surgical success rates of these options were relayed as well.        Tinnitus information provided. Tinnitus likely exacerbated due to COVID.   Annual audiogram.   Hearing protection.   Tinnitus education was provided.  Tinnitus is widely considered a disorder of cental auditory processing.     Hearing preservation was reinforced   I also cautioned the patient against investing in any oral supplements advertised to cure tinnitus.     I have also recommended yearly audiograms, masking devices or apps.  For worsening symptoms, I recommend online or in person cognitive behavioral therapy (CBT) referral.     The patient will follow up as necessary for worsening symptoms or changes in symptoms.       Complete Neck Us- Left palpable node.   Complete imaging and follow up pending results.     Lana Reis PA-C  ENT  [facility], [place]  [phone]

## 2021-06-03 NOTE — PROGRESS NOTES
Audiology Evaluation Completed. Please refer SCANNED AUDIOGRAM and/or TYMPANOGRAM for BACKGROUND, RESULTS, RECOMMENDATIONS.      Savita PINEDA, [facility]  Audiologist #[id number]

## 2021-06-08 ENCOUNTER — TRANSFERRED RECORDS (OUTPATIENT)
Dept: HEALTH INFORMATION MANAGEMENT | Facility: OTHER | Age: 61
End: 2021-06-08

## 2021-06-08 ENCOUNTER — ANTICOAGULATION THERAPY VISIT (OUTPATIENT)
Dept: ANTICOAGULATION | Facility: OTHER | Age: 61
End: 2021-06-08
Attending: INTERNAL MEDICINE
Payer: COMMERCIAL

## 2021-06-08 DIAGNOSIS — Z79.01 ANTICOAGULATION MONITORING, INR RANGE 2-3: ICD-10-CM

## 2021-06-08 DIAGNOSIS — I74.9 EMBOLISM AND THROMBOSIS (H): ICD-10-CM

## 2021-06-08 LAB — INR PPP: 2.3 (ref 0.9–1.1)

## 2021-06-08 NOTE — PROGRESS NOTES
ANTICOAGULATION FOLLOW-UP CLINIC VISIT    Patient Name:  Trisha Fernando  Date:  2021  Contact Type:  No phone call needed. INR within range.     SUBJECTIVE:  Patient Findings         Clinical Outcomes     Negatives:  Major bleeding event, Thromboembolic event, Anticoagulation-related hospital admission, Anticoagulation-related ED visit, Anticoagulation-related fatality           OBJECTIVE    Recent labs: (last 7 days)     21   INR 2.3*       ASSESSMENT / PLAN  INR assessment THER    Recheck INR In: 1 WEEK    INR Location Home INR      Anticoagulation Summary  As of 2021    INR goal:  2.0-3.0   TTR:  76.5 % (1 y)   INR used for dosin.3 (2021)   Warfarin maintenance plan:  5 mg (5 mg x 1) every day   Full warfarin instructions:  5 mg every day   Weekly warfarin total:  35 mg   No change documented:  Dione Julian RN   Plan last modified:  Maria D Zayas RN (3/16/2021)   Next INR check:  6/15/2021   Priority:  High   Target end date:  Indefinite    Indications    Long term (current) use of anticoagulants [Z79.01]  Embolism and thrombosis (H) [I74.9]  Anticoagulation monitoring  INR range 2-3 [Z79.01]             Anticoagulation Episode Summary     INR check location:      Preferred lab:      Send INR reminders to:  [facility]    Comments:  MDINR needs weekly INR done      Anticoagulation Care Providers     Provider Role Specialty Phone number    Maria Guadalupe RamonDO Referring Internal Medicine [phone]            See the Encounter Report to view Anticoagulation Flowsheet and Dosing Calendar (Go to Encounters tab in chart review, and find the Anticoagulation Therapy Visit)     No encounter, INR received via fax.  INR in range so no telephone call.  Patient is to call INR clinic if any changes affecting INR.       Dione Julian, SINDHU

## 2021-06-09 ENCOUNTER — HOSPITAL ENCOUNTER (OUTPATIENT)
Dept: ULTRASOUND IMAGING | Facility: OTHER | Age: 61
Discharge: HOME | End: 2021-06-09
Attending: PHYSICIAN ASSISTANT | Admitting: PHYSICIAN ASSISTANT
Payer: COMMERCIAL

## 2021-06-09 DIAGNOSIS — R53.83 OTHER FATIGUE: ICD-10-CM

## 2021-06-09 DIAGNOSIS — R59.1 LYMPHADENOPATHY: ICD-10-CM

## 2021-06-09 PROCEDURE — 76536 US EXAM OF HEAD AND NECK: CPT

## 2021-06-13 DIAGNOSIS — I10 ESSENTIAL HYPERTENSION: ICD-10-CM

## 2021-06-14 ENCOUNTER — TRANSFERRED RECORDS (OUTPATIENT)
Dept: HEALTH INFORMATION MANAGEMENT | Facility: OTHER | Age: 61
End: 2021-06-14

## 2021-06-14 LAB — INR PPP: 2.9 (ref 0.9–1.1)

## 2021-06-14 RX ORDER — LOSARTAN POTASSIUM 25 MG/1
TABLET ORAL
Qty: 90 TABLET | Refills: 1 | OUTPATIENT
Start: 2021-06-14

## 2021-06-14 NOTE — TELEPHONE ENCOUNTER
losartan (COZAAR) 100 MG tablet 90 tablet 1 4/29/2021  No   Sig - Route: Take 1 tablet (100 mg) by mouth daily      To [facility]    Jil Hull RN on 6/14/2021 at 2:40 PM

## 2021-06-22 ENCOUNTER — ANTICOAGULATION THERAPY VISIT (OUTPATIENT)
Dept: ANTICOAGULATION | Facility: OTHER | Age: 61
End: 2021-06-22
Attending: INTERNAL MEDICINE
Payer: COMMERCIAL

## 2021-06-22 ENCOUNTER — TRANSFERRED RECORDS (OUTPATIENT)
Dept: HEALTH INFORMATION MANAGEMENT | Facility: OTHER | Age: 61
End: 2021-06-22

## 2021-06-22 DIAGNOSIS — Z79.01 ANTICOAGULATION MONITORING, INR RANGE 2-3: ICD-10-CM

## 2021-06-22 DIAGNOSIS — I74.9 EMBOLISM AND THROMBOSIS (H): ICD-10-CM

## 2021-06-22 LAB — INR PPP: 2.3 (ref 0.9–1.1)

## 2021-06-22 NOTE — PROGRESS NOTES
ANTICOAGULATION FOLLOW-UP CLINIC VISIT    Patient Name:  Trisha Fernando  Date:  2021  Contact Type:  No phone call needed. INR within range.    SUBJECTIVE:  Patient Findings         Clinical Outcomes     Negatives:  Major bleeding event, Thromboembolic event, Anticoagulation-related hospital admission, Anticoagulation-related ED visit, Anticoagulation-related fatality           OBJECTIVE    Recent labs: (last 7 days)     21   INR 2.3*       ASSESSMENT / PLAN  INR assessment THER    Recheck INR In: 2 WEEKS    INR Location Home INR      Anticoagulation Summary  As of 2021    INR goal:  2.0-3.0   TTR:  76.5 % (1 y)   INR used for dosin.3 (2021)   Warfarin maintenance plan:  5 mg (5 mg x 1) every day   Full warfarin instructions:  5 mg every day   Weekly warfarin total:  35 mg   No change documented:  Dione Julian RN   Plan last modified:  Maria D Zayas RN (3/16/2021)   Next INR check:  2021   Priority:  High   Target end date:  Indefinite    Indications    Long term (current) use of anticoagulants [Z79.01]  Embolism and thrombosis (H) [I74.9]  Anticoagulation monitoring  INR range 2-3 [Z79.01]             Anticoagulation Episode Summary     INR check location:      Preferred lab:      Send INR reminders to:  [facility]    Comments:  MDINR needs weekly INR done      Anticoagulation Care Providers     Provider Role Specialty Phone number    Maria Guadalupe RamonDO Referring Internal Medicine [phone]            See the Encounter Report to view Anticoagulation Flowsheet and Dosing Calendar (Go to Encounters tab in chart review, and find the Anticoagulation Therapy Visit)     No encounter, INR received via fax.  INR in range so no telephone call.  Patient is to call INR clinic if any changes affecting INR.       Dione Julian, SINDHU

## 2021-07-05 ENCOUNTER — TRANSFERRED RECORDS (OUTPATIENT)
Dept: HEALTH INFORMATION MANAGEMENT | Facility: OTHER | Age: 61
End: 2021-07-05

## 2021-07-05 ENCOUNTER — ANTICOAGULATION THERAPY VISIT (OUTPATIENT)
Dept: ANTICOAGULATION | Facility: OTHER | Age: 61
End: 2021-07-05
Attending: INTERNAL MEDICINE
Payer: COMMERCIAL

## 2021-07-05 DIAGNOSIS — I74.9 EMBOLISM AND THROMBOSIS (H): ICD-10-CM

## 2021-07-05 DIAGNOSIS — Z79.01 ANTICOAGULATION MONITORING, INR RANGE 2-3: ICD-10-CM

## 2021-07-05 LAB — INR PPP: 2.9 (ref 0.9–1.1)

## 2021-07-05 NOTE — PROGRESS NOTES
ANTICOAGULATION FOLLOW-UP CLINIC VISIT    Patient Name:  Trisha Fernando  Date:  2021  Contact Type:  No phone call needed. INR within range.     SUBJECTIVE:  Patient Findings         Clinical Outcomes     Negatives:  Major bleeding event, Thromboembolic event, Anticoagulation-related hospital admission, Anticoagulation-related ED visit, Anticoagulation-related fatality           OBJECTIVE    Recent labs: (last 7 days)     21   INR 2.9*       ASSESSMENT / PLAN  INR assessment THER    Recheck INR In: 1 WEEK    INR Location Home INR      Anticoagulation Summary  As of 2021    INR goal:  2.0-3.0   TTR:  76.5 % (1 y)   INR used for dosin.9 (2021)   Warfarin maintenance plan:  5 mg (5 mg x 1) every day   Full warfarin instructions:  5 mg every day   Weekly warfarin total:  35 mg   No change documented:  Dione Julian RN   Plan last modified:  Maria D Zayas RN (3/16/2021)   Next INR check:  2021   Priority:  High   Target end date:  Indefinite    Indications    Long term (current) use of anticoagulants [Z79.01]  Embolism and thrombosis (H) [I74.9]  Anticoagulation monitoring  INR range 2-3 [Z79.01]             Anticoagulation Episode Summary     INR check location:      Preferred lab:      Send INR reminders to:  [facility]    Comments:  MDINR needs weekly INR done      Anticoagulation Care Providers     Provider Role Specialty Phone number    Maria Guadalupe RamonDO Referring Internal Medicine [phone]            See the Encounter Report to view Anticoagulation Flowsheet and Dosing Calendar (Go to Encounters tab in chart review, and find the Anticoagulation Therapy Visit)     No encounter, INR received via fax.  INR in range so no telephone call.  Patient is to call INR clinic if any changes affecting INR.       Dione Julian, SINDHU

## 2021-07-12 ENCOUNTER — ANTICOAGULATION THERAPY VISIT (OUTPATIENT)
Dept: ANTICOAGULATION | Facility: OTHER | Age: 61
End: 2021-07-12
Attending: INTERNAL MEDICINE
Payer: COMMERCIAL

## 2021-07-12 ENCOUNTER — TRANSFERRED RECORDS (OUTPATIENT)
Dept: HEALTH INFORMATION MANAGEMENT | Facility: OTHER | Age: 61
End: 2021-07-12

## 2021-07-12 DIAGNOSIS — I74.9 EMBOLISM AND THROMBOSIS (H): Primary | ICD-10-CM

## 2021-07-12 DIAGNOSIS — Z79.01 ANTICOAGULATION MONITORING, INR RANGE 2-3: ICD-10-CM

## 2021-07-12 LAB — INR PPP: 2.5 (ref 0.9–1.1)

## 2021-07-12 NOTE — PROGRESS NOTES
ANTICOAGULATION FOLLOW-UP CLINIC VISIT    Patient Name:  Trisha Fernando  Date:  2021  Contact Type:  No phone call needed. INR within range.     SUBJECTIVE:  Patient Findings         Clinical Outcomes     Negatives:  Major bleeding event, Thromboembolic event, Anticoagulation-related hospital admission, Anticoagulation-related ED visit, Anticoagulation-related fatality           OBJECTIVE    Recent labs: (last 7 days)     21  0000   INR 2.5*       ASSESSMENT / PLAN  INR assessment THER    Recheck INR In: 1 WEEK    INR Location Home INR      Anticoagulation Summary  As of 2021    INR goal:  2.0-3.0   TTR:  76.5 % (1 y)   INR used for dosin.5 (2021)   Warfarin maintenance plan:  5 mg (5 mg x 1) every day   Full warfarin instructions:  5 mg every day   Weekly warfarin total:  35 mg   No change documented:  Dione Julian RN   Plan last modified:  Maria D Zayas RN (3/16/2021)   Next INR check:  2021   Priority:  High   Target end date:  Indefinite    Indications    Long term (current) use of anticoagulants [Z79.01]  Embolism and thrombosis (H) [I74.9]  Anticoagulation monitoring  INR range 2-3 [Z79.01]             Anticoagulation Episode Summary     INR check location:      Preferred lab:      Send INR reminders to:  [facility]    Comments:  MDINR needs weekly INR done      Anticoagulation Care Providers     Provider Role Specialty Phone number    Maria Guadalupe RamonDO Referring Internal Medicine [phone]            See the Encounter Report to view Anticoagulation Flowsheet and Dosing Calendar (Go to Encounters tab in chart review, and find the Anticoagulation Therapy Visit)     No encounter, INR received via fax.  INR in range so no telephone call.  Patient is to call INR clinic if any changes affecting INR.       Dione Julian, SINDHU

## 2021-07-20 ENCOUNTER — TRANSFERRED RECORDS (OUTPATIENT)
Dept: HEALTH INFORMATION MANAGEMENT | Facility: OTHER | Age: 61
End: 2021-07-20

## 2021-07-20 ENCOUNTER — ANTICOAGULATION THERAPY VISIT (OUTPATIENT)
Dept: ANTICOAGULATION | Facility: OTHER | Age: 61
End: 2021-07-20
Attending: INTERNAL MEDICINE
Payer: COMMERCIAL

## 2021-07-20 DIAGNOSIS — Z79.01 ANTICOAGULATION MONITORING, INR RANGE 2-3: ICD-10-CM

## 2021-07-20 DIAGNOSIS — I74.9 EMBOLISM AND THROMBOSIS (H): Primary | ICD-10-CM

## 2021-07-20 LAB — INR PPP: 2.6 (ref 0.9–1.1)

## 2021-07-20 NOTE — PROGRESS NOTES
ANTICOAGULATION FOLLOW-UP CLINIC VISIT    Patient Name:  Trisha Fernando  Date:  2021  Contact Type:  No phone call needed. INR with in range.    SUBJECTIVE:  Patient Findings         Clinical Outcomes     Negatives:  Major bleeding event, Thromboembolic event, Anticoagulation-related hospital admission, Anticoagulation-related ED visit, Anticoagulation-related fatality           OBJECTIVE    Recent labs: (last 7 days)     21  0000   INR 2.6       ASSESSMENT / PLAN  INR assessment THER    Recheck INR In: 1 WEEK    INR Location Home INR      Anticoagulation Summary  As of 2021    INR goal:  2.0-3.0   TTR:  76.5 % (1 y)   INR used for dosin.6 (2021)   Warfarin maintenance plan:  5 mg (5 mg x 1) every day   Full warfarin instructions:  5 mg every day   Weekly warfarin total:  35 mg   No change documented:  Dione Julian RN   Plan last modified:  Maria D Zayas RN (3/16/2021)   Next INR check:  2021   Priority:  High   Target end date:  Indefinite    Indications    Long term (current) use of anticoagulants [Z79.01]  Embolism and thrombosis (H) [I74.9]  Anticoagulation monitoring  INR range 2-3 [Z79.01]             Anticoagulation Episode Summary     INR check location:      Preferred lab:      Send INR reminders to:  [facility]    Comments:  MDINR needs weekly INR done      Anticoagulation Care Providers     Provider Role Specialty Phone number    Maria Guadalupe Ramon DO Referring Internal Medicine [phone]            See the Encounter Report to view Anticoagulation Flowsheet and Dosing Calendar (Go to Encounters tab in chart review, and find the Anticoagulation Therapy Visit)        Dione Julian RN

## 2021-07-26 ENCOUNTER — TRANSFERRED RECORDS (OUTPATIENT)
Dept: HEALTH INFORMATION MANAGEMENT | Facility: OTHER | Age: 61
End: 2021-07-26

## 2021-07-26 ENCOUNTER — ANTICOAGULATION THERAPY VISIT (OUTPATIENT)
Dept: ANTICOAGULATION | Facility: OTHER | Age: 61
End: 2021-07-26
Attending: INTERNAL MEDICINE
Payer: COMMERCIAL

## 2021-07-26 DIAGNOSIS — Z79.01 ANTICOAGULATION MONITORING, INR RANGE 2-3: ICD-10-CM

## 2021-07-26 DIAGNOSIS — I74.9 EMBOLISM AND THROMBOSIS (H): Primary | ICD-10-CM

## 2021-07-26 LAB — INR (EXTERNAL): 3.1 (ref 0.9–1.1)

## 2021-07-26 NOTE — PROGRESS NOTES
ANTICOAGULATION FOLLOW-UP CLINIC VISIT    Patient Name:  Trisha Fernando  Date:  7/26/2021  Contact Type:  Telephone/ left message for patient    SUBJECTIVE:  Patient Findings         Clinical Outcomes     Negatives:  Major bleeding event, Thromboembolic event, Anticoagulation-related hospital admission, Anticoagulation-related ED visit, Anticoagulation-related fatality           OBJECTIVE    Recent labs: (last 7 days)     07/26/21  1349   INR 3.1*       ASSESSMENT / PLAN  INR assessment SUPRA    Recheck INR In: 1 WEEK    INR Location Home INR      Anticoagulation Summary  As of 7/26/2021    INR goal:  2.0-3.0   TTR:  76.1 % (1 y)   INR used for dosing:  3.1 (7/26/2021)   Warfarin maintenance plan:  5 mg (5 mg x 1) every day   Full warfarin instructions:  5 mg every day   Weekly warfarin total:  35 mg   Plan last modified:  Maria D Zayas RN (3/16/2021)   Next INR check:  8/2/2021   Priority:  High   Target end date:  Indefinite    Indications    Long term (current) use of anticoagulants [Z79.01]  Embolism and thrombosis (H) [I74.9]  Anticoagulation monitoring  INR range 2-3 [Z79.01]             Anticoagulation Episode Summary     INR check location:      Preferred lab:      Send INR reminders to:  [facility]    Comments:  MDINR needs weekly INR done      Anticoagulation Care Providers     Provider Role Specialty Phone number    Maria Guadalupe Ramon DO Referring Internal Medicine [phone]            See the Encounter Report to view Anticoagulation Flowsheet and Dosing Calendar (Go to Encounters tab in chart review, and find the Anticoagulation Therapy Visit)        Smitha Nair, RN

## 2021-07-27 ENCOUNTER — PATIENT OUTREACH (OUTPATIENT)
Dept: INTERNAL MEDICINE | Facility: OTHER | Age: 61
End: 2021-07-27

## 2021-07-27 NOTE — TELEPHONE ENCOUNTER
Patient Quality Outreach      Summary:    Patient has the following on her problem list/HM: None    Patient is due/failing the following:   Breast Cancer Screening - Mammogram    Type of outreach:    Sent [employer] message.    Questions for provider review:    None                                                                                                                                     Leesa Romero LPN .......7/27/2021 1:31 PM      Chart routed to .

## 2021-07-31 ENCOUNTER — HEALTH MAINTENANCE LETTER (OUTPATIENT)
Age: 61
End: 2021-07-31

## 2021-08-02 ENCOUNTER — ANTICOAGULATION THERAPY VISIT (OUTPATIENT)
Dept: ANTICOAGULATION | Facility: OTHER | Age: 61
End: 2021-08-02
Attending: INTERNAL MEDICINE
Payer: COMMERCIAL

## 2021-08-02 ENCOUNTER — TRANSFERRED RECORDS (OUTPATIENT)
Dept: HEALTH INFORMATION MANAGEMENT | Facility: OTHER | Age: 61
End: 2021-08-02

## 2021-08-02 DIAGNOSIS — I74.9 EMBOLISM AND THROMBOSIS (H): Primary | ICD-10-CM

## 2021-08-02 DIAGNOSIS — Z79.01 ANTICOAGULATION MONITORING, INR RANGE 2-3: ICD-10-CM

## 2021-08-02 LAB — INR (EXTERNAL): 3 (ref 0.9–1.1)

## 2021-08-02 NOTE — PROGRESS NOTES
ANTICOAGULATION FOLLOW-UP CLINIC VISIT    Patient Name:  Trisha Fernando  Date:  8/2/2021  Contact Type:  no call needed patient to continue same dose    SUBJECTIVE:  Patient Findings         Clinical Outcomes     Negatives:  Major bleeding event, Thromboembolic event, Anticoagulation-related hospital admission, Anticoagulation-related ED visit, Anticoagulation-related fatality           OBJECTIVE    Recent labs: (last 7 days)     08/02/21  1533   INR 3.0*       ASSESSMENT / PLAN  INR assessment THER    Recheck INR In: 1 WEEK    INR Location Home INR      Anticoagulation Summary  As of 8/2/2021    INR goal:  2.0-3.0   TTR:  74.2 % (1 y)   INR used for dosing:  3.0 (8/2/2021)   Warfarin maintenance plan:  5 mg (5 mg x 1) every day   Full warfarin instructions:  5 mg every day   Weekly warfarin total:  35 mg   Plan last modified:  Maria D Zayas RN (3/16/2021)   Next INR check:  8/9/2021   Priority:  High   Target end date:  Indefinite    Indications    Long term (current) use of anticoagulants [Z79.01]  Embolism and thrombosis (H) [I74.9]  Anticoagulation monitoring  INR range 2-3 [Z79.01]             Anticoagulation Episode Summary     INR check location:      Preferred lab:      Send INR reminders to:  [facility]    Comments:  MDINR needs weekly INR done      Anticoagulation Care Providers     Provider Role Specialty Phone number    Maria Guadalupe Ramon DO Referring Internal Medicine [phone]            See the Encounter Report to view Anticoagulation Flowsheet and Dosing Calendar (Go to Encounters tab in chart review, and find the Anticoagulation Therapy Visit)        Smitha Nair RN

## 2021-08-04 ENCOUNTER — LAB (OUTPATIENT)
Dept: LAB | Facility: OTHER | Age: 61
End: 2021-08-04
Attending: INTERNAL MEDICINE
Payer: COMMERCIAL

## 2021-08-04 ENCOUNTER — OFFICE VISIT (OUTPATIENT)
Dept: INTERNAL MEDICINE | Facility: OTHER | Age: 61
End: 2021-08-04
Attending: NURSE PRACTITIONER
Payer: COMMERCIAL

## 2021-08-04 VITALS
BODY MASS INDEX: 44.41 KG/M2 | HEART RATE: 79 BPM | OXYGEN SATURATION: 96 % | HEIGHT: 68 IN | TEMPERATURE: 97.8 F | DIASTOLIC BLOOD PRESSURE: 94 MMHG | RESPIRATION RATE: 18 BRPM | WEIGHT: 293 LBS | SYSTOLIC BLOOD PRESSURE: 132 MMHG

## 2021-08-04 DIAGNOSIS — I10 ESSENTIAL HYPERTENSION: Primary | Chronic | ICD-10-CM

## 2021-08-04 DIAGNOSIS — Z13.1 SCREENING FOR DIABETES MELLITUS: ICD-10-CM

## 2021-08-04 DIAGNOSIS — I10 ESSENTIAL HYPERTENSION: ICD-10-CM

## 2021-08-04 DIAGNOSIS — R60.0 EDEMA OF BOTH LOWER EXTREMITIES: ICD-10-CM

## 2021-08-04 PROBLEM — F33.0 DEPRESSION, MAJOR, RECURRENT, MILD (H): Chronic | Status: ACTIVE | Noted: 2018-02-08

## 2021-08-04 PROBLEM — E66.01 MORBID OBESITY (H): Chronic | Status: ACTIVE | Noted: 2020-11-17

## 2021-08-04 LAB
ALBUMIN SERPL-MCNC: 4 G/DL (ref 3.5–5.7)
ALP SERPL-CCNC: 64 U/L (ref 34–104)
ALT SERPL W P-5'-P-CCNC: 34 U/L (ref 7–52)
ANION GAP SERPL CALCULATED.3IONS-SCNC: 8 MMOL/L (ref 3–14)
AST SERPL W P-5'-P-CCNC: 24 U/L (ref 13–39)
BILIRUB SERPL-MCNC: 0.7 MG/DL (ref 0.3–1)
BUN SERPL-MCNC: 14 MG/DL (ref 7–25)
CALCIUM SERPL-MCNC: 9.9 MG/DL (ref 8.6–10.3)
CHLORIDE BLD-SCNC: 104 MMOL/L (ref 98–107)
CO2 SERPL-SCNC: 26 MMOL/L (ref 21–31)
CREAT SERPL-MCNC: 0.87 MG/DL (ref 0.6–1.2)
ERYTHROCYTE [DISTWIDTH] IN BLOOD BY AUTOMATED COUNT: 13.9 % (ref 10–15)
GFR SERPL CREATININE-BSD FRML MDRD: 72 ML/MIN/1.73M2
GLUCOSE BLD-MCNC: 116 MG/DL (ref 70–105)
HBA1C MFR BLD: 5.7 % (ref 4–6.2)
HCT VFR BLD AUTO: 47.9 % (ref 35–47)
HGB BLD-MCNC: 15.6 G/DL (ref 11.7–15.7)
MCH RBC QN AUTO: 30.9 PG (ref 26.5–33)
MCHC RBC AUTO-ENTMCNC: 32.6 G/DL (ref 31.5–36.5)
MCV RBC AUTO: 95 FL (ref 78–100)
PLATELET # BLD AUTO: 241 10E3/UL (ref 150–450)
POTASSIUM BLD-SCNC: 4.2 MMOL/L (ref 3.5–5.1)
PROT SERPL-MCNC: 6.9 G/DL (ref 6.4–8.9)
RBC # BLD AUTO: 5.05 10E6/UL (ref 3.8–5.2)
SODIUM SERPL-SCNC: 138 MMOL/L (ref 134–144)
WBC # BLD AUTO: 7.2 10E3/UL (ref 4–11)

## 2021-08-04 PROCEDURE — 82040 ASSAY OF SERUM ALBUMIN: CPT | Mod: ZL

## 2021-08-04 PROCEDURE — 36415 COLL VENOUS BLD VENIPUNCTURE: CPT | Mod: ZL

## 2021-08-04 PROCEDURE — 99213 OFFICE O/P EST LOW 20 MIN: CPT | Performed by: NURSE PRACTITIONER

## 2021-08-04 PROCEDURE — 83036 HEMOGLOBIN GLYCOSYLATED A1C: CPT | Mod: ZL

## 2021-08-04 PROCEDURE — 85041 AUTOMATED RBC COUNT: CPT | Mod: ZL

## 2021-08-04 ASSESSMENT — PATIENT HEALTH QUESTIONNAIRE - PHQ9
SUM OF ALL RESPONSES TO PHQ QUESTIONS 1-9: 8
10. IF YOU CHECKED OFF ANY PROBLEMS, HOW DIFFICULT HAVE THESE PROBLEMS MADE IT FOR YOU TO DO YOUR WORK, TAKE CARE OF THINGS AT HOME, OR GET ALONG WITH OTHER PEOPLE: SOMEWHAT DIFFICULT
SUM OF ALL RESPONSES TO PHQ QUESTIONS 1-9: 8

## 2021-08-04 ASSESSMENT — PAIN SCALES - GENERAL: PAINLEVEL: NO PAIN (0)

## 2021-08-04 ASSESSMENT — MIFFLIN-ST. JEOR: SCORE: 2285.46

## 2021-08-04 NOTE — NURSING NOTE
"Chief Complaint   Patient presents with     Diabetes     Diabetes check, B/P check     Previous A1C is at goal of <8  Lab Results   Component Value Date    A1C 5.7 08/04/2021     Urine microalbumin:creatine: 8/4/21  Foot exam:   Eye exam : 11/2020   Tobacco User : no  Patient is not on a daily aspirin  Patient is not on a Statin.  Blood pressure today of:     BP Readings from Last 1 Encounters:   08/04/21 (!) 160/102      is not at the goal of <139/89 for diabetics.    Kyung Polanco LPN on 8/4/2021 at 10:56 AM      Initial BP (!) 160/102 (BP Location: Left arm, Patient Position: Sitting, Cuff Size: Adult Large)   Pulse 79   Temp 97.8  F (36.6  C) (Tympanic)   Resp 18   Ht 1.727 m (5' 8\")   Wt (!) 167.2 kg (368 lb 9.6 oz)   LMP  (LMP Unknown)   SpO2 96%   Breastfeeding No   BMI 56.05 kg/m   Estimated body mass index is 56.05 kg/m  as calculated from the following:    Height as of this encounter: 1.727 m (5' 8\").    Weight as of this encounter: 167.2 kg (368 lb 9.6 oz).  Medication Reconciliation: complete  FOOD SECURITY SCREENING QUESTIONS  Hunger Vital Signs:  Within the past 12 months we worried whether our food would run out before we got money to buy more. Never  Within the past 12 months the food we bought just didn't last and we didn't have money to get more. Never      Kyung Polanco LPN    "

## 2021-08-04 NOTE — PROGRESS NOTES
"Trisha Fernando  : 1960 Age: 60 year old Sex: female MRN: [id number]    CC:   Chief Complaint   Patient presents with     Diabetes     Diabetes check, B/P check     ELIZABETH Score:    ELIZABETH-7 SCORE 2018 3/1/2018 2018   Total Score 5 1 10     PHQ-2 Score:     PHQ-2 (  [employer]) 2020   Q1: Little interest or pleasure in doing things 3 1   Q2: Feeling down, depressed or hopeless 2 2   PHQ-2 Score 5 3          Tobacco Use      Smoking status: Former Smoker        Packs/day: 1.00        Years: 12.00        Pack years: 12        Types: Cigarettes        Quit date: 1990        Years since quittin.9      Smokeless tobacco: Never Used      Tobacco comment: no ecig    NURSE'S NOTES:    Nursing Notes:   Kyung Polanco LPN  2021 11:27 AM  Signed  Chief Complaint   Patient presents with     Diabetes     Diabetes check, B/P check     Previous A1C is at goal of <8  Lab Results   Component Value Date    A1C 5.7 2021     Urine microalbumin:creatine: 21  Foot exam: unknown  Eye exam : 2020   Tobacco User : no  Patient is not on a daily aspirin  Patient is not on a Statin. Has IVD  Blood pressure today of:     BP Readings from Last 1 Encounters:   21 (!) 160/102      is not at the goal of <139/89 for diabetics.    Kyung Polanco LPN on 2021 at 10:56 AM      Initial BP (!) 160/102 (BP Location: Left arm, Patient Position: Sitting, Cuff Size: Adult Large)   Pulse 79   Temp 97.8  F (36.6  C) (Tympanic)   Resp 18   Ht 1.727 m (5' 8\")   Wt (!) 167.2 kg (368 lb 9.6 oz)   LMP  (LMP Unknown)   SpO2 96%   Breastfeeding No   BMI 56.05 kg/m   Estimated body mass index is 56.05 kg/m  as calculated from the following:    Height as of this encounter: 1.727 m (5' 8\").    Weight as of this encounter: 167.2 kg (368 lb 9.6 oz).  Medication Reconciliation: complete  FOOD SECURITY SCREENING QUESTIONS  Hunger Vital Signs:  Within the past 12 months we worried whether our food " would run out before we got money to buy more. Never  Within the past 12 months the food we bought just didn't last and we didn't have money to get more. Never      Kyung Polanco, LPN    Nursing note reviewed with patient.  Accuracy and completeness verified.      SUBJECTIVE:                                                      HPI:   Trisha Fernando presents to the clinic today for recheck of her HTN and DM. She was seen prior by Dr. Ramon and at that appointment, was advised to stop losartan due to adverse symptoms she was having. She was put on amlodipine for HTN control.  Today, the patient reports her symptoms have resolved since stopping the losartan. Her blood pressures at home are running in the 130-140s systolic and 80-90s diastolic.     She reports she has felt like she was having low blood sugars 3x over the past few weeks.     Amlodipine was started on 7/22/21, does notice mild LE edema with initiation of this med. She is not happy about the edema and wonders if there is alternative medication for blood pressure control.    She does report wheezing and cough at night.      Trisha Fernando also presents with:    Patient Active Problem List   Diagnosis     Anticoagulation monitoring, INR range 2-3     Depression, major, recurrent, mild (H)     Health care maintenance     Hypertension     Varicose veins of both legs with edema     Long term (current) use of anticoagulants     Embolism and thrombosis (H)     Hospital discharge follow-up     Morbid obesity (H)     2019 novel coronavirus disease (COVID-19)     Depression     DVT (deep venous thrombosis) (H)     Family history of factor V Leiden mutation     Synovial cyst       Current Code Status:  Full Code    REVIEW OF SYSTEMS:    Review of Systems   Respiratory: Positive for cough and wheezing.    Cardiovascular: Positive for leg swelling.   All other systems reviewed and are negative.      Problem List/PMH: Reviewed in EMR, and made relevant updates  "today.  Medications: Reviewed in EMR, and made relevant updates today.  Allergies: Reviewed in EMR, and made relevant updates today.    OBJECTIVE:                                                      BP (!) 150/98 (BP Location: Left arm, Patient Position: Sitting, Cuff Size: Adult Large)   Pulse 79   Temp 97.8  F (36.6  C) (Tympanic)   Resp 18   Ht 1.727 m (5' 8\")   Wt (!) 167.2 kg (368 lb 9.6 oz)   LMP  (LMP Unknown)   SpO2 96%   Breastfeeding No   BMI 56.05 kg/m      Current Pain Score:   No Pain (0)     Physical Exam  Vitals and nursing note reviewed.   Constitutional:       Appearance: Normal appearance. She is obese.   HENT:      Head: Normocephalic and atraumatic.   Eyes:      Extraocular Movements: Extraocular movements intact.      Conjunctiva/sclera: Conjunctivae normal.   Cardiovascular:      Rate and Rhythm: Normal rate and regular rhythm.      Heart sounds: Normal heart sounds.   Pulmonary:      Breath sounds: Wheezing present.   Abdominal:      General: Bowel sounds are normal.      Palpations: Abdomen is soft.   Musculoskeletal:         General: Normal range of motion.   Skin:     General: Skin is warm and dry.   Neurological:      Mental Status: She is alert and oriented to person, place, and time. Mental status is at baseline.   Psychiatric:         Mood and Affect: Mood normal.         Behavior: Behavior normal.         Thought Content: Thought content normal.         Judgment: Judgment normal.          BP Readings from Last 3 Encounters:   06/03/21 130/72   02/04/21 138/82   11/17/20 104/70        There were no vitals filed for this visit.     The 10-year ASCVD risk score (Stephanie BRE Jr., et al., 2013) is: 5.2%    Values used to calculate the score:      Age: 60 years      Sex: Female      Is Non- : No      Diabetic: No      Tobacco smoker: No      Systolic Blood Pressure: 132 mmHg      Is BP treated: Yes      HDL Cholesterol: 56 mg/dL      Total Cholesterol: 202 " mg/dL    Diagnostics Completed at this Visit:    Results for orders placed or performed in visit on 08/04/21   CBC W PLT No Diff     Status: Abnormal   Result Value Ref Range    WBC Count 7.2 4.0 - 11.0 10e3/uL    RBC Count 5.05 3.80 - 5.20 10e6/uL    Hemoglobin 15.6 11.7 - 15.7 g/dL    Hematocrit 47.9 (H) 35.0 - 47.0 %    MCV 95 78 - 100 fL    MCH 30.9 26.5 - 33.0 pg    MCHC 32.6 31.5 - 36.5 g/dL    RDW 13.9 10.0 - 15.0 %    Platelet Count 241 150 - 450 10e3/uL   Hemoglobin A1c     Status: Normal   Result Value Ref Range    Hemoglobin A1C 5.7 4.0 - 6.2 %   Comprehensive Metabolic Panel     Status: Abnormal   Result Value Ref Range    Sodium 138 134 - 144 mmol/L    Potassium 4.2 3.5 - 5.1 mmol/L    Chloride 104 98 - 107 mmol/L    Carbon Dioxide (CO2) 26 21 - 31 mmol/L    Anion Gap 8 3 - 14 mmol/L    Urea Nitrogen 14 7 - 25 mg/dL    Creatinine 0.87 0.60 - 1.20 mg/dL    Calcium 9.9 8.6 - 10.3 mg/dL    Glucose 116 (H) 70 - 105 mg/dL    Alkaline Phosphatase 64 34 - 104 U/L    AST 24 13 - 39 U/L    ALT 34 7 - 52 U/L    Protein Total 6.9 6.4 - 8.9 g/dL    Albumin 4.0 3.5 - 5.7 g/dL    Bilirubin Total 0.7 0.3 - 1.0 mg/dL    GFR Estimate 72 >60 mL/min/1.73m2        ASSESSMENT AND PLAN:    Essential hypertension  Better controlled with amlodipine but patient is having edema from this. Continue metoprolol.    Screening for diabetes mellitus  A1c is 5.7 today.    Edema of both lower extremities  Consider alternative medication for BP. Consider clonidine, hydrochlorothiazide.       I explained my diagnostic considerations and recommendations to the patient, who voiced understanding and agreement with the treatment plan. All questions were answered. We discussed potential side effects of any prescribed or recommended therapies, as well as expectations for response to treatments.    Patient was advised to allow up to 2 days for response on lab results via MyChart and or letter to be sent.    FOLLOW-UP:    Return in about 2 weeks  (around 8/18/2021) for BP Recheck.     Clinic : [phone]  Appointment line: [phone]     BELLA Hansen, [facility]-  Internal Medicine  08/08/2021 7:16 PM  _____________________________________________________________________________________    Total time spent with this patient was 20 minutes which included chart review, visualization and interpretation of labs and/or images, time spent with patient, and documentation.    PATIENT INSTRUCTIONS:    Patient Instructions   Goal BP is under 140/80.    Recheck BP twice daily as we discussed in clinic (in AM/PM). Record and send readings in via [employer] for review.    Will discuss with [facility] regarding med change/alternative.    Answers for HPI/ROS submitted by the patient on 8/4/2021  If you checked off any problems, how difficult have these problems made it for you to do your work, take care of things at home, or get along with other people?: Somewhat difficult  PHQ9 TOTAL SCORE: 8

## 2021-08-05 ASSESSMENT — PATIENT HEALTH QUESTIONNAIRE - PHQ9: SUM OF ALL RESPONSES TO PHQ QUESTIONS 1-9: 8

## 2021-08-08 DIAGNOSIS — E87.6 HYPOKALEMIA: ICD-10-CM

## 2021-08-08 PROBLEM — R60.0 EDEMA OF BOTH LOWER EXTREMITIES: Status: ACTIVE | Noted: 2021-08-08

## 2021-08-08 ASSESSMENT — ENCOUNTER SYMPTOMS
WHEEZING: 1
COUGH: 1

## 2021-08-09 ENCOUNTER — TRANSFERRED RECORDS (OUTPATIENT)
Dept: HEALTH INFORMATION MANAGEMENT | Facility: OTHER | Age: 61
End: 2021-08-09

## 2021-08-09 DIAGNOSIS — I10 ESSENTIAL HYPERTENSION: Primary | ICD-10-CM

## 2021-08-09 LAB — INR (EXTERNAL): 3.7 (ref 0.9–1.1)

## 2021-08-09 RX ORDER — HYDROCHLOROTHIAZIDE 12.5 MG/1
12.5 TABLET ORAL DAILY
Qty: 30 TABLET | Refills: 0 | Status: SHIPPED | OUTPATIENT
Start: 2021-08-09 | End: 2021-09-01 | Stop reason: ALTCHOICE

## 2021-08-10 ENCOUNTER — ANTICOAGULATION THERAPY VISIT (OUTPATIENT)
Dept: ANTICOAGULATION | Facility: OTHER | Age: 61
End: 2021-08-10
Attending: INTERNAL MEDICINE
Payer: COMMERCIAL

## 2021-08-10 DIAGNOSIS — Z79.01 ANTICOAGULATION MONITORING, INR RANGE 2-3: ICD-10-CM

## 2021-08-10 DIAGNOSIS — I74.9 EMBOLISM AND THROMBOSIS (H): Primary | ICD-10-CM

## 2021-08-10 RX ORDER — POTASSIUM CHLORIDE 750 MG/1
10 TABLET, EXTENDED RELEASE ORAL DAILY
Qty: 90 TABLET | Refills: 3 | Status: SHIPPED | OUTPATIENT
Start: 2021-08-10 | End: 2022-08-16

## 2021-08-10 NOTE — PROGRESS NOTES
ANTICOAGULATION FOLLOW-UP CLINIC VISIT    Patient Name:  Trisha Fernando  Date:  8/10/2021  Contact Type:  Telephone/ spoke with patient reviewed dosing    SUBJECTIVE:  Patient Findings     Positives:  Change in alcohol use (was at a wedding last weekend had more alcohol than usual), Change in medications (stopped amlodipine and starting HCTZ), Other complaints (BP's haven been high and Blood sugars have been low with symptoms)        Clinical Outcomes     Negatives:  Major bleeding event, Thromboembolic event, Anticoagulation-related hospital admission, Anticoagulation-related ED visit, Anticoagulation-related fatality           OBJECTIVE    Recent labs: (last 7 days)     08/09/21  1700   INR 3.7*       ASSESSMENT / PLAN  INR assessment SUPRA    Recheck INR In: 1 WEEK    INR Location Home INR      Anticoagulation Summary  As of 8/10/2021    INR goal:  2.0-3.0   TTR:  72.2 % (1 y)   INR used for dosing:  3.7 (8/9/2021)   Warfarin maintenance plan:  5 mg (5 mg x 1) every day   Full warfarin instructions:  8/10: 2.5 mg; Otherwise 5 mg every day   Weekly warfarin total:  35 mg   Plan last modified:  Maria D Zayas RN (3/16/2021)   Next INR check:  8/17/2021   Priority:  High   Target end date:  Indefinite    Indications    Long term (current) use of anticoagulants [Z79.01]  Embolism and thrombosis (H) [I74.9]  Anticoagulation monitoring  INR range 2-3 [Z79.01]             Anticoagulation Episode Summary     INR check location:      Preferred lab:      Send INR reminders to:  [facility]    Comments:  MDINR needs weekly INR done      Anticoagulation Care Providers     Provider Role Specialty Phone number    Maria Guadalupe Ramon DO Referring Internal Medicine [phone]            See the Encounter Report to view Anticoagulation Flowsheet and Dosing Calendar (Go to Encounters tab in chart review, and find the Anticoagulation Therapy Visit)        Smitha Nair, RN

## 2021-08-10 NOTE — TELEPHONE ENCOUNTER
"Routing refill request to provider for review/approval because:    LOV: 8/4/2021  Last Potassium\"8/4/2021  Jil Hull RN on 8/10/2021 at 11:04 AM          "

## 2021-08-16 ENCOUNTER — TRANSFERRED RECORDS (OUTPATIENT)
Dept: HEALTH INFORMATION MANAGEMENT | Facility: OTHER | Age: 61
End: 2021-08-16

## 2021-08-16 ENCOUNTER — ANTICOAGULATION THERAPY VISIT (OUTPATIENT)
Dept: ANTICOAGULATION | Facility: OTHER | Age: 61
End: 2021-08-16
Attending: INTERNAL MEDICINE
Payer: COMMERCIAL

## 2021-08-16 DIAGNOSIS — Z79.01 ANTICOAGULATION MONITORING, INR RANGE 2-3: ICD-10-CM

## 2021-08-16 DIAGNOSIS — I74.9 EMBOLISM AND THROMBOSIS (H): Primary | ICD-10-CM

## 2021-08-16 LAB — INR (EXTERNAL): 2.8 (ref 0.9–1.1)

## 2021-08-16 NOTE — PROGRESS NOTES
ANTICOAGULATION FOLLOW-UP CLINIC VISIT    Patient Name:  Trisha Fernando  Date:  2021  Contact Type:  no call needed patient to continue same dose    SUBJECTIVE:  Patient Findings         Clinical Outcomes     Negatives:  Major bleeding event, Thromboembolic event, Anticoagulation-related hospital admission, Anticoagulation-related ED visit, Anticoagulation-related fatality           OBJECTIVE    Recent labs: (last 7 days)     21  0230   INR 2.8*       ASSESSMENT / PLAN  INR assessment THER    Recheck INR In: 1 WEEK    INR Location Home INR      Anticoagulation Summary  As of 2021    INR goal:  2.0-3.0   TTR:  70.8 % (1 y)   INR used for dosin.8 (2021)   Warfarin maintenance plan:  5 mg (5 mg x 1) every day   Full warfarin instructions:  5 mg every day   Weekly warfarin total:  35 mg   Plan last modified:  Maria D Zayas RN (3/16/2021)   Next INR check:  2021   Priority:  High   Target end date:  Indefinite    Indications    Long term (current) use of anticoagulants [Z79.01]  Embolism and thrombosis (H) [I74.9]  Anticoagulation monitoring  INR range 2-3 [Z79.01]             Anticoagulation Episode Summary     INR check location:      Preferred lab:      Send INR reminders to:  [facility]    Comments:  MDINR needs weekly INR done      Anticoagulation Care Providers     Provider Role Specialty Phone number    Maria Guadalupe Ramon DO Referring Internal Medicine [phone]            See the Encounter Report to view Anticoagulation Flowsheet and Dosing Calendar (Go to Encounters tab in chart review, and find the Anticoagulation Therapy Visit)        Smitha Nair RN

## 2021-08-22 DIAGNOSIS — I10 ESSENTIAL HYPERTENSION: ICD-10-CM

## 2021-08-23 RX ORDER — METOPROLOL TARTRATE 25 MG/1
12.5 TABLET, FILM COATED ORAL 2 TIMES DAILY
Qty: 180 TABLET | Refills: 1 | Status: SHIPPED | OUTPATIENT
Start: 2021-08-23 | End: 2021-12-27

## 2021-08-23 NOTE — TELEPHONE ENCOUNTER
"Requested Prescriptions   Pending Prescriptions Disp Refills     metoprolol tartrate (LOPRESSOR) 25 MG tablet [Pharmacy Med Name: METOPROLOL TARTRATE 25 MG TAB] 180 tablet 1     Sig: TAKE 0.5 TABLETS (12.5 MG) BY MOUTH 2 TIMES DAILY       Beta-Blockers Protocol Failed - 8/22/2021 10:09 AM        Failed - Blood pressure under 140/90 in past 12 months     BP Readings from Last 3 Encounters:   08/04/21 (!) 132/94   06/03/21 130/72   02/04/21 138/82                 Passed - Patient is age 6 or older        Passed - Recent (12 mo) or future (30 days) visit within the authorizing provider's specialty     Patient has had an office visit with the authorizing provider or a provider within the authorizing providers department within the previous 12 mos or has a future within next 30 days. See \"Patient Info\" tab in inbasket, or \"Choose Columns\" in Meds & Orders section of the refill encounter.              Passed - Medication is active on med list              Last Written Prescription Date: 12/14/2020  Last Fill Quantity: 180,   # refills: 1  Last Office Visit: 8/4/2021 with BELLA Hansen, NP  Future Office visit:   None noted.  Unable to complete prescription refill per RN medication refill policy. Will route to provider for review and consideration.  Josefina Tse RN on 8/23/2021 at 3:11 PM          "

## 2021-08-25 ENCOUNTER — ANTICOAGULATION THERAPY VISIT (OUTPATIENT)
Dept: ANTICOAGULATION | Facility: OTHER | Age: 61
End: 2021-08-25
Attending: INTERNAL MEDICINE
Payer: COMMERCIAL

## 2021-08-25 ENCOUNTER — TRANSFERRED RECORDS (OUTPATIENT)
Dept: HEALTH INFORMATION MANAGEMENT | Facility: OTHER | Age: 61
End: 2021-08-25

## 2021-08-25 DIAGNOSIS — I74.9 EMBOLISM AND THROMBOSIS (H): Primary | ICD-10-CM

## 2021-08-25 DIAGNOSIS — Z79.01 ANTICOAGULATION MONITORING, INR RANGE 2-3: ICD-10-CM

## 2021-08-25 LAB — INR (EXTERNAL): 3.4 (ref 0.9–1.1)

## 2021-08-25 NOTE — PROGRESS NOTES
ANTICOAGULATION FOLLOW-UP CLINIC VISIT    Patient Name:  Trisha Fernando  Date:  8/25/2021  Contact Type:  Telephone/ Spoke with pt and went over new dosing    SUBJECTIVE:  Patient Findings     Comments:  Patient denies any identifiable changes that caused the suppratherapeutic INR.         Clinical Outcomes     Negatives:  Major bleeding event, Thromboembolic event, Anticoagulation-related hospital admission, Anticoagulation-related ED visit, Anticoagulation-related fatality    Comments:  Patient denies any identifiable changes that caused the suppratherapeutic INR.            OBJECTIVE    Recent labs: (last 7 days)     08/25/21  0817   INR 3.4*       ASSESSMENT / PLAN  INR assessment SUPRA    Recheck INR In: 1 WEEK    INR Location Home INR      Anticoagulation Summary  As of 8/25/2021    INR goal:  2.0-3.0   TTR:  69.1 % (1 y)   INR used for dosing:  3.4 (8/25/2021)   Warfarin maintenance plan:  2.5 mg (5 mg x 0.5) every Wed; 5 mg (5 mg x 1) all other days   Full warfarin instructions:  2.5 mg every Wed; 5 mg all other days   Weekly warfarin total:  32.5 mg   Plan last modified:  Mariia Foster RN (8/25/2021)   Next INR check:  9/1/2021   Priority:  High   Target end date:  Indefinite    Indications    Long term (current) use of anticoagulants [Z79.01]  Embolism and thrombosis (H) [I74.9]  Anticoagulation monitoring  INR range 2-3 [Z79.01]             Anticoagulation Episode Summary     INR check location:      Preferred lab:      Send INR reminders to:  [facility]    Comments:  MDINR needs weekly INR done      Anticoagulation Care Providers     Provider Role Specialty Phone number    Maria Guadalupe Ramon DO Referring Internal Medicine [phone]            See the Encounter Report to view Anticoagulation Flowsheet and Dosing Calendar (Go to Encounters tab in chart review, and find the Anticoagulation Therapy Visit)        Mariia Foster, SINDHU

## 2021-08-29 ENCOUNTER — MYC MEDICAL ADVICE (OUTPATIENT)
Dept: INTERNAL MEDICINE | Facility: OTHER | Age: 61
End: 2021-08-29

## 2021-08-29 DIAGNOSIS — I10 ESSENTIAL HYPERTENSION: ICD-10-CM

## 2021-08-30 ENCOUNTER — TRANSFERRED RECORDS (OUTPATIENT)
Dept: HEALTH INFORMATION MANAGEMENT | Facility: OTHER | Age: 61
End: 2021-08-30

## 2021-08-30 ENCOUNTER — ANTICOAGULATION THERAPY VISIT (OUTPATIENT)
Dept: ANTICOAGULATION | Facility: OTHER | Age: 61
End: 2021-08-30
Attending: INTERNAL MEDICINE
Payer: COMMERCIAL

## 2021-08-30 DIAGNOSIS — I74.9 EMBOLISM AND THROMBOSIS (H): Primary | ICD-10-CM

## 2021-08-30 DIAGNOSIS — Z79.01 ANTICOAGULATION MONITORING, INR RANGE 2-3: ICD-10-CM

## 2021-08-30 LAB — INR (EXTERNAL): 2.6 (ref 2–3)

## 2021-08-30 RX ORDER — WARFARIN SODIUM 5 MG/1
TABLET ORAL
Qty: 90 TABLET | Refills: 1
Start: 2021-08-30 | End: 2021-09-15

## 2021-08-30 NOTE — PROGRESS NOTES
ANTICOAGULATION FOLLOW-UP CLINIC VISIT    Patient Name:  Trisha Fernando  Date:  2021  Contact Type:  No phone call needed. INR within range.     SUBJECTIVE:  Patient Findings         Clinical Outcomes     Negatives:  Major bleeding event, Thromboembolic event, Anticoagulation-related hospital admission, Anticoagulation-related ED visit, Anticoagulation-related fatality           OBJECTIVE    Recent labs: (last 7 days)     21  1153   INR 2.6       ASSESSMENT / PLAN  INR assessment THER    Recheck INR In: 1 WEEK    INR Location Home INR      Anticoagulation Summary  As of 2021    INR goal:  2.0-3.0   TTR:  68.5 % (1 y)   INR used for dosin.6 (2021)   Warfarin maintenance plan:  2.5 mg (5 mg x 0.5) every Wed; 5 mg (5 mg x 1) all other days   Full warfarin instructions:  2.5 mg every Wed; 5 mg all other days   Weekly warfarin total:  32.5 mg   No change documented:  Dione Julian RN   Plan last modified:  Mariia Foster RN (2021)   Next INR check:  2021   Priority:  High   Target end date:  Indefinite    Indications    Long term (current) use of anticoagulants [Z79.01]  Embolism and thrombosis (H) [I74.9]  Anticoagulation monitoring  INR range 2-3 [Z79.01]             Anticoagulation Episode Summary     INR check location:      Preferred lab:      Send INR reminders to:  [facility]    Comments:  MDINR needs weekly INR done      Anticoagulation Care Providers     Provider Role Specialty Phone number    Yenny Maria Guadalupe DO KIRBY Referring Internal Medicine [phone]            See the Encounter Report to view Anticoagulation Flowsheet and Dosing Calendar (Go to Encounters tab in chart review, and find the Anticoagulation Therapy Visit)     No encounter, INR received via fax.  INR in range so no telephone call.  Patient is to call INR clinic if any changes affecting INR.       Dione Julian, SINDHU

## 2021-08-31 DIAGNOSIS — I10 ESSENTIAL HYPERTENSION: ICD-10-CM

## 2021-08-31 RX ORDER — HYDROCHLOROTHIAZIDE 12.5 MG/1
12.5 TABLET ORAL DAILY
Qty: 30 TABLET | Refills: 0 | Status: CANCELLED | OUTPATIENT
Start: 2021-08-31

## 2021-08-31 NOTE — TELEPHONE ENCOUNTER
Patient is requesting renewal of her hydrochlorothiazide. will route to PCP, medication is t'd up.    Shelly Quigley LPN on 8/31/2021 at 9:04 AM

## 2021-09-01 RX ORDER — CHLORTHALIDONE 25 MG/1
12.5 TABLET ORAL DAILY
Qty: 45 TABLET | Refills: 0 | Status: SHIPPED | OUTPATIENT
Start: 2021-09-01 | End: 2021-09-16

## 2021-09-02 RX ORDER — HYDROCHLOROTHIAZIDE 12.5 MG/1
12.5 TABLET ORAL DAILY
Qty: 30 TABLET | Refills: 0 | OUTPATIENT
Start: 2021-09-02 | End: 2021-10-02

## 2021-09-02 NOTE — TELEPHONE ENCOUNTER
Disp Refills Start End EDDA   hydrochlorothiazide (HYDRODIURIL) 12.5 MG tablet (Discontinued) 30 tablet 0 8/9/2021 9/1/2021 --   Sig - Route: Take 1 tablet (12.5 mg) by mouth daily - Oral       This Order Has Been Discontinued    Order Status Reason By On   Discontinued Alternate therapy Maria Guadalupe Ramon DO 9/1/21 0645     Refill request denied at this time. Patricia Moore RN  ....................  9/2/2021   1:17 PM

## 2021-09-06 ENCOUNTER — TRANSFERRED RECORDS (OUTPATIENT)
Dept: HEALTH INFORMATION MANAGEMENT | Facility: OTHER | Age: 61
End: 2021-09-06

## 2021-09-06 LAB — INR (EXTERNAL): 2.2 (ref 0.9–1.1)

## 2021-09-07 ENCOUNTER — ANTICOAGULATION THERAPY VISIT (OUTPATIENT)
Dept: ANTICOAGULATION | Facility: OTHER | Age: 61
End: 2021-09-07
Attending: INTERNAL MEDICINE
Payer: COMMERCIAL

## 2021-09-07 DIAGNOSIS — Z79.01 ANTICOAGULATION MONITORING, INR RANGE 2-3: ICD-10-CM

## 2021-09-07 DIAGNOSIS — I74.9 EMBOLISM AND THROMBOSIS (H): Primary | ICD-10-CM

## 2021-09-07 NOTE — PROGRESS NOTES
ANTICOAGULATION FOLLOW-UP CLINIC VISIT    Patient Name:  Trisha Fernando  Date:  2021  Contact Type:  No phone call needed. INR within range. Continue same dose.     SUBJECTIVE:  Patient Findings         Clinical Outcomes     Negatives:  Major bleeding event, Thromboembolic event, Anticoagulation-related hospital admission, Anticoagulation-related ED visit, Anticoagulation-related fatality           OBJECTIVE    Recent labs: (last 7 days)     21  1046   INR 2.2       ASSESSMENT / PLAN  INR assessment THER    Recheck INR In: 1 WEEK    INR Location Home INR      Anticoagulation Summary  As of 2021    INR goal:  2.0-3.0   TTR:  68.4 % (1 y)   INR used for dosin.2 (2021)   Warfarin maintenance plan:  2.5 mg (5 mg x 0.5) every Wed; 5 mg (5 mg x 1) all other days   Full warfarin instructions:  2.5 mg every Wed; 5 mg all other days   Weekly warfarin total:  32.5 mg   No change documented:  Dione Julian RN   Plan last modified:  Mariia Foster RN (2021)   Next INR check:  2021   Priority:  High   Target end date:  Indefinite    Indications    Long term (current) use of anticoagulants [Z79.01]  Embolism and thrombosis (H) [I74.9]  Anticoagulation monitoring  INR range 2-3 [Z79.01]             Anticoagulation Episode Summary     INR check location:      Preferred lab:      Send INR reminders to:  [facility]    Comments:  MDINR needs weekly INR done      Anticoagulation Care Providers     Provider Role Specialty Phone number    Maria Guadalupe Ramon DO Referring Internal Medicine [phone]            See the Encounter Report to view Anticoagulation Flowsheet and Dosing Calendar (Go to Encounters tab in chart review, and find the Anticoagulation Therapy Visit)     No encounter, INR received via fax.  INR in range so no telephone call.  Patient is to call INR clinic if any changes affecting INR.       Dione Julian, SINDHU

## 2021-09-13 ENCOUNTER — TRANSFERRED RECORDS (OUTPATIENT)
Dept: HEALTH INFORMATION MANAGEMENT | Facility: OTHER | Age: 61
End: 2021-09-13

## 2021-09-14 ENCOUNTER — ANTICOAGULATION THERAPY VISIT (OUTPATIENT)
Dept: ANTICOAGULATION | Facility: OTHER | Age: 61
End: 2021-09-14
Attending: INTERNAL MEDICINE
Payer: COMMERCIAL

## 2021-09-14 DIAGNOSIS — Z79.01 ANTICOAGULATION MONITORING, INR RANGE 2-3: ICD-10-CM

## 2021-09-14 DIAGNOSIS — I74.9 EMBOLISM AND THROMBOSIS (H): Primary | ICD-10-CM

## 2021-09-14 LAB — INR POINT OF CARE: 2.5 (ref 0.9–1.1)

## 2021-09-14 PROCEDURE — 85610 PROTHROMBIN TIME: CPT | Mod: QW

## 2021-09-14 PROCEDURE — 36416 COLLJ CAPILLARY BLOOD SPEC: CPT

## 2021-09-14 NOTE — PROGRESS NOTES
ANTICOAGULATION FOLLOW-UP CLINIC VISIT    Patient Name:  Trisha Fernando  Date:  2021  Contact Type:  Face to Face    SUBJECTIVE:  Patient Findings         Clinical Outcomes     Negatives:  Major bleeding event, Thromboembolic event, Anticoagulation-related hospital admission, Anticoagulation-related ED visit, Anticoagulation-related fatality           OBJECTIVE    Recent labs: (last 7 days)     21  1240   INR 2.5*       ASSESSMENT / PLAN  INR assessment THER    Recheck INR In: 1 WEEK    INR Location Home INR      Anticoagulation Summary  As of 2021    INR goal:  2.0-3.0   TTR:  68.5 % (1 y)   INR used for dosin.5 (2021)   Warfarin maintenance plan:  2.5 mg (5 mg x 0.5) every Wed; 5 mg (5 mg x 1) all other days   Full warfarin instructions:  2.5 mg every Wed; 5 mg all other days   Weekly warfarin total:  32.5 mg   No change documented:  Dione Julian RN   Plan last modified:  Mariia Foster RN (2021)   Next INR check:  2021   Priority:  High   Target end date:  Indefinite    Indications    Long term (current) use of anticoagulants [Z79.01]  Embolism and thrombosis (H) [I74.9]  Anticoagulation monitoring  INR range 2-3 [Z79.01]             Anticoagulation Episode Summary     INR check location:      Preferred lab:      Send INR reminders to:  [facility]    Comments:  MDINR needs weekly INR done      Anticoagulation Care Providers     Provider Role Specialty Phone number    YennyMaria Guadalupe DO KIRBY Referring Internal Medicine [phone]            See the Encounter Report to view Anticoagulation Flowsheet and Dosing Calendar (Go to Encounters tab in chart review, and find the Anticoagulation Therapy Visit)     No encounter, INR received via fax.  INR in range so no telephone call.  Patient is to call INR clinic if any changes affecting INR.       Dione Julian, SINDHU

## 2021-09-16 ENCOUNTER — OFFICE VISIT (OUTPATIENT)
Dept: INTERNAL MEDICINE | Facility: OTHER | Age: 61
End: 2021-09-16
Attending: INTERNAL MEDICINE
Payer: COMMERCIAL

## 2021-09-16 VITALS
RESPIRATION RATE: 16 BRPM | SYSTOLIC BLOOD PRESSURE: 142 MMHG | HEART RATE: 74 BPM | TEMPERATURE: 98.2 F | DIASTOLIC BLOOD PRESSURE: 92 MMHG | OXYGEN SATURATION: 96 %

## 2021-09-16 DIAGNOSIS — I10 ESSENTIAL HYPERTENSION: ICD-10-CM

## 2021-09-16 DIAGNOSIS — Z12.31 ENCOUNTER FOR SCREENING MAMMOGRAM FOR BREAST CANCER: Primary | ICD-10-CM

## 2021-09-16 DIAGNOSIS — F33.0 DEPRESSION, MAJOR, RECURRENT, MILD (H): ICD-10-CM

## 2021-09-16 PROCEDURE — 99214 OFFICE O/P EST MOD 30 MIN: CPT | Performed by: INTERNAL MEDICINE

## 2021-09-16 RX ORDER — BUPROPION HYDROCHLORIDE 150 MG/1
150 TABLET ORAL EVERY MORNING
Qty: 90 TABLET | Refills: 0 | Status: SHIPPED | OUTPATIENT
Start: 2021-09-16 | End: 2021-12-14

## 2021-09-16 RX ORDER — CHLORTHALIDONE 25 MG/1
25 TABLET ORAL DAILY
Qty: 90 TABLET | Refills: 0 | Status: SHIPPED | OUTPATIENT
Start: 2021-09-16 | End: 2021-12-27

## 2021-09-16 ASSESSMENT — ANXIETY QUESTIONNAIRES
4. TROUBLE RELAXING: SEVERAL DAYS
5. BEING SO RESTLESS THAT IT IS HARD TO SIT STILL: NOT AT ALL
1. FEELING NERVOUS, ANXIOUS, OR ON EDGE: SEVERAL DAYS
2. NOT BEING ABLE TO STOP OR CONTROL WORRYING: SEVERAL DAYS
8. IF YOU CHECKED OFF ANY PROBLEMS, HOW DIFFICULT HAVE THESE MADE IT FOR YOU TO DO YOUR WORK, TAKE CARE OF THINGS AT HOME, OR GET ALONG WITH OTHER PEOPLE?: NOT DIFFICULT AT ALL
7. FEELING AFRAID AS IF SOMETHING AWFUL MIGHT HAPPEN: NOT AT ALL
GAD7 TOTAL SCORE: 5
7. FEELING AFRAID AS IF SOMETHING AWFUL MIGHT HAPPEN: NOT AT ALL
GAD7 TOTAL SCORE: 5
GAD7 TOTAL SCORE: 5
6. BECOMING EASILY ANNOYED OR IRRITABLE: SEVERAL DAYS
3. WORRYING TOO MUCH ABOUT DIFFERENT THINGS: SEVERAL DAYS

## 2021-09-16 ASSESSMENT — PATIENT HEALTH QUESTIONNAIRE - PHQ9
10. IF YOU CHECKED OFF ANY PROBLEMS, HOW DIFFICULT HAVE THESE PROBLEMS MADE IT FOR YOU TO DO YOUR WORK, TAKE CARE OF THINGS AT HOME, OR GET ALONG WITH OTHER PEOPLE: SOMEWHAT DIFFICULT
SUM OF ALL RESPONSES TO PHQ QUESTIONS 1-9: 8
SUM OF ALL RESPONSES TO PHQ QUESTIONS 1-9: 8

## 2021-09-16 ASSESSMENT — PAIN SCALES - GENERAL: PAINLEVEL: NO PAIN (0)

## 2021-09-16 NOTE — NURSING NOTE
Patient presents to clinic today for follow up on blood pressure and new medication.  Medication reconciliation completed.    ACP on file? No, form previously provided    FOOD SECURITY SCREENING QUESTIONS  Hunger Vital Signs:  Within the past 12 months we worried whether our food would run out before we got money to buy more. Never  Within the past 12 months the food we bought just didn't last and we didn't have money to get more. Never    Marlene Harrison CMA([facility])..................9/16/2021   3:57 PM

## 2021-09-16 NOTE — PROGRESS NOTES
Chief Complaint   Patient presents with     RECHECK         HPI: Ms. Fernando is a 60 year old female who presents today for follow up of hypertension.    She reports that she started on a half tab of chlorthalidone.  She has done okay with this with no obvious side effect although is noting some more fluid retention.  She has been checking her blood pressures at home.  They have ranged between 127/80 and 151/86.  They are down overall since starting the chlorthalidone.    She continues on Wellbutrin for mood.  She does need a refill of this.  She denies any obvious side effects.    She is due for her mammogram and we will place orders to obtain in the next few months prior to annual physical.    She  reports that she quit smoking about 31 years ago. Her smoking use included cigarettes. She has a 12.00 pack-year smoking history. She has never used smokeless tobacco.    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      ROS  Pertinent ROS was performed and was negative, including for fever, chills, chest pain, shortness of breath, increased lower extremity edema. No other concerns, with exception of HPI above.      EXAM:   BP (!) 142/92 (BP Location: Right arm, Patient Position: Sitting, Cuff Size: Adult Large)   Pulse 74   Temp 98.2  F (36.8  C) (Tympanic)   Resp 16   LMP  (LMP Unknown)   SpO2 96%     Estimated body mass index is 56.05 kg/m  as calculated from the following:    Height as of 8/4/21: 1.727 m (5' 8\").    Weight as of 8/4/21: 167.2 kg (368 lb 9.6 oz).      GEN: Vitals reviewed. Healthy appearing. Patient is in no acute distress. Cooperative with exam.  HEENT: Normocephalic atraumatic.  Eyes grossly normal to inspection.  No discharge or erythema, or obvious scleral/conjunctival abnormalities.   CV: Heart regular in rate and rhythm with no murmur.    LUNGS: No audible wheeze, cough, or visible cyanosis.  No visible retractions or increased work of breathing.  Lungs clear to auscultation bilaterally.    ABD:  Obese.  SKIN: Warm and dry to touch.  Visible skin clear. No significant rash, abnormal pigmentation or lesions.  EXT: No clubbing or cyanosis.   Trace to 1+ lower extremity peripheral edema.  PSYCH: Mood is good.  Mentation appears normal, affect normal/bright, judgement and insight intact, normal speech and appearance well-groomed.     ASSESSMENT AND PLAN:    Essential hypertension  -Blood pressure today remains uncontrolled with mild exacerbation.  Increase chlorthalidone to full tablet daily.  Plan on repeat renal test in approximately 1 week.  Call with any new concerns.  - chlorthalidone (HYGROTON) 25 MG tablet  Dispense: 90 tablet; Refill: 0    Depression, major, recurrent, mild (H)  -Stable/controlled overall.  Continue current medication.  - buPROPion (WELLBUTRIN XL) 150 MG 24 hr tablet  Dispense: 90 tablet; Refill: 0    Encounter for screening mammogram for breast cancer  - [place] " Screen Bilateral w/Favio       Follow-up in 3 months for annual physical or sooner as needed.      BONITA OLIVER, DO   9/16/2021 4:34 PM      Answers for HPI/ROS submitted by the patient on 9/16/2021  If you checked off any problems, how difficult have these problems made it for you to do your work, take care of things at home, or get along with other people?: Somewhat difficult  PHQ9 TOTAL SCORE: 8  ELIZABETH 7 TOTAL SCORE: 5

## 2021-09-17 ASSESSMENT — PATIENT HEALTH QUESTIONNAIRE - PHQ9: SUM OF ALL RESPONSES TO PHQ QUESTIONS 1-9: 8

## 2021-09-17 ASSESSMENT — ANXIETY QUESTIONNAIRES: GAD7 TOTAL SCORE: 5

## 2021-09-20 ENCOUNTER — TRANSFERRED RECORDS (OUTPATIENT)
Dept: HEALTH INFORMATION MANAGEMENT | Facility: OTHER | Age: 61
End: 2021-09-20

## 2021-09-20 ENCOUNTER — ANTICOAGULATION THERAPY VISIT (OUTPATIENT)
Dept: ANTICOAGULATION | Facility: OTHER | Age: 61
End: 2021-09-20
Attending: INTERNAL MEDICINE
Payer: COMMERCIAL

## 2021-09-20 DIAGNOSIS — Z79.01 ANTICOAGULATION MONITORING, INR RANGE 2-3: ICD-10-CM

## 2021-09-20 DIAGNOSIS — I74.9 EMBOLISM AND THROMBOSIS (H): Primary | ICD-10-CM

## 2021-09-20 LAB — INR (EXTERNAL): 2.7 (ref 0.9–1.1)

## 2021-09-20 NOTE — PROGRESS NOTES
ANTICOAGULATION FOLLOW-UP CLINIC VISIT    Patient Name:  Trisha Fernando  Date:  2021  Contact Type:  no call needed patient to continue same dose    SUBJECTIVE:  Patient Findings     Positives:  Change in medications (increased dose of hygroton)        Clinical Outcomes     Negatives:  Major bleeding event, Thromboembolic event, Anticoagulation-related hospital admission, Anticoagulation-related ED visit, Anticoagulation-related fatality           OBJECTIVE    Recent labs: (last 7 days)     21  1555   INR 2.7*       ASSESSMENT / PLAN  INR assessment THER    Recheck INR In: 1 WEEK    INR Location Home INR      Anticoagulation Summary  As of 2021    INR goal:  2.0-3.0   TTR:  69.8 % (1 y)   INR used for dosin.7 (2021)   Warfarin maintenance plan:  2.5 mg (5 mg x 0.5) every Wed; 5 mg (5 mg x 1) all other days   Full warfarin instructions:  2.5 mg every Wed; 5 mg all other days   Weekly warfarin total:  32.5 mg   No change documented:  Smitha Nair RN   Plan last modified:  Mariia Foster RN (2021)   Next INR check:  2021   Priority:  High   Target end date:  Indefinite    Indications    Long term (current) use of anticoagulants [Z79.01]  Embolism and thrombosis (H) [I74.9]  Anticoagulation monitoring  INR range 2-3 [Z79.01]             Anticoagulation Episode Summary     INR check location:      Preferred lab:      Send INR reminders to:  [facility]    Comments:  MDINR needs weekly INR done      Anticoagulation Care Providers     Provider Role Specialty Phone number    Maria Guadalupe Ramon DO Referring Internal Medicine [phone]            See the Encounter Report to view Anticoagulation Flowsheet and Dosing Calendar (Go to Encounters tab in chart review, and find the Anticoagulation Therapy Visit)        Smitha Nair, RN

## 2021-09-21 ENCOUNTER — TELEPHONE (OUTPATIENT)
Dept: LAB | Facility: OTHER | Age: 61
End: 2021-09-21

## 2021-09-21 DIAGNOSIS — Z79.899 HIGH RISK MEDICATION USE: ICD-10-CM

## 2021-09-21 DIAGNOSIS — I10 ESSENTIAL HYPERTENSION: Primary | ICD-10-CM

## 2021-09-21 NOTE — PROGRESS NOTES
Patient is coming for a lab only appointment on Thursday, 9/23. Note says labs for Dr. Ramon.  Please place orders.  Thanks,  Lab

## 2021-09-22 PROBLEM — Z09 HOSPITAL DISCHARGE FOLLOW-UP: Status: RESOLVED | Noted: 2020-11-16 | Resolved: 2021-09-22

## 2021-09-22 PROBLEM — U07.1 2019 NOVEL CORONAVIRUS DISEASE (COVID-19): Status: RESOLVED | Noted: 2020-11-25 | Resolved: 2021-09-22

## 2021-09-22 PROBLEM — Z79.01 LONG TERM (CURRENT) USE OF ANTICOAGULANTS: Status: RESOLVED | Noted: 2018-02-11 | Resolved: 2021-09-22

## 2021-09-22 PROBLEM — I74.9 EMBOLISM AND THROMBOSIS (H): Status: RESOLVED | Noted: 2018-02-11 | Resolved: 2021-09-22

## 2021-09-23 ENCOUNTER — LAB (OUTPATIENT)
Dept: LAB | Facility: OTHER | Age: 61
End: 2021-09-23
Attending: INTERNAL MEDICINE
Payer: COMMERCIAL

## 2021-09-23 DIAGNOSIS — I10 ESSENTIAL HYPERTENSION: ICD-10-CM

## 2021-09-23 DIAGNOSIS — Z79.899 HIGH RISK MEDICATION USE: ICD-10-CM

## 2021-09-23 LAB
ALBUMIN SERPL-MCNC: 4 G/DL (ref 3.5–5.7)
ANION GAP SERPL CALCULATED.3IONS-SCNC: 6 MMOL/L (ref 3–14)
BUN SERPL-MCNC: 16 MG/DL (ref 7–25)
CALCIUM SERPL-MCNC: 10.4 MG/DL (ref 8.6–10.3)
CHLORIDE BLD-SCNC: 99 MMOL/L (ref 98–107)
CO2 SERPL-SCNC: 34 MMOL/L (ref 21–31)
CREAT SERPL-MCNC: 0.95 MG/DL (ref 0.6–1.2)
GFR SERPL CREATININE-BSD FRML MDRD: 65 ML/MIN/1.73M2
GLUCOSE BLD-MCNC: 108 MG/DL (ref 70–105)
HOLD SPECIMEN: NORMAL
HOLD SPECIMEN: NORMAL
PHOSPHATE SERPL-MCNC: 2.5 MG/DL (ref 2.5–5)
POTASSIUM BLD-SCNC: 3.5 MMOL/L (ref 3.5–5.1)
SODIUM SERPL-SCNC: 139 MMOL/L (ref 134–144)

## 2021-09-23 PROCEDURE — 82040 ASSAY OF SERUM ALBUMIN: CPT | Mod: ZL

## 2021-09-23 PROCEDURE — 36415 COLL VENOUS BLD VENIPUNCTURE: CPT | Mod: ZL

## 2021-09-25 ENCOUNTER — HEALTH MAINTENANCE LETTER (OUTPATIENT)
Age: 61
End: 2021-09-25

## 2021-09-27 ENCOUNTER — ANTICOAGULATION THERAPY VISIT (OUTPATIENT)
Dept: ANTICOAGULATION | Facility: OTHER | Age: 61
End: 2021-09-27
Attending: INTERNAL MEDICINE
Payer: COMMERCIAL

## 2021-09-27 ENCOUNTER — TRANSFERRED RECORDS (OUTPATIENT)
Dept: HEALTH INFORMATION MANAGEMENT | Facility: OTHER | Age: 61
End: 2021-09-27

## 2021-09-27 DIAGNOSIS — Z79.01 ANTICOAGULATION MONITORING, INR RANGE 2-3: Primary | ICD-10-CM

## 2021-09-27 LAB
INR (EXTERNAL): 2.6 (ref 2–3)
INR POINT OF CARE: 2.6 (ref 0.9–1.1)

## 2021-09-27 PROCEDURE — 85610 PROTHROMBIN TIME: CPT | Mod: QW

## 2021-09-27 PROCEDURE — 36416 COLLJ CAPILLARY BLOOD SPEC: CPT

## 2021-09-27 NOTE — PROGRESS NOTES
ANTICOAGULATION FOLLOW-UP CLINIC VISIT    Patient Name:  Trisha Fernando  Date:  2021  Contact Type:  No phone call needed. INR within range.     SUBJECTIVE:  Patient Findings         Clinical Outcomes     Negatives:  Major bleeding event, Thromboembolic event, Anticoagulation-related hospital admission, Anticoagulation-related ED visit, Anticoagulation-related fatality           OBJECTIVE    Recent labs: (last 7 days)     21  1437   INR 2.6*  2.6       ASSESSMENT / PLAN  INR assessment THER    Recheck INR In: 1 WEEK    INR Location Home INR      Anticoagulation Summary  As of 2021    INR goal:  2.0-3.0   TTR:  70.7 % (1 y)   INR used for dosin.6 (2021)   Warfarin maintenance plan:  2.5 mg (5 mg x 0.5) every Wed; 5 mg (5 mg x 1) all other days   Full warfarin instructions:  2.5 mg every Wed; 5 mg all other days   Weekly warfarin total:  32.5 mg   No change documented:  Dione Julian RN   Plan last modified:  Mariia Foster RN (2021)   Next INR check:  10/4/2021   Priority:  High   Target end date:  Indefinite    Indications    Long term (current) use of anticoagulants (Resolved) [Z79.01]  Embolism and thrombosis (H) (Resolved) [I74.9]  Anticoagulation monitoring  INR range 2-3 [Z79.01]             Anticoagulation Episode Summary     INR check location:      Preferred lab:      Send INR reminders to:  [facility]    Comments:  MDINR needs weekly INR done      Anticoagulation Care Providers     Provider Role Specialty Phone number    Maria Guadalupe Ramon DO Referring Internal Medicine [phone]            See the Encounter Report to view Anticoagulation Flowsheet and Dosing Calendar (Go to Encounters tab in chart review, and find the Anticoagulation Therapy Visit)     No encounter, INR received via fax.  INR in range so no telephone call.  Patient is to call INR clinic if any changes affecting INR.     Dione Julian, SINDHU

## 2021-09-29 ENCOUNTER — HOSPITAL ENCOUNTER (OUTPATIENT)
Dept: MAMMOGRAPHY | Facility: OTHER | Age: 61
Discharge: HOME | End: 2021-09-29
Attending: INTERNAL MEDICINE | Admitting: INTERNAL MEDICINE
Payer: COMMERCIAL

## 2021-09-29 DIAGNOSIS — Z12.31 ENCOUNTER FOR SCREENING MAMMOGRAM FOR BREAST CANCER: ICD-10-CM

## 2021-09-29 PROCEDURE — 77063 BREAST TOMOSYNTHESIS BI: CPT

## 2021-10-04 ENCOUNTER — TRANSFERRED RECORDS (OUTPATIENT)
Dept: HEALTH INFORMATION MANAGEMENT | Facility: OTHER | Age: 61
End: 2021-10-04

## 2021-10-05 ENCOUNTER — ANTICOAGULATION THERAPY VISIT (OUTPATIENT)
Dept: ANTICOAGULATION | Facility: OTHER | Age: 61
End: 2021-10-05
Attending: INTERNAL MEDICINE
Payer: COMMERCIAL

## 2021-10-05 DIAGNOSIS — Z79.01 ANTICOAGULATION MONITORING, INR RANGE 2-3: Primary | ICD-10-CM

## 2021-10-05 LAB — INR (EXTERNAL): 2.1 (ref 0.9–1.1)

## 2021-10-05 NOTE — PROGRESS NOTES
ANTICOAGULATION FOLLOW-UP CLINIC VISIT    Patient Name:  Trisha Fernando  Date:  10/5/2021  Contact Type:  No phone call needed. INR within range.    SUBJECTIVE:  Patient Findings         Clinical Outcomes     Negatives:  Major bleeding event, Thromboembolic event, Anticoagulation-related hospital admission, Anticoagulation-related ED visit, Anticoagulation-related fatality           OBJECTIVE    Recent labs: (last 7 days)     10/05/21  1129   INR 2.1*       ASSESSMENT / PLAN  INR assessment THER    Recheck INR In: 1 WEEK    INR Location Home INR      Anticoagulation Summary  As of 10/5/2021    INR goal:  2.0-3.0   TTR:  72.9 % (1 y)   INR used for dosin.1 (10/5/2021)   Warfarin maintenance plan:  2.5 mg (5 mg x 0.5) every Wed; 5 mg (5 mg x 1) all other days   Full warfarin instructions:  2.5 mg every Wed; 5 mg all other days   Weekly warfarin total:  32.5 mg   No change documented:  Dione Julian RN   Plan last modified:  Mariia Foster RN (2021)   Next INR check:  10/12/2021   Priority:  High   Target end date:  Indefinite    Indications    Long term (current) use of anticoagulants (Resolved) [Z79.01]  Embolism and thrombosis (H) (Resolved) [I74.9]  Anticoagulation monitoring  INR range 2-3 [Z79.01]             Anticoagulation Episode Summary     INR check location:      Preferred lab:      Send INR reminders to:  [facility]    Comments:  MDINR needs weekly INR done      Anticoagulation Care Providers     Provider Role Specialty Phone number    Maria Guadalupe Ramon DO Referring Internal Medicine [phone]            See the Encounter Report to view Anticoagulation Flowsheet and Dosing Calendar (Go to Encounters tab in chart review, and find the Anticoagulation Therapy Visit)     No encounter, INR received via fax.  INR in range so no telephone call.  Patient is to call INR clinic if any changes affecting INR.       Dione Julian RN

## 2021-10-07 NOTE — CONSULTS
Pt seen today for an anesthesia consult and airway evaluation prior to her scheduled colonoscopy .  This is a very pleasant 58 y.o. patient who has had successful anesthetics in the past with no problems.  She denies any history of airway difficulties and has had no prior GA problems.  She has gained weight since her previous Gallbladder surgery where her BMI at that time was 43.3. She currently has a BMI of 54.72. The risks were explained to her regarding high BMI individuals and potential airway difficulties. Evaluation of her airway today demonstrated no signifigant issues.  Patient was noted to have a Malampati score of 1 with good neck flexion & extension, & adequate mental to thyroid cartilage distance. No problems with dentition. She does snore occasionally at night, but has not been officially diagnosed with JIM.  Plan to proceed with surgery as scheduled.     BELLA Mckeon CRNA on 4/11/2019 at 2:10 PM   none

## 2021-10-12 ENCOUNTER — ANTICOAGULATION THERAPY VISIT (OUTPATIENT)
Dept: ANTICOAGULATION | Facility: OTHER | Age: 61
End: 2021-10-12
Attending: INTERNAL MEDICINE
Payer: COMMERCIAL

## 2021-10-12 ENCOUNTER — TRANSFERRED RECORDS (OUTPATIENT)
Dept: HEALTH INFORMATION MANAGEMENT | Facility: OTHER | Age: 61
End: 2021-10-12

## 2021-10-12 DIAGNOSIS — Z79.01 ANTICOAGULATION MONITORING, INR RANGE 2-3: Primary | ICD-10-CM

## 2021-10-12 LAB — INR (EXTERNAL): 2 (ref 0.9–1.1)

## 2021-10-12 NOTE — PROGRESS NOTES
ANTICOAGULATION FOLLOW-UP CLINIC VISIT    Patient Name:  Trisha Fernando  Date:  10/12/2021  Contact Type:  No phone call needed. INR within range. Continue same dose.    SUBJECTIVE:  Patient Findings         Clinical Outcomes     Negatives:  Major bleeding event, Thromboembolic event, Anticoagulation-related hospital admission, Anticoagulation-related ED visit, Anticoagulation-related fatality           OBJECTIVE    Recent labs: (last 7 days)     10/12/21  1252   INR 2.0*       ASSESSMENT / PLAN  INR assessment THER    Recheck INR In: 1 WEEK    INR Location Home INR      Anticoagulation Summary  As of 10/12/2021    INR goal:  2.0-3.0   TTR:  74.9 % (1 y)   INR used for dosin.0 (10/12/2021)   Warfarin maintenance plan:  2.5 mg (5 mg x 0.5) every Wed; 5 mg (5 mg x 1) all other days   Full warfarin instructions:  2.5 mg every Wed; 5 mg all other days   Weekly warfarin total:  32.5 mg   No change documented:  Dione Julian RN   Plan last modified:  Mariia Foster RN (2021)   Next INR check:  10/19/2021   Priority:  High   Target end date:  Indefinite    Indications    Long term (current) use of anticoagulants (Resolved) [Z79.01]  Embolism and thrombosis (H) (Resolved) [I74.9]  Anticoagulation monitoring  INR range 2-3 [Z79.01]             Anticoagulation Episode Summary     INR check location:      Preferred lab:      Send INR reminders to:  [facility]    Comments:  MDINR needs weekly INR done      Anticoagulation Care Providers     Provider Role Specialty Phone number    Maria Guadalupe Ramon DO Referring Internal Medicine [phone]            See the Encounter Report to view Anticoagulation Flowsheet and Dosing Calendar (Go to Encounters tab in chart review, and find the Anticoagulation Therapy Visit)     No encounter, INR received via fax.  INR in range so no telephone call.  Patient is to call INR clinic if any changes affecting INR.       Dione Julian RN

## 2021-10-13 DIAGNOSIS — I10 ESSENTIAL HYPERTENSION: ICD-10-CM

## 2021-10-14 RX ORDER — CHLORTHALIDONE 25 MG/1
TABLET ORAL
Qty: 45 TABLET | OUTPATIENT
Start: 2021-10-14

## 2021-10-14 NOTE — TELEPHONE ENCOUNTER
chlorthalidone (HYGROTON) 25 MG tablet 90 tablet 0 9/16/2021  No   Sig - Route: Take 1 tablet (25 mg) by mouth daily Increase in dose -      To [facility]  Refill request to soon  Jil Hull RN on 10/14/2021 at 4:11 PM

## 2021-10-18 ENCOUNTER — TRANSFERRED RECORDS (OUTPATIENT)
Dept: HEALTH INFORMATION MANAGEMENT | Facility: OTHER | Age: 61
End: 2021-10-18

## 2021-10-18 ENCOUNTER — ANTICOAGULATION THERAPY VISIT (OUTPATIENT)
Dept: ANTICOAGULATION | Facility: OTHER | Age: 61
End: 2021-10-18
Attending: INTERNAL MEDICINE
Payer: COMMERCIAL

## 2021-10-18 DIAGNOSIS — Z79.01 ANTICOAGULATION MONITORING, INR RANGE 2-3: Primary | ICD-10-CM

## 2021-10-18 LAB — INR (EXTERNAL): 2.7 (ref 0.9–1.1)

## 2021-10-18 NOTE — PROGRESS NOTES
ANTICOAGULATION FOLLOW-UP CLINIC VISIT    Patient Name:  Trisha Fernando  Date:  10/18/2021  Contact Type:  Face to Face    SUBJECTIVE:  Patient Findings         Clinical Outcomes     Negatives:  Major bleeding event, Thromboembolic event, Anticoagulation-related hospital admission, Anticoagulation-related ED visit, Anticoagulation-related fatality           OBJECTIVE    Recent labs: (last 7 days)     10/18/21  1559   INR 2.7*       ASSESSMENT / PLAN  INR assessment THER    Recheck INR In: 1 WEEK    INR Location Home INR      Anticoagulation Summary  As of 10/18/2021    INR goal:  2.0-3.0   TTR:  76.5 % (1 y)   INR used for dosin.7 (10/18/2021)   Warfarin maintenance plan:  2.5 mg (5 mg x 0.5) every Wed; 5 mg (5 mg x 1) all other days   Full warfarin instructions:  2.5 mg every Wed; 5 mg all other days   Weekly warfarin total:  32.5 mg   No change documented:  Smitha Nair RN   Plan last modified:  Mariia Foster RN (2021)   Next INR check:  10/25/2021   Priority:  High   Target end date:  Indefinite    Indications    Long term (current) use of anticoagulants (Resolved) [Z79.01]  Embolism and thrombosis (H) (Resolved) [I74.9]  Anticoagulation monitoring  INR range 2-3 [Z79.01]             Anticoagulation Episode Summary     INR check location:      Preferred lab:      Send INR reminders to:  [facility]    Comments:  MDINR needs weekly INR done      Anticoagulation Care Providers     Provider Role Specialty Phone number    Maria Guadalupe Ramon DO Referring Internal Medicine [phone]            See the Encounter Report to view Anticoagulation Flowsheet and Dosing Calendar (Go to Encounters tab in chart review, and find the Anticoagulation Therapy Visit)        Smitha Nair, RN

## 2021-10-25 ENCOUNTER — TRANSFERRED RECORDS (OUTPATIENT)
Dept: HEALTH INFORMATION MANAGEMENT | Facility: OTHER | Age: 61
End: 2021-10-25

## 2021-10-25 LAB — INR (EXTERNAL): 2.5 (ref 2–3)

## 2021-10-26 ENCOUNTER — ANTICOAGULATION THERAPY VISIT (OUTPATIENT)
Dept: ANTICOAGULATION | Facility: OTHER | Age: 61
End: 2021-10-26
Attending: INTERNAL MEDICINE
Payer: COMMERCIAL

## 2021-10-26 DIAGNOSIS — Z79.01 ANTICOAGULATION MONITORING, INR RANGE 2-3: Primary | ICD-10-CM

## 2021-10-26 NOTE — PROGRESS NOTES
ANTICOAGULATION FOLLOW-UP CLINIC VISIT    Patient Name:  Trisha Fernando  Date:  10/26/2021  Contact Type:  no call needed patient to continue same dose    SUBJECTIVE:  Patient Findings         Clinical Outcomes     Negatives:  Major bleeding event, Thromboembolic event, Anticoagulation-related hospital admission, Anticoagulation-related ED visit, Anticoagulation-related fatality           OBJECTIVE    Recent labs: (last 7 days)     10/25/21  2331   INR 2.5*       ASSESSMENT / PLAN  INR assessment THER    Recheck INR In: 1 WEEK    INR Location Home INR      Anticoagulation Summary  As of 10/26/2021    INR goal:  2.0-3.0   TTR:  77.8 % (1 y)   INR used for dosin.5 (10/25/2021)   Warfarin maintenance plan:  2.5 mg (5 mg x 0.5) every Wed; 5 mg (5 mg x 1) all other days   Full warfarin instructions:  2.5 mg every Wed; 5 mg all other days   Weekly warfarin total:  32.5 mg   No change documented:  Smitha Nair RN   Plan last modified:  Mariia Foster RN (2021)   Next INR check:  2021   Priority:  High   Target end date:  Indefinite    Indications    Long term (current) use of anticoagulants (Resolved) [Z79.01]  Embolism and thrombosis (H) (Resolved) [I74.9]  Anticoagulation monitoring  INR range 2-3 [Z79.01]             Anticoagulation Episode Summary     INR check location:      Preferred lab:      Send INR reminders to:  [facility]    Comments:  MDINR needs weekly INR done      Anticoagulation Care Providers     Provider Role Specialty Phone number    Maria Guadalupe Ramon DO Referring Internal Medicine [phone]            See the Encounter Report to view Anticoagulation Flowsheet and Dosing Calendar (Go to Encounters tab in chart review, and find the Anticoagulation Therapy Visit)        Smitha Nair, RN

## 2021-11-01 ENCOUNTER — TRANSFERRED RECORDS (OUTPATIENT)
Dept: HEALTH INFORMATION MANAGEMENT | Facility: OTHER | Age: 61
End: 2021-11-01

## 2021-11-01 LAB — INR (EXTERNAL): 2.6 (ref 0.9–1.1)

## 2021-11-02 ENCOUNTER — ANTICOAGULATION THERAPY VISIT (OUTPATIENT)
Dept: ANTICOAGULATION | Facility: OTHER | Age: 61
End: 2021-11-02
Attending: INTERNAL MEDICINE
Payer: COMMERCIAL

## 2021-11-02 DIAGNOSIS — Z79.01 ANTICOAGULATION MONITORING, INR RANGE 2-3: Primary | ICD-10-CM

## 2021-11-02 NOTE — PROGRESS NOTES
ANTICOAGULATION FOLLOW-UP CLINIC VISIT    Patient Name:  Trisha Fernando  Date:  2021  Contact Type:  no call needed patient to continue same dose    SUBJECTIVE:  Patient Findings         Clinical Outcomes     Negatives:  Major bleeding event, Thromboembolic event, Anticoagulation-related hospital admission, Anticoagulation-related ED visit, Anticoagulation-related fatality           OBJECTIVE    Recent labs: (last 7 days)     21  1630   INR 2.6*       ASSESSMENT / PLAN  INR assessment THER    Recheck INR In: 1 WEEK    INR Location Home INR      Anticoagulation Summary  As of 2021    INR goal:  2.0-3.0   TTR:  77.8 % (1 y)   INR used for dosin.6 (2021)   Warfarin maintenance plan:  2.5 mg (5 mg x 0.5) every Wed; 5 mg (5 mg x 1) all other days   Full warfarin instructions:  2.5 mg every Wed; 5 mg all other days   Weekly warfarin total:  32.5 mg   Plan last modified:  Mariia Foster RN (2021)   Next INR check:  2021   Priority:  High   Target end date:  Indefinite    Indications    Long term (current) use of anticoagulants (Resolved) [Z79.01]  Embolism and thrombosis (H) (Resolved) [I74.9]  Anticoagulation monitoring  INR range 2-3 [Z79.01]             Anticoagulation Episode Summary     INR check location:      Preferred lab:      Send INR reminders to:  [facility]    Comments:  MDINR needs weekly INR done      Anticoagulation Care Providers     Provider Role Specialty Phone number    Maria Guadalupe Ramon DO Referring Internal Medicine [phone]            See the Encounter Report to view Anticoagulation Flowsheet and Dosing Calendar (Go to Encounters tab in chart review, and find the Anticoagulation Therapy Visit)        Smitha Nair, RN

## 2021-11-08 ENCOUNTER — TRANSFERRED RECORDS (OUTPATIENT)
Dept: HEALTH INFORMATION MANAGEMENT | Facility: OTHER | Age: 61
End: 2021-11-08

## 2021-11-08 ENCOUNTER — ANTICOAGULATION THERAPY VISIT (OUTPATIENT)
Dept: ANTICOAGULATION | Facility: OTHER | Age: 61
End: 2021-11-08
Attending: INTERNAL MEDICINE
Payer: COMMERCIAL

## 2021-11-08 DIAGNOSIS — Z79.01 ANTICOAGULATION MONITORING, INR RANGE 2-3: Primary | ICD-10-CM

## 2021-11-08 LAB — INR (EXTERNAL): 2.2 (ref 0.9–1.1)

## 2021-11-08 NOTE — PROGRESS NOTES
ANTICOAGULATION FOLLOW-UP CLINIC VISIT    Patient Name:  Trisha Fernando  Date:  2021  Contact Type:  No phone call needed. INR within range.    SUBJECTIVE:  Patient Findings         Clinical Outcomes     Negatives:  Major bleeding event, Thromboembolic event, Anticoagulation-related hospital admission, Anticoagulation-related ED visit, Anticoagulation-related fatality           OBJECTIVE    Recent labs: (last 7 days)     21  1422   INR 2.2*       ASSESSMENT / PLAN  INR assessment THER    Recheck INR In: 1 WEEK    INR Location Home INR      Anticoagulation Summary  As of 2021    INR goal:  2.0-3.0   TTR:  78.0 % (1 y)   INR used for dosin.2 (2021)   Warfarin maintenance plan:  2.5 mg (5 mg x 0.5) every Wed; 5 mg (5 mg x 1) all other days   Full warfarin instructions:  2.5 mg every Wed; 5 mg all other days   Weekly warfarin total:  32.5 mg   No change documented:  Dione Julian RN   Plan last modified:  Mariia Foster RN (2021)   Next INR check:  11/15/2021   Priority:  High   Target end date:  Indefinite    Indications    Long term (current) use of anticoagulants (Resolved) [Z79.01]  Embolism and thrombosis (H) (Resolved) [I74.9]  Anticoagulation monitoring  INR range 2-3 [Z79.01]             Anticoagulation Episode Summary     INR check location:      Preferred lab:      Send INR reminders to:  [facility]    Comments:  MDINR needs weekly INR done      Anticoagulation Care Providers     Provider Role Specialty Phone number    Maria Guadalupe Ramon DO Referring Internal Medicine [phone]            See the Encounter Report to view Anticoagulation Flowsheet and Dosing Calendar (Go to Encounters tab in chart review, and find the Anticoagulation Therapy Visit)     No encounter, INR received via fax.  INR in range so no telephone call.  Patient is to call INR clinic if any changes affecting INR.       Dione Julian RN

## 2021-11-16 ENCOUNTER — TRANSFERRED RECORDS (OUTPATIENT)
Dept: HEALTH INFORMATION MANAGEMENT | Facility: OTHER | Age: 61
End: 2021-11-16

## 2021-11-16 ENCOUNTER — ANTICOAGULATION THERAPY VISIT (OUTPATIENT)
Dept: ANTICOAGULATION | Facility: OTHER | Age: 61
End: 2021-11-16
Attending: INTERNAL MEDICINE
Payer: COMMERCIAL

## 2021-11-16 DIAGNOSIS — Z79.01 ANTICOAGULATION MONITORING, INR RANGE 2-3: Primary | ICD-10-CM

## 2021-11-16 LAB — INR (EXTERNAL): 2.4 (ref 0.9–1.1)

## 2021-11-16 NOTE — PROGRESS NOTES
ANTICOAGULATION FOLLOW-UP CLINIC VISIT    Patient Name:  Trisha Fernando  Date:  2021  Contact Type:  No phone call needed. INR within range    SUBJECTIVE:  Patient Findings         Clinical Outcomes     Negatives:  Major bleeding event, Thromboembolic event, Anticoagulation-related hospital admission, Anticoagulation-related ED visit, Anticoagulation-related fatality           OBJECTIVE    Recent labs: (last 7 days)     21  0845   INR 2.4*       ASSESSMENT / PLAN  INR assessment THER    Recheck INR In: 1 WEEK    INR Location Home INR      Anticoagulation Summary  As of 2021    INR goal:  2.0-3.0   TTR:  79.8 % (1 y)   INR used for dosin.4 (2021)   Warfarin maintenance plan:  2.5 mg (5 mg x 0.5) every Wed; 5 mg (5 mg x 1) all other days   Full warfarin instructions:  2.5 mg every Wed; 5 mg all other days   Weekly warfarin total:  32.5 mg   No change documented:  Dione Julian RN   Plan last modified:  Mariia Foster RN (2021)   Next INR check:  2021   Priority:  High   Target end date:  Indefinite    Indications    Long term (current) use of anticoagulants (Resolved) [Z79.01]  Embolism and thrombosis (H) (Resolved) [I74.9]  Anticoagulation monitoring  INR range 2-3 [Z79.01]             Anticoagulation Episode Summary     INR check location:      Preferred lab:      Send INR reminders to:  [facility]    Comments:  MDINR needs weekly INR done      Anticoagulation Care Providers     Provider Role Specialty Phone number    Maria Guadalupe Ramon DO Referring Internal Medicine [phone]            See the Encounter Report to view Anticoagulation Flowsheet and Dosing Calendar (Go to Encounters tab in chart review, and find the Anticoagulation Therapy Visit)     No encounter, INR received via fax.  INR in range so no telephone call.  Patient is to call INR clinic if any changes affecting INR.       Dione Julian RN

## 2021-11-22 ENCOUNTER — TRANSFERRED RECORDS (OUTPATIENT)
Dept: HEALTH INFORMATION MANAGEMENT | Facility: OTHER | Age: 61
End: 2021-11-22
Payer: COMMERCIAL

## 2021-11-22 LAB — INR (EXTERNAL): 2.4 (ref 0.9–1.1)

## 2021-11-23 ENCOUNTER — ANTICOAGULATION THERAPY VISIT (OUTPATIENT)
Dept: ANTICOAGULATION | Facility: OTHER | Age: 61
End: 2021-11-23
Attending: SURGERY
Payer: COMMERCIAL

## 2021-11-23 DIAGNOSIS — Z79.01 ANTICOAGULATION MONITORING, INR RANGE 2-3: Primary | ICD-10-CM

## 2021-11-23 NOTE — PROGRESS NOTES
ANTICOAGULATION FOLLOW-UP CLINIC VISIT    Patient Name:  Trisha Fernando  Date:  2021  Contact Type:  no call needed patient to continue same dose    SUBJECTIVE:  Patient Findings         Clinical Outcomes     Negatives:  Major bleeding event, Thromboembolic event, Anticoagulation-related hospital admission, Anticoagulation-related ED visit, Anticoagulation-related fatality           OBJECTIVE    Recent labs: (last 7 days)     21  1700   INR 2.4*       ASSESSMENT / PLAN  INR assessment THER    Recheck INR In: 1 WEEK    INR Location Home INR      Anticoagulation Summary  As of 2021    INR goal:  2.0-3.0   TTR:  80.2 % (1 y)   INR used for dosin.4 (2021)   Warfarin maintenance plan:  2.5 mg (5 mg x 0.5) every Wed; 5 mg (5 mg x 1) all other days   Full warfarin instructions:  2.5 mg every Wed; 5 mg all other days   Weekly warfarin total:  32.5 mg   No change documented:  Smitha Nair RN   Plan last modified:  Mariia Foster RN (2021)   Next INR check:  2021   Priority:  High   Target end date:  Indefinite    Indications    Long term (current) use of anticoagulants (Resolved) [Z79.01]  Embolism and thrombosis (H) (Resolved) [I74.9]  Anticoagulation monitoring  INR range 2-3 [Z79.01]             Anticoagulation Episode Summary     INR check location:      Preferred lab:      Send INR reminders to:  [facility]    Comments:  MDINR needs weekly INR done      Anticoagulation Care Providers     Provider Role Specialty Phone number    Maria Guadalupe Ramon DO Referring Internal Medicine [phone]            See the Encounter Report to view Anticoagulation Flowsheet and Dosing Calendar (Go to Encounters tab in chart review, and find the Anticoagulation Therapy Visit)        Smitha Nair, RN

## 2021-11-30 ENCOUNTER — TRANSFERRED RECORDS (OUTPATIENT)
Dept: HEALTH INFORMATION MANAGEMENT | Facility: OTHER | Age: 61
End: 2021-11-30

## 2021-11-30 ENCOUNTER — ANTICOAGULATION THERAPY VISIT (OUTPATIENT)
Dept: ANTICOAGULATION | Facility: OTHER | Age: 61
End: 2021-11-30
Payer: COMMERCIAL

## 2021-11-30 DIAGNOSIS — Z79.01 ANTICOAGULATION MONITORING, INR RANGE 2-3: Primary | ICD-10-CM

## 2021-11-30 LAB — INR (EXTERNAL): 2.4 (ref 0.9–1.1)

## 2021-11-30 NOTE — PROGRESS NOTES
ANTICOAGULATION FOLLOW-UP CLINIC VISIT    Patient Name:  Trisha Fernando  Date:  2021  Contact Type:  no call needed patient to continue same dose    SUBJECTIVE:  Patient Findings         Clinical Outcomes     Negatives:  Major bleeding event, Thromboembolic event, Anticoagulation-related hospital admission, Anticoagulation-related ED visit, Anticoagulation-related fatality           OBJECTIVE    Recent labs: (last 7 days)     21  1255   INR 2.4*       ASSESSMENT / PLAN  INR assessment THER    Recheck INR In: 1 WEEK    INR Location Home INR      Anticoagulation Summary  As of 2021    INR goal:  2.0-3.0   TTR:  81.7 % (1 y)   INR used for dosin.4 (2021)   Warfarin maintenance plan:  2.5 mg (5 mg x 0.5) every Wed; 5 mg (5 mg x 1) all other days   Full warfarin instructions:  2.5 mg every Wed; 5 mg all other days   Weekly warfarin total:  32.5 mg   No change documented:  Smitha Nair RN   Plan last modified:  Mariia Foster RN (2021)   Next INR check:  2021   Priority:  High   Target end date:  Indefinite    Indications    Long term (current) use of anticoagulants (Resolved) [Z79.01]  Embolism and thrombosis (H) (Resolved) [I74.9]  Anticoagulation monitoring  INR range 2-3 [Z79.01]             Anticoagulation Episode Summary     INR check location:      Preferred lab:      Send INR reminders to:  [facility]    Comments:  MDINR needs weekly INR done      Anticoagulation Care Providers     Provider Role Specialty Phone number    Maria Guadalupe Ramon DO Referring Internal Medicine [phone]            See the Encounter Report to view Anticoagulation Flowsheet and Dosing Calendar (Go to Encounters tab in chart review, and find the Anticoagulation Therapy Visit)        Smitha Nair, RN

## 2021-12-06 ENCOUNTER — TRANSFERRED RECORDS (OUTPATIENT)
Dept: HEALTH INFORMATION MANAGEMENT | Facility: OTHER | Age: 61
End: 2021-12-06
Payer: COMMERCIAL

## 2021-12-06 LAB — INR (EXTERNAL): 2.4 (ref 0.9–1.1)

## 2021-12-07 ENCOUNTER — ANTICOAGULATION THERAPY VISIT (OUTPATIENT)
Dept: ANTICOAGULATION | Facility: OTHER | Age: 61
End: 2021-12-07
Attending: INTERNAL MEDICINE
Payer: COMMERCIAL

## 2021-12-07 DIAGNOSIS — Z79.01 ANTICOAGULATION MONITORING, INR RANGE 2-3: Primary | ICD-10-CM

## 2021-12-07 NOTE — PROGRESS NOTES
ANTICOAGULATION FOLLOW-UP CLINIC VISIT    Patient Name:  Trisha Fernando  Date:  2021  Contact Type:  no call needed patient to continue same dose    SUBJECTIVE:  Patient Findings         Clinical Outcomes     Negatives:  Major bleeding event, Thromboembolic event, Anticoagulation-related hospital admission, Anticoagulation-related ED visit, Anticoagulation-related fatality           OBJECTIVE    Recent labs: (last 7 days)     21   INR 2.4*       ASSESSMENT / PLAN  INR assessment THER    Recheck INR In: 1 WEEK    INR Location Home INR      Anticoagulation Summary  As of 2021    INR goal:  2.0-3.0   TTR:  83.6 % (1 y)   INR used for dosin.4 (2021)   Warfarin maintenance plan:  2.5 mg (5 mg x 0.5) every Wed; 5 mg (5 mg x 1) all other days   Full warfarin instructions:  2.5 mg every Wed; 5 mg all other days   Weekly warfarin total:  32.5 mg   No change documented:  Smitha Nair RN   Plan last modified:  Mariia Foster RN (2021)   Next INR check:  2021   Priority:  High   Target end date:  Indefinite    Indications    Long term (current) use of anticoagulants (Resolved) [Z79.01]  Embolism and thrombosis (H) (Resolved) [I74.9]  Anticoagulation monitoring  INR range 2-3 [Z79.01]             Anticoagulation Episode Summary     INR check location:      Preferred lab:      Send INR reminders to:  [facility]    Comments:  MDINR needs weekly INR done      Anticoagulation Care Providers     Provider Role Specialty Phone number    Maria Guadalupe Ramon DO Referring Internal Medicine [phone]            See the Encounter Report to view Anticoagulation Flowsheet and Dosing Calendar (Go to Encounters tab in chart review, and find the Anticoagulation Therapy Visit)        Smitha Nair, RN

## 2021-12-12 DIAGNOSIS — F33.0 DEPRESSION, MAJOR, RECURRENT, MILD (H): ICD-10-CM

## 2021-12-13 ENCOUNTER — TRANSFERRED RECORDS (OUTPATIENT)
Dept: HEALTH INFORMATION MANAGEMENT | Facility: OTHER | Age: 61
End: 2021-12-13
Payer: COMMERCIAL

## 2021-12-13 LAB — INR (EXTERNAL): 2.5 (ref 0.9–1.1)

## 2021-12-14 ENCOUNTER — ANTICOAGULATION THERAPY VISIT (OUTPATIENT)
Dept: ANTICOAGULATION | Facility: OTHER | Age: 61
End: 2021-12-14
Attending: INTERNAL MEDICINE
Payer: COMMERCIAL

## 2021-12-14 DIAGNOSIS — Z79.01 ANTICOAGULATION MONITORING, INR RANGE 2-3: Primary | ICD-10-CM

## 2021-12-14 RX ORDER — BUPROPION HYDROCHLORIDE 150 MG/1
TABLET ORAL
Qty: 90 TABLET | Refills: 0 | Status: SHIPPED | OUTPATIENT
Start: 2021-12-14 | End: 2021-12-27

## 2021-12-14 NOTE — TELEPHONE ENCOUNTER
"Patient has an appt later this month. Rx approved with zero refills.     12/27/21 Yenny:  Depression, major, recurrent, mild (H)  -Stable/controlled overall.  Continue current medication.  - buPROPion (WELLBUTRIN XL) 150 MG 24 hr tablet  Dispense: 90 tablet; Refill: 0    Last Prescription Date: 9/16/21  Last Qty/Refills: 90 / 0  Last Office Visit: 9/16/21  Future Office Visit: 12/27/21 Yenny     Requested Prescriptions   Pending Prescriptions Disp Refills     buPROPion (WELLBUTRIN XL) 150 MG 24 hr tablet [Pharmacy Med Name: BUPROPION HCL  MG TABLET] 90 tablet 0     Sig: TAKE 1 TABLET BY MOUTH EVERY MORNING       SSRIs Protocol Failed - 12/12/2021  7:33 AM        Failed - PHQ-9 score less than 5 in past 6 months     Please review last PHQ-9 score.           Passed - Medication is Bupropion     If the medication is Bupropion (Wellbutrin), and the patient is taking for smoking cessation; OK to refill.          Passed - Medication is active on med list        Passed - Patient is age 18 or older        Passed - No active pregnancy on record        Passed - No positive pregnancy test in last 12 months        Passed - Recent (6 mo) or future (30 days) visit within the authorizing provider's specialty     Patient had office visit in the last 6 months or has a visit in the next 30 days with authorizing provider or within the authorizing provider's specialty.  See \"Patient Info\" tab in inbasket, or \"Choose Columns\" in Meds & Orders section of the refill encounter.                 "

## 2021-12-14 NOTE — PROGRESS NOTES
ANTICOAGULATION FOLLOW-UP CLINIC VISIT    Patient Name:  Trisha Fernando  Date:  2021  Contact Type:  no call needed patient to continue same dose    SUBJECTIVE:  Patient Findings         Clinical Outcomes     Negatives:  Major bleeding event, Thromboembolic event, Anticoagulation-related hospital admission, Anticoagulation-related ED visit, Anticoagulation-related fatality           OBJECTIVE    Recent labs: (last 7 days)     21  1700   INR 2.5*       ASSESSMENT / PLAN  INR assessment THER    Recheck INR In: 1 WEEK    INR Location Home INR      Anticoagulation Summary  As of 2021    INR goal:  2.0-3.0   TTR:  85.5 % (1 y)   INR used for dosin.5 (2021)   Warfarin maintenance plan:  2.5 mg (5 mg x 0.5) every Wed; 5 mg (5 mg x 1) all other days   Full warfarin instructions:  2.5 mg every Wed; 5 mg all other days   Weekly warfarin total:  32.5 mg   No change documented:  Smitha Nair RN   Plan last modified:  Mariia Foster RN (2021)   Next INR check:  2021   Priority:  High   Target end date:  Indefinite    Indications    Long term (current) use of anticoagulants (Resolved) [Z79.01]  Embolism and thrombosis (H) (Resolved) [I74.9]  Anticoagulation monitoring  INR range 2-3 [Z79.01]             Anticoagulation Episode Summary     INR check location:      Preferred lab:      Send INR reminders to:  [facility]    Comments:  MDINR needs weekly INR done      Anticoagulation Care Providers     Provider Role Specialty Phone number    Maria Guadalupe Ramon DO Referring Internal Medicine [phone]            See the Encounter Report to view Anticoagulation Flowsheet and Dosing Calendar (Go to Encounters tab in chart review, and find the Anticoagulation Therapy Visit)        Smitha Nair, RN

## 2021-12-20 ENCOUNTER — TRANSFERRED RECORDS (OUTPATIENT)
Dept: HEALTH INFORMATION MANAGEMENT | Facility: OTHER | Age: 61
End: 2021-12-20

## 2021-12-20 ENCOUNTER — ANTICOAGULATION THERAPY VISIT (OUTPATIENT)
Dept: ANTICOAGULATION | Facility: OTHER | Age: 61
End: 2021-12-20
Attending: INTERNAL MEDICINE
Payer: COMMERCIAL

## 2021-12-20 DIAGNOSIS — Z79.01 ANTICOAGULATION MONITORING, INR RANGE 2-3: Primary | ICD-10-CM

## 2021-12-20 LAB — INR (EXTERNAL): 2.5 (ref 0.9–1.1)

## 2021-12-20 NOTE — PROGRESS NOTES
ANTICOAGULATION FOLLOW-UP CLINIC VISIT    Patient Name:  Trisha Fernando  Date:  2021  Contact Type:  no call needed patient to continue same dose    SUBJECTIVE:  Patient Findings         Clinical Outcomes     Negatives:  Major bleeding event, Thromboembolic event, Anticoagulation-related hospital admission, Anticoagulation-related ED visit, Anticoagulation-related fatality           OBJECTIVE    Recent labs: (last 7 days)     21  1530   INR 2.5*       ASSESSMENT / PLAN  INR assessment THER    Recheck INR In: 1 WEEK    INR Location Home INR      Anticoagulation Summary  As of 2021    INR goal:  2.0-3.0   TTR:  87.2 % (1 y)   INR used for dosin.5 (2021)   Warfarin maintenance plan:  2.5 mg (5 mg x 0.5) every Wed; 5 mg (5 mg x 1) all other days   Full warfarin instructions:  2.5 mg every Wed; 5 mg all other days   Weekly warfarin total:  32.5 mg   No change documented:  Smitha Nair RN   Plan last modified:  Mariia Foster RN (2021)   Next INR check:  2021   Priority:  High   Target end date:  Indefinite    Indications    Long term (current) use of anticoagulants (Resolved) [Z79.01]  Embolism and thrombosis (H) (Resolved) [I74.9]  Anticoagulation monitoring  INR range 2-3 [Z79.01]             Anticoagulation Episode Summary     INR check location:      Preferred lab:      Send INR reminders to:  [facility]    Comments:  MDINR needs weekly INR done      Anticoagulation Care Providers     Provider Role Specialty Phone number    Maria Guadalupe Ramon DO Referring Internal Medicine [phone]            See the Encounter Report to view Anticoagulation Flowsheet and Dosing Calendar (Go to Encounters tab in chart review, and find the Anticoagulation Therapy Visit)        Smitha Nair, RN

## 2021-12-27 ENCOUNTER — TRANSFERRED RECORDS (OUTPATIENT)
Dept: HEALTH INFORMATION MANAGEMENT | Facility: OTHER | Age: 61
End: 2021-12-27

## 2021-12-27 ENCOUNTER — OFFICE VISIT (OUTPATIENT)
Dept: INTERNAL MEDICINE | Facility: OTHER | Age: 61
End: 2021-12-27
Attending: INTERNAL MEDICINE
Payer: COMMERCIAL

## 2021-12-27 VITALS
SYSTOLIC BLOOD PRESSURE: 136 MMHG | HEIGHT: 67 IN | RESPIRATION RATE: 16 BRPM | HEART RATE: 70 BPM | BODY MASS INDEX: 45.99 KG/M2 | WEIGHT: 293 LBS | DIASTOLIC BLOOD PRESSURE: 88 MMHG | TEMPERATURE: 96.7 F | OXYGEN SATURATION: 96 %

## 2021-12-27 DIAGNOSIS — L98.9 SKIN LESION OF RIGHT LEG: ICD-10-CM

## 2021-12-27 DIAGNOSIS — F33.0 DEPRESSION, MAJOR, RECURRENT, MILD (H): Primary | ICD-10-CM

## 2021-12-27 DIAGNOSIS — T78.40XA ALLERGY, INITIAL ENCOUNTER: ICD-10-CM

## 2021-12-27 DIAGNOSIS — I10 ESSENTIAL HYPERTENSION: ICD-10-CM

## 2021-12-27 DIAGNOSIS — Z13.220 SCREENING FOR HYPERLIPIDEMIA: ICD-10-CM

## 2021-12-27 DIAGNOSIS — Z13.1 SCREENING FOR DIABETES MELLITUS: ICD-10-CM

## 2021-12-27 DIAGNOSIS — R43.0 LOSS OF SMELL: ICD-10-CM

## 2021-12-27 LAB
ALBUMIN SERPL-MCNC: 4 G/DL (ref 3.5–5.7)
ALP SERPL-CCNC: 52 U/L (ref 34–104)
ALT SERPL W P-5'-P-CCNC: 34 U/L (ref 7–52)
ANION GAP SERPL CALCULATED.3IONS-SCNC: 6 MMOL/L (ref 3–14)
AST SERPL W P-5'-P-CCNC: 27 U/L (ref 13–39)
BILIRUB SERPL-MCNC: 0.6 MG/DL (ref 0.3–1)
BUN SERPL-MCNC: 17 MG/DL (ref 7–25)
CALCIUM SERPL-MCNC: 10.4 MG/DL (ref 8.6–10.3)
CHLORIDE BLD-SCNC: 101 MMOL/L (ref 98–107)
CHOLEST SERPL-MCNC: 226 MG/DL
CO2 SERPL-SCNC: 32 MMOL/L (ref 21–31)
CREAT SERPL-MCNC: 0.93 MG/DL (ref 0.6–1.2)
FASTING STATUS PATIENT QL REPORTED: YES
GFR SERPL CREATININE-BSD FRML MDRD: 69 ML/MIN/1.73M2
GLUCOSE BLD-MCNC: 108 MG/DL (ref 70–105)
HDLC SERPL-MCNC: 60 MG/DL (ref 23–92)
INR (EXTERNAL): 2.4 (ref 0.9–1.1)
LDLC SERPL CALC-MCNC: 134 MG/DL
NONHDLC SERPL-MCNC: 166 MG/DL
POTASSIUM BLD-SCNC: 3.6 MMOL/L (ref 3.5–5.1)
PROT SERPL-MCNC: 6.6 G/DL (ref 6.4–8.9)
SODIUM SERPL-SCNC: 139 MMOL/L (ref 134–144)
TRIGL SERPL-MCNC: 162 MG/DL

## 2021-12-27 PROCEDURE — 99396 PREV VISIT EST AGE 40-64: CPT | Performed by: INTERNAL MEDICINE

## 2021-12-27 PROCEDURE — 80061 LIPID PANEL: CPT | Mod: ZL | Performed by: INTERNAL MEDICINE

## 2021-12-27 PROCEDURE — 80053 COMPREHEN METABOLIC PANEL: CPT | Mod: ZL | Performed by: INTERNAL MEDICINE

## 2021-12-27 PROCEDURE — 36415 COLL VENOUS BLD VENIPUNCTURE: CPT | Mod: ZL | Performed by: INTERNAL MEDICINE

## 2021-12-27 RX ORDER — BUPROPION HYDROCHLORIDE 150 MG/1
150 TABLET ORAL EVERY MORNING
Qty: 90 TABLET | Refills: 3 | Status: SHIPPED | OUTPATIENT
Start: 2021-12-27 | End: 2022-10-26

## 2021-12-27 RX ORDER — METOPROLOL TARTRATE 25 MG/1
25 TABLET, FILM COATED ORAL 2 TIMES DAILY
Qty: 180 TABLET | Refills: 3 | Status: SHIPPED | OUTPATIENT
Start: 2021-12-27 | End: 2022-09-28

## 2021-12-27 RX ORDER — CHLORTHALIDONE 25 MG/1
25 TABLET ORAL DAILY
Qty: 90 TABLET | Refills: 3 | Status: SHIPPED | OUTPATIENT
Start: 2021-12-27 | End: 2022-09-28

## 2021-12-27 ASSESSMENT — ANXIETY QUESTIONNAIRES
6. BECOMING EASILY ANNOYED OR IRRITABLE: SEVERAL DAYS
3. WORRYING TOO MUCH ABOUT DIFFERENT THINGS: SEVERAL DAYS
1. FEELING NERVOUS, ANXIOUS, OR ON EDGE: SEVERAL DAYS
GAD7 TOTAL SCORE: 5
2. NOT BEING ABLE TO STOP OR CONTROL WORRYING: SEVERAL DAYS
5. BEING SO RESTLESS THAT IT IS HARD TO SIT STILL: NOT AT ALL
7. FEELING AFRAID AS IF SOMETHING AWFUL MIGHT HAPPEN: NOT AT ALL
GAD7 TOTAL SCORE: 5
GAD7 TOTAL SCORE: 5
4. TROUBLE RELAXING: SEVERAL DAYS
7. FEELING AFRAID AS IF SOMETHING AWFUL MIGHT HAPPEN: NOT AT ALL

## 2021-12-27 ASSESSMENT — PAIN SCALES - GENERAL: PAINLEVEL: MILD PAIN (2)

## 2021-12-27 ASSESSMENT — MIFFLIN-ST. JEOR: SCORE: 2269.25

## 2021-12-27 ASSESSMENT — PATIENT HEALTH QUESTIONNAIRE - PHQ9
10. IF YOU CHECKED OFF ANY PROBLEMS, HOW DIFFICULT HAVE THESE PROBLEMS MADE IT FOR YOU TO DO YOUR WORK, TAKE CARE OF THINGS AT HOME, OR GET ALONG WITH OTHER PEOPLE: NOT DIFFICULT AT ALL
SUM OF ALL RESPONSES TO PHQ QUESTIONS 1-9: 10
SUM OF ALL RESPONSES TO PHQ QUESTIONS 1-9: 10

## 2021-12-27 NOTE — NURSING NOTE
Patient presents to clinic today for annual physical. She is not fasting.   Notes some increased pain in her right knee lately.    Medication reconciliation completed.    ACP on file? No, form previously provided. (Has one about 1/2 filled out).    FOOD SECURITY SCREENING QUESTIONS    The next two questions are to help us understand your food security.  If you are feeling you need any assistance in this area, we have resources available to support you today.    Hunger Vital Signs:  Within the past 12 months we worried whether our food would run out before we got money to buy more. Never  Within the past 12 months the food we bought just didn't last and we didn't have money to get more. Never    Marlene Harrison CMA([facility])..................12/27/2021   10:13 AM

## 2021-12-27 NOTE — PROGRESS NOTES
Chief Complaint   Patient presents with     Physical     not fasting today         HPI: Ms. Fernando is a 61 year old female who presents today for yearly physical.  She overall is feeling good.      She has had some right knee pain.  This has been ongoing.  It bothers her off and on.  It is better when she is up and moving around at work.    She has a history of factor V Leiden and continues on Coumadin.  She has been getting regular INR checks.  She has had a CBC done within the year that was normal.  She denies any blood in the stool or bleeding.    She has a skin spot on the posterior right leg.  She reports that at times it itches and she does pick at it off and on.  It has not healed up overall    She has also had some increased skin rashes and allergy based symptoms since she had COVID last year.  She does find that it improves with Claritin.  She also has continued to have a loss of smell since she was sick with Covid.  This is intermittent and waxes and wanes with different foods/smells.    Her blood pressure has been somewhat elevated.  It typically is in the 130s/90s at home.  She is currently on metoprolol and chlorthalidone.  She denies any side effects    She also continues with some depression.  She is on Effexor and Wellbutrin.  She denies any side effects.  Her mood is tied into her weight and she is planning to work on increased exercise and continued weight improvement.    History is discussed and updated on 12/27/2021 with patient.  It is current to the best of my knowledge as below.    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    Right forearm     Calculus of kidney      DVT (deep venous thrombosis) (H) 10/2/2012     Essential (primary) hypertension      Heterozygous factor V Leiden mutation (H) 09/01/2008      History of fatty infiltration of liver      Hyperlipidemia      Major depressive disorder, recurrent, mild (H)      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, unspecified (CODE)     Left foot acromyxoid fibroblastic, 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 Sutton)       COLONOSCOPY N/A 2019    Normal exam, 10 year follow up     LITHOTRIPSY       MAMMOPLASTY REDUCTION  2003     OTHER SURGICAL HISTORY Right 2011    Forearm Lipoma Excision     OTHER SURGICAL HISTORY      Left foot soft tissue resection; dr. Lang     TONSILLECTOMY           Current Outpatient Medications   Medication Sig Dispense Refill     ADVAIR DISKUS 250-50 MCG/DOSE inhaler        albuterol (PROAIR HFA/PROVENTIL HFA/VENTOLIN HFA) 108 (90 Base) MCG/ACT inhaler Inhale 2 puffs into the lungs every 4 hours as needed for shortness of breath / dyspnea or wheezing 1 Inhaler 1     buPROPion (WELLBUTRIN XL) 150 MG 24 hr tablet Take 1 tablet (150 mg) by mouth every morning 90 tablet 3     chlorthalidone (HYGROTON) 25 MG tablet Take 1 tablet (25 mg) by mouth daily Increase in dose 90 tablet 3     Cholecalciferol (VITAMIN D3) 2000 UNITS CAPS Take 2,000 Units by mouth daily       Elastic Bandages & Supports (MEDICAL COMPRESSION STOCKINGS) MISC For personal use. Length: calf Strength: 16-20 mmHg Circumference in cm: For calf: Ankle 15cm,  Calf 30cm, Ankle to calf length 40cm.       fish oil-omega-3 fatty acids 1000 MG capsule Take 1 capsule by mouth daily        LORazepam (ATIVAN) 0.5 MG tablet Take 0.5 mg by mouth every 6 hours as needed        metoprolol tartrate (LOPRESSOR) 25 MG tablet Take 1 tablet (25 mg) by mouth 2 times daily Increase in dose 180 tablet 3     potassium chloride ER (K-TAB/KLOR-CON) 10 MEQ CR tablet TAKE 1 TABLET (10 MEQ) BY MOUTH DAILY 90 tablet 3     triamcinolone (KENALOG) 0.1 % external cream Apply topically 2 times daily As needed for rash 15 g 0     venlafaxine (EFFEXOR-XR) 150 MG 24 hr capsule TAKE 1 CAPSULE BY MOUTH EVERY DAY WITH FOOD 90 capsule 4     vitamin B-Complex Take 1 tablet by mouth daily       warfarin ANTICOAGULANT (COUMADIN) 5 MG tablet TAKE 5 MG 6 days a week and 2.5 mg one day a week OR AS DIRECTED BY THE [facility]. 90 tablet 1       Allergies   Allergen Reactions     Lisinopril Swelling     Angioedema, Edema       Losartan Dizziness     dizziness, tiredness, itching in ears and face, hands and feet     Adhesive Tape Itching and Rash     Amlodipine Swelling     edema     Imperial Itching and Rash     Wound Dressing Adhesive Rash        Family History   Problem Relation Age of Onset     Other - See Comments Mother         HX of kidney stones and blood clots/ gallbladder disease     Dementia Mother      Other - See Comments Father         accidental death/ gallbladder disease     Other - See Comments Brother         HX of kidney stones and blood clots     No Known Problems Brother      No Known Problems Brother      No Known Problems Brother      Heart Disease Paternal Grandfather         Heart Disease,ASCHD     No Known Problems Daughter      No Known Problems Daughter      Colon Cancer Maternal Uncle 50        Cancer-colon     Colon Cancer Maternal Uncle 50        Cancer-colon     Other - See Comments Maternal Uncle         HX of kidney stones     Other - See Comments Paternal Aunt         Parkinson's  "      Family Status   Relation Name Status     Mo          gall bladder disease     Fa          Accidental death, gall bladder disease     Bro Dale Alive     Bro Yair Alive     Bro Antonio Alive     Bro Julian Alive     PGFa  (Not Specified)     Nicol Reno Alive     Nicol Cabrera Alive     MUnc          with colon cancer, 1  in his 50's,1 diagnosed in 50's     MUnc 1      MUnc 2      PAunt  (Not Specified)        Social History     Tobacco Use     Smoking status: Former Smoker     Packs/day: 1.00     Years: 12.00     Pack years: 12.00     Types: Cigarettes     Quit date: 1990     Years since quittin.3     Smokeless tobacco: Never Used     Tobacco comment: no ecig   Substance Use Topics     Alcohol use: Not Currently     Alcohol/week: 2.0 standard drinks     Comment:  rarely if ever       Social History     Social History Narrative    .  Two children, Shadia ([employer]) and Deborah ([place], college at [employer]); - [facility] [employer] [facility]             ROS  GEN:-fevers/-chills/-night sweats/-wt change  NEURO: -headaches/-vision changes  EARS: +hearing changes/+tinnitus  NOSE: -drainage/+congestion  MOUTH/THROAT: - sore throat/-dysphagia/-sores  LUNGS: -sob/-significant cough  CARDIOVASCULAR: -cp/-palpitations  GI: -pain/-change in bowels/-bloody stools  : -change in bladder/-vaginal discharge or bleeding  HEMATOLOGIC/LYMPHATIC: -swollen nodes  SKIN: +rashes/+lesions  MSK/RHEUM: +joint pain/-swelling  PSYCH:+depression/-anxiety     EXAM:   /88 (BP Location: Right arm, Patient Position: Sitting, Cuff Size: Adult Large)   Pulse 70   Temp (!) 96.7  F (35.9  C) (Tympanic)   Resp 16   Ht 1.706 m (5' 7.18\")   Wt (!) 167.4 kg (369 lb)   LMP  (LMP Unknown)   SpO2 96%   BMI 57.48 kg/m    Estimated body mass index is 57.48 kg/m  as calculated from the following:    Height as of this encounter: 1.706 m (5' 7.18\").    Weight as of this encounter: 167.4 kg " (369 lb).      GEN: Vitals reviewed. Healthy appearing. Patient is in no acute distress. Cooperative with exam.  HEENT: Normocephalic atraumatic.  Eyes grossly normal to inspection.  No discharge or erythema, or obvious scleral/conjunctival abnormalities. EACs clear bilaterally, TM gray with normal landmarks.  NECK: Supple; no thyromegaly or masses noted.  No cervical or supraclavicular lymphadenopathy.  CV: Heart regular in rate and rhythm with no murmur.    LUNGS: No audible wheeze, cough, or visible cyanosis.  No visible retractions or increased work of breathing.  Lungs clear to auscultation bilaterally.    ABD:  Obese, nondistended  SKIN: Warm and dry to touch.  Visible skin clear. No significant rash, abnormal pigmentation or lesions.  Excoriated skin lesion noted on the posterior right leg approximately 5 to 6 mm across.  Erythema on the forehead but no significant rash  EXT: No clubbing or cyanosis.   1-2+ bilateral lower peripheral edema.  NEURO: Alert and oriented to person, place, and time.  Cranial nerves II-XII grossly intact with no focal or lateralizing deficits.  Muscle tone normal.  Gait normal. No tremor.   MSK: ROM of upper and lower ext symmetric and full.  PSYCH: Mood is good.  Mentation appears normal, affect normal/bright, judgement and insight intact, normal speech and appearance well-groomed.       ASSESSMENT AND PLAN:    Depression, major, recurrent, mild (H)  At this time she will continue with her bupropion.  She is to call if she feels any additional treatment is needed.  - buPROPion (WELLBUTRIN XL) 150 MG 24 hr tablet  Dispense: 90 tablet; Refill: 3    Essential hypertension  With her slightly elevated blood pressures at home we will increase her metoprolol to 25 mg twice daily.  She is to call with any side effects with this.  Continue on chlorthalidone.  - chlorthalidone (HYGROTON) 25 MG tablet  Dispense: 90 tablet; Refill: 3  - metoprolol tartrate (LOPRESSOR) 25 MG tablet  Dispense:  180 tablet; Refill: 3    Loss of smell  Information provided on smell training.    Screening for diabetes mellitus  - Comprehensive Metabolic Panel    Screening for hyperlipidemia  Continue to monitor.  - Lipid Panel    Skin lesion of right leg  Recommend some antibiotic topical and avoidance of excoriation.  If she has persistent symptoms would recommend excisional biopsy with pathology.    Allergy, initial encounter  At this time she will try taking Claritin on a daily basis for 4 to 6 weeks.  If she has persistent symptoms despite this, we may need to consider referral to allergy/ENT.        Return in about 6 months (around 6/27/2022) for BP Recheck.      BONITA OLIVER, DO   12/27/2021 10:39 AM  Answers for HPI/ROS submitted by the patient on 12/27/2021  If you checked off any problems, how difficult have these problems made it for you to do your work, take care of things at home, or get along with other people?: Not difficult at all  PHQ9 TOTAL SCORE: 10  ELIZABETH 7 TOTAL SCORE: 5

## 2021-12-28 ENCOUNTER — ANTICOAGULATION THERAPY VISIT (OUTPATIENT)
Dept: ANTICOAGULATION | Facility: OTHER | Age: 61
End: 2021-12-28
Attending: INTERNAL MEDICINE
Payer: COMMERCIAL

## 2021-12-28 DIAGNOSIS — Z79.01 ANTICOAGULATION MONITORING, INR RANGE 2-3: Primary | ICD-10-CM

## 2021-12-28 ASSESSMENT — PATIENT HEALTH QUESTIONNAIRE - PHQ9: SUM OF ALL RESPONSES TO PHQ QUESTIONS 1-9: 10

## 2021-12-28 ASSESSMENT — ANXIETY QUESTIONNAIRES: GAD7 TOTAL SCORE: 5

## 2021-12-28 NOTE — PROGRESS NOTES
ANTICOAGULATION FOLLOW-UP CLINIC VISIT    Patient Name:  Trisha Fernando  Date:  2021  Contact Type:  no call needed patient to continue same dose    SUBJECTIVE:  Patient Findings         Clinical Outcomes     Negatives:  Major bleeding event, Thromboembolic event, Anticoagulation-related hospital admission, Anticoagulation-related ED visit, Anticoagulation-related fatality           OBJECTIVE    Recent labs: (last 7 days)     21  1730   INR 2.4*       ASSESSMENT / PLAN  INR assessment THER    Recheck INR In: 1 WEEK    INR Location Home INR      Anticoagulation Summary  As of 2021    INR goal:  2.0-3.0   TTR:  89.4 % (1 y)   INR used for dosin.4 (2021)   Warfarin maintenance plan:  2.5 mg (5 mg x 0.5) every Wed; 5 mg (5 mg x 1) all other days   Full warfarin instructions:  2.5 mg every Wed; 5 mg all other days   Weekly warfarin total:  32.5 mg   No change documented:  Smitha Nair RN   Plan last modified:  Mariia Foster RN (2021)   Next INR check:  2022   Priority:  High   Target end date:  Indefinite    Indications    Long term (current) use of anticoagulants (Resolved) [Z79.01]  Embolism and thrombosis (H) (Resolved) [I74.9]  Anticoagulation monitoring  INR range 2-3 [Z79.01]             Anticoagulation Episode Summary     INR check location:      Preferred lab:      Send INR reminders to:  [facility]    Comments:  MDINR needs weekly INR done      Anticoagulation Care Providers     Provider Role Specialty Phone number    Maria Guadalupe Ramon DO Referring Internal Medicine [phone]            See the Encounter Report to view Anticoagulation Flowsheet and Dosing Calendar (Go to Encounters tab in chart review, and find the Anticoagulation Therapy Visit)        Smitha Nair, RN

## 2022-01-03 ENCOUNTER — TRANSFERRED RECORDS (OUTPATIENT)
Dept: HEALTH INFORMATION MANAGEMENT | Facility: OTHER | Age: 62
End: 2022-01-03

## 2022-01-03 ENCOUNTER — ANTICOAGULATION THERAPY VISIT (OUTPATIENT)
Dept: ANTICOAGULATION | Facility: OTHER | Age: 62
End: 2022-01-03
Attending: INTERNAL MEDICINE
Payer: COMMERCIAL

## 2022-01-03 DIAGNOSIS — Z79.01 ANTICOAGULATION MONITORING, INR RANGE 2-3: ICD-10-CM

## 2022-01-03 DIAGNOSIS — I74.9 EMBOLISM AND THROMBOSIS (H): Primary | ICD-10-CM

## 2022-01-03 LAB — INR (EXTERNAL): 2.6 (ref 0.9–1.1)

## 2022-01-03 NOTE — PROGRESS NOTES
ANTICOAGULATION FOLLOW-UP CLINIC VISIT    Patient Name:  Trisha Fernando  Date:  1/3/2022  Contact Type:  no call needed patient to continue same dose     SUBJECTIVE:  Patient Findings         Clinical Outcomes     Negatives:  Major bleeding event, Thromboembolic event, Anticoagulation-related hospital admission, Anticoagulation-related ED visit, Anticoagulation-related fatality           OBJECTIVE    Recent labs: (last 7 days)     22  0442   INR 2.6*       ASSESSMENT / PLAN  INR assessment THER    Recheck INR In: 1 WEEK    INR Location Home INR      Anticoagulation Summary  As of 1/3/2022    INR goal:  2.0-3.0   TTR:  89.4 % (1 y)   INR used for dosin.6 (1/3/2022)   Warfarin maintenance plan:  2.5 mg (5 mg x 0.5) every Wed; 5 mg (5 mg x 1) all other days   Full warfarin instructions:  2.5 mg every Wed; 5 mg all other days   Weekly warfarin total:  32.5 mg   No change documented:  Mariia Foster RN   Plan last modified:  Mariia Foster RN (2021)   Next INR check:  1/10/2022   Priority:  High   Target end date:  Indefinite    Indications    Long term (current) use of anticoagulants (Resolved) [Z79.01]  Embolism and thrombosis (H) (Resolved) [I74.9]  Anticoagulation monitoring  INR range 2-3 [Z79.01]  Embolism and thrombosis (H) [I74.9]             Anticoagulation Episode Summary     INR check location:      Preferred lab:      Send INR reminders to:  [facility]    Comments:  MDINR needs weekly INR done      Anticoagulation Care Providers     Provider Role Specialty Phone number    Maria Guadalupe Ramon DO Referring Internal Medicine [phone]            See the Encounter Report to view Anticoagulation Flowsheet and Dosing Calendar (Go to Encounters tab in chart review, and find the Anticoagulation Therapy Visit)        Mariia Foster, SINDHU

## 2022-01-10 ENCOUNTER — TRANSFERRED RECORDS (OUTPATIENT)
Dept: HEALTH INFORMATION MANAGEMENT | Facility: OTHER | Age: 62
End: 2022-01-10
Payer: COMMERCIAL

## 2022-01-10 LAB — INR (EXTERNAL): 2.7 (ref 0.9–1.1)

## 2022-01-11 ENCOUNTER — ANTICOAGULATION THERAPY VISIT (OUTPATIENT)
Dept: ANTICOAGULATION | Facility: OTHER | Age: 62
End: 2022-01-11
Attending: INTERNAL MEDICINE
Payer: COMMERCIAL

## 2022-01-11 DIAGNOSIS — I74.9 EMBOLISM AND THROMBOSIS (H): ICD-10-CM

## 2022-01-11 DIAGNOSIS — Z79.01 ANTICOAGULATION MONITORING, INR RANGE 2-3: Primary | ICD-10-CM

## 2022-01-11 NOTE — PROGRESS NOTES
ANTICOAGULATION FOLLOW-UP CLINIC VISIT    Patient Name:  Trisha Fernando  Date:  2022  Contact Type:  No call needed patient to continue same dose    SUBJECTIVE:  Patient Findings         Clinical Outcomes     Negatives:  Major bleeding event, Thromboembolic event, Anticoagulation-related hospital admission, Anticoagulation-related ED visit, Anticoagulation-related fatality           OBJECTIVE    Recent labs: (last 7 days)     22  1001   INR 2.7*       ASSESSMENT / PLAN  INR assessment THER    Recheck INR In: 1 WEEK    INR Location Home INR      Anticoagulation Summary  As of 2022    INR goal:  2.0-3.0   TTR:  89.4 % (1 y)   INR used for dosin.7 (2022)   Warfarin maintenance plan:  2.5 mg (5 mg x 0.5) every Wed; 5 mg (5 mg x 1) all other days   Full warfarin instructions:  2.5 mg every Wed; 5 mg all other days   Weekly warfarin total:  32.5 mg   No change documented:  Smitha Nair RN   Plan last modified:  Mariia Foster RN (2021)   Next INR check:  2022   Priority:  High   Target end date:  Indefinite    Indications    Long term (current) use of anticoagulants (Resolved) [Z79.01]  Embolism and thrombosis (H) (Resolved) [I74.9]  Anticoagulation monitoring  INR range 2-3 [Z79.01]  Embolism and thrombosis (H) [I74.9]             Anticoagulation Episode Summary     INR check location:      Preferred lab:      Send INR reminders to:  [facility]    Comments:  MDINR needs weekly INR done      Anticoagulation Care Providers     Provider Role Specialty Phone number    Maria Guadalupe Ramon DO Referring Internal Medicine [phone]            See the Encounter Report to view Anticoagulation Flowsheet and Dosing Calendar (Go to Encounters tab in chart review, and find the Anticoagulation Therapy Visit)        Smitha Nair, RN

## 2022-01-14 ENCOUNTER — TELEPHONE (OUTPATIENT)
Dept: INTERNAL MEDICINE | Facility: OTHER | Age: 62
End: 2022-01-14
Payer: COMMERCIAL

## 2022-01-14 NOTE — TELEPHONE ENCOUNTER
MIRIAM Ramon-pt out of town and forgot medications at home. Needs 2 days of Warfarin. Aware pcp gone and its late. Can Edith address this? Thank you.  Yuliana Dunbar

## 2022-01-17 ENCOUNTER — TELEPHONE (OUTPATIENT)
Dept: INTERNAL MEDICINE | Facility: OTHER | Age: 62
End: 2022-01-17
Payer: COMMERCIAL

## 2022-01-17 ENCOUNTER — TRANSFERRED RECORDS (OUTPATIENT)
Dept: HEALTH INFORMATION MANAGEMENT | Facility: OTHER | Age: 62
End: 2022-01-17

## 2022-01-17 ENCOUNTER — ANTICOAGULATION THERAPY VISIT (OUTPATIENT)
Dept: ANTICOAGULATION | Facility: OTHER | Age: 62
End: 2022-01-17
Attending: INTERNAL MEDICINE
Payer: COMMERCIAL

## 2022-01-17 DIAGNOSIS — I74.9 EMBOLISM AND THROMBOSIS (H): ICD-10-CM

## 2022-01-17 DIAGNOSIS — Z79.01 ANTICOAGULATION MONITORING, INR RANGE 2-3: Primary | ICD-10-CM

## 2022-01-17 LAB — INR (EXTERNAL): 1.4 (ref 0.9–1.1)

## 2022-01-17 NOTE — PROGRESS NOTES
ANTICOAGULATION FOLLOW-UP CLINIC VISIT    Patient Name:  Trisha Fernando  Date:  2022  Contact Type:  Telephone/ spoke with patient reviewed dosing    SUBJECTIVE:  Patient Findings     Positives:  Missed doses (missed taking pills while out of town for the weekend)        Clinical Outcomes     Negatives:  Major bleeding event, Thromboembolic event, Anticoagulation-related hospital admission, Anticoagulation-related ED visit, Anticoagulation-related fatality           OBJECTIVE    Recent labs: (last 7 days)     22  1122   INR 1.4*       ASSESSMENT / PLAN  INR assessment SUB    Recheck INR In: 1 WEEK    INR Location Home INR      Anticoagulation Summary  As of 2022    INR goal:  2.0-3.0   TTR:  88.6 % (1 y)   INR used for dosin.4 (2022)   Warfarin maintenance plan:  2.5 mg (5 mg x 0.5) every Wed; 5 mg (5 mg x 1) all other days   Full warfarin instructions:  : 7.5 mg; : 5 mg; Otherwise 2.5 mg every Wed; 5 mg all other days   Weekly warfarin total:  32.5 mg   Plan last modified:  Mariia Foster RN (2021)   Next INR check:  2022   Priority:  High   Target end date:  Indefinite    Indications    Long term (current) use of anticoagulants (Resolved) [Z79.01]  Embolism and thrombosis (H) (Resolved) [I74.9]  Anticoagulation monitoring  INR range 2-3 [Z79.01]  Embolism and thrombosis (H) [I74.9]             Anticoagulation Episode Summary     INR check location:      Preferred lab:      Send INR reminders to:  [facility]    Comments:  MDINR needs weekly INR done      Anticoagulation Care Providers     Provider Role Specialty Phone number    Maria Guadalupe Ramon DO Referring Internal Medicine [phone]            See the Encounter Report to view Anticoagulation Flowsheet and Dosing Calendar (Go to Encounters tab in chart review, and find the Anticoagulation Therapy Visit)        Smitha Nair, RN

## 2022-01-17 NOTE — TELEPHONE ENCOUNTER
She got it all figured out. Nothing more needed at this time.  Marlene Harrison CMA([facility])..................1/17/2022   11:48 AM

## 2022-01-17 NOTE — TELEPHONE ENCOUNTER
MD INR called with an out of range INR reading of 1.4.     Please contact patient.     Marlene Wilson on 1/17/2022 at 4:31 PM

## 2022-01-17 NOTE — TELEPHONE ENCOUNTER
Patient stated that she spoke to our INR nurses today and they have a plan.  Marlene Harrison CMA([facility])..................1/17/2022   4:53 PM

## 2022-01-24 LAB — INR (EXTERNAL): 2.2 (ref 0.9–1.1)

## 2022-01-25 ENCOUNTER — ANTICOAGULATION THERAPY VISIT (OUTPATIENT)
Dept: ANTICOAGULATION | Facility: OTHER | Age: 62
End: 2022-01-25
Attending: INTERNAL MEDICINE
Payer: COMMERCIAL

## 2022-01-25 DIAGNOSIS — Z79.01 ANTICOAGULATION MONITORING, INR RANGE 2-3: Primary | ICD-10-CM

## 2022-01-25 DIAGNOSIS — I74.9 EMBOLISM AND THROMBOSIS (H): ICD-10-CM

## 2022-01-25 NOTE — PROGRESS NOTES
ANTICOAGULATION FOLLOW-UP CLINIC VISIT    Patient Name:  Trisha Fernando  Date:  2022  Contact Type:  No call needed, patient to continue same dose    SUBJECTIVE:  Patient Findings         Clinical Outcomes     Negatives:  Major bleeding event, Thromboembolic event, Anticoagulation-related hospital admission, Anticoagulation-related ED visit, Anticoagulation-related fatality           OBJECTIVE    Recent labs: (last 7 days)     22   INR 2.2*       ASSESSMENT / PLAN  INR assessment THER    Recheck INR In: 1 WEEK    INR Location Home INR      Anticoagulation Summary  As of 2022    INR goal:  2.0-3.0   TTR:  87.0 % (1 y)   INR used for dosin.2 (2022)   Warfarin maintenance plan:  2.5 mg (5 mg x 0.5) every Wed; 5 mg (5 mg x 1) all other days   Full warfarin instructions:  2.5 mg every Wed; 5 mg all other days   Weekly warfarin total:  32.5 mg   No change documented:  Kaela Bell RN   Plan last modified:  Mariia Foster RN (2021)   Next INR check:  2022   Priority:  High   Target end date:  Indefinite    Indications    Long term (current) use of anticoagulants (Resolved) [Z79.01]  Embolism and thrombosis (H) (Resolved) [I74.9]  Anticoagulation monitoring  INR range 2-3 [Z79.01]  Embolism and thrombosis (H) [I74.9]             Anticoagulation Episode Summary     INR check location:      Preferred lab:      Send INR reminders to:  [facility]    Comments:  MDINR needs weekly INR done      Anticoagulation Care Providers     Provider Role Specialty Phone number    Maria Guadalupe Ramon DO Referring Internal Medicine [phone]            See the Encounter Report to view Anticoagulation Flowsheet and Dosing Calendar (Go to Encounters tab in chart review, and find the Anticoagulation Therapy Visit)        Kaela Bell, SINDHU

## 2022-01-31 ENCOUNTER — TRANSFERRED RECORDS (OUTPATIENT)
Dept: HEALTH INFORMATION MANAGEMENT | Facility: OTHER | Age: 62
End: 2022-01-31
Payer: COMMERCIAL

## 2022-01-31 LAB — INR (EXTERNAL): 2.4 (ref 0.9–1.1)

## 2022-02-01 ENCOUNTER — ANTICOAGULATION THERAPY VISIT (OUTPATIENT)
Dept: ANTICOAGULATION | Facility: OTHER | Age: 62
End: 2022-02-01
Attending: INTERNAL MEDICINE
Payer: COMMERCIAL

## 2022-02-01 DIAGNOSIS — I74.9 EMBOLISM AND THROMBOSIS (H): ICD-10-CM

## 2022-02-01 DIAGNOSIS — Z79.01 ANTICOAGULATION MONITORING, INR RANGE 2-3: Primary | ICD-10-CM

## 2022-02-01 NOTE — PROGRESS NOTES
ANTICOAGULATION FOLLOW-UP CLINIC VISIT    Patient Name:  Trisha Fernando  Date:  2022  Contact Type:  No Phone call needed, pt to continue same dose.    SUBJECTIVE:  Patient Findings         Clinical Outcomes     Negatives:  Major bleeding event, Thromboembolic event, Anticoagulation-related hospital admission, Anticoagulation-related ED visit, Anticoagulation-related fatality           OBJECTIVE    Recent labs: (last 7 days)     22  1720   INR 2.4*       ASSESSMENT / PLAN  INR assessment THER    Recheck INR In: 1 WEEK    INR Location Home INR      Anticoagulation Summary  As of 2022    INR goal:  2.0-3.0   TTR:  87.0 % (1 y)   INR used for dosin.4 (2022)   Warfarin maintenance plan:  2.5 mg (5 mg x 0.5) every Wed; 5 mg (5 mg x 1) all other days   Full warfarin instructions:  2.5 mg every Wed; 5 mg all other days   Weekly warfarin total:  32.5 mg   No change documented:  Kaitlynn Patrick RN   Plan last modified:  Mariia Foster RN (2021)   Next INR check:  2022   Priority:  High   Target end date:  Indefinite    Indications    Long term (current) use of anticoagulants (Resolved) [Z79.01]  Embolism and thrombosis (H) (Resolved) [I74.9]  Anticoagulation monitoring  INR range 2-3 [Z79.01]  Embolism and thrombosis (H) [I74.9]             Anticoagulation Episode Summary     INR check location:      Preferred lab:      Send INR reminders to:  [facility]    Comments:  [facility] needs weekly INR done      Anticoagulation Care Providers     Provider Role Specialty Phone number    Maria Guadalupe Ramon DO Referring Internal Medicine [phone]            See the Encounter Report to view Anticoagulation Flowsheet and Dosing Calendar (Go to Encounters tab in chart review, and find the Anticoagulation Therapy Visit)        Kaitlynn Patrick, SINDHU

## 2022-02-07 ENCOUNTER — TRANSFERRED RECORDS (OUTPATIENT)
Dept: HEALTH INFORMATION MANAGEMENT | Facility: OTHER | Age: 62
End: 2022-02-07
Payer: COMMERCIAL

## 2022-02-08 ENCOUNTER — ANTICOAGULATION THERAPY VISIT (OUTPATIENT)
Dept: ANTICOAGULATION | Facility: OTHER | Age: 62
End: 2022-02-08
Attending: INTERNAL MEDICINE
Payer: COMMERCIAL

## 2022-02-08 DIAGNOSIS — Z79.01 ANTICOAGULATION MONITORING, INR RANGE 2-3: Primary | ICD-10-CM

## 2022-02-08 DIAGNOSIS — I74.9 EMBOLISM AND THROMBOSIS (H): ICD-10-CM

## 2022-02-08 LAB — INR (EXTERNAL): 2.1 (ref 0.9–1.1)

## 2022-02-08 NOTE — PROGRESS NOTES
ANTICOAGULATION FOLLOW-UP CLINIC VISIT    Patient Name:  Trisha Fernando  Date:  2022  Contact Type:  no contact needed    SUBJECTIVE:         OBJECTIVE    Recent labs: (last 7 days)     22  0615   INR 2.1*       ASSESSMENT / PLAN  No question data found.  Anticoagulation Summary  As of 2022    INR goal:  2.0-3.0   TTR:  87.0 % (1 y)   INR used for dosin.1 (2022)   Warfarin maintenance plan:  2.5 mg (5 mg x 0.5) every Wed; 5 mg (5 mg x 1) all other days   Full warfarin instructions:  2.5 mg every Wed; 5 mg all other days   Weekly warfarin total:  32.5 mg   No change documented:  Dea Goldstein RN   Plan last modified:  Mariia Foster RN (2021)   Next INR check:  2/15/2022   Priority:  High   Target end date:  Indefinite    Indications    Long term (current) use of anticoagulants (Resolved) [Z79.01]  Embolism and thrombosis (H) (Resolved) [I74.9]  Anticoagulation monitoring  INR range 2-3 [Z79.01]  Embolism and thrombosis (H) [I74.9]             Anticoagulation Episode Summary     INR check location:      Preferred lab:      Send INR reminders to:  [facility]    Comments:  [facility] needs weekly INR done      Anticoagulation Care Providers     Provider Role Specialty Phone number    Maria Guadalupe Ramon DO Referring Internal Medicine [phone]            See the Encounter Report to view Anticoagulation Flowsheet and Dosing Calendar (Go to Encounters tab in chart review, and find the Anticoagulation Therapy Visit)    No changes.     Dea Goldstein, RN

## 2022-02-14 ENCOUNTER — ANTICOAGULATION THERAPY VISIT (OUTPATIENT)
Dept: ANTICOAGULATION | Facility: OTHER | Age: 62
End: 2022-02-14
Attending: INTERNAL MEDICINE
Payer: COMMERCIAL

## 2022-02-14 DIAGNOSIS — Z79.01 ANTICOAGULATION MONITORING, INR RANGE 2-3: Primary | ICD-10-CM

## 2022-02-14 DIAGNOSIS — I74.9 EMBOLISM AND THROMBOSIS (H): ICD-10-CM

## 2022-02-14 LAB — INR (EXTERNAL): 2.4 (ref 0.9–1.1)

## 2022-02-14 NOTE — PROGRESS NOTES
ANTICOAGULATION FOLLOW-UP CLINIC VISIT    Patient Name:  Trisha Fernando  Date:  2022  Contact Type:  no call needed patient to continue same dose    SUBJECTIVE:  Patient Findings         Clinical Outcomes     Negatives:  Major bleeding event, Thromboembolic event, Anticoagulation-related hospital admission, Anticoagulation-related ED visit, Anticoagulation-related fatality           OBJECTIVE    Recent labs: (last 7 days)     22  1541   INR 2.4*       ASSESSMENT / PLAN  INR assessment THER    Recheck INR In: 1 WEEK    INR Location Home INR      Anticoagulation Summary  As of 2022    INR goal:  2.0-3.0   TTR:  87.1 % (1 y)   INR used for dosin.4 (2022)   Warfarin maintenance plan:  2.5 mg (5 mg x 0.5) every Wed; 5 mg (5 mg x 1) all other days   Full warfarin instructions:  2.5 mg every Wed; 5 mg all other days   Weekly warfarin total:  32.5 mg   No change documented:  Smitha Nair RN   Plan last modified:  Mariia Foster RN (2021)   Next INR check:  2022   Priority:  High   Target end date:  Indefinite    Indications    Long term (current) use of anticoagulants (Resolved) [Z79.01]  Embolism and thrombosis (H) (Resolved) [I74.9]  Anticoagulation monitoring  INR range 2-3 [Z79.01]  Embolism and thrombosis (H) [I74.9]             Anticoagulation Episode Summary     INR check location:      Preferred lab:      Send INR reminders to:  [facility]    Comments:  [facility] needs weekly INR done      Anticoagulation Care Providers     Provider Role Specialty Phone number    Maria Guadalupe Ramon DO Referring Internal Medicine [phone]            See the Encounter Report to view Anticoagulation Flowsheet and Dosing Calendar (Go to Encounters tab in chart review, and find the Anticoagulation Therapy Visit)        Smitha Nair, RN

## 2022-02-22 ENCOUNTER — TRANSFERRED RECORDS (OUTPATIENT)
Dept: HEALTH INFORMATION MANAGEMENT | Facility: OTHER | Age: 62
End: 2022-02-22

## 2022-02-22 ENCOUNTER — ANTICOAGULATION THERAPY VISIT (OUTPATIENT)
Dept: ANTICOAGULATION | Facility: OTHER | Age: 62
End: 2022-02-22
Attending: INTERNAL MEDICINE
Payer: COMMERCIAL

## 2022-02-22 DIAGNOSIS — I74.9 EMBOLISM AND THROMBOSIS (H): ICD-10-CM

## 2022-02-22 DIAGNOSIS — Z79.01 ANTICOAGULATION MONITORING, INR RANGE 2-3: ICD-10-CM

## 2022-02-22 DIAGNOSIS — Z79.01 ANTICOAGULATION MONITORING, INR RANGE 2-3: Primary | ICD-10-CM

## 2022-02-22 LAB — INR (EXTERNAL): 2.5 (ref 2–3)

## 2022-02-22 RX ORDER — WARFARIN SODIUM 5 MG/1
TABLET ORAL
Qty: 90 TABLET | Refills: 1 | Status: SHIPPED | OUTPATIENT
Start: 2022-02-22 | End: 2022-06-20

## 2022-02-22 NOTE — TELEPHONE ENCOUNTER
"[id number] [place]  sent Rx request for the following:      Requested Prescriptions   Pending Prescriptions Disp Refills     warfarin ANTICOAGULANT (COUMADIN) 5 MG tablet [Pharmacy Med Name: WARFARIN SODIUM 5 MG TABLET] 90 tablet 1     Sig: TAKE 5 MG 6 DAYS A WEEK AND 2.5 MG ONE DAY A WEEK OR AS [facility].       Vitamin K Antagonists Failed - 2/22/2022 12:35 AM        Failed - INR is within goal in the past 6 weeks     Confirm INR is within goal in the past 6 weeks.     Recent Labs   Lab Test 02/22/22  0847   INR 2.5           Passed - Recent (12 mo) or future (30 days) visit within the authorizing provider's specialty     Patient has had an office visit with the authorizing provider or a provider within the authorizing providers department within the previous 12 mos or has a future within next 30 days. See \"Patient Info\" tab in inbasket, or \"Choose Columns\" in Meds & Orders section of the refill encounter.          Passed - Medication is active on med list        Passed - Patient is 18 years of age or older        Passed - Patient is not pregnant        Passed - No positive pregnancy on file in past 12 months     Last Prescription Date:   9/15/21  Last Fill Qty/Refills:         90, R-1  Last Office Visit:              12/27/21 Last INR: 2/22/22  Future Office visit:           None  Routing refill request to provider for review/approval because:  Unable to complete prescription refill per RN Medication Refill Policy. Mariia Foster RN .............. 2/22/2022  10:40 AM  "

## 2022-02-22 NOTE — PROGRESS NOTES
ANTICOAGULATION FOLLOW-UP CLINIC VISIT    Patient Name:  Trisha Fernando  Date:  2022  Contact Type:  faxed results received, no contact needed    SUBJECTIVE:  Patient Findings         Clinical Outcomes     Negatives:  Major bleeding event, Thromboembolic event, Anticoagulation-related hospital admission, Anticoagulation-related ED visit, Anticoagulation-related fatality           OBJECTIVE    Recent labs: (last 7 days)     22  0847   INR 2.5       ASSESSMENT / PLAN  INR assessment THER    Recheck INR In: 1 WEEK    INR Location Home INR      Anticoagulation Summary  As of 2022    INR goal:  2.0-3.0   TTR:  87.0 % (1 y)   INR used for dosin.5 (2022)   Warfarin maintenance plan:  2.5 mg (5 mg x 0.5) every Wed; 5 mg (5 mg x 1) all other days   Full warfarin instructions:  2.5 mg every Wed; 5 mg all other days   Weekly warfarin total:  32.5 mg   No change documented:  Filippo Zaragoza RN   Plan last modified:  Mariia Foster RN (2021)   Next INR check:  3/1/2022   Priority:  Maintenance   Target end date:  Indefinite    Indications    Long term (current) use of anticoagulants (Resolved) [Z79.01]  Embolism and thrombosis (H) (Resolved) [I74.9]  Anticoagulation monitoring  INR range 2-3 [Z79.01]  Embolism and thrombosis (H) [I74.9]             Anticoagulation Episode Summary     INR check location:      Preferred lab:      Send INR reminders to:  [facility]    Comments:  [facility] needs weekly INR done      Anticoagulation Care Providers     Provider Role Specialty Phone number    Maria Guadalupe Ramon DO Referring Internal Medicine [phone]            See the Encounter Report to view Anticoagulation Flowsheet and Dosing Calendar (Go to Encounters tab in chart review, and find the Anticoagulation Therapy Visit)    Filippo Zaragzoa, SINDHU

## 2022-02-28 ENCOUNTER — TRANSFERRED RECORDS (OUTPATIENT)
Dept: HEALTH INFORMATION MANAGEMENT | Facility: OTHER | Age: 62
End: 2022-02-28
Payer: COMMERCIAL

## 2022-02-28 LAB — INR (EXTERNAL): 2.3 (ref 0.9–1.1)

## 2022-03-01 ENCOUNTER — ANTICOAGULATION THERAPY VISIT (OUTPATIENT)
Dept: ANTICOAGULATION | Facility: OTHER | Age: 62
End: 2022-03-01
Attending: INTERNAL MEDICINE
Payer: COMMERCIAL

## 2022-03-01 DIAGNOSIS — I74.9 EMBOLISM AND THROMBOSIS (H): ICD-10-CM

## 2022-03-01 DIAGNOSIS — Z79.01 ANTICOAGULATION MONITORING, INR RANGE 2-3: Primary | ICD-10-CM

## 2022-03-01 NOTE — PROGRESS NOTES
ANTICOAGULATION FOLLOW-UP CLINIC VISIT    Patient Name:  Trisha Fernando  Date:  3/1/2022  Contact Type:  no call needed patient to continue same dose    SUBJECTIVE:  Patient Findings         Clinical Outcomes     Negatives:  Major bleeding event, Thromboembolic event, Anticoagulation-related hospital admission, Anticoagulation-related ED visit, Anticoagulation-related fatality           OBJECTIVE    Recent labs: (last 7 days)     22  1700   INR 2.3*       ASSESSMENT / PLAN  INR assessment THER    Recheck INR In: 1 WEEK    INR Location Home INR      Anticoagulation Summary  As of 3/1/2022    INR goal:  2.0-3.0   TTR:  87.0 % (1 y)   INR used for dosin.3 (2022)   Warfarin maintenance plan:  2.5 mg (5 mg x 0.5) every Wed; 5 mg (5 mg x 1) all other days   Full warfarin instructions:  2.5 mg every Wed; 5 mg all other days   Weekly warfarin total:  32.5 mg   No change documented:  Smitha Nair RN   Plan last modified:  Mariia Foster RN (2021)   Next INR check:  3/8/2022   Priority:  High   Target end date:  Indefinite    Indications    Long term (current) use of anticoagulants (Resolved) [Z79.01]  Embolism and thrombosis (H) (Resolved) [I74.9]  Anticoagulation monitoring  INR range 2-3 [Z79.01]  Embolism and thrombosis (H) [I74.9]             Anticoagulation Episode Summary     INR check location:      Preferred lab:      Send INR reminders to:  [facility]    Comments:  [facility] needs weekly INR done      Anticoagulation Care Providers     Provider Role Specialty Phone number    Maria Guadalupe Ramon DO Referring Internal Medicine [phone]            See the Encounter Report to view Anticoagulation Flowsheet and Dosing Calendar (Go to Encounters tab in chart review, and find the Anticoagulation Therapy Visit)        Smitha Nair, RN

## 2022-03-07 ENCOUNTER — TRANSFERRED RECORDS (OUTPATIENT)
Dept: HEALTH INFORMATION MANAGEMENT | Facility: OTHER | Age: 62
End: 2022-03-07
Payer: COMMERCIAL

## 2022-03-07 LAB — INR (EXTERNAL): 2.1 (ref 0.9–1.1)

## 2022-03-08 ENCOUNTER — ANTICOAGULATION THERAPY VISIT (OUTPATIENT)
Dept: ANTICOAGULATION | Facility: OTHER | Age: 62
End: 2022-03-08
Attending: INTERNAL MEDICINE
Payer: COMMERCIAL

## 2022-03-08 DIAGNOSIS — I74.9 EMBOLISM AND THROMBOSIS (H): ICD-10-CM

## 2022-03-08 DIAGNOSIS — Z79.01 ANTICOAGULATION MONITORING, INR RANGE 2-3: Primary | ICD-10-CM

## 2022-03-08 NOTE — PROGRESS NOTES
ANTICOAGULATION FOLLOW-UP CLINIC VISIT    Patient Name:  Trisha Fernando  Date:  3/8/2022  Contact Type:  Fax from [facility], no call needed, no change in dosing    SUBJECTIVE:  Patient Findings         Clinical Outcomes     Negatives:  Major bleeding event, Thromboembolic event, Anticoagulation-related hospital admission, Anticoagulation-related ED visit, Anticoagulation-related fatality           OBJECTIVE    Recent labs: (last 7 days)     22  1700   INR 2.1*       ASSESSMENT / PLAN  INR assessment THER    Recheck INR In: 1 WEEK    INR Location Home INR      Anticoagulation Summary  As of 3/8/2022    INR goal:  2.0-3.0   TTR:  87.0 % (1 y)   INR used for dosin.1 (3/7/2022)   Warfarin maintenance plan:  2.5 mg (5 mg x 0.5) every Wed; 5 mg (5 mg x 1) all other days   Full warfarin instructions:  2.5 mg every Wed; 5 mg all other days   Weekly warfarin total:  32.5 mg   No change documented:  Kaela Bell RN   Plan last modified:  Mariia Foster RN (2021)   Next INR check:  3/15/2022   Priority:  High   Target end date:  Indefinite    Indications    Long term (current) use of anticoagulants (Resolved) [Z79.01]  Embolism and thrombosis (H) (Resolved) [I74.9]  Anticoagulation monitoring  INR range 2-3 [Z79.01]  Embolism and thrombosis (H) [I74.9]             Anticoagulation Episode Summary     INR check location:      Preferred lab:      Send INR reminders to:  [facility]    Comments:  [facility] needs weekly INR done      Anticoagulation Care Providers     Provider Role Specialty Phone number    Maria Guadalupe Ramon DO Referring Internal Medicine [phone]            See the Encounter Report to view Anticoagulation Flowsheet and Dosing Calendar (Go to Encounters tab in chart review, and find the Anticoagulation Therapy Visit)        Kaela Bell, SINDHU

## 2022-03-14 ENCOUNTER — ANTICOAGULATION THERAPY VISIT (OUTPATIENT)
Dept: ANTICOAGULATION | Facility: OTHER | Age: 62
End: 2022-03-14
Attending: INTERNAL MEDICINE
Payer: COMMERCIAL

## 2022-03-14 DIAGNOSIS — Z79.01 ANTICOAGULATION MONITORING, INR RANGE 2-3: Primary | ICD-10-CM

## 2022-03-14 DIAGNOSIS — I74.9 EMBOLISM AND THROMBOSIS (H): ICD-10-CM

## 2022-03-14 LAB — INR (EXTERNAL): 2.4 (ref 0.9–1.1)

## 2022-03-14 NOTE — PROGRESS NOTES
ANTICOAGULATION FOLLOW-UP CLINIC VISIT    Patient Name:  Trisha Fernando  Date:  3/14/2022  Contact Type:  no call needed patient to continue same dose    SUBJECTIVE:  Patient Findings         Clinical Outcomes     Negatives:  Major bleeding event, Thromboembolic event, Anticoagulation-related hospital admission, Anticoagulation-related ED visit, Anticoagulation-related fatality           OBJECTIVE    Recent labs: (last 7 days)     22  1448   INR 2.4*       ASSESSMENT / PLAN  INR assessment THER    Recheck INR In: 1 WEEK    INR Location Home INR      Anticoagulation Summary  As of 3/14/2022    INR goal:  2.0-3.0   TTR:  87.4 % (1 y)   INR used for dosin.4 (3/14/2022)   Warfarin maintenance plan:  2.5 mg (5 mg x 0.5) every Wed; 5 mg (5 mg x 1) all other days   Full warfarin instructions:  2.5 mg every Wed; 5 mg all other days   Weekly warfarin total:  32.5 mg   No change documented:  Smitha Nair RN   Plan last modified:  Mariia Foster RN (2021)   Next INR check:  3/21/2022   Priority:  High   Target end date:  Indefinite    Indications    Long term (current) use of anticoagulants (Resolved) [Z79.01]  Embolism and thrombosis (H) (Resolved) [I74.9]  Anticoagulation monitoring  INR range 2-3 [Z79.01]  Embolism and thrombosis (H) [I74.9]             Anticoagulation Episode Summary     INR check location:      Preferred lab:      Send INR reminders to:  [facility]    Comments:  [facility] needs weekly INR done      Anticoagulation Care Providers     Provider Role Specialty Phone number    Maria Guadalupe Ramon DO Referring Internal Medicine [phone]            See the Encounter Report to view Anticoagulation Flowsheet and Dosing Calendar (Go to Encounters tab in chart review, and find the Anticoagulation Therapy Visit)        Smitha Nair, RN

## 2022-03-15 ENCOUNTER — HOSPITAL ENCOUNTER (OUTPATIENT)
Dept: GENERAL RADIOLOGY | Facility: OTHER | Age: 62
Discharge: HOME | End: 2022-03-15
Attending: NURSE PRACTITIONER
Payer: COMMERCIAL

## 2022-03-15 ENCOUNTER — OFFICE VISIT (OUTPATIENT)
Dept: FAMILY MEDICINE | Facility: OTHER | Age: 62
End: 2022-03-15
Payer: COMMERCIAL

## 2022-03-15 VITALS
BODY MASS INDEX: 56.11 KG/M2 | DIASTOLIC BLOOD PRESSURE: 82 MMHG | HEART RATE: 69 BPM | RESPIRATION RATE: 20 BRPM | OXYGEN SATURATION: 95 % | TEMPERATURE: 98.2 F | HEIGHT: 68 IN | SYSTOLIC BLOOD PRESSURE: 124 MMHG

## 2022-03-15 DIAGNOSIS — R07.81 RIB PAIN ON LEFT SIDE: ICD-10-CM

## 2022-03-15 DIAGNOSIS — R06.2 WHEEZING: ICD-10-CM

## 2022-03-15 DIAGNOSIS — R05.9 COUGH: Primary | ICD-10-CM

## 2022-03-15 PROCEDURE — 71046 X-RAY EXAM CHEST 2 VIEWS: CPT

## 2022-03-15 PROCEDURE — 99213 OFFICE O/P EST LOW 20 MIN: CPT | Performed by: NURSE PRACTITIONER

## 2022-03-15 RX ORDER — CODEINE PHOSPHATE AND GUAIFENESIN 10; 100 MG/5ML; MG/5ML
1-2 SOLUTION ORAL EVERY 4 HOURS PRN
Qty: 118 ML | Refills: 0 | Status: SHIPPED | OUTPATIENT
Start: 2022-03-15 | End: 2022-03-22

## 2022-03-15 ASSESSMENT — PAIN SCALES - GENERAL: PAINLEVEL: MILD PAIN (3)

## 2022-03-15 NOTE — PATIENT INSTRUCTIONS
Chest xray today was clear; no pneumonia. No antibiotics are indicated.   Likely viral URI; bronchitis.     Refilled cough syrup with codeine to help with sleep and cough suppression.

## 2022-03-15 NOTE — PROGRESS NOTES
"ASSESSMENT/PLAN:    I have reviewed the nursing notes.  I have reviewed the findings, diagnosis, plan and need for follow up with the patient.    1. Cough  - XR Chest 2 Views  - guaiFENesin-codeine (ROBITUSSIN AC) 100-10 MG/5ML solution; Take 5-10 mLs by mouth every 4 hours as needed for cough  Dispense: 118 mL; Refill: 0  Reviewed clear chest x-ray today, discussed with patient that her symptoms exam are consistent with viral upper respiratory infection, bronchitis.  No antibiotics are indicated today.  Opted to refill Robitussin-AC to help promote sleep and suppress cough at night.  Provided reassurance today.  Follow-up if symptoms worsen or change.    2. Rib pain on left side  Musculoskeletal pain from coughing; may use tylenol / heat/ ice for comfort.     3. Wheezing  May continue use of albuterol inhaler.   Antibiotics are not indicated.    -Symptomatic treatment - Encouraged fluids, salt water gargles, honey (only if greater than 1 year in age due to risk of botulism), elevation, humidifier, sinus rinse/netti pot, lozenges, tea, topical vapor rub, popsicles, rest, etc     Follow up if symptoms persist or worsen or concerns    I explained my diagnostic considerations and recommendations to the patient, who voiced understanding and agreement with the treatment plan. All questions were answered. We discussed potential side effects of any prescribed or recommended therapies, as well as expectations for response to treatments.    Shadia Wilson NP  3/15/2022  11:05 AM    HPI:  Trisha Fernando is a 61 year old female who presents to [facility] today for concerns of productive cough and wheezing x 5 days. She took 2 covid tests, both were negative from the school/government. Last night when coughing she heard a \"pop\" sound in ribs on left side which has hurt since, mild pain only. She has to sleep sitting up;  wakes up due to the wheezing. No known COPD or asthma history.     Denies fevers; she has been " taking dayquil;nyquil. Temp of around 100 at highest.     No nausea, vomiting.     She had covid a year ago in November; was hospitalized. States this feels similar. No known exposures. She works at the [employer].     No relief with albuterol inhaler she has been using.     Cough syrup with codeine is helpful; hers is .       Past Medical History:   Diagnosis Date      novel coronavirus disease (COVID-19) 2020     Activated protein C resistance (H)      Acute respiratory failure with hypoxia (H) 2020     Benign lipomatous neoplasm 2011    Right forearm     Calculus of kidney      DVT (deep venous thrombosis) (H) 10/2/2012     Essential (primary) hypertension      Heterozygous factor V Leiden mutation (H) 2008     History of fatty infiltration of liver      Hyperlipidemia      Major depressive disorder, recurrent, mild (H)      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, unspecified (CODE)     Left foot acromyxoid fibroblastic, 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 Sutton)       COLONOSCOPY N/A 2019    Normal exam, 10 year follow up     LITHOTRIPSY       MAMMOPLASTY REDUCTION  2003     OTHER SURGICAL HISTORY Right 2011    Forearm Lipoma Excision     OTHER SURGICAL HISTORY      Left foot soft tissue resection; dr. Lang     TONSILLECTOMY       Social  History     Tobacco Use     Smoking status: Former Smoker     Packs/day: 1.00     Years: 12.00     Pack years: 12.00     Types: Cigarettes     Quit date: 1990     Years since quittin.5     Smokeless tobacco: Never Used     Tobacco comment: no ecig   Substance Use Topics     Alcohol use: Not Currently     Alcohol/week: 2.0 standard drinks     Comment:  rarely if ever     Current Outpatient Medications   Medication Sig Dispense Refill     ADVAIR DISKUS 250-50 MCG/DOSE inhaler        albuterol (PROAIR HFA/PROVENTIL HFA/VENTOLIN HFA) 108 (90 Base) MCG/ACT inhaler Inhale 2 puffs into the lungs every 4 hours as needed for shortness of breath / dyspnea or wheezing 1 Inhaler 1     buPROPion (WELLBUTRIN XL) 150 MG 24 hr tablet Take 1 tablet (150 mg) by mouth every morning 90 tablet 3     chlorthalidone (HYGROTON) 25 MG tablet Take 1 tablet (25 mg) by mouth daily Increase in dose 90 tablet 3     Cholecalciferol (VITAMIN D3) 2000 UNITS CAPS Take 2,000 Units by mouth daily       Elastic Bandages & Supports (MEDICAL COMPRESSION STOCKINGS) MISC For personal use. Length: calf Strength: 16-20 mmHg Circumference in cm: For calf: Ankle 15cm, Calf 30cm, Ankle to calf length 40cm.       fish oil-omega-3 fatty acids 1000 MG capsule Take 1 capsule by mouth daily        LORazepam (ATIVAN) 0.5 MG tablet Take 0.5 mg by mouth every 6 hours as needed        metoprolol tartrate (LOPRESSOR) 25 MG tablet Take 1 tablet (25 mg) by mouth 2 times daily Increase in dose 180 tablet 3     potassium chloride ER (K-TAB/KLOR-CON) 10 MEQ CR tablet TAKE 1 TABLET (10 MEQ) BY MOUTH DAILY 90 tablet 3     triamcinolone (KENALOG) 0.1 % external cream Apply topically 2 times daily As needed for rash 15 g 0     venlafaxine (EFFEXOR-XR) 150 MG 24 hr capsule TAKE 1 CAPSULE BY MOUTH EVERY DAY WITH FOOD 90 capsule 4     vitamin B-Complex Take 1 tablet by mouth daily       warfarin ANTICOAGULANT (COUMADIN) 5 MG tablet TAKE 5 MG 6 DAYS A WEEK AND 2.5 MG ONE DAY  "A WEEK OR AS DIRECTED BY THE [facility]. 90 tablet 1     Allergies   Allergen Reactions     Lisinopril Swelling     Angioedema, Edema       Losartan Dizziness     dizziness, tiredness, itching in ears and face, hands and feet     Adhesive Tape Itching and Rash     Amlodipine Swelling     edema     [place] Itching and Rash     Wound Dressing Adhesive Rash     Past medical history, past surgical history, current medications and allergies reviewed and accurate to the best of my knowledge.      ROS:  Refer to HPI    /82 (BP Location: Right arm, Patient Position: Sitting, Cuff Size: Adult Large)   Pulse 69   Temp 98.2  F (36.8  C) (Tympanic)   Resp 20   Ht 1.727 m (5' 8\")   LMP  (LMP Unknown)   SpO2 95%   Breastfeeding No   BMI 56.11 kg/m      EXAM:  General Appearance: Well appearing 61 year old female, appropriate appearance for age. No acute distress   Orophayrnx: moist mucous membranes, posterior pharynx without erythema, tonsils symmetric, no erythema, no exudates or petechiae, no post nasal drip seen, voice clear.    Nose: no drainage or congestion   Neck: supple without adenopathy  Respiratory: normal chest wall and respirations.  Normal effort.  Clear to auscultation bilaterally, no wheezing, crackles or rhonchi.  No increased work of breathing.  +nonproductive cough appreciated.  Cardiac: RRR with no murmurs  Psychological: normal affect, alert, oriented, and pleasant.     Results for orders placed or performed in visit on 03/15/22   XR Chest 2 Views     Status: None    Narrative    Procedure:XR CHEST 2 VW    Clinical history:Female, 61 years, r/o pneumonia; she does have some  rib discomfort around rib 8-10 on left anterior chest from coughing.;  Cough; Rib pain    Technique: Two views are submitted.    Comparison: 2/4/2021    Findings: The cardiac silhouette is normal. The pulmonary vasculature  is normal.    The lungs are clear. Bony structures are unremarkable.      Impression    " Impression:   No acute abnormality. No evidence of acute or active cardiopulmonary  disease.    THERESE SEAMAN MD         SYSTEM ID:  [id number]

## 2022-03-15 NOTE — NURSING NOTE
"Patient presents to the clinic today with productive cough and wheezing that she has had for 5 days.  Patient states she took 2 COVID tests that were both negative.  Patient states last night when she was coughing she heard a \"pop\" sound in her ribs on the left side and states that area has hurt since.  Eli Parham LPN 3/15/2022   10:58 AM    Chief Complaint   Patient presents with     Cough       Initial /82 (BP Location: Right arm, Patient Position: Sitting, Cuff Size: Adult Large)   Pulse 69   Temp 98.2  F (36.8  C) (Tympanic)   Resp 20   Ht 1.727 m (5' 8\")   LMP  (LMP Unknown)   SpO2 95%   Breastfeeding No   BMI 56.11 kg/m   Estimated body mass index is 56.11 kg/m  as calculated from the following:    Height as of this encounter: 1.727 m (5' 8\").    Weight as of 12/27/21: 167.4 kg (369 lb).  Medication Reconciliation: complete  Eli Parham LPN    "

## 2022-03-19 ENCOUNTER — OFFICE VISIT (OUTPATIENT)
Dept: FAMILY MEDICINE | Facility: OTHER | Age: 62
End: 2022-03-19
Attending: PHYSICIAN ASSISTANT
Payer: COMMERCIAL

## 2022-03-19 VITALS
WEIGHT: 293 LBS | HEIGHT: 68 IN | HEART RATE: 78 BPM | SYSTOLIC BLOOD PRESSURE: 130 MMHG | TEMPERATURE: 98.4 F | RESPIRATION RATE: 20 BRPM | OXYGEN SATURATION: 96 % | BODY MASS INDEX: 44.41 KG/M2 | DIASTOLIC BLOOD PRESSURE: 80 MMHG

## 2022-03-19 DIAGNOSIS — J18.9 PNEUMONIA OF RIGHT LOWER LOBE DUE TO INFECTIOUS ORGANISM: Primary | ICD-10-CM

## 2022-03-19 DIAGNOSIS — Z83.2 FAMILY HISTORY OF FACTOR V LEIDEN MUTATION: ICD-10-CM

## 2022-03-19 DIAGNOSIS — Z79.01 ANTICOAGULATION MONITORING, INR RANGE 2-3: ICD-10-CM

## 2022-03-19 PROCEDURE — 99214 OFFICE O/P EST MOD 30 MIN: CPT | Performed by: NURSE PRACTITIONER

## 2022-03-19 RX ORDER — AZITHROMYCIN 250 MG/1
TABLET, FILM COATED ORAL
Qty: 6 TABLET | Refills: 0 | Status: SHIPPED | OUTPATIENT
Start: 2022-03-19 | End: 2022-03-24

## 2022-03-19 ASSESSMENT — PATIENT HEALTH QUESTIONNAIRE - PHQ9: SUM OF ALL RESPONSES TO PHQ QUESTIONS 1-9: 13

## 2022-03-19 ASSESSMENT — PAIN SCALES - GENERAL: PAINLEVEL: NO PAIN (0)

## 2022-03-19 NOTE — PROGRESS NOTES
Assessment & Plan   Problem List Items Addressed This Visit        Other    Anticoagulation monitoring, INR range 2-3    Family history of factor V Leiden mutation      Other Visit Diagnoses     Pneumonia of right lower lobe due to infectious organism    -  Primary    Relevant Medications    azithromycin (ZITHROMAX) 250 MG tablet      She has had symptoms for the past week that have progressively worsened.  Lung sounds do show right lower lobe Rales, suspicious of right lower lobe pneumonia.  At this time will treat with azithromycin daily for 5 days.  Recommend ongoing symptomatic management and close follow-up if symptoms are not improving.  She is on warfarin due to factor V Leiden, she will reach out to [facility] clinic for close monitoring of this as well due to current antibiotics.    No follow-ups on file.    BELLA Parada [facility] is a 61 year old who presents for the following health issues     HPI     Has had sx for 8-9 days, started with cough and sinus congestion. Feels like symptoms are worsening. Worse sinus congestion, headaches, upper jaw painful. Feeling SOB, cough is not very productive. Taking dayquil, nyquil, cough medication, tylenol, ibuprofen. Not sure of any fevers.  She was seen in  on Tuesday, CXR negative. No asthma, COPD. Had COVID last year. Has done 2 COVID tests at home this past week, negative. Works at elementary school.     I did review her previous chest x-ray with her today as well as her notes from her previous visit in rapid clinic.    Review of Systems   See above      Objective    LMP  (LMP Unknown)   There is no height or weight on file to calculate BMI.  Physical Exam   GENERAL: Appears mildly ill, nontoxic, alert and no distress, obese  EYES: Eyes grossly normal to inspection, PERRL and conjunctivae and sclerae normal  HENT: ear canals and TM's normal, nose and mouth without ulcers or lesions  NECK: no adenopathy,  no asymmetry, masses, or scars and thyroid normal to palpation  RESP: Rales noted right lower lobe, frequent cough, O2 sats 96% on room air   CV: regular rate and rhythm, normal S1 S2, no S3 or S4  PSYCH: mentation appears normal, affect normal/bright

## 2022-03-19 NOTE — NURSING NOTE
"Chief Complaint   Patient presents with     Cough     Sinus Problem     Patient presented to the clinic with a cough, and sinus congestion that started on 3/11/22 and has not gotten any better. She is also having shortness of breath with exertion.     Initial /80 (BP Location: Left arm, Patient Position: Sitting, Cuff Size: Adult Large)   Pulse 78   Temp 98.4  F (36.9  C) (Tympanic)   Resp 20   Ht 1.727 m (5' 8\")   Wt (!) 165.6 kg (365 lb)   LMP  (LMP Unknown)   SpO2 96%   Breastfeeding No   BMI 55.50 kg/m   Estimated body mass index is 55.5 kg/m  as calculated from the following:    Height as of this encounter: 1.727 m (5' 8\").    Weight as of this encounter: 165.6 kg (365 lb).       FOOD SECURITY SCREENING QUESTIONS:    The next two questions are to help us understand your food security.  If you are feeling you need any assistance in this area, we have resources available to support you today.    Hunger Vital Signs:  Within the past 12 months we worried whether our food would run out before we got money to buy more. Never  Within the past 12 months the food we bought just didn't last and we didn't have money to get more. Never      Medication Reconciliation: Complete      Agustina Archer LPN  "

## 2022-03-22 ENCOUNTER — ANTICOAGULATION THERAPY VISIT (OUTPATIENT)
Dept: ANTICOAGULATION | Facility: OTHER | Age: 62
End: 2022-03-22
Attending: INTERNAL MEDICINE
Payer: COMMERCIAL

## 2022-03-22 DIAGNOSIS — Z79.01 ANTICOAGULATION MONITORING, INR RANGE 2-3: Primary | ICD-10-CM

## 2022-03-22 DIAGNOSIS — I74.9 EMBOLISM AND THROMBOSIS (H): ICD-10-CM

## 2022-03-22 LAB — INR (EXTERNAL): 2.9 (ref 0.9–1.1)

## 2022-03-22 NOTE — PROGRESS NOTES
ANTICOAGULATION FOLLOW-UP CLINIC VISIT    Patient Name:  Trihsa Fernando  Date:  3/22/2022  Contact Type:  no call needed patient to continue same dose    SUBJECTIVE:  Patient Findings         Clinical Outcomes     Negatives:  Major bleeding event, Thromboembolic event, Anticoagulation-related hospital admission, Anticoagulation-related ED visit, Anticoagulation-related fatality           OBJECTIVE    Recent labs: (last 7 days)     22  1534   INR 2.9*       ASSESSMENT / PLAN  INR assessment THER    Recheck INR In: 1 WEEK    INR Location Home INR      Anticoagulation Summary  As of 3/22/2022    INR goal:  2.0-3.0   TTR:  88.1 % (1 y)   INR used for dosin.9 (3/22/2022)   Warfarin maintenance plan:  2.5 mg (5 mg x 0.5) every Wed; 5 mg (5 mg x 1) all other days   Full warfarin instructions:  2.5 mg every Wed; 5 mg all other days   Weekly warfarin total:  32.5 mg   Plan last modified:  Mariia Foster RN (2021)   Next INR check:  3/29/2022   Priority:  High   Target end date:  Indefinite    Indications    Long term (current) use of anticoagulants (Resolved) [Z79.01]  Embolism and thrombosis (H) (Resolved) [I74.9]  Anticoagulation monitoring  INR range 2-3 [Z79.01]  Embolism and thrombosis (H) [I74.9]             Anticoagulation Episode Summary     INR check location:      Preferred lab:      Send INR reminders to:  [facility]    Comments:  MDINR needs weekly INR done      Anticoagulation Care Providers     Provider Role Specialty Phone number    Maria Guadalupe Ramon DO Referring Internal Medicine [phone]            See the Encounter Report to view Anticoagulation Flowsheet and Dosing Calendar (Go to Encounters tab in chart review, and find the Anticoagulation Therapy Visit)        Smitha Nair, RN

## 2022-03-29 ENCOUNTER — ANTICOAGULATION THERAPY VISIT (OUTPATIENT)
Dept: ANTICOAGULATION | Facility: OTHER | Age: 62
End: 2022-03-29
Attending: INTERNAL MEDICINE
Payer: COMMERCIAL

## 2022-03-29 DIAGNOSIS — I74.9 EMBOLISM AND THROMBOSIS (H): ICD-10-CM

## 2022-03-29 DIAGNOSIS — Z79.01 ANTICOAGULATION MONITORING, INR RANGE 2-3: Primary | ICD-10-CM

## 2022-03-29 LAB — INR (EXTERNAL): 2.7 (ref 0.9–1.1)

## 2022-03-29 NOTE — PROGRESS NOTES
ANTICOAGULATION FOLLOW-UP CLINIC VISIT    Patient Name:  Trisha Fernando  Date:  3/29/2022  Contact Type:  No call needed, patient to continue same dose.     SUBJECTIVE:  Patient Findings         Clinical Outcomes     Negatives:  Major bleeding event, Thromboembolic event, Anticoagulation-related hospital admission, Anticoagulation-related ED visit, Anticoagulation-related fatality           OBJECTIVE    Recent labs: (last 7 days)     22  1334   INR 2.7*       ASSESSMENT / PLAN  INR assessment THER    Recheck INR In: 1 WEEK    INR Location Home INR      Anticoagulation Summary  As of 3/29/2022    INR goal:  2.0-3.0   TTR:  88.1 % (1 y)   INR used for dosin.7 (3/29/2022)   Warfarin maintenance plan:  2.5 mg (5 mg x 0.5) every Wed; 5 mg (5 mg x 1) all other days   Full warfarin instructions:  2.5 mg every Wed; 5 mg all other days   Weekly warfarin total:  32.5 mg   No change documented:  Ghada Betancur RN   Plan last modified:  Mariia Foster RN (2021)   Next INR check:  2022   Priority:  High   Target end date:  Indefinite    Indications    Long term (current) use of anticoagulants (Resolved) [Z79.01]  Embolism and thrombosis (H) (Resolved) [I74.9]  Anticoagulation monitoring  INR range 2-3 [Z79.01]  Embolism and thrombosis (H) [I74.9]             Anticoagulation Episode Summary     INR check location:      Preferred lab:      Send INR reminders to:  [facility]    Comments:  MDINR needs weekly INR done      Anticoagulation Care Providers     Provider Role Specialty Phone number    Maria Guadalupe Ramon DO Referring Internal Medicine [phone]            See the Encounter Report to view Anticoagulation Flowsheet and Dosing Calendar (Go to Encounters tab in chart review, and find the Anticoagulation Therapy Visit)        Ghada Betancur, SINDHU

## 2022-04-04 ENCOUNTER — ANTICOAGULATION THERAPY VISIT (OUTPATIENT)
Dept: ANTICOAGULATION | Facility: OTHER | Age: 62
End: 2022-04-04
Attending: INTERNAL MEDICINE
Payer: COMMERCIAL

## 2022-04-04 DIAGNOSIS — Z79.01 ANTICOAGULATION MONITORING, INR RANGE 2-3: Primary | ICD-10-CM

## 2022-04-04 DIAGNOSIS — I74.9 EMBOLISM AND THROMBOSIS (H): ICD-10-CM

## 2022-04-04 LAB — INR (EXTERNAL): 2.6 (ref 0.9–1.1)

## 2022-04-04 NOTE — PROGRESS NOTES
ANTICOAGULATION FOLLOW-UP CLINIC VISIT    Patient Name:  Trisha Fernando  Date:  2022  Contact Type:  No call needed. pt to continue same dose.    SUBJECTIVE:  Patient Findings         Clinical Outcomes     Negatives:  Major bleeding event, Thromboembolic event, Anticoagulation-related hospital admission, Anticoagulation-related ED visit, Anticoagulation-related fatality           OBJECTIVE    Recent labs: (last 7 days)     22  1301   INR 2.6*       ASSESSMENT / PLAN  INR assessment THER    Recheck INR In: 1 WEEK    INR Location Home INR      Anticoagulation Summary  As of 2022    INR goal:  2.0-3.0   TTR:  88.1 % (1 y)   INR used for dosin.6 (2022)   Warfarin maintenance plan:  2.5 mg (5 mg x 0.5) every Wed; 5 mg (5 mg x 1) all other days   Full warfarin instructions:  2.5 mg every Wed; 5 mg all other days   Weekly warfarin total:  32.5 mg   No change documented:  Ghada Betancur RN   Plan last modified:  Mariia Foster RN (2021)   Next INR check:  2022   Priority:  High   Target end date:  Indefinite    Indications    Long term (current) use of anticoagulants (Resolved) [Z79.01]  Embolism and thrombosis (H) (Resolved) [I74.9]  Anticoagulation monitoring  INR range 2-3 [Z79.01]  Embolism and thrombosis (H) [I74.9]             Anticoagulation Episode Summary     INR check location:      Preferred lab:      Send INR reminders to:  [facility]    Comments:  MDINR needs weekly INR done      Anticoagulation Care Providers     Provider Role Specialty Phone number    Maria Guadalupe Ramon DO Referring Internal Medicine [phone]            See the Encounter Report to view Anticoagulation Flowsheet and Dosing Calendar (Go to Encounters tab in chart review, and find the Anticoagulation Therapy Visit)        Ghada Betancur, SINDHU

## 2022-04-11 ENCOUNTER — ANTICOAGULATION THERAPY VISIT (OUTPATIENT)
Dept: ANTICOAGULATION | Facility: OTHER | Age: 62
End: 2022-04-11
Attending: INTERNAL MEDICINE
Payer: COMMERCIAL

## 2022-04-11 DIAGNOSIS — I74.9 EMBOLISM AND THROMBOSIS (H): ICD-10-CM

## 2022-04-11 DIAGNOSIS — Z79.01 ANTICOAGULATION MONITORING, INR RANGE 2-3: Primary | ICD-10-CM

## 2022-04-11 LAB — INR (EXTERNAL): 2.6 (ref 0.9–1.1)

## 2022-04-11 NOTE — PROGRESS NOTES
ANTICOAGULATION FOLLOW-UP CLINIC VISIT    Patient Name:  Trisha Fernando  Date:  2022  Contact Type:  no call needed patient to continue same dose    SUBJECTIVE:  Patient Findings         Clinical Outcomes     Negatives:  Major bleeding event, Thromboembolic event, Anticoagulation-related hospital admission, Anticoagulation-related ED visit, Anticoagulation-related fatality           OBJECTIVE    Recent labs: (last 7 days)     22  1356   INR 2.6*       ASSESSMENT / PLAN  INR assessment THER    Recheck INR In: 1 WEEK    INR Location Home INR      Anticoagulation Summary  As of 2022    INR goal:  2.0-3.0   TTR:  88.0 % (1 y)   INR used for dosin.6 (2022)   Warfarin maintenance plan:  2.5 mg (5 mg x 0.5) every Wed; 5 mg (5 mg x 1) all other days   Full warfarin instructions:  2.5 mg every Wed; 5 mg all other days   Weekly warfarin total:  32.5 mg   No change documented:  Smitha Nair RN   Plan last modified:  Mariia Foster RN (2021)   Next INR check:  2022   Priority:  High   Target end date:  Indefinite    Indications    Long term (current) use of anticoagulants (Resolved) [Z79.01]  Embolism and thrombosis (H) (Resolved) [I74.9]  Anticoagulation monitoring  INR range 2-3 [Z79.01]  Embolism and thrombosis (H) [I74.9]             Anticoagulation Episode Summary     INR check location:      Preferred lab:      Send INR reminders to:  [facility]    Comments:  MDINR needs weekly INR done      Anticoagulation Care Providers     Provider Role Specialty Phone number    Maria Guadalupe Ramon DO Referring Internal Medicine [phone]            See the Encounter Report to view Anticoagulation Flowsheet and Dosing Calendar (Go to Encounters tab in chart review, and find the Anticoagulation Therapy Visit)        Smitha Nair, RN

## 2022-04-18 LAB — INR (EXTERNAL): 2.8 (ref 0.9–1.1)

## 2022-04-18 NOTE — TELEPHONE ENCOUNTER
Call patient: Normal mammogram Patient should be bridged.  Please assist with developing plan for this.  Thank you.

## 2022-04-19 ENCOUNTER — ANTICOAGULATION THERAPY VISIT (OUTPATIENT)
Dept: ANTICOAGULATION | Facility: OTHER | Age: 62
End: 2022-04-19
Attending: INTERNAL MEDICINE
Payer: COMMERCIAL

## 2022-04-19 DIAGNOSIS — Z79.01 ANTICOAGULATION MONITORING, INR RANGE 2-3: Primary | ICD-10-CM

## 2022-04-19 DIAGNOSIS — I74.9 EMBOLISM AND THROMBOSIS (H): ICD-10-CM

## 2022-04-19 NOTE — PROGRESS NOTES
ANTICOAGULATION FOLLOW-UP CLINIC VISIT    Patient Name:  Trisha Fernando  Date:  2022  Contact Type:  No need to call patietn to continue same dose.     SUBJECTIVE:  Patient Findings         Clinical Outcomes     Negatives:  Major bleeding event, Thromboembolic event, Anticoagulation-related hospital admission, Anticoagulation-related ED visit, Anticoagulation-related fatality           OBJECTIVE    Recent labs: (last 7 days)     22  1752   INR 2.8*       ASSESSMENT / PLAN  INR assessment THER    Recheck INR In: 1 WEEK    INR Location Home INR      Anticoagulation Summary  As of 2022    INR goal:  2.0-3.0   TTR:  88.1 % (1 y)   INR used for dosin.8 (2022)   Warfarin maintenance plan:  2.5 mg (5 mg x 0.5) every Wed; 5 mg (5 mg x 1) all other days   Full warfarin instructions:  2.5 mg every Wed; 5 mg all other days   Weekly warfarin total:  32.5 mg   No change documented:  Rebecca Bonilla RN   Plan last modified:  Mariia Foster RN (2021)   Next INR check:  2022   Priority:  High   Target end date:  Indefinite    Indications    Long term (current) use of anticoagulants (Resolved) [Z79.01]  Embolism and thrombosis (H) (Resolved) [I74.9]  Anticoagulation monitoring  INR range 2-3 [Z79.01]  Embolism and thrombosis (H) [I74.9]             Anticoagulation Episode Summary     INR check location:      Preferred lab:      Send INR reminders to:  [facility]    Comments:  MDINR needs weekly INR done      Anticoagulation Care Providers     Provider Role Specialty Phone number    Maria Guadalupe Ramon DO Referring Internal Medicine [phone]            See the Encounter Report to view Anticoagulation Flowsheet and Dosing Calendar (Go to Encounters tab in chart review, and find the Anticoagulation Therapy Visit)        Rebecca Bonilla, RN

## 2022-04-25 ENCOUNTER — ANTICOAGULATION THERAPY VISIT (OUTPATIENT)
Dept: ANTICOAGULATION | Facility: OTHER | Age: 62
End: 2022-04-25
Attending: INTERNAL MEDICINE
Payer: COMMERCIAL

## 2022-04-25 DIAGNOSIS — I74.9 EMBOLISM AND THROMBOSIS (H): ICD-10-CM

## 2022-04-25 DIAGNOSIS — Z79.01 ANTICOAGULATION MONITORING, INR RANGE 2-3: Primary | ICD-10-CM

## 2022-04-25 LAB — INR (EXTERNAL): 2.8 (ref 0.9–1.1)

## 2022-04-25 NOTE — PROGRESS NOTES
ANTICOAGULATION FOLLOW-UP CLINIC VISIT    Patient Name:  Trisha Fernando  Date:  2022  Contact Type:  No need to call to continue current dose.     SUBJECTIVE:  Patient Findings         Clinical Outcomes     Negatives:  Major bleeding event, Thromboembolic event, Anticoagulation-related hospital admission, Anticoagulation-related ED visit, Anticoagulation-related fatality           OBJECTIVE    Recent labs: (last 7 days)     22  1522   INR 2.8*       ASSESSMENT / PLAN  INR assessment THER    Recheck INR In: 1 WEEK    INR Location Home INR      Anticoagulation Summary  As of 2022    INR goal:  2.0-3.0   TTR:  88.0 % (1 y)   INR used for dosin.8 (2022)   Warfarin maintenance plan:  2.5 mg (5 mg x 0.5) every Wed; 5 mg (5 mg x 1) all other days   Full warfarin instructions:  2.5 mg every Wed; 5 mg all other days   Weekly warfarin total:  32.5 mg   No change documented:  Rebecca Bonilla RN   Plan last modified:  Mariia Foster RN (2021)   Next INR check:  2022   Priority:  High   Target end date:  Indefinite    Indications    Long term (current) use of anticoagulants (Resolved) [Z79.01]  Embolism and thrombosis (H) (Resolved) [I74.9]  Anticoagulation monitoring  INR range 2-3 [Z79.01]  Embolism and thrombosis (H) [I74.9]             Anticoagulation Episode Summary     INR check location:      Preferred lab:      Send INR reminders to:  [facility]    Comments:  MDINR needs weekly INR done      Anticoagulation Care Providers     Provider Role Specialty Phone number    Maria Guadalupe Ramno DO Referring Internal Medicine [phone]            See the Encounter Report to view Anticoagulation Flowsheet and Dosing Calendar (Go to Encounters tab in chart review, and find the Anticoagulation Therapy Visit)        Rebecca Bonilla, RN

## 2022-05-02 LAB — INR (EXTERNAL): 2.9 (ref 0.9–1.1)

## 2022-05-03 ENCOUNTER — ANTICOAGULATION THERAPY VISIT (OUTPATIENT)
Dept: ANTICOAGULATION | Facility: OTHER | Age: 62
End: 2022-05-03
Attending: INTERNAL MEDICINE
Payer: COMMERCIAL

## 2022-05-03 DIAGNOSIS — I74.9 EMBOLISM AND THROMBOSIS (H): ICD-10-CM

## 2022-05-03 DIAGNOSIS — Z79.01 ANTICOAGULATION MONITORING, INR RANGE 2-3: Primary | ICD-10-CM

## 2022-05-03 NOTE — PROGRESS NOTES
ANTICOAGULATION FOLLOW-UP CLINIC VISIT    Patient Name:  Trisha Fernando  Date:  5/3/2022  Contact Type:  No call needed, patient to continue same dose.    SUBJECTIVE:  Patient Findings         Clinical Outcomes     Negatives:  Major bleeding event, Thromboembolic event, Anticoagulation-related hospital admission, Anticoagulation-related ED visit, Anticoagulation-related fatality           OBJECTIVE    Recent labs: (last 7 days)     22   INR 2.9*       ASSESSMENT / PLAN  INR assessment THER    Recheck INR In: 1 WEEK    INR Location Home INR      Anticoagulation Summary  As of 5/3/2022    INR goal:  2.0-3.0   TTR:  88.1 % (1 y)   INR used for dosin.9 (2022)   Warfarin maintenance plan:  2.5 mg (5 mg x 0.5) every Wed; 5 mg (5 mg x 1) all other days   Full warfarin instructions:  2.5 mg every Wed; 5 mg all other days   Weekly warfarin total:  32.5 mg   No change documented:  Ghada Betancur RN   Plan last modified:  Mariia Foster RN (2021)   Next INR check:  5/10/2022   Priority:  High   Target end date:  Indefinite    Indications    Long term (current) use of anticoagulants (Resolved) [Z79.01]  Embolism and thrombosis (H) (Resolved) [I74.9]  Anticoagulation monitoring  INR range 2-3 [Z79.01]  Embolism and thrombosis (H) [I74.9]             Anticoagulation Episode Summary     INR check location:      Preferred lab:      Send INR reminders to:  [facility]    Comments:  MDINR needs weekly INR done      Anticoagulation Care Providers     Provider Role Specialty Phone number    Maria Guadalupe Ramon DO Referring Internal Medicine [phone]            See the Encounter Report to view Anticoagulation Flowsheet and Dosing Calendar (Go to Encounters tab in chart review, and find the Anticoagulation Therapy Visit)        Ghada Betancur, SINDHU

## 2022-05-04 ENCOUNTER — E-VISIT (OUTPATIENT)
Dept: INTERNAL MEDICINE | Facility: OTHER | Age: 62
End: 2022-05-04
Payer: COMMERCIAL

## 2022-05-04 DIAGNOSIS — R05.9 COUGH: Primary | ICD-10-CM

## 2022-05-05 ENCOUNTER — HOSPITAL ENCOUNTER (OUTPATIENT)
Dept: GENERAL RADIOLOGY | Facility: OTHER | Age: 62
Discharge: HOME | End: 2022-05-05
Attending: INTERNAL MEDICINE
Payer: COMMERCIAL

## 2022-05-05 ENCOUNTER — OFFICE VISIT (OUTPATIENT)
Dept: INTERNAL MEDICINE | Facility: OTHER | Age: 62
End: 2022-05-05
Attending: INTERNAL MEDICINE
Payer: COMMERCIAL

## 2022-05-05 VITALS
DIASTOLIC BLOOD PRESSURE: 86 MMHG | SYSTOLIC BLOOD PRESSURE: 130 MMHG | HEART RATE: 81 BPM | BODY MASS INDEX: 56.11 KG/M2 | RESPIRATION RATE: 16 BRPM | OXYGEN SATURATION: 94 % | WEIGHT: 293 LBS | TEMPERATURE: 98.2 F

## 2022-05-05 DIAGNOSIS — R05.9 COUGH: Primary | ICD-10-CM

## 2022-05-05 DIAGNOSIS — R05.9 COUGH: ICD-10-CM

## 2022-05-05 DIAGNOSIS — R06.02 SOB (SHORTNESS OF BREATH): ICD-10-CM

## 2022-05-05 PROBLEM — J44.9 COPD (CHRONIC OBSTRUCTIVE PULMONARY DISEASE) (H): Status: ACTIVE | Noted: 2022-05-05

## 2022-05-05 LAB
D DIMER PPP FEU-MCNC: 0.37 UG/ML FEU (ref 0–0.5)
ERYTHROCYTE [DISTWIDTH] IN BLOOD BY AUTOMATED COUNT: 14.1 % (ref 10–15)
FLUAV RNA SPEC QL NAA+PROBE: NEGATIVE
FLUBV RNA RESP QL NAA+PROBE: NEGATIVE
HCT VFR BLD AUTO: 48.6 % (ref 35–47)
HGB BLD-MCNC: 16.1 G/DL (ref 11.7–15.7)
MCH RBC QN AUTO: 31.3 PG (ref 26.5–33)
MCHC RBC AUTO-ENTMCNC: 33.1 G/DL (ref 31.5–36.5)
MCV RBC AUTO: 95 FL (ref 78–100)
NT-PROBNP SERPL-MCNC: 35 PG/ML (ref 0–100)
PLATELET # BLD AUTO: 223 10E3/UL (ref 150–450)
RBC # BLD AUTO: 5.14 10E6/UL (ref 3.8–5.2)
RSV RNA SPEC NAA+PROBE: NEGATIVE
SARS-COV-2 RNA RESP QL NAA+PROBE: NEGATIVE
WBC # BLD AUTO: 6.8 10E3/UL (ref 4–11)

## 2022-05-05 PROCEDURE — 83880 ASSAY OF NATRIURETIC PEPTIDE: CPT | Mod: ZL | Performed by: INTERNAL MEDICINE

## 2022-05-05 PROCEDURE — 87637 SARSCOV2&INF A&B&RSV AMP PRB: CPT | Mod: ZL | Performed by: INTERNAL MEDICINE

## 2022-05-05 PROCEDURE — 71046 X-RAY EXAM CHEST 2 VIEWS: CPT

## 2022-05-05 PROCEDURE — 36415 COLL VENOUS BLD VENIPUNCTURE: CPT | Mod: ZL | Performed by: INTERNAL MEDICINE

## 2022-05-05 PROCEDURE — C9803 HOPD COVID-19 SPEC COLLECT: HCPCS

## 2022-05-05 PROCEDURE — 99214 OFFICE O/P EST MOD 30 MIN: CPT | Performed by: INTERNAL MEDICINE

## 2022-05-05 PROCEDURE — 85027 COMPLETE CBC AUTOMATED: CPT | Mod: ZL | Performed by: INTERNAL MEDICINE

## 2022-05-05 PROCEDURE — 85379 FIBRIN DEGRADATION QUANT: CPT | Mod: ZL | Performed by: INTERNAL MEDICINE

## 2022-05-05 RX ORDER — ALBUTEROL SULFATE 90 UG/1
2 AEROSOL, METERED RESPIRATORY (INHALATION) EVERY 4 HOURS PRN
Qty: 8.5 G | Refills: 3 | Status: SHIPPED | OUTPATIENT
Start: 2022-05-05 | End: 2023-11-24

## 2022-05-05 RX ORDER — GUAIFENESIN/DEXTROMETHORPHAN 100-10MG/5
10 SYRUP ORAL 3 TIMES DAILY PRN
Qty: 236 ML | Refills: 0 | Status: SHIPPED | OUTPATIENT
Start: 2022-05-05 | End: 2023-02-23

## 2022-05-05 ASSESSMENT — PATIENT HEALTH QUESTIONNAIRE - PHQ9
10. IF YOU CHECKED OFF ANY PROBLEMS, HOW DIFFICULT HAVE THESE PROBLEMS MADE IT FOR YOU TO DO YOUR WORK, TAKE CARE OF THINGS AT HOME, OR GET ALONG WITH OTHER PEOPLE: NOT DIFFICULT AT ALL
SUM OF ALL RESPONSES TO PHQ QUESTIONS 1-9: 11
SUM OF ALL RESPONSES TO PHQ QUESTIONS 1-9: 11

## 2022-05-05 ASSESSMENT — PAIN SCALES - GENERAL: PAINLEVEL: NO PAIN (1)

## 2022-05-05 ASSESSMENT — ENCOUNTER SYMPTOMS: COUGH: 1

## 2022-05-05 NOTE — NURSING NOTE
Patient presents to clinic today for cough which started Sunday. Tested negative for COVID this morning. She did have COVID in November 2020.  Medication reconciliation completed.    ACP on file? No, form previously provided.    FOOD SECURITY SCREENING QUESTIONS    The next two questions are to help us understand your food security.  If you are feeling you need any assistance in this area, we have resources available to support you today.    Hunger Vital Signs:  Within the past 12 months we worried whether our food would run out before we got money to buy more. Never  Within the past 12 months the food we bought just didn't last and we didn't have money to get more. Never    Marlene Harrison CMA(AAMA)..................5/5/2022   4:15 PM

## 2022-05-05 NOTE — PROGRESS NOTES
"  Assessment & Plan     Cough  - viral testing negative for RSV, flu and COVID; symptomatic treatment, call or be seen with worsening symptoms  - XR Chest 2 Views; Future  - albuterol (PROAIR HFA/PROVENTIL HFA/VENTOLIN HFA) 108 (90 Base) MCG/ACT inhaler; Inhale 2 puffs into the lungs every 4 hours as needed for shortness of breath / dyspnea or wheezing  - guaiFENesin-dextromethorphan (ROBITUSSIN DM) 100-10 MG/5ML syrup; Take 10 mLs by mouth 3 times daily as needed for cough  - Symptomatic; Yes; 5/5/2022 Influenza A/B & SARS-CoV2 (COVID-19) Virus PCR Multiplex Nose    SOB (shortness of breath)  - labs are all normal; likely viral illness, monitor and call with worsening  - Brain Natriuretic Peptide (BNP)  - CBC W PLT No Diff  - D dimer quantitative     BMI:   Estimated body mass index is 56.11 kg/m  as calculated from the following:    Height as of 3/19/22: 1.727 m (5' 8\").    Weight as of this encounter: 167.4 kg (369 lb).   Weight management plan: Discussed healthy diet and exercise guidelines    - Return/call as needed for follow-up should any new symptoms develop, for worsening of current symptoms or if symptoms do not resolve with above plan.    Maria Guadalupe Ramon, [facility] AND [facility] is a 61 year old who presents for the following health issues:    In mid march was sick with bronchitis, xray at the time was clear.  She had coughing and stuffy nose with neck and chest discomfort.  She was given cough syrup.  She didn't feel better after a few days and came back and got a Zpak and then gradually improved.      Last week she was at a [employer] game and then 2 days later had scratchy throat.  No fevers other than low grade once.  She is having a productive cough, congestion, neck ache, initial headache but none now.  She is Nyquil and Dayquil.  She has also taken some cough syrup.  She feels she is having a hard time breathing at times with wheezing.      Cough    History of Present " Illness       Mental Health Follow-up:                    Today's PHQ-9         PHQ-9 Total Score: 11  PHQ-9 Q9 Thoughts of better off dead/self-harm past 2 weeks :   (P) Not at all    How difficult have these problems made it for you to do your work, take care of things at home, or get along with other people: Not difficult at all        Reason for visit:  Cough  Symptoms include:  Shortness of breath, wheezing, coughing  Symptom intensity:  Moderate  Symptom progression:  Worsening  Had these symptoms before:  Yes  Has tried/received treatment for these symptoms:  Yes  Previous treatment was successful:  Yes  Prior treatment description:  Antibiotics and rest  What makes it worse:  Walking, laying flat  What makes it better:  NyQuil, inhaler    She eats 0-1 servings of fruits and vegetables daily.She consumes 0 sweetened beverage(s) daily.She exercises with enough effort to increase her heart rate 9 or less minutes per day.  She exercises with enough effort to increase her heart rate 3 or less days per week.   She is taking medications regularly.      Review of Systems   Respiratory: Positive for cough.    No objective fever.      Objective    /86 (BP Location: Right arm, Patient Position: Sitting, Cuff Size: Adult Large)   Pulse 81   Temp 98.2  F (36.8  C) (Tympanic)   Resp 16   Wt (!) 167.4 kg (369 lb)   LMP  (LMP Unknown)   SpO2 96%   BMI 56.11 kg/m    Body mass index is 56.11 kg/m .  Physical Exam   GEN: Vitals reviewed. Healthy appearing. Patient is in no acute distress. Cooperative with exam.  HEENT: Normocephalic atraumatic.  Eyes grossly normal to inspection.  No discharge or erythema, or obvious scleral/conjunctival abnormalities.   CV: Heart regular in rate and rhythm with no murmur.    LUNGS: No audible wheeze, cough, or visible cyanosis.  No visible retractions or increased work of breathing.  Lungs clear to auscultation bilaterally.    ABD:  Obese, nondistended  SKIN: Warm and dry to  touch.  Visible skin clear. No significant rash, abnormal pigmentation or lesions.  EXT: No clubbing or cyanosis.  1-2+ peripheral edema.

## 2022-05-06 ASSESSMENT — PATIENT HEALTH QUESTIONNAIRE - PHQ9: SUM OF ALL RESPONSES TO PHQ QUESTIONS 1-9: 11

## 2022-05-09 LAB — INR (EXTERNAL): 2.5 (ref 0.9–1.1)

## 2022-05-10 ENCOUNTER — ANTICOAGULATION THERAPY VISIT (OUTPATIENT)
Dept: ANTICOAGULATION | Facility: OTHER | Age: 62
End: 2022-05-10
Attending: INTERNAL MEDICINE
Payer: COMMERCIAL

## 2022-05-10 DIAGNOSIS — Z79.01 ANTICOAGULATION MONITORING, INR RANGE 2-3: Primary | ICD-10-CM

## 2022-05-10 DIAGNOSIS — I74.9 EMBOLISM AND THROMBOSIS (H): ICD-10-CM

## 2022-05-10 NOTE — PROGRESS NOTES
ANTICOAGULATION MANAGEMENT     Trisha Fernando 61 year old female is on warfarin with therapeutic INR result. (Goal INR 2.0-3.0)    Recent labs: (last 7 days)     05/09/22 1940   INR 2.5*       ASSESSMENT          PLAN         No call needed. Patient to continue same dose.      Ghada Betancur RN  Anticoagulation Clinic  5/10/2022

## 2022-05-17 ENCOUNTER — ANTICOAGULATION THERAPY VISIT (OUTPATIENT)
Dept: ANTICOAGULATION | Facility: OTHER | Age: 62
End: 2022-05-17
Attending: INTERNAL MEDICINE
Payer: COMMERCIAL

## 2022-05-17 DIAGNOSIS — Z79.01 ANTICOAGULATION MONITORING, INR RANGE 2-3: Primary | ICD-10-CM

## 2022-05-17 DIAGNOSIS — I74.9 EMBOLISM AND THROMBOSIS (H): ICD-10-CM

## 2022-05-17 LAB — INR (EXTERNAL): 2.2 (ref 0.9–1.1)

## 2022-05-17 NOTE — PROGRESS NOTES
ANTICOAGULATION MANAGEMENT     Trisha Fernando 61 year old female is on warfarin with therapeutic INR result. (Goal INR 2.0-3.0)    Recent labs: (last 7 days)     05/17/22  0802   INR 2.2*       ASSESSMENT       Source(s): Chart Review    Previous INR was Therapeutic last 2(+) visits    Medication, diet, health changes since last INR chart reviewed; none identified           PLAN     Recommended plan for no diet, medication or health factor changes affecting INR     Dosing Instructions: continue your current warfarin dose with next INR in 1 week       Summary  As of 5/17/2022    Full warfarin instructions:  2.5 mg every Wed; 5 mg all other days   Next INR check:  5/24/2022             no call needed patient to continue same dose    Patient to recheck with home meter    Education provided: None required    Plan made per [facility] anticoagulation protocol    Smitha Nair RN  Anticoagulation Clinic  5/17/2022    _______________________________________________________________________     Anticoagulation Episode Summary     Current INR goal:  2.0-3.0   TTR:  88.1 % (1 y)   Target end date:  Indefinite   Send INR reminders to:  [facility]    Indications    Long term (current) use of anticoagulants (Resolved) [Z79.01]  Embolism and thrombosis (H) (Resolved) [I74.9]  Anticoagulation monitoring  INR range 2-3 [Z79.01]  Embolism and thrombosis (H) [I74.9]           Comments:  MDINR needs weekly INR done         Anticoagulation Care Providers     Provider Role Specialty Phone number    Maria Guadalupe Ramon DO Referring Internal Medicine [phone]

## 2022-05-23 ENCOUNTER — ANTICOAGULATION THERAPY VISIT (OUTPATIENT)
Dept: ANTICOAGULATION | Facility: OTHER | Age: 62
End: 2022-05-23
Attending: INTERNAL MEDICINE
Payer: COMMERCIAL

## 2022-05-23 DIAGNOSIS — Z79.01 ANTICOAGULATION MONITORING, INR RANGE 2-3: Primary | ICD-10-CM

## 2022-05-23 DIAGNOSIS — I74.9 EMBOLISM AND THROMBOSIS (H): ICD-10-CM

## 2022-05-23 LAB — INR (EXTERNAL): 2.7 (ref 0.9–1.1)

## 2022-05-23 NOTE — PROGRESS NOTES
ANTICOAGULATION MANAGEMENT     Trisha Fernando 61 year old female is on warfarin with therapeutic INR result. (Goal INR 2.0-3.0)    Recent labs: (last 7 days)     05/23/22  1443   INR 2.7*       ASSESSMENT       Source(s): Chart Review    Previous INR was Therapeutic last 2(+) visits    Medication, diet, health changes since last INR chart reviewed; none identified           PLAN     Recommended plan for no diet, medication or health factor changes affecting INR     Dosing Instructions: continue your current warfarin dose with next INR in 2 weeks       Summary  As of 5/23/2022    Full warfarin instructions:  2.5 mg every Wed; 5 mg all other days   Next INR check:  5/30/2022             no call needed patient to continue same dose    Patient to recheck with home meter    Education provided: None required    Plan made per [facility] anticoagulation protocol    Smitha Nair RN  Anticoagulation Clinic  5/23/2022    _______________________________________________________________________     Anticoagulation Episode Summary     Current INR goal:  2.0-3.0   TTR:  88.8 % (1 y)   Target end date:  Indefinite   Send INR reminders to:  [facility]    Indications    Long term (current) use of anticoagulants (Resolved) [Z79.01]  Embolism and thrombosis (H) (Resolved) [I74.9]  Anticoagulation monitoring  INR range 2-3 [Z79.01]  Embolism and thrombosis (H) [I74.9]           Comments:  MDINR needs weekly INR done         Anticoagulation Care Providers     Provider Role Specialty Phone number    Maria Guadalupe Ramon DO Referring Internal Medicine [phone]

## 2022-05-30 LAB — INR (EXTERNAL): 2.2 (ref 0.9–1.1)

## 2022-05-31 ENCOUNTER — ANTICOAGULATION THERAPY VISIT (OUTPATIENT)
Dept: ANTICOAGULATION | Facility: OTHER | Age: 62
End: 2022-05-31
Attending: INTERNAL MEDICINE
Payer: COMMERCIAL

## 2022-05-31 DIAGNOSIS — Z79.01 ANTICOAGULATION MONITORING, INR RANGE 2-3: Primary | ICD-10-CM

## 2022-05-31 DIAGNOSIS — I74.9 EMBOLISM AND THROMBOSIS (H): ICD-10-CM

## 2022-05-31 NOTE — PROGRESS NOTES
ANTICOAGULATION MANAGEMENT     Trisha Fernando 61 year old female is on warfarin with therapeutic INR result. (Goal INR 2.0-3.0)    Recent labs: (last 7 days)     05/30/22  0924   INR 2.2*       ASSESSMENT       Source(s): Chart Review    Previous INR was Therapeutic last 2(+) visits    Medication, diet, health changes since last INR chart reviewed; none identified           PLAN     Recommended plan for no diet, medication or health factor changes affecting INR     Dosing Instructions: continue your current warfarin dose with next INR in 1 week       Summary  As of 5/31/2022    Full warfarin instructions:  2.5 mg every Wed; 5 mg all other days   Next INR check:  6/7/2022             no call needed patient to continue same dose    Patient to recheck with home meter    Education provided: None required    Plan made per [facility] anticoagulation protocol    Smitha Nair RN  Anticoagulation Clinic  5/31/2022    _______________________________________________________________________     Anticoagulation Episode Summary     Current INR goal:  2.0-3.0   TTR:  89.7 % (1 y)   Target end date:  Indefinite   Send INR reminders to:  [facility]    Indications    Long term (current) use of anticoagulants (Resolved) [Z79.01]  Embolism and thrombosis (H) (Resolved) [I74.9]  Anticoagulation monitoring  INR range 2-3 [Z79.01]  Embolism and thrombosis (H) [I74.9]           Comments:  MDINR needs weekly INR done         Anticoagulation Care Providers     Provider Role Specialty Phone number    Maria Guadalupe Ramon DO Referring Internal Medicine [phone]

## 2022-06-02 DIAGNOSIS — H91.93 DECREASED HEARING OF BOTH EARS: Primary | ICD-10-CM

## 2022-06-07 ENCOUNTER — ANTICOAGULATION THERAPY VISIT (OUTPATIENT)
Dept: ANTICOAGULATION | Facility: OTHER | Age: 62
End: 2022-06-07
Attending: INTERNAL MEDICINE
Payer: COMMERCIAL

## 2022-06-07 DIAGNOSIS — Z79.01 ANTICOAGULATION MONITORING, INR RANGE 2-3: Primary | ICD-10-CM

## 2022-06-07 DIAGNOSIS — I74.9 EMBOLISM AND THROMBOSIS (H): ICD-10-CM

## 2022-06-07 LAB — INR (EXTERNAL): 2.3 (ref 0.9–1.1)

## 2022-06-07 NOTE — PROGRESS NOTES
ANTICOAGULATION MANAGEMENT     Trisha Fernando 61 year old female is on warfarin with therapeutic INR result. (Goal INR 2.0-3.0)    Recent labs: (last 7 days)     06/07/22  0752   INR 2.3*       ASSESSMENT       Source(s): Chart Review    Previous INR was Therapeutic last 2(+) visits    Medication, diet, health changes since last INR chart reviewed; none identified           PLAN     Recommended plan for no diet, medication or health factor changes affecting INR     Dosing Instructions: continue your current warfarin dose with next INR in 1 week       Summary  As of 6/7/2022    Full warfarin instructions:  2.5 mg every Wed; 5 mg all other days   Next INR check:  6/14/2022             no call needed patient to continue same dose    Patient to recheck with home meter    Education provided: None required    Plan made per [facility] anticoagulation protocol    Smitha Nair RN  Anticoagulation Clinic  6/7/2022    _______________________________________________________________________     Anticoagulation Episode Summary     Current INR goal:  2.0-3.0   TTR:  89.7 % (1 y)   Target end date:  Indefinite   Send INR reminders to:  [facility]    Indications    Long term (current) use of anticoagulants (Resolved) [Z79.01]  Embolism and thrombosis (H) (Resolved) [I74.9]  Anticoagulation monitoring  INR range 2-3 [Z79.01]  Embolism and thrombosis (H) [I74.9]           Comments:  MDINR needs weekly INR done         Anticoagulation Care Providers     Provider Role Specialty Phone number    Maria Guadalupe Ramon DO Referring Internal Medicine [phone]

## 2022-06-09 ENCOUNTER — OFFICE VISIT (OUTPATIENT)
Dept: AUDIOLOGY | Facility: OTHER | Age: 62
End: 2022-06-09
Attending: AUDIOLOGIST
Payer: COMMERCIAL

## 2022-06-09 DIAGNOSIS — H91.93 DECREASED HEARING OF BOTH EARS: ICD-10-CM

## 2022-06-09 PROCEDURE — 92552 PURE TONE AUDIOMETRY AIR: CPT | Performed by: AUDIOLOGIST

## 2022-06-09 PROCEDURE — 92556 SPEECH AUDIOMETRY COMPLETE: CPT | Performed by: AUDIOLOGIST

## 2022-06-09 NOTE — PROGRESS NOTES
Audiology Evaluation Completed. Please refer SCANNED AUDIOGRAM and/or TYMPANOGRAM for BACKGROUND, RESULTS, RECOMMENDATIONS.      Savita PINEDA, [facility]-A  Audiologist #[id number]

## 2022-06-13 ENCOUNTER — ANTICOAGULATION THERAPY VISIT (OUTPATIENT)
Dept: ANTICOAGULATION | Facility: OTHER | Age: 62
End: 2022-06-13
Attending: INTERNAL MEDICINE
Payer: COMMERCIAL

## 2022-06-13 DIAGNOSIS — I74.9 EMBOLISM AND THROMBOSIS (H): ICD-10-CM

## 2022-06-13 DIAGNOSIS — Z79.01 ANTICOAGULATION MONITORING, INR RANGE 2-3: Primary | ICD-10-CM

## 2022-06-13 LAB — INR (EXTERNAL): 2.1 (ref 0.9–1.1)

## 2022-06-13 NOTE — PROGRESS NOTES
ANTICOAGULATION  MANAGEMENT-Home Monitor Managed by Exception    Trisharebeka Fernando 61 year old female is on warfarin with therapeutic INR result. (Goal INR 2.0-3.0)    Recent labs: (last 7 days)     06/13/22  1128   INR 2.1*         Previous INR was Therapeutic    Medication, diet, health changes since last INR:chart reviewed; none identified    Contacted within the last 12 weeks by phone on 6/7/22      JOSE JUAN     Trisha was NOT contacted regarding therapeutic result today per home monitoring policy manage by exception agreement.   Current warfarin dose is to be continued:     Summary  As of 6/13/2022    Full warfarin instructions:  2.5 mg every Wed; 5 mg all other days   Next INR check:  6/20/2022           ?   Smitha Nair RN  Anticoagulation Clinic  6/13/2022    _______________________________________________________________________     Anticoagulation Episode Summary     Current INR goal:  2.0-3.0   TTR:  89.7 % (1 y)   Target end date:  Indefinite   Send INR reminders to:  [facility]    Indications    Long term (current) use of anticoagulants (Resolved) [Z79.01]  Embolism and thrombosis (H) (Resolved) [I74.9]  Anticoagulation monitoring  INR range 2-3 [Z79.01]  Embolism and thrombosis (H) [I74.9]           Comments:  MDINR needs weekly INR done         Anticoagulation Care Providers     Provider Role Specialty Phone number    Maria Guadalupe Ramon DO Referring Internal Medicine [phone]

## 2022-06-15 DIAGNOSIS — E87.6 HYPOKALEMIA: ICD-10-CM

## 2022-06-16 RX ORDER — POTASSIUM CHLORIDE 750 MG/1
TABLET, EXTENDED RELEASE ORAL
Qty: 90 TABLET | Refills: 88 | OUTPATIENT
Start: 2022-06-16

## 2022-06-16 NOTE — TELEPHONE ENCOUNTER
[facility] sent Rx request for the following:      Requested Prescriptions   Pending Prescriptions Disp Refills     potassium chloride ER (K-TAB/KLOR-CON) 10 MEQ CR tablet [Pharmacy Med Name: POTASSIUM CL ER 10 MEQ TABLET] 90 tablet 88     Sig: TAKE 1 TABLET BY MOUTH EVERY DAY          Last Prescription Date:   8/10/2021  Last Fill Qty/Refills:         90, R-3    Last Office Visit:              5/5/2022   Future Office visit:           6/27/2022    Unable to complete prescription refill per RN Medication Refill Policy. Redundant refill request refused: Too soon:      Ava Schultz, RN on 6/16/2022 at 1:03 PM

## 2022-06-19 DIAGNOSIS — E87.6 HYPOKALEMIA: ICD-10-CM

## 2022-06-20 ENCOUNTER — TRANSFERRED RECORDS (OUTPATIENT)
Dept: HEALTH INFORMATION MANAGEMENT | Facility: OTHER | Age: 62
End: 2022-06-20

## 2022-06-20 ENCOUNTER — ANTICOAGULATION THERAPY VISIT (OUTPATIENT)
Dept: ANTICOAGULATION | Facility: OTHER | Age: 62
End: 2022-06-20
Attending: INTERNAL MEDICINE
Payer: COMMERCIAL

## 2022-06-20 DIAGNOSIS — I74.9 EMBOLISM AND THROMBOSIS (H): ICD-10-CM

## 2022-06-20 DIAGNOSIS — Z79.01 ANTICOAGULATION MONITORING, INR RANGE 2-3: ICD-10-CM

## 2022-06-20 DIAGNOSIS — Z79.01 ANTICOAGULATION MONITORING, INR RANGE 2-3: Primary | ICD-10-CM

## 2022-06-20 LAB — INR (EXTERNAL): 2.3 (ref 0.9–1.1)

## 2022-06-20 RX ORDER — WARFARIN SODIUM 5 MG/1
TABLET ORAL
Qty: 85 TABLET | Refills: 2 | Status: SHIPPED | OUTPATIENT
Start: 2022-06-20 | End: 2023-06-12

## 2022-06-20 RX ORDER — POTASSIUM CHLORIDE 750 MG/1
TABLET, EXTENDED RELEASE ORAL
Qty: 90 TABLET | Refills: 88 | OUTPATIENT
Start: 2022-06-20

## 2022-06-20 NOTE — PROGRESS NOTES
ANTICOAGULATION FOLLOW-UP CLINIC VISIT    Patient Name:  Trisha Fernando  Date:  2022  Contact Type:  Trisha was NOT contacted regarding therapeutic result today per home monitoring policy manage by exception agreement. Patient to continue current dose.     SUBJECTIVE:  Patient Findings         Clinical Outcomes     Negatives:  Major bleeding event, Thromboembolic event, Anticoagulation-related hospital admission, Anticoagulation-related ED visit, Anticoagulation-related fatality           OBJECTIVE    Recent labs: (last 7 days)     22  1402   INR 2.3       ASSESSMENT / PLAN  INR assessment THER    Recheck INR In: 1 WEEK    INR Location Home INR      Anticoagulation Summary  As of 2022    INR goal:  2.0-3.0   TTR:  89.7 % (1 y)   INR used for dosin.3 (2022)   Warfarin maintenance plan:  2.5 mg (5 mg x 0.5) every Wed; 5 mg (5 mg x 1) all other days   Full warfarin instructions:  2.5 mg every Wed; 5 mg all other days   Weekly warfarin total:  32.5 mg   No change documented:  Filippo Zaragoza RN   Plan last modified:  Mariia Foster RN (2021)   Next INR check:  2022   Priority:  Maintenance   Target end date:  Indefinite    Indications    Long term (current) use of anticoagulants (Resolved) [Z79.01]  Embolism and thrombosis (H) (Resolved) [I74.9]  Anticoagulation monitoring  INR range 2-3 [Z79.01]  Embolism and thrombosis (H) [I74.9]             Anticoagulation Episode Summary     INR check location:      Preferred lab:      Send INR reminders to:  [facility]    Comments:  MDINR needs weekly INR done      Anticoagulation Care Providers     Provider Role Specialty Phone number    Maria Guadalupe Ramon DO Referring Internal Medicine [phone]            See the Encounter Report to view Anticoagulation Flowsheet and Dosing Calendar (Go to Encounters tab in chart review, and find the Anticoagulation Therapy Visit)    Filippo Zaragoza, SINDHU

## 2022-06-20 NOTE — TELEPHONE ENCOUNTER
potassium chloride ER (K-TAB/KLOR-CON) 10 MEQ CR tablet 90 tablet 3 8/10/2021  No   Sig - Route: TAKE 1 TABLET (10 MEQ) BY MOUTH DAILY      To [facility]    Jil Hull RN on 6/20/2022 at 12:06 PM

## 2022-06-27 ENCOUNTER — ANTICOAGULATION THERAPY VISIT (OUTPATIENT)
Dept: ANTICOAGULATION | Facility: OTHER | Age: 62
End: 2022-06-27
Attending: INTERNAL MEDICINE
Payer: COMMERCIAL

## 2022-06-27 DIAGNOSIS — I74.9 EMBOLISM AND THROMBOSIS (H): ICD-10-CM

## 2022-06-27 DIAGNOSIS — Z79.01 ANTICOAGULATION MONITORING, INR RANGE 2-3: Primary | ICD-10-CM

## 2022-06-27 LAB — INR (EXTERNAL): 2.2 (ref 0.9–1.1)

## 2022-06-28 NOTE — PROGRESS NOTES
ANTICOAGULATION  MANAGEMENT-Home Monitor Managed by Exception    Trisha Fernando 61 year old female is on warfarin with therapeutic INR result. (Goal INR 2.0-3.0)    Recent labs: (last 7 days)     06/27/22  1154   INR 2.2*         Previous INR was Therapeutic    Medication, diet, health changes since last INR:chart reviewed; none identified        PLAN     Trisha was NOT contacted regarding therapeutic result today per home monitoring policy manage by exception agreement.   Current warfarin dose is to be continued:     Summary  As of 6/27/2022    Full warfarin instructions:  2.5 mg every Wed; 5 mg all other days   Next INR check:  7/5/2022           ?   Ghada Betancur RN  Anticoagulation Clinic  6/28/2022    _______________________________________________________________________     Anticoagulation Episode Summary     Current INR goal:  2.0-3.0   TTR:  89.7 % (1 y)   Target end date:  Indefinite   Send INR reminders to:  [facility]    Indications    Long term (current) use of anticoagulants (Resolved) [Z79.01]  Embolism and thrombosis (H) (Resolved) [I74.9]  Anticoagulation monitoring  INR range 2-3 [Z79.01]  Embolism and thrombosis (H) [I74.9]           Comments:  MDINR needs weekly INR done         Anticoagulation Care Providers     Provider Role Specialty Phone number    Maria Guadalupe Ramon DO Referring Internal Medicine [phone]

## 2022-07-05 ENCOUNTER — ANTICOAGULATION THERAPY VISIT (OUTPATIENT)
Dept: ANTICOAGULATION | Facility: OTHER | Age: 62
End: 2022-07-05
Attending: INTERNAL MEDICINE
Payer: COMMERCIAL

## 2022-07-05 DIAGNOSIS — I74.9 EMBOLISM AND THROMBOSIS (H): ICD-10-CM

## 2022-07-05 DIAGNOSIS — Z79.01 ANTICOAGULATION MONITORING, INR RANGE 2-3: Primary | ICD-10-CM

## 2022-07-05 LAB — INR (EXTERNAL): 2.6 (ref 0.9–1.1)

## 2022-07-05 NOTE — PROGRESS NOTES
ANTICOAGULATION  MANAGEMENT-Home Monitor Managed by Exception    Trisha NATALIA Masons 61 year old female is on warfarin with therapeutic INR result. (Goal INR 2.0-3.0)    Recent labs: (last 7 days)     07/05/22  1059   INR 2.6*         Previous INR was Therapeutic    Medication, diet, health changes since last INR:chart reviewed; none identified    Contacted within the last 12 weeks by phone        PLAN     Trisha was NOT contacted regarding therapeutic result today per home monitoring policy manage by exception agreement.   Current warfarin dose is to be continued:     Summary  As of 7/5/2022    Full warfarin instructions:  2.5 mg every Wed; 5 mg all other days   Next INR check:  7/12/2022           ?   Smitha Nair RN  Anticoagulation Clinic  7/5/2022    _______________________________________________________________________     Anticoagulation Episode Summary     Current INR goal:  2.0-3.0   TTR:  89.7 % (1 y)   Target end date:  Indefinite   Send INR reminders to:  [facility]    Indications    Long term (current) use of anticoagulants (Resolved) [Z79.01]  Embolism and thrombosis (H) (Resolved) [I74.9]  Anticoagulation monitoring  INR range 2-3 [Z79.01]  Embolism and thrombosis (H) [I74.9]           Comments:  MDINR needs weekly INR done         Anticoagulation Care Providers     Provider Role Specialty Phone number    Maria Guadalupe Ramon DO Referring Internal Medicine [phone]

## 2022-07-08 NOTE — PROGRESS NOTES
ANTICOAGULATION FOLLOW-UP CLINIC VISIT    Patient Name:  Trisha Fernando  Date:  2019  Contact Type:  Face to Face    SUBJECTIVE:     Patient Findings     Positives:   Upcoming invasive procedure (having colonoscopy done in May)           OBJECTIVE    INR Protime   Date Value Ref Range Status   2019 2.5 (A) 0.86 - 1.14 Final       ASSESSMENT / PLAN  INR assessment THER    Recheck INR In: 6 WEEKS    INR Location Clinic      Anticoagulation Summary  As of 2019    INR goal:   2.0-3.0   TTR:   76.8 % (6.4 y)   INR used for dosin.5 (2019)   Warfarin maintenance plan:   5 mg (5 mg x 1) every day   Full warfarin instructions:   : Hold; : Hold; : Hold; : Hold; : Hold; : 7.5 mg; : 7.5 mg; : 7.5 mg; Otherwise 5 mg every day   Weekly warfarin total:   35 mg   Next INR check:   2019   Priority:   INR   Target end date:   Indefinite    Indications    Long term (current) use of anticoagulants [Z79.01]  Embolism and thrombosis (H) [I74.9]             Anticoagulation Episode Summary     INR check location:       Preferred lab:       Send INR reminders to:    INR    Comments:         Anticoagulation Care Providers     Provider Role Specialty Phone number    Maria Guadalupe Ramon DO [facility] Internal Medicine [phone]            See the Encounter Report to view Anticoagulation Flowsheet and Dosing Calendar (Go to Encounters tab in chart review, and find the Anticoagulation Therapy Visit)        Smitha Nair RN                  Melolabial Interpolation Flap Text: A decision was made to reconstruct the defect utilizing an interpolation axial flap and a staged reconstruction.  A telfa template was made of the defect.  This telfa template was then used to outline the melolabial interpolation flap.  The donor area for the pedicle flap was then injected with anesthesia.  The flap was excised through the skin and subcutaneous tissue down to the layer of the underlying musculature.  The pedicle flap was carefully excised within this deep plane to maintain its blood supply.  The edges of the donor site were undermined.   The donor site was closed in a primary fashion.  The pedicle was then rotated into position and sutured.  Once the tube was sutured into place, adequate blood supply was confirmed with blanching and refill.  The pedicle was then wrapped with xeroform gauze and dressed appropriately with a telfa and gauze bandage to ensure continued blood supply and protect the attached pedicle.

## 2022-07-11 LAB — INR (EXTERNAL): 2.9 (ref 0.9–1.1)

## 2022-07-12 ENCOUNTER — ANTICOAGULATION THERAPY VISIT (OUTPATIENT)
Dept: ANTICOAGULATION | Facility: OTHER | Age: 62
End: 2022-07-12
Attending: INTERNAL MEDICINE
Payer: COMMERCIAL

## 2022-07-12 DIAGNOSIS — Z79.01 ANTICOAGULATION MONITORING, INR RANGE 2-3: Primary | ICD-10-CM

## 2022-07-12 DIAGNOSIS — I74.9 EMBOLISM AND THROMBOSIS (H): ICD-10-CM

## 2022-07-12 NOTE — PROGRESS NOTES
ANTICOAGULATION  MANAGEMENT-Home Monitor Managed by Exception    Trisha Fernando 61 year old female is on warfarin with therapeutic INR result. (Goal INR 2.0-3.0)    Recent labs: (last 7 days)     07/11/22  1630   INR 2.9*         Previous INR was Therapeutic    Medication, diet, health changes since last INR:chart reviewed; none identified        PLAN     Trisha was NOT contacted regarding therapeutic result today per home monitoring policy manage by exception agreement.   Current warfarin dose is to be continued:     Summary  As of 7/12/2022    Full warfarin instructions:  2.5 mg every Wed; 5 mg all other days   Next INR check:  7/19/2022           ?   Ghada Betancur RN  Anticoagulation Clinic  7/12/2022    _______________________________________________________________________     Anticoagulation Episode Summary     Current INR goal:  2.0-3.0   TTR:  89.7 % (1 y)   Target end date:  Indefinite   Send INR reminders to:  [facility]    Indications    Long term (current) use of anticoagulants (Resolved) [Z79.01]  Embolism and thrombosis (H) (Resolved) [I74.9]  Anticoagulation monitoring  INR range 2-3 [Z79.01]  Embolism and thrombosis (H) [I74.9]           Comments:  MDINR needs weekly INR done         Anticoagulation Care Providers     Provider Role Specialty Phone number    Maria Guadalupe Ramon DO Referring Internal Medicine [phone]

## 2022-07-19 ENCOUNTER — ANTICOAGULATION THERAPY VISIT (OUTPATIENT)
Dept: ANTICOAGULATION | Facility: OTHER | Age: 62
End: 2022-07-19
Attending: INTERNAL MEDICINE
Payer: COMMERCIAL

## 2022-07-19 DIAGNOSIS — Z79.01 ANTICOAGULATION MONITORING, INR RANGE 2-3: Primary | ICD-10-CM

## 2022-07-19 DIAGNOSIS — I74.9 EMBOLISM AND THROMBOSIS (H): ICD-10-CM

## 2022-07-19 LAB — INR (EXTERNAL): 2.7 (ref 0.9–1.1)

## 2022-07-19 NOTE — PROGRESS NOTES
ANTICOAGULATION  MANAGEMENT-Home Monitor Managed by Exception    Trisha Fernando 61 year old female is on warfarin with therapeutic INR result. (Goal INR 2.0-3.0)    Recent labs: (last 7 days)     07/19/22  1405   INR 2.7*         Previous INR was Therapeutic    Medication, diet, health changes since last INR:chart reviewed; none identified      PLAN     Trisha was NOT contacted regarding therapeutic result today per home monitoring policy manage by exception agreement.   Current warfarin dose is to be continued:     Summary  As of 7/19/2022    Full warfarin instructions:  2.5 mg every Wed; 5 mg all other days   Next INR check:  7/26/2022           ?   Ghada Betancur RN  Anticoagulation Clinic  7/19/2022    _______________________________________________________________________     Anticoagulation Episode Summary     Current INR goal:  2.0-3.0   TTR:  89.7 % (1 y)   Target end date:  Indefinite   Send INR reminders to:  [facility]    Indications    Long term (current) use of anticoagulants (Resolved) [Z79.01]  Embolism and thrombosis (H) (Resolved) [I74.9]  Anticoagulation monitoring  INR range 2-3 [Z79.01]  Embolism and thrombosis (H) [I74.9]           Comments:  MDINR needs weekly INR done         Anticoagulation Care Providers     Provider Role Specialty Phone number    Maria Guadalupe Ramon DO Referring Internal Medicine [phone]

## 2022-07-25 ENCOUNTER — ANTICOAGULATION THERAPY VISIT (OUTPATIENT)
Dept: ANTICOAGULATION | Facility: OTHER | Age: 62
End: 2022-07-25
Attending: INTERNAL MEDICINE
Payer: COMMERCIAL

## 2022-07-25 DIAGNOSIS — Z79.01 ANTICOAGULATION MONITORING, INR RANGE 2-3: Primary | ICD-10-CM

## 2022-07-25 DIAGNOSIS — I74.9 EMBOLISM AND THROMBOSIS (H): ICD-10-CM

## 2022-07-25 LAB — INR (EXTERNAL): 3 (ref 0.9–1.1)

## 2022-07-25 NOTE — PROGRESS NOTES
ANTICOAGULATION  MANAGEMENT-Home Monitor Managed by Exception    Trisharebeka Fernando 61 year old female is on warfarin with therapeutic INR result. (Goal INR 2.0-3.0)    Recent labs: (last 7 days)     07/25/22  1114   INR 3.0*         Previous INR was Therapeutic    Medication, diet, health changes since last INR:chart reviewed; none identified    Contacted within the last 12 weeks by phone on 7/19/22       JOSE JUAN     Trisha was NOT contacted regarding therapeutic result today per home monitoring policy manage by exception agreement.   Current warfarin dose is to be continued:     Summary  As of 7/25/2022    Full warfarin instructions:  2.5 mg every Wed; 5 mg all other days   Next INR check:  8/22/2022           ?   Smitha Nair RN  Anticoagulation Clinic  7/25/2022    _______________________________________________________________________     Anticoagulation Episode Summary     Current INR goal:  2.0-3.0   TTR:  89.7 % (1 y)   Target end date:  Indefinite   Send INR reminders to:  [facility]    Indications    Long term (current) use of anticoagulants (Resolved) [Z79.01]  Embolism and thrombosis (H) (Resolved) [I74.9]  Anticoagulation monitoring  INR range 2-3 [Z79.01]  Embolism and thrombosis (H) [I74.9]           Comments:  MDINR needs weekly INR done         Anticoagulation Care Providers     Provider Role Specialty Phone number    Maria Guadalupe Ramon DO Referring Internal Medicine [phone]

## 2022-08-02 ENCOUNTER — ANTICOAGULATION THERAPY VISIT (OUTPATIENT)
Dept: ANTICOAGULATION | Facility: OTHER | Age: 62
End: 2022-08-02
Attending: INTERNAL MEDICINE
Payer: COMMERCIAL

## 2022-08-02 DIAGNOSIS — Z79.01 ANTICOAGULATION MONITORING, INR RANGE 2-3: Primary | ICD-10-CM

## 2022-08-02 DIAGNOSIS — I74.9 EMBOLISM AND THROMBOSIS (H): ICD-10-CM

## 2022-08-02 LAB — INR (EXTERNAL): 2.9 (ref 0.9–1.1)

## 2022-08-02 NOTE — PROGRESS NOTES
ANTICOAGULATION  MANAGEMENT-Home Monitor Managed by Exception    Trisharebeka Fernando 61 year old female is on warfarin with therapeutic INR result. (Goal INR 2.0-3.0)    Recent labs: (last 7 days)     08/02/22  0822   INR 2.9*         Previous INR was Therapeutic    Medication, diet, health changes since last INR:chart reviewed; none identified    Contacted within the last 12 weeks by phone on 7/19/22      JOSE JUAN     Trisha was NOT contacted regarding therapeutic result today per home monitoring policy manage by exception agreement.   Current warfarin dose is to be continued:     Summary  As of 8/2/2022    Full warfarin instructions:  2.5 mg every Wed; 5 mg all other days   Next INR check:  8/30/2022           ?   Smitha Nair RN  Anticoagulation Clinic  8/2/2022    _______________________________________________________________________     Anticoagulation Episode Summary     Current INR goal:  2.0-3.0   TTR:  91.9 % (1 y)   Target end date:  Indefinite   Send INR reminders to:  [facility]    Indications    Long term (current) use of anticoagulants (Resolved) [Z79.01]  Embolism and thrombosis (H) (Resolved) [I74.9]  Anticoagulation monitoring  INR range 2-3 [Z79.01]  Embolism and thrombosis (H) [I74.9]           Comments:  MDINR needs weekly INR done         Anticoagulation Care Providers     Provider Role Specialty Phone number    Maria Guadalupe Ramon DO Referring Internal Medicine [phone]

## 2022-08-08 LAB — INR (EXTERNAL): 3 (ref 0.9–1.1)

## 2022-08-09 ENCOUNTER — ANTICOAGULATION THERAPY VISIT (OUTPATIENT)
Dept: ANTICOAGULATION | Facility: OTHER | Age: 62
End: 2022-08-09
Attending: INTERNAL MEDICINE
Payer: COMMERCIAL

## 2022-08-09 DIAGNOSIS — I74.9 EMBOLISM AND THROMBOSIS (H): ICD-10-CM

## 2022-08-09 DIAGNOSIS — Z79.01 ANTICOAGULATION MONITORING, INR RANGE 2-3: Primary | ICD-10-CM

## 2022-08-09 NOTE — PROGRESS NOTES
ANTICOAGULATION  MANAGEMENT-Home Monitor Managed by Exception    Trisha NATALIA Fernando 61 year old female is on warfarin with therapeutic INR result. (Goal INR 2.0-3.0)    Recent labs: (last 7 days)     08/08/22 2013   INR 3.0*         Previous INR was Therapeutic    Medication, diet, health changes since last INR:chart reviewed; none identified    Contacted within the last 12 weeks by phone on 7/19/22      JOSE JUAN     Trisha was NOT contacted regarding therapeutic result today per home monitoring policy manage by exception agreement.   Current warfarin dose is to be continued:     Summary  As of 8/9/2022    Full warfarin instructions:  2.5 mg every Wed; 5 mg all other days   Next INR check:  9/6/2022           ?   Smitha Nair RN  Anticoagulation Clinic  8/9/2022    _______________________________________________________________________     Anticoagulation Episode Summary     Current INR goal:  2.0-3.0   TTR:  93.8 % (1 y)   Target end date:  Indefinite   Send INR reminders to:  [facility]    Indications    Long term (current) use of anticoagulants (Resolved) [Z79.01]  Embolism and thrombosis (H) (Resolved) [I74.9]  Anticoagulation monitoring  INR range 2-3 [Z79.01]  Embolism and thrombosis (H) [I74.9]           Comments:  MDINR needs weekly INR done         Anticoagulation Care Providers     Provider Role Specialty Phone number    Maria Guadalupe Ramon DO Referring Internal Medicine [phone]

## 2022-08-15 LAB — INR (EXTERNAL): 2.7 (ref 0.9–1.1)

## 2022-08-16 ENCOUNTER — ANTICOAGULATION THERAPY VISIT (OUTPATIENT)
Dept: ANTICOAGULATION | Facility: OTHER | Age: 62
End: 2022-08-16
Attending: INTERNAL MEDICINE
Payer: COMMERCIAL

## 2022-08-16 DIAGNOSIS — Z79.01 ANTICOAGULATION MONITORING, INR RANGE 2-3: Primary | ICD-10-CM

## 2022-08-16 DIAGNOSIS — E87.6 HYPOKALEMIA: ICD-10-CM

## 2022-08-16 DIAGNOSIS — I74.9 EMBOLISM AND THROMBOSIS (H): ICD-10-CM

## 2022-08-16 RX ORDER — POTASSIUM CHLORIDE 750 MG/1
TABLET, EXTENDED RELEASE ORAL
Qty: 90 TABLET | Refills: 88 | Status: SHIPPED | OUTPATIENT
Start: 2022-08-16 | End: 2023-11-24

## 2022-08-16 NOTE — TELEPHONE ENCOUNTER
"  Last Prescription Date: 8/10/21  Last Qty/Refills: 90 / 3  Last Office Visit: 5/5/22  Future Office Visit: None     Requested Prescriptions   Pending Prescriptions Disp Refills     potassium chloride ER (K-TAB/KLOR-CON) 10 MEQ CR tablet [Pharmacy Med Name: POTASSIUM CL ER 10 MEQ TABLET] 90 tablet 88     Sig: TAKE 1 TABLET BY MOUTH EVERY DAY       Potassium Supplements Protocol Passed - 8/16/2022 12:37 AM        Passed - Recent (12 mo) or future (30 days) visit within the authorizing provider's department     Patient has had an office visit with the authorizing provider or a provider within the authorizing providers department within the previous 12 mos or has a future within next 30 days. See \"Patient Info\" tab in inbasket, or \"Choose Columns\" in Meds & Orders section of the refill encounter.              Passed - Medication is active on med list        Passed - Patient is age 18 or older        Passed - Normal serum potassium in past 12 months     Recent Labs   Lab Test 12/27/21  1117   POTASSIUM 3.6                         "

## 2022-08-16 NOTE — PROGRESS NOTES
ANTICOAGULATION  MANAGEMENT-Home Monitor Managed by Exception    Trisharebeka Fernando 61 year old female is on warfarin with therapeutic INR result. (Goal INR 2.0-3.0)    Recent labs: (last 7 days)     08/15/22  1830   INR 2.7*         Previous INR was Therapeutic    Medication, diet, health changes since last INR:chart reviewed; none identified    Contacted within the last 12 weeks by phone on 7/19/22      JOSE JUAN     Trisha was NOT contacted regarding therapeutic result today per home monitoring policy manage by exception agreement.   Current warfarin dose is to be continued:     Summary  As of 8/16/2022    Full warfarin instructions:  2.5 mg every Wed; 5 mg all other days   Next INR check:  9/13/2022           ?   Smitha Nair RN  Anticoagulation Clinic  8/16/2022    _______________________________________________________________________     Anticoagulation Episode Summary     Current INR goal:  2.0-3.0   TTR:  95.3 % (1 y)   Target end date:  Indefinite   Send INR reminders to:  [facility]    Indications    Long term (current) use of anticoagulants (Resolved) [Z79.01]  Embolism and thrombosis (H) (Resolved) [I74.9]  Anticoagulation monitoring  INR range 2-3 [Z79.01]  Embolism and thrombosis (H) [I74.9]           Comments:  MDINR needs weekly INR done         Anticoagulation Care Providers     Provider Role Specialty Phone number    Maria Guadalupe Ramon DO Referring Internal Medicine [phone]

## 2022-08-22 LAB — INR (EXTERNAL): 2.9 (ref 0.9–1.1)

## 2022-08-23 ENCOUNTER — ANTICOAGULATION THERAPY VISIT (OUTPATIENT)
Dept: ANTICOAGULATION | Facility: OTHER | Age: 62
End: 2022-08-23
Attending: INTERNAL MEDICINE
Payer: COMMERCIAL

## 2022-08-23 DIAGNOSIS — Z79.01 ANTICOAGULATION MONITORING, INR RANGE 2-3: Primary | ICD-10-CM

## 2022-08-23 DIAGNOSIS — I74.9 EMBOLISM AND THROMBOSIS (H): ICD-10-CM

## 2022-08-23 NOTE — PROGRESS NOTES
ANTICOAGULATION  MANAGEMENT-Home Monitor Managed by Exception    Trisharebeka Fernando 61 year old female is on warfarin with therapeutic INR result. (Goal INR 2.0-3.0)    Recent labs: (last 7 days)     08/22/22 2053   INR 2.9*         Previous INR was Therapeutic    Medication, diet, health changes since last INR:chart reviewed; none identified    Contacted within the last 12 weeks by phone on 7/19/22      JOSE JUAN     Trisha was NOT contacted regarding therapeutic result today per home monitoring policy manage by exception agreement.   Current warfarin dose is to be continued:     Summary  As of 8/23/2022    Full warfarin instructions:  2.5 mg every Wed; 5 mg all other days   Next INR check:  9/20/2022           ?   Smitha Nair RN  Anticoagulation Clinic  8/23/2022    _______________________________________________________________________     Anticoagulation Episode Summary     Current INR goal:  2.0-3.0   TTR:  96.4 % (1 y)   Target end date:  Indefinite   Send INR reminders to:  [facility]    Indications    Long term (current) use of anticoagulants (Resolved) [Z79.01]  Embolism and thrombosis (H) (Resolved) [I74.9]  Anticoagulation monitoring  INR range 2-3 [Z79.01]  Embolism and thrombosis (H) [I74.9]           Comments:  MDINR needs weekly INR done         Anticoagulation Care Providers     Provider Role Specialty Phone number    Maria Guadalupe Ramon DO Referring Internal Medicine [phone]

## 2022-08-29 ENCOUNTER — ANTICOAGULATION THERAPY VISIT (OUTPATIENT)
Dept: ANTICOAGULATION | Facility: OTHER | Age: 62
End: 2022-08-29
Attending: INTERNAL MEDICINE
Payer: COMMERCIAL

## 2022-08-29 DIAGNOSIS — I74.9 EMBOLISM AND THROMBOSIS (H): ICD-10-CM

## 2022-08-29 DIAGNOSIS — Z79.01 ANTICOAGULATION MONITORING, INR RANGE 2-3: Primary | ICD-10-CM

## 2022-08-29 LAB — INR (EXTERNAL): 2.7 (ref 0.9–1.1)

## 2022-08-29 NOTE — PROGRESS NOTES
ANTICOAGULATION  MANAGEMENT-Home Monitor Managed by Exception    Trisha NATALIA Fernando 61 year old female is on warfarin with therapeutic INR result. (Goal INR 2.0-3.0)    Recent labs: (last 7 days)     08/29/22  1230   INR 2.7*         Previous INR was Therapeutic    Medication, diet, health changes since last INR:chart reviewed; none identified    Contacted within the last 12 weeks by phone on 7/19/22      JOSE JUAN     Trisha was NOT contacted regarding therapeutic result today per home monitoring policy manage by exception agreement.   Current warfarin dose is to be continued:     Summary  As of 8/29/2022    Full warfarin instructions:  2.5 mg every Wed; 5 mg all other days   Next INR check:  9/5/2022           ?   Smitha Nair RN  Anticoagulation Clinic  8/29/2022    _______________________________________________________________________     Anticoagulation Episode Summary     Current INR goal:  2.0-3.0   TTR:  97.6 % (1 y)   Target end date:  Indefinite   Send INR reminders to:  [facility]    Indications    Long term (current) use of anticoagulants (Resolved) [Z79.01]  Embolism and thrombosis (H) (Resolved) [I74.9]  Anticoagulation monitoring  INR range 2-3 [Z79.01]  Embolism and thrombosis (H) [I74.9]           Comments:  MDINR needs weekly INR done         Anticoagulation Care Providers     Provider Role Specialty Phone number    Maria Guadalupe Ramon DO Referring Internal Medicine [phone]

## 2022-09-05 LAB — INR (EXTERNAL): 2.9 (ref 0.9–1.1)

## 2022-09-06 ENCOUNTER — ANTICOAGULATION THERAPY VISIT (OUTPATIENT)
Dept: ANTICOAGULATION | Facility: OTHER | Age: 62
End: 2022-09-06
Attending: INTERNAL MEDICINE
Payer: COMMERCIAL

## 2022-09-06 NOTE — PROGRESS NOTES
ANTICOAGULATION  MANAGEMENT-Home Monitor Managed by Exception    Trisharebeka Fernando 61 year old female is on warfarin with therapeutic INR result. (Goal INR 2.0-3.0)    Recent labs: (last 7 days)     09/05/22  1020   INR 2.9*         Previous INR was Therapeutic    Medication, diet, health changes since last INR:chart reviewed; none identified    Contacted within the last 12 weeks by phone on 7/19/22      JOSE JUAN     Trisha was NOT contacted regarding therapeutic result today per home monitoring policy manage by exception agreement.   Current warfarin dose is to be continued:     Summary  As of 9/6/2022    Full warfarin instructions:  2.5 mg every Wed; 5 mg all other days   Next INR check:  10/4/2022           ?   Smitha Nair RN  Anticoagulation Clinic  9/6/2022    _______________________________________________________________________     Anticoagulation Episode Summary     Current INR goal:  2.0-3.0   TTR:  97.6 % (1 y)   Target end date:  Indefinite   Send INR reminders to:  [facility]    Indications    Long term (current) use of anticoagulants (Resolved) [Z79.01]  Embolism and thrombosis (H) (Resolved) [I74.9]  Anticoagulation monitoring  INR range 2-3 [Z79.01]  Embolism and thrombosis (H) [I74.9]           Comments:  MDINR needs weekly INR done         Anticoagulation Care Providers     Provider Role Specialty Phone number    Maria Guadalupe Ramon DO Referring Internal Medicine [phone]

## 2022-09-13 ENCOUNTER — ANTICOAGULATION THERAPY VISIT (OUTPATIENT)
Dept: ANTICOAGULATION | Facility: OTHER | Age: 62
End: 2022-09-13
Attending: INTERNAL MEDICINE
Payer: COMMERCIAL

## 2022-09-13 DIAGNOSIS — Z79.01 ANTICOAGULATION MONITORING, INR RANGE 2-3: Primary | ICD-10-CM

## 2022-09-13 DIAGNOSIS — I74.9 EMBOLISM AND THROMBOSIS (H): ICD-10-CM

## 2022-09-13 LAB — INR (EXTERNAL): 2.7 (ref 0.9–1.1)

## 2022-09-13 NOTE — PROGRESS NOTES
ANTICOAGULATION  MANAGEMENT-Home Monitor Managed by Exception    Trisharebeka Fernando 61 year old female is on warfarin with therapeutic INR result. (Goal INR 2.0-3.0)    Recent labs: (last 7 days)     09/13/22  0846   INR 2.7*         Previous INR was Therapeutic    Medication, diet, health changes since last INR:chart reviewed; none identified    Contacted within the last 12 weeks by phone on 7/19/22      JOSE JUAN     Trisha was NOT contacted regarding therapeutic result today per home monitoring policy manage by exception agreement.   Current warfarin dose is to be continued:     Summary  As of 9/13/2022    Full warfarin instructions:  2.5 mg every Wed; 5 mg all other days   Next INR check:  9/27/2022           ?   Smitha Nair RN  Anticoagulation Clinic  9/13/2022    _______________________________________________________________________     Anticoagulation Episode Summary     Current INR goal:  2.0-3.0   TTR:  97.7 % (1 y)   Target end date:  Indefinite   Send INR reminders to:  [facility]    Indications    Long term (current) use of anticoagulants (Resolved) [Z79.01]  Embolism and thrombosis (H) (Resolved) [I74.9]  Anticoagulation monitoring  INR range 2-3 [Z79.01]  Embolism and thrombosis (H) [I74.9]           Comments:  MDINR needs weekly INR done         Anticoagulation Care Providers     Provider Role Specialty Phone number    Maria Guadalupe Ramon DO Referring Internal Medicine [phone]

## 2022-09-15 DIAGNOSIS — I10 ESSENTIAL HYPERTENSION: ICD-10-CM

## 2022-09-15 RX ORDER — METOPROLOL TARTRATE 25 MG/1
25 TABLET, FILM COATED ORAL 2 TIMES DAILY
Qty: 180 TABLET | Refills: 1 | OUTPATIENT
Start: 2022-09-15

## 2022-09-15 NOTE — TELEPHONE ENCOUNTER
[facility] sent Rx request for the following:    METOPROLOL TARTRATE 25 MG TAB  Last Prescription Date:   12/27/21  Last Fill Qty/Refills:         180, R-3    Last Office Visit:              5/5/22   Future Office visit:           10/26/22  Redundant refill request refused: Too soon:    Kaela Bell RN on 9/15/2022 at 8:14 AM

## 2022-09-18 DIAGNOSIS — I10 ESSENTIAL HYPERTENSION: ICD-10-CM

## 2022-09-19 LAB — INR (EXTERNAL): 3 (ref 0.9–1.1)

## 2022-09-19 RX ORDER — CHLORTHALIDONE 25 MG/1
25 TABLET ORAL DAILY
Qty: 90 TABLET | OUTPATIENT
Start: 2022-09-19

## 2022-09-19 RX ORDER — METOPROLOL TARTRATE 25 MG/1
25 TABLET, FILM COATED ORAL 2 TIMES DAILY
Qty: 180 TABLET | Refills: 1 | OUTPATIENT
Start: 2022-09-19

## 2022-09-19 NOTE — TELEPHONE ENCOUNTER
Redundant refill request refused: Too soon:    metoprolol tartrate (LOPRESSOR) 25 MG tablet 180 tablet 3 12/27/2021  No   Sig - Route: Take 1 tablet (25 mg) by mouth 2 times daily Increase in dose - Oral   Sent to pharmacy as: Metoprolol Tartrate 25 MG Oral Tablet (LOPRESSOR)   Class: E-Prescribe   Notes to Pharmacy: Renewal only - patient will call for refill   Order: [id number]   E-Prescribing Status: Receipt confirmed by pharmacy (12/27/2021 10:36 AM CST)     chlorthalidone (HYGROTON) 25 MG tablet 90 tablet 3 12/27/2021  No   Sig - Route: Take 1 tablet (25 mg) by mouth daily Increase in dose - Oral   Sent to pharmacy as: Chlorthalidone 25 MG Oral Tablet (HYGROTON)   Class: E-Prescribe   Notes to Pharmacy: Renewal only - patient will call for refill   Order: [id number]   E-Prescribing Status: Receipt confirmed by pharmacy (12/27/2021 10:36 AM CST)     [facility] [id number] [place] [facility] - [place], [place] - [address]     Teagan Wharton RN .............. 9/19/2022  4:21 PM

## 2022-09-20 ENCOUNTER — ANTICOAGULATION THERAPY VISIT (OUTPATIENT)
Dept: ANTICOAGULATION | Facility: OTHER | Age: 62
End: 2022-09-20
Attending: INTERNAL MEDICINE
Payer: COMMERCIAL

## 2022-09-20 DIAGNOSIS — Z79.01 ANTICOAGULATION MONITORING, INR RANGE 2-3: Primary | ICD-10-CM

## 2022-09-20 DIAGNOSIS — I74.9 EMBOLISM AND THROMBOSIS (H): ICD-10-CM

## 2022-09-20 NOTE — PROGRESS NOTES
ANTICOAGULATION  MANAGEMENT-Home Monitor Managed by Exception    Trisha NATALIA Fernando 61 year old female is on warfarin with therapeutic INR result. (Goal INR 2.0-3.0)    Recent labs: (last 7 days)     09/19/22  1744   INR 3.0*         Previous INR was Therapeutic    Medication, diet, health changes since last INR:chart reviewed; none identified    Contacted within the last 12 weeks by phone on 7/19/22      JOSE JUAN     Trisha was NOT contacted regarding therapeutic result today per home monitoring policy manage by exception agreement.   Current warfarin dose is to be continued:     Summary  As of 9/20/2022    Full warfarin instructions:  2.5 mg every Wed; 5 mg all other days   Next INR check:  9/26/2022           ?   Ghada Betancur RN  Anticoagulation Clinic  9/20/2022    _______________________________________________________________________     Anticoagulation Episode Summary     Current INR goal:  2.0-3.0   TTR:  97.6 % (1 y)   Target end date:  Indefinite   Send INR reminders to:  [facility]    Indications    Long term (current) use of anticoagulants (Resolved) [Z79.01]  Embolism and thrombosis (H) (Resolved) [I74.9]  Anticoagulation monitoring  INR range 2-3 [Z79.01]  Embolism and thrombosis (H) [I74.9]           Comments:  MDINR needs weekly INR done         Anticoagulation Care Providers     Provider Role Specialty Phone number    Maria Guadalupe Ramon DO Referring Internal Medicine [phone]

## 2022-09-24 DIAGNOSIS — I10 ESSENTIAL HYPERTENSION: ICD-10-CM

## 2022-09-26 ENCOUNTER — TRANSFERRED RECORDS (OUTPATIENT)
Dept: HEALTH INFORMATION MANAGEMENT | Facility: OTHER | Age: 62
End: 2022-09-26

## 2022-09-26 LAB — INR (EXTERNAL): 2.8 (ref 0.9–1.1)

## 2022-09-27 ENCOUNTER — ANTICOAGULATION THERAPY VISIT (OUTPATIENT)
Dept: ANTICOAGULATION | Facility: OTHER | Age: 62
End: 2022-09-27
Attending: INTERNAL MEDICINE
Payer: COMMERCIAL

## 2022-09-27 DIAGNOSIS — Z79.01 ANTICOAGULATION MONITORING, INR RANGE 2-3: Primary | ICD-10-CM

## 2022-09-27 DIAGNOSIS — I74.9 EMBOLISM AND THROMBOSIS (H): ICD-10-CM

## 2022-09-27 NOTE — PROGRESS NOTES
ANTICOAGULATION  MANAGEMENT-Home Monitor Managed by Exception    Trisha NATALIA Fernando 61 year old female is on warfarin with therapeutic INR result. (Goal INR 2.0-3.0)    Recent labs: (last 7 days)     09/26/22  1741   INR 2.8*         Previous INR was Therapeutic    Medication, diet, health changes since last INR:chart reviewed; none identified    Contacted within the last 12 weeks by phone on 07/19/22      JOSE JUAN     Trisha was NOT contacted regarding therapeutic result today per home monitoring policy manage by exception agreement.   Current warfarin dose is to be continued:     Summary  As of 9/27/2022    Full warfarin instructions:  2.5 mg every Wed; 5 mg all other days   Next INR check:  10/4/2022           ?   Mariia Foster RN  Anticoagulation Clinic  9/27/2022    _______________________________________________________________________     Anticoagulation Episode Summary     Current INR goal:  2.0-3.0   TTR:  97.6 % (1 y)   Target end date:  Indefinite   Send INR reminders to:  [facility]    Indications    Long term (current) use of anticoagulants (Resolved) [Z79.01]  Embolism and thrombosis (H) (Resolved) [I74.9]  Anticoagulation monitoring  INR range 2-3 [Z79.01]  Embolism and thrombosis (H) [I74.9]           Comments:  MDINR needs weekly INR done         Anticoagulation Care Providers     Provider Role Specialty Phone number    Maria Guadalupe Ramon DO Referring Internal Medicine [phone]

## 2022-09-27 NOTE — TELEPHONE ENCOUNTER
"  Last Prescription Date: 12/27/21  Last Qty/Refills: 180 / 3  Last Office Visit: 5/25/22  Future Office Visit: 10/26/22     Requested Prescriptions   Pending Prescriptions Disp Refills     metoprolol tartrate (LOPRESSOR) 25 MG tablet [Pharmacy Med Name: METOPROLOL TARTRATE 25 MG TAB] 180 tablet 1     Sig: TAKE 1 TABLET (25 MG) BY MOUTH 2 TIMES DAILY INCREASE IN DOSE       Beta-Blockers Protocol Passed - 9/24/2022 12:59 PM        Passed - Blood pressure under 140/90 in past 12 months     BP Readings from Last 3 Encounters:   05/05/22 130/86   03/19/22 130/80   03/15/22 124/82                 Passed - Patient is age 6 or older        Passed - Recent (12 mo) or future (30 days) visit within the authorizing provider's specialty     Patient has had an office visit with the authorizing provider or a provider within the authorizing providers department within the previous 12 mos or has a future within next 30 days. See \"Patient Info\" tab in inbasket, or \"Choose Columns\" in Meds & Orders section of the refill encounter.              Passed - Medication is active on med list           chlorthalidone (HYGROTON) 25 MG tablet [Pharmacy Med Name: CHLORTHALIDONE 25 MG TABLET] 90 tablet PRN     Sig: TAKE 1 TABLET (25 MG) BY MOUTH DAILY INCREASE IN DOSE       Diuretics (Including Combos) Protocol Passed - 9/24/2022 12:59 PM        Passed - Blood pressure under 140/90 in past 12 months     BP Readings from Last 3 Encounters:   05/05/22 130/86   03/19/22 130/80   03/15/22 124/82                 Passed - Recent (12 mo) or future (30 days) visit within the authorizing provider's specialty     Patient has had an office visit with the authorizing provider or a provider within the authorizing providers department within the previous 12 mos or has a future within next 30 days. See \"Patient Info\" tab in inbasket, or \"Choose Columns\" in Meds & Orders section of the refill encounter.              Passed - Medication is active on med list    "     Passed - Patient is age 18 or older        Passed - No active pregancy on record        Passed - Normal serum creatinine on file in past 12 months     Recent Labs   Lab Test 12/27/21  1117   CR 0.93              Passed - Normal serum potassium on file in past 12 months     Recent Labs   Lab Test 12/27/21  1117   POTASSIUM 3.6                    Passed - Normal serum sodium on file in past 12 months     Recent Labs   Lab Test 12/27/21  1117                 Passed - No positive pregnancy test in past 12 months

## 2022-09-28 RX ORDER — METOPROLOL TARTRATE 25 MG/1
25 TABLET, FILM COATED ORAL 2 TIMES DAILY
Qty: 180 TABLET | Refills: 0 | Status: SHIPPED | OUTPATIENT
Start: 2022-09-28 | End: 2022-10-26

## 2022-09-28 RX ORDER — CHLORTHALIDONE 25 MG/1
25 TABLET ORAL DAILY
Qty: 90 TABLET | Refills: 0 | Status: SHIPPED | OUTPATIENT
Start: 2022-09-28 | End: 2022-10-26

## 2022-09-28 NOTE — TELEPHONE ENCOUNTER
The patient called regarding refill of metoprolol.  [facility] pharmacy needs Dr Ramon's approval for this.  Please advise. She has only 1 pill left.

## 2022-10-03 LAB — INR (EXTERNAL): 2.8 (ref 0.9–1.1)

## 2022-10-04 ENCOUNTER — ANTICOAGULATION THERAPY VISIT (OUTPATIENT)
Dept: ANTICOAGULATION | Facility: OTHER | Age: 62
End: 2022-10-04
Attending: INTERNAL MEDICINE
Payer: COMMERCIAL

## 2022-10-04 DIAGNOSIS — Z79.01 ANTICOAGULATION MONITORING, INR RANGE 2-3: Primary | ICD-10-CM

## 2022-10-04 DIAGNOSIS — I74.9 EMBOLISM AND THROMBOSIS (H): ICD-10-CM

## 2022-10-04 NOTE — PROGRESS NOTES
ANTICOAGULATION  MANAGEMENT-Home Monitor Managed by Exception    Trisharebeka Fernando 61 year old female is on warfarin with therapeutic INR result. (Goal INR 2.0-3.0)    Recent labs: (last 7 days)     10/03/22  2300   INR 2.8*         Previous INR was Therapeutic    Medication, diet, health changes since last INR:chart reviewed; none identified    Contacted within the last 12 weeks by phone on 7/19/22      JOSE JUAN     Trisha was NOT contacted regarding therapeutic result today per home monitoring policy manage by exception agreement.   Current warfarin dose is to be continued:     Summary  As of 10/4/2022    Full warfarin instructions:  2.5 mg every Wed; 5 mg all other days   Next INR check:  10/11/2022           ?   Smitha Nair RN  Anticoagulation Clinic  10/4/2022    _______________________________________________________________________     Anticoagulation Episode Summary     Current INR goal:  2.0-3.0   TTR:  97.6 % (1 y)   Target end date:  Indefinite   Send INR reminders to:  [facility]    Indications    Long term (current) use of anticoagulants (Resolved) [Z79.01]  Embolism and thrombosis (H) (Resolved) [I74.9]  Anticoagulation monitoring  INR range 2-3 [Z79.01]  Embolism and thrombosis (H) [I74.9]           Comments:  MDINR needs weekly INR done         Anticoagulation Care Providers     Provider Role Specialty Phone number    Maria Guadalupe Ramon DO Referring Internal Medicine [phone]

## 2022-10-10 LAB — INR (EXTERNAL): 2.7 (ref 0.9–1.1)

## 2022-10-11 ENCOUNTER — ANTICOAGULATION THERAPY VISIT (OUTPATIENT)
Dept: ANTICOAGULATION | Facility: OTHER | Age: 62
End: 2022-10-11
Attending: INTERNAL MEDICINE
Payer: COMMERCIAL

## 2022-10-11 DIAGNOSIS — I74.9 EMBOLISM AND THROMBOSIS (H): ICD-10-CM

## 2022-10-11 DIAGNOSIS — Z79.01 ANTICOAGULATION MONITORING, INR RANGE 2-3: Primary | ICD-10-CM

## 2022-10-11 NOTE — PROGRESS NOTES
ANTICOAGULATION  MANAGEMENT-Home Monitor Managed by Exception    Trisha NATALIA Masons 61 year old female is on warfarin with therapeutic INR result. (Goal INR 2.0-3.0)    Recent labs: (last 7 days)     10/10/22  1732   INR 2.7*         Previous INR was Therapeutic    Medication, diet, health changes since last INR:chart reviewed; none identified    Contacted within the last 12 weeks by phone on 7/19/22      JOSE JUAN     Trisha was NOT contacted regarding therapeutic result today per home monitoring policy manage by exception agreement.   Current warfarin dose is to be continued:     Summary  As of 10/11/2022    Full warfarin instructions:  2.5 mg every Wed; 5 mg all other days   Next INR check:  10/18/2022           ?   Rebecca Bonilla RN  Anticoagulation Clinic  10/11/2022    _______________________________________________________________________     Anticoagulation Episode Summary     Current INR goal:  2.0-3.0   TTR:  97.6 % (1 y)   Target end date:  Indefinite   Send INR reminders to:  [facility]    Indications    Long term (current) use of anticoagulants (Resolved) [Z79.01]  Embolism and thrombosis (H) (Resolved) [I74.9]  Anticoagulation monitoring  INR range 2-3 [Z79.01]  Embolism and thrombosis (H) [I74.9]           Comments:  MDINR needs weekly INR done         Anticoagulation Care Providers     Provider Role Specialty Phone number    Maria Guadalupe Ramon DO Referring Internal Medicine [phone]

## 2022-10-17 LAB — INR (EXTERNAL): 3 (ref 0.9–1.1)

## 2022-10-18 ENCOUNTER — ANTICOAGULATION THERAPY VISIT (OUTPATIENT)
Dept: ANTICOAGULATION | Facility: OTHER | Age: 62
End: 2022-10-18
Attending: INTERNAL MEDICINE
Payer: COMMERCIAL

## 2022-10-18 DIAGNOSIS — Z79.01 ANTICOAGULATION MONITORING, INR RANGE 2-3: Primary | ICD-10-CM

## 2022-10-18 DIAGNOSIS — I74.9 EMBOLISM AND THROMBOSIS (H): ICD-10-CM

## 2022-10-18 NOTE — PROGRESS NOTES
ANTICOAGULATION  MANAGEMENT-Home Monitor Managed by Exception    Trisharebeka Fernando 61 year old female is on warfarin with therapeutic INR result. (Goal INR 2.0-3.0)    Recent labs: (last 7 days)     10/17/22  1700   INR 3.0*         Previous INR was Therapeutic    Medication, diet, health changes since last INR:chart reviewed; none identified    Contacted within the last 12 weeks by phone on 7/19/22      JOSE JUAN     Trisha was NOT contacted regarding therapeutic result today per home monitoring policy manage by exception agreement.   Current warfarin dose is to be continued:     Summary  As of 10/18/2022    Full warfarin instructions:  2.5 mg every Wed; 5 mg all other days   Next INR check:  10/25/2022           ?   Smitha Nair RN  Anticoagulation Clinic  10/18/2022    _______________________________________________________________________     Anticoagulation Episode Summary     Current INR goal:  2.0-3.0   TTR:  97.6 % (1 y)   Target end date:  Indefinite   Send INR reminders to:  [facility]    Indications    Long term (current) use of anticoagulants (Resolved) [Z79.01]  Embolism and thrombosis (H) (Resolved) [I74.9]  Anticoagulation monitoring  INR range 2-3 [Z79.01]  Embolism and thrombosis (H) [I74.9]           Comments:  MDINR needs weekly INR done         Anticoagulation Care Providers     Provider Role Specialty Phone number    Maria Guadalupe Ramon DO Referring Internal Medicine [phone]

## 2022-10-24 LAB — INR (EXTERNAL): 2.5 (ref 0.9–1.1)

## 2022-10-25 ENCOUNTER — ANTICOAGULATION THERAPY VISIT (OUTPATIENT)
Dept: ANTICOAGULATION | Facility: OTHER | Age: 62
End: 2022-10-25
Payer: COMMERCIAL

## 2022-10-25 DIAGNOSIS — Z79.01 ANTICOAGULATION MONITORING, INR RANGE 2-3: Primary | ICD-10-CM

## 2022-10-25 DIAGNOSIS — I74.9 EMBOLISM AND THROMBOSIS (H): ICD-10-CM

## 2022-10-25 NOTE — PROGRESS NOTES
ANTICOAGULATION  MANAGEMENT-Home Monitor Managed by Exception    Trisha NATALIA Fernando 61 year old female is on warfarin with therapeutic INR result. (Goal INR 2.0-3.0)    Recent labs: (last 7 days)     10/24/22  2013   INR 2.5*         Previous INR was Therapeutic    Medication, diet, health changes since last INR:chart reviewed; none identified    Contacted within the last 12 weeks by phone on 7/19/22      JOSE JUAN     Trisha was NOT contacted regarding therapeutic result today per home monitoring policy manage by exception agreement.   Current warfarin dose is to be continued:     Summary  As of 10/25/2022    Full warfarin instructions:  2.5 mg every Wed; 5 mg all other days; Starting 10/25/2022   Next INR check:  11/1/2022           ?   Smitha Nair RN  Anticoagulation Clinic  10/25/2022    _______________________________________________________________________     Anticoagulation Episode Summary     Current INR goal:  2.0-3.0   TTR:  97.6 % (1 y)   Target end date:  Indefinite   Send INR reminders to:  [facility]    Indications    Long term (current) use of anticoagulants (Resolved) [Z79.01]  Embolism and thrombosis (H) (Resolved) [I74.9]  Anticoagulation monitoring  INR range 2-3 [Z79.01]  Embolism and thrombosis (H) [I74.9]           Comments:  MDINR needs weekly INR done         Anticoagulation Care Providers     Provider Role Specialty Phone number    Maria Guadalupe Ramon DO Referring Internal Medicine [phone]

## 2022-10-26 ENCOUNTER — MYC MEDICAL ADVICE (OUTPATIENT)
Dept: INTERNAL MEDICINE | Facility: OTHER | Age: 62
End: 2022-10-26

## 2022-10-26 ENCOUNTER — OFFICE VISIT (OUTPATIENT)
Dept: INTERNAL MEDICINE | Facility: OTHER | Age: 62
End: 2022-10-26
Attending: INTERNAL MEDICINE
Payer: COMMERCIAL

## 2022-10-26 VITALS
SYSTOLIC BLOOD PRESSURE: 128 MMHG | TEMPERATURE: 96.7 F | DIASTOLIC BLOOD PRESSURE: 86 MMHG | RESPIRATION RATE: 18 BRPM | OXYGEN SATURATION: 98 % | HEART RATE: 75 BPM

## 2022-10-26 DIAGNOSIS — M25.50 MULTIPLE JOINT PAIN: Primary | ICD-10-CM

## 2022-10-26 DIAGNOSIS — R76.8 ELEVATED ANTINUCLEAR ANTIBODY (ANA) LEVEL: ICD-10-CM

## 2022-10-26 DIAGNOSIS — M25.50 MULTIPLE JOINT PAIN: ICD-10-CM

## 2022-10-26 DIAGNOSIS — E66.01 MORBID OBESITY (H): ICD-10-CM

## 2022-10-26 DIAGNOSIS — I10 ESSENTIAL HYPERTENSION: ICD-10-CM

## 2022-10-26 DIAGNOSIS — F33.0 DEPRESSION, MAJOR, RECURRENT, MILD (H): ICD-10-CM

## 2022-10-26 DIAGNOSIS — R21 RASH: ICD-10-CM

## 2022-10-26 DIAGNOSIS — R76.8 ELEVATED RHEUMATOID FACTOR: ICD-10-CM

## 2022-10-26 LAB
BASOPHILS # BLD AUTO: 0.1 10E3/UL (ref 0–0.2)
BASOPHILS NFR BLD AUTO: 1 %
CRP SERPL-MCNC: 12.3 MG/L
EOSINOPHIL # BLD AUTO: 0.2 10E3/UL (ref 0–0.7)
EOSINOPHIL NFR BLD AUTO: 2 %
ERYTHROCYTE [DISTWIDTH] IN BLOOD BY AUTOMATED COUNT: 13.8 % (ref 10–15)
ERYTHROCYTE [SEDIMENTATION RATE] IN BLOOD BY WESTERGREN METHOD: 16 MM/HR (ref 0–30)
HCT VFR BLD AUTO: 48.4 % (ref 35–47)
HGB BLD-MCNC: 16 G/DL (ref 11.7–15.7)
IMM GRANULOCYTES # BLD: 0 10E3/UL
IMM GRANULOCYTES NFR BLD: 0 %
LYMPHOCYTES # BLD AUTO: 3 10E3/UL (ref 0.8–5.3)
LYMPHOCYTES NFR BLD AUTO: 33 %
MCH RBC QN AUTO: 31.4 PG (ref 26.5–33)
MCHC RBC AUTO-ENTMCNC: 33.1 G/DL (ref 31.5–36.5)
MCV RBC AUTO: 95 FL (ref 78–100)
MONOCYTES # BLD AUTO: 0.5 10E3/UL (ref 0–1.3)
MONOCYTES NFR BLD AUTO: 6 %
NEUTROPHILS # BLD AUTO: 5.3 10E3/UL (ref 1.6–8.3)
NEUTROPHILS NFR BLD AUTO: 58 %
NRBC # BLD AUTO: 0 10E3/UL
NRBC BLD AUTO-RTO: 0 /100
PLATELET # BLD AUTO: 226 10E3/UL (ref 150–450)
RBC # BLD AUTO: 5.1 10E6/UL (ref 3.8–5.2)
RHEUMATOID FACT SER NEPH-ACNC: 47 IU/ML
WBC # BLD AUTO: 9 10E3/UL (ref 4–11)

## 2022-10-26 PROCEDURE — 85652 RBC SED RATE AUTOMATED: CPT | Mod: ZL | Performed by: INTERNAL MEDICINE

## 2022-10-26 PROCEDURE — 36415 COLL VENOUS BLD VENIPUNCTURE: CPT | Mod: ZL | Performed by: INTERNAL MEDICINE

## 2022-10-26 PROCEDURE — 87476 LYME DIS DNA AMP PROBE: CPT | Mod: ZL | Performed by: INTERNAL MEDICINE

## 2022-10-26 PROCEDURE — 90662 IIV NO PRSV INCREASED AG IM: CPT | Performed by: INTERNAL MEDICINE

## 2022-10-26 PROCEDURE — 86200 CCP ANTIBODY: CPT | Mod: ZL | Performed by: INTERNAL MEDICINE

## 2022-10-26 PROCEDURE — 99215 OFFICE O/P EST HI 40 MIN: CPT | Mod: 25 | Performed by: INTERNAL MEDICINE

## 2022-10-26 PROCEDURE — 90471 IMMUNIZATION ADMIN: CPT | Performed by: INTERNAL MEDICINE

## 2022-10-26 PROCEDURE — 86038 ANTINUCLEAR ANTIBODIES: CPT | Mod: ZL | Performed by: INTERNAL MEDICINE

## 2022-10-26 PROCEDURE — 86431 RHEUMATOID FACTOR QUANT: CPT | Mod: ZL | Performed by: INTERNAL MEDICINE

## 2022-10-26 PROCEDURE — 86140 C-REACTIVE PROTEIN: CPT | Mod: ZL | Performed by: INTERNAL MEDICINE

## 2022-10-26 PROCEDURE — 85004 AUTOMATED DIFF WBC COUNT: CPT | Mod: ZL | Performed by: INTERNAL MEDICINE

## 2022-10-26 RX ORDER — METOPROLOL TARTRATE 25 MG/1
25 TABLET, FILM COATED ORAL 2 TIMES DAILY
Qty: 180 TABLET | Refills: 0 | Status: SHIPPED | OUTPATIENT
Start: 2022-10-26 | End: 2023-03-11

## 2022-10-26 RX ORDER — BUPROPION HYDROCHLORIDE 150 MG/1
150 TABLET ORAL EVERY MORNING
Qty: 90 TABLET | Refills: 0 | Status: SHIPPED | OUTPATIENT
Start: 2022-10-26 | End: 2023-03-28

## 2022-10-26 RX ORDER — CHLORTHALIDONE 25 MG/1
25 TABLET ORAL DAILY
Qty: 90 TABLET | Refills: 0 | Status: SHIPPED | OUTPATIENT
Start: 2022-10-26 | End: 2023-03-11

## 2022-10-26 ASSESSMENT — ANXIETY QUESTIONNAIRES
5. BEING SO RESTLESS THAT IT IS HARD TO SIT STILL: NOT AT ALL
GAD7 TOTAL SCORE: 11
1. FEELING NERVOUS, ANXIOUS, OR ON EDGE: NEARLY EVERY DAY
7. FEELING AFRAID AS IF SOMETHING AWFUL MIGHT HAPPEN: SEVERAL DAYS
3. WORRYING TOO MUCH ABOUT DIFFERENT THINGS: NEARLY EVERY DAY
GAD7 TOTAL SCORE: 11
2. NOT BEING ABLE TO STOP OR CONTROL WORRYING: SEVERAL DAYS
GAD7 TOTAL SCORE: 11
IF YOU CHECKED OFF ANY PROBLEMS ON THIS QUESTIONNAIRE, HOW DIFFICULT HAVE THESE PROBLEMS MADE IT FOR YOU TO DO YOUR WORK, TAKE CARE OF THINGS AT HOME, OR GET ALONG WITH OTHER PEOPLE: SOMEWHAT DIFFICULT
6. BECOMING EASILY ANNOYED OR IRRITABLE: MORE THAN HALF THE DAYS
4. TROUBLE RELAXING: SEVERAL DAYS
8. IF YOU CHECKED OFF ANY PROBLEMS, HOW DIFFICULT HAVE THESE MADE IT FOR YOU TO DO YOUR WORK, TAKE CARE OF THINGS AT HOME, OR GET ALONG WITH OTHER PEOPLE?: SOMEWHAT DIFFICULT
7. FEELING AFRAID AS IF SOMETHING AWFUL MIGHT HAPPEN: SEVERAL DAYS

## 2022-10-26 ASSESSMENT — PATIENT HEALTH QUESTIONNAIRE - PHQ9
SUM OF ALL RESPONSES TO PHQ QUESTIONS 1-9: 16
SUM OF ALL RESPONSES TO PHQ QUESTIONS 1-9: 16
10. IF YOU CHECKED OFF ANY PROBLEMS, HOW DIFFICULT HAVE THESE PROBLEMS MADE IT FOR YOU TO DO YOUR WORK, TAKE CARE OF THINGS AT HOME, OR GET ALONG WITH OTHER PEOPLE: SOMEWHAT DIFFICULT

## 2022-10-26 ASSESSMENT — PAIN SCALES - GENERAL: PAINLEVEL: NO PAIN (0)

## 2022-10-26 NOTE — PATIENT INSTRUCTIONS
For your skin, please try the following:  - Cetirizine (Zyrtec) 10mg daily at bedtime for 3-4 weeks and then as needed     Try Triamcinolone in the morning and hydrocortisone at night for 14 days    Please note that these can take up to 3-4 weeks to see a difference.    Consider getting your COVID booster in 2-4 weeks at any pharmacy     Problem: Post Op Day 1 CABG/Heart Valve Replacement  Goal: Optimal care of the post op CABG/heart valve replacement Post Op Day 1  Intervention: EKG and CXR completed  Completed by NOC RN  Intervention: Antibiotics are discontinued within 24 hours of anesthesia end time unless indication documented for continuation beyond 24 hours  ABx discontinued  Intervention: Daily Weights  Weighed by NOC RN  Intervention: Up in chair for all meals  Up to chair for breakfast and lunch.  Pain issues at dinner prevented mobility.  APRN aware.  Intervention: Ambulate in am if stable. First ambulation is 25 feet. Repeat x 3 as tolerated. Ambulate again before bed.  Ambulated x 1; ~25ft in hallway with wheelchair push.  Wide, shuffle gait, stand-by assist.  Intervention: Discontinue shipman catheter unless documented reason for continuation  Shipman d/c per MD order  Intervention: Remove original surgical dressing after 24 hrs, leave open to air unless otherwise specified by physician  Prevena wound vac dressing to remain in place for 7 days per order.  Intervention: Consider chest tube removal by MD  Chest tubes removed per MD order  Intervention: IS q 1 hour while awake and record best IS volume  Frequent IS use encouraged; max IS 1000  Intervention: Graduated elastic stockings  KEILY hose in use.  Intervention: Saline lock IV  Done  Intervention: Transfer to tele status, begin VS q 4 hours  Transitioned to tele status per protocol  Intervention: After 24th hour post-anesthesia end time, transition patient to Cardiac Surgery SQ Insulin Protocol  Pt transitioned to SQ insulin protocol.  Intervention: If patient is CABG or on home beta-blocker, start Beta-Blocker on POD 1 or POD 2 or contraindication documented  Beta-blocker held due to vasopressor in use this AM

## 2022-10-26 NOTE — NURSING NOTE
"Chief Complaint   Patient presents with     Blood Pressure Check     Mental Health Problem       Initial /86   Pulse 75   Temp (!) 96.7  F (35.9  C) (Temporal)   Resp 18   LMP  (LMP Unknown)   SpO2 98%  Estimated body mass index is 56.11 kg/m  as calculated from the following:    Height as of 3/19/22: 1.727 m (5' 8\").    Weight as of 5/5/22: 167.4 kg (369 lb).  FOOD SECURITY SCREENING QUESTIONS  Hunger Vital Signs:  Within the past 12 months we worried whether our food would run out before we got money to buy more. Never  Within the past 12 months the food we bought just didn't last and we didn't have money to get more. Never        Tr Lawrence, LPN    "

## 2022-10-26 NOTE — PROGRESS NOTES
"  Assessment & Plan     Essential hypertension  At this time we will continue on current medications however continue to monitor her rash closely.  - chlorthalidone (HYGROTON) 25 MG tablet; Take 1 tablet (25 mg) by mouth daily Increase in dose  - metoprolol tartrate (LOPRESSOR) 25 MG tablet; Take 1 tablet (25 mg) by mouth 2 times daily Increase in dose    Depression, major, recurrent, mild (H)  She is given the names of several mental health providers in the area.  She was informed that they can take over management of her medications if needed.  - buPROPion (WELLBUTRIN XL) 150 MG 24 hr tablet; Take 1 tablet (150 mg) by mouth every morning    Multiple joint pain  Labs are obtained today to rule out any underlining inflammatory joint disease  - Anti Nuclear Rukhsana IgG by IFA with Reflex  - CRP inflammation  - Sedimentation Rate (ESR)    Morbid obesity (H)  With a long discussion today regarding her weight.  We will start semaglutide and increase if tolerated.  We discussed on focusing on health, working on diet and exercise.  She will see me back in 2 months.  - semaglutide (OZEMPIC) 2 MG/1.5ML SOPN pen; Inject 0.5 mg Subcutaneous every 7 days Start with 0.25mg weekly for 2 weeks and then increase if tolerated.    Rash  -Exact cause is unknown.  It may be related to her blood pressure meds however we will continue to monitor and she will try a stronger topical cream for the next 2 weeks.  We will also obtain underlining autoimmune labs to rule out any possible contributing rheumatologic disease.  - CBC and Differential       BMI:   Estimated body mass index is 56.11 kg/m  as calculated from the following:    Height as of 3/19/22: 1.727 m (5' 8\").    Weight as of 5/5/22: 167.4 kg (369 lb).   Weight management plan: Discussed healthy diet and exercise guidelines    Depression Screening Follow Up    PHQ 10/26/2022   PHQ-9 Total Score 16   Q9: Thoughts of better off dead/self-harm past 2 weeks Several days   F/U: Thoughts " of suicide or self-harm No   F/U: Safety concerns No     Last PHQ-9 10/26/2022   1.  Little interest or pleasure in doing things 3   2.  Feeling down, depressed, or hopeless 3   3.  Trouble falling or staying asleep, or sleeping too much 1   4.  Feeling tired or having little energy 3   5.  Poor appetite or overeating 1   6.  Feeling bad about yourself 2   7.  Trouble concentrating 1   8.  Moving slowly or restless 1   Q9: Thoughts of better off dead/self-harm past 2 weeks 1   PHQ-9 Total Score 16   Difficulty at work, home, or with people -   In the past two weeks have you had thoughts of suicide or self harm? No   Do you have concerns about your personal safety or the safety of others? No       Follow Up      Follow Up Actions Taken  Mental Health Referral placed    Discussed the following ways the patient can remain in a safe environment:  be around others      Return in about 2 months (around 12/26/2022) for Annual Review with renewal of all medications, pap.     44 minutes spent on the date of the encounter doing chart review, history and exam, documentation and further activities as noted above      Maria Guadalupe Ramon, [facility] AND [facility]   Trisha is a 61 year old, presenting for the following health issues:  Blood Pressure Check and Mental Health Problem    Patient follows up for her blood pressure today.  Blood pressure readings range between 119/80 and 135/79.  Heart rate has been in the 60-70 range.  She is on chlorthalidone and metoprolol.  The she overall feels that this is controlled her blood pressure however she has developed a skin rash and is curious if they could be related to her meds.  See below for additional information regarding blood pressure.    She has been having some peeling skin from her eyebrows and forehead.  She tried some hydrocortisone which helped the itchiness however she continues to have the skin issues.  She has been taking Claritin daily.  Denies any  fevers    We discussed her weight today.  This continues to be a source of frustration and depression for her.  She does feel that she would benefit from seeing a mental health provider.  She continues on bupropion more every morning.  She denies any side effects.  We discussed multiple options for working on weight loss including medications.  She is interested in possibly trying semaglutide.    She has also been having increased joint pain overall.  She feels this is worse with her increase in weight.  She does find this impedes her ability to exercise as much as she would like to.      Mental Health Problem    History of Present Illness       Mental Health Follow-up:  Patient presents to follow-up on Depression & Anxiety.Patient's depression since last visit has been:  Bad  The patient is not having other symptoms associated with depression.  Patient's anxiety since last visit has been:  Medium  The patient is not having other symptoms associated with anxiety.  Any significant life events: relationship concerns and health concerns  Patient is not feeling anxious or having panic attacks.  Patient has no concerns about alcohol or drug use.    Hypertension: She presents for follow up of hypertension.  She does check blood pressure  regularly outside of the clinic. Outpatient blood pressures have not been over 140/90. She does not follow a low salt diet.      Today's PHQ-9         PHQ-9 Total Score: 16    PHQ-9 Q9 Thoughts of better off dead/self-harm past 2 weeks :   Several days  Thoughts of suicide or self harm: (P) No  Self-harm Plan:     Self-harm Action:       Safety concerns for self or others: (P) No    How difficult have these problems made it for you to do your work, take care of things at home, or get along with other people: Somewhat difficult  Today's ELIZABETH-7 Score: 11         Review of Systems   Denies any fever      Objective    /86   Pulse 75   Temp (!) 96.7  F (35.9  C) (Temporal)   Resp 18    LMP  (LMP Unknown)   SpO2 98%   There is no height or weight on file to calculate BMI.  Physical Exam   GEN: Vitals reviewed. Healthy appearing. Patient is in no acute distress. Cooperative with exam.  HEENT: Normocephalic atraumatic.  Eyes grossly normal to inspection.  No discharge or erythema, or obvious scleral/conjunctival abnormalities.   CV: Heart regular in rate and rhythm with no murmur.    LUNGS: No audible wheeze, cough, or visible cyanosis.  No visible retractions or increased work of breathing.  Lungs clear to auscultation bilaterally.    ABD:  Obese, nondistended  SKIN: Warm and dry to touch.  Visible skin clear. No significant rash, abnormal pigmentation or lesions.  EXT: No clubbing or cyanosis.   Trace to 1+ peripheral edema.

## 2022-10-27 NOTE — TELEPHONE ENCOUNTER
Patient would like prescription of Ozempic sent to [facility] in [place], [place], Prescription forwarded to that pharmacy per patient preference. Rebecca Bonilla RN on 10/27/2022 at 7:39 AM

## 2022-10-28 LAB
ANA PAT SER IF-IMP: ABNORMAL
ANA SER QL IF: ABNORMAL
ANA TITR SER IF: ABNORMAL {TITER}

## 2022-10-31 LAB
B BURGDOR DNA SPEC QL NAA+PROBE: NOT DETECTED
CCP AB SER IA-ACNC: 0.6 U/ML
INR (EXTERNAL): 2.6 (ref 0.9–1.1)

## 2022-11-01 ENCOUNTER — ANTICOAGULATION THERAPY VISIT (OUTPATIENT)
Dept: ANTICOAGULATION | Facility: OTHER | Age: 62
End: 2022-11-01
Attending: INTERNAL MEDICINE
Payer: COMMERCIAL

## 2022-11-01 DIAGNOSIS — I74.9 EMBOLISM AND THROMBOSIS (H): ICD-10-CM

## 2022-11-01 DIAGNOSIS — Z79.01 ANTICOAGULATION MONITORING, INR RANGE 2-3: Primary | ICD-10-CM

## 2022-11-01 NOTE — PROGRESS NOTES
ANTICOAGULATION  MANAGEMENT-Home Monitor Managed by Exception    Trisha FISHER Denislaurie 61 year old female is on warfarin with therapeutic INR result. (Goal INR 2.0-3.0)    Recent labs: (last 7 days)     10/31/22  1928   INR 2.6*         Previous INR was Therapeutic    Medication, diet, health changes since last INR:chart reviewed; none identified        PLAN     Trisha was NOT contacted regarding therapeutic result today per home monitoring policy manage by exception agreement.   Current warfarin dose is to be continued:     Summary  As of 11/1/2022    Full warfarin instructions:  2.5 mg every Wed; 5 mg all other days; Starting 11/1/2022   Next INR check:  11/7/2022           ?   Ghada Betancur RN  Anticoagulation Clinic  11/1/2022    _______________________________________________________________________     Anticoagulation Episode Summary     Current INR goal:  2.0-3.0   TTR:  97.6 % (1 y)   Target end date:  Indefinite   Send INR reminders to:  [facility]    Indications    Long term (current) use of anticoagulants (Resolved) [Z79.01]  Embolism and thrombosis (H) (Resolved) [I74.9]  Anticoagulation monitoring  INR range 2-3 [Z79.01]  Embolism and thrombosis (H) [I74.9]           Comments:  MDINR needs weekly INR done         Anticoagulation Care Providers     Provider Role Specialty Phone number    Maria Guadalupe Ramon DO Referring Internal Medicine [phone]

## 2022-11-07 LAB — INR (EXTERNAL): 2.5 (ref 0.9–1.1)

## 2022-11-08 ENCOUNTER — ANTICOAGULATION THERAPY VISIT (OUTPATIENT)
Dept: ANTICOAGULATION | Facility: OTHER | Age: 62
End: 2022-11-08
Attending: INTERNAL MEDICINE
Payer: COMMERCIAL

## 2022-11-08 DIAGNOSIS — Z79.01 ANTICOAGULATION MONITORING, INR RANGE 2-3: Primary | ICD-10-CM

## 2022-11-08 DIAGNOSIS — I74.9 EMBOLISM AND THROMBOSIS (H): ICD-10-CM

## 2022-11-08 NOTE — PROGRESS NOTES
ANTICOAGULATION MANAGEMENT     Trisha Barriosbells 61 year old female is on warfarin with therapeutic INR result. (Goal INR 2.0-3.0)    Recent labs: (last 7 days)     11/07/22  0646   INR 2.5*       ASSESSMENT       Source(s): Chart Review    Previous INR was Therapeutic last 2(+) visits    Medication, diet, health changes since last INR: Started Ozempic, no interactions noted with warfarin.            PLAN     Unable to reach Trisha today.    Left message to call back for a follow up. Due for 12 week follow up call.     Follow up required to confirm warfarin dose taken and assess for changes    Rebecca Bonilla RN  Anticoagulation Clinic  11/8/2022

## 2022-11-22 ENCOUNTER — ANTICOAGULATION THERAPY VISIT (OUTPATIENT)
Dept: ANTICOAGULATION | Facility: OTHER | Age: 62
End: 2022-11-22
Attending: INTERNAL MEDICINE
Payer: COMMERCIAL

## 2022-11-22 DIAGNOSIS — Z79.01 ANTICOAGULATION MONITORING, INR RANGE 2-3: Primary | ICD-10-CM

## 2022-11-22 DIAGNOSIS — I74.9 EMBOLISM AND THROMBOSIS (H): ICD-10-CM

## 2022-11-22 LAB — INR (EXTERNAL): 2.5 (ref 0.9–1.1)

## 2022-11-22 NOTE — PROGRESS NOTES
ANTICOAGULATION  MANAGEMENT-Home Monitor Managed by Exception    Trisha NATALIA Fernando 61 year old female is on warfarin with therapeutic INR result. (Goal INR 2.0-3.0)    Recent labs: (last 7 days)     11/22/22  1352   INR 2.5*         Previous INR was Therapeutic    Medication, diet, health changes since last INR:chart reviewed; none identified    Contacted within the last 12 weeks by phone on 11/8/22      JOSE JUAN     Trisha was NOT contacted regarding therapeutic result today per home monitoring policy manage by exception agreement.   Current warfarin dose is to be continued:     Summary  As of 11/22/2022    Full warfarin instructions:  2.5 mg every Wed; 5 mg all other days; Starting 11/22/2022   Next INR check:  12/6/2022           ?   Ghada Betancur RN  Anticoagulation Clinic  11/22/2022    _______________________________________________________________________     Anticoagulation Episode Summary     Current INR goal:  2.0-3.0   TTR:  97.6 % (1 y)   Target end date:  Indefinite   Send INR reminders to:  [facility]    Indications    Long term (current) use of anticoagulants (Resolved) [Z79.01]  Embolism and thrombosis (H) (Resolved) [I74.9]  Anticoagulation monitoring  INR range 2-3 [Z79.01]  Embolism and thrombosis (H) [I74.9]           Comments:  MDINR needs weekly INR done         Anticoagulation Care Providers     Provider Role Specialty Phone number    Maria Guadalupe Ramon DO Referring Internal Medicine [phone]

## 2022-11-28 LAB — INR (EXTERNAL): 2.6 (ref 0.9–1.1)

## 2022-11-29 ENCOUNTER — ANTICOAGULATION THERAPY VISIT (OUTPATIENT)
Dept: ANTICOAGULATION | Facility: OTHER | Age: 62
End: 2022-11-29
Attending: INTERNAL MEDICINE
Payer: COMMERCIAL

## 2022-11-29 ENCOUNTER — OFFICE VISIT (OUTPATIENT)
Dept: FAMILY MEDICINE | Facility: OTHER | Age: 62
End: 2022-11-29
Attending: INTERNAL MEDICINE
Payer: COMMERCIAL

## 2022-11-29 VITALS
DIASTOLIC BLOOD PRESSURE: 68 MMHG | HEART RATE: 66 BPM | SYSTOLIC BLOOD PRESSURE: 130 MMHG | OXYGEN SATURATION: 96 % | BODY MASS INDEX: 44.41 KG/M2 | TEMPERATURE: 97.8 F | RESPIRATION RATE: 20 BRPM | WEIGHT: 293 LBS | HEIGHT: 68 IN

## 2022-11-29 DIAGNOSIS — J02.0 STREP THROAT: ICD-10-CM

## 2022-11-29 DIAGNOSIS — J11.1 INFLUENZA-LIKE ILLNESS: Primary | ICD-10-CM

## 2022-11-29 DIAGNOSIS — R05.1 ACUTE COUGH: ICD-10-CM

## 2022-11-29 DIAGNOSIS — Z79.01 ANTICOAGULATION MONITORING, INR RANGE 2-3: Primary | ICD-10-CM

## 2022-11-29 DIAGNOSIS — I74.9 EMBOLISM AND THROMBOSIS (H): ICD-10-CM

## 2022-11-29 LAB
FLUAV RNA SPEC QL NAA+PROBE: NEGATIVE
FLUBV RNA RESP QL NAA+PROBE: NEGATIVE
GROUP A STREP BY PCR: NOT DETECTED
RSV RNA SPEC NAA+PROBE: NEGATIVE
SARS-COV-2 RNA RESP QL NAA+PROBE: NEGATIVE

## 2022-11-29 PROCEDURE — 87637 SARSCOV2&INF A&B&RSV AMP PRB: CPT | Mod: ZL | Performed by: NURSE PRACTITIONER

## 2022-11-29 PROCEDURE — 99213 OFFICE O/P EST LOW 20 MIN: CPT | Mod: CS | Performed by: NURSE PRACTITIONER

## 2022-11-29 PROCEDURE — C9803 HOPD COVID-19 SPEC COLLECT: HCPCS | Performed by: NURSE PRACTITIONER

## 2022-11-29 PROCEDURE — 87651 STREP A DNA AMP PROBE: CPT | Mod: ZL | Performed by: NURSE PRACTITIONER

## 2022-11-29 ASSESSMENT — ENCOUNTER SYMPTOMS
SORE THROAT: 1
HEMATOLOGIC/LYMPHATIC NEGATIVE: 1
WHEEZING: 1
RHINORRHEA: 1
EYES NEGATIVE: 1
CHILLS: 1
HEADACHES: 1
SHORTNESS OF BREATH: 0
SINUS PAIN: 0
TROUBLE SWALLOWING: 0
GASTROINTESTINAL NEGATIVE: 1
PSYCHIATRIC NEGATIVE: 1
FEVER: 1
ACTIVITY CHANGE: 0
COUGH: 1
MUSCULOSKELETAL NEGATIVE: 1
APPETITE CHANGE: 0

## 2022-11-29 ASSESSMENT — PAIN SCALES - GENERAL: PAINLEVEL: MODERATE PAIN (4)

## 2022-11-29 NOTE — PROGRESS NOTES
Trisha Fernando  1960    ASSESSMENT/PLAN:   1. Influenza-like illness  2. Strep throat  3. Acute cough  Reviewed with patient that her symptoms are likely viral in nature.  There has been many influenza, COVID-19 and RSV cases in the community.  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  We discussed symptomatic treatment including a nondrowsy antihistamine, Flonase nasal spray, Tylenol, ibuprofen and nasal saline rinses.  Patient verbalized understanding.  Due to her worsening sore throat and fever she would like to be swabbed for strep which I think is reasonable.    Signs are stable.  Lungs are clear to auscultation, no wheezing.  I do recommend she start using her albuterol inhaler which she agrees.  She will be notified of results once they return.  We discussed symptoms and red flags on when she should return for reevaluation such as signs for secondary bacterial sinusitis, ear infection or pneumonia.  - Symptomatic; Unknown Influenza A/B & SARS-CoV2 (COVID-19) Virus PCR Multiplex Nasopharyngeal  - Group A Streptococcus PCR Throat Swab  - Symptomatic; Unknown Influenza A/B & SARS-CoV2 (COVID-19) Virus PCR Multiplex Nasopharyngeal    Patient agrees with plan of care and verbalizes understating. AVS printed. Patient education provided verbally and written instructions provided as requested. Patient made aware of emergent sings and symptoms to monitor for and when to seek additional care/follow up.     SUBJECTIVE:   CHIEF COMPLAINT/ REASON FOR VISIT  Patient presents with:  Throat Problem: X 4 days  Cough: X 1 week     HISTORY OF PRESENT ILLNESS  Trisha Fernando is a pleasant 62 year old female presents to rapid clinic today with concern of sore throat and bad cough.  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  She has been around a lot of sick children.  Her symptoms first started around a week ago.  She developed a mild sore throat and then the cough  "persist.  She has had a runny nose, some sinus congestion.  At one point she had a recorded fever of 99 and has had some chills over the past day.  She has been taking Tylenol and ibuprofen at home.  She denies any shortness of breath but has felt a little bit wheezy at times.  She has not used her albuterol inhaler at home.  Sleeping has been difficult as her cough seems to be worse at night and in the morning.  She denies any gastrointestinal symptoms.  She has been staying well-hydrated.    I have reviewed the nursing notes.  I have reviewed allergies, medication list, problem list, and past medical history.    REVIEW OF SYSTEMS  Review of Systems   Constitutional: Positive for chills and fever. Negative for activity change and appetite change.   HENT: Positive for postnasal drip, rhinorrhea and sore throat. Negative for congestion, ear discharge, ear pain, sinus pain and trouble swallowing.    Eyes: Negative.    Respiratory: Positive for cough and wheezing. Negative for shortness of breath.    Gastrointestinal: Negative.    Genitourinary: Negative.    Musculoskeletal: Negative.    Neurological: Positive for headaches.   Hematological: Negative.    Psychiatric/Behavioral: Negative.       VITAL SIGNS  Vitals:    11/29/22 1047   BP: 130/68   BP Location: Right arm   Patient Position: Sitting   Cuff Size: Adult Large   Pulse: 66   Resp: 20   Temp: 97.8  F (36.6  C)   TempSrc: Tympanic   SpO2: 96%   Weight: (!) 165.6 kg (365 lb)   Height: 1.727 m (5' 8\")      Body mass index is 55.5 kg/m .  PHQ-9 SCORE 3/19/2022 5/5/2022 10/26/2022   PHQ-9 Total Score MyChart - 11 (Moderate depression) 16 (Moderately severe depression)   PHQ-9 Total Score 13 11 16       OBJECTIVE:   PHYSICAL EXAM  Physical Exam  Vitals reviewed.   Constitutional:       Appearance: Normal appearance. She is not ill-appearing or toxic-appearing.   HENT:      Head: Normocephalic and atraumatic.      Right Ear: Tympanic membrane, ear canal and external " ear normal.      Left Ear: Tympanic membrane, ear canal and external ear normal.      Nose: Rhinorrhea present. No congestion.      Mouth/Throat:      Pharynx: Posterior oropharyngeal erythema present. No oropharyngeal exudate.   Eyes:      Conjunctiva/sclera: Conjunctivae normal.   Cardiovascular:      Rate and Rhythm: Normal rate and regular rhythm.      Pulses: Normal pulses.      Heart sounds: Normal heart sounds. No murmur heard.  Pulmonary:      Effort: Pulmonary effort is normal.      Breath sounds: Normal breath sounds.   Abdominal:      General: Bowel sounds are normal. There is no distension.      Palpations: Abdomen is soft.      Tenderness: There is no abdominal tenderness.   Musculoskeletal:      Cervical back: Normal range of motion and neck supple. No tenderness.   Lymphadenopathy:      Cervical: No cervical adenopathy.   Skin:     General: Skin is warm and dry.      Capillary Refill: Capillary refill takes less than 2 seconds.   Neurological:      General: No focal deficit present.      Mental Status: She is alert and oriented to person, place, and time.   Psychiatric:         Mood and Affect: Mood normal.         Behavior: Behavior normal.         Thought Content: Thought content normal.         Judgment: Judgment normal.        DIAGNOSTICS  Results for orders placed or performed in visit on 11/29/22   INR (External Result)     Status: Abnormal   Result Value Ref Range    INR (External) 2.6 (A) 0.9 - 1.1        Genoveva Chopra NP  [facility]

## 2022-11-29 NOTE — PROGRESS NOTES
ANTICOAGULATION  MANAGEMENT-Home Monitor Managed by Exception    Trisha NATALIA Fernando 62 year old female is on warfarin with therapeutic INR result. (Goal INR 2.0-3.0)    Recent labs: (last 7 days)     11/28/22 2021   INR 2.6*         Previous INR was Therapeutic    Medication, diet, health changes since last INR:chart reviewed; none identified    Contacted within the last 12 weeks by phone on 11/8/22      JOSE JUAN     Trisha was NOT contacted regarding therapeutic result today per home monitoring policy manage by exception agreement.   Current warfarin dose is to be continued:     Summary  As of 11/29/2022    Full warfarin instructions:  2.5 mg every Wed; 5 mg all other days; Starting 11/29/2022   Next INR check:  12/6/2022           ?   Smitha Nair RN  Anticoagulation Clinic  11/29/2022    _______________________________________________________________________     Anticoagulation Episode Summary     Current INR goal:  2.0-3.0   TTR:  97.6 % (1 y)   Target end date:  Indefinite   Send INR reminders to:  [facility]    Indications    Long term (current) use of anticoagulants (Resolved) [Z79.01]  Embolism and thrombosis (H) (Resolved) [I74.9]  Anticoagulation monitoring  INR range 2-3 [Z79.01]  Embolism and thrombosis (H) [I74.9]           Comments:  MDINR needs weekly INR done         Anticoagulation Care Providers     Provider Role Specialty Phone number    Maria Guadalupe Ramon DO Referring Internal Medicine [phone]

## 2022-11-29 NOTE — NURSING NOTE
Chief Complaint   Patient presents with     Throat Problem     X 4 days     Cough     X 1 week     Patient tx with tylenol and ibuprofen.  Experiencing shortness of breath worsening the past few weeks or so - Since covid- 2 years ago, patient reports not being able to breathe as good she had in the past.    Advanced Care Planning on file? no    Medication Review Completed: complete    FOOD SECURITY SCREENING QUESTIONS:    The next two questions are to help us understand your food security.  If you are feeling you need any assistance in this area, we have resources available to support you today.    Hunger Vital Signs:  Within the past 12 months we worried whether our food would run out before we got money to buy more. Never  Within the past 12 months the food we bought just didn't last and we didn't have money to get more. Never    Josiane Foy LPN

## 2022-12-02 ENCOUNTER — MYC MEDICAL ADVICE (OUTPATIENT)
Dept: INTERNAL MEDICINE | Facility: OTHER | Age: 62
End: 2022-12-02

## 2022-12-02 DIAGNOSIS — R05.1 ACUTE COUGH: Primary | ICD-10-CM

## 2022-12-02 RX ORDER — CODEINE PHOSPHATE AND GUAIFENESIN 10; 100 MG/5ML; MG/5ML
1-2 SOLUTION ORAL EVERY 6 HOURS PRN
Qty: 118 ML | Refills: 0 | Status: SHIPPED | OUTPATIENT
Start: 2022-12-02 | End: 2022-12-30

## 2022-12-06 ENCOUNTER — ANTICOAGULATION THERAPY VISIT (OUTPATIENT)
Dept: ANTICOAGULATION | Facility: OTHER | Age: 62
End: 2022-12-06
Attending: INTERNAL MEDICINE
Payer: COMMERCIAL

## 2022-12-06 DIAGNOSIS — I74.9 EMBOLISM AND THROMBOSIS (H): ICD-10-CM

## 2022-12-06 DIAGNOSIS — Z79.01 ANTICOAGULATION MONITORING, INR RANGE 2-3: Primary | ICD-10-CM

## 2022-12-06 LAB — INR (EXTERNAL): 2.9 (ref 0.9–1.1)

## 2022-12-06 NOTE — PROGRESS NOTES
ANTICOAGULATION  MANAGEMENT-Home Monitor Managed by Exception    Trisha NATALIA Masons 62 year old female is on warfarin with therapeutic INR result. (Goal INR 2.0-3.0)    Recent labs: (last 7 days)     12/06/22  1013   INR 2.9*         Previous INR was Therapeutic    Medication, diet, health changes since last INR:chart reviewed; none identified    Contacted within the last 12 weeks by phone on 11/8/22      JOSE JUAN     Trisha was NOT contacted regarding therapeutic result today per home monitoring policy manage by exception agreement.   Current warfarin dose is to be continued:     Summary  As of 12/6/2022    Full warfarin instructions:  2.5 mg every Wed; 5 mg all other days; Starting 12/6/2022   Next INR check:  12/13/2022           ?   Rebecca Bonilla RN  Anticoagulation Clinic  12/6/2022    _______________________________________________________________________     Anticoagulation Episode Summary     Current INR goal:  2.0-3.0   TTR:  97.6 % (1 y)   Target end date:  Indefinite   Send INR reminders to:  [facility]    Indications    Long term (current) use of anticoagulants (Resolved) [Z79.01]  Embolism and thrombosis (H) (Resolved) [I74.9]  Anticoagulation monitoring  INR range 2-3 [Z79.01]  Embolism and thrombosis (H) [I74.9]           Comments:  MDINR needs weekly INR done         Anticoagulation Care Providers     Provider Role Specialty Phone number    Maria Guadalupe Ramon DO Referring Internal Medicine [phone]

## 2022-12-13 ENCOUNTER — ANTICOAGULATION THERAPY VISIT (OUTPATIENT)
Dept: ANTICOAGULATION | Facility: OTHER | Age: 62
End: 2022-12-13
Attending: INTERNAL MEDICINE
Payer: COMMERCIAL

## 2022-12-13 DIAGNOSIS — Z79.01 ANTICOAGULATION MONITORING, INR RANGE 2-3: Primary | ICD-10-CM

## 2022-12-13 DIAGNOSIS — I74.9 EMBOLISM AND THROMBOSIS (H): ICD-10-CM

## 2022-12-13 DIAGNOSIS — F33.0 DEPRESSION, MAJOR, RECURRENT, MILD (H): ICD-10-CM

## 2022-12-13 LAB — INR (EXTERNAL): 2.8 (ref 0.9–1.1)

## 2022-12-13 NOTE — PROGRESS NOTES
ANTICOAGULATION  MANAGEMENT-Home Monitor Managed by Exception    Trisha NATALIA Fernando 62 year old female is on warfarin with therapeutic INR result. (Goal INR 2.0-3.0)    Recent labs: (last 7 days)     12/13/22  0933   INR 2.8*         Previous INR was Therapeutic    Medication, diet, health changes since last INR:chart reviewed; none identified    Contacted within the last 12 weeks by phone on 11/8/22      JOSE JUAN     Trisha was NOT contacted regarding therapeutic result today per home monitoring policy manage by exception agreement.   Current warfarin dose is to be continued:     Summary  As of 12/13/2022    Full warfarin instructions:  2.5 mg every Wed; 5 mg all other days; Starting 12/13/2022   Next INR check:  12/20/2022           ?   Ghada Betancur RN  Anticoagulation Clinic  12/13/2022    _______________________________________________________________________     Anticoagulation Episode Summary     Current INR goal:  2.0-3.0   TTR:  97.6 % (1 y)   Target end date:  Indefinite   Send INR reminders to:  [facility]    Indications    Long term (current) use of anticoagulants (Resolved) [Z79.01]  Embolism and thrombosis (H) (Resolved) [I74.9]  Anticoagulation monitoring  INR range 2-3 [Z79.01]  Embolism and thrombosis (H) [I74.9]           Comments:  MDINR needs weekly INR done         Anticoagulation Care Providers     Provider Role Specialty Phone number    Maria Guadalupe Ramon DO Referring Internal Medicine [phone]

## 2022-12-14 RX ORDER — VENLAFAXINE HYDROCHLORIDE 150 MG/1
CAPSULE, EXTENDED RELEASE ORAL
Qty: 90 CAPSULE | Refills: 3 | Status: SHIPPED | OUTPATIENT
Start: 2022-12-14 | End: 2023-11-24

## 2022-12-19 LAB — INR (EXTERNAL): 3.1 (ref 0.9–1.1)

## 2022-12-20 ENCOUNTER — ANTICOAGULATION THERAPY VISIT (OUTPATIENT)
Dept: ANTICOAGULATION | Facility: OTHER | Age: 62
End: 2022-12-20
Attending: INTERNAL MEDICINE
Payer: COMMERCIAL

## 2022-12-20 DIAGNOSIS — Z79.01 ANTICOAGULATION MONITORING, INR RANGE 2-3: Primary | ICD-10-CM

## 2022-12-20 DIAGNOSIS — I74.9 EMBOLISM AND THROMBOSIS (H): ICD-10-CM

## 2022-12-20 NOTE — PROGRESS NOTES
ANTICOAGULATION MANAGEMENT     Trisha Fernando 62 year old female is on warfarin with supratherapeutic INR result. (Goal INR 2.0-3.0)    Recent labs: (last 7 days)     12/19/22 2042   INR 3.1*       ASSESSMENT       Source(s): Chart Review and Patient/Caregiver Call       Warfarin doses taken: Warfarin taken as instructed    Diet: Change in alcohol intake may be affecting INR. increased alcohol     New illness, injury, or hospitalization: No    Medication/supplement changes: None noted    Signs or symptoms of bleeding or clotting: No    Previous INR: Therapeutic last 2(+) visits    Additional findings: None       PLAN     Recommended plan for temporary change(s) affecting INR     Dosing Instructions: Continue your current warfarin dose with next INR in 1 week       Summary  As of 12/20/2022    Full warfarin instructions:  2.5 mg every Wed; 5 mg all other days; Starting 12/20/2022   Next INR check:  12/27/2022             Telephone call with Trisha who verbalizes understanding and agrees to plan    Patient to recheck with home meter    Education provided:     None required    Plan made per [facility] anticoagulation protocol    Smitha Nair RN  Anticoagulation Clinic  12/20/2022    _______________________________________________________________________     Anticoagulation Episode Summary     Current INR goal:  2.0-3.0   TTR:  97.1 % (1 y)   Target end date:  Indefinite   Send INR reminders to:  [facility]    Indications    Long term (current) use of anticoagulants (Resolved) [Z79.01]  Embolism and thrombosis (H) (Resolved) [I74.9]  Anticoagulation monitoring  INR range 2-3 [Z79.01]  Embolism and thrombosis (H) [I74.9]           Comments:  MDINR needs weekly INR done         Anticoagulation Care Providers     Provider Role Specialty Phone number    Maria Guadalupe Ramon DO Referring Internal Medicine [phone]

## 2022-12-27 ENCOUNTER — ANTICOAGULATION THERAPY VISIT (OUTPATIENT)
Dept: ANTICOAGULATION | Facility: OTHER | Age: 62
End: 2022-12-27
Attending: INTERNAL MEDICINE
Payer: COMMERCIAL

## 2022-12-27 DIAGNOSIS — I74.9 EMBOLISM AND THROMBOSIS (H): ICD-10-CM

## 2022-12-27 DIAGNOSIS — Z79.01 ANTICOAGULATION MONITORING, INR RANGE 2-3: Primary | ICD-10-CM

## 2022-12-27 LAB — INR (EXTERNAL): 3.2 (ref 0.9–1.1)

## 2022-12-27 NOTE — PROGRESS NOTES
ANTICOAGULATION MANAGEMENT     Trisha Fernando 62 year old female is on warfarin with supratherapeutic INR result. (Goal INR 2.0-3.0)    Recent labs: (last 7 days)     12/27/22  0840   INR 3.2*       ASSESSMENT       Source(s): Chart Review and Patient/Caregiver Call       Warfarin doses taken: Warfarin taken as instructed    Diet: No new diet changes identified    New illness, injury, or hospitalization: No    Medication/supplement changes: None noted    Signs or symptoms of bleeding or clotting: No    Previous INR: Supratherapeutic    Additional findings: None       PLAN     Recommended plan for no diet, medication or health factor changes affecting INR     Dosing Instructions: partial hold then continue your current warfarin dose with next INR in 1 week       Summary  As of 12/27/2022    Full warfarin instructions:  12/27: 2.5 mg; Otherwise 2.5 mg every Wed; 5 mg all other days   Next INR check:  1/3/2023             Telephone call with Trisha who verbalizes understanding and agrees to plan    Patient to recheck with home meter    Education provided:     None required    Plan made per [facility] anticoagulation protocol    Smitha Nair RN  Anticoagulation Clinic  12/27/2022    _______________________________________________________________________     Anticoagulation Episode Summary     Current INR goal:  2.0-3.0   TTR:  94.9 % (1 y)   Target end date:  Indefinite   Send INR reminders to:  [facility]    Indications    Long term (current) use of anticoagulants (Resolved) [Z79.01]  Embolism and thrombosis (H) (Resolved) [I74.9]  Anticoagulation monitoring  INR range 2-3 [Z79.01]  Embolism and thrombosis (H) [I74.9]           Comments:  MDINR needs weekly INR done         Anticoagulation Care Providers     Provider Role Specialty Phone number    Maria Guadalupe Ramon DO Referring Internal Medicine [phone]

## 2022-12-30 ENCOUNTER — OFFICE VISIT (OUTPATIENT)
Dept: INTERNAL MEDICINE | Facility: OTHER | Age: 62
End: 2022-12-30
Attending: INTERNAL MEDICINE
Payer: COMMERCIAL

## 2022-12-30 VITALS
BODY MASS INDEX: 45.99 KG/M2 | WEIGHT: 293 LBS | OXYGEN SATURATION: 97 % | TEMPERATURE: 97.9 F | RESPIRATION RATE: 14 BRPM | HEIGHT: 67 IN | HEART RATE: 70 BPM | DIASTOLIC BLOOD PRESSURE: 86 MMHG | SYSTOLIC BLOOD PRESSURE: 136 MMHG

## 2022-12-30 DIAGNOSIS — R73.9 ELEVATED BLOOD SUGAR: ICD-10-CM

## 2022-12-30 DIAGNOSIS — Z13.220 SCREENING FOR HYPERLIPIDEMIA: Primary | ICD-10-CM

## 2022-12-30 DIAGNOSIS — E66.01 MORBID OBESITY (H): ICD-10-CM

## 2022-12-30 DIAGNOSIS — Z13.1 SCREENING FOR DIABETES MELLITUS: ICD-10-CM

## 2022-12-30 DIAGNOSIS — Z12.4 SCREENING FOR CERVICAL CANCER: ICD-10-CM

## 2022-12-30 DIAGNOSIS — B37.2 CANDIDIASIS OF SKIN: ICD-10-CM

## 2022-12-30 DIAGNOSIS — Z12.31 ENCOUNTER FOR SCREENING MAMMOGRAM FOR BREAST CANCER: ICD-10-CM

## 2022-12-30 PROCEDURE — 99396 PREV VISIT EST AGE 40-64: CPT | Performed by: INTERNAL MEDICINE

## 2022-12-30 PROCEDURE — 87624 HPV HI-RISK TYP POOLED RSLT: CPT | Mod: ZL | Performed by: INTERNAL MEDICINE

## 2022-12-30 PROCEDURE — G0123 SCREEN CERV/VAG THIN LAYER: HCPCS | Performed by: INTERNAL MEDICINE

## 2022-12-30 RX ORDER — NYSTATIN 100000 U/G
CREAM TOPICAL 2 TIMES DAILY
Qty: 30 G | Refills: 3 | Status: SHIPPED | OUTPATIENT
Start: 2022-12-30

## 2022-12-30 ASSESSMENT — ENCOUNTER SYMPTOMS
CONSTIPATION: 0
ARTHRALGIAS: 1
TROUBLE SWALLOWING: 0
NERVOUS/ANXIOUS: 1
SHORTNESS OF BREATH: 1
CHILLS: 0
BREAST MASS: 0
ABDOMINAL PAIN: 1
FEVER: 0
WHEEZING: 1
HEADACHES: 1
COUGH: 1
WEAKNESS: 1
HEMATOCHEZIA: 0
DIARRHEA: 0

## 2022-12-30 ASSESSMENT — PAIN SCALES - GENERAL: PAINLEVEL: NO PAIN (0)

## 2022-12-30 ASSESSMENT — PATIENT HEALTH QUESTIONNAIRE - PHQ9
SUM OF ALL RESPONSES TO PHQ QUESTIONS 1-9: 9
10. IF YOU CHECKED OFF ANY PROBLEMS, HOW DIFFICULT HAVE THESE PROBLEMS MADE IT FOR YOU TO DO YOUR WORK, TAKE CARE OF THINGS AT HOME, OR GET ALONG WITH OTHER PEOPLE: SOMEWHAT DIFFICULT
SUM OF ALL RESPONSES TO PHQ QUESTIONS 1-9: 9

## 2022-12-30 NOTE — PROGRESS NOTES
SUBJECTIVE:   CC: Trisha is an 62 year old who presents for preventive health visit.     She overall has been doing okay.  She did try starting Ozempic however due to side effects including nausea, constipation she did stop the medication.  She is unsure if she had any weight loss during this time.  We had a long discussion today regarding her weight and options to try to help with that.  She reports that she does use food as a coping mechanism which we discussed is common.  She has not had significant joint pain at this moment    She has had some issues with skin candidiasis.  This is pruritic at times    She is due for cervical cancer screening, mammogram, diabetes and cholesterol screening.    Healthy Habits:     Getting at least 3 servings of Calcium per day:  Yes    Bi-annual eye exam:  Yes    Dental care twice a year:  Yes    Sleep apnea or symptoms of sleep apnea:  Daytime drowsiness    Diet:  Regular (no restrictions)    Frequency of exercise:  None    Taking medications regularly:  Yes    Medication side effects:  Lightheadedness and Other    PHQ-2 Total Score: 2    Additional concerns today:  No      Today's PHQ-2 Score:   PHQ-2 (  [employer]) 2022   Q1: Little interest or pleasure in doing things 1   Q2: Feeling down, depressed or hopeless 1   PHQ-2 Score 2   PHQ-2 Total Score (12-17 Years)- Positive if 3 or more points; Administer PHQ-A if positive -   Q1: Little interest or pleasure in doing things Several days   Q2: Feeling down, depressed or hopeless Several days   PHQ-2 Score 2           Social History     Tobacco Use     Smoking status: Former     Packs/day: 1.00     Years: 12.00     Pack years: 12.00     Types: Cigarettes     Quit date: 1990     Years since quittin.3     Smokeless tobacco: Never   Substance Use Topics     Alcohol use: Not Currently     Alcohol/week: 2.0 standard drinks     Comment:  rarely if ever         Alcohol Use 2022   Prescreen: >3 drinks/day or >7  drinks/week? No       Reviewed orders with patient.  Reviewed health maintenance and updated orders accordingly - Yes      Breast Cancer Screening:  Any new diagnosis of family breast, ovarian, or bowel cancer? No    FHS-7:   Breast CA Risk Assessment (FHS-7) 9/29/2021   Did any of your first-degree relatives have breast or ovarian cancer? No   Did any of your relatives have bilateral breast cancer? No   Did any man in your family have breast cancer? No   Did any woman in your family have breast and ovarian cancer? No   Did any woman in your family have breast cancer before age 50 y? No   Do you have 2 or more relatives with breast and/or ovarian cancer? No   Do you have 2 or more relatives with breast and/or bowel cancer? Yes       Mammogram Screening: Recommended annual mammography  Pertinent mammograms are reviewed under the imaging tab.    History of abnormal Pap smear: NO - age 30-65 PAP every 5 years with negative HPV co-testing recommended     Reviewed and updated as needed this visit by clinical staff   Tobacco  Allergies  Meds  Problems  Med Hx  Surg Hx  Fam Hx  Soc   Hx        Reviewed and updated as needed this visit by Provider   Tobacco  Allergies  Meds  Problems  Med Hx  Surg Hx  Fam Hx         Current Outpatient Medications   Medication     buPROPion (WELLBUTRIN XL) 150 MG 24 hr tablet     chlorthalidone (HYGROTON) 25 MG tablet     Cholecalciferol (VITAMIN D3) 2000 UNITS CAPS     fish oil-omega-3 fatty acids 1000 MG capsule     LORazepam (ATIVAN) 0.5 MG tablet     metoprolol tartrate (LOPRESSOR) 25 MG tablet     nystatin (MYCOSTATIN) 077355 UNIT/GM external cream     potassium chloride ER (K-TAB/KLOR-CON) 10 MEQ CR tablet     triamcinolone (KENALOG) 0.1 % external cream     venlafaxine (EFFEXOR XR) 150 MG 24 hr capsule     vitamin B-Complex     warfarin ANTICOAGULANT (COUMADIN) 5 MG tablet     albuterol (PROAIR HFA/PROVENTIL HFA/VENTOLIN HFA) 108 (90 Base) MCG/ACT inhaler     Elastic  "Bandages & Supports (MEDICAL COMPRESSION STOCKINGS) MISC     guaiFENesin-dextromethorphan (ROBITUSSIN DM) 100-10 MG/5ML syrup     No current facility-administered medications for this visit.         Review of Systems   Constitutional: Negative for chills and fever.   HENT: Positive for hearing loss. Negative for trouble swallowing.    Respiratory: Positive for cough, shortness of breath and wheezing.         After recent illness   Cardiovascular: Negative for chest pain.   Gastrointestinal: Positive for abdominal pain. Negative for constipation, diarrhea and hematochezia.        With hernia   Breasts:  Negative for tenderness, breast mass and discharge.   Genitourinary: Negative for pelvic pain, vaginal bleeding and vaginal discharge.   Musculoskeletal: Positive for arthralgias.        Chronic - knee in particularly   Skin: Negative for rash.   Neurological: Positive for weakness and headaches.        Intermittent   Psychiatric/Behavioral: Positive for mood changes. The patient is nervous/anxious.         OBJECTIVE:   /86 (BP Location: Right arm, Patient Position: Sitting, Cuff Size: Adult Large)   Pulse 70   Temp 97.9  F (36.6  C) (Temporal)   Resp 14   Ht 1.702 m (5' 7\")   Wt (!) 166.5 kg (367 lb)   LMP  (LMP Unknown)   SpO2 97%   BMI 57.48 kg/m    Physical Exam  GEN: Vitals reviewed. Healthy appearing. Patient is in no acute distress. Cooperative with exam.  HEENT: Normocephalic atraumatic.  Eyes grossly normal to inspection.  No discharge or erythema, or obvious scleral/conjunctival abnormalities. Oropharynx with no erythema or exudates. Dentition adequate.  EACs clear bilaterally, TM gray with normal landmarks.  NECK: Supple; no thyromegaly or masses noted.  No cervical or supraclavicular lymphadenopathy.  CV: Heart regular in rate and rhythm with no murmur.    LUNGS: No audible wheeze, cough, or visible cyanosis.  No visible retractions or increased work of breathing.  Lungs clear to auscultation " bilaterally.    ABD:  Obese, Soft, nontender, and nondistended.  No rebound. Bowel sounds positive.  : Normal female external genitalia.  Atrophy present.  No vulvar lesion or mass. Cervix visualized.  Pap smear obtained.    SKIN: Warm and dry to touch.  Visible skin clear. No abnormal pigmentation or lesions. Erythematous rash with satellite lesions.    EXT: No clubbing or cyanosis.  1+ peripheral edema.  NEURO: Alert and oriented to person, place, and time.  Cranial nerves II-XII grossly intact with no focal or lateralizing deficits.  Muscle tone normal.  Gait normal. No tremor.   MSK: ROM of upper and lower ext symmetric and full.  PSYCH: Mood is good.  Mentation appears normal, affect normal/bright, judgement and insight intact, normal speech and appearance well-groomed.      Diagnostic Test Results:  Labs reviewed in Epic    ASSESSMENT/PLAN:   1. Screening for hyperlipidemia  -She will plan to obtain fasting lipids in the next month  - Lipid Panel; Future    2. Screening for diabetes mellitus  She will plan to obtain fasting labs in the next month  - Comprehensive Metabolic Panel; Future    3. Screening for cervical cancer  - Pap Screen with HPV - recommended age 30 - 65 years    4. Encounter for screening mammogram for breast cancer  - MA Screening Bilateral w/ Favio; Future    5. Candidiasis of skin  Encouraged to use nystatin topical to help with her skin rash.  She is to call with continued issues.  - nystatin (MYCOSTATIN) 821194 UNIT/GM external cream; Apply topically 2 times daily For rash - can use once daily preventatively  Dispense: 30 g; Refill: 3    6. Morbid obesity (H)  We discussed various options including referral for weight management, alternative medications such as Saxenda or other, counseling to help with the psychological component, bariatric surgery referral or other.  If at any point she is interested in these she is to let me know.  She will be seen back in 6 months to follow-up and see  how she is doing.    COUNSELING:  Reviewed preventive health counseling, as reflected in patient instructions        She reports that she quit smoking about 32 years ago. Her smoking use included cigarettes. She has a 12.00 pack-year smoking history. She has never used smokeless tobacco.    Maria Guadalupe Ramon, [facility]

## 2022-12-30 NOTE — NURSING NOTE
Patient presents to clinic today for annual physical. She is not fasting.     Medication review completed.    Advanced Care Planning discussed/reviewed.    FOOD SECURITY SCREENING QUESTIONS    The next two questions are to help us understand your food security.  If you are feeling you need any assistance in this area, we have resources available to support you today.    Hunger Vital Signs:  Within the past 12 months we worried whether our food would run out before we got money to buy more. Never  Within the past 12 months the food we bought just didn't last and we didn't have money to get more. Never    Marlene Harrison CMA(AAMA)..................12/30/2022   12:37 PM

## 2023-01-02 ENCOUNTER — LAB (OUTPATIENT)
Dept: LAB | Facility: OTHER | Age: 63
End: 2023-01-02
Attending: INTERNAL MEDICINE
Payer: COMMERCIAL

## 2023-01-02 ENCOUNTER — ANTICOAGULATION THERAPY VISIT (OUTPATIENT)
Dept: ANTICOAGULATION | Facility: OTHER | Age: 63
End: 2023-01-02
Attending: INTERNAL MEDICINE
Payer: COMMERCIAL

## 2023-01-02 DIAGNOSIS — Z13.1 SCREENING FOR DIABETES MELLITUS: ICD-10-CM

## 2023-01-02 DIAGNOSIS — I74.9 EMBOLISM AND THROMBOSIS (H): ICD-10-CM

## 2023-01-02 DIAGNOSIS — Z13.220 SCREENING FOR HYPERLIPIDEMIA: ICD-10-CM

## 2023-01-02 DIAGNOSIS — Z79.01 ANTICOAGULATION MONITORING, INR RANGE 2-3: Primary | ICD-10-CM

## 2023-01-02 LAB
ALBUMIN SERPL BCG-MCNC: 3.8 G/DL (ref 3.5–5.2)
ALP SERPL-CCNC: 60 U/L (ref 35–104)
ALT SERPL W P-5'-P-CCNC: 32 U/L (ref 10–35)
ANION GAP SERPL CALCULATED.3IONS-SCNC: 10 MMOL/L (ref 7–15)
AST SERPL W P-5'-P-CCNC: 27 U/L (ref 10–35)
BILIRUB SERPL-MCNC: 0.6 MG/DL
BUN SERPL-MCNC: 17.3 MG/DL (ref 8–23)
CALCIUM SERPL-MCNC: 9.9 MG/DL (ref 8.8–10.2)
CHLORIDE SERPL-SCNC: 99 MMOL/L (ref 98–107)
CHOLEST SERPL-MCNC: 215 MG/DL
CREAT SERPL-MCNC: 0.79 MG/DL (ref 0.51–0.95)
DEPRECATED HCO3 PLAS-SCNC: 28 MMOL/L (ref 22–29)
GFR SERPL CREATININE-BSD FRML MDRD: 84 ML/MIN/1.73M2
GLUCOSE SERPL-MCNC: 131 MG/DL (ref 70–99)
HDLC SERPL-MCNC: 63 MG/DL
INR (EXTERNAL): 2.3 (ref 0.9–1.1)
LDLC SERPL CALC-MCNC: 127 MG/DL
NONHDLC SERPL-MCNC: 152 MG/DL
POTASSIUM SERPL-SCNC: 3.6 MMOL/L (ref 3.4–5.3)
PROT SERPL-MCNC: 6.9 G/DL (ref 6.4–8.3)
SODIUM SERPL-SCNC: 137 MMOL/L (ref 136–145)
TRIGL SERPL-MCNC: 124 MG/DL

## 2023-01-02 PROCEDURE — 80053 COMPREHEN METABOLIC PANEL: CPT | Mod: ZL

## 2023-01-02 PROCEDURE — 36415 COLL VENOUS BLD VENIPUNCTURE: CPT | Mod: ZL

## 2023-01-02 PROCEDURE — 80061 LIPID PANEL: CPT | Mod: ZL

## 2023-01-02 NOTE — PROGRESS NOTES
ANTICOAGULATION MANAGEMENT     Trisha Fernando 62 year old female is on warfarin with therapeutic INR result. (Goal INR 2.0-3.0)    Recent labs: (last 7 days)     01/02/23  1216   INR 2.3*       ASSESSMENT       Source(s): Chart Review and Patient/Caregiver Call       Warfarin doses taken: Warfarin taken as instructed    Diet: No new diet changes identified    New illness, injury, or hospitalization: No    Medication/supplement changes: None noted    Signs or symptoms of bleeding or clotting: No    Previous INR: Supratherapeutic    Additional findings: None       PLAN     Recommended plan for no diet, medication or health factor changes affecting INR     Dosing Instructions: Continue your current warfarin dose with next INR in 1 week       Summary  As of 1/2/2023    Full warfarin instructions:  2.5 mg every Wed; 5 mg all other days   Next INR check:  1/9/2023             Telephone call with Trisha who verbalizes understanding and agrees to plan    Patient to recheck with home meter    Education provided:     None required    Plan made per [facility] anticoagulation protocol    Smitha Nair, RN  Anticoagulation Clinic  1/2/2023    _______________________________________________________________________     Anticoagulation Episode Summary     Current INR goal:  2.0-3.0   TTR:  94.6 % (1 y)   Target end date:  Indefinite   Send INR reminders to:  [facility]    Indications    Long term (current) use of anticoagulants (Resolved) [Z79.01]  Embolism and thrombosis (H) (Resolved) [I74.9]  Anticoagulation monitoring  INR range 2-3 [Z79.01]  Embolism and thrombosis (H) [I74.9]           Comments:  MDINR needs weekly INR done         Anticoagulation Care Providers     Provider Role Specialty Phone number    Maria Guadalupe Ramon DO Referring Internal Medicine [phone]

## 2023-01-04 LAB
BKR LAB AP GYN ADEQUACY: ABNORMAL
BKR LAB AP GYN INTERPRETATION: ABNORMAL
BKR LAB AP HPV REFLEX: ABNORMAL
BKR LAB AP PREVIOUS ABNORMAL: ABNORMAL
PATH REPORT.COMMENTS IMP SPEC: ABNORMAL
PATH REPORT.COMMENTS IMP SPEC: ABNORMAL
PATH REPORT.RELEVANT HX SPEC: ABNORMAL

## 2023-01-07 ENCOUNTER — MYC MEDICAL ADVICE (OUTPATIENT)
Dept: INTERNAL MEDICINE | Facility: OTHER | Age: 63
End: 2023-01-07

## 2023-01-09 LAB
HUMAN PAPILLOMA VIRUS 16 DNA: NEGATIVE
HUMAN PAPILLOMA VIRUS 18 DNA: NEGATIVE
HUMAN PAPILLOMA VIRUS FINAL DIAGNOSIS: NORMAL
HUMAN PAPILLOMA VIRUS OTHER HR: NEGATIVE
INR (EXTERNAL): 2.9 (ref 0.9–1.1)

## 2023-01-10 ENCOUNTER — ANTICOAGULATION THERAPY VISIT (OUTPATIENT)
Dept: ANTICOAGULATION | Facility: OTHER | Age: 63
End: 2023-01-10
Attending: INTERNAL MEDICINE
Payer: COMMERCIAL

## 2023-01-10 DIAGNOSIS — I74.9 EMBOLISM AND THROMBOSIS (H): ICD-10-CM

## 2023-01-10 DIAGNOSIS — Z79.01 ANTICOAGULATION MONITORING, INR RANGE 2-3: Primary | ICD-10-CM

## 2023-01-10 NOTE — PROGRESS NOTES
ANTICOAGULATION  MANAGEMENT-Home Monitor Managed by Exception    Trisharebeka Fernando 62 year old female is on warfarin with therapeutic INR result. (Goal INR 2.0-3.0)    Recent labs: (last 7 days)     01/09/23 2036   INR 2.9*         Previous INR was Therapeutic    Medication, diet, health changes since last INR:chart reviewed; none identified    Contacted within the last 12 weeks by phone on 1/2/23      JOSE JUAN     Trisha was NOT contacted regarding therapeutic result today per home monitoring policy manage by exception agreement.   Current warfarin dose is to be continued:     Summary  As of 1/10/2023    Full warfarin instructions:  2.5 mg every Wed; 5 mg all other days   Next INR check:  1/17/2023           ?   Smitha Nair RN  Anticoagulation Clinic  1/10/2023    _______________________________________________________________________     Anticoagulation Episode Summary     Current INR goal:  2.0-3.0   TTR:  94.5 % (1 y)   Target end date:  Indefinite   Send INR reminders to:  [facility]    Indications    Long term (current) use of anticoagulants (Resolved) [Z79.01]  Embolism and thrombosis (H) (Resolved) [I74.9]  Anticoagulation monitoring  INR range 2-3 [Z79.01]  Embolism and thrombosis (H) [I74.9]           Comments:  MDINR needs weekly INR done         Anticoagulation Care Providers     Provider Role Specialty Phone number    Maria Guadalupe Ramon DO Referring Internal Medicine [phone]

## 2023-01-17 ENCOUNTER — ANTICOAGULATION THERAPY VISIT (OUTPATIENT)
Dept: ANTICOAGULATION | Facility: OTHER | Age: 63
End: 2023-01-17
Attending: INTERNAL MEDICINE
Payer: COMMERCIAL

## 2023-01-17 DIAGNOSIS — Z79.01 ANTICOAGULATION MONITORING, INR RANGE 2-3: Primary | ICD-10-CM

## 2023-01-17 DIAGNOSIS — I74.9 EMBOLISM AND THROMBOSIS (H): ICD-10-CM

## 2023-01-17 LAB — INR (EXTERNAL): 2.3 (ref 0.9–1.1)

## 2023-01-17 NOTE — PROGRESS NOTES
ANTICOAGULATION  MANAGEMENT-Home Monitor Managed by Exception    Trisharebeka Fernando 62 year old female is on warfarin with therapeutic INR result. (Goal INR 2.0-3.0)    Recent labs: (last 7 days)     01/17/23  0808   INR 2.3*         Previous INR was Therapeutic    Medication, diet, health changes since last INR:chart reviewed; none identified    Contacted within the last 12 weeks by phone on 1/2/23      JOSE JUAN     Trisha was NOT contacted regarding therapeutic result today per home monitoring policy manage by exception agreement.   Current warfarin dose is to be continued:     Summary  As of 1/17/2023    Full warfarin instructions:  2.5 mg every Wed; 5 mg all other days   Next INR check:  1/24/2023           ?   Smitha Nair RN  Anticoagulation Clinic  1/17/2023    _______________________________________________________________________     Anticoagulation Episode Summary     Current INR goal:  2.0-3.0   TTR:  95.4 % (1 y)   Target end date:  Indefinite   Send INR reminders to:  [facility]    Indications    Long term (current) use of anticoagulants (Resolved) [Z79.01]  Embolism and thrombosis (H) (Resolved) [I74.9]  Anticoagulation monitoring  INR range 2-3 [Z79.01]  Embolism and thrombosis (H) [I74.9]           Comments:  MDINR needs weekly INR done         Anticoagulation Care Providers     Provider Role Specialty Phone number    Maria Guadalupe Ramon DO Referring Internal Medicine [phone]             Upon Nutritional Assessment by the Registered Dietitian your patient was determined to meet criteria / has evidence of the following diagnosis/diagnoses:          [ ]  Mild Protein Calorie Malnutrition        [x ]  Moderate Protein Calorie Malnutrition        [ ] Severe Protein Calorie Malnutrition        [ ] Unspecified Protein Calorie Malnutrition        [ ] Underweight / BMI <19        [ ] Morbid Obesity / BMI > 40      Findings as based on:  •  Comprehensive nutrition assessment and consultation  •  Calorie counts (nutrient intake analysis)  •  Food acceptance and intake status from observations by staff  •  Follow up  •  Patient education  •  Intervention secondary to interdisciplinary rounds  •   concerns      Treatment:    The following diet has been recommended:    Consider changing enteral formula to Nepro @ 50ml/hr     PROVIDER Section:     By signing this assessment you are acknowledging and agree with the diagnosis/diagnoses assigned by the Registered Dietitian    Comments:

## 2023-01-23 LAB — INR (EXTERNAL): 2.2 (ref 0.9–1.1)

## 2023-01-24 ENCOUNTER — ANTICOAGULATION THERAPY VISIT (OUTPATIENT)
Dept: ANTICOAGULATION | Facility: OTHER | Age: 63
End: 2023-01-24
Attending: INTERNAL MEDICINE
Payer: COMMERCIAL

## 2023-01-24 ENCOUNTER — HOSPITAL ENCOUNTER (OUTPATIENT)
Dept: MAMMOGRAPHY | Facility: OTHER | Age: 63
Discharge: HOME | End: 2023-01-24
Attending: INTERNAL MEDICINE
Payer: COMMERCIAL

## 2023-01-24 DIAGNOSIS — Z12.31 ENCOUNTER FOR SCREENING MAMMOGRAM FOR BREAST CANCER: ICD-10-CM

## 2023-01-24 DIAGNOSIS — Z79.01 ANTICOAGULATION MONITORING, INR RANGE 2-3: Primary | ICD-10-CM

## 2023-01-24 DIAGNOSIS — I74.9 EMBOLISM AND THROMBOSIS (H): ICD-10-CM

## 2023-01-24 PROCEDURE — 77067 SCR MAMMO BI INCL CAD: CPT

## 2023-01-24 NOTE — PROGRESS NOTES
ANTICOAGULATION  MANAGEMENT-Home Monitor Managed by Exception    Trisha NATALIA Fernando 62 year old female is on warfarin with therapeutic INR result. (Goal INR 2.0-3.0)    Recent labs: (last 7 days)     01/23/23  2316   INR 2.2*         Previous INR was Therapeutic    Medication, diet, health changes since last INR:chart reviewed; none identified    Contacted within the last 12 weeks by phone on 1/2/23      JOSE JUAN     Trisha was NOT contacted regarding therapeutic result today per home monitoring policy manage by exception agreement.   Current warfarin dose is to be continued:     Summary  As of 1/24/2023    Full warfarin instructions:  2.5 mg every Wed; 5 mg all other days   Next INR check:  1/30/2023           ?   Ghada Betancur RN  Anticoagulation Clinic  1/24/2023    _______________________________________________________________________     Anticoagulation Episode Summary     Current INR goal:  2.0-3.0   TTR:  96.9 % (1 y)   Target end date:  Indefinite   Send INR reminders to:  [facility]    Indications    Long term (current) use of anticoagulants (Resolved) [Z79.01]  Embolism and thrombosis (H) (Resolved) [I74.9]  Anticoagulation monitoring  INR range 2-3 [Z79.01]  Embolism and thrombosis (H) [I74.9]           Comments:  MDINR needs weekly INR done         Anticoagulation Care Providers     Provider Role Specialty Phone number    Maria Guadalupe Ramon DO Referring Internal Medicine [phone]

## 2023-01-30 ENCOUNTER — MYC MEDICAL ADVICE (OUTPATIENT)
Dept: INTERNAL MEDICINE | Facility: OTHER | Age: 63
End: 2023-01-30
Payer: COMMERCIAL

## 2023-01-30 LAB — INR (EXTERNAL): 2.5 (ref 0.9–1.1)

## 2023-01-31 ENCOUNTER — ANTICOAGULATION THERAPY VISIT (OUTPATIENT)
Dept: ANTICOAGULATION | Facility: OTHER | Age: 63
End: 2023-01-31
Attending: INTERNAL MEDICINE
Payer: COMMERCIAL

## 2023-01-31 DIAGNOSIS — Z79.01 ANTICOAGULATION MONITORING, INR RANGE 2-3: Primary | ICD-10-CM

## 2023-01-31 DIAGNOSIS — I74.9 EMBOLISM AND THROMBOSIS (H): ICD-10-CM

## 2023-01-31 NOTE — PROGRESS NOTES
ANTICOAGULATION  MANAGEMENT-Home Monitor Managed by Exception    Trisha NATALIA Fernando 62 year old female is on warfarin with therapeutic INR result. (Goal INR 2.0-3.0)    Recent labs: (last 7 days)     01/30/23  1900   INR 2.5*         Previous INR was Therapeutic    Medication, diet, health changes since last INR:chart reviewed; none identified    Contacted within the last 12 weeks by phone on 1/2/23      JOSE JUAN     Trisha was NOT contacted regarding therapeutic result today per home monitoring policy manage by exception agreement.   Current warfarin dose is to be continued:     Summary  As of 1/31/2023    Full warfarin instructions:  2.5 mg every Wed; 5 mg all other days   Next INR check:  2/7/2023           ?   Smitha Nair RN  Anticoagulation Clinic  1/31/2023    _______________________________________________________________________     Anticoagulation Episode Summary     Current INR goal:  2.0-3.0   TTR:  96.9 % (1 y)   Target end date:  Indefinite   Send INR reminders to:  [facility]    Indications    Long term (current) use of anticoagulants (Resolved) [Z79.01]  Embolism and thrombosis (H) (Resolved) [I74.9]  Anticoagulation monitoring  INR range 2-3 [Z79.01]  Embolism and thrombosis (H) [I74.9]           Comments:  MDINR needs weekly INR done         Anticoagulation Care Providers     Provider Role Specialty Phone number    Maria Guadalupe Ramon DO Referring Internal Medicine [phone]

## 2023-02-06 LAB — INR (EXTERNAL): 2.6 (ref 0.9–1.1)

## 2023-02-07 ENCOUNTER — ANTICOAGULATION THERAPY VISIT (OUTPATIENT)
Dept: ANTICOAGULATION | Facility: OTHER | Age: 63
End: 2023-02-07
Attending: INTERNAL MEDICINE
Payer: COMMERCIAL

## 2023-02-07 DIAGNOSIS — I74.9 EMBOLISM AND THROMBOSIS (H): ICD-10-CM

## 2023-02-07 DIAGNOSIS — Z79.01 ANTICOAGULATION MONITORING, INR RANGE 2-3: Primary | ICD-10-CM

## 2023-02-07 NOTE — PROGRESS NOTES
ANTICOAGULATION  MANAGEMENT-Home Monitor Managed by Exception    Trisha NATALIA Masons 62 year old female is on warfarin with therapeutic INR result. (Goal INR 2.0-3.0)    Recent labs: (last 7 days)     02/06/23 2004   INR 2.6*         Previous INR was Therapeutic    Medication, diet, health changes since last INR:chart reviewed; none identified    Contacted within the last 12 weeks by phone on 1/2/23      JOSEJ UAN     Trisha was NOT contacted regarding therapeutic result today per home monitoring policy manage by exception agreement.   Current warfarin dose is to be continued:     Summary  As of 2/7/2023    Full warfarin instructions:  2.5 mg every Wed; 5 mg all other days   Next INR check:  2/14/2023           ?   Smitha Nair, RN  Anticoagulation Clinic  2/7/2023    _______________________________________________________________________     Anticoagulation Episode Summary     Current INR goal:  2.0-3.0   TTR:  96.9 % (1 y)   Target end date:  Indefinite   Send INR reminders to:  [facility]    Indications    Long term (current) use of anticoagulants (Resolved) [Z79.01]  Embolism and thrombosis (H) (Resolved) [I74.9]  Anticoagulation monitoring  INR range 2-3 [Z79.01]  Embolism and thrombosis (H) [I74.9]           Comments:  MDINR needs weekly INR done         Anticoagulation Care Providers     Provider Role Specialty Phone number    Maria Guadalupe Ramon DO Referring Internal Medicine [phone]

## 2023-02-13 LAB — INR (EXTERNAL): 2.8 (ref 0.9–1.1)

## 2023-02-14 ENCOUNTER — ANTICOAGULATION THERAPY VISIT (OUTPATIENT)
Dept: ANTICOAGULATION | Facility: OTHER | Age: 63
End: 2023-02-14
Attending: INTERNAL MEDICINE
Payer: COMMERCIAL

## 2023-02-14 DIAGNOSIS — Z79.01 ANTICOAGULATION MONITORING, INR RANGE 2-3: Primary | ICD-10-CM

## 2023-02-14 DIAGNOSIS — I74.9 EMBOLISM AND THROMBOSIS (H): ICD-10-CM

## 2023-02-14 NOTE — PROGRESS NOTES
ANTICOAGULATION  MANAGEMENT-Home Monitor Managed by Exception    Trisha NATALIA Fernando 62 year old female is on warfarin with therapeutic INR result. (Goal INR 2.0-3.0)    Recent labs: (last 7 days)     02/13/23  2225   INR 2.8*         Previous INR was Therapeutic    Medication, diet, health changes since last INR:chart reviewed; none identified    Contacted within the last 12 weeks by phone on 1/2/23      JOSE JUAN     Trisha was NOT contacted regarding therapeutic result today per home monitoring policy manage by exception agreement.   Current warfarin dose is to be continued:     Summary  As of 2/14/2023    Full warfarin instructions:  2.5 mg every Wed; 5 mg all other days   Next INR check:  2/20/2023           ?   Ghada Betancur RN  Anticoagulation Clinic  2/14/2023    _______________________________________________________________________     Anticoagulation Episode Summary     Current INR goal:  2.0-3.0   TTR:  96.9 % (1 y)   Target end date:  Indefinite   Send INR reminders to:  [facility]    Indications    Long term (current) use of anticoagulants (Resolved) [Z79.01]  Embolism and thrombosis (H) (Resolved) [I74.9]  Anticoagulation monitoring  INR range 2-3 [Z79.01]  Embolism and thrombosis (H) [I74.9]           Comments:  MDINR needs weekly INR done         Anticoagulation Care Providers     Provider Role Specialty Phone number    Maria Guadalupe Ramon DO Referring Internal Medicine [phone]

## 2023-02-17 ENCOUNTER — OFFICE VISIT (OUTPATIENT)
Dept: FAMILY MEDICINE | Facility: OTHER | Age: 63
End: 2023-02-17
Attending: NURSE PRACTITIONER
Payer: COMMERCIAL

## 2023-02-17 VITALS
WEIGHT: 293 LBS | TEMPERATURE: 96.3 F | HEART RATE: 76 BPM | RESPIRATION RATE: 20 BRPM | DIASTOLIC BLOOD PRESSURE: 82 MMHG | SYSTOLIC BLOOD PRESSURE: 128 MMHG | OXYGEN SATURATION: 97 % | BODY MASS INDEX: 57.48 KG/M2

## 2023-02-17 DIAGNOSIS — L30.4 PRURITIC INTERTRIGO: ICD-10-CM

## 2023-02-17 DIAGNOSIS — L08.1 ERYTHRASMA: Primary | ICD-10-CM

## 2023-02-17 PROCEDURE — 99213 OFFICE O/P EST LOW 20 MIN: CPT | Performed by: NURSE PRACTITIONER

## 2023-02-17 RX ORDER — CLINDAMYCIN PHOSPHATE 10 MG/G
GEL TOPICAL 2 TIMES DAILY
Qty: 150 G | Refills: 1 | Status: SHIPPED | OUTPATIENT
Start: 2023-02-17

## 2023-02-17 ASSESSMENT — PAIN SCALES - GENERAL: PAINLEVEL: SEVERE PAIN (6)

## 2023-02-17 NOTE — NURSING NOTE
"Chief Complaint   Patient presents with     Derm Problem     Patient is here for a rash on her thighs and under her stomach that started around 12/26/22. Patient has been using nystatin and cortizone cream.    Initial /82   Pulse 76   Temp (!) 96.3  F (35.7  C) (Tympanic)   Resp 20   Wt (!) 166.5 kg (367 lb)   LMP  (LMP Unknown)   SpO2 97%   BMI 57.48 kg/m   Estimated body mass index is 57.48 kg/m  as calculated from the following:    Height as of 12/30/22: 1.702 m (5' 7\").    Weight as of this encounter: 166.5 kg (367 lb).  Medication Reconciliation: complete    Sandhya Ramos LPN  "

## 2023-02-17 NOTE — PROGRESS NOTES
ASSESSMENT/PLAN:     I have reviewed the nursing notes.  I have reviewed the findings, diagnosis, plan and need for follow up with the patient.      1. Pruritic intertrigo    Persisting abdominal fold and groin area rash for a few months.  Started on Nystatin 2 months ago without improvement and now with development of pruritis.  Differential diagnoses include candidiasis vs erythrasma.  Rash did not respond to Nystatin so opted to try antibiotic for erythrasma.  Topical ointments are the only choice as patient is on Warfarin which interacts with Clarithromycin, Erythromycin, and Fluconazole.   Patient to continue to wash skin gently with soapy water BID, gently pat dry or air dry, avoiding friction.  Follow up in approximately 2 weeks if no improvement or worsening     2. Erythrasma    - clindamycin (CLINDAMAX) 1 % external gel; Apply topically 2 times daily  Dispense: 150 g; Refill: 1        I explained my diagnostic considerations and recommendations to the patient, who voiced understanding and agreement with the treatment plan. All questions were answered. We discussed potential side effects of any prescribed or recommended therapies, as well as expectations for response to treatments.    Niecy Zapata NP  [facility] AND [facility]      SUBJECTIVE:   Trisha Fernando is a 62 year old female who presents to clinic today for the following health issues:  Rash    HPI  She has been having a persistent rash for a few months.  Rash is in the abdominal folds and groin.  She was seen by her PCP and started on Nystatin 2 months ago without any noted improvement.  Rash was not initially pruritic until after trying Nystatin rash became pruritic and continues to sting.  Rash has been pruritic for about a month now.  No urinary symptoms.  No fevers.  Using Hydrocortisone and Calamine.  Taking Claritin.  Washing daily with soapy water.          Past Medical History:   Diagnosis Date     2019 novel coronavirus  disease (COVID-19) 2020     Activated protein C resistance (H)      Acute respiratory failure with hypoxia (H) 2020     Benign lipomatous neoplasm 2011    Right forearm     Calculus of kidney      DVT (deep venous thrombosis) (H) 10/2/2012     Essential (primary) hypertension      Heterozygous factor V Leiden mutation (H) 2008     History of fatty infiltration of liver      Hyperlipidemia      Major depressive disorder, recurrent, mild (H)      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, unspecified (CODE)     Left foot acromyxoid fibroblastic, 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 Sutton)       COLONOSCOPY N/A 2019    Normal exam, 10 year follow up     LITHOTRIPSY       MAMMOPLASTY REDUCTION  2003     OTHER SURGICAL HISTORY Right 2011    Forearm Lipoma Excision     OTHER SURGICAL HISTORY      Left foot soft tissue resection; dr. Lang     TONSILLECTOMY       Social History     Tobacco Use     Smoking status: Former     Packs/day: 1.00     Years: 12.00     Pack years: 12.00     Types: Cigarettes     Quit date: 1990     Years since quittin.4     Smokeless tobacco: Never   Substance Use Topics     Alcohol use: Not Currently     Alcohol/week: 2.0 standard drinks     Comment:  rarely if ever     Current Outpatient Medications   Medication Sig Dispense Refill     albuterol  (PROAIR HFA/PROVENTIL HFA/VENTOLIN HFA) 108 (90 Base) MCG/ACT inhaler Inhale 2 puffs into the lungs every 4 hours as needed for shortness of breath / dyspnea or wheezing 8.5 g 3     buPROPion (WELLBUTRIN XL) 150 MG 24 hr tablet Take 1 tablet (150 mg) by mouth every morning 90 tablet 0     chlorthalidone (HYGROTON) 25 MG tablet Take 1 tablet (25 mg) by mouth daily Increase in dose 90 tablet 0     Cholecalciferol (VITAMIN D3) 2000 UNITS CAPS Take 2,000 Units by mouth daily       Elastic Bandages & Supports (MEDICAL COMPRESSION STOCKINGS) MISC For personal use. Length: calf Strength: 16-20 mmHg Circumference in cm: For calf: Ankle 15cm, Calf 30cm, Ankle to calf length 40cm.       fish oil-omega-3 fatty acids 1000 MG capsule Take 1 capsule by mouth daily        LORazepam (ATIVAN) 0.5 MG tablet Take 0.5 mg by mouth every 6 hours as needed        metoprolol tartrate (LOPRESSOR) 25 MG tablet Take 1 tablet (25 mg) by mouth 2 times daily Increase in dose 180 tablet 0     nystatin (MYCOSTATIN) 733366 UNIT/GM external cream Apply topically 2 times daily For rash - can use once daily preventatively 30 g 3     potassium chloride ER (K-TAB/KLOR-CON) 10 MEQ CR tablet TAKE 1 TABLET BY MOUTH EVERY DAY 90 tablet 88     triamcinolone (KENALOG) 0.1 % external cream Apply topically 2 times daily As needed for rash 15 g 0     venlafaxine (EFFEXOR XR) 150 MG 24 hr capsule TAKE 1 CAPSULE BY MOUTH EVERY DAY WITH FOOD 90 capsule 3     vitamin B-Complex Take 1 tablet by mouth daily       warfarin ANTICOAGULANT (COUMADIN) 5 MG tablet TAKE 5 MG 6 DAYS A WEEK AND 2.5 MG ONE DAY A WEEK OR AS DIRECTED BY THE PROTIME CLINIC. 85 tablet 2     guaiFENesin-dextromethorphan (ROBITUSSIN DM) 100-10 MG/5ML syrup Take 10 mLs by mouth 3 times daily as needed for cough (Patient not taking: Reported on 2/17/2023) 236 mL 0     Allergies   Allergen Reactions     Lisinopril Swelling     Angioedema, Edema       Losartan Dizziness     dizziness, tiredness, itching  in ears and face, hands and feet     Adhesive Tape Itching and Rash     Amlodipine Swelling     edema     [place] Itching and Rash     Ozempic (0.25 Or 0.5 Mg-Dose) [Semaglutide] Other (See Comments)     constipation     Wound Dressing Adhesive Rash         Past medical history, past surgical history, current medications and allergies reviewed and accurate to the best of my knowledge.        OBJECTIVE:     /82   Pulse 76   Temp (!) 96.3  F (35.7  C) (Tympanic)   Resp 20   Wt (!) 166.5 kg (367 lb)   LMP  (LMP Unknown)   SpO2 97%   BMI 57.48 kg/m    Body mass index is 57.48 kg/m .     Physical Exam  General Appearance: Well appearing adult female, appropriate appearance for age. No acute distress  Respiratory: normal chest wall and respirations.  Normal effort.  No cough appreciated.   Musculoskeletal:  Equal movement of bilateral upper extremities.  Equal movement of bilateral lower extremities.  Normal gait.    Dermatological:  Bilateral abdominal skin folds, mons pubis, and bilateral thigh folds with dry scaling brownish well defined plagues without moisture, weeping, or maceration.    Psychological: normal affect, alert, oriented, and pleasant.

## 2023-02-20 LAB — INR (EXTERNAL): 2.5 (ref 0.9–1.1)

## 2023-02-21 ENCOUNTER — ANTICOAGULATION THERAPY VISIT (OUTPATIENT)
Dept: ANTICOAGULATION | Facility: OTHER | Age: 63
End: 2023-02-21
Attending: INTERNAL MEDICINE
Payer: COMMERCIAL

## 2023-02-21 DIAGNOSIS — Z79.01 ANTICOAGULATION MONITORING, INR RANGE 2-3: Primary | ICD-10-CM

## 2023-02-21 DIAGNOSIS — I74.9 EMBOLISM AND THROMBOSIS (H): ICD-10-CM

## 2023-02-21 NOTE — PROGRESS NOTES
ANTICOAGULATION  MANAGEMENT-Home Monitor Managed by Exception    Trisha NATALIA Fernando 62 year old female is on warfarin with therapeutic INR result. (Goal INR 2.0-3.0)    Recent labs: (last 7 days)     02/20/23  1818   INR 2.5*         Previous INR was Therapeutic    Medication, diet, health changes since last INR:chart reviewed; none identified    Contacted within the last 12 weeks by phone on 1/2/23      JOSE JUAN     Trisha was NOT contacted regarding therapeutic result today per home monitoring policy manage by exception agreement.   Current warfarin dose is to be continued:     Summary  As of 2/21/2023    Full warfarin instructions:  2.5 mg every Wed; 5 mg all other days   Next INR check:  2/28/2023           ?   Smitha Nair RN  Anticoagulation Clinic  2/21/2023    _______________________________________________________________________     Anticoagulation Episode Summary     Current INR goal:  2.0-3.0   TTR:  96.9 % (1 y)   Target end date:  Indefinite   Send INR reminders to:  [facility]    Indications    Long term (current) use of anticoagulants (Resolved) [Z79.01]  Embolism and thrombosis (H) (Resolved) [I74.9]  Anticoagulation monitoring  INR range 2-3 [Z79.01]  Embolism and thrombosis (H) [I74.9]           Comments:  MDINR needs weekly INR done         Anticoagulation Care Providers     Provider Role Specialty Phone number    Maria Guadalupe Ramon DO Referring Internal Medicine [phone]

## 2023-02-23 ENCOUNTER — TELEPHONE (OUTPATIENT)
Dept: INTERNAL MEDICINE | Facility: OTHER | Age: 63
End: 2023-02-23
Payer: COMMERCIAL

## 2023-02-23 ENCOUNTER — VIRTUAL VISIT (OUTPATIENT)
Dept: FAMILY MEDICINE | Facility: OTHER | Age: 63
End: 2023-02-23
Attending: FAMILY MEDICINE
Payer: COMMERCIAL

## 2023-02-23 DIAGNOSIS — Z79.01 ANTICOAGULATION MONITORING, INR RANGE 2-3: ICD-10-CM

## 2023-02-23 DIAGNOSIS — Z86.718 PERSONAL HISTORY OF DVT (DEEP VEIN THROMBOSIS): ICD-10-CM

## 2023-02-23 DIAGNOSIS — E66.01 MORBID OBESITY (H): ICD-10-CM

## 2023-02-23 DIAGNOSIS — U07.1 INFECTION DUE TO 2019 NOVEL CORONAVIRUS: Primary | ICD-10-CM

## 2023-02-23 PROCEDURE — 99213 OFFICE O/P EST LOW 20 MIN: CPT | Mod: TEL | Performed by: FAMILY MEDICINE

## 2023-02-23 NOTE — PROGRESS NOTES
"Trisha is a 62 year old who is being evaluated via a billable telephone visit.      What phone number would you like to be contacted at? [phone]  How would you like to obtain your AVS? Rosanat    Distant Location (provider location):  On-site    Assessment & Plan       ICD-10-CM    1. Infection due to 2019 novel coronavirus  U07.1 nirmatrelvir and ritonavir (PAXLOVID) therapy pack      2. Anticoagulation monitoring, INR range 2-3  Z79.01       3. Personal history of DVT (deep vein thrombosis)  Z86.718       4. Morbid obesity (H)  E66.01         1.  Patient meets clinical and timing criteria for oral treatment.  She has normal renal function.  She will be started on Paxlovid x5 days.  Potential side effects this medication are discussed.  Discussed risk of rebound symptoms.  2.  Patient is to quarantine at home for 5 days.  Following this, she could consider retesting but should also wear a mask for an additional 5 days when out.  3.  Symptomatic care is reviewed.  4.  Discussed indications to have an in person visit, especially related to respiratory symptoms.  5.  Patient is on warfarin, dose can be affected by Paxlovid.  She does do at home monitoring, she will start monitoring daily while she is on Paxlovid.    Prescription drug management       COVID-19 positive patient.  Encounter for consideration of medication intervention. Patient does qualify for a prescription. Full discussion with patient including medication options, risks and benefits. Potential drug interactions reviewed with patient.     Treatment Planned Paxlovid, Rx sent to pharmacy    Temporary change to home medications: home INR monitoring        Estimated body mass index is 57.48 kg/m  as calculated from the following:    Height as of 12/30/22: 1.702 m (5' 7\").    Weight as of 2/17/23: 166.5 kg (367 lb).  GFR Estimate   Date Value Ref Range Status   01/02/2023 84 >60 mL/min/1.73m2 Final     Comment:     Effective December 21, 2021 eGFRcr in " adults is calculated using the 2021 CKD-EPI creatinine equation which includes age and gender (Andreia et al., [place], DOI: 10.1056/FJWNoq3436858)   02/04/2021 66 >60 mL/min/[1.73_m2] Final     Lab Results   Component Value Date    BICLG18ODS Negative 11/29/2022       Return if symptoms worsen or fail to improve.    JUSTINO BROWNE MD  [facility]    Subjective   Trisha is a 62 year old, presenting for the following health issues:  Covid Concern      HPI   Trisha Fernando is a 62 year old female assessed for positive COVID testing.  Yesterday started to have symptoms.  She had some congestion.    Patient tested this morning, had a positive test.  Risk factors for exposure, .  T99 - was feeling feverish.   At home sats 95% this morning.      When she had COVID in the past, she was hospitalized with increasing respiratory symptoms.      History of DVT - anticoagulated.      COVID-19 Symptom Review  How many days ago did these symptoms start? 1     Are any of the following symptoms significant for you?    New or worsening difficulty breathing? No    Worsening cough? No    Fever or chills? Yes, I felt feverish or had chills.    Headache: No    Sore throat: No    Chest pain: YES    Diarrhea: No    Body aches? No    What treatments has patient tried? None   Does patient live in a nursing home, group home, or shelter? No  Does patient have a way to get food/medications during quarantined? Yes, I have a friend or family member who can help me.              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    Right forearm     Calculus of kidney      DVT (deep venous thrombosis) (H) 10/2/2012     Essential (primary) hypertension      Heterozygous factor V Leiden mutation (H) 09/01/2008      History of fatty infiltration of liver      Hyperlipidemia      Major depressive disorder, recurrent, mild (H)      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, unspecified (CODE) 2007    Left foot acromyxoid fibroblastic, 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     Umbilical hernia      Varicose veins of both legs with edema 9/8/2015     Current Outpatient Medications   Medication     albuterol (PROAIR HFA/PROVENTIL HFA/VENTOLIN HFA) 108 (90 Base) MCG/ACT inhaler     buPROPion (WELLBUTRIN XL) 150 MG 24 hr tablet     chlorthalidone (HYGROTON) 25 MG tablet     Cholecalciferol (VITAMIN D3) 2000 UNITS CAPS     clindamycin (CLINDAMAX) 1 % external gel     Elastic Bandages & Supports (MEDICAL COMPRESSION STOCKINGS) MISC     fish oil-omega-3 fatty acids 1000 MG capsule     LORazepam (ATIVAN) 0.5 MG tablet     metoprolol tartrate (LOPRESSOR) 25 MG tablet     nirmatrelvir and ritonavir (PAXLOVID) therapy pack     nystatin (MYCOSTATIN) 859072 UNIT/GM external cream     potassium chloride ER (K-TAB/KLOR-CON) 10 MEQ CR tablet     triamcinolone (KENALOG) 0.1 % external cream     venlafaxine (EFFEXOR XR) 150 MG 24 hr capsule     vitamin B-Complex     warfarin ANTICOAGULANT (COUMADIN) 5 MG tablet     No current facility-administered medications for this visit.         Review of Systems         Objective    Vitals - Patient Reported  SpO2 (Patient Reported): 92  Temperature (Patient Reported): 99.9  F (37.7  C)  Pain Score: Mild Pain (3)  Pain Loc:  (Left breast upper back)        Physical Exam   healthy, alert and no distress  PSYCH: Alert and oriented times 3; coherent speech, normal   rate and volume, able to articulate logical thoughts, able   to abstract reason,  no tangential thoughts, no hallucinations   or delusions  Her affect is normal  RESP: No cough, no audible wheezing, able to talk in full sentences  Remainder of exam unable to be completed due to telephone visits                Phone call duration: 12 minutes

## 2023-02-23 NOTE — PATIENT INSTRUCTIONS
COVID-19 Outpatient Treatments  Your care team can help you find the best treatments for COVID-19. Talk to a health care provider or refer to the FDA medicine fact sheets below.    Important: You can't have Paxlovid or molnupiravir if you're starting the medicine more than 5 days after your symptoms have started.  Paxlovid: https://www.fda.gov/media/185064/download  Molnupiravir (Lagevrio): https://www.fda.gov/media/064641/download  Paxlovid (nimatrelvir and ritonavir)  How it works  Two medicines (nirmatrelvir and ritonavir) are taken together. They stop the virus from growing. Less amount of virus is easier for your body to fight.  Benefits  Lowers risk of a hospital stay or death from COVID-19.  How to take    Medicine comes in a daily container with both medicine tablets. Take by mouth twice daily (once in the morning, once at night) for 5 days.    The number of tablets to take varies by patient.    Don't chew or break capsules. Swallow whole.  When to take  Take as soon as possible after positive COVID-19 test result, and within 5 days of your first symptoms.  Who can take it  Patients must be 12 years or older, weigh at least 88 pounds, and have tested positive for COVID-19. Paxlovid is the preferred treatment for pregnant patients.  Possible side effects  Can cause altered sense of taste, diarrhea (loose, watery stools), high blood pressure, muscle aches.  Medicine conflicts    Some medicines may conflict with Paxlovid and may cause serious side effects.    Tell your care team about all the medicines you take, including prescription and over-the-counter medicines, vitamins, and herbal supplements.    Your care team will review your medicines to make sure that you can safely take Paxlovid.  Cautions    Paxlovid is not advised for patients with severe kidney or liver disease. If you have kidney or liver problems, the dose may need to be changed.    If you're pregnant or breastfeeding, talk to your care team  about your options.    If you take hormonal birth control (such as the Pill), then you or your partner should also use a non-hormonal form of birth control (such as a condom). Keep doing this for 1 menstrual cycle after your last dose of Paxlovid.  Molnupiravir (Lagevrio)  How it works  Stops the virus from growing. Less amount of virus is easier for your body to fight.  Benefits  Lowers risk of a hospital stay or death from COVID-19.  How to take  Take 4 capsules by mouth every 12 hours (4 in the morning and 4 at night) for 5 days. Don't chew or break capsules. Swallow whole.  When to take  Take as soon as possible after positive COVID-19 test result, and within 5 days of your first symptoms.  Who can take it  Patients must be 18 years or older and have tested positive for COVID-19.  Possible side effects  Diarrhea (loose, watery stools), nausea (feeling sick to your stomach), dizziness, headaches.  Medicine conflicts  Right now, there are no known conflicts with other drugs. But tell your care team about all medicines you take.  Cautions    This medicine is not advised for patients who are pregnant.    If you are someone who could become pregnant, use trusted birth control until 4 days after your last dose of molnupiravir.    If your partner could become pregnant, you should use trusted birth control until 3 months after your last dose of molnupiravir.  For informational purposes only. Not to replace the advice of your health care provider. Copyright   2022 [facility]. All rights reserved. Clinically reviewed by Jaclyn Varela, PharmD, BCACP. [employer] [id number] - REV 12/22.

## 2023-02-23 NOTE — NURSING NOTE
"Chief Complaint   Patient presents with     Covid Concern       Initial LMP  (LMP Unknown)  Estimated body mass index is 57.48 kg/m  as calculated from the following:    Height as of 12/30/22: 1.702 m (5' 7\").    Weight as of 2/17/23: 166.5 kg (367 lb).  Medication Reconciliation: complete    FOOD SECURITY SCREENING QUESTIONS  Hunger Vital Signs:  Within the past 12 months we worried whether our food would run out before we got money to buy more. Never  Within the past 12 months the food we bought just didn't last and we didn't have money to get more. Never  Sheila Marie LPN 2/23/2023 8:30 AM        "

## 2023-02-27 ENCOUNTER — ANTICOAGULATION THERAPY VISIT (OUTPATIENT)
Dept: ANTICOAGULATION | Facility: OTHER | Age: 63
End: 2023-02-27
Attending: INTERNAL MEDICINE
Payer: COMMERCIAL

## 2023-02-27 DIAGNOSIS — I74.9 EMBOLISM AND THROMBOSIS (H): ICD-10-CM

## 2023-02-27 DIAGNOSIS — Z79.01 ANTICOAGULATION MONITORING, INR RANGE 2-3: Primary | ICD-10-CM

## 2023-02-27 LAB — INR (EXTERNAL): 1.9 (ref 0.9–1.1)

## 2023-02-27 NOTE — PROGRESS NOTES
ANTICOAGULATION MANAGEMENT     Trisha Fernando 62 year old female is on warfarin with subtherapeutic INR result. (Goal INR 2.0-3.0)    Recent labs: (last 7 days)     02/27/23  1106   INR 1.9*       ASSESSMENT       Source(s): Chart Review and Patient/Caregiver Call       Warfarin doses taken: Warfarin taken as instructed    Diet: No new diet changes identified    New illness, injury, or hospitalization: Yes: Covid positive    Medication/supplement changes: started paxlovid 2/23 x 5 days    Signs or symptoms of bleeding or clotting: No    Previous INR: Therapeutic last 2(+) visits    Additional findings: None         PLAN     Recommended plan for temporary change(s) affecting INR     Dosing Instructions: Continue your current warfarin dose with next INR in 1 week       Summary  As of 2/27/2023    Full warfarin instructions:  2.5 mg every Wed; 5 mg all other days   Next INR check:  3/6/2023             Telephone call with Trisha who verbalizes understanding and agrees to plan    Patient to recheck with home meter    Education provided:     None required    Plan made per [facility] anticoagulation protocol    Smitha Nair, RN  Anticoagulation Clinic  2/27/2023    _______________________________________________________________________     Anticoagulation Episode Summary     Current INR goal:  2.0-3.0   TTR:  96.5 % (1 y)   Target end date:  Indefinite   Send INR reminders to:  [facility]    Indications    Long term (current) use of anticoagulants (Resolved) [Z79.01]  Embolism and thrombosis (H) (Resolved) [I74.9]  Anticoagulation monitoring  INR range 2-3 [Z79.01]  Embolism and thrombosis (H) [I74.9]           Comments:  MDINR needs weekly INR done         Anticoagulation Care Providers     Provider Role Specialty Phone number    Maria Guadlaupe Ramon DO Referring Internal Medicine [phone]

## 2023-03-06 LAB — INR (EXTERNAL): 2.2 (ref 0.9–1.1)

## 2023-03-07 ENCOUNTER — ANTICOAGULATION THERAPY VISIT (OUTPATIENT)
Dept: ANTICOAGULATION | Facility: OTHER | Age: 63
End: 2023-03-07
Attending: INTERNAL MEDICINE
Payer: COMMERCIAL

## 2023-03-07 DIAGNOSIS — I10 ESSENTIAL HYPERTENSION: ICD-10-CM

## 2023-03-07 DIAGNOSIS — Z79.01 ANTICOAGULATION MONITORING, INR RANGE 2-3: Primary | ICD-10-CM

## 2023-03-07 DIAGNOSIS — I74.9 EMBOLISM AND THROMBOSIS (H): ICD-10-CM

## 2023-03-07 NOTE — PROGRESS NOTES
ANTICOAGULATION MANAGEMENT     Trisha Fernando 62 year old female is on warfarin with therapeutic INR result. (Goal INR 2.0-3.0)    Recent labs: (last 7 days)     03/06/23  1700   INR 2.2*       ASSESSMENT       Source(s): Chart Review    Previous INR was Subtherapeutic    Medication, diet, health changes since last INR chart reviewed; none identified             PLAN     Recommended plan for no diet, medication or health factor changes affecting INR     Dosing Instructions: Continue your current warfarin dose with next INR in 1 week       Summary  As of 3/7/2023    Full warfarin instructions:  2.5 mg every Wed; 5 mg all other days   Next INR check:  3/14/2023             no call needed patient continuing same dose    Patient to recheck with home meter    Education provided:     Resume manage by exception with home monitor. Continue to submit INR results to home monitor company.You will only be called when your result is out of range. Please call and notify [facility] if new medication started, dose missed, signs or symptoms of bleeding or clotting, or a surgery/procedure is scheduled.    Plan made per [facility] anticoagulation protocol    Smitha Nair RN  Anticoagulation Clinic  3/7/2023    _______________________________________________________________________     Anticoagulation Episode Summary     Current INR goal:  2.0-3.0   TTR:  95.9 % (1 y)   Target end date:  Indefinite   Send INR reminders to:  [facility]    Indications    Long term (current) use of anticoagulants (Resolved) [Z79.01]  Embolism and thrombosis (H) (Resolved) [I74.9]  Anticoagulation monitoring  INR range 2-3 [Z79.01]  Embolism and thrombosis (H) [I74.9]           Comments:  MDINR needs weekly INR done         Anticoagulation Care Providers     Provider Role Specialty Phone number    Maria Guadalupe Ramon DO Referring Internal Medicine [phone]

## 2023-03-10 NOTE — TELEPHONE ENCOUNTER
METOPROLOL TARTRATE 25 MG TAB        Last Written Prescription Date:  10/26/22  Last Fill Quantity: 180,   # refills: 0  Last Office Visit: 2/23/23  Future Office visit:       CHLORTHALIDONE 25 MG TABLET        Last Written Prescription Date:  10/26/22  Last Fill Quantity: 90,   # refills: 0  Last Office Visit: 2/23/23  Future Office visit:       Routing refill request to provider for review/approval because:  Drug not on the [facility] or [facility] refill protocol or controlled substance. Unable to complete prescription refill per RNMedication Refill Policy.................... Poonam Ervin RN ....................  3/10/2023   4:39 PM

## 2023-03-11 RX ORDER — METOPROLOL TARTRATE 25 MG/1
25 TABLET, FILM COATED ORAL 2 TIMES DAILY
Qty: 180 TABLET | Refills: 0 | Status: SHIPPED | OUTPATIENT
Start: 2023-03-11 | End: 2023-06-08

## 2023-03-11 RX ORDER — CHLORTHALIDONE 25 MG/1
25 TABLET ORAL DAILY
Qty: 90 TABLET | Refills: 0 | Status: SHIPPED | OUTPATIENT
Start: 2023-03-11 | End: 2023-06-08

## 2023-03-13 LAB — INR (EXTERNAL): 2.8 (ref 0.9–1.1)

## 2023-03-14 ENCOUNTER — ANTICOAGULATION THERAPY VISIT (OUTPATIENT)
Dept: ANTICOAGULATION | Facility: OTHER | Age: 63
End: 2023-03-14
Attending: INTERNAL MEDICINE
Payer: COMMERCIAL

## 2023-03-14 DIAGNOSIS — Z79.01 ANTICOAGULATION MONITORING, INR RANGE 2-3: Primary | ICD-10-CM

## 2023-03-14 DIAGNOSIS — I74.9 EMBOLISM AND THROMBOSIS (H): ICD-10-CM

## 2023-03-14 NOTE — PROGRESS NOTES
ANTICOAGULATION  MANAGEMENT-Home Monitor Managed by Exception    Trisha NATALIA Fernando 62 year old female is on warfarin with therapeutic INR result. (Goal INR 2.0-3.0)    Recent labs: (last 7 days)     03/13/23  1737   INR 2.8*         Previous INR was Therapeutic    Medication, diet, health changes since last INR:chart reviewed; none identified    Contacted within the last 12 weeks by phone on 2/27/23      JOSE JUAN     Trisha was NOT contacted regarding therapeutic result today per home monitoring policy manage by exception agreement.   Current warfarin dose is to be continued:     Summary  As of 3/14/2023    Full warfarin instructions:  2.5 mg every Wed; 5 mg all other days   Next INR check:  3/20/2023           ?   Ghada Betancur RN  Anticoagulation Clinic  3/14/2023    _______________________________________________________________________     Anticoagulation Episode Summary     Current INR goal:  2.0-3.0   TTR:  95.9 % (1 y)   Target end date:  Indefinite   Send INR reminders to:  [facility]    Indications    Long term (current) use of anticoagulants (Resolved) [Z79.01]  Embolism and thrombosis (H) (Resolved) [I74.9]  Anticoagulation monitoring  INR range 2-3 [Z79.01]  Embolism and thrombosis (H) [I74.9]           Comments:  MDINR needs weekly INR done         Anticoagulation Care Providers     Provider Role Specialty Phone number    Maria Guadalupe Ramon DO Referring Internal Medicine [phone]

## 2023-03-20 LAB — INR (EXTERNAL): 3 (ref 0.9–1.1)

## 2023-03-21 ENCOUNTER — ANTICOAGULATION THERAPY VISIT (OUTPATIENT)
Dept: ANTICOAGULATION | Facility: OTHER | Age: 63
End: 2023-03-21
Attending: INTERNAL MEDICINE
Payer: COMMERCIAL

## 2023-03-21 DIAGNOSIS — I74.9 EMBOLISM AND THROMBOSIS (H): ICD-10-CM

## 2023-03-21 DIAGNOSIS — Z79.01 ANTICOAGULATION MONITORING, INR RANGE 2-3: Primary | ICD-10-CM

## 2023-03-21 NOTE — PROGRESS NOTES
ANTICOAGULATION  MANAGEMENT-Home Monitor Managed by Exception    Trisha NATALIA Fernando 62 year old female is on warfarin with therapeutic INR result. (Goal INR 2.0-3.0)    Recent labs: (last 7 days)     03/20/23  1757   INR 3.0*         Previous INR was Therapeutic    Medication, diet, health changes since last INR:chart reviewed; none identified    Contacted within the last 12 weeks by phone on 2/27/23      JOSE JUAN     Trisha was NOT contacted regarding therapeutic result today per home monitoring policy manage by exception agreement.   Current warfarin dose is to be continued:     Summary  As of 3/21/2023    Full warfarin instructions:  2.5 mg every Wed; 5 mg all other days   Next INR check:  3/27/2023           ?   Ghada Betancur RN  Anticoagulation Clinic  3/21/2023    _______________________________________________________________________     Anticoagulation Episode Summary     Current INR goal:  2.0-3.0   TTR:  95.9 % (1 y)   Target end date:  Indefinite   Send INR reminders to:  [facility]    Indications    Long term (current) use of anticoagulants (Resolved) [Z79.01]  Embolism and thrombosis (H) (Resolved) [I74.9]  Anticoagulation monitoring  INR range 2-3 [Z79.01]  Embolism and thrombosis (H) [I74.9]           Comments:  MDINR needs weekly INR done         Anticoagulation Care Providers     Provider Role Specialty Phone number    Maria Guadalupe Ramon DO Referring Internal Medicine [phone]

## 2023-03-24 DIAGNOSIS — F33.0 DEPRESSION, MAJOR, RECURRENT, MILD (H): ICD-10-CM

## 2023-03-27 LAB — INR (EXTERNAL): 2.7 (ref 0.9–1.1)

## 2023-03-28 ENCOUNTER — ANTICOAGULATION THERAPY VISIT (OUTPATIENT)
Dept: ANTICOAGULATION | Facility: OTHER | Age: 63
End: 2023-03-28
Attending: INTERNAL MEDICINE
Payer: COMMERCIAL

## 2023-03-28 DIAGNOSIS — Z79.01 ANTICOAGULATION MONITORING, INR RANGE 2-3: Primary | ICD-10-CM

## 2023-03-28 DIAGNOSIS — I74.9 EMBOLISM AND THROMBOSIS (H): ICD-10-CM

## 2023-03-28 NOTE — PROGRESS NOTES
ANTICOAGULATION  MANAGEMENT-Home Monitor Managed by Exception    Trisha NATALIA Fernando 62 year old female is on warfarin with therapeutic INR result. (Goal INR 2.0-3.0)    Recent labs: (last 7 days)     03/27/23  1846   INR 2.7*         Previous INR was Therapeutic    Medication, diet, health changes since last INR:chart reviewed; none identified    Contacted within the last 12 weeks by phone on 2/27/23      JOSE JUAN     Trisha was NOT contacted regarding therapeutic result today per home monitoring policy manage by exception agreement.   Current warfarin dose is to be continued:     Summary  As of 3/28/2023    Full warfarin instructions:  2.5 mg every Wed; 5 mg all other days   Next INR check:  4/3/2023           ?   Ghada Betancur RN  Anticoagulation Clinic  3/28/2023    _______________________________________________________________________     Anticoagulation Episode Summary     Current INR goal:  2.0-3.0   TTR:  95.9 % (1 y)   Target end date:  Indefinite   Send INR reminders to:  [facility]    Indications    Long term (current) use of anticoagulants (Resolved) [Z79.01]  Embolism and thrombosis (H) (Resolved) [I74.9]  Anticoagulation monitoring  INR range 2-3 [Z79.01]  Embolism and thrombosis (H) [I74.9]           Comments:  MDINR needs weekly INR done         Anticoagulation Care Providers     Provider Role Specialty Phone number    Maria Guadalupe Ramon DO Referring Internal Medicine [phone]

## 2023-03-28 NOTE — TELEPHONE ENCOUNTER
[facility] sent Rx request for the following:    BUPROPION HCL  MG TABLET  Last Prescription Date:   10/26/22  Last Fill Qty/Refills:         90, R-0    Last Office Visit:              12/30/22   Future Office visit:           6/19/23  Kaela Bell RN on 3/28/2023 at 7:49 AM

## 2023-03-29 ENCOUNTER — MYC REFILL (OUTPATIENT)
Dept: INTERNAL MEDICINE | Facility: OTHER | Age: 63
End: 2023-03-29
Payer: COMMERCIAL

## 2023-03-29 DIAGNOSIS — F33.0 DEPRESSION, MAJOR, RECURRENT, MILD (H): ICD-10-CM

## 2023-03-30 RX ORDER — BUPROPION HYDROCHLORIDE 150 MG/1
TABLET ORAL
Qty: 90 TABLET | Refills: 1 | Status: SHIPPED | OUTPATIENT
Start: 2023-03-30 | End: 2023-09-22

## 2023-03-30 RX ORDER — BUPROPION HYDROCHLORIDE 150 MG/1
150 TABLET ORAL EVERY MORNING
Qty: 90 TABLET | Refills: 0 | OUTPATIENT
Start: 2023-03-30

## 2023-03-30 NOTE — TELEPHONE ENCOUNTER
Patient's chart was accessed to determine the status of a refill request - nothing needed at this time as the request was previously addressed.    Teagan Wharton RN .............. 3/30/2023  3:49 PM

## 2023-04-03 LAB — INR (EXTERNAL): 2.9 (ref 0.9–1.1)

## 2023-04-04 ENCOUNTER — ANTICOAGULATION THERAPY VISIT (OUTPATIENT)
Dept: ANTICOAGULATION | Facility: OTHER | Age: 63
End: 2023-04-04
Attending: INTERNAL MEDICINE
Payer: COMMERCIAL

## 2023-04-04 DIAGNOSIS — I74.9 EMBOLISM AND THROMBOSIS (H): ICD-10-CM

## 2023-04-04 DIAGNOSIS — Z79.01 ANTICOAGULATION MONITORING, INR RANGE 2-3: Primary | ICD-10-CM

## 2023-04-04 NOTE — PROGRESS NOTES
ANTICOAGULATION  MANAGEMENT-Home Monitor Managed by Exception    Trisha NATALIA Fernando 62 year old female is on warfarin with therapeutic INR result. (Goal INR 2.0-3.0)    Recent labs: (last 7 days)     04/03/23  2309   INR 2.9*         Previous INR was Therapeutic    Medication, diet, health changes since last INR:chart reviewed; none identified    Contacted within the last 12 weeks by phone on 2/27/23      JOSE JUAN     Trisha was NOT contacted regarding therapeutic result today per home monitoring policy manage by exception agreement.   Current warfarin dose is to be continued:     Summary  As of 4/4/2023    Full warfarin instructions:  2.5 mg every Wed; 5 mg all other days   Next INR check:  4/10/2023           ?   Ghada Betancur RN  Anticoagulation Clinic  4/4/2023    _______________________________________________________________________     Anticoagulation Episode Summary     Current INR goal:  2.0-3.0   TTR:  95.9 % (1 y)   Target end date:  Indefinite   Send INR reminders to:  [facility]    Indications    Long term (current) use of anticoagulants (Resolved) [Z79.01]  Embolism and thrombosis (H) (Resolved) [I74.9]  Anticoagulation monitoring  INR range 2-3 [Z79.01]  Embolism and thrombosis (H) [I74.9]           Comments:  MDINR needs weekly INR done         Anticoagulation Care Providers     Provider Role Specialty Phone number    Maria Guadalupe Ramon DO Referring Internal Medicine [phone]

## 2023-04-10 LAB — INR (EXTERNAL): 2.7 (ref 0.9–1.1)

## 2023-04-11 ENCOUNTER — ANTICOAGULATION THERAPY VISIT (OUTPATIENT)
Dept: ANTICOAGULATION | Facility: OTHER | Age: 63
End: 2023-04-11
Attending: INTERNAL MEDICINE
Payer: COMMERCIAL

## 2023-04-11 DIAGNOSIS — I74.9 EMBOLISM AND THROMBOSIS (H): ICD-10-CM

## 2023-04-11 DIAGNOSIS — Z79.01 ANTICOAGULATION MONITORING, INR RANGE 2-3: Primary | ICD-10-CM

## 2023-04-11 NOTE — PROGRESS NOTES
ANTICOAGULATION  MANAGEMENT-Home Monitor Managed by Exception    Trisha NATALIA Fernando 62 year old female is on warfarin with therapeutic INR result. (Goal INR 2.0-3.0)    Recent labs: (last 7 days)     04/10/23  1900   INR 2.7*         Previous INR was Therapeutic    Medication, diet, health changes since last INR:chart reviewed; none identified    Contacted within the last 12 weeks by phone on 2/27/23      JOSE JUAN     Trisha was NOT contacted regarding therapeutic result today per home monitoring policy manage by exception agreement.   Current warfarin dose is to be continued:     Summary  As of 4/11/2023    Full warfarin instructions:  2.5 mg every Wed; 5 mg all other days   Next INR check:  4/18/2023           ?   Smitha Nair RN  Anticoagulation Clinic  4/11/2023    _______________________________________________________________________     Anticoagulation Episode Summary     Current INR goal:  2.0-3.0   TTR:  95.9 % (1 y)   Target end date:  Indefinite   Send INR reminders to:  [facility]    Indications    Long term (current) use of anticoagulants (Resolved) [Z79.01]  Embolism and thrombosis (H) (Resolved) [I74.9]  Anticoagulation monitoring  INR range 2-3 [Z79.01]  Embolism and thrombosis (H) [I74.9]           Comments:  MDINR needs weekly INR done         Anticoagulation Care Providers     Provider Role Specialty Phone number    Maria Guadalupe Ramon DO Referring Internal Medicine [phone]

## 2023-04-19 ENCOUNTER — ANTICOAGULATION THERAPY VISIT (OUTPATIENT)
Dept: ANTICOAGULATION | Facility: OTHER | Age: 63
End: 2023-04-19
Attending: INTERNAL MEDICINE
Payer: COMMERCIAL

## 2023-04-19 DIAGNOSIS — I74.9 EMBOLISM AND THROMBOSIS (H): ICD-10-CM

## 2023-04-19 DIAGNOSIS — Z79.01 ANTICOAGULATION MONITORING, INR RANGE 2-3: Primary | ICD-10-CM

## 2023-04-19 LAB — INR (EXTERNAL): 2.3 (ref 0.9–1.1)

## 2023-04-19 NOTE — PROGRESS NOTES
ANTICOAGULATION  MANAGEMENT-Home Monitor Managed by Exception    Trisharebeka Fernnado 62 year old female is on warfarin with therapeutic INR result. (Goal INR 2.0-3.0)    Recent labs: (last 7 days)     04/19/23  0817   INR 2.3*         Previous INR was Therapeutic    Medication, diet, health changes since last INR:chart reviewed; none identified    Contacted within the last 12 weeks by phone on 2/27/23      JOSE JUAN     Trisha was NOT contacted regarding therapeutic result today per home monitoring policy manage by exception agreement.   Current warfarin dose is to be continued:     Summary  As of 4/19/2023    Full warfarin instructions:  2.5 mg every Wed; 5 mg all other days   Next INR check:  4/26/2023           ?   Smitha Nair RN  Anticoagulation Clinic  4/19/2023    _______________________________________________________________________     Anticoagulation Episode Summary     Current INR goal:  2.0-3.0   TTR:  95.9 % (1 y)   Target end date:  Indefinite   Send INR reminders to:  [facility]    Indications    Long term (current) use of anticoagulants (Resolved) [Z79.01]  Embolism and thrombosis (H) (Resolved) [I74.9]  Anticoagulation monitoring  INR range 2-3 [Z79.01]  Embolism and thrombosis (H) [I74.9]           Comments:  MDINR needs weekly INR done         Anticoagulation Care Providers     Provider Role Specialty Phone number    Maria Guadalupe Ramon DO Referring Internal Medicine [phone]

## 2023-04-20 ENCOUNTER — OFFICE VISIT (OUTPATIENT)
Dept: INTERNAL MEDICINE | Facility: OTHER | Age: 63
End: 2023-04-20
Attending: NURSE PRACTITIONER
Payer: COMMERCIAL

## 2023-04-20 VITALS
BODY MASS INDEX: 57.17 KG/M2 | RESPIRATION RATE: 16 BRPM | TEMPERATURE: 96.8 F | HEART RATE: 71 BPM | OXYGEN SATURATION: 97 % | WEIGHT: 293 LBS | SYSTOLIC BLOOD PRESSURE: 128 MMHG | DIASTOLIC BLOOD PRESSURE: 78 MMHG

## 2023-04-20 DIAGNOSIS — B37.2 YEAST INFECTION OF THE SKIN: Primary | ICD-10-CM

## 2023-04-20 PROCEDURE — 99213 OFFICE O/P EST LOW 20 MIN: CPT | Performed by: NURSE PRACTITIONER

## 2023-04-20 RX ORDER — CLOBETASOL PROPIONATE 0.5 MG/G
OINTMENT TOPICAL 2 TIMES DAILY
Qty: 60 G | Refills: 0 | Status: SHIPPED | OUTPATIENT
Start: 2023-04-20

## 2023-04-20 RX ORDER — FLUCONAZOLE 150 MG/1
150 TABLET ORAL
Qty: 3 TABLET | Refills: 3 | Status: SHIPPED | OUTPATIENT
Start: 2023-04-20 | End: 2023-05-24

## 2023-04-20 ASSESSMENT — ANXIETY QUESTIONNAIRES
IF YOU CHECKED OFF ANY PROBLEMS ON THIS QUESTIONNAIRE, HOW DIFFICULT HAVE THESE PROBLEMS MADE IT FOR YOU TO DO YOUR WORK, TAKE CARE OF THINGS AT HOME, OR GET ALONG WITH OTHER PEOPLE: NOT DIFFICULT AT ALL
3. WORRYING TOO MUCH ABOUT DIFFERENT THINGS: SEVERAL DAYS
GAD7 TOTAL SCORE: 4
2. NOT BEING ABLE TO STOP OR CONTROL WORRYING: SEVERAL DAYS
6. BECOMING EASILY ANNOYED OR IRRITABLE: SEVERAL DAYS
8. IF YOU CHECKED OFF ANY PROBLEMS, HOW DIFFICULT HAVE THESE MADE IT FOR YOU TO DO YOUR WORK, TAKE CARE OF THINGS AT HOME, OR GET ALONG WITH OTHER PEOPLE?: NOT DIFFICULT AT ALL
5. BEING SO RESTLESS THAT IT IS HARD TO SIT STILL: NOT AT ALL
7. FEELING AFRAID AS IF SOMETHING AWFUL MIGHT HAPPEN: NOT AT ALL
4. TROUBLE RELAXING: NOT AT ALL
1. FEELING NERVOUS, ANXIOUS, OR ON EDGE: SEVERAL DAYS
GAD7 TOTAL SCORE: 4
7. FEELING AFRAID AS IF SOMETHING AWFUL MIGHT HAPPEN: NOT AT ALL
GAD7 TOTAL SCORE: 4

## 2023-04-20 ASSESSMENT — PAIN SCALES - GENERAL: PAINLEVEL: MODERATE PAIN (4)

## 2023-04-20 ASSESSMENT — PATIENT HEALTH QUESTIONNAIRE - PHQ9
SUM OF ALL RESPONSES TO PHQ QUESTIONS 1-9: 6
SUM OF ALL RESPONSES TO PHQ QUESTIONS 1-9: 6
10. IF YOU CHECKED OFF ANY PROBLEMS, HOW DIFFICULT HAVE THESE PROBLEMS MADE IT FOR YOU TO DO YOUR WORK, TAKE CARE OF THINGS AT HOME, OR GET ALONG WITH OTHER PEOPLE: NOT DIFFICULT AT ALL

## 2023-04-20 NOTE — PROGRESS NOTES
"  Assessment & Plan     Yeast infection of the skin  No signs of secondary infection at this time.  Patient instructed to not scratch, can wear socks on her hands at night if she finds she is scratching during her sleep.  She was encouraged to clean under her pannus and in her groin folds well with a baby wipe, sensitive skin, and allow open to air at night.  This means minimal pajamas, no underwear.  She can clean the areas again in the morning and apply clobetasol topical ointment.  I opted to go with clobetasol as she has failed nystatin, clindamycin gel, Kenalog ointment, Benadryl cream, calamine lotion.  We discussed that if this does not help, I would send in a referral for her to dermatology.  We are also going to treat her with oral Diflucan tablets.  She is encouraged to drink kombucha, Dior, eat yogurt.  The goal is to keep the areas clean, dry to prevent secondary infection.  - fluconazole (DIFLUCAN) 150 MG tablet  Dispense: 3 tablet; Refill: 3  - clobetasol (TEMOVATE) 0.05 % external ointment  Dispense: 60 g; Refill: 0      Prescription drug management       BMI:   Estimated body mass index is 57.17 kg/m  as calculated from the following:    Height as of 12/30/22: 1.702 m (5' 7\").    Weight as of this encounter: 165.6 kg (365 lb).     Return if symptoms worsen or fail to improve.    Edith Ramirez NP  [facility] 62 [facility], presenting for the following health issues:  Derm Problem (Rash - 4 months; abdomen, thighs)     4/20/2023   3:16 PM   Additional Questions   Roomed by Kyung PATEL LPN     History of Present Illness       Reason for visit:  Rash with severe itching    She eats 2-3 servings of fruits and vegetables daily.She consumes 0 sweetened beverage(s) daily.She exercises with enough effort to increase her heart rate 9 or less minutes per day.  She exercises with enough effort to increase her heart rate 3 or less days per week.   She is taking " medications regularly.    Today's PHQ-9         PHQ-9 Total Score: 6    PHQ-9 Q9 Thoughts of better off dead/self-harm past 2 weeks :   Not at all    How difficult have these problems made it for you to do your work, take care of things at home, or get along with other people: Not difficult at all  Today's ELIZABETH-7 Score: 4     Rash  Onset/Duration: 12/2022  Description  Location: pannus fold of abdomen, tops of both thighs, bilateral groin area  Character: blotchy, burning, red, itching  Itching: severe  Intensity:  severe  Progression of Symptoms:  worsening  Accompanying signs and symptoms:   Fever: No  Body aches or joint pain: YES  Sore throat symptoms: No  Recent cold symptoms: No  History:           Previous episodes of similar rash: None  New exposures:  Medication - metoprolol, chlorthalidone 10/26/22  Recent travel: No  Exposure to similar rash: No  Precipitating or alleviating factors: itching makes it feel better for awhile  Therapies tried and outcome: hydrocortisone cream, Benadryl, Calamine, Nystatin cream, clindamycin gel, kenalog cream    Review of Systems   CONSTITUTIONAL: NEGATIVE for fever, chills, change in weight  INTEGUMENTARY/SKIN: POSITIVE for rash abdomen, groin and upper legs bilateral  ENT/MOUTH: NEGATIVE for ear, mouth and throat problems  RESP: NEGATIVE for significant cough or SOB  CV: NEGATIVE for chest pain, palpitations or peripheral edema  ROS otherwise negative      Objective    /78 (BP Location: Right arm, Patient Position: Sitting, Cuff Size: Adult Large)   Pulse 71   Temp 96.8  F (36  C) (Temporal)   Resp 16   Wt (!) 165.6 kg (365 lb)   LMP  (LMP Unknown)   SpO2 97%   Breastfeeding No   BMI 57.17 kg/m    Body mass index is 57.17 kg/m .  Physical Exam   GENERAL: healthy, alert and no distress  EYES: Eyes grossly normal to inspection, PERRL and conjunctivae and sclerae normal  MS: no gross musculoskeletal defects noted, no edema  SKIN: erythema - abdomen, groin and  upper legs and yeast appearing rash in pannus folds, groin folds and the tops of her thighs bilaterally  NEURO: Normal strength and tone, mentation intact and speech normal  PSYCH: mentation appears normal, affect normal/bright

## 2023-04-20 NOTE — NURSING NOTE
"Chief Complaint   Patient presents with     Derm Problem     Rash - 4 months; abdomen, thighs       Initial /78 (BP Location: Right arm, Patient Position: Sitting, Cuff Size: Adult Large)   Pulse 71   Temp 96.8  F (36  C) (Temporal)   Resp 16   Wt (!) 165.6 kg (365 lb)   LMP  (LMP Unknown)   SpO2 97%   Breastfeeding No   BMI 57.17 kg/m   Estimated body mass index is 57.17 kg/m  as calculated from the following:    Height as of 12/30/22: 1.702 m (5' 7\").    Weight as of this encounter: 165.6 kg (365 lb).     Medication Reconciliation: complete      FOOD SECURITY SCREENING QUESTIONS:    The next two questions are to help us understand your food security.  If you are feeling you need any assistance in this area, we have resources available to support you today.    Hunger Vital Signs:  Within the past 12 months we worried whether our food would run out before we got money to buy more. Never  Within the past 12 months the food we bought just didn't last and we didn't have money to get more. Never        Advance care plan reviewed      Kyung Polanco LPN on 4/20/2023 at 3:21 PM      "

## 2023-04-21 ENCOUNTER — TELEPHONE (OUTPATIENT)
Dept: INTERNAL MEDICINE | Facility: OTHER | Age: 63
End: 2023-04-21
Payer: COMMERCIAL

## 2023-04-21 DIAGNOSIS — Z79.01 ANTICOAGULATION MONITORING, INR RANGE 2-3: Primary | ICD-10-CM

## 2023-04-21 DIAGNOSIS — I74.9 EMBOLISM AND THROMBOSIS (H): ICD-10-CM

## 2023-04-21 NOTE — TELEPHONE ENCOUNTER
ANTICOAGULATION  MANAGEMENT     Interacting Medication Review    Interacting medication(s): Fluconazole (Diflucan) with warfarin.    Duration: Single dose on 4/20 then every 3 days 1 tablet x 3 tablets    Indication: yeast    New medication?: Yes, interaction may increase INR and risk of bleeding. With Fluconazole courses > 3 days [facility] protocol Appendix G recommends empiric reduction of warfarin dose in therapeutic/supratherapeutic patients.        PLAN     Temporarily adjust warfarin dose during interaction (15.4% change) with next INR in 3 days        Summary  As of 4/21/2023    Full warfarin instructions:  2.5 mg every Wed; 5 mg all other days   Next INR check:  4/24/2023             Telephone call with Trisha who agrees to plan and repeated back plan correctly    Daily doses modified on anticoagulation calendar for interaction <= 7 days    Plan made per [facility] anticoagulation protocol    Smitha Nair RN  Anticoagulation Clinic

## 2023-04-24 ENCOUNTER — ANTICOAGULATION THERAPY VISIT (OUTPATIENT)
Dept: ANTICOAGULATION | Facility: OTHER | Age: 63
End: 2023-04-24
Attending: INTERNAL MEDICINE
Payer: COMMERCIAL

## 2023-04-24 DIAGNOSIS — I74.9 EMBOLISM AND THROMBOSIS (H): ICD-10-CM

## 2023-04-24 DIAGNOSIS — Z79.01 ANTICOAGULATION MONITORING, INR RANGE 2-3: Primary | ICD-10-CM

## 2023-04-24 LAB — INR (EXTERNAL): 2.3 (ref 0.9–1.1)

## 2023-04-24 NOTE — PROGRESS NOTES
ANTICOAGULATION MANAGEMENT     Trisha Fernando 62 year old female is on warfarin with therapeutic INR result. (Goal INR 2.0-3.0)    Recent labs: (last 7 days)     04/24/23  1312   INR 2.3*       ASSESSMENT       Source(s): Chart Review       Warfarin doses taken: Reviewed in chart    Diet: No new diet changes identified    Medication/supplement changes: taking diflucan 1 tab every 3 days x 3 doses    New illness, injury, or hospitalization: No    Signs or symptoms of bleeding or clotting: No    Previous result: Therapeutic last 2(+) visits    Additional findings: None         PLAN     Recommended plan for temporary change(s) affecting INR     Dosing Instructions: continue with current dose but Wednesday when you take last diflucan tab hold warfarin dose with next INR in 1 week       Summary  As of 4/24/2023    Full warfarin instructions:  4/26: Hold; Otherwise 2.5 mg every Wed; 5 mg all other days   Next INR check:  5/1/2023             Detailed voice message left for Trisha with dosing instructions and follow up date.   Sent [employer] message with dosing and follow up instructions    Patient to recheck with home meter    Education provided:     Contact [phone] with any changes, questions or concerns.     Plan made per [facility] anticoagulation protocol    Smitha Nair RN  Anticoagulation Clinic  4/24/2023    _______________________________________________________________________     Anticoagulation Episode Summary     Current INR goal:  2.0-3.0   TTR:  95.9 % (1 y)   Target end date:  Indefinite   Send INR reminders to:  [facility]    Indications    Long term (current) use of anticoagulants (Resolved) [Z79.01]  Embolism and thrombosis (H) (Resolved) [I74.9]  Anticoagulation monitoring  INR range 2-3 [Z79.01]  Embolism and thrombosis (H) [I74.9]           Comments:  MDINR needs weekly INR done         Anticoagulation Care Providers     Provider Role Specialty Phone number    Maria Guadalupe Ramon DO Referring  Internal Medicine [phone]

## 2023-04-28 ENCOUNTER — MYC MEDICAL ADVICE (OUTPATIENT)
Dept: INTERNAL MEDICINE | Facility: OTHER | Age: 63
End: 2023-04-28
Payer: COMMERCIAL

## 2023-05-01 ENCOUNTER — ANTICOAGULATION THERAPY VISIT (OUTPATIENT)
Dept: ANTICOAGULATION | Facility: OTHER | Age: 63
End: 2023-05-01
Attending: INTERNAL MEDICINE
Payer: COMMERCIAL

## 2023-05-01 DIAGNOSIS — I74.9 EMBOLISM AND THROMBOSIS (H): ICD-10-CM

## 2023-05-01 DIAGNOSIS — Z79.01 ANTICOAGULATION MONITORING, INR RANGE 2-3: Primary | ICD-10-CM

## 2023-05-01 LAB — INR (EXTERNAL): 2.4 (ref 0.9–1.1)

## 2023-05-01 NOTE — PROGRESS NOTES
ANTICOAGULATION  MANAGEMENT-Home Monitor Managed by Exception    Trisha NATALIA Fernando 62 year old female is on warfarin with therapeutic INR result. (Goal INR 2.0-3.0)    Recent labs: (last 7 days)     05/01/23  1406   INR 2.4*         Previous INR was Therapeutic    Medication, diet, health changes since last INR:chart reviewed; none identified    Contacted within the last 12 weeks by phone on 4/24/23      JOSE JUAN     Trisha was NOT contacted regarding therapeutic result today per home monitoring policy manage by exception agreement.   Current warfarin dose is to be continued:     Summary  As of 5/1/2023    Full warfarin instructions:  2.5 mg every Wed; 5 mg all other days   Next INR check:  5/8/2023           ?   Smitha Nair, RN  Anticoagulation Clinic  5/1/2023    _______________________________________________________________________     Anticoagulation Episode Summary     Current INR goal:  2.0-3.0   TTR:  95.9 % (1 y)   Target end date:  Indefinite   Send INR reminders to:  [facility]    Indications    Long term (current) use of anticoagulants (Resolved) [Z79.01]  Embolism and thrombosis (H) (Resolved) [I74.9]  Anticoagulation monitoring  INR range 2-3 [Z79.01]  Embolism and thrombosis (H) [I74.9]           Comments:  MDINR needs weekly INR done         Anticoagulation Care Providers     Provider Role Specialty Phone number    Maria Guadalupe Ramon DO Referring Internal Medicine [phone]

## 2023-05-08 ENCOUNTER — ANTICOAGULATION THERAPY VISIT (OUTPATIENT)
Dept: ANTICOAGULATION | Facility: OTHER | Age: 63
End: 2023-05-08
Attending: INTERNAL MEDICINE
Payer: COMMERCIAL

## 2023-05-08 DIAGNOSIS — I74.9 EMBOLISM AND THROMBOSIS (H): ICD-10-CM

## 2023-05-08 DIAGNOSIS — Z79.01 ANTICOAGULATION MONITORING, INR RANGE 2-3: Primary | ICD-10-CM

## 2023-05-08 LAB — INR (EXTERNAL): 2.6 (ref 0.9–1.1)

## 2023-05-09 NOTE — PROGRESS NOTES
ANTICOAGULATION  MANAGEMENT-Home Monitor Managed by Exception    Trisha FISHER Kassie 62 year old female is on warfarin with therapeutic INR result. (Goal INR 2.0-3.0)    Recent labs: (last 7 days)     05/08/23  1929   INR 2.6*         Previous INR was Therapeutic    Medication, diet, health changes since last INR:chart reviewed; none identified    Contacted within the last 12 weeks by phone on 02/27/23      JOSE JUAN     Trisha was NOT contacted regarding therapeutic result today per home monitoring policy manage by exception agreement.   Current warfarin dose is to be continued:     Summary  As of 5/8/2023    Full warfarin instructions:  2.5 mg every Wed; 5 mg all other days   Next INR check:  5/15/2023           ?   Mariia Foster, RN  Anticoagulation Clinic  5/9/2023    _______________________________________________________________________     Anticoagulation Episode Summary     Current INR goal:  2.0-3.0   TTR:  95.9 % (1 y)   Target end date:  Indefinite   Send INR reminders to:  [facility]    Indications    Long term (current) use of anticoagulants (Resolved) [Z79.01]  Embolism and thrombosis (H) (Resolved) [I74.9]  Anticoagulation monitoring  INR range 2-3 [Z79.01]  Embolism and thrombosis (H) [I74.9]           Comments:  MDINR needs weekly INR done         Anticoagulation Care Providers     Provider Role Specialty Phone number    Maria Guadalupe Ramon DO Referring Internal Medicine [phone]

## 2023-05-17 ENCOUNTER — ANTICOAGULATION THERAPY VISIT (OUTPATIENT)
Dept: ANTICOAGULATION | Facility: OTHER | Age: 63
End: 2023-05-17
Attending: INTERNAL MEDICINE
Payer: COMMERCIAL

## 2023-05-17 DIAGNOSIS — Z79.01 ANTICOAGULATION MONITORING, INR RANGE 2-3: Primary | ICD-10-CM

## 2023-05-17 DIAGNOSIS — I74.9 EMBOLISM AND THROMBOSIS (H): ICD-10-CM

## 2023-05-17 LAB — INR (EXTERNAL): 3 (ref 0.9–1.1)

## 2023-05-17 NOTE — PROGRESS NOTES
ANTICOAGULATION  MANAGEMENT-Home Monitor Managed by Exception    Trisha NATALIA Fernando 62 year old female is on warfarin with therapeutic INR result. (Goal INR 2.0-3.0)    Recent labs: (last 7 days)     05/16/23  1810   INR 3.0*         Previous INR was Therapeutic    Medication, diet, health changes since last INR:chart reviewed; none identified    Contacted within the last 12 weeks by phone on 2/27/23      JOSE JUAN     Trisha was NOT contacted regarding therapeutic result today per home monitoring policy manage by exception agreement.   Current warfarin dose is to be continued:     Summary  As of 5/17/2023    Full warfarin instructions:  2.5 mg every Wed; 5 mg all other days   Next INR check:  5/23/2023           ?   Ghada Betancur RN  Anticoagulation Clinic  5/17/2023    _______________________________________________________________________     Anticoagulation Episode Summary     Current INR goal:  2.0-3.0   TTR:  95.9 % (1 y)   Target end date:  Indefinite   Send INR reminders to:  [facility]    Indications    Long term (current) use of anticoagulants (Resolved) [Z79.01]  Embolism and thrombosis (H) (Resolved) [I74.9]  Anticoagulation monitoring  INR range 2-3 [Z79.01]  Embolism and thrombosis (H) [I74.9]           Comments:  MDINR needs weekly INR done         Anticoagulation Care Providers     Provider Role Specialty Phone number    Maria Guadalupe Ramon DO Referring Internal Medicine [phone]

## 2023-05-22 LAB — INR (EXTERNAL): 2.1 (ref 0.9–1.1)

## 2023-05-23 ENCOUNTER — ANTICOAGULATION THERAPY VISIT (OUTPATIENT)
Dept: ANTICOAGULATION | Facility: OTHER | Age: 63
End: 2023-05-23
Attending: INTERNAL MEDICINE
Payer: COMMERCIAL

## 2023-05-23 DIAGNOSIS — I74.9 EMBOLISM AND THROMBOSIS (H): ICD-10-CM

## 2023-05-23 DIAGNOSIS — Z79.01 ANTICOAGULATION MONITORING, INR RANGE 2-3: Primary | ICD-10-CM

## 2023-05-23 NOTE — PROGRESS NOTES
ANTICOAGULATION  MANAGEMENT-Home Monitor Managed by Exception    Trisha Fernando 62 year old female is on warfarin with therapeutic INR result. (Goal INR 2.0-3.0)    Recent labs: (last 7 days)     05/22/23 2012   INR 2.1*         Previous INR was Therapeutic    Medication, diet, health changes since last INR:chart reviewed; none identified        PLAN     Trisha was NOT contacted regarding therapeutic result today per home monitoring policy manage by exception agreement.   Current warfarin dose is to be continued:     Summary  As of 5/23/2023    Full warfarin instructions:  2.5 mg every Wed; 5 mg all other days   Next INR check:  5/30/2023           ?   Smitha Nair RN  Anticoagulation Clinic  5/23/2023    _______________________________________________________________________     Anticoagulation Episode Summary     Current INR goal:  2.0-3.0   TTR:  95.9 % (1 y)   Target end date:  Indefinite   Send INR reminders to:  [facility]    Indications    Long term (current) use of anticoagulants (Resolved) [Z79.01]  Embolism and thrombosis (H) (Resolved) [I74.9]  Anticoagulation monitoring  INR range 2-3 [Z79.01]  Embolism and thrombosis (H) [I74.9]           Comments:  MDINR needs weekly INR done         Anticoagulation Care Providers     Provider Role Specialty Phone number    Maria Guadalupe Ramon DO Referring Internal Medicine [phone]

## 2023-05-29 LAB — INR (EXTERNAL): 2.3 (ref 0.9–1.1)

## 2023-05-30 ENCOUNTER — ANTICOAGULATION THERAPY VISIT (OUTPATIENT)
Dept: ANTICOAGULATION | Facility: OTHER | Age: 63
End: 2023-05-30
Attending: INTERNAL MEDICINE
Payer: COMMERCIAL

## 2023-05-30 DIAGNOSIS — I74.9 EMBOLISM AND THROMBOSIS (H): ICD-10-CM

## 2023-05-30 DIAGNOSIS — Z79.01 ANTICOAGULATION MONITORING, INR RANGE 2-3: Primary | ICD-10-CM

## 2023-05-30 NOTE — PROGRESS NOTES
ANTICOAGULATION  MANAGEMENT-Home Monitor Managed by Exception    Trisha Fernando 62 year old female is on warfarin with therapeutic INR result. (Goal INR 2.0-3.0)    Recent labs: (last 7 days)     05/29/23  0811   INR 2.3*         Previous INR was Therapeutic    Medication, diet, health changes since last INR:chart reviewed; none identified          PLAN     Trisha was NOT contacted regarding therapeutic result today per home monitoring policy manage by exception agreement.   Current warfarin dose is to be continued:     Summary  As of 5/30/2023    Full warfarin instructions:  2.5 mg every Wed; 5 mg all other days   Next INR check:  6/6/2023           ?   Smitha Nair RN  Anticoagulation Clinic  5/30/2023    _______________________________________________________________________     Anticoagulation Episode Summary     Current INR goal:  2.0-3.0   TTR:  95.9 % (1 y)   Target end date:  Indefinite   Send INR reminders to:  [facility]    Indications    Long term (current) use of anticoagulants (Resolved) [Z79.01]  Embolism and thrombosis (H) (Resolved) [I74.9]  Anticoagulation monitoring  INR range 2-3 [Z79.01]  Embolism and thrombosis (H) [I74.9]           Comments:  MDINR needs weekly INR done         Anticoagulation Care Providers     Provider Role Specialty Phone number    Maria Guadalupe Ramon DO Referring Internal Medicine [phone]

## 2023-06-06 ENCOUNTER — ANTICOAGULATION THERAPY VISIT (OUTPATIENT)
Dept: ANTICOAGULATION | Facility: OTHER | Age: 63
End: 2023-06-06
Attending: INTERNAL MEDICINE
Payer: COMMERCIAL

## 2023-06-06 DIAGNOSIS — Z79.01 ANTICOAGULATION MONITORING, INR RANGE 2-3: Primary | ICD-10-CM

## 2023-06-06 DIAGNOSIS — I74.9 EMBOLISM AND THROMBOSIS (H): ICD-10-CM

## 2023-06-06 LAB — INR (EXTERNAL): 2.5 (ref 0.9–1.1)

## 2023-06-06 NOTE — PROGRESS NOTES
ANTICOAGULATION  MANAGEMENT-Home Monitor Managed by Exception    Trisha Fernando 62 year old female is on warfarin with therapeutic INR result. (Goal INR 2.0-3.0)    Recent labs: (last 7 days)     06/06/23  0800   INR 2.5*         Previous INR was Therapeutic    Medication, diet, health changes since last INR:chart reviewed; none identified        PLAN     Trisha was NOT contacted regarding therapeutic result today per home monitoring policy manage by exception agreement.   Current warfarin dose is to be continued:     Summary  As of 6/6/2023    Full warfarin instructions:  2.5 mg every Wed; 5 mg all other days   Next INR check:  6/14/2023           ?   Ghada Betancur RN  Anticoagulation Clinic  6/6/2023    _______________________________________________________________________     Anticoagulation Episode Summary     Current INR goal:  2.0-3.0   TTR:  95.9 % (1 y)   Target end date:  Indefinite   Send INR reminders to:  [facility]    Indications    Long term (current) use of anticoagulants (Resolved) [Z79.01]  Embolism and thrombosis (H) (Resolved) [I74.9]  Anticoagulation monitoring  INR range 2-3 [Z79.01]  Embolism and thrombosis (H) [I74.9]           Comments:  MDINR needs weekly INR done         Anticoagulation Care Providers     Provider Role Specialty Phone number    Maria Guadalupe Ramon DO Referring Internal Medicine [phone]

## 2023-06-11 DIAGNOSIS — I74.9 EMBOLISM AND THROMBOSIS (H): ICD-10-CM

## 2023-06-11 DIAGNOSIS — Z79.01 ANTICOAGULATION MONITORING, INR RANGE 2-3: ICD-10-CM

## 2023-06-12 ENCOUNTER — TRANSFERRED RECORDS (OUTPATIENT)
Dept: HEALTH INFORMATION MANAGEMENT | Facility: OTHER | Age: 63
End: 2023-06-12
Payer: COMMERCIAL

## 2023-06-12 RX ORDER — WARFARIN SODIUM 5 MG/1
TABLET ORAL
Qty: 85 TABLET | Refills: 2 | Status: SHIPPED | OUTPATIENT
Start: 2023-06-12 | End: 2024-03-04

## 2023-06-12 NOTE — TELEPHONE ENCOUNTER
[facility] [id number] in [place] sent Rx request for the following:      Requested Prescriptions   Pending Prescriptions Disp Refills     warfarin ANTICOAGULANT (COUMADIN) 5 MG tablet [Pharmacy Med Name: WARFARIN SODIUM 5 MG TABLET] 85 tablet 2     Sig: TAKE 5 MG 6 DAYS A WEEK AND 2.5 MG ONE DAY A WEEK OR AS DIRECTED BY THE PROTIME CLINIC.       Vitamin K Antagonists Failed - 6/11/2023 11:11 AM        Failed - INR is within goal in the past 6 weeks     Confirm INR is within goal in the past 6 weeks.     Recent Labs   Lab Test 06/06/23  0800   INR 2.5*            Last Prescription Date:   6/20/22  Last Fill Qty/Refills:         85, R-2    Last Office Visit:              4/20/23   Future Office visit:           6/19/23  Ghada Betancur RN on 6/12/2023 at 1:51 PM

## 2023-06-13 ENCOUNTER — ANTICOAGULATION THERAPY VISIT (OUTPATIENT)
Dept: ANTICOAGULATION | Facility: OTHER | Age: 63
End: 2023-06-13
Attending: INTERNAL MEDICINE
Payer: COMMERCIAL

## 2023-06-13 DIAGNOSIS — I74.9 EMBOLISM AND THROMBOSIS (H): ICD-10-CM

## 2023-06-13 DIAGNOSIS — Z79.01 ANTICOAGULATION MONITORING, INR RANGE 2-3: Primary | ICD-10-CM

## 2023-06-13 LAB — INR (EXTERNAL): 2.7 (ref 0.9–1.1)

## 2023-06-13 NOTE — PROGRESS NOTES
ANTICOAGULATION  MANAGEMENT-Home Monitor Managed by Exception    Trisha Fernando 62 year old female is on warfarin with therapeutic INR result. (Goal INR 2.0-3.0)    Recent labs: (last 7 days)     06/12/23  1711   INR 2.7*         Previous INR was Therapeutic    Medication, diet, health changes since last INR:chart reviewed; none identified        PLAN     Trisha was NOT contacted regarding therapeutic result today per home monitoring policy manage by exception agreement.   Current warfarin dose is to be continued:     Summary  As of 6/13/2023    Full warfarin instructions:  2.5 mg every Wed; 5 mg all other days   Next INR check:  6/21/2023           ?   Ghada Betancur RN  Anticoagulation Clinic  6/13/2023    _______________________________________________________________________     Anticoagulation Episode Summary     Current INR goal:  2.0-3.0   TTR:  95.9 % (1 y)   Target end date:  Indefinite   Send INR reminders to:  [facility]    Indications    Long term (current) use of anticoagulants (Resolved) [Z79.01]  Embolism and thrombosis (H) (Resolved) [I74.9]  Anticoagulation monitoring  INR range 2-3 [Z79.01]  Embolism and thrombosis (H) [I74.9]           Comments:  MDINR needs weekly INR done         Anticoagulation Care Providers     Provider Role Specialty Phone number    Maria Guadalupe Ramon DO Referring Internal Medicine [phone]

## 2023-06-19 ENCOUNTER — OFFICE VISIT (OUTPATIENT)
Dept: INTERNAL MEDICINE | Facility: OTHER | Age: 63
End: 2023-06-19
Attending: INTERNAL MEDICINE
Payer: COMMERCIAL

## 2023-06-19 VITALS
TEMPERATURE: 96.9 F | BODY MASS INDEX: 45.99 KG/M2 | RESPIRATION RATE: 16 BRPM | OXYGEN SATURATION: 97 % | SYSTOLIC BLOOD PRESSURE: 128 MMHG | DIASTOLIC BLOOD PRESSURE: 68 MMHG | WEIGHT: 293 LBS | HEART RATE: 78 BPM | HEIGHT: 67 IN

## 2023-06-19 DIAGNOSIS — R60.0 EDEMA OF BOTH LOWER EXTREMITIES: Primary | ICD-10-CM

## 2023-06-19 DIAGNOSIS — I10 PRIMARY HYPERTENSION: Chronic | ICD-10-CM

## 2023-06-19 DIAGNOSIS — R53.83 FATIGUE, UNSPECIFIED TYPE: ICD-10-CM

## 2023-06-19 DIAGNOSIS — J44.9 CHRONIC OBSTRUCTIVE PULMONARY DISEASE, UNSPECIFIED COPD TYPE (H): ICD-10-CM

## 2023-06-19 DIAGNOSIS — I77.810 ASCENDING AORTA DILATION (H): ICD-10-CM

## 2023-06-19 DIAGNOSIS — R73.9 ELEVATED BLOOD SUGAR: ICD-10-CM

## 2023-06-19 DIAGNOSIS — R06.00 DYSPNEA, UNSPECIFIED TYPE: ICD-10-CM

## 2023-06-19 LAB
ALBUMIN SERPL BCG-MCNC: 3.9 G/DL (ref 3.5–5.2)
ALP SERPL-CCNC: 66 U/L (ref 35–104)
ALT SERPL W P-5'-P-CCNC: 37 U/L (ref 0–50)
ANION GAP SERPL CALCULATED.3IONS-SCNC: 9 MMOL/L (ref 7–15)
AST SERPL W P-5'-P-CCNC: 29 U/L (ref 0–45)
BASOPHILS # BLD AUTO: 0 10E3/UL (ref 0–0.2)
BASOPHILS NFR BLD AUTO: 1 %
BILIRUB SERPL-MCNC: 0.4 MG/DL
BUN SERPL-MCNC: 16.7 MG/DL (ref 8–23)
CALCIUM SERPL-MCNC: 10.5 MG/DL (ref 8.8–10.2)
CHLORIDE SERPL-SCNC: 100 MMOL/L (ref 98–107)
CREAT SERPL-MCNC: 0.9 MG/DL (ref 0.51–0.95)
DEPRECATED HCO3 PLAS-SCNC: 29 MMOL/L (ref 22–29)
EOSINOPHIL # BLD AUTO: 0.1 10E3/UL (ref 0–0.7)
EOSINOPHIL NFR BLD AUTO: 1 %
ERYTHROCYTE [DISTWIDTH] IN BLOOD BY AUTOMATED COUNT: 14 % (ref 10–15)
GFR SERPL CREATININE-BSD FRML MDRD: 71 ML/MIN/1.73M2
GLUCOSE SERPL-MCNC: 83 MG/DL (ref 70–99)
HBA1C MFR BLD: 5.5 % (ref 4–6.2)
HCT VFR BLD AUTO: 48.1 % (ref 35–47)
HGB BLD-MCNC: 16.2 G/DL (ref 11.7–15.7)
IMM GRANULOCYTES # BLD: 0 10E3/UL
IMM GRANULOCYTES NFR BLD: 0 %
LYMPHOCYTES # BLD AUTO: 2.7 10E3/UL (ref 0.8–5.3)
LYMPHOCYTES NFR BLD AUTO: 34 %
MCH RBC QN AUTO: 31.9 PG (ref 26.5–33)
MCHC RBC AUTO-ENTMCNC: 33.7 G/DL (ref 31.5–36.5)
MCV RBC AUTO: 95 FL (ref 78–100)
MONOCYTES # BLD AUTO: 0.5 10E3/UL (ref 0–1.3)
MONOCYTES NFR BLD AUTO: 6 %
NEUTROPHILS # BLD AUTO: 4.6 10E3/UL (ref 1.6–8.3)
NEUTROPHILS NFR BLD AUTO: 58 %
NRBC # BLD AUTO: 0 10E3/UL
NRBC BLD AUTO-RTO: 0 /100
PLATELET # BLD AUTO: 220 10E3/UL (ref 150–450)
POTASSIUM SERPL-SCNC: 3.8 MMOL/L (ref 3.4–5.3)
PROT SERPL-MCNC: 7 G/DL (ref 6.4–8.3)
RBC # BLD AUTO: 5.08 10E6/UL (ref 3.8–5.2)
SODIUM SERPL-SCNC: 138 MMOL/L (ref 136–145)
TSH SERPL DL<=0.005 MIU/L-ACNC: 2.5 UIU/ML (ref 0.3–4.2)
WBC # BLD AUTO: 7.9 10E3/UL (ref 4–11)

## 2023-06-19 PROCEDURE — 36415 COLL VENOUS BLD VENIPUNCTURE: CPT | Mod: ZL | Performed by: INTERNAL MEDICINE

## 2023-06-19 PROCEDURE — 84443 ASSAY THYROID STIM HORMONE: CPT | Mod: ZL | Performed by: INTERNAL MEDICINE

## 2023-06-19 PROCEDURE — 99214 OFFICE O/P EST MOD 30 MIN: CPT | Performed by: INTERNAL MEDICINE

## 2023-06-19 PROCEDURE — 80053 COMPREHEN METABOLIC PANEL: CPT | Mod: ZL | Performed by: INTERNAL MEDICINE

## 2023-06-19 PROCEDURE — 85025 COMPLETE CBC W/AUTO DIFF WBC: CPT | Mod: ZL | Performed by: INTERNAL MEDICINE

## 2023-06-19 PROCEDURE — 83036 HEMOGLOBIN GLYCOSYLATED A1C: CPT | Mod: ZL | Performed by: INTERNAL MEDICINE

## 2023-06-19 ASSESSMENT — ANXIETY QUESTIONNAIRES
7. FEELING AFRAID AS IF SOMETHING AWFUL MIGHT HAPPEN: NOT AT ALL
2. NOT BEING ABLE TO STOP OR CONTROL WORRYING: SEVERAL DAYS
7. FEELING AFRAID AS IF SOMETHING AWFUL MIGHT HAPPEN: NOT AT ALL
GAD7 TOTAL SCORE: 5
6. BECOMING EASILY ANNOYED OR IRRITABLE: SEVERAL DAYS
3. WORRYING TOO MUCH ABOUT DIFFERENT THINGS: SEVERAL DAYS
GAD7 TOTAL SCORE: 5
8. IF YOU CHECKED OFF ANY PROBLEMS, HOW DIFFICULT HAVE THESE MADE IT FOR YOU TO DO YOUR WORK, TAKE CARE OF THINGS AT HOME, OR GET ALONG WITH OTHER PEOPLE?: SOMEWHAT DIFFICULT
4. TROUBLE RELAXING: SEVERAL DAYS
GAD7 TOTAL SCORE: 5
5. BEING SO RESTLESS THAT IT IS HARD TO SIT STILL: NOT AT ALL
IF YOU CHECKED OFF ANY PROBLEMS ON THIS QUESTIONNAIRE, HOW DIFFICULT HAVE THESE PROBLEMS MADE IT FOR YOU TO DO YOUR WORK, TAKE CARE OF THINGS AT HOME, OR GET ALONG WITH OTHER PEOPLE: SOMEWHAT DIFFICULT
1. FEELING NERVOUS, ANXIOUS, OR ON EDGE: SEVERAL DAYS

## 2023-06-19 ASSESSMENT — PAIN SCALES - GENERAL: PAINLEVEL: MILD PAIN (2)

## 2023-06-19 ASSESSMENT — PATIENT HEALTH QUESTIONNAIRE - PHQ9
10. IF YOU CHECKED OFF ANY PROBLEMS, HOW DIFFICULT HAVE THESE PROBLEMS MADE IT FOR YOU TO DO YOUR WORK, TAKE CARE OF THINGS AT HOME, OR GET ALONG WITH OTHER PEOPLE: SOMEWHAT DIFFICULT
SUM OF ALL RESPONSES TO PHQ QUESTIONS 1-9: 12
SUM OF ALL RESPONSES TO PHQ QUESTIONS 1-9: 12

## 2023-06-19 NOTE — NURSING NOTE
"Chief Complaint   Patient presents with     RECHECK     Multiple issues   Patient presents to the clinic today for multiple issues    Initial LMP  (LMP Unknown)  Estimated body mass index is 57.17 kg/m  as calculated from the following:    Height as of 12/30/22: 1.702 m (5' 7\").    Weight as of 4/20/23: 165.6 kg (365 lb).  Meds Reconciled: complete        FOOD SECURITY SCREENING QUESTIONS:    The next two questions are to help us understand your food security.  If you are feeling you need any assistance in this area, we have resources available to support you today.    Hunger Vital Signs:  Within the past 12 months we worried whether our food would run out before we got money to buy more. Never  Within the past 12 months the food we bought just didn't last and we didn't have money to get more. Never  Barbie Ngo LPN,LPN on 6/19/2023 at 11:27 AM      Barbie Ngo LPN  "

## 2023-06-19 NOTE — PROGRESS NOTES
"  Assessment & Plan     Edema of both lower extremities  -At this time given her dyspnea on exertion, recent COVID and lower extremity edema we will pursue an echocardiogram.  Thyroid is also checked and normal  - Echocardiogram Complete; Future  - TSH Reflex GH    Dyspnea, unspecified type  Exact etiology is unknown.  It may be postinfectious reactive airway, underlining pulmonary disease or cardiac disease.  Labs are checked and reassuring today.  Patient will be set up for pulmonary function studies and echocardiogram.  If echocardiogram is normal/unrevealing we will consider a stress test.  She is encouraged to come to the ER should she have any significant worsening.  - Echocardiogram Complete; Future  - Pulmonary Function Test (); Future  - CBC and Differential  - TSH Reflex GH  - Comprehensive Metabolic Panel    Chronic obstructive pulmonary disease, unspecified COPD type (H)  -Plan for pulmonary function studies to better delineate the severity of COPD  - Echocardiogram Complete; Future  - Pulmonary Function Test (); Future    Primary hypertension  Controlled/stable    Ascending aorta dilation (H)  -Reassess with echocardiogram  - Echocardiogram Complete; Future    Fatigue, unspecified type  -Suspect this is post-COVID however pursue above testing.  Labs are reassuring.  - CBC and Differential  - TSH Reflex GH    Elevated blood sugar  -Blood sugar slightly elevated.  Add on A1c is normal.  - Hemoglobin A1c     BMI:   Estimated body mass index is 57.51 kg/m  as calculated from the following:    Height as of this encounter: 1.702 m (5' 7\").    Weight as of this encounter: 166.6 kg (367 lb 3.2 oz).   Weight management plan: Discussed healthy diet and exercise guidelines      Return in about 2 months (around 8/19/2023) for Recheck.    32 minutes spent on the date of the encounter doing chart review, history and exam, documentation and further activities as noted above    Maria Guadalupe Ramon, DO   [facility]    Bria Rico is a 62 year old, presenting for the following health issues:  RECHECK (Multiple issues)        6/19/2023    11:26 AM   Additional Questions   Roomed by Barbie MONAHAN     Patient presents for follow-up of multiple issues.  She has a history of hypertension.  See below for additional information.  Her blood pressure is controlled today.    She was recently treated with fluconazole and developed a rash afterward.  This has been added to her allergy list.    She reports that she has been tired all the time.  She had COVID in February and since then has had body aches and feeling rundown.  She also reports that her taste and smell are not fully back.  She has had some chest heaviness and dyspnea on exertion along with some dizziness at times.  She denies any outright chest pain.    History of Present Illness       Hyperlipidemia:  She presents for follow up of hyperlipidemia.  She is not taking medication to lower cholesterol. She is not having myalgia or other side effects to statin medications.    Hypertension: She presents for follow up of hypertension.  She does check blood pressure  regularly outside of the clinic. Outpatient blood pressures have not been over 140/90. She does not follow a low salt diet.     She eats 0-1 servings of fruits and vegetables daily.She consumes 0 sweetened beverage(s) daily.She exercises with enough effort to increase her heart rate 9 or less minutes per day.  She exercises with enough effort to increase her heart rate 3 or less days per week.   She is taking medications regularly.    Today's PHQ-9         PHQ-9 Total Score: 12    PHQ-9 Q9 Thoughts of better off dead/self-harm past 2 weeks :   Not at all    How difficult have these problems made it for you to do your work, take care of things at home, or get along with other people: Somewhat difficult  Today's ELIZABETH-7 Score: 5       Review of Systems   Denies any fevers, chills, chest pain, increased  "lower extremity edema, changes in bowel or bladder or other concerns.         Objective    /68 (BP Location: Right arm, Patient Position: Sitting, Cuff Size: Adult Large)   Pulse 78   Temp 96.9  F (36.1  C) (Temporal)   Resp 16   Ht 1.702 m (5' 7\")   Wt (!) 166.6 kg (367 lb 3.2 oz)   LMP 05/14/2013 (Approximate)   SpO2 97%   BMI 57.51 kg/m    Body mass index is 57.51 kg/m .  Physical Exam   GEN: Vitals reviewed. Healthy appearing. Patient is in no acute distress. Cooperative with exam.  HEENT: Normocephalic atraumatic.  Eyes grossly normal to inspection.  No discharge or erythema, or obvious scleral/conjunctival abnormalities.   NECK: Supple; no thyromegaly or masses noted.  No cervical or supraclavicular lymphadenopathy.  CV: Heart regular in rate and rhythm with no murmur.    LUNGS: No audible wheeze, cough, or visible cyanosis.  No visible retractions or increased work of breathing.  Lungs clear to auscultation bilaterally.    ABD:  Obese, nondistended  SKIN: Warm and dry to touch.  Visible skin clear. No significant rash, abnormal pigmentation or lesions.  EXT: No clubbing or cyanosis.  1+ bilateral lower extremity peripheral edema.      Answers for HPI/ROS submitted by the patient on 6/19/2023  If you checked off any problems, how difficult have these problems made it for you to do your work, take care of things at home, or get along with other people?: Somewhat difficult  PHQ9 TOTAL SCORE: 12  ELIZABETH 7 TOTAL SCORE: 5      "

## 2023-06-20 ENCOUNTER — ANTICOAGULATION THERAPY VISIT (OUTPATIENT)
Dept: ANTICOAGULATION | Facility: OTHER | Age: 63
End: 2023-06-20
Attending: INTERNAL MEDICINE
Payer: COMMERCIAL

## 2023-06-20 DIAGNOSIS — Z79.01 ANTICOAGULATION MONITORING, INR RANGE 2-3: Primary | ICD-10-CM

## 2023-06-20 DIAGNOSIS — I74.9 EMBOLISM AND THROMBOSIS (H): ICD-10-CM

## 2023-06-20 LAB — INR (EXTERNAL): 2.7 (ref 0.9–1.1)

## 2023-06-20 NOTE — PROGRESS NOTES
ANTICOAGULATION  MANAGEMENT-Home Monitor Managed by Exception    Trisha Fernando 62 year old female is on warfarin with therapeutic INR result. (Goal INR 2.0-3.0)    Recent labs: (last 7 days)     06/20/23  0800   INR 2.7*         Previous INR was Therapeutic    Medication, diet, health changes since last INR:chart reviewed; none identified        PLAN     Trisha was NOT contacted regarding therapeutic result today per home monitoring policy manage by exception agreement.   Current warfarin dose is to be continued:     Summary  As of 6/20/2023    Full warfarin instructions:  2.5 mg every Wed; 5 mg all other days   Next INR check:  6/28/2023           ?   Rebecca Bonilla RN  Anticoagulation Clinic  6/20/2023    _______________________________________________________________________     Anticoagulation Episode Summary     Current INR goal:  2.0-3.0   TTR:  95.9 % (1 y)   Target end date:  Indefinite   Send INR reminders to:  [facility]    Indications    Long term (current) use of anticoagulants (Resolved) [Z79.01]  Embolism and thrombosis (H) (Resolved) [I74.9]  Anticoagulation monitoring  INR range 2-3 [Z79.01]  Embolism and thrombosis (H) [I74.9]           Comments:  MDINR needs weekly INR done         Anticoagulation Care Providers     Provider Role Specialty Phone number    Maria Guadalupe Ramon DO Referring Internal Medicine [phone]

## 2023-06-26 ENCOUNTER — ANTICOAGULATION THERAPY VISIT (OUTPATIENT)
Dept: ANTICOAGULATION | Facility: OTHER | Age: 63
End: 2023-06-26
Attending: INTERNAL MEDICINE
Payer: COMMERCIAL

## 2023-06-26 DIAGNOSIS — Z79.01 ANTICOAGULATION MONITORING, INR RANGE 2-3: Primary | ICD-10-CM

## 2023-06-26 DIAGNOSIS — I74.9 EMBOLISM AND THROMBOSIS (H): ICD-10-CM

## 2023-06-26 LAB — INR (EXTERNAL): 2.7 (ref 0.9–1.1)

## 2023-06-26 NOTE — PROGRESS NOTES
ANTICOAGULATION  MANAGEMENT-Home Monitor Managed by Exception    Trisharebeka Fernando 62 year old female is on warfarin with therapeutic INR result. (Goal INR 2.0-3.0)    Recent labs: (last 7 days)     06/26/23  1058   INR 2.7*         Previous INR was Therapeutic    Medication, diet, health changes since last INR:chart reviewed; none identified    Contacted within the last 12 weeks by phone on 6/28/23      PLAN     Trisha was NOT contacted regarding therapeutic result today per home monitoring policy manage by exception agreement.   Current warfarin dose is to be continued:     Summary  As of 6/26/2023    Full warfarin instructions:  2.5 mg every Wed; 5 mg all other days   Next INR check:  7/10/2023           ?   Smitha Nair RN  Anticoagulation Clinic  6/26/2023    _______________________________________________________________________     Anticoagulation Episode Summary     Current INR goal:  2.0-3.0   TTR:  96.0 % (1 y)   Target end date:  Indefinite   Send INR reminders to:  [facility]    Indications    Long term (current) use of anticoagulants (Resolved) [Z79.01]  Embolism and thrombosis (H) (Resolved) [I74.9]  Anticoagulation monitoring  INR range 2-3 [Z79.01]  Embolism and thrombosis (H) [I74.9]           Comments:  MDINR needs weekly INR done         Anticoagulation Care Providers     Provider Role Specialty Phone number    Maria Guadalupe Ramon DO Referring Internal Medicine [phone]

## 2023-07-05 ENCOUNTER — ANTICOAGULATION THERAPY VISIT (OUTPATIENT)
Dept: ANTICOAGULATION | Facility: OTHER | Age: 63
End: 2023-07-05
Attending: INTERNAL MEDICINE
Payer: COMMERCIAL

## 2023-07-05 DIAGNOSIS — Z79.01 ANTICOAGULATION MONITORING, INR RANGE 2-3: Primary | ICD-10-CM

## 2023-07-05 DIAGNOSIS — I74.9 EMBOLISM AND THROMBOSIS (H): ICD-10-CM

## 2023-07-05 LAB — INR (EXTERNAL): 3 (ref 0.9–1.1)

## 2023-07-05 NOTE — PROGRESS NOTES
ANTICOAGULATION  MANAGEMENT-Home Monitor Managed by Exception    Trisharebeka Fernando 62 year old female is on warfarin with therapeutic INR result. (Goal INR 2.0-3.0)    Recent labs: (last 7 days)     07/03/23  1908   INR 3.0*         Previous INR was Therapeutic    Medication, diet, health changes since last INR:chart reviewed; none identified    Contacted within the last 12 weeks by phone on 6/28/23      PLAN     Trisha was NOT contacted regarding therapeutic result today per home monitoring policy manage by exception agreement.   Current warfarin dose is to be continued:     Summary  As of 7/5/2023    Full warfarin instructions:  2.5 mg every Wed; 5 mg all other days   Next INR check:  7/10/2023           ?   Smitha Nair RN  Anticoagulation Clinic  7/5/2023    _______________________________________________________________________     Anticoagulation Episode Summary     Current INR goal:  2.0-3.0   TTR:  96.0 % (1 y)   Target end date:  Indefinite   Send INR reminders to:  [facility]    Indications    Long term (current) use of anticoagulants (Resolved) [Z79.01]  Embolism and thrombosis (H) (Resolved) [I74.9]  Anticoagulation monitoring  INR range 2-3 [Z79.01]  Embolism and thrombosis (H) [I74.9]           Comments:  MDINR needs weekly INR done         Anticoagulation Care Providers     Provider Role Specialty Phone number    Maria Guadalupe Ramon DO Referring Internal Medicine [phone]          3

## 2023-07-06 ENCOUNTER — HOSPITAL ENCOUNTER (OUTPATIENT)
Dept: CARDIOLOGY | Facility: OTHER | Age: 63
Discharge: HOME | End: 2023-07-06
Attending: INTERNAL MEDICINE
Payer: COMMERCIAL

## 2023-07-06 ENCOUNTER — HOSPITAL ENCOUNTER (OUTPATIENT)
Dept: RESPIRATORY THERAPY | Facility: OTHER | Age: 63
Discharge: HOME | End: 2023-07-06
Attending: INTERNAL MEDICINE
Payer: COMMERCIAL

## 2023-07-06 DIAGNOSIS — I77.810 ASCENDING AORTA DILATION (H): ICD-10-CM

## 2023-07-06 DIAGNOSIS — R06.00 DYSPNEA, UNSPECIFIED TYPE: ICD-10-CM

## 2023-07-06 DIAGNOSIS — J44.9 CHRONIC OBSTRUCTIVE PULMONARY DISEASE, UNSPECIFIED COPD TYPE (H): ICD-10-CM

## 2023-07-06 DIAGNOSIS — R60.0 EDEMA OF BOTH LOWER EXTREMITIES: ICD-10-CM

## 2023-07-06 LAB — LVEF ECHO: NORMAL

## 2023-07-06 PROCEDURE — 94010 BREATHING CAPACITY TEST: CPT

## 2023-07-06 PROCEDURE — 999N000157 HC STATISTIC RCP TIME EA 10 MIN

## 2023-07-06 PROCEDURE — 94726 PLETHYSMOGRAPHY LUNG VOLUMES: CPT

## 2023-07-06 PROCEDURE — 93306 TTE W/DOPPLER COMPLETE: CPT

## 2023-07-06 PROCEDURE — 94729 DIFFUSING CAPACITY: CPT

## 2023-07-06 PROCEDURE — 94729 DIFFUSING CAPACITY: CPT | Mod: 26 | Performed by: INTERNAL MEDICINE

## 2023-07-06 PROCEDURE — 94010 BREATHING CAPACITY TEST: CPT | Mod: 26 | Performed by: INTERNAL MEDICINE

## 2023-07-06 PROCEDURE — 93306 TTE W/DOPPLER COMPLETE: CPT | Mod: 26 | Performed by: INTERNAL MEDICINE

## 2023-07-06 PROCEDURE — 94726 PLETHYSMOGRAPHY LUNG VOLUMES: CPT | Mod: 26 | Performed by: INTERNAL MEDICINE

## 2023-07-10 ENCOUNTER — ANTICOAGULATION THERAPY VISIT (OUTPATIENT)
Dept: ANTICOAGULATION | Facility: OTHER | Age: 63
End: 2023-07-10
Attending: INTERNAL MEDICINE
Payer: COMMERCIAL

## 2023-07-10 DIAGNOSIS — M25.561 RIGHT KNEE PAIN, UNSPECIFIED CHRONICITY: Primary | ICD-10-CM

## 2023-07-10 DIAGNOSIS — I74.9 EMBOLISM AND THROMBOSIS (H): ICD-10-CM

## 2023-07-10 DIAGNOSIS — Z79.01 ANTICOAGULATION MONITORING, INR RANGE 2-3: Primary | ICD-10-CM

## 2023-07-10 LAB — INR (EXTERNAL): 3.4 (ref 0.9–1.1)

## 2023-07-10 NOTE — PROGRESS NOTES
ANTICOAGULATION MANAGEMENT     Trisha Fernando 62 year old female is on warfarin with supratherapeutic INR result. (Goal INR 2.0-3.0)    Recent labs: (last 7 days)     07/10/23  1316   INR 3.4*       ASSESSMENT       Source(s): Chart Review and Patient/Caregiver Call       Warfarin doses taken: Warfarin taken as instructed    Diet: No new diet changes identified    Medication/supplement changes: taking meloxicam EOD    New illness, injury, or hospitalization: No    Signs or symptoms of bleeding or clotting: No    Previous result: Therapeutic last 2(+) visits    Additional findings: None         PLAN     Recommended plan for temporary change(s) affecting INR     Dosing Instructions: partial hold then continue your current warfarin dose with next INR in 1 week       Summary  As of 7/10/2023    Full warfarin instructions:  7/10: 2.5 mg; Otherwise 2.5 mg every Wed; 5 mg all other days   Next INR check:  7/17/2023             Telephone call with Trisha who verbalizes understanding and agrees to plan    Patient to recheck with home meter    Education provided:     None required    Plan made per [facility] anticoagulation protocol    Smitha Nair, RN  Anticoagulation Clinic  7/10/2023    _______________________________________________________________________     Anticoagulation Episode Summary     Current INR goal:  2.0-3.0   TTR:  94.2 % (1 y)   Target end date:  Indefinite   Send INR reminders to:  [facility]    Indications    Long term (current) use of anticoagulants (Resolved) [Z79.01]  Embolism and thrombosis (H) (Resolved) [I74.9]  Anticoagulation monitoring  INR range 2-3 [Z79.01]  Embolism and thrombosis (H) [I74.9]           Comments:  MDINR needs weekly INR done         Anticoagulation Care Providers     Provider Role Specialty Phone number    Maria Guadalupe Ramon DO Referring Internal Medicine [phone]

## 2023-07-12 ENCOUNTER — HOSPITAL ENCOUNTER (OUTPATIENT)
Dept: GENERAL RADIOLOGY | Facility: OTHER | Age: 63
Discharge: HOME | End: 2023-07-12
Attending: ORTHOPAEDIC SURGERY
Payer: COMMERCIAL

## 2023-07-12 ENCOUNTER — OFFICE VISIT (OUTPATIENT)
Dept: ORTHOPEDICS | Facility: OTHER | Age: 63
End: 2023-07-12
Attending: ORTHOPAEDIC SURGERY
Payer: COMMERCIAL

## 2023-07-12 VITALS — OXYGEN SATURATION: 95 % | HEART RATE: 64 BPM

## 2023-07-12 DIAGNOSIS — M25.561 RIGHT KNEE PAIN, UNSPECIFIED CHRONICITY: ICD-10-CM

## 2023-07-12 DIAGNOSIS — M25.561 RIGHT KNEE PAIN, UNSPECIFIED CHRONICITY: Primary | ICD-10-CM

## 2023-07-12 PROCEDURE — 20610 DRAIN/INJ JOINT/BURSA W/O US: CPT | Mod: RT | Performed by: ORTHOPAEDIC SURGERY

## 2023-07-12 PROCEDURE — 250N000009 HC RX 250: Performed by: ORTHOPAEDIC SURGERY

## 2023-07-12 PROCEDURE — 250N000011 HC RX IP 250 OP 636: Mod: JZ | Performed by: ORTHOPAEDIC SURGERY

## 2023-07-12 PROCEDURE — 73560 X-RAY EXAM OF KNEE 1 OR 2: CPT | Mod: LT

## 2023-07-12 PROCEDURE — 99203 OFFICE O/P NEW LOW 30 MIN: CPT | Mod: 25 | Performed by: ORTHOPAEDIC SURGERY

## 2023-07-12 RX ORDER — TRIAMCINOLONE ACETONIDE 40 MG/ML
40 INJECTION, SUSPENSION INTRA-ARTICULAR; INTRAMUSCULAR ONCE
Status: COMPLETED | OUTPATIENT
Start: 2023-07-12 | End: 2023-07-12

## 2023-07-12 RX ORDER — LIDOCAINE HYDROCHLORIDE 10 MG/ML
4 INJECTION, SOLUTION EPIDURAL; INFILTRATION; INTRACAUDAL; PERINEURAL ONCE
Status: COMPLETED | OUTPATIENT
Start: 2023-07-12 | End: 2023-07-12

## 2023-07-12 RX ADMIN — LIDOCAINE HYDROCHLORIDE 4 ML: 10 INJECTION, SOLUTION INFILTRATION; PERINEURAL at 10:52

## 2023-07-12 RX ADMIN — TRIAMCINOLONE ACETONIDE 40 MG: 40 INJECTION, SUSPENSION INTRA-ARTICULAR; INTRAMUSCULAR at 10:52

## 2023-07-12 ASSESSMENT — PAIN SCALES - GENERAL: PAINLEVEL: MODERATE PAIN (4)

## 2023-07-12 NOTE — PROGRESS NOTES
Visit Date: 2023    REASON FOR EVALUATION:  Right knee pain.    HISTORY OF PRESENT ILLNESS:  Trisha comes in with regards to her right knee.  It has been bothering her for a bit of time here.  She is here to look at options here at this point in time.  The patient has not tried an injection recently at this point.  She did have x-rays done, shows moderate to significant arthrosis, medial compartment as well as patellofemoral joint.  She has tried ibuprofen, ice, lidocaine.  She is on Coumadin for factor V Leiden.  As such, she cannot really take anti-inflammatories chronically at this point in time.    MEDICATIONS:  Reviewed.    ALLERGIES:  REVIEWED.    PHYSICAL EXAMINATION:    GENERAL:  The patient is 5 feet and 367 pounds.  Alert and oriented x3, cooperative with exam, in no acute distress.  Does ambulate with satisfactory gait.  Affect is appropriate here as well.  MUSCULOSKELETAL:  Examination of the knee shows motion 0 to about 115.  Crepitus with flexion and extension.  Neurovascular examination otherwise intact.  Kneecap tracking is acceptable.    IMAGING:  X-rays reviewed, shows end-stage arthrosis, medial compartment as well as patellofemoral joint.    PROCEDURE:  Following informed consent and sterile preparation, the patient's right knee was injected with 4 mL of 1% lidocaine and 40 mg of Kenalog under sterile conditions.    IMPRESSION:    1.  Right knee arthrosis.  2.  Morbid obesity.    PLAN:  Injection as stated above.  Certainly needs to work on some weight loss reduction before she would be a proper operative candidate.  I would like to get her BMI closer to 40 at this point in time.  I will see her back for repeat injection down the road.  We did talk about a little impact exercise here as well.    Johnnie Grubbs MD        D: 2023   T: 2023   MT: KELLY    Name:     TRISHA LANIER  MRN:      [id number]        Account:    [id number]   :      1960           Visit  Date: 07/12/2023     Document: [id number]

## 2023-07-12 NOTE — PROGRESS NOTES
Patient is here for consult on her right knee pain.  Martha Amin LPN .....................7/12/2023 10:20 AM

## 2023-07-17 ENCOUNTER — ANTICOAGULATION THERAPY VISIT (OUTPATIENT)
Dept: ANTICOAGULATION | Facility: OTHER | Age: 63
End: 2023-07-17
Attending: INTERNAL MEDICINE
Payer: COMMERCIAL

## 2023-07-17 DIAGNOSIS — I74.9 EMBOLISM AND THROMBOSIS (H): ICD-10-CM

## 2023-07-17 DIAGNOSIS — Z79.01 ANTICOAGULATION MONITORING, INR RANGE 2-3: Primary | ICD-10-CM

## 2023-07-17 LAB — INR (EXTERNAL): 2.2 (ref 0.9–1.1)

## 2023-07-18 ENCOUNTER — ANTICOAGULATION THERAPY VISIT (OUTPATIENT)
Dept: ANTICOAGULATION | Facility: OTHER | Age: 63
End: 2023-07-18
Attending: INTERNAL MEDICINE
Payer: COMMERCIAL

## 2023-07-18 DIAGNOSIS — I74.9 EMBOLISM AND THROMBOSIS (H): ICD-10-CM

## 2023-07-18 DIAGNOSIS — Z79.01 ANTICOAGULATION MONITORING, INR RANGE 2-3: Primary | ICD-10-CM

## 2023-07-18 NOTE — PROGRESS NOTES
ANTICOAGULATION MANAGEMENT     Trisha Fernando 62 year old female is on warfarin with therapeutic INR result. (Goal INR 2.0-3.0)    Recent labs: (last 7 days)     07/17/23  1707   INR 2.2*       ASSESSMENT       Source(s): Chart Review and Patient/Caregiver Call       Warfarin doses taken: Warfarin taken as instructed    Diet: No new diet changes identified    New illness, injury, or hospitalization: No    Medication/supplement changes: None noted    Signs or symptoms of bleeding or clotting: No    Previous INR: Supratherapeutic    Additional findings: None       PLAN     Recommended plan for no diet, medication or health factor changes affecting INR     Dosing Instructions: Continue your current warfarin dose with next INR in 1 week       Summary  As of 7/18/2023    Full warfarin instructions:  2.5 mg every Wed; 5 mg all other days   Next INR check:               Telephone call with Trisha who verbalizes understanding and agrees to plan    Patient to recheck with home meter    Education provided: Please call back if any changes to your diet, medications or how you've been taking warfarin and Resume manage by exception with home monitor. Continue to submit INR results to home monitor company.You will only be called when your result is out of range. Please call and notify [facility] if new medication started, dose missed, signs or symptoms of bleeding or clotting, or a surgery/procedure is scheduled.    Plan made per [facility] anticoagulation protocol    Rebecca Bonilla RN  Anticoagulation Clinic  7/18/2023    _______________________________________________________________________     Anticoagulation Episode Summary     Current INR goal:  2.0-3.0   TTR:  93.5 % (1 y)   Target end date:  Indefinite   Send INR reminders to:  [facility]    Indications    Long term (current) use of anticoagulants (Resolved) [Z79.01]  Embolism and thrombosis (H) (Resolved) [I74.9]  Anticoagulation monitoring  INR range 2-3  [Z79.01]  Embolism and thrombosis (H) [I74.9]           Comments:  MDINR needs weekly INR done         Anticoagulation Care Providers     Provider Role Specialty Phone number    Maria Guadalupe Ramon DO Referring Internal Medicine [phone]

## 2023-07-24 ENCOUNTER — ANTICOAGULATION THERAPY VISIT (OUTPATIENT)
Dept: ANTICOAGULATION | Facility: OTHER | Age: 63
End: 2023-07-24
Attending: INTERNAL MEDICINE
Payer: COMMERCIAL

## 2023-07-24 DIAGNOSIS — Z79.01 ANTICOAGULATION MONITORING, INR RANGE 2-3: Primary | ICD-10-CM

## 2023-07-24 DIAGNOSIS — I74.9 EMBOLISM AND THROMBOSIS (H): ICD-10-CM

## 2023-07-25 LAB — INR (EXTERNAL): 2.6 (ref 0.9–1.1)

## 2023-07-25 NOTE — PROGRESS NOTES
ANTICOAGULATION  MANAGEMENT-Home Monitor Managed by Exception    Trisharebeka Fernando 62 year old female is on warfarin with therapeutic INR result. (Goal INR 2.0-3.0)    Recent labs: (last 7 days)     07/25/23  0911   INR 2.6*         Previous INR was Therapeutic    Medication, diet, health changes since last INR:chart reviewed; none identified    Contacted within the last 12 weeks by phone on 7/18/23      JOSE JUAN     Trisha was NOT contacted regarding therapeutic result today per home monitoring policy manage by exception agreement.   Current warfarin dose is to be continued:     Summary  As of 7/24/2023    Full warfarin instructions:  2.5 mg every Wed; 5 mg all other days   Next INR check:  7/31/2023           ?   Smitha Nair RN  Anticoagulation Clinic  7/25/2023    _______________________________________________________________________     Anticoagulation Episode Summary     Current INR goal:  2.0-3.0   TTR:  93.5 % (1 y)   Target end date:  Indefinite   Send INR reminders to:  [facility]    Indications    Long term (current) use of anticoagulants (Resolved) [Z79.01]  Embolism and thrombosis (H) (Resolved) [I74.9]  Anticoagulation monitoring  INR range 2-3 [Z79.01]  Embolism and thrombosis (H) [I74.9]           Comments:  MDINR needs weekly INR done         Anticoagulation Care Providers     Provider Role Specialty Phone number    Maria Guadalupe Ramon DO Referring Internal Medicine [phone]

## 2023-07-31 ENCOUNTER — ANTICOAGULATION THERAPY VISIT (OUTPATIENT)
Dept: ANTICOAGULATION | Facility: OTHER | Age: 63
End: 2023-07-31
Attending: INTERNAL MEDICINE
Payer: COMMERCIAL

## 2023-07-31 DIAGNOSIS — Z79.01 ANTICOAGULATION MONITORING, INR RANGE 2-3: Primary | ICD-10-CM

## 2023-07-31 DIAGNOSIS — I74.9 EMBOLISM AND THROMBOSIS (H): ICD-10-CM

## 2023-07-31 LAB — INR (EXTERNAL): 2.4 (ref 0.9–1.1)

## 2023-08-01 NOTE — PROGRESS NOTES
ANTICOAGULATION  MANAGEMENT-Home Monitor Managed by Exception    Trisha NATALIA Fernando 62 year old female is on warfarin with therapeutic INR result. (Goal INR 2.0-3.0)    Recent labs: (last 7 days)     07/31/23  1406   INR 2.4*       Previous INR was Therapeutic  Medication, diet, health changes since last INR:chart reviewed; none identified  Contacted within the last 12 weeks by phone on 7/18/23      JOSE JUAN     Trisha was NOT contacted regarding therapeutic result today per home monitoring policy manage by exception agreement.   Current warfarin dose is to be continued:     Summary  As of 7/31/2023      Full warfarin instructions:  2.5 mg every Wed; 5 mg all other days   Next INR check:  8/8/2023             ?   Smitha Nair RN  Anticoagulation Clinic  8/1/2023    _______________________________________________________________________     Anticoagulation Episode Summary       Current INR goal:  2.0-3.0   TTR:  93.5 % (1 y)   Target end date:  Indefinite   Send INR reminders to:  [facility]    Indications    Long term (current) use of anticoagulants (Resolved) [Z79.01]  Embolism and thrombosis (H) (Resolved) [I74.9]  Anticoagulation monitoring  INR range 2-3 [Z79.01]  Embolism and thrombosis (H) [I74.9]             Comments:  MDINR needs weekly INR done             Anticoagulation Care Providers       Provider Role Specialty Phone number    Maria Guadalupe Ramon DO Referring Internal Medicine [phone]

## 2023-08-07 ENCOUNTER — ANTICOAGULATION THERAPY VISIT (OUTPATIENT)
Dept: ANTICOAGULATION | Facility: OTHER | Age: 63
End: 2023-08-07
Attending: INTERNAL MEDICINE
Payer: COMMERCIAL

## 2023-08-07 DIAGNOSIS — I74.9 EMBOLISM AND THROMBOSIS (H): ICD-10-CM

## 2023-08-07 DIAGNOSIS — Z79.01 ANTICOAGULATION MONITORING, INR RANGE 2-3: Primary | ICD-10-CM

## 2023-08-07 LAB — INR (EXTERNAL): 2.2 (ref 0.9–1.1)

## 2023-08-07 NOTE — PROGRESS NOTES
ANTICOAGULATION  MANAGEMENT-Home Monitor Managed by Exception    Trisharebeka Fernando 62 year old female is on warfarin with therapeutic INR result. (Goal INR 2.0-3.0)    Recent labs: (last 7 days)     08/07/23  1203   INR 2.2*       Previous INR was Therapeutic  Medication, diet, health changes since last INR:chart reviewed; none identified  Contacted within the last 12 weeks by phone on 7/18/23      JOSE JUAN     Trisha was NOT contacted regarding therapeutic result today per home monitoring policy manage by exception agreement.   Current warfarin dose is to be continued:     Summary  As of 8/7/2023      Full warfarin instructions:  2.5 mg every Wed; 5 mg all other days   Next INR check:  8/14/2023             ?   Smitha Nair RN  Anticoagulation Clinic  8/7/2023    _______________________________________________________________________     Anticoagulation Episode Summary       Current INR goal:  2.0-3.0   TTR:  93.5 % (1 y)   Target end date:  Indefinite   Send INR reminders to:  [facility]    Indications    Long term (current) use of anticoagulants (Resolved) [Z79.01]  Embolism and thrombosis (H) (Resolved) [I74.9]  Anticoagulation monitoring  INR range 2-3 [Z79.01]  Embolism and thrombosis (H) [I74.9]             Comments:  MDINR needs weekly INR done             Anticoagulation Care Providers       Provider Role Specialty Phone number    Maria Guadalupe Ramon DO Referring Internal Medicine [phone]

## 2023-08-14 ENCOUNTER — ANTICOAGULATION THERAPY VISIT (OUTPATIENT)
Dept: ANTICOAGULATION | Facility: OTHER | Age: 63
End: 2023-08-14
Attending: INTERNAL MEDICINE
Payer: COMMERCIAL

## 2023-08-14 DIAGNOSIS — Z79.01 ANTICOAGULATION MONITORING, INR RANGE 2-3: Primary | ICD-10-CM

## 2023-08-14 DIAGNOSIS — I74.9 EMBOLISM AND THROMBOSIS (H): ICD-10-CM

## 2023-08-14 LAB — INR (EXTERNAL): 2.6 (ref 0.9–1.1)

## 2023-08-15 NOTE — PROGRESS NOTES
ANTICOAGULATION  MANAGEMENT-Home Monitor Managed by Exception    Trisha NATALIA Masons 62 year old female is on warfarin with therapeutic INR result. (Goal INR 2.0-3.0)    Recent labs: (last 7 days)     08/14/23 2003   INR 2.6*       Previous INR was Therapeutic  Medication, diet, health changes since last INR:chart reviewed; none identified  Contacted within the last 12 weeks by phone on 7/18/23      JOSE JUAN     Trisha was NOT contacted regarding therapeutic result today per home monitoring policy manage by exception agreement.   Current warfarin dose is to be continued:     Summary  As of 8/14/2023      Full warfarin instructions:  2.5 mg every Wed; 5 mg all other days   Next INR check:  8/21/2023             ?   Smitha Nair RN  Anticoagulation Clinic  8/15/2023    _______________________________________________________________________     Anticoagulation Episode Summary       Current INR goal:  2.0-3.0   TTR:  93.5 % (1 y)   Target end date:  Indefinite   Send INR reminders to:  [facility]    Indications    Long term (current) use of anticoagulants (Resolved) [Z79.01]  Embolism and thrombosis (H) (Resolved) [I74.9]  Anticoagulation monitoring  INR range 2-3 [Z79.01]  Embolism and thrombosis (H) [I74.9]             Comments:  MDINR needs weekly INR done             Anticoagulation Care Providers       Provider Role Specialty Phone number    Maria Guadalupe Ramon DO Referring Internal Medicine [phone]

## 2023-08-21 ENCOUNTER — ANTICOAGULATION THERAPY VISIT (OUTPATIENT)
Dept: ANTICOAGULATION | Facility: OTHER | Age: 63
End: 2023-08-21
Attending: INTERNAL MEDICINE
Payer: COMMERCIAL

## 2023-08-21 ENCOUNTER — OFFICE VISIT (OUTPATIENT)
Dept: INTERNAL MEDICINE | Facility: OTHER | Age: 63
End: 2023-08-21
Attending: INTERNAL MEDICINE
Payer: COMMERCIAL

## 2023-08-21 VITALS
DIASTOLIC BLOOD PRESSURE: 83 MMHG | RESPIRATION RATE: 18 BRPM | TEMPERATURE: 97.7 F | HEIGHT: 68 IN | WEIGHT: 293 LBS | SYSTOLIC BLOOD PRESSURE: 123 MMHG | HEART RATE: 65 BPM | BODY MASS INDEX: 44.41 KG/M2 | OXYGEN SATURATION: 97 %

## 2023-08-21 DIAGNOSIS — R06.00 DYSPNEA, UNSPECIFIED TYPE: ICD-10-CM

## 2023-08-21 DIAGNOSIS — I74.9 EMBOLISM AND THROMBOSIS (H): ICD-10-CM

## 2023-08-21 DIAGNOSIS — E66.01 MORBID OBESITY (H): Primary | Chronic | ICD-10-CM

## 2023-08-21 DIAGNOSIS — Z79.01 ANTICOAGULATION MONITORING, INR RANGE 2-3: Primary | ICD-10-CM

## 2023-08-21 DIAGNOSIS — R73.9 ELEVATED BLOOD SUGAR: ICD-10-CM

## 2023-08-21 DIAGNOSIS — R60.0 EDEMA OF BOTH LOWER EXTREMITIES: ICD-10-CM

## 2023-08-21 PROBLEM — J44.9 COPD (CHRONIC OBSTRUCTIVE PULMONARY DISEASE) (H): Status: RESOLVED | Noted: 2022-05-05 | Resolved: 2023-08-21

## 2023-08-21 LAB — INR (EXTERNAL): 2.8 (ref 0.9–1.1)

## 2023-08-21 PROCEDURE — 99214 OFFICE O/P EST MOD 30 MIN: CPT | Performed by: INTERNAL MEDICINE

## 2023-08-21 ASSESSMENT — PAIN SCALES - GENERAL: PAINLEVEL: NO PAIN (0)

## 2023-08-21 ASSESSMENT — ANXIETY QUESTIONNAIRES
5. BEING SO RESTLESS THAT IT IS HARD TO SIT STILL: NOT AT ALL
1. FEELING NERVOUS, ANXIOUS, OR ON EDGE: NOT AT ALL
4. TROUBLE RELAXING: SEVERAL DAYS
GAD7 TOTAL SCORE: 3
3. WORRYING TOO MUCH ABOUT DIFFERENT THINGS: SEVERAL DAYS
IF YOU CHECKED OFF ANY PROBLEMS ON THIS QUESTIONNAIRE, HOW DIFFICULT HAVE THESE PROBLEMS MADE IT FOR YOU TO DO YOUR WORK, TAKE CARE OF THINGS AT HOME, OR GET ALONG WITH OTHER PEOPLE: NOT DIFFICULT AT ALL
GAD7 TOTAL SCORE: 3
2. NOT BEING ABLE TO STOP OR CONTROL WORRYING: NOT AT ALL
7. FEELING AFRAID AS IF SOMETHING AWFUL MIGHT HAPPEN: NOT AT ALL
6. BECOMING EASILY ANNOYED OR IRRITABLE: SEVERAL DAYS

## 2023-08-21 ASSESSMENT — PATIENT HEALTH QUESTIONNAIRE - PHQ9
SUM OF ALL RESPONSES TO PHQ QUESTIONS 1-9: 4
10. IF YOU CHECKED OFF ANY PROBLEMS, HOW DIFFICULT HAVE THESE PROBLEMS MADE IT FOR YOU TO DO YOUR WORK, TAKE CARE OF THINGS AT HOME, OR GET ALONG WITH OTHER PEOPLE: NOT DIFFICULT AT ALL
SUM OF ALL RESPONSES TO PHQ QUESTIONS 1-9: 4

## 2023-08-21 NOTE — PROGRESS NOTES
Assessment & Plan     Morbid obesity (H)  We do long discussion today regarding her weight.  She will trial Ozempic again.  She is to send me an update in 4 to 6 weeks to let me know how things are going and if a prescription is needed ongoing.    Edema of both lower extremities  Likely related to weight, heat and diet.  Recommended reducing salt intake.    Dyspnea, unspecified type  Same as above    Elevated blood sugar  A1c was okay.  Continue to monitor periodically.  Work on weight loss.      Return in about 5 months (around 1/21/2024) for Annual Review with renewal of all medications, and as needed sooner.    32 minutes spent on the date of the encounter doing chart review, history and exam, documentation and further activities as noted above    Maria Guadalupe Ramon,   [facility]   Trisha is a 62 year old, presenting for the following health issues:  RECHECK (2 month follow up testing)        8/21/2023    11:54 AM   Additional Questions   Roomed by Mildred MAYO CNA/[facility]       History of Present Illness       COPD:  She presents for follow up of COPD.  Overall, COPD symptoms are stable since last visit.  She has same as usual fatigue or shortness of breath with exertion and same as usual shortness of breath at rest.  She sometimes coughs and does not have change in sputum. No recent fever. She can walk less than 1 block without stopping to rest. She can walk 2 flights of stairs without resting. The patient has had no ED, urgent care, or hospital admissions because of COPD since the last visit.     Mental Health Follow-up:  Patient presents to follow-up on Depression & Anxiety.Patient's depression since last visit has been:  Good  The patient is not having other symptoms associated with depression.  Patient's anxiety since last visit has been:  Good  The patient is not having other symptoms associated with anxiety.  Any significant life events: No  Patient is not feeling anxious or  "having panic attacks.  Patient has no concerns about alcohol or drug use.    Hypertension: She presents for follow up of hypertension.  She does not check blood pressure  regularly outside of the clinic. Outpatient blood pressures have not been over 140/90. She does not follow a low salt diet.     She eats 0-1 servings of fruits and vegetables daily.She consumes 0 sweetened beverage(s) daily.She exercises with enough effort to increase her heart rate 9 or less minutes per day.  She exercises with enough effort to increase her heart rate 3 or less days per week.   She is taking medications regularly.     Patient has continue to work on weight loss.  She has been trying to do this on her own and has struggled over time.  She previously tried Ozempic and stopped it relatively early.  She is interested in possibly trying again.  We had a long discussion regarding her weight.    She has been having issues with lower extremity edema and dyspnea.  She did undergo testing which we reviewed in depth today.  Her echocardiogram overall looked good.  Her ejection fraction was essentially normal.  Her pulmonary function studies showed mild obstructive disease but overall no significant issues.  Labs from June were reviewed together and overall normal.  Hemoglobin was normal as was thyroid.    Additionally she had a knee x-ray which showed severe osteoarthritis.  She met with orthopedics who recommended weight loss prior to surgery.  She does need to be BMI of 40.    Review of Systems   No fever      Objective    /83 (BP Location: Right arm, Patient Position: Sitting, Cuff Size: Adult Regular)   Pulse 65   Temp 97.7  F (36.5  C) (Tympanic)   Resp 18   Ht 1.715 m (5' 7.5\")   Wt (!) 165.4 kg (364 lb 9.6 oz)   LMP 05/14/2013 (Approximate)   SpO2 97%   Breastfeeding No   BMI 56.26 kg/m    Body mass index is 56.26 kg/m .  Physical Exam   GEN: Vitals reviewed. Healthy appearing. Patient is in no acute distress. " Cooperative with exam.  HEENT: Normocephalic atraumatic.  Eyes grossly normal to inspection.  No discharge or erythema, or obvious scleral/conjunctival abnormalities.   LUNGS: No audible wheeze, cough, or visible cyanosis.  No visible retractions or increased work of breathing.    ABD:  Obese, nondistended  SKIN: Warm and dry to touch.  Visible skin clear. No significant rash, abnormal pigmentation or lesions.

## 2023-08-21 NOTE — NURSING NOTE
"Chief Complaint   Patient presents with    RECHECK     2 month follow up testing         Initial /83 (BP Location: Right arm, Patient Position: Sitting, Cuff Size: Adult Regular)   Pulse 65   Temp 97.7  F (36.5  C) (Tympanic)   Resp 18   Ht 1.715 m (5' 7.5\")   Wt (!) 165.4 kg (364 lb 9.6 oz)   LMP 05/14/2013 (Approximate)   SpO2 97%   Breastfeeding No   BMI 56.26 kg/m   Estimated body mass index is 56.26 kg/m  as calculated from the following:    Height as of this encounter: 1.715 m (5' 7.5\").    Weight as of this encounter: 165.4 kg (364 lb 9.6 oz).       FOOD SECURITY SCREENING QUESTIONS:    The next two questions are to help us understand your food security.  If you are feeling you need any assistance in this area, we have resources available to support you today.    Hunger Vital Signs:  Within the past 12 months we worried whether our food would run out before we got money to buy more. Never  Within the past 12 months the food we bought just didn't last and we didn't have money to get more. Never  Mildred Renteria LPN on 8/21/2023 at 11:57 AM     Mildred Renteria   "

## 2023-08-22 NOTE — PROGRESS NOTES
ANTICOAGULATION  MANAGEMENT-Home Monitor Managed by Exception    Trisha NATALIA Fernando 62 year old female is on warfarin with therapeutic INR result. (Goal INR 2.0-3.0)    Recent labs: (last 7 days)     08/21/23  1602   INR 2.8*       Previous INR was Therapeutic  Medication, diet, health changes since last INR:chart reviewed; none identified  Contacted within the last 12 weeks by phone on 7/18/23      JOSE JUAN     Trisha was NOT contacted regarding therapeutic result today per home monitoring policy manage by exception agreement.   Current warfarin dose is to be continued:     Summary  As of 8/21/2023      Full warfarin instructions:  2.5 mg every Wed; 5 mg all other days   Next INR check:  8/28/2023             ?   Smitha Nair RN  Anticoagulation Clinic  8/22/2023    _______________________________________________________________________     Anticoagulation Episode Summary       Current INR goal:  2.0-3.0   TTR:  93.5 % (1 y)   Target end date:  Indefinite   Send INR reminders to:  [facility]    Indications    Long term (current) use of anticoagulants (Resolved) [Z79.01]  Embolism and thrombosis (H) (Resolved) [I74.9]  Anticoagulation monitoring  INR range 2-3 [Z79.01]  Embolism and thrombosis (H) [I74.9]             Comments:  MDINR needs weekly INR done             Anticoagulation Care Providers       Provider Role Specialty Phone number    Maria Guadalupe Ramon DO Referring Internal Medicine [phone]

## 2023-08-28 ENCOUNTER — ANTICOAGULATION THERAPY VISIT (OUTPATIENT)
Dept: ANTICOAGULATION | Facility: OTHER | Age: 63
End: 2023-08-28
Attending: INTERNAL MEDICINE
Payer: COMMERCIAL

## 2023-08-28 DIAGNOSIS — Z79.01 ANTICOAGULATION MONITORING, INR RANGE 2-3: Primary | ICD-10-CM

## 2023-08-28 DIAGNOSIS — I74.9 EMBOLISM AND THROMBOSIS (H): ICD-10-CM

## 2023-08-28 LAB — INR HOME MONITORING: 3.3 RATIO (ref 2–3)

## 2023-08-29 NOTE — PROGRESS NOTES
ANTICOAGULATION MANAGEMENT     Trisha Fernando 62 year old female is on warfarin with supratherapeutic INR result. (Goal INR 2.0-3.0)    Recent labs: (last 7 days)     08/28/23  1731   INR 3.3*       ASSESSMENT     Source(s): Chart Review  Previous INR was Therapeutic last 2(+) visits  Medication, diet, health changes since last INR chart reviewed; none identified       PLAN     Recommended plan for no diet, medication or health factor changes affecting INR     Dosing Instructions: Continue your current warfarin dose with next INR in 1 week       Summary  As of 8/28/2023      Full warfarin instructions:  2.5 mg every Wed; 5 mg all other days   Next INR check:  9/5/2023               Detailed voice message left for Trisha with dosing instructions and follow up date.     Patient to recheck with home meter    Education provided: Please call back if any changes to your diet, medications or how you've been taking warfarin    Plan made per [facility] anticoagulation protocol    Ghada Betancur RN  Anticoagulation Clinic  8/29/2023    _______________________________________________________________________     Anticoagulation Episode Summary       Current INR goal:  2.0-3.0   TTR:  92.3 % (1 y)   Target end date:  Indefinite   Send INR reminders to:  [facility]    Indications    Long term (current) use of anticoagulants (Resolved) [Z79.01]  Embolism and thrombosis (H) (Resolved) [I74.9]  Anticoagulation monitoring  INR range 2-3 [Z79.01]  Embolism and thrombosis (H) [I74.9]             Comments:  MDINR needs weekly INR done             Anticoagulation Care Providers       Provider Role Specialty Phone number    Maria Guadalupe Ramon DO Referring Internal Medicine [phone]

## 2023-09-04 LAB — INR HOME MONITORING: 2.6 RATIO (ref 2–3)

## 2023-09-05 ENCOUNTER — ANTICOAGULATION THERAPY VISIT (OUTPATIENT)
Dept: ANTICOAGULATION | Facility: OTHER | Age: 63
End: 2023-09-05
Attending: INTERNAL MEDICINE
Payer: COMMERCIAL

## 2023-09-05 DIAGNOSIS — Z79.01 ANTICOAGULATION MONITORING, INR RANGE 2-3: Primary | ICD-10-CM

## 2023-09-05 DIAGNOSIS — I74.9 EMBOLISM AND THROMBOSIS (H): ICD-10-CM

## 2023-09-05 LAB — INR (EXTERNAL): 2.6 (ref 0.9–1.1)

## 2023-09-05 NOTE — PROGRESS NOTES
ANTICOAGULATION MANAGEMENT     Trisha Fernando 62 year old female is on warfarin with therapeutic INR result. (Goal INR 2.0-3.0)    Recent labs: (last 7 days)     09/04/23  1228   INR 2.6*       ASSESSMENT     Source(s): Chart Review  Previous INR was Supratherapeutic  Medication, diet, health changes since last INR chart reviewed; none identified       PLAN     Recommended plan for no diet, medication or health factor changes affecting INR     Dosing Instructions: Continue your current warfarin dose with next INR in 1 week       Summary  As of 9/5/2023      Full warfarin instructions:  2.5 mg every Wed; 5 mg all other days   Next INR check:  9/11/2023               Detailed voice message left for Trisha with dosing instructions and follow up date.     Patient to recheck with home meter    Education provided: Please call back if any changes to your diet, medications or how you've been taking warfarin and Resume manage by exception with home monitor. Continue to submit INR results to home monitor company.You will only be called when your result is out of range. Please call and notify [facility] if new medication started, dose missed, signs or symptoms of bleeding or clotting, or a surgery/procedure is scheduled.    Plan made per [facility] anticoagulation protocol    Ghada Betancur, RN  Anticoagulation Clinic  9/5/2023    _______________________________________________________________________     Anticoagulation Episode Summary       Current INR goal:  2.0-3.0   TTR:  91.5 % (1 y)   Target end date:  Indefinite   Send INR reminders to:  [facility]    Indications    Long term (current) use of anticoagulants (Resolved) [Z79.01]  Embolism and thrombosis (H) (Resolved) [I74.9]  Anticoagulation monitoring  INR range 2-3 [Z79.01]  Embolism and thrombosis (H) [I74.9]             Comments:  MDINR needs weekly INR done             Anticoagulation Care Providers       Provider Role Specialty Phone number    Maria Guadalupe Ramon  K, DO [facility] [phone]

## 2023-09-11 ENCOUNTER — ANTICOAGULATION THERAPY VISIT (OUTPATIENT)
Dept: ANTICOAGULATION | Facility: OTHER | Age: 63
End: 2023-09-11
Attending: INTERNAL MEDICINE
Payer: COMMERCIAL

## 2023-09-11 DIAGNOSIS — Z79.01 ANTICOAGULATION MONITORING, INR RANGE 2-3: Primary | ICD-10-CM

## 2023-09-11 DIAGNOSIS — I74.9 EMBOLISM AND THROMBOSIS (H): ICD-10-CM

## 2023-09-11 LAB — INR HOME MONITORING: 3.1 RATIO (ref 2–3)

## 2023-09-12 NOTE — PROGRESS NOTES
ANTICOAGULATION MANAGEMENT     Trisha Fernando 62 year old female is on warfarin with supratherapeutic INR result. (Goal INR 2.0-3.0)    Recent labs: (last 7 days)     09/11/23 1923   INR 3.1*       ASSESSMENT     Source(s): Chart Review and Patient/Caregiver Call     Warfarin doses taken: Warfarin taken as instructed  Diet: No new diet changes identified  New illness, injury, or hospitalization: No  Medication/supplement changes:  ozempic started on a few days ago Concurrent use of SEMAGLUTIDE and DRUGS WITH A NARROW THERAPEUTIC INDEX may result in increased narrow therapeutic index (NTI) drug exposure.  Also taking ibuprofen, tylenol, and meloxicam for pain-she was made aware of the increased risk of a GI bleed with warfarin and NSAID medications- patient understands  Signs or symptoms of bleeding or clotting: No  Previous INR: Therapeutic last visit; previously outside of goal range  Additional findings: None     PLAN     Recommended plan for ongoing change(s) affecting INR     Dosing Instructions: decrease your warfarin dose (7.7% change) with next INR in 1 week       Summary  As of 9/11/2023      Full warfarin instructions:  2.5 mg every Mon, Fri; 5 mg all other days   Next INR check:  9/18/2023               Telephone call with Trisha who verbalizes understanding and agrees to plan    Patient to recheck with home meter    Education provided: Please call back if any changes to your diet, medications or how you've been taking warfarin    Plan made per [facility] anticoagulation protocol    Ghada Betancur, RN  Anticoagulation Clinic  9/12/2023    _______________________________________________________________________     Anticoagulation Episode Summary       Current INR goal:  2.0-3.0   TTR:  91.1 % (1 y)   Target end date:  Indefinite   Send INR reminders to:  [facility]    Indications    Long term (current) use of anticoagulants (Resolved) [Z79.01]  Embolism and thrombosis (H) (Resolved)  [I74.9]  Anticoagulation monitoring  INR range 2-3 [Z79.01]  Embolism and thrombosis (H) [I74.9]             Comments:  MDINR needs weekly INR done             Anticoagulation Care Providers       Provider Role Specialty Phone number    Maria Guadalupe Ramon DO Referring Internal Medicine [phone]

## 2023-09-18 ENCOUNTER — ANTICOAGULATION THERAPY VISIT (OUTPATIENT)
Dept: ANTICOAGULATION | Facility: OTHER | Age: 63
End: 2023-09-18
Attending: INTERNAL MEDICINE
Payer: COMMERCIAL

## 2023-09-18 DIAGNOSIS — Z79.01 ANTICOAGULATION MONITORING, INR RANGE 2-3: Primary | ICD-10-CM

## 2023-09-18 DIAGNOSIS — I74.9 EMBOLISM AND THROMBOSIS (H): ICD-10-CM

## 2023-09-18 LAB — INR HOME MONITORING: 2.9 RATIO (ref 2–3)

## 2023-09-19 NOTE — PROGRESS NOTES
ANTICOAGULATION MANAGEMENT     Trisha Fernando 62 year old female is on warfarin with therapeutic INR result. (Goal INR 2.0-3.0)    Recent labs: (last 7 days)     09/18/23  1907   INR 2.9       ASSESSMENT     Source(s): Chart Review and Patient/Caregiver Call     Warfarin doses taken: Warfarin taken as instructed  Diet: No new diet changes identified  New illness, injury, or hospitalization: No  Medication/supplement changes: None noted  Signs or symptoms of bleeding or clotting: No  Previous INR: Therapeutic last visit; previously outside of goal range  Additional findings: None     PLAN     Recommended plan for no diet, medication or health factor changes affecting INR     Dosing Instructions: Continue your current warfarin dose with next INR in 1 week       Summary  As of 9/18/2023      Full warfarin instructions:  2.5 mg every Mon, Fri; 5 mg all other days   Next INR check:  9/25/2023               Telephone call with Trisha who verbalizes understanding and agrees to plan    Patient to recheck with home meter    Education provided: Please call back if any changes to your diet, medications or how you've been taking warfarin and Resume manage by exception with home monitor. Continue to submit INR results to home monitor company.You will only be called when your result is out of range. Please call and notify [facility] if new medication started, dose missed, signs or symptoms of bleeding or clotting, or a surgery/procedure is scheduled.    Plan made per [facility] anticoagulation protocol    Rebecca Bonilla RN  Anticoagulation Clinic  9/19/2023    _______________________________________________________________________     Anticoagulation Episode Summary       Current INR goal:  2.0-3.0   TTR:  90.2 % (1 y)   Target end date:  Indefinite   Send INR reminders to:  [facility]    Indications    Long term (current) use of anticoagulants (Resolved) [Z79.01]  Embolism and thrombosis (H) (Resolved)  [I74.9]  Anticoagulation monitoring  INR range 2-3 [Z79.01]  Embolism and thrombosis (H) [I74.9]             Comments:  MDINR needs weekly INR done             Anticoagulation Care Providers       Provider Role Specialty Phone number    Maria Guadalupe Ramon DO Referring Internal Medicine [phone]

## 2023-09-20 DIAGNOSIS — F33.0 DEPRESSION, MAJOR, RECURRENT, MILD (H): ICD-10-CM

## 2023-09-22 RX ORDER — BUPROPION HYDROCHLORIDE 150 MG/1
150 TABLET ORAL EVERY MORNING
Qty: 90 TABLET | Refills: 1 | Status: SHIPPED | OUTPATIENT
Start: 2023-09-22 | End: 2024-01-24

## 2023-09-22 NOTE — TELEPHONE ENCOUNTER
"[facility] in [employer] sent Rx request for the following:      Requested Prescriptions   Pending Prescriptions Disp Refills    buPROPion (WELLBUTRIN XL) 150 MG 24 hr tablet [Pharmacy Med Name: BUPROPION HCL  MG TABLET] 90 tablet 1     Sig: TAKE 1 TABLET BY MOUTH EVERY DAY IN THE MORNING       [facility] Protocol Passed - 9/20/2023 12:54 AM        Passed - PHQ-9 score less than 5 in past 6 months     Please review last PHQ-9 score.           Passed - Medication is Bupropion     If the medication is Bupropion (Wellbutrin), and the patient is taking for smoking cessation; OK to refill.          Passed - Medication is active on med list        Passed - Patient is age 18 or older        Passed - No active pregnancy on record        Passed - No positive pregnancy test in last 12 months        Passed - Recent (6 mo) or future (30 days) visit within the authorizing provider's specialty     Patient had office visit in the last 6 months or has a visit in the next 30 days with authorizing provider or within the authorizing provider's specialty.  See \"Patient Info\" tab in inbasket, or \"Choose Columns\" in Meds & Orders section of the refill encounter.                 Last Prescription Date:   3/30/23  Last Fill Qty/Refills:         90, R-1    Last Office Visit:              8/21/23   Future Office visit:           1/24/24    Lisbeth Smith RN on 9/22/2023 at 12:49 PM         "

## 2023-09-25 ENCOUNTER — ANTICOAGULATION THERAPY VISIT (OUTPATIENT)
Dept: ANTICOAGULATION | Facility: OTHER | Age: 63
End: 2023-09-25
Attending: INTERNAL MEDICINE
Payer: COMMERCIAL

## 2023-09-25 DIAGNOSIS — Z79.01 ANTICOAGULATION MONITORING, INR RANGE 2-3: Primary | ICD-10-CM

## 2023-09-25 DIAGNOSIS — I74.9 EMBOLISM AND THROMBOSIS (H): ICD-10-CM

## 2023-09-25 LAB — INR HOME MONITORING: 2.9 RATIO (ref 2–3)

## 2023-09-26 NOTE — PROGRESS NOTES
ANTICOAGULATION  MANAGEMENT-Home Monitor Managed by Exception    Trisha NATALIA Fernando 62 year old female is on warfarin with therapeutic INR result. (Goal INR 2.0-3.0)    Recent labs: (last 7 days)     09/25/23  1914   INR 2.9       Previous INR was Therapeutic  Medication, diet, health changes since last INR:chart reviewed; none identified  Contacted within the last 12 weeks by phone on 9/18/23      JOSE JUAN     Trisha was NOT contacted regarding therapeutic result today per home monitoring policy manage by exception agreement.   Current warfarin dose is to be continued:     Summary  As of 9/25/2023      Full warfarin instructions:  2.5 mg every Mon, Fri; 5 mg all other days   Next INR check:  10/2/2023             ?   Ghada Betancur RN  Anticoagulation Clinic  9/26/2023    _______________________________________________________________________     Anticoagulation Episode Summary       Current INR goal:  2.0-3.0   TTR:  90.2 % (1 y)   Target end date:  Indefinite   Send INR reminders to:  [facility]    Indications    Long term (current) use of anticoagulants (Resolved) [Z79.01]  Embolism and thrombosis (H) (Resolved) [I74.9]  Anticoagulation monitoring  INR range 2-3 [Z79.01]  Embolism and thrombosis (H) [I74.9]             Comments:  MDINR needs weekly INR done             Anticoagulation Care Providers       Provider Role Specialty Phone number    Maria Guadalupe Ramon DO Referring Internal Medicine [phone]

## 2023-10-02 ENCOUNTER — ANTICOAGULATION THERAPY VISIT (OUTPATIENT)
Dept: ANTICOAGULATION | Facility: OTHER | Age: 63
End: 2023-10-02
Attending: INTERNAL MEDICINE
Payer: COMMERCIAL

## 2023-10-02 DIAGNOSIS — Z79.01 ANTICOAGULATION MONITORING, INR RANGE 2-3: Primary | ICD-10-CM

## 2023-10-02 DIAGNOSIS — I74.9 EMBOLISM AND THROMBOSIS (H): ICD-10-CM

## 2023-10-02 LAB — INR HOME MONITORING: 2.6 RATIO (ref 2–3)

## 2023-10-03 NOTE — PROGRESS NOTES
ANTICOAGULATION  MANAGEMENT-Home Monitor Managed by Exception    Trisha NATALIA Fernando 62 year old female is on warfarin with therapeutic INR result. (Goal INR 2.0-3.0)    Recent labs: (last 7 days)     10/02/23  1909   INR 2.6       Previous INR was Therapeutic  Medication, diet, health changes since last INR:chart reviewed; none identified  Contacted within the last 12 weeks by phone on 9/18/23      JOSE JUAN     Trisha was NOT contacted regarding therapeutic result today per home monitoring policy manage by exception agreement.   Current warfarin dose is to be continued:     Summary  As of 10/2/2023      Full warfarin instructions:  2.5 mg every Mon, Fri; 5 mg all other days   Next INR check:  10/9/2023             ?   Ghada Betancur RN  Anticoagulation Clinic  10/3/2023    _______________________________________________________________________     Anticoagulation Episode Summary       Current INR goal:  2.0-3.0   TTR:  90.2 % (1 y)   Target end date:  Indefinite   Send INR reminders to:  [facility]    Indications    Long term (current) use of anticoagulants (Resolved) [Z79.01]  Embolism and thrombosis (H) (Resolved) [I74.9]  Anticoagulation monitoring  INR range 2-3 [Z79.01]  Embolism and thrombosis (H) [I74.9]             Comments:  MDINR needs weekly INR done             Anticoagulation Care Providers       Provider Role Specialty Phone number    Maria Guadalupe Ramon DO Referring Internal Medicine [phone]

## 2023-10-09 ENCOUNTER — ANTICOAGULATION THERAPY VISIT (OUTPATIENT)
Dept: ANTICOAGULATION | Facility: OTHER | Age: 63
End: 2023-10-09
Attending: INTERNAL MEDICINE
Payer: COMMERCIAL

## 2023-10-09 DIAGNOSIS — I74.9 EMBOLISM AND THROMBOSIS (H): ICD-10-CM

## 2023-10-09 DIAGNOSIS — Z79.01 ANTICOAGULATION MONITORING, INR RANGE 2-3: Primary | ICD-10-CM

## 2023-10-10 LAB
INR (EXTERNAL): 3.2 (ref 0.9–1.1)
INR HOME MONITORING: 3.2 RATIO (ref 2–3)

## 2023-10-10 NOTE — PROGRESS NOTES
ANTICOAGULATION MANAGEMENT     Trisha Fernando 62 year old female is on warfarin with supratherapeutic INR result. (Goal INR 2.0-3.0)    Recent labs: (last 7 days)     10/10/23  0749   INR 3.2*       ASSESSMENT     Source(s): Chart Review and Patient/Caregiver Call     Warfarin doses taken: Warfarin taken as instructed  Diet: No new diet changes identified  New illness, injury, or hospitalization: Yes: productive Cough since Wednesday, has not taken a covid test, joint pain, ear pain  Medication/supplement changes:  tylenol and ibuprofen  Signs or symptoms of bleeding or clotting: No  Previous INR: Therapeutic last 2(+) visits  Additional findings: None     PLAN     Recommended plan for temporary change(s) affecting INR     Dosing Instructions: partial hold then continue your current warfarin dose with next INR in 1 week       Summary  As of 10/9/2023      Full warfarin instructions:  10/10: 2.5 mg; Otherwise 2.5 mg every Mon, Fri; 5 mg all other days   Next INR check:  10/16/2023               Telephone call with Trisha who verbalizes understanding and agrees to plan    Patient to recheck with home meter    Education provided: Please call back if any changes to your diet, medications or how you've been taking warfarin    Plan made per [facility] anticoagulation protocol    Ghada Betancur RN  Anticoagulation Clinic  10/10/2023    _______________________________________________________________________     Anticoagulation Episode Summary       Current INR goal:  2.0-3.0   TTR:  89.5 % (1 y)   Target end date:  Indefinite   Send INR reminders to:  [facility]    Indications    Long term (current) use of anticoagulants (Resolved) [Z79.01]  Embolism and thrombosis (H) (Resolved) [I74.9]  Anticoagulation monitoring  INR range 2-3 [Z79.01]  Embolism and thrombosis (H) [I74.9]             Comments:  MDINR needs weekly INR done             Anticoagulation Care Providers       Provider Role Specialty Phone  number    Maria Guadalupe Ramon,  Referring Internal Medicine [phone]

## 2023-10-12 ENCOUNTER — OFFICE VISIT (OUTPATIENT)
Dept: FAMILY MEDICINE | Facility: OTHER | Age: 63
End: 2023-10-12
Attending: STUDENT IN AN ORGANIZED HEALTH CARE EDUCATION/TRAINING PROGRAM
Payer: COMMERCIAL

## 2023-10-12 ENCOUNTER — TELEPHONE (OUTPATIENT)
Dept: INTERNAL MEDICINE | Facility: OTHER | Age: 63
End: 2023-10-12

## 2023-10-12 VITALS
DIASTOLIC BLOOD PRESSURE: 80 MMHG | OXYGEN SATURATION: 96 % | RESPIRATION RATE: 18 BRPM | TEMPERATURE: 97.7 F | SYSTOLIC BLOOD PRESSURE: 122 MMHG | HEART RATE: 64 BPM

## 2023-10-12 DIAGNOSIS — Z79.01 ANTICOAGULATION MONITORING, INR RANGE 2-3: Primary | ICD-10-CM

## 2023-10-12 DIAGNOSIS — H65.91 OME (OTITIS MEDIA WITH EFFUSION), RIGHT: Primary | ICD-10-CM

## 2023-10-12 DIAGNOSIS — I74.9 EMBOLISM AND THROMBOSIS (H): ICD-10-CM

## 2023-10-12 DIAGNOSIS — J06.9 VIRAL URI WITH COUGH: ICD-10-CM

## 2023-10-12 PROCEDURE — 99213 OFFICE O/P EST LOW 20 MIN: CPT | Performed by: PHYSICIAN ASSISTANT

## 2023-10-12 RX ORDER — FLUCONAZOLE 150 MG/1
150 TABLET ORAL ONCE
Qty: 1 TABLET | Refills: 0 | Status: SHIPPED | OUTPATIENT
Start: 2023-10-12 | End: 2023-10-12

## 2023-10-12 RX ORDER — SEMAGLUTIDE 1.34 MG/ML
0.25 INJECTION, SOLUTION SUBCUTANEOUS
COMMUNITY
End: 2023-12-18

## 2023-10-12 ASSESSMENT — PAIN SCALES - GENERAL: PAINLEVEL: NO PAIN (0)

## 2023-10-12 NOTE — PATIENT INSTRUCTIONS
"You were prescribed an antibiotic, please take into consideration the following information:  - Take entire course of antibiotic even if you start to feel better.  - Antibiotics can cause stomach upset including nausea and diarrhea. Read your bottle or ask the pharmacist if antibiotic can be taken with food to help prevent nausea. If you have symptoms of diarrhea you can take an over-the-counter probiotic and/or increase foods with probiotics such as yogurt, [place], sauerkraut.  -Use caution in sunlight as can lead to increased risk of sunburn while on ABX (antibiotics).     Activia or greek yogurt    Saline nasal spray    Please refer to your AVS for follow up and pain/symptoms management recommendations (I.e.: medications, helpful conservative treatment modalities, appropriate follow up if need to a specialist or family practice, etc.). Please return to urgent care if your symptoms change or worsen.     Discharge instructions:  -If you were prescribed a medication(s), please take this as prescribed/directed  -Monitor your symptoms, if changing/worsening, return to UC/ER or PCP for follow up    - For ear infection. Take entire course of antibiotics to ensure this clears (even if feeling better).  - Tylenol or ibuprofen for pain and fevers.   - Eat yogurt, kefir or take over-the-counter \"probiotic\" at least 2 hours before or after a dose of antibiotic. This will replace good bacteria that may have been lost due to the antibiotic. (This may also help to prevent yeast infections and upset stomach during the course of antibiotic.)  - In the future at onset of congestion: Blow nose or use bulb syringe to keep nasal congestion cleared and use saline nasal spray/flush.  -Alternative ibuprofen and tylenol as needed.   -Rest/relaxation and keeping hydrated with clear liquids (ie: water or gatorade). Using a humidifier may be beneficial as well.     * Recheck with family practice as needed or ER sooner with worsening or " concerns.

## 2023-10-12 NOTE — NURSING NOTE
"Chief Complaint   Patient presents with    Cough    Otalgia     Patient is here for cough and ear pain. Symptoms started last Wednesday. She blew her nose last Thursday and right ear popped. She states it still feels plugged/muffled.     Initial /80   Pulse 64   Temp 97.7  F (36.5  C) (Tympanic)   Resp 18   LMP 05/14/2013 (Approximate)   SpO2 96%   Breastfeeding No  Estimated body mass index is 56.26 kg/m  as calculated from the following:    Height as of 8/21/23: 1.715 m (5' 7.5\").    Weight as of 8/21/23: 165.4 kg (364 lb 9.6 oz).  Medication Reconciliation: complete    Shadia Mccracken CMA      FOOD SECURITY SCREENING QUESTIONS:    The next two questions are to help us understand your food security.  If you are feeling you need any assistance in this area, we have resources available to support you today.    Hunger Vital Signs:  Within the past 12 months we worried whether our food would run out before we got money to buy more. Never  Within the past 12 months the food we bought just didn't last and we didn't have money to get more. Never  Shadia Mccracken CMA,LPN on 10/12/2023 at 1:08 PM      "

## 2023-10-12 NOTE — TELEPHONE ENCOUNTER
ANTICOAGULATION  MANAGEMENT     Interacting Medication Review    Interacting medication(s): Azithromycin (Zithromax, Z-maribel) and Fluconazole (Diflucan) with warfarin.    Duration: Single dose on today of diflucan and 7 day course of z maribel    Indication:  otitis media with effusion    New medication?: Yes, interaction may increase INR and risk of bleeding. Closer INR monitoring recommended.        PLAN     Temporarily adjust warfarin dose during interaction (16% change) with next INR in 5 days        New warfarin dose: hold dose if taking the diflucan.  Recommend next INR: 10/16    Left detailed voice message for Trisha with dosing instructions and follow up date.     New recheck date updated on anticoagulation calendar     Plan made per [facility] anticoagulation protocol    Smitha Nair, RN  Anticoagulation Clinic

## 2023-10-12 NOTE — PROGRESS NOTES
Assessment & Plan       ICD-10-CM    1. OME (otitis media with effusion), right  H65.91 amoxicillin-clavulanate (AUGMENTIN) 875-125 MG tablet     fluconazole (DIFLUCAN) 150 MG tablet      2. Viral URI with cough  J06.9         Right otitis media, will treat with Augmentin, tylenol warm compresses, other symptomatic remedies. Avoid trauma to ear(s) such as Q-tips. If symptoms change or worsen, recommend follow up for reevaluation (high fevers, worsening pain, abnormal drainage or odor from ear, etc.).  Viral URI. Recommend: increased fluids, rest, use of humidifier, antitussives (cough medication for adults), alternating tylenol and ibuprofen every 4-6 hours as needed (if able to take these medications), salt water gargles for sore throats or throat lozenges, honey, netti pots/saline rinses for sinus/congestion, as well as other home remedies. If worsening fevers, chills, uncontrollable nausea/vomiting, dehydration,etc., or other worsening signs occur, patient is agreeable to follow up for reevaluation. Patient is in agreement and understanding of the above treatment plan. All questions and concerns were addressed and answered to patient's satisfaction. AVS reviewed with patient.     See Patient Instructions    Return if symptoms worsen or fail to improve.     Kaela Leung PA-C  [facility]   Trisha is a 62 year old, presenting for the following health issues:  Cough and Otalgia        8/21/2023    11:54 AM   Additional Questions   Roomed by Mildred MAYO CNA/TR[place]ON       [facility]     Trisha presents with a 1+ week concern of cough, sore throat and right ear pain.    Symptoms:  No fevers or chills.   YES: +  sore throat/pharyngitis/tonsillitis.   YES: +  allergy/URI Symptoms  No muffled sounds/change in hearing  No sensation of fullness in ear(s)  No ringing in ears/tinnitus  No balance changes  No dizziness  YES: +  congestion (head/nasal/chest)  YES: +  cough/productive cough  YES: +   post nasal drip  No headache  No sinus pain/pressure  No myalgias  YES: +  otalgia  No rash  Activity Level Changes: No  Appetite/Liquid Intake Changes: No  Changes to Bowel Habits: No  Changes to Bladder Habits: No    Treatments tried: Tylenol/Ibuprofen    Site of exposure: not known.  Type of exposure: not known    Review of Systems   Constitutional, HEENT, cardiovascular, pulmonary, GI, , musculoskeletal, neuro, skin, endocrine and psych systems are negative, except as otherwise noted.        Objective    /80   Pulse 64   Temp 97.7  F (36.5  C) (Tympanic)   Resp 18   LMP 05/14/2013 (Approximate)   SpO2 96%   Breastfeeding No   There is no height or weight on file to calculate BMI.  Physical Exam   GENERAL: healthy, alert and no distress  EYES: Eyes grossly normal to inspection, PERRL and conjunctivae and sclerae normal  HENT: normal cephalic/atraumatic, right ear: clear effusion and erythematous, left ear: normal: no effusions, no erythema, normal landmarks, nose and mouth without ulcers or lesions, oropharynx clear, and oral mucous membranes moist  NECK: no adenopathy, no asymmetry, masses, or scars and thyroid normal to palpation  RESP: lungs clear to auscultation - no rales, rhonchi or wheezes  CV: regular rate and rhythm, normal S1 S2, no S3 or S4, no murmur, click or rub, no peripheral edema and peripheral pulses strong  SKIN: no suspicious lesions or rashes  PSYCH: mentation appears normal, affect normal/bright

## 2023-10-16 ENCOUNTER — ANTICOAGULATION THERAPY VISIT (OUTPATIENT)
Dept: ANTICOAGULATION | Facility: OTHER | Age: 63
End: 2023-10-16
Attending: INTERNAL MEDICINE
Payer: COMMERCIAL

## 2023-10-16 DIAGNOSIS — Z79.01 ANTICOAGULATION MONITORING, INR RANGE 2-3: Primary | ICD-10-CM

## 2023-10-16 DIAGNOSIS — I74.9 EMBOLISM AND THROMBOSIS (H): ICD-10-CM

## 2023-10-16 LAB — INR HOME MONITORING: 2.2 RATIO (ref 2–3)

## 2023-10-16 NOTE — PROGRESS NOTES
ANTICOAGULATION MANAGEMENT     Trisha Fernando 62 year old female is on warfarin with therapeutic INR result. (Goal INR 2.0-3.0)    Recent labs: (last 7 days)     10/16/23  1332   INR 2.2       ASSESSMENT     Source(s): Chart Review and Patient/Caregiver Call     Warfarin doses taken: Warfarin taken as instructed  Diet: No new diet changes identified  Medication/supplement changes:  continues with Z maribel  New illness, injury, or hospitalization: Yes: ear infection  Signs or symptoms of bleeding or clotting: No  Previous result: Supratherapeutic  Additional findings: None       PLAN     Recommended plan for temporary change(s) affecting INR     Dosing Instructions: Continue your current warfarin dose with next INR in 1 week       Summary  As of 10/16/2023      Full warfarin instructions:  2.5 mg every Mon, Fri; 5 mg all other days   Next INR check:  10/23/2023               Telephone call with Trisha who verbalizes understanding and agrees to plan    Patient to recheck with home meter    Education provided:   None required    Plan made per [facility] anticoagulation protocol    Smitha Nair, RN  Anticoagulation Clinic  10/16/2023    _______________________________________________________________________     Anticoagulation Episode Summary       Current INR goal:  2.0-3.0   TTR:  89.2% (1 y)   Target end date:  Indefinite   Send INR reminders to:  [facility]    Indications    Long term (current) use of anticoagulants (Resolved) [Z79.01]  Embolism and thrombosis (H) (Resolved) [I74.9]  Anticoagulation monitoring  INR range 2-3 [Z79.01]  Embolism and thrombosis (H) [I74.9]             Comments:  MDINR needs weekly INR done             Anticoagulation Care Providers       Provider Role Specialty Phone number    Maria Guadalupe Ramon DO Referring Internal Medicine [phone]

## 2023-10-23 ENCOUNTER — ANTICOAGULATION THERAPY VISIT (OUTPATIENT)
Dept: ANTICOAGULATION | Facility: OTHER | Age: 63
End: 2023-10-23
Attending: INTERNAL MEDICINE
Payer: COMMERCIAL

## 2023-10-23 DIAGNOSIS — I74.9 EMBOLISM AND THROMBOSIS (H): ICD-10-CM

## 2023-10-23 DIAGNOSIS — Z79.01 ANTICOAGULATION MONITORING, INR RANGE 2-3: Primary | ICD-10-CM

## 2023-10-23 LAB — INR HOME MONITORING: 2.4 RATIO (ref 2–3)

## 2023-10-23 NOTE — PROGRESS NOTES
ANTICOAGULATION MANAGEMENT     Trisha Fernando 62 year old female is on warfarin with therapeutic INR result. (Goal INR 2.0-3.0)    Recent labs: (last 7 days)     10/23/23  1654   INR 2.4       ASSESSMENT     Source(s): Chart Review  Previous INR was Therapeutic last visit; previously outside of goal range  Medication, diet, health changes since last INR chart reviewed; none identified       PLAN     Recommended plan for no diet, medication or health factor changes affecting INR     Dosing Instructions: Continue your current warfarin dose with next INR in 1 week       Summary  As of 10/23/2023      Full warfarin instructions:  2.5 mg every Mon, Fri; 5 mg all other days   Next INR check:  10/30/2023               Detailed voice message left for Trisha with dosing instructions and follow up date.     Patient to recheck with home meter    Education provided: Please call back if any changes to your diet, medications or how you've been taking warfarin and Resume manage by exception with home monitor. Continue to submit INR results to home monitor company.You will only be called when your result is out of range. Please call and notify [facility] if new medication started, dose missed, signs or symptoms of bleeding or clotting, or a surgery/procedure is scheduled.    Plan made per [facility] anticoagulation protocol    Ghada Betancur, RN  Anticoagulation Clinic  10/23/2023    _______________________________________________________________________     Anticoagulation Episode Summary       Current INR goal:  2.0-3.0   TTR:  89.2% (1 y)   Target end date:  Indefinite   Send INR reminders to:  [facility]    Indications    Long term (current) use of anticoagulants (Resolved) [Z79.01]  Embolism and thrombosis (H) (Resolved) [I74.9]  Anticoagulation monitoring  INR range 2-3 [Z79.01]  Embolism and thrombosis (H) [I74.9]             Comments:  MDINR needs weekly INR done             Anticoagulation Care Providers        Provider Role Specialty Phone number    Maria Guadalupe Ramon DO Referring Internal Medicine [phone]

## 2023-10-30 ENCOUNTER — ANTICOAGULATION THERAPY VISIT (OUTPATIENT)
Dept: ANTICOAGULATION | Facility: OTHER | Age: 63
End: 2023-10-30
Attending: INTERNAL MEDICINE
Payer: COMMERCIAL

## 2023-10-30 DIAGNOSIS — I74.9 EMBOLISM AND THROMBOSIS (H): ICD-10-CM

## 2023-10-30 DIAGNOSIS — Z79.01 ANTICOAGULATION MONITORING, INR RANGE 2-3: Primary | ICD-10-CM

## 2023-10-30 LAB — INR HOME MONITORING: 2.6 RATIO (ref 2–3)

## 2023-10-31 NOTE — PROGRESS NOTES
ANTICOAGULATION  MANAGEMENT-Home Monitor Managed by Exception    Trisha NATALIA Fernando 62 year old female is on warfarin with therapeutic INR result. (Goal INR 2.0-3.0)    Recent labs: (last 7 days)     10/30/23  1739   INR 2.6       Previous INR was Therapeutic  Medication, diet, health changes since last INR:chart reviewed; none identified  Contacted within the last 12 weeks by phone on 10/23/2023      JOSE JUAN     Trisha was NOT contacted regarding therapeutic result today per home monitoring policy manage by exception agreement.   Current warfarin dose is to be continued:     Summary  As of 10/30/2023      Full warfarin instructions:  2.5 mg every Mon, Fri; 5 mg all other days   Next INR check:  11/6/2023             ?   Josee Sterling RN  Anticoagulation Clinic  10/31/2023    _______________________________________________________________________     Anticoagulation Episode Summary       Current INR goal:  2.0-3.0   TTR:  89.2% (1 y)   Target end date:  Indefinite   Send INR reminders to:  [facility]    Indications    Long term (current) use of anticoagulants (Resolved) [Z79.01]  Embolism and thrombosis (H) (Resolved) [I74.9]  Anticoagulation monitoring  INR range 2-3 [Z79.01]  Embolism and thrombosis (H) [I74.9]             Comments:  MDINR needs weekly INR done             Anticoagulation Care Providers       Provider Role Specialty Phone number    Maria Guadalupe Ramon DO Referring Internal Medicine [phone]

## 2023-11-06 ENCOUNTER — ANTICOAGULATION THERAPY VISIT (OUTPATIENT)
Dept: ANTICOAGULATION | Facility: OTHER | Age: 63
End: 2023-11-06
Attending: INTERNAL MEDICINE
Payer: COMMERCIAL

## 2023-11-06 DIAGNOSIS — Z79.01 ANTICOAGULATION MONITORING, INR RANGE 2-3: Primary | ICD-10-CM

## 2023-11-06 DIAGNOSIS — I74.9 EMBOLISM AND THROMBOSIS (H): ICD-10-CM

## 2023-11-06 LAB — INR HOME MONITORING: 2.6 RATIO (ref 2–3)

## 2023-11-06 NOTE — PROGRESS NOTES
ANTICOAGULATION  MANAGEMENT-Home Monitor Managed by Exception    Trisha NATALIA Fernando 62 year old female is on warfarin with therapeutic INR result. (Goal INR 2.0-3.0)    Recent labs: (last 7 days)     11/06/23  1654   INR 2.6       Previous INR was Therapeutic  Medication, diet, health changes since last INR:chart reviewed; none identified  Contacted within the last 12 weeks by phone on 10/23/23       JOSE JUAN     Trisha was NOT contacted regarding therapeutic result today per home monitoring policy manage by exception agreement.   Current warfarin dose is to be continued:     Summary  As of 11/6/2023      Full warfarin instructions:  2.5 mg every Mon, Fri; 5 mg all other days   Next INR check:  11/13/2023             ?   Ghada Betancur RN  Anticoagulation Clinic  11/6/2023    _______________________________________________________________________     Anticoagulation Episode Summary       Current INR goal:  2.0-3.0   TTR:  89.2% (1 y)   Target end date:  Indefinite   Send INR reminders to:  [facility]    Indications    Long term (current) use of anticoagulants (Resolved) [Z79.01]  Embolism and thrombosis (H) (Resolved) [I74.9]  Anticoagulation monitoring  INR range 2-3 [Z79.01]  Embolism and thrombosis (H) [I74.9]             Comments:  MDINR needs weekly INR done             Anticoagulation Care Providers       Provider Role Specialty Phone number    Maria Guadalupe Ramon DO Referring Internal Medicine [phone]

## 2023-11-13 LAB — INR HOME MONITORING: 2.9 RATIO (ref 2–3)

## 2023-11-14 ENCOUNTER — ANTICOAGULATION THERAPY VISIT (OUTPATIENT)
Dept: ANTICOAGULATION | Facility: OTHER | Age: 63
End: 2023-11-14
Attending: INTERNAL MEDICINE
Payer: COMMERCIAL

## 2023-11-14 DIAGNOSIS — Z79.01 ANTICOAGULATION MONITORING, INR RANGE 2-3: Primary | ICD-10-CM

## 2023-11-14 DIAGNOSIS — I74.9 EMBOLISM AND THROMBOSIS (H): ICD-10-CM

## 2023-11-14 NOTE — PROGRESS NOTES
ANTICOAGULATION  MANAGEMENT-Home Monitor Managed by Exception    Trisharebeka Fernando 62 year old female is on warfarin with therapeutic INR result. (Goal INR 2.0-3.0)    Recent labs: (last 7 days)     11/13/23  2114   INR 2.9       Previous INR was Therapeutic  Medication, diet, health changes since last INR:chart reviewed; none identified  Contacted within the last 12 weeks by phone on 10/23/23      JOSE JUAN     Trisha was NOT contacted regarding therapeutic result today per home monitoring policy manage by exception agreement.   Current warfarin dose is to be continued:     Summary  As of 11/14/2023      Full warfarin instructions:  2.5 mg every Mon, Fri; 5 mg all other days   Next INR check:  11/20/2023             ?   Smitha Nair RN  Anticoagulation Clinic  11/14/2023    _______________________________________________________________________     Anticoagulation Episode Summary       Current INR goal:  2.0-3.0   TTR:  89.2% (1 y)   Target end date:  Indefinite   Send INR reminders to:  [facility]    Indications    Long term (current) use of anticoagulants (Resolved) [Z79.01]  Embolism and thrombosis (H) (Resolved) [I74.9]  Anticoagulation monitoring  INR range 2-3 [Z79.01]  Embolism and thrombosis (H) [I74.9]             Comments:  MDINR needs weekly INR done             Anticoagulation Care Providers       Provider Role Specialty Phone number    Maria Guadalupe Ramon DO Referring Internal Medicine [phone]

## 2023-11-20 ENCOUNTER — ANTICOAGULATION THERAPY VISIT (OUTPATIENT)
Dept: ANTICOAGULATION | Facility: OTHER | Age: 63
End: 2023-11-20
Attending: INTERNAL MEDICINE
Payer: COMMERCIAL

## 2023-11-20 DIAGNOSIS — I74.9 EMBOLISM AND THROMBOSIS (H): ICD-10-CM

## 2023-11-20 DIAGNOSIS — Z79.01 ANTICOAGULATION MONITORING, INR RANGE 2-3: Primary | ICD-10-CM

## 2023-11-20 LAB — INR HOME MONITORING: 2.7 RATIO (ref 2–3)

## 2023-11-20 NOTE — PROGRESS NOTES
ANTICOAGULATION  MANAGEMENT-Home Monitor Managed by Exception    Trisha Fernando 62 year old female is on warfarin with therapeutic INR result. (Goal INR 2.0-3.0)    Recent labs: (last 7 days)     11/20/23  1709   INR 2.7       Previous INR was Therapeutic  Medication, diet, health changes since last INR:chart reviewed; none identified  Contacted within the last 12 weeks by phone on 10/23/23      JOSE JUAN     Trisha was NOT contacted regarding therapeutic result today per home monitoring policy manage by exception agreement.   Current warfarin dose is to be continued:     Summary  As of 11/20/2023      Full warfarin instructions:  2.5 mg every Mon, Fri; 5 mg all other days   Next INR check:  11/27/2023             ?   Smitha Nair RN  Anticoagulation Clinic  11/20/2023    _______________________________________________________________________     Anticoagulation Episode Summary       Current INR goal:  2.0-3.0   TTR:  89.2% (1 y)   Target end date:  Indefinite   Send INR reminders to:  [facility]    Indications    Long term (current) use of anticoagulants (Resolved) [Z79.01]  Embolism and thrombosis (H) (Resolved) [I74.9]  Anticoagulation monitoring  INR range 2-3 [Z79.01]  Embolism and thrombosis (H) [I74.9]             Comments:  MDINR needs weekly INR done             Anticoagulation Care Providers       Provider Role Specialty Phone number    Maria Guadalupe Ramon DO Referring Internal Medicine [phone]

## 2023-11-24 ENCOUNTER — MYC MEDICAL ADVICE (OUTPATIENT)
Dept: INTERNAL MEDICINE | Facility: OTHER | Age: 63
End: 2023-11-24
Payer: COMMERCIAL

## 2023-11-24 ENCOUNTER — MYC REFILL (OUTPATIENT)
Dept: INTERNAL MEDICINE | Facility: OTHER | Age: 63
End: 2023-11-24
Payer: COMMERCIAL

## 2023-11-24 DIAGNOSIS — R06.02 SOB (SHORTNESS OF BREATH): Primary | ICD-10-CM

## 2023-11-24 DIAGNOSIS — E87.6 HYPOKALEMIA: ICD-10-CM

## 2023-11-24 DIAGNOSIS — R05.9 COUGH: ICD-10-CM

## 2023-11-24 DIAGNOSIS — I10 ESSENTIAL HYPERTENSION: ICD-10-CM

## 2023-11-24 DIAGNOSIS — F33.0 DEPRESSION, MAJOR, RECURRENT, MILD (H): ICD-10-CM

## 2023-11-24 RX ORDER — ALBUTEROL SULFATE 90 UG/1
2 AEROSOL, METERED RESPIRATORY (INHALATION) EVERY 4 HOURS PRN
Qty: 8.5 G | Refills: 3 | OUTPATIENT
Start: 2023-11-24

## 2023-11-24 RX ORDER — VENLAFAXINE HYDROCHLORIDE 150 MG/1
150 CAPSULE, EXTENDED RELEASE ORAL
Qty: 90 CAPSULE | Refills: 3 | Status: SHIPPED | OUTPATIENT
Start: 2023-11-24

## 2023-11-24 RX ORDER — ALBUTEROL SULFATE 90 UG/1
2 AEROSOL, METERED RESPIRATORY (INHALATION) EVERY 4 HOURS PRN
Qty: 8.5 G | Refills: 3 | Status: SHIPPED | OUTPATIENT
Start: 2023-11-24

## 2023-11-24 RX ORDER — METOPROLOL TARTRATE 25 MG/1
25 TABLET, FILM COATED ORAL 2 TIMES DAILY
Qty: 180 TABLET | Refills: 1 | Status: SHIPPED | OUTPATIENT
Start: 2023-11-24 | End: 2024-01-24

## 2023-11-24 RX ORDER — POTASSIUM CHLORIDE 750 MG/1
10 TABLET, EXTENDED RELEASE ORAL DAILY
Qty: 90 TABLET | Refills: 88 | Status: SHIPPED | OUTPATIENT
Start: 2023-11-24

## 2023-11-24 RX ORDER — CHLORTHALIDONE 25 MG/1
25 TABLET ORAL DAILY
Qty: 90 TABLET | Refills: 1 | Status: SHIPPED | OUTPATIENT
Start: 2023-11-24 | End: 2024-01-24

## 2023-11-24 NOTE — TELEPHONE ENCOUNTER
Requested Prescriptions   Pending Prescriptions Disp Refills    albuterol (PROAIR HFA/PROVENTIL HFA/VENTOLIN HFA) 108 (90 Base) MCG/ACT inhaler 8.5 g 3     Sig: Inhale 2 puffs into the lungs every 4 hours as needed for shortness of breath or wheezing       There is no refill protocol information for this order     Last Prescription Date:   5/5/22  Last Fill Qty/Refills:         8.5 g, R-3    Last Office Visit:              10/12/23   Future Office visit:           none    Routing to PCP/provider for refill consideration/determination.    Clinton Arellano RN on 11/24/2023 at 12:21 PM

## 2023-11-24 NOTE — TELEPHONE ENCOUNTER
Duplicate request. Not ordering. Already ordered 11/24/23.    Clinton Arellano RN on 11/24/2023 at 12:17 PM

## 2023-11-24 NOTE — TELEPHONE ENCOUNTER
Requested Prescriptions   Pending Prescriptions Disp Refills    chlorthalidone (HYGROTON) 25 MG tablet 90 tablet 1     Sig: Take 1 tablet (25 mg) by mouth daily Increase in dose       Diuretics (Including Combos) Protocol Passed - 11/24/2023 12:12 PM   Last Prescription Date:   6/9/23  Last Fill Qty/Refills:         90, R-1      metoprolol tartrate (LOPRESSOR) 25 MG tablet 180 tablet 1     Sig: Take 1 tablet (25 mg) by mouth 2 times daily Increase in dose       Beta-Blockers Protocol Passed - 11/24/2023 12:12 PM   Last Prescription Date:   6/9/23  Last Fill Qty/Refills:         180, R-1      potassium chloride ER (K-TAB/KLOR-CON) 10 MEQ CR tablet 90 tablet 88     Sig: Take 1 tablet (10 mEq) by mouth daily       Potassium Supplements Protocol Passed - 11/24/2023 12:12 PM   Last Prescription Date:   8/16/22  Last Fill Qty/Refills:         90, R-88      venlafaxine (EFFEXOR XR) 150 MG 24 hr capsule 90 capsule 3     Sig: Take 1 capsule (150 mg) by mouth daily with food       Serotonin-Norepinephrine Reuptake Inhibitors  Passed - 11/24/2023 12:12 PM    Last Prescription Date:   12/14/22  Last Fill Qty/Refills:         90, R-3       Last Office Visit:              10/12/23   Future Office visit:           1/24/24    Prescription approved per [facility] Refill Protocol.  Clinton Arellano RN on 11/24/2023 at 12:16 PM

## 2023-11-27 ENCOUNTER — ANTICOAGULATION THERAPY VISIT (OUTPATIENT)
Dept: ANTICOAGULATION | Facility: OTHER | Age: 63
End: 2023-11-27
Attending: INTERNAL MEDICINE
Payer: COMMERCIAL

## 2023-11-27 DIAGNOSIS — I74.9 EMBOLISM AND THROMBOSIS (H): ICD-10-CM

## 2023-11-27 DIAGNOSIS — Z79.01 ANTICOAGULATION MONITORING, INR RANGE 2-3: Primary | ICD-10-CM

## 2023-11-27 LAB — INR HOME MONITORING: 2.5 RATIO (ref 2–3)

## 2023-11-27 NOTE — PROGRESS NOTES
ANTICOAGULATION  MANAGEMENT-Home Monitor Managed by Exception    Trisha Fernando 63 year old female is on warfarin with therapeutic INR result. (Goal INR 2.0-3.0)    Recent labs: (last 7 days)     11/27/23  1709   INR 2.5       Previous INR was Therapeutic  Medication, diet, health changes since last INR:chart reviewed; none identified  Contacted within the last 12 weeks by phone on 10/23/23      JOSE JUAN     Trisha was NOT contacted regarding therapeutic result today per home monitoring policy manage by exception agreement.   Current warfarin dose is to be continued:     Summary  As of 11/27/2023      Full warfarin instructions:  2.5 mg every Mon, Fri; 5 mg all other days   Next INR check:  12/4/2023             ?   Smitha Nair RN  Anticoagulation Clinic  11/27/2023    _______________________________________________________________________     Anticoagulation Episode Summary       Current INR goal:  2.0-3.0   TTR:  89.2% (1 y)   Target end date:  Indefinite   Send INR reminders to:  [facility]    Indications    Long term (current) use of anticoagulants (Resolved) [Z79.01]  Embolism and thrombosis (H) (Resolved) [I74.9]  Anticoagulation monitoring  INR range 2-3 [Z79.01]  Embolism and thrombosis (H) [I74.9]             Comments:  MDINR needs weekly INR done             Anticoagulation Care Providers       Provider Role Specialty Phone number    Maria Guadalupe Ramon DO Referring Internal Medicine [phone]

## 2023-12-04 ENCOUNTER — ANTICOAGULATION THERAPY VISIT (OUTPATIENT)
Dept: ANTICOAGULATION | Facility: OTHER | Age: 63
End: 2023-12-04
Attending: INTERNAL MEDICINE
Payer: COMMERCIAL

## 2023-12-04 DIAGNOSIS — Z79.01 ANTICOAGULATION MONITORING, INR RANGE 2-3: Primary | ICD-10-CM

## 2023-12-04 DIAGNOSIS — I74.9 EMBOLISM AND THROMBOSIS (H): ICD-10-CM

## 2023-12-05 LAB — INR HOME MONITORING: 2.6 RATIO (ref 2–3)

## 2023-12-05 NOTE — PROGRESS NOTES
ANTICOAGULATION  MANAGEMENT-Home Monitor Managed by Exception    Trisha FISHER Kassie 63 year old female is on warfarin with therapeutic INR result. (Goal INR 2.0-3.0)    Recent labs: (last 7 days)     12/05/23  0622   INR 2.6       Previous INR was Therapeutic  Medication, diet, health changes since last INR:chart reviewed; none identified  Contacted within the last 12 weeks by phone on 10/23/23  Last [facility] referral date: 10/10/2023      JOSE JUAN Honda was NOT contacted regarding therapeutic result today per home monitoring policy manage by exception agreement.   Current warfarin dose is to be continued:     Summary  As of 12/4/2023      Full warfarin instructions:  2.5 mg every Mon, Fri; 5 mg all other days   Next INR check:  12/12/2023             ?   Aury Jamison RN  Anticoagulation Clinic  12/5/2023    _______________________________________________________________________     Anticoagulation Episode Summary       Current INR goal:  2.0-3.0   TTR:  89.2% (1 y)   Target end date:  Indefinite   Send INR reminders to:  [facility]    Indications    Long term (current) use of anticoagulants (Resolved) [Z79.01]  Embolism and thrombosis (H) (Resolved) [I74.9]  Anticoagulation monitoring  INR range 2-3 [Z79.01]  Embolism and thrombosis (H) [I74.9]             Comments:  MDINR needs weekly INR done             Anticoagulation Care Providers       Provider Role Specialty Phone number    Maria Guadalupe Ramon DO Referring Internal Medicine [phone]

## 2023-12-11 ENCOUNTER — VIRTUAL VISIT (OUTPATIENT)
Dept: FAMILY MEDICINE | Facility: OTHER | Age: 63
End: 2023-12-11
Attending: INTERNAL MEDICINE
Payer: COMMERCIAL

## 2023-12-11 ENCOUNTER — ANTICOAGULATION THERAPY VISIT (OUTPATIENT)
Dept: ANTICOAGULATION | Facility: OTHER | Age: 63
End: 2023-12-11
Attending: INTERNAL MEDICINE
Payer: COMMERCIAL

## 2023-12-11 DIAGNOSIS — Z79.01 ANTICOAGULATION MONITORING, INR RANGE 2-3: Primary | ICD-10-CM

## 2023-12-11 DIAGNOSIS — U07.1 INFECTION DUE TO 2019 NOVEL CORONAVIRUS: Primary | ICD-10-CM

## 2023-12-11 DIAGNOSIS — I74.9 EMBOLISM AND THROMBOSIS (H): ICD-10-CM

## 2023-12-11 LAB — INR HOME MONITORING: 2.7 RATIO (ref 2–3)

## 2023-12-11 PROCEDURE — 99212 OFFICE O/P EST SF 10 MIN: CPT | Mod: TEL

## 2023-12-11 ASSESSMENT — PATIENT HEALTH QUESTIONNAIRE - PHQ9
10. IF YOU CHECKED OFF ANY PROBLEMS, HOW DIFFICULT HAVE THESE PROBLEMS MADE IT FOR YOU TO DO YOUR WORK, TAKE CARE OF THINGS AT HOME, OR GET ALONG WITH OTHER PEOPLE: NOT DIFFICULT AT ALL
SUM OF ALL RESPONSES TO PHQ QUESTIONS 1-9: 0

## 2023-12-11 ASSESSMENT — ANXIETY QUESTIONNAIRES
4. TROUBLE RELAXING: NOT AT ALL
7. FEELING AFRAID AS IF SOMETHING AWFUL MIGHT HAPPEN: NOT AT ALL
3. WORRYING TOO MUCH ABOUT DIFFERENT THINGS: NOT AT ALL
2. NOT BEING ABLE TO STOP OR CONTROL WORRYING: NOT AT ALL
6. BECOMING EASILY ANNOYED OR IRRITABLE: NOT AT ALL
GAD7 TOTAL SCORE: 0
1. FEELING NERVOUS, ANXIOUS, OR ON EDGE: NOT AT ALL
IF YOU CHECKED OFF ANY PROBLEMS ON THIS QUESTIONNAIRE, HOW DIFFICULT HAVE THESE PROBLEMS MADE IT FOR YOU TO DO YOUR WORK, TAKE CARE OF THINGS AT HOME, OR GET ALONG WITH OTHER PEOPLE: NOT DIFFICULT AT ALL
5. BEING SO RESTLESS THAT IT IS HARD TO SIT STILL: NOT AT ALL
GAD7 TOTAL SCORE: 0

## 2023-12-11 NOTE — NURSING NOTE
"Chief Complaint   Patient presents with    Covid Concern       Initial LMP 05/14/2013 (Approximate)  Estimated body mass index is 56.26 kg/m  as calculated from the following:    Height as of 8/21/23: 1.715 m (5' 7.5\").    Weight as of 8/21/23: 165.4 kg (364 lb 9.6 oz).    FOOD SECURITY SCREENING QUESTIONS:    The next two questions are to help us understand your food security.  If you are feeling you need any assistance in this area, we have resources available to support you today.    Hunger Vital Signs:  Within the past 12 months we worried whether our food would run out before we got money to buy more. Never  Within the past 12 months the food we bought just didn't last and we didn't have money to get more. Never    Medication Reconciliation: Complete.       Devorah Collins LPN on 12/11/2023 at 9:20 AM     "

## 2023-12-11 NOTE — PATIENT INSTRUCTIONS
For informational purposes only. Not to replace the advice of your health care provider. Copyright   2022 [facility]. All rights reserved. Clinically reviewed by Jaclyn Varela, PharmD, BCACP. [employer] [id number] - REV 06/23.  COVID-19 Outpatient Treatments  Your care team can help you find the best treatments for COVID-19. Talk to a health care provider or refer to the FDA medicine fact sheets below.  Paxlovid (nirmatrelvir and ritonavir): https://www.paxlovid.[employer]/resources  Molnupiravir (Lagevrio): https://www.fda.gov/media/872337/download  Important: We can only prescribe Paxlovid or Molnupiravir when it can be started within 5 days of first having symptoms.  Paxlovid (nimatrelvir and ritonavir)  How it works  Two medicines (nirmatrelvir and ritonavir) are taken together. They stop the virus from growing. Less amount of virus is easier for your body to fight.  Benefits  Lowers risk of a hospital stay or death from COVID-19.  How to take  Medicine comes in a daily container with both medicine tablets. Take by mouth twice daily (once in the morning, once at night) for 5 days.  The number of tablets to take varies by patient.  Don't chew or break capsules. Swallow whole.  When to take  Take it as soon as possible and within 5 days of your first symptoms.  Who can take it  Patients must be 12 years or older weigh at least 88 pounds. Paxlovid is the preferred treatment for pregnant patients.  Possible side effects  Can cause altered sense of taste, diarrhea (loose, watery stools), high blood pressure, muscle aches.  Medicine conflicts  Some medicines may conflict with Paxlovid and may cause serious side effects.  Tell your care team about all the medicines you take, including prescription and over-the-counter medicines, vitamins, and herbal supplements.  Your care team will review your medicines to make sure that you can safely take Paxlovid.  Cautions  Paxlovid is not advised for patients with severe  kidney or liver disease. If you have kidney or liver problems, the dose may need to be changed.  If you're pregnant or breastfeeding, talk to your care team about your options.  If you take hormonal birth control (such as the Pill), then you or your partner should also use a non-hormonal form of birth control (such as a condom). Keep doing this for 1 menstrual cycle after your last dose of Paxlovid.  Molnupiravir (lagevrio)  How it works  Stops the virus from growing. Less amount of virus is easier for your body to fight.  Benefits  Lowers risk of a hospital stay or death from COVID-19.  How to take  Take 4 capsules by mouth every 12 hours (4 in the morning and 4 at night) for 5 days. Don't chew or break capsules. Swallow whole.  When to take  Take as soon as possible and within 5 days of your first symptoms.  Who can take it  Patients must be 18 years or older.   Possible side effects  Diarrhea (loose, watery stools), nausea (feeling sick to your stomach), dizziness, headaches.  Medicine conflicts  Right now, there are no known conflicts with other drugs. But tell your care team about all medicines you take.  Cautions  This medicine is not advised for patients who are pregnant.  If you are someone who could become pregnant, use trusted birth control until 4 days after your last dose of molnupiravir.  If your partner could become pregnant, you should use trusted birth control until 3 months after your last dose of molnupiravir.

## 2023-12-11 NOTE — PROGRESS NOTES
ANTICOAGULATION MANAGEMENT     Trisha Fernando 63 year old female is on warfarin with therapeutic INR result. (Goal INR 2.0-3.0)    Recent labs: (last 7 days)     12/11/23  1521   INR 2.7       ASSESSMENT     Source(s): Chart Review and Patient/Caregiver Call     Warfarin doses taken: Warfarin taken as instructed  Diet: No new diet changes identified  Medication/supplement changes:  will plan to start paxlovid today  New illness, injury, or hospitalization: Yes: Covid positive   Signs or symptoms of bleeding or clotting: No  Previous result: Therapeutic last 2(+) visits  Additional findings: None       PLAN     Recommended plan for temporary change(s) affecting INR     Dosing Instructions: Continue your current warfarin dose with next INR in 1 week       Summary  As of 12/11/2023      Full warfarin instructions:  2.5 mg every Mon, Fri; 5 mg all other days   Next INR check:  12/18/2023               Telephone call with Trisha who verbalizes understanding and agrees to plan    Patient to recheck with home meter    Education provided:   None required    Plan made per [facility] anticoagulation protocol    Smitha Nair RN  Anticoagulation Clinic  12/11/2023    _______________________________________________________________________     Anticoagulation Episode Summary       Current INR goal:  2.0-3.0   TTR:  89.2% (1 y)   Target end date:  Indefinite   Send INR reminders to:  [facility]    Indications    Long term (current) use of anticoagulants (Resolved) [Z79.01]  Embolism and thrombosis (H) (Resolved) [I74.9]  Anticoagulation monitoring  INR range 2-3 [Z79.01]  Embolism and thrombosis (H) [I74.9]             Comments:  MDINR needs weekly INR done             Anticoagulation Care Providers       Provider Role Specialty Phone number    Maria Guadalupe Ramon DO Referring Internal Medicine [phone]

## 2023-12-18 ENCOUNTER — MYC REFILL (OUTPATIENT)
Dept: INTERNAL MEDICINE | Facility: OTHER | Age: 63
End: 2023-12-18

## 2023-12-18 ENCOUNTER — ANTICOAGULATION THERAPY VISIT (OUTPATIENT)
Dept: ANTICOAGULATION | Facility: OTHER | Age: 63
End: 2023-12-18
Attending: INTERNAL MEDICINE
Payer: COMMERCIAL

## 2023-12-18 DIAGNOSIS — Z79.01 ANTICOAGULATION MONITORING, INR RANGE 2-3: Primary | ICD-10-CM

## 2023-12-18 DIAGNOSIS — E66.01 MORBID OBESITY (H): Primary | Chronic | ICD-10-CM

## 2023-12-18 DIAGNOSIS — I74.9 EMBOLISM AND THROMBOSIS (H): ICD-10-CM

## 2023-12-18 LAB — INR HOME MONITORING: 2 RATIO (ref 2–3)

## 2023-12-19 NOTE — PROGRESS NOTES
ANTICOAGULATION MANAGEMENT     Trisha Fernando 63 year old female is on warfarin with therapeutic INR result. (Goal INR 2.0-3.0)    Recent labs: (last 7 days)     12/18/23  1907   INR 2       ASSESSMENT     Source(s): Patient/ Care giver call     Warfarin doses taken: Warfarin taken as instructed  Diet: No new diet changes identified  New illness, injury, or hospitalization: No  Medication/supplement changes:  finished paxlovid on 12/16/23 morning  Signs or symptoms of bleeding or clotting: No  Previous INR: Therapeutic last 2(+) visits  Additional findings: None     PLAN     Recommended plan for no diet, medication or health factor changes affecting INR     Dosing Instructions: Continue your current warfarin dose with next INR in 1 week       Summary  As of 12/18/2023      Full warfarin instructions:  2.5 mg every Mon, Fri; 5 mg all other days   Next INR check:  12/26/2023               Telephone call with Trisha who verbalizes understanding and agrees to plan    Patient to recheck with home meter    Education provided: Please call back if any changes to your diet, medications or how you've been taking warfarin and Resume manage by exception with home monitor. Continue to submit INR results to home monitor company.You will only be called when your result is out of range. Please call and notify [facility] if new medication started, dose missed, signs or symptoms of bleeding or clotting, or a surgery/procedure is scheduled.    Plan made per [facility] anticoagulation protocol    Ghada Granda RN  Anticoagulation Clinic  12/19/2023    _______________________________________________________________________     Anticoagulation Episode Summary       Current INR goal:  2.0-3.0   TTR:  89.5% (1 y)   Target end date:  Indefinite   Send INR reminders to:  [facility]    Indications    Long term (current) use of anticoagulants (Resolved) [Z79.01]  Embolism and thrombosis (H) (Resolved) [I74.9]  Anticoagulation  monitoring  INR range 2-3 [Z79.01]  Embolism and thrombosis (H) [I74.9]             Comments:  MDINR needs weekly INR done             Anticoagulation Care Providers       Provider Role Specialty Phone number    Maria Guadalupe Ramon DO Referring Internal Medicine [phone]

## 2023-12-25 LAB — INR HOME MONITORING: 2.1 RATIO (ref 2–3)

## 2023-12-26 ENCOUNTER — ANTICOAGULATION THERAPY VISIT (OUTPATIENT)
Dept: ANTICOAGULATION | Facility: OTHER | Age: 63
End: 2023-12-26
Attending: INTERNAL MEDICINE
Payer: COMMERCIAL

## 2023-12-26 DIAGNOSIS — Z79.01 ANTICOAGULATION MONITORING, INR RANGE 2-3: Primary | ICD-10-CM

## 2023-12-26 DIAGNOSIS — I74.9 EMBOLISM AND THROMBOSIS (H): ICD-10-CM

## 2023-12-26 RX ORDER — SEMAGLUTIDE 1.34 MG/ML
0.25 INJECTION, SOLUTION SUBCUTANEOUS
Qty: 1.5 ML | Refills: 4 | Status: SHIPPED | OUTPATIENT
Start: 2023-12-26 | End: 2023-12-29

## 2023-12-26 NOTE — TELEPHONE ENCOUNTER
[id number] in [employer] [facility] sent Rx request for the following:      Requested Prescriptions   Pending Prescriptions Disp Refills    semaglutide (OZEMPIC (0.25 OR 0.5 MG/DOSE)) 2 MG/1.5ML SOPN pen 1.5 mL      Sig: Inject 0.25 mg Subcutaneous every 7 days       GLP-1 Agonists Protocol Failed - 12/18/2023  1:54 PM        Failed - HgbA1C in past 3 or 6 months     If HgbA1C is 8 or greater, it needs to be on file within the past 3 months.  If less than 8, must be on file within the past 6 months.     Recent Labs   Lab Test 06/19/23  1221   A1C 5.5            Last Prescription Date:   10/27/22  Last Fill Qty/Refills:         1.5ml, R-3    Last Office Visit:              8/21/23   Future Office visit:           1/24/24    Routing refill request to provider for review/approval because:  Drug not active on patient's medication list    Brandi Toussaint RN on 12/26/2023 at 9:40 AM

## 2023-12-26 NOTE — PROGRESS NOTES
ANTICOAGULATION  MANAGEMENT-Home Monitor Managed by Exception    Trisha FISHER Marlons 63 year old female is on warfarin with therapeutic INR result. (Goal INR 2.0-3.0)    Recent labs: (last 7 days)     12/25/23  1936   INR 2.1       Previous INR was Therapeutic  Medication, diet, health changes since last INR:chart reviewed; none identified  Contacted within the last 12 weeks by phone on 12/18/23  Last [facility] referral date: 10/10/2023      JOSE JUAN Rico was NOT contacted regarding therapeutic result today per home monitoring policy manage by exception agreement.   Current warfarin dose is to be continued:     Summary  As of 12/26/2023      Full warfarin instructions:  2.5 mg every Mon, Fri; 5 mg all other days   Next INR check:  1/2/2024             ?   Ghada Granda RN  Anticoagulation Clinic  12/26/2023    _______________________________________________________________________     Anticoagulation Episode Summary       Current INR goal:  2.0-3.0   TTR:  91.4% (1 y)   Target end date:  Indefinite   Send INR reminders to:  [facility]    Indications    Long term (current) use of anticoagulants (Resolved) [Z79.01]  Embolism and thrombosis (H) (Resolved) [I74.9]  Anticoagulation monitoring  INR range 2-3 [Z79.01]  Embolism and thrombosis (H) [I74.9]             Comments:  MDINR needs weekly INR done             Anticoagulation Care Providers       Provider Role Specialty Phone number    Maria Guadalupe Ramon DO Referring Internal Medicine [phone]

## 2023-12-28 ENCOUNTER — MYC MEDICAL ADVICE (OUTPATIENT)
Dept: INTERNAL MEDICINE | Facility: OTHER | Age: 63
End: 2023-12-28
Payer: COMMERCIAL

## 2023-12-28 DIAGNOSIS — E66.01 MORBID OBESITY (H): Chronic | ICD-10-CM

## 2023-12-29 RX ORDER — SEMAGLUTIDE 1.34 MG/ML
0.25 INJECTION, SOLUTION SUBCUTANEOUS
Qty: 1.5 ML | Refills: 4 | Status: SHIPPED | OUTPATIENT
Start: 2023-12-29 | End: 2024-01-24

## 2023-12-29 RX ORDER — SEMAGLUTIDE 1.34 MG/ML
0.25 INJECTION, SOLUTION SUBCUTANEOUS
Qty: 1.5 ML | Refills: 4 | Status: SHIPPED | OUTPATIENT
Start: 2023-12-29 | End: 2023-12-29

## 2024-01-01 LAB — INR HOME MONITORING: 2.1 RATIO (ref 2–3)

## 2024-01-02 ENCOUNTER — ANTICOAGULATION THERAPY VISIT (OUTPATIENT)
Dept: ANTICOAGULATION | Facility: OTHER | Age: 64
End: 2024-01-02
Attending: INTERNAL MEDICINE
Payer: COMMERCIAL

## 2024-01-02 DIAGNOSIS — I74.9 EMBOLISM AND THROMBOSIS (H): ICD-10-CM

## 2024-01-02 DIAGNOSIS — Z79.01 ANTICOAGULATION MONITORING, INR RANGE 2-3: Primary | ICD-10-CM

## 2024-01-02 NOTE — PROGRESS NOTES
ANTICOAGULATION  MANAGEMENT-Home Monitor Managed by Exception    Trisha FISHER [place] 63 year old female is on warfarin with therapeutic INR result. (Goal INR 2.0-3.0)    Recent labs: (last 7 days)     01/01/24  1402   INR 2.1       Previous INR was Therapeutic  Medication, diet, health changes since last INR:chart reviewed; none identified  Contacted within the last 12 weeks by phone on 12/18/23  Last [facility] referral date: 10/10/2023      JOSE JUAN Honda was NOT contacted regarding therapeutic result today per home monitoring policy manage by exception agreement.   Current warfarin dose is to be continued:     Summary  As of 1/2/2024      Full warfarin instructions:  2.5 mg every Mon, Fri; 5 mg all other days   Next INR check:  1/8/2024             ?   Smitha Nair RN  Anticoagulation Clinic  1/2/2024    _______________________________________________________________________     Anticoagulation Episode Summary       Current INR goal:  2.0-3.0   TTR:  92.2% (1 y)   Target end date:  Indefinite   Send INR reminders to:  [facility]    Indications    Long term (current) use of anticoagulants (Resolved) [Z79.01]  Embolism and thrombosis (H) (Resolved) [I74.9]  Anticoagulation monitoring  INR range 2-3 [Z79.01]  Embolism and thrombosis (H) [I74.9]             Comments:  MDINR needs weekly INR done             Anticoagulation Care Providers       Provider Role Specialty Phone number    Maria Guadalupe Ramon DO Referring Internal Medicine [phone]

## 2024-01-08 ENCOUNTER — ANTICOAGULATION THERAPY VISIT (OUTPATIENT)
Dept: ANTICOAGULATION | Facility: OTHER | Age: 64
End: 2024-01-08
Attending: INTERNAL MEDICINE
Payer: COMMERCIAL

## 2024-01-08 DIAGNOSIS — Z79.01 ANTICOAGULATION MONITORING, INR RANGE 2-3: Primary | ICD-10-CM

## 2024-01-08 DIAGNOSIS — I74.9 EMBOLISM AND THROMBOSIS (H): ICD-10-CM

## 2024-01-08 LAB — INR HOME MONITORING: 3.1 RATIO (ref 2–3)

## 2024-01-09 NOTE — PROGRESS NOTES
ANTICOAGULATION MANAGEMENT     Trisha Fernando 63 year old female is on warfarin with supratherapeutic INR result. (Goal INR 2.0-3.0)    Recent labs: (last 7 days)     01/08/24  1725   INR 3.1*       ASSESSMENT     Source(s): Chart review     Warfarin doses taken: Warfarin taken as instructed  Diet: No new diet changes identified  New illness, injury, or hospitalization: No  Medication/supplement changes: None noted  Signs or symptoms of bleeding or clotting: No  Previous INR: Therapeutic last 2(+) visits  Additional findings: None     PLAN     Recommended plan for no diet, medication or health factor changes affecting INR     Dosing Instructions: Continue your current warfarin dose with next INR in 1 week       Summary  As of 1/8/2024      Full warfarin instructions:  2.5 mg every Mon, Fri; 5 mg all other days   Next INR check:  1/15/2024               Detailed voice message left for Trisha with dosing instructions and follow up date.     Patient to recheck with home meter    Education provided: Please call back if any changes to your diet, medications or how you've been taking warfarin    Plan made per [facility] anticoagulation protocol    Ghada Granda RN  Anticoagulation Clinic  1/9/2024    _______________________________________________________________________     Anticoagulation Episode Summary       Current INR goal:  2.0-3.0   TTR:  92.0% (1 y)   Target end date:  Indefinite   Send INR reminders to:  [facility]    Indications    Long term (current) use of anticoagulants (Resolved) [Z79.01]  Embolism and thrombosis (H) (Resolved) [I74.9]  Anticoagulation monitoring  INR range 2-3 [Z79.01]  Embolism and thrombosis (H) [I74.9]             Comments:  MDINR needs weekly INR done             Anticoagulation Care Providers       Provider Role Specialty Phone number    Maria Guadalupe Ramon DO Referring Internal Medicine [phone]

## 2024-01-15 ENCOUNTER — ANTICOAGULATION THERAPY VISIT (OUTPATIENT)
Dept: ANTICOAGULATION | Facility: OTHER | Age: 64
End: 2024-01-15
Attending: INTERNAL MEDICINE
Payer: COMMERCIAL

## 2024-01-15 DIAGNOSIS — Z79.01 ANTICOAGULATION MONITORING, INR RANGE 2-3: Primary | ICD-10-CM

## 2024-01-15 DIAGNOSIS — I74.9 EMBOLISM AND THROMBOSIS (H): ICD-10-CM

## 2024-01-15 LAB — INR HOME MONITORING: 3.1 RATIO (ref 2–3)

## 2024-01-16 NOTE — PROGRESS NOTES
ANTICOAGULATION MANAGEMENT     Trisha Fernando 63 year old female is on warfarin with supratherapeutic INR result. (Goal INR 2.0-3.0)    Recent labs: (last 7 days)     01/15/24  1905   INR 3.1*       ASSESSMENT     Source(s): Patient/ Care giver call     Warfarin doses taken: Warfarin taken as instructed  Diet: No new diet changes identified  New illness, injury, or hospitalization: No  Medication/supplement changes: None noted  Signs or symptoms of bleeding or clotting: No  Previous INR: Supratherapeutic  Additional findings:  Patient reports stopping their Ozempic 3 weeks ago.     PLAN     Recommended plan for no diet, medication or health factor changes affecting INR     Dosing Instructions: decrease your warfarin dose (8.3% change) with next INR in 1 week       Summary  As of 1/15/2024      Full warfarin instructions:  2.5 mg every Mon, Fri; 5 mg all other days   Next INR check:                 Telephone call with Trisha who verbalizes understanding and agrees to plan    Patient to recheck with home meter    Education provided: Please call back if any changes to your diet, medications or how you've been taking warfarin, Monitoring for bleeding signs and symptoms, and When to seek medical attention/emergency care    Plan made per [facility] anticoagulation protocol    Josee Sterling RN  Anticoagulation Clinic  1/16/2024    _______________________________________________________________________     Anticoagulation Episode Summary       Current INR goal:  2.0-3.0   TTR:  90.0% (1 y)   Target end date:  Indefinite   Send INR reminders to:  [facility]    Indications    Long term (current) use of anticoagulants (Resolved) [Z79.01]  Embolism and thrombosis (H) (Resolved) [I74.9]  Anticoagulation monitoring  INR range 2-3 [Z79.01]  Embolism and thrombosis (H) [I74.9]             Comments:  MDINR needs weekly INR done             Anticoagulation Care Providers       Provider Role Specialty Phone number    Yenny,  Maria Guadalupe ORR,  [facility] Internal Medicine [phone]

## 2024-01-22 LAB — INR HOME MONITORING: 2.3 RATIO (ref 2–3)

## 2024-01-23 ENCOUNTER — ANTICOAGULATION THERAPY VISIT (OUTPATIENT)
Dept: ANTICOAGULATION | Facility: OTHER | Age: 64
End: 2024-01-23
Attending: INTERNAL MEDICINE
Payer: COMMERCIAL

## 2024-01-23 DIAGNOSIS — I74.9 EMBOLISM AND THROMBOSIS (H): ICD-10-CM

## 2024-01-23 DIAGNOSIS — Z79.01 ANTICOAGULATION MONITORING, INR RANGE 2-3: Primary | ICD-10-CM

## 2024-01-23 NOTE — PROGRESS NOTES
ANTICOAGULATION MANAGEMENT     Trisha Fernando 63 year old female is on warfarin with therapeutic INR result. (Goal INR 2.0-3.0)    Recent labs: (last 7 days)     01/22/24  1649   INR 2.3       ASSESSMENT     Source(s): Chart Review  Previous INR was Supratherapeutic  Medication, diet, health changes since last INR chart reviewed; none identified         PLAN     Recommended plan for no diet, medication or health factor changes affecting INR     Dosing Instructions: Continue your current warfarin dose with next INR in 1 week       Summary  As of 1/23/2024      Full warfarin instructions:  2.5 mg every Mon, Wed, Fri; 5 mg all other days   Next INR check:  1/30/2024               Detailed voice message left for Trisha with dosing instructions and follow up date.     Patient to recheck with home meter    Education provided:   Contact [phone] with any changes, questions or concerns.     Plan made per [facility] anticoagulation protocol    Smitha Nair RN  Anticoagulation Clinic  1/23/2024    _______________________________________________________________________     Anticoagulation Episode Summary       Current INR goal:  2.0-3.0   TTR:  89.8% (1 y)   Target end date:  Indefinite   Send INR reminders to:  [facility]    Indications    Long term (current) use of anticoagulants (Resolved) [Z79.01]  Embolism and thrombosis (H) (Resolved) [I74.9]  Anticoagulation monitoring  INR range 2-3 [Z79.01]  Embolism and thrombosis (H) [I74.9]             Comments:  MDINR needs weekly INR done             Anticoagulation Care Providers       Provider Role Specialty Phone number    Maria Guadalupe Ramon DO Referring Internal Medicine [phone]

## 2024-01-24 ENCOUNTER — OFFICE VISIT (OUTPATIENT)
Dept: INTERNAL MEDICINE | Facility: OTHER | Age: 64
End: 2024-01-24
Attending: INTERNAL MEDICINE
Payer: COMMERCIAL

## 2024-01-24 VITALS
DIASTOLIC BLOOD PRESSURE: 85 MMHG | HEART RATE: 71 BPM | BODY MASS INDEX: 45.99 KG/M2 | SYSTOLIC BLOOD PRESSURE: 133 MMHG | HEIGHT: 67 IN | WEIGHT: 293 LBS | OXYGEN SATURATION: 97 % | RESPIRATION RATE: 20 BRPM | TEMPERATURE: 97.9 F

## 2024-01-24 DIAGNOSIS — M25.50 MULTIPLE JOINT PAIN: ICD-10-CM

## 2024-01-24 DIAGNOSIS — F33.0 DEPRESSION, MAJOR, RECURRENT, MILD (H): ICD-10-CM

## 2024-01-24 DIAGNOSIS — I10 ESSENTIAL HYPERTENSION: ICD-10-CM

## 2024-01-24 DIAGNOSIS — E66.01 MORBID OBESITY (H): ICD-10-CM

## 2024-01-24 DIAGNOSIS — F40.10 SOCIAL ANXIETY DISORDER: ICD-10-CM

## 2024-01-24 DIAGNOSIS — Z13.220 SCREENING FOR HYPERLIPIDEMIA: ICD-10-CM

## 2024-01-24 DIAGNOSIS — I74.9 EMBOLISM AND THROMBOSIS (H): Primary | ICD-10-CM

## 2024-01-24 DIAGNOSIS — Z12.31 ENCOUNTER FOR SCREENING MAMMOGRAM FOR BREAST CANCER: ICD-10-CM

## 2024-01-24 DIAGNOSIS — Z13.1 SCREENING FOR DIABETES MELLITUS: ICD-10-CM

## 2024-01-24 DIAGNOSIS — Z79.01 ANTICOAGULATION MONITORING, INR RANGE 2-3: ICD-10-CM

## 2024-01-24 DIAGNOSIS — E83.52 HYPERCALCEMIA: ICD-10-CM

## 2024-01-24 DIAGNOSIS — R76.8 ELEVATED RHEUMATOID FACTOR: ICD-10-CM

## 2024-01-24 LAB
ALBUMIN SERPL BCG-MCNC: 4 G/DL (ref 3.5–5.2)
ALP SERPL-CCNC: 57 U/L (ref 40–150)
ALT SERPL W P-5'-P-CCNC: 34 U/L (ref 0–50)
ANION GAP SERPL CALCULATED.3IONS-SCNC: 9 MMOL/L (ref 7–15)
AST SERPL W P-5'-P-CCNC: 28 U/L (ref 0–45)
BILIRUB SERPL-MCNC: 0.5 MG/DL
BUN SERPL-MCNC: 14.4 MG/DL (ref 8–23)
CALCIUM SERPL-MCNC: 10.3 MG/DL (ref 8.8–10.2)
CHLORIDE SERPL-SCNC: 97 MMOL/L (ref 98–107)
CHOLEST SERPL-MCNC: 217 MG/DL
CREAT SERPL-MCNC: 0.87 MG/DL (ref 0.51–0.95)
CRP SERPL-MCNC: 9.22 MG/L
DEPRECATED HCO3 PLAS-SCNC: 32 MMOL/L (ref 22–29)
EGFRCR SERPLBLD CKD-EPI 2021: 74 ML/MIN/1.73M2
ERYTHROCYTE [DISTWIDTH] IN BLOOD BY AUTOMATED COUNT: 14 % (ref 10–15)
ERYTHROCYTE [SEDIMENTATION RATE] IN BLOOD BY WESTERGREN METHOD: 20 MM/HR (ref 0–30)
FASTING STATUS PATIENT QL REPORTED: NO
GLUCOSE SERPL-MCNC: 110 MG/DL (ref 70–99)
HCT VFR BLD AUTO: 46 % (ref 35–47)
HDLC SERPL-MCNC: 70 MG/DL
HGB BLD-MCNC: 15.7 G/DL (ref 11.7–15.7)
IRON BINDING CAPACITY (ROCHE): 334 UG/DL (ref 240–430)
IRON SATN MFR SERPL: 25 % (ref 15–46)
IRON SERPL-MCNC: 83 UG/DL (ref 37–145)
LDLC SERPL CALC-MCNC: 120 MG/DL
MAGNESIUM SERPL-MCNC: 1.7 MG/DL (ref 1.7–2.3)
MCH RBC QN AUTO: 32.4 PG (ref 26.5–33)
MCHC RBC AUTO-ENTMCNC: 34.1 G/DL (ref 31.5–36.5)
MCV RBC AUTO: 95 FL (ref 78–100)
NONHDLC SERPL-MCNC: 147 MG/DL
PLATELET # BLD AUTO: 210 10E3/UL (ref 150–450)
POTASSIUM SERPL-SCNC: 3.5 MMOL/L (ref 3.4–5.3)
PROT SERPL-MCNC: 7.3 G/DL (ref 6.4–8.3)
RBC # BLD AUTO: 4.85 10E6/UL (ref 3.8–5.2)
SODIUM SERPL-SCNC: 138 MMOL/L (ref 135–145)
TRIGL SERPL-MCNC: 136 MG/DL
TSH SERPL DL<=0.005 MIU/L-ACNC: 2.22 UIU/ML (ref 0.3–4.2)
WBC # BLD AUTO: 7.7 10E3/UL (ref 4–11)

## 2024-01-24 PROCEDURE — 85027 COMPLETE CBC AUTOMATED: CPT | Mod: ZL | Performed by: INTERNAL MEDICINE

## 2024-01-24 PROCEDURE — 80061 LIPID PANEL: CPT | Mod: ZL | Performed by: INTERNAL MEDICINE

## 2024-01-24 PROCEDURE — 82306 VITAMIN D 25 HYDROXY: CPT | Mod: ZL | Performed by: INTERNAL MEDICINE

## 2024-01-24 PROCEDURE — 80053 COMPREHEN METABOLIC PANEL: CPT | Mod: ZL | Performed by: INTERNAL MEDICINE

## 2024-01-24 PROCEDURE — 84443 ASSAY THYROID STIM HORMONE: CPT | Mod: ZL | Performed by: INTERNAL MEDICINE

## 2024-01-24 PROCEDURE — 86140 C-REACTIVE PROTEIN: CPT | Mod: ZL | Performed by: INTERNAL MEDICINE

## 2024-01-24 PROCEDURE — 90715 TDAP VACCINE 7 YRS/> IM: CPT | Performed by: INTERNAL MEDICINE

## 2024-01-24 PROCEDURE — 86431 RHEUMATOID FACTOR QUANT: CPT | Mod: ZL | Performed by: INTERNAL MEDICINE

## 2024-01-24 PROCEDURE — 86200 CCP ANTIBODY: CPT | Mod: ZL | Performed by: INTERNAL MEDICINE

## 2024-01-24 PROCEDURE — 83550 IRON BINDING TEST: CPT | Mod: ZL | Performed by: INTERNAL MEDICINE

## 2024-01-24 PROCEDURE — 86038 ANTINUCLEAR ANTIBODIES: CPT | Mod: ZL | Performed by: INTERNAL MEDICINE

## 2024-01-24 PROCEDURE — 99396 PREV VISIT EST AGE 40-64: CPT | Mod: 25 | Performed by: INTERNAL MEDICINE

## 2024-01-24 PROCEDURE — 85652 RBC SED RATE AUTOMATED: CPT | Mod: ZL | Performed by: INTERNAL MEDICINE

## 2024-01-24 PROCEDURE — 36415 COLL VENOUS BLD VENIPUNCTURE: CPT | Mod: ZL | Performed by: INTERNAL MEDICINE

## 2024-01-24 PROCEDURE — 83970 ASSAY OF PARATHORMONE: CPT | Mod: ZL | Performed by: INTERNAL MEDICINE

## 2024-01-24 PROCEDURE — 90471 IMMUNIZATION ADMIN: CPT | Performed by: INTERNAL MEDICINE

## 2024-01-24 PROCEDURE — 83735 ASSAY OF MAGNESIUM: CPT | Mod: ZL | Performed by: INTERNAL MEDICINE

## 2024-01-24 RX ORDER — PREDNISONE 20 MG/1
20 TABLET ORAL DAILY
Qty: 7 TABLET | Refills: 0 | Status: SHIPPED | OUTPATIENT
Start: 2024-01-24 | End: 2024-01-31

## 2024-01-24 RX ORDER — CHLORTHALIDONE 25 MG/1
25 TABLET ORAL DAILY
Qty: 90 TABLET | Refills: 4 | Status: SHIPPED | OUTPATIENT
Start: 2024-01-24

## 2024-01-24 RX ORDER — BUPROPION HYDROCHLORIDE 150 MG/1
300 TABLET ORAL EVERY MORNING
Qty: 180 TABLET | Refills: 4 | Status: SHIPPED | OUTPATIENT
Start: 2024-01-24

## 2024-01-24 RX ORDER — METOPROLOL TARTRATE 25 MG/1
25 TABLET, FILM COATED ORAL 2 TIMES DAILY
Qty: 180 TABLET | Refills: 4 | Status: SHIPPED | OUTPATIENT
Start: 2024-01-24

## 2024-01-24 ASSESSMENT — PATIENT HEALTH QUESTIONNAIRE - PHQ9
SUM OF ALL RESPONSES TO PHQ QUESTIONS 1-9: 13
SUM OF ALL RESPONSES TO PHQ QUESTIONS 1-9: 13
10. IF YOU CHECKED OFF ANY PROBLEMS, HOW DIFFICULT HAVE THESE PROBLEMS MADE IT FOR YOU TO DO YOUR WORK, TAKE CARE OF THINGS AT HOME, OR GET ALONG WITH OTHER PEOPLE: SOMEWHAT DIFFICULT

## 2024-01-24 ASSESSMENT — ANXIETY QUESTIONNAIRES
7. FEELING AFRAID AS IF SOMETHING AWFUL MIGHT HAPPEN: NOT AT ALL
GAD7 TOTAL SCORE: 5
IF YOU CHECKED OFF ANY PROBLEMS ON THIS QUESTIONNAIRE, HOW DIFFICULT HAVE THESE PROBLEMS MADE IT FOR YOU TO DO YOUR WORK, TAKE CARE OF THINGS AT HOME, OR GET ALONG WITH OTHER PEOPLE: SOMEWHAT DIFFICULT
3. WORRYING TOO MUCH ABOUT DIFFERENT THINGS: SEVERAL DAYS
2. NOT BEING ABLE TO STOP OR CONTROL WORRYING: SEVERAL DAYS
8. IF YOU CHECKED OFF ANY PROBLEMS, HOW DIFFICULT HAVE THESE MADE IT FOR YOU TO DO YOUR WORK, TAKE CARE OF THINGS AT HOME, OR GET ALONG WITH OTHER PEOPLE?: SOMEWHAT DIFFICULT
7. FEELING AFRAID AS IF SOMETHING AWFUL MIGHT HAPPEN: NOT AT ALL
GAD7 TOTAL SCORE: 5
6. BECOMING EASILY ANNOYED OR IRRITABLE: SEVERAL DAYS
1. FEELING NERVOUS, ANXIOUS, OR ON EDGE: SEVERAL DAYS
GAD7 TOTAL SCORE: 5
4. TROUBLE RELAXING: SEVERAL DAYS
5. BEING SO RESTLESS THAT IT IS HARD TO SIT STILL: NOT AT ALL

## 2024-01-24 ASSESSMENT — ENCOUNTER SYMPTOMS
CONSTIPATION: 0
FREQUENCY: 0
HEMATURIA: 0
SHORTNESS OF BREATH: 1
PARESTHESIAS: 1
DIZZINESS: 1
JOINT SWELLING: 1
HEARTBURN: 0
FEVER: 0
SORE THROAT: 0
PALPITATIONS: 0
COUGH: 1
CHILLS: 0
HEMATOCHEZIA: 0
NERVOUS/ANXIOUS: 1
HEADACHES: 1
WEAKNESS: 1
ARTHRALGIAS: 1
ABDOMINAL PAIN: 0
MYALGIAS: 1
DYSURIA: 0
ADENOPATHY: 0
DIARRHEA: 0
NAUSEA: 0
EYE PAIN: 0

## 2024-01-24 ASSESSMENT — PAIN SCALES - GENERAL: PAINLEVEL: MODERATE PAIN (4)

## 2024-01-24 NOTE — NURSING NOTE
"Chief Complaint   Patient presents with    Physical         Initial /85 (BP Location: Right arm, Patient Position: Sitting, Cuff Size: Adult Large)   Pulse 71   Temp 97.9  F (36.6  C) (Temporal)   Resp 20   Ht 1.697 m (5' 6.8\")   Wt (!) 165.5 kg (364 lb 12.8 oz)   LMP 05/14/2013 (Approximate)   SpO2 97%   Breastfeeding No   BMI 57.48 kg/m   Estimated body mass index is 57.48 kg/m  as calculated from the following:    Height as of this encounter: 1.697 m (5' 6.8\").    Weight as of this encounter: 165.5 kg (364 lb 12.8 oz).       FOOD SECURITY SCREENING QUESTIONS:    The next two questions are to help us understand your food security.  If you are feeling you need any assistance in this area, we have resources available to support you today.    Hunger Vital Signs:  Within the past 12 months we worried whether our food would run out before we got money to buy more. Never  Within the past 12 months the food we bought just didn't last and we didn't have money to get more. Never  Mildred Renteria LPN on 1/24/2024 at 4:12 PM     Mildred Renteria   "

## 2024-01-24 NOTE — PROGRESS NOTES
"  {PROVIDER CHARTING PREFERENCE:722758}    Bria Rico is a 63 year old, presenting for the following health issues:  Physical      1/24/2024     3:58 PM   Additional Questions   Roomed by TRENTON Eli     Healthy Habits:     Getting at least 3 servings of Calcium per day:  NO    Bi-annual eye exam:  Yes    Dental care twice a year:  NO    Sleep apnea or symptoms of sleep apnea:  Daytime drowsiness    Diet:  Regular (no restrictions)    Frequency of exercise:  None    Taking medications regularly:  Yes    Barriers to taking medications:  None    Medication side effects:  Muscle aches and Lightheadedness    Additional concerns today:  Yes     {ALERT  Recent PHQ-9 score indicates suicidal ideations :730653}{Provider Documentation  Link to [facility] ([facility]) Flowsheet :129369}  {MA/LPN/RN Pre-Provider Visit Orders- hCG/UA/Strep (Optional):875420}  {SUPERLIST (Optional):099928}  {additonal problems for provider to add (Optional):782144}    {ROS Picklists (Optional):180805}      Objective    /85 (BP Location: Right arm, Patient Position: Sitting, Cuff Size: Adult Large)   Pulse 71   Temp 97.9  F (36.6  C) (Temporal)   Resp 20   Ht 1.697 m (5' 6.8\")   Wt (!) 165.5 kg (364 lb 12.8 oz)   LMP 05/14/2013 (Approximate)   SpO2 97%   Breastfeeding No   BMI 57.48 kg/m    Body mass index is 57.48 kg/m .  Physical Exam   {Exam List (Optional):407416}    {Diagnostic Test Results (Optional):639018}        Signed Electronically by: Maria Guadalupe Ramon DO  {Email feedback regarding this note to [email or web address]   :359847}  "

## 2024-01-24 NOTE — PROGRESS NOTES
"Preventive Care Visit  [facility] AND [facility]  Maria Guadalupe Ramon DO, Internal Medicine  Jan 24, 2024       SUBJECTIVE:   Trisha is a 63 year old, presenting for the following:  Physical        1/24/2024     3:58 PM   Additional Questions   Roomed by TRENTON Eli     Healthy Habits:     Getting at least 3 servings of Calcium per day:  NO    Bi-annual eye exam:  Yes    Dental care twice a year:  NO    Sleep apnea or symptoms of sleep apnea:  Daytime drowsiness    Diet:  Regular (no restrictions)    Frequency of exercise:  None    Taking medications regularly:  Yes    Barriers to taking medications:  None    Medication side effects:  Muscle aches and Lightheadedness    Additional concerns today:  Yes    Patient presents today for follow-up of multiple issues.     She has been having ongoing issues with depression.  She also has some anxiety with going out of the house.  She does feel that this is worsened overall.  She is interested in referral for mental health.  She is also interested in adjusting medications.  She is currently on Wellbutrin 150 mg and Effexor 150 mg.  She is not interested in seeing a medication manager at this time.    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  She denies any signs of bleeding.    Her biggest concern today is she has had continued issues with significant joint pain.  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  She also has multiple other joints that have been sore.  Over a year ago she had an elevated rheumatoid factor and at the time it was recommended she consider rheumatology however she has been unable to follow-up with them.  We discussed retesting with further evaluation and possible treatment.  She is open to a short course of prednisone.    She is due for diabetes screening and cholesterol screening along with mammogram.  She was noted on prior labs to have persistent hypercalcemia.  She is due for " recheck of this along with additional labs to work this up.    She has a history of hypertension.  She continues on chlorthalidone and metoprolol.  Her blood pressure today is well-controlled.    Regarding her weight she did try the Ozempic however had significant abdominal side effects from this.  She lost approximately 6 pounds while on it however then was unable to get it covered by insurance.  She is not sure that this is a great option given her side effects.  We discussed multiple other options and at this time she feels she needs to work on her mental health and joint pain prior to addressing her weight.      Today's PHQ-9 Score:       2024     3:56 PM   PHQ-9 SCORE   PHQ-9 Total Score MyChart 13 (Moderate depression)   PHQ-9 Total Score 13     Have you ever done Advance Care Planning? (For example, a Health Directive, POLST, or a discussion with a medical provider or your loved ones about your wishes): Yes, patient states has an Advance Care Planning document and will bring a copy to the clinic.    Social History     Tobacco Use     Smoking status: Former     Packs/day: 1.00     Years: 12.00     Additional pack years: 0.00     Total pack years: 12.00     Types: Cigarettes     Quit date: 1990     Years since quittin.4     Smokeless tobacco: Never   Substance Use Topics     Alcohol use: Not Currently     Alcohol/week: 2.0 standard drinks of alcohol     Comment:  rarely if ever             2024     4:00 PM   Alcohol Use   Prescreen: >3 drinks/day or >7 drinks/week? No     Reviewed orders with patient.  Reviewed health maintenance and updated orders accordingly - Yes      Breast Cancer Screening:  Any new diagnosis of family breast, ovarian, or bowel cancer? No    FHS-7:       2021     3:56 PM 2023     1:32 PM   Breast CA Risk Assessment (FHS-7)   Did any of your first-degree relatives have breast or ovarian cancer? No No   Did any of your relatives have bilateral breast cancer? No  No   Did any man in your family have breast cancer? No No   Did any woman in your family have breast and ovarian cancer? No No   Did any woman in your family have breast cancer before age 50 y? No No   Do you have 2 or more relatives with breast and/or ovarian cancer? No No   Do you have 2 or more relatives with breast and/or bowel cancer? Yes Yes       Mammogram Screening: Recommended annual mammography  Pertinent mammograms are reviewed under the imaging tab.    History of abnormal Pap smear: YES - updated in Problem List and Health Maintenance accordingly      Latest Ref Rng & Units 12/30/2022     1:18 PM   PAP / HPV   PAP  Atypical squamous cells of undetermined significance (ASC-US)    HPV 16 DNA Negative Negative    HPV 18 DNA Negative Negative    Other HR HPV Negative Negative      Reviewed and updated as needed this visit by clinical staff   Tobacco  Allergies  Meds  Problems  Med Hx  Surg Hx  Fam Hx  Soc   Hx        Reviewed and updated as needed this visit by Provider   Tobacco  Allergies  Meds  Problems  Med Hx  Surg Hx  Fam Hx          Current Outpatient Medications   Medication     albuterol (PROAIR HFA/PROVENTIL HFA/VENTOLIN HFA) 108 (90 Base) MCG/ACT inhaler     buPROPion (WELLBUTRIN XL) 150 MG 24 hr tablet     chlorthalidone (HYGROTON) 25 MG tablet     Cholecalciferol (VITAMIN D3) 2000 UNITS CAPS     clindamycin (CLINDAMAX) 1 % external gel     clobetasol (TEMOVATE) 0.05 % external ointment     Elastic Bandages & Supports (MEDICAL COMPRESSION STOCKINGS) MISC     fish oil-omega-3 fatty acids 1000 MG capsule     LORazepam (ATIVAN) 0.5 MG tablet     metoprolol tartrate (LOPRESSOR) 25 MG tablet     nystatin (MYCOSTATIN) 908360 UNIT/GM external cream     potassium chloride ER (K-TAB/KLOR-CON) 10 MEQ CR tablet     predniSONE (DELTASONE) 20 MG tablet     triamcinolone (KENALOG) 0.1 % external cream     venlafaxine (EFFEXOR XR) 150 MG 24 hr capsule     vitamin B-Complex     warfarin  "ANTICOAGULANT (COUMADIN) 5 MG tablet     No current facility-administered medications for this visit.       Review of Systems   Constitutional:  Negative for chills and fever.   HENT:  Positive for hearing loss. Negative for congestion, ear pain and sore throat.    Eyes:  Negative for pain and visual disturbance.   Respiratory:  Positive for cough and shortness of breath.         Chronic   Cardiovascular:  Negative for chest pain and palpitations.   Gastrointestinal:  Negative for abdominal pain, constipation, diarrhea and nausea.   Genitourinary:  Negative for dysuria, frequency, genital sores, hematuria and urgency.   Musculoskeletal:  Positive for arthralgias, joint swelling and myalgias.        Discussed above   Skin:  Positive for rash.        On face since 1st infection with COVID   Neurological:  Positive for dizziness, weakness and headaches.   Hematological:  Negative for adenopathy.   Psychiatric/Behavioral:  The patient is nervous/anxious.         Discussed in depth above         OBJECTIVE:   /85 (BP Location: Right arm, Patient Position: Sitting, Cuff Size: Adult Large)   Pulse 71   Temp 97.9  F (36.6  C) (Temporal)   Resp 20   Ht 1.697 m (5' 6.8\")   Wt (!) 165.5 kg (364 lb 12.8 oz)   LMP 05/14/2013 (Approximate)   SpO2 97%   Breastfeeding No   BMI 57.48 kg/m     Estimated body mass index is 57.48 kg/m  as calculated from the following:    Height as of this encounter: 1.697 m (5' 6.8\").    Weight as of this encounter: 165.5 kg (364 lb 12.8 oz).  Physical Exam  GEN: Vitals reviewed. Healthy appearing. Patient is in no acute distress. Cooperative with exam.  HEENT: Normocephalic atraumatic.  Eyes grossly normal to inspection.  No discharge or erythema, or obvious scleral/conjunctival abnormalities.   NECK: Supple; no thyromegaly or masses noted.  No cervical or supraclavicular lymphadenopathy.  CV: Heart regular in rate and rhythm with no murmur.    LUNGS: No audible wheeze, cough, or " visible cyanosis.  No visible retractions or increased work of breathing.  Lungs clear to auscultation bilaterally.    ABD:  Obese, Nondistended  SKIN: Warm and dry to touch.  Visible skin clear. No significant rash, abnormal pigmentation or lesions.  EXT: No clubbing or cyanosis.  1-2+ peripheral edema.  NEURO: Alert and oriented to person, place, and time.  Cranial nerves II-XII grossly intact with no focal or lateralizing deficits.  Muscle tone normal.  No tremor.   MSK: ROM of upper and lower ext symmetric and full.  PSYCH: Mood is good.  Mentation appears normal, affect normal/bright, judgement and insight intact, normal speech and appearance well-groomed.      Diagnostic Test Results:  Labs reviewed in Epic    ASSESSMENT/PLAN:   1. Embolism and thrombosis (H)  Continue to monitor blood counts.  Continue on warfarin at this time with INR clinic.  Could consider DOAC treatment in the future.    2. Depression, major, recurrent, mild (H24)  At this time we will increase her Wellbutrin to 300 mg daily.  She is to monitor for any side effects.  She will be referred for mental health.  Labs checked today to rule out underlying etiologies.  - buPROPion (WELLBUTRIN XL) 150 MG 24 hr tablet; Take 2 tablets (300 mg) by mouth every morning Increase in dose  Dispense: 180 tablet; Refill: 4  - Adult Mental Health  Referral; Future  - Iron & Iron Binding Capacity    3. Morbid obesity (H)  Continue to work on activity as able and we will consider additional medications in the future if appropriate.    4. Essential hypertension  - Controlled.  Continue current regimen.    - chlorthalidone (HYGROTON) 25 MG tablet; Take 1 tablet (25 mg) by mouth daily  Dispense: 90 tablet; Refill: 4  - metoprolol tartrate (LOPRESSOR) 25 MG tablet; Take 1 tablet (25 mg) by mouth 2 times daily  Dispense: 180 tablet; Refill: 4    5. Social anxiety disorder  See above  - Adult Mental Health  Referral; Future    6. Elevated  rheumatoid factor  At this time we will obtain repeat lab testing when her significant worsening in joint pain.  We will trial a short course of prednisone.  Depending on the results of her testing and benefit of this we will plan to consider more long-term treatment and referral to rheumatology.  - Anti Nuclear Rukhsana IgG by IFA with Reflex  - Cyclic Citrullinated Peptide Antibody IgG  - Sedimentation Rate (ESR)  - CRP inflammation  - Rheumatoid factor    7. Screening for diabetes mellitus  - Comprehensive metabolic panel    8. Multiple joint pain  See above  - predniSONE (DELTASONE) 20 MG tablet; Take 1 tablet (20 mg) by mouth daily for 7 days  Dispense: 7 tablet; Refill: 0  - CBC with platelets  - Magnesium  - TSH with free T4 reflex  - Anti Nuclear Rukhsana IgG by IFA with Reflex  - Cyclic Citrullinated Peptide Antibody IgG  - Sedimentation Rate (ESR)  - CRP inflammation  - Rheumatoid factor    9. Screening for hyperlipidemia  - Lipid Profile    10. Encounter for screening mammogram for breast cancer  - MA Screening Bilateral w/ Favio; Future    11. Hypercalcemia  Check PTH and vitamin D today.  Recheck calcium.  Continue to monitor.  - PTH, Intact  - Vitamin D Total    12. Anticoagulation monitoring, INR range 2-3  Hemoglobin and iron today.  Monitor for any signs of bleeding.        Depression Screening Follow Up        1/24/2024     3:56 PM   PHQ   PHQ-9 Total Score 13   Q9: Thoughts of better off dead/self-harm past 2 weeks Several days   F/U: Thoughts of suicide or self-harm No   F/U: Safety concerns No         1/24/2024     3:56 PM   Last PHQ-9   1.  Little interest or pleasure in doing things 2   2.  Feeling down, depressed, or hopeless 2   3.  Trouble falling or staying asleep, or sleeping too much 2   4.  Feeling tired or having little energy 2   5.  Poor appetite or overeating 2   6.  Feeling bad about yourself 1   7.  Trouble concentrating 1   8.  Moving slowly or restless 0   Q9: Thoughts of better off  "dead/self-harm past 2 weeks 1   PHQ-9 Total Score 13   In the past two weeks have you had thoughts of suicide or self harm? No   Do you have concerns about your personal safety or the safety of others? No       Follow Up    Follow Up Actions Taken  Crisis resource information provided in the After Visit Summary  Mental Health Referral placed    Discussed the following ways the patient can remain in a safe environment:  be around others    Counseling  Reviewed preventive health counseling, as reflected in patient instructions      BMI  Estimated body mass index is 57.48 kg/m  as calculated from the following:    Height as of this encounter: 1.697 m (5' 6.8\").    Weight as of this encounter: 165.5 kg (364 lb 12.8 oz).   Weight management plan: Discussed healthy diet and exercise guidelines      She reports that she quit smoking about 33 years ago. Her smoking use included cigarettes. She has a 12 pack-year smoking history. She has never used smokeless tobacco.        Signed Electronically by: Maria Guadalupe Ramon DO  "

## 2024-01-25 LAB
PTH-INTACT SERPL-MCNC: 101 PG/ML (ref 15–65)
RHEUMATOID FACT SERPL-ACNC: 46 IU/ML
VIT D+METAB SERPL-MCNC: 41 NG/ML (ref 20–50)

## 2024-01-26 LAB
ANA PAT SER IF-IMP: ABNORMAL
ANA SER QL IF: ABNORMAL
ANA TITR SER IF: ABNORMAL {TITER}
CCP AB SER IA-ACNC: 1.5 U/ML

## 2024-01-29 ENCOUNTER — ANTICOAGULATION THERAPY VISIT (OUTPATIENT)
Dept: ANTICOAGULATION | Facility: OTHER | Age: 64
End: 2024-01-29
Attending: INTERNAL MEDICINE
Payer: COMMERCIAL

## 2024-01-29 DIAGNOSIS — Z79.01 ANTICOAGULATION MONITORING, INR RANGE 2-3: Primary | ICD-10-CM

## 2024-01-29 DIAGNOSIS — I74.9 EMBOLISM AND THROMBOSIS (H): ICD-10-CM

## 2024-01-29 LAB — INR HOME MONITORING: 1.9 RATIO (ref 2–3)

## 2024-01-30 NOTE — PROGRESS NOTES
ANTICOAGULATION MANAGEMENT     Trisha Fernando 63 year old female is on warfarin with subtherapeutic INR result. (Goal INR 2.0-3.0)    Recent labs: (last 7 days)     01/29/24  1828   INR 1.9*       ASSESSMENT     Source(s): Chart Review and Patient/Caregiver Call     Warfarin doses taken: Warfarin taken as instructed  Diet: Increased greens/vitamin K in diet; ongoing change  Medication/supplement changes:  prednisone x 7 days and increased bupropion   New illness, injury, or hospitalization: No  Signs or symptoms of bleeding or clotting: No  Previous result: Therapeutic last visit; previously outside of goal range  Additional findings: None       PLAN     Recommended plan for ongoing change(s) affecting INR     Dosing Instructions: Increase your warfarin dose (9.1% change) with next INR in 1 week       Summary  As of 1/29/2024      Full warfarin instructions:  2.5 mg every Mon, Fri; 5 mg all other days   Next INR check:  2/5/2024               Telephone call with Trisha who verbalizes understanding and agrees to plan    Patient to recheck with home meter    Education provided:   None required    Plan made per [facility] anticoagulation protocol    Smitha Nair, RN  Anticoagulation Clinic  1/30/2024    _______________________________________________________________________     Anticoagulation Episode Summary       Current INR goal:  2.0-3.0   TTR:  89.3% (1 y)   Target end date:  Indefinite   Send INR reminders to:  [facility]    Indications    Long term (current) use of anticoagulants (Resolved) [Z79.01]  Embolism and thrombosis (H) (Resolved) [I74.9]  Anticoagulation monitoring  INR range 2-3 [Z79.01]  Embolism and thrombosis (H) [I74.9]             Comments:  MDINR needs weekly INR done             Anticoagulation Care Providers       Provider Role Specialty Phone number    Maria Guadalupe Ramon DO Referring Internal Medicine [phone]

## 2024-02-04 ENCOUNTER — MYC MEDICAL ADVICE (OUTPATIENT)
Dept: INTERNAL MEDICINE | Facility: OTHER | Age: 64
End: 2024-02-04
Payer: COMMERCIAL

## 2024-02-05 ENCOUNTER — ANTICOAGULATION THERAPY VISIT (OUTPATIENT)
Dept: ANTICOAGULATION | Facility: OTHER | Age: 64
End: 2024-02-05
Attending: INTERNAL MEDICINE
Payer: COMMERCIAL

## 2024-02-05 DIAGNOSIS — I74.9 EMBOLISM AND THROMBOSIS (H): ICD-10-CM

## 2024-02-05 DIAGNOSIS — Z79.01 ANTICOAGULATION MONITORING, INR RANGE 2-3: Primary | ICD-10-CM

## 2024-02-05 LAB — INR HOME MONITORING: 2.2 RATIO (ref 2–3)

## 2024-02-05 NOTE — TELEPHONE ENCOUNTER
Rheumatology referral placed 1/29/24    Sent to [facility] on 1/30/24  Called [facility] referral line to check on status of referral. Talked to Rose who said that they're still working on entering referrals from last week and haven't gotten to it yet.   [phone] scheduling line for rheumatology referral at [facility].    Patient updated via [employer].    Kathryn Castro RN on 2/5/2024 at 3:54 PM

## 2024-02-05 NOTE — PROGRESS NOTES
ANTICOAGULATION MANAGEMENT     Trisha Fernando 63 year old female is on warfarin with therapeutic INR result. (Goal INR 2.0-3.0)    Recent labs: (last 7 days)     02/05/24  1659   INR 2.2       ASSESSMENT     Source(s): Patient/ Care giver call     Warfarin doses taken: Warfarin taken as instructed  Diet: No new diet changes identified  New illness, injury, or hospitalization: No  Medication/supplement changes:  finished prednisone on 2/1/24  Signs or symptoms of bleeding or clotting: No  Previous INR: Subtherapeutic  Additional findings: None     PLAN     Recommended plan for no diet, medication or health factor changes affecting INR     Dosing Instructions: Continue your current warfarin dose with next INR in 1 week       Summary  As of 2/5/2024      Full warfarin instructions:  2.5 mg every Mon, Fri; 5 mg all other days   Next INR check:  2/12/2024               Telephone call with Trisha who verbalizes understanding and agrees to plan    Patient to recheck with home meter    Education provided: Please call back if any changes to your diet, medications or how you've been taking warfarin and Resume manage by exception with home monitor. Continue to submit INR results to home monitor company.You will only be called when your result is out of range. Please call and notify [facility] if new medication started, dose missed, signs or symptoms of bleeding or clotting, or a surgery/procedure is scheduled.    Plan made per [facility] anticoagulation protocol    Ghada Granda, RN  Anticoagulation Clinic  2/5/2024    _______________________________________________________________________     Anticoagulation Episode Summary       Current INR goal:  2.0-3.0   TTR:  88.7% (1 y)   Target end date:  Indefinite   Send INR reminders to:  [facility]    Indications    Long term (current) use of anticoagulants (Resolved) [Z79.01]  Embolism and thrombosis (H) (Resolved) [I74.9]  Anticoagulation monitoring  INR range 2-3  [Z79.01]  Embolism and thrombosis (H) [I74.9]             Comments:  MDINR needs weekly INR done             Anticoagulation Care Providers       Provider Role Specialty Phone number    Maria Guadalupe Ramon DO Referring Internal Medicine [phone]

## 2024-02-05 NOTE — TELEPHONE ENCOUNTER
Thank you - if she isn't able to see them in the next month or two, we can discuss options for treatment in the meantime. Keep me updated if this is needed.

## 2024-02-12 ENCOUNTER — ANTICOAGULATION THERAPY VISIT (OUTPATIENT)
Dept: ANTICOAGULATION | Facility: OTHER | Age: 64
End: 2024-02-12
Attending: INTERNAL MEDICINE
Payer: COMMERCIAL

## 2024-02-12 DIAGNOSIS — Z79.01 ANTICOAGULATION MONITORING, INR RANGE 2-3: Primary | ICD-10-CM

## 2024-02-12 DIAGNOSIS — I74.9 EMBOLISM AND THROMBOSIS (H): ICD-10-CM

## 2024-02-12 LAB — INR HOME MONITORING: 2 RATIO (ref 2–3)

## 2024-02-13 NOTE — PROGRESS NOTES
ANTICOAGULATION  MANAGEMENT-Home Monitor Managed by Exception    Trisha FISHER Marlons 63 year old female is on warfarin with therapeutic INR result. (Goal INR 2.0-3.0)    Recent labs: (last 7 days)     02/12/24 1923   INR 2       Previous INR was Therapeutic  Medication, diet, health changes since last INR:chart reviewed; none identified  Contacted within the last 12 weeks by phone on 2/5/24  Last [facility] referral date: 10/10/2023      JOSE JUAN Honda was NOT contacted regarding therapeutic result today per home monitoring policy manage by exception agreement.   Current warfarin dose is to be continued:     Summary  As of 2/12/2024      Full warfarin instructions:  2.5 mg every Mon, Fri; 5 mg all other days   Next INR check:  2/19/2024             ?   Ghada Granda RN  Anticoagulation Clinic  2/13/2024    _______________________________________________________________________     Anticoagulation Episode Summary       Current INR goal:  2.0-3.0   TTR:  88.7% (1 y)   Target end date:  Indefinite   Send INR reminders to:  [facility]    Indications    Long term (current) use of anticoagulants (Resolved) [Z79.01]  Embolism and thrombosis (H) (Resolved) [I74.9]  Anticoagulation monitoring  INR range 2-3 [Z79.01]  Embolism and thrombosis (H) [I74.9]             Comments:  MDINR needs weekly INR done             Anticoagulation Care Providers       Provider Role Specialty Phone number    Maria Guadalupe Ramon DO Referring Internal Medicine [phone]

## 2024-02-19 ENCOUNTER — ANTICOAGULATION THERAPY VISIT (OUTPATIENT)
Dept: ANTICOAGULATION | Facility: OTHER | Age: 64
End: 2024-02-19
Attending: INTERNAL MEDICINE
Payer: COMMERCIAL

## 2024-02-19 DIAGNOSIS — I74.9 EMBOLISM AND THROMBOSIS (H): ICD-10-CM

## 2024-02-19 DIAGNOSIS — Z79.01 ANTICOAGULATION MONITORING, INR RANGE 2-3: Primary | ICD-10-CM

## 2024-02-19 LAB — INR HOME MONITORING: 2.2 RATIO (ref 2–3)

## 2024-02-20 NOTE — PROGRESS NOTES
ANTICOAGULATION  MANAGEMENT-Home Monitor Managed by Exception    Trisha Fernando 63 year old female is on warfarin with therapeutic INR result. (Goal INR 2.0-3.0)    Recent labs: (last 7 days)     02/19/24  1815   INR 2.2       Previous INR was Therapeutic  Medication, diet, health changes since last INR:chart reviewed; none identified  Contacted within the last 12 weeks by phone on 2/05/2024  Last [facility] referral date: 10/10/2023      JOSE JUAN Rico was NOT contacted regarding therapeutic result today per home monitoring policy manage by exception agreement.   Current warfarin dose is to be continued:     Summary  As of 2/19/2024      Full warfarin instructions:  2.5 mg every Mon, Fri; 5 mg all other days   Next INR check:  2/26/2024             ?   Josee Sterling RN  Anticoagulation Clinic  2/20/2024    _______________________________________________________________________     Anticoagulation Episode Summary       Current INR goal:  2.0-3.0   TTR:  88.7% (1 y)   Target end date:  Indefinite   Send INR reminders to:  [facility]    Indications    Long term (current) use of anticoagulants (Resolved) [Z79.01]  Embolism and thrombosis (H) (Resolved) [I74.9]  Anticoagulation monitoring  INR range 2-3 [Z79.01]  Embolism and thrombosis (H) [I74.9]             Comments:  MDINR needs weekly INR done             Anticoagulation Care Providers       Provider Role Specialty Phone number    Maria Guadalupe Ramon DO Referring Internal Medicine [phone]

## 2024-02-26 ENCOUNTER — ANTICOAGULATION THERAPY VISIT (OUTPATIENT)
Dept: ANTICOAGULATION | Facility: OTHER | Age: 64
End: 2024-02-26
Attending: INTERNAL MEDICINE
Payer: COMMERCIAL

## 2024-02-26 DIAGNOSIS — Z79.01 ANTICOAGULATION MONITORING, INR RANGE 2-3: Primary | ICD-10-CM

## 2024-02-26 DIAGNOSIS — I74.9 EMBOLISM AND THROMBOSIS (H): ICD-10-CM

## 2024-02-26 LAB — INR HOME MONITORING: 2 RATIO (ref 2–3)

## 2024-02-27 NOTE — PROGRESS NOTES
ANTICOAGULATION  MANAGEMENT-Home Monitor Managed by Exception    Trisha FISHER Marlons 63 year old female is on warfarin with therapeutic INR result. (Goal INR 2.0-3.0)    Recent labs: (last 7 days)     02/26/24  2338   INR 2       Previous INR was Therapeutic  Medication, diet, health changes since last INR:chart reviewed; none identified  Contacted within the last 12 weeks by phone on 02/05/24  Last [facility] referral date: 10/10/2023      JOSE JUAN Honda was NOT contacted regarding therapeutic result today per home monitoring policy manage by exception agreement.   Current warfarin dose is to be continued:     Summary  As of 2/26/2024      Full warfarin instructions:  2.5 mg every Mon, Fri; 5 mg all other days   Next INR check:  3/5/2024             ?   Aury Jamison RN  Anticoagulation Clinic  2/27/2024    _______________________________________________________________________     Anticoagulation Episode Summary       Current INR goal:  2.0-3.0   TTR:  89.2% (1 y)   Target end date:  Indefinite   Send INR reminders to:  [facility]    Indications    Long term (current) use of anticoagulants (Resolved) [Z79.01]  Embolism and thrombosis (H) (Resolved) [I74.9]  Anticoagulation monitoring  INR range 2-3 [Z79.01]  Embolism and thrombosis (H) [I74.9]             Comments:  MDINR needs weekly INR done             Anticoagulation Care Providers       Provider Role Specialty Phone number    Maria Guadalupe Ramon DO Referring Internal Medicine [phone]

## 2024-03-01 DIAGNOSIS — Z79.01 ANTICOAGULATION MONITORING, INR RANGE 2-3: ICD-10-CM

## 2024-03-01 DIAGNOSIS — I74.9 EMBOLISM AND THROMBOSIS (H): ICD-10-CM

## 2024-03-04 LAB — INR HOME MONITORING: 2.3 RATIO (ref 2–3)

## 2024-03-04 RX ORDER — WARFARIN SODIUM 5 MG/1
TABLET ORAL
Qty: 90 TABLET | Refills: 1 | Status: SHIPPED | OUTPATIENT
Start: 2024-03-04 | End: 2024-08-26

## 2024-03-04 NOTE — TELEPHONE ENCOUNTER
ANTICOAGULATION MANAGEMENT:  Medication Refill    Anticoagulation Summary  As of 2/26/2024      Warfarin maintenance plan:  2.5 mg (5 mg x 0.5) every Mon, Fri; 5 mg (5 mg x 1) all other days   Next INR check:  3/5/2024   Target end date:  Indefinite    Indications    Long term (current) use of anticoagulants (Resolved) [Z79.01]  Embolism and thrombosis (H) (Resolved) [I74.9]  Anticoagulation monitoring  INR range 2-3 [Z79.01]  Embolism and thrombosis (H) [I74.9]                 Anticoagulation Care Providers       Provider Role Specialty Phone number    Maria Guadalupe Ramon DO Referring Internal Medicine [phone]            Refill Criteria    Visit with referring provider/group: Meets criteria: office visit within referring provider group in the last 1 year on 1/24/24    [facility] referral last signed: 10/10/2023; within last year: Yes    Lab monitoring not exceeding 2 weeks overdue: Yes    Trisha meets all criteria for refill. Rx instructions and quantity supplied updated to match patient's current dosing plan. Warfarin 90 day supply with 1 refill granted per [facility] protocol     Smitha Nair, RN  Anticoagulation Clinic

## 2024-03-05 ENCOUNTER — ANTICOAGULATION THERAPY VISIT (OUTPATIENT)
Dept: ANTICOAGULATION | Facility: OTHER | Age: 64
End: 2024-03-05
Attending: INTERNAL MEDICINE
Payer: COMMERCIAL

## 2024-03-05 DIAGNOSIS — I74.9 EMBOLISM AND THROMBOSIS (H): ICD-10-CM

## 2024-03-05 DIAGNOSIS — Z79.01 ANTICOAGULATION MONITORING, INR RANGE 2-3: Primary | ICD-10-CM

## 2024-03-05 NOTE — PROGRESS NOTES
ANTICOAGULATION  MANAGEMENT-Home Monitor Managed by Exception    Trisha FISHER Marlons 63 year old female is on warfarin with therapeutic INR result. (Goal INR 2.0-3.0)    Recent labs: (last 7 days)     03/04/24  1851   INR 2.3       Previous INR was Therapeutic  Medication, diet, health changes since last INR:chart reviewed; none identified  Contacted within the last 12 weeks by phone on 2/5/24  Last [facility] referral date: 10/10/2023      JOSE JUAN     Trisha was NOT contacted regarding therapeutic result today per home monitoring policy manage by exception agreement.   Current warfarin dose is to be continued:     Summary  As of 3/5/2024      Full warfarin instructions:  2.5 mg every Mon, Fri; 5 mg all other days   Next INR check:  3/11/2024             ?   Smitha Nair RN  Anticoagulation Clinic  3/5/2024    _______________________________________________________________________     Anticoagulation Episode Summary       Current INR goal:  2.0-3.0   TTR:  89.7% (1 y)   Target end date:  Indefinite   Send INR reminders to:  [facility]    Indications    Long term (current) use of anticoagulants (Resolved) [Z79.01]  Embolism and thrombosis (H) (Resolved) [I74.9]  Anticoagulation monitoring  INR range 2-3 [Z79.01]  Embolism and thrombosis (H) [I74.9]             Comments:  MDINR needs weekly INR done             Anticoagulation Care Providers       Provider Role Specialty Phone number    Maria Guadalupe Ramon DO Referring Internal Medicine [phone]

## 2024-03-11 LAB — INR HOME MONITORING: 2.4 RATIO (ref 2–3)

## 2024-03-12 ENCOUNTER — ANTICOAGULATION THERAPY VISIT (OUTPATIENT)
Dept: ANTICOAGULATION | Facility: OTHER | Age: 64
End: 2024-03-12
Attending: INTERNAL MEDICINE
Payer: COMMERCIAL

## 2024-03-12 DIAGNOSIS — I74.9 EMBOLISM AND THROMBOSIS (H): ICD-10-CM

## 2024-03-12 DIAGNOSIS — Z79.01 ANTICOAGULATION MONITORING, INR RANGE 2-3: Primary | ICD-10-CM

## 2024-03-12 NOTE — PROGRESS NOTES
ANTICOAGULATION  MANAGEMENT-Home Monitor Managed by Exception    Trisha FISHER Marlons 63 year old female is on warfarin with therapeutic INR result. (Goal INR 2.0-3.0)    Recent labs: (last 7 days)     03/11/24 1932   INR 2.4       Previous INR was Therapeutic  Medication, diet, health changes since last INR:chart reviewed; none identified  Contacted within the last 12 weeks by phone on 2/5/24  Last [facility] referral date: 10/10/2023      JOSE JUAN     Trisha was NOT contacted regarding therapeutic result today per home monitoring policy manage by exception agreement.   Current warfarin dose is to be continued:     Summary  As of 3/12/2024      Full warfarin instructions:  2.5 mg every Mon, Fri; 5 mg all other days   Next INR check:  3/19/2024             ?   Smitha Nair RN  Anticoagulation Clinic  3/12/2024    _______________________________________________________________________     Anticoagulation Episode Summary       Current INR goal:  2.0-3.0   TTR:  89.7% (1 y)   Target end date:  Indefinite   Send INR reminders to:  [facility]    Indications    Long term (current) use of anticoagulants (Resolved) [Z79.01]  Embolism and thrombosis (H) (Resolved) [I74.9]  Anticoagulation monitoring  INR range 2-3 [Z79.01]  Embolism and thrombosis (H) [I74.9]             Comments:  MDINR needs weekly INR done             Anticoagulation Care Providers       Provider Role Specialty Phone number    Maria Guadalupe Ramon DO Referring Internal Medicine [phone]

## 2024-03-14 ENCOUNTER — MEDICAL CORRESPONDENCE (OUTPATIENT)
Dept: HEALTH INFORMATION MANAGEMENT | Facility: OTHER | Age: 64
End: 2024-03-14
Payer: COMMERCIAL

## 2024-03-18 LAB — INR HOME MONITORING: 2.5 RATIO (ref 2–3)

## 2024-03-19 ENCOUNTER — ANTICOAGULATION THERAPY VISIT (OUTPATIENT)
Dept: ANTICOAGULATION | Facility: OTHER | Age: 64
End: 2024-03-19
Attending: INTERNAL MEDICINE
Payer: COMMERCIAL

## 2024-03-19 DIAGNOSIS — Z79.01 ANTICOAGULATION MONITORING, INR RANGE 2-3: Primary | ICD-10-CM

## 2024-03-19 DIAGNOSIS — I74.9 EMBOLISM AND THROMBOSIS (H): ICD-10-CM

## 2024-03-19 NOTE — PROGRESS NOTES
ANTICOAGULATION  MANAGEMENT-Home Monitor Managed by Exception    Trisha FISHER Kassie 63 year old female is on warfarin with therapeutic INR result. (Goal INR 2.0-3.0)    Recent labs: (last 7 days)     03/18/24  2114   INR 2.5       Previous INR was Therapeutic  Medication, diet, health changes since last INR:chart reviewed; none identified  Contacted within the last 12 weeks by phone on 02/05/24  Last [facility] referral date: 10/10/2023      JOSE JUAN Rico was NOT contacted regarding therapeutic result today per home monitoring policy manage by exception agreement.   Current warfarin dose is to be continued:     Summary  As of 3/19/2024      Full warfarin instructions:  2.5 mg every Mon, Fri; 5 mg all other days   Next INR check:  3/26/2024             ?   Aury Jamison RN  Anticoagulation Clinic  3/19/2024    _______________________________________________________________________     Anticoagulation Episode Summary       Current INR goal:  2.0-3.0   TTR:  89.7% (1 y)   Target end date:  Indefinite   Send INR reminders to:  [facility]    Indications    Long term (current) use of anticoagulants (Resolved) [Z79.01]  Embolism and thrombosis (H) (Resolved) [I74.9]  Anticoagulation monitoring  INR range 2-3 [Z79.01]  Embolism and thrombosis (H) [I74.9]             Comments:  MDINR needs weekly INR done             Anticoagulation Care Providers       Provider Role Specialty Phone number    Maria Guadalupe Ramon DO Referring Internal Medicine [phone]

## 2024-03-25 LAB — INR HOME MONITORING: 2.3 RATIO (ref 2–3)

## 2024-03-26 ENCOUNTER — ANTICOAGULATION THERAPY VISIT (OUTPATIENT)
Dept: ANTICOAGULATION | Facility: OTHER | Age: 64
End: 2024-03-26
Attending: INTERNAL MEDICINE
Payer: COMMERCIAL

## 2024-03-26 DIAGNOSIS — Z79.01 ANTICOAGULATION MONITORING, INR RANGE 2-3: Primary | ICD-10-CM

## 2024-03-26 DIAGNOSIS — I74.9 EMBOLISM AND THROMBOSIS (H): ICD-10-CM

## 2024-03-26 NOTE — PROGRESS NOTES
ANTICOAGULATION  MANAGEMENT-Home Monitor Managed by Exception    Trisha Fernando 63 year old female is on warfarin with therapeutic INR result. (Goal INR 2.0-3.0)    Recent labs: (last 7 days)     03/25/24  1720   INR 2.3       Previous INR was Therapeutic  Medication, diet, health changes since last INR:chart reviewed; none identified  Contacted within the last 12 weeks by phone on 2/05/2024  Last [facility] referral date: 10/10/2023      JOSE JUAN Rico was NOT contacted regarding therapeutic result today per home monitoring policy manage by exception agreement.   Current warfarin dose is to be continued:     Summary  As of 3/26/2024      Full warfarin instructions:  2.5 mg every Mon, Fri; 5 mg all other days   Next INR check:  4/2/2024             ?   Josee Sterling RN  Anticoagulation Clinic  3/26/2024    _______________________________________________________________________     Anticoagulation Episode Summary       Current INR goal:  2.0-3.0   TTR:  89.7% (1 y)   Target end date:  Indefinite   Send INR reminders to:  [facility]    Indications    Long term (current) use of anticoagulants (Resolved) [Z79.01]  Embolism and thrombosis (H) (Resolved) [I74.9]  Anticoagulation monitoring  INR range 2-3 [Z79.01]  Embolism and thrombosis (H) [I74.9]             Comments:  MDINR needs weekly INR done             Anticoagulation Care Providers       Provider Role Specialty Phone number    Maria Guadalupe Ramon DO Referring Internal Medicine [phone]

## 2024-04-01 LAB — INR HOME MONITORING: 2.5 RATIO (ref 2–3)

## 2024-04-01 NOTE — PROGRESS NOTES
ANTICOAGULATION FOLLOW-UP CLINIC VISIT    Patient Name:  Trisha Fernando  Date:  3/30/2021  Contact Type:  no call needed patient to continue same dose    SUBJECTIVE:  Patient Findings         Clinical Outcomes     Negatives:  Major bleeding event, Thromboembolic event, Anticoagulation-related hospital admission, Anticoagulation-related ED visit, Anticoagulation-related fatality           OBJECTIVE    Recent labs: (last 7 days)     21   INR 2.3*       ASSESSMENT / PLAN  INR assessment THER    Recheck INR In: 1 WEEK    INR Location Home INR      Anticoagulation Summary  As of 3/30/2021    INR goal:  2.0-3.0   TTR:  75.9 % (1 y)   INR used for dosin.3 (3/29/2021)   Warfarin maintenance plan:  5 mg (5 mg x 1) every day   Full warfarin instructions:  5 mg every day   Weekly warfarin total:  35 mg   No change documented:  Smitha Nair RN   Plan last modified:  Maria D Zayas RN (3/16/2021)   Next INR check:  2021   Priority:  High   Target end date:  Indefinite    Indications    Long term (current) use of anticoagulants [Z79.01]  Embolism and thrombosis (H) [I74.9]  Anticoagulation monitoring  INR range 2-3 [Z79.01]             Anticoagulation Episode Summary     INR check location:      Preferred lab:      Send INR reminders to:  [facility]    Comments:  MDINR needs weekly INR done      Anticoagulation Care Providers     Provider Role Specialty Phone number    Maria Guadalupe Ramon DO Referring Internal Medicine [phone]            See the Encounter Report to view Anticoagulation Flowsheet and Dosing Calendar (Go to Encounters tab in chart review, and find the Anticoagulation Therapy Visit)        Smitha Nair, RN                  ED to inpatient nurses report      Chief Complaint:  Chief Complaint   Patient presents with    Shortness of Breath     Present to ED from: home    MOA:     LOC: alert and orientated to name, place, date  Mobility: Independent  Oxygen Baseline: ra    Current needs required: 3L NC     Code Status:   Prior    What abnormal results were found and what did you give/do to treat them? SOB/Hypoxia on exertion  Any procedures or intervention occur? See MAR    Mental Status:  Level of Consciousness: Alert (0)    Psych Assessment:        Vitals:  Patient Vitals for the past 24 hrs:   BP Temp Temp src Pulse Resp SpO2 Height Weight   04/01/24 0246 125/71 -- -- 68 18 94 % -- --   04/01/24 0218 128/80 97.8 °F (36.6 °C) Oral 61 20 94 % 1.448 m (4' 9\") 113.4 kg (250 lb)        LDAs:   Peripheral IV 04/01/24 Right Antecubital (Active)   Site Assessment Clean, dry & intact 04/01/24 0231   Line Status Blood return noted;Flushed;Normal saline locked 04/01/24 0231   Phlebitis Assessment No symptoms 04/01/24 0231   Infiltration Assessment 0 04/01/24 0231   Dressing Status Clean, dry & intact 04/01/24 0231       Ambulatory Status:  Presents to emergency department  because of falls (Syncope, seizure, or loss of consciousness): No, Age > 70: No, Altered Mental Status, Intoxication with alcohol or substance confusion (Disorientation, impaired judgment, poor safety awaremess, or inability to follow instructions): No, Impaired Mobility: Ambulates or transfers with assistive devices or assistance; Unable to ambulate or transer.: No, Nursing Judgement: No    Diagnosis:  DISPOSITION     Final diagnoses:   None        Consults:  None     Pain Score:  Pain Assessment  Pain Assessment: None - Denies Pain    C-SSRS:   Risk of Suicide: No Risk    Sepsis Screening:  Sepsis Risk Score: 0.37    [place] Fall Risk:  [place] 1 Fall Risk  Presents to emergency department  because of falls (Syncope, seizure, or loss of consciousness): No  Age > 70: No  Altered

## 2024-04-02 ENCOUNTER — ANTICOAGULATION THERAPY VISIT (OUTPATIENT)
Dept: ANTICOAGULATION | Facility: OTHER | Age: 64
End: 2024-04-02
Attending: INTERNAL MEDICINE
Payer: COMMERCIAL

## 2024-04-02 DIAGNOSIS — Z79.01 ANTICOAGULATION MONITORING, INR RANGE 2-3: Primary | ICD-10-CM

## 2024-04-02 DIAGNOSIS — I74.9 EMBOLISM AND THROMBOSIS (H): ICD-10-CM

## 2024-04-02 NOTE — PROGRESS NOTES
ANTICOAGULATION  MANAGEMENT-Home Monitor Managed by Exception    Trisha FISHER [place]beatricebells 63 year old female is on warfarin with therapeutic INR result. (Goal INR 2.0-3.0)    Recent labs: (last 7 days)     04/01/24 1942   INR 2.5       Previous INR was Therapeutic  Medication, diet, health changes since last INR:chart reviewed; none identified  Contacted within the last 12 weeks by phone on 2/5/24  Last [facility] referral date: 10/10/2023      JOSE JUAN Honda was NOT contacted regarding therapeutic result today per home monitoring policy manage by exception agreement.   Current warfarin dose is to be continued:     Summary  As of 4/2/2024      Full warfarin instructions:  2.5 mg every Mon, Fri; 5 mg all other days   Next INR check:  4/9/2024             ?   Smitha Nair RN  Anticoagulation Clinic  4/2/2024    _______________________________________________________________________     Anticoagulation Episode Summary       Current INR goal:  2.0-3.0   TTR:  89.7% (1 y)   Target end date:  Indefinite   Send INR reminders to:  [facility]    Indications    Long term (current) use of anticoagulants (Resolved) [Z79.01]  Embolism and thrombosis (H) (Resolved) [I74.9]  Anticoagulation monitoring  INR range 2-3 [Z79.01]  Embolism and thrombosis (H) [I74.9]             Comments:  MDINR needs weekly INR done             Anticoagulation Care Providers       Provider Role Specialty Phone number    Maria Guadalupe Ramon DO Referring Internal Medicine [phone]

## 2024-04-08 LAB — INR HOME MONITORING: 1.7 RATIO (ref 2–3)

## 2024-04-09 ENCOUNTER — ANTICOAGULATION THERAPY VISIT (OUTPATIENT)
Dept: ANTICOAGULATION | Facility: OTHER | Age: 64
End: 2024-04-09
Attending: INTERNAL MEDICINE
Payer: COMMERCIAL

## 2024-04-09 DIAGNOSIS — Z79.01 ANTICOAGULATION MONITORING, INR RANGE 2-3: Primary | ICD-10-CM

## 2024-04-09 DIAGNOSIS — I74.9 EMBOLISM AND THROMBOSIS (H): ICD-10-CM

## 2024-04-09 NOTE — PROGRESS NOTES
ANTICOAGULATION MANAGEMENT     Trisha Fernando 63 year old female is on warfarin with subtherapeutic INR result. (Goal INR 2.0-3.0)    Recent labs: (last 7 days)     04/08/24  1920   INR 1.7*       ASSESSMENT     Source(s): Chart Review  Previous INR was Therapeutic last 2(+) visits  Medication, diet, health changes since last INR chart reviewed; none identified patient called back states she missed a dose last week         PLAN     Recommended plan for no diet, medication or health factor changes affecting INR     Dosing Instructions: Continue your current warfarin dose with next INR in 1 week       Summary  As of 4/9/2024      Full warfarin instructions:  2.5 mg every Mon, Fri; 5 mg all other days   Next INR check:  4/16/2024               Detailed voice message left for Trisha with dosing instructions and follow up date.     Patient to recheck with home meter    Education provided:   Contact [phone] with any changes, questions or concerns.     Plan made per [facility] anticoagulation protocol    Smitha Nair RN  Anticoagulation Clinic  4/9/2024    _______________________________________________________________________     Anticoagulation Episode Summary       Current INR goal:  2.0-3.0   TTR:  88.9% (1 y)   Target end date:  Indefinite   Send INR reminders to:  [facility]    Indications    Long term (current) use of anticoagulants (Resolved) [Z79.01]  Embolism and thrombosis (H) (Resolved) [I74.9]  Anticoagulation monitoring  INR range 2-3 [Z79.01]  Embolism and thrombosis (H) [I74.9]             Comments:  MDINR needs weekly INR done             Anticoagulation Care Providers       Provider Role Specialty Phone number    Maria Guadalupe Ramon DO Referring Internal Medicine [phone]

## 2024-04-15 LAB — INR HOME MONITORING: 1.7 RATIO (ref 2–3)

## 2024-04-16 ENCOUNTER — ANTICOAGULATION THERAPY VISIT (OUTPATIENT)
Dept: ANTICOAGULATION | Facility: OTHER | Age: 64
End: 2024-04-16
Attending: INTERNAL MEDICINE
Payer: COMMERCIAL

## 2024-04-16 DIAGNOSIS — I74.9 EMBOLISM AND THROMBOSIS (H): ICD-10-CM

## 2024-04-16 DIAGNOSIS — Z79.01 ANTICOAGULATION MONITORING, INR RANGE 2-3: Primary | ICD-10-CM

## 2024-04-16 NOTE — PROGRESS NOTES
ANTICOAGULATION MANAGEMENT     Trisha Fernando 63 year old female is on warfarin with subtherapeutic INR result. (Goal INR 2.0-3.0)    Recent labs: (last 7 days)     04/15/24  1924   INR 1.7*       ASSESSMENT     Source(s): Chart review     Warfarin doses taken: Warfarin taken as instructed  Diet: No new diet changes identified  New illness, injury, or hospitalization: No  Medication/supplement changes: None noted  Signs or symptoms of bleeding or clotting: No  Previous INR: Subtherapeutic  Additional findings: None     PLAN     Recommended plan for no diet, medication or health factor changes affecting INR     Dosing Instructions: Increase your warfarin dose (8.3% change) with next INR in 1 week       Summary  As of 4/16/2024      Full warfarin instructions:  2.5 mg every Mon, Fri; 5 mg all other days   Next INR check:  4/23/2024               Detailed voice message left for Trisha with dosing instructions and follow up date.     Patient to recheck with home meter    Education provided: Please call back if any changes to your diet, medications or how you've been taking warfarin    Plan made per [facility] anticoagulation protocol    Aury Jamison, RN  Anticoagulation Clinic  4/16/2024    _______________________________________________________________________     Anticoagulation Episode Summary       Current INR goal:  2.0-3.0   TTR:  87.0% (1 y)   Target end date:  Indefinite   Send INR reminders to:  [facility]    Indications    Long term (current) use of anticoagulants (Resolved) [Z79.01]  Embolism and thrombosis (H) (Resolved) [I74.9]  Anticoagulation monitoring  INR range 2-3 [Z79.01]  Embolism and thrombosis (H) [I74.9]             Comments:  MDINR needs weekly INR done             Anticoagulation Care Providers       Provider Role Specialty Phone number    Maria Guadalupe Ramon DO Referring Internal Medicine [phone]

## 2024-04-17 ENCOUNTER — HOSPITAL ENCOUNTER (OUTPATIENT)
Dept: GENERAL RADIOLOGY | Facility: OTHER | Age: 64
Discharge: HOME | End: 2024-04-17
Attending: ORTHOPAEDIC SURGERY
Payer: COMMERCIAL

## 2024-04-17 ENCOUNTER — OFFICE VISIT (OUTPATIENT)
Dept: ORTHOPEDICS | Facility: OTHER | Age: 64
End: 2024-04-17
Attending: ORTHOPAEDIC SURGERY
Payer: COMMERCIAL

## 2024-04-17 DIAGNOSIS — G89.29 CHRONIC LEFT SHOULDER PAIN: Primary | ICD-10-CM

## 2024-04-17 DIAGNOSIS — M25.512 LEFT SHOULDER PAIN, UNSPECIFIED CHRONICITY: ICD-10-CM

## 2024-04-17 DIAGNOSIS — M25.512 CHRONIC LEFT SHOULDER PAIN: Primary | ICD-10-CM

## 2024-04-17 PROCEDURE — 73030 X-RAY EXAM OF SHOULDER: CPT | Mod: LT

## 2024-04-17 PROCEDURE — 99213 OFFICE O/P EST LOW 20 MIN: CPT | Performed by: ORTHOPAEDIC SURGERY

## 2024-04-17 RX ORDER — TRAMADOL HYDROCHLORIDE 50 MG/1
50 TABLET ORAL
Qty: 15 TABLET | Refills: 0 | Status: SHIPPED | OUTPATIENT
Start: 2024-04-17 | End: 2024-04-27

## 2024-04-17 NOTE — PROGRESS NOTES
Surgical Clinic Consult  Primary physician:     Maria Guadalupe Ramon    Chief complaint:   Left shoulder pain    History of present illness:  This is a 63 year old female I am seeing in consultation for chronic left shoulder pain getting worse here has been going on for least the past year or thereabouts or close 2.  No 1 fall prior to onset.  Pain with overhead activity.  Pain at nighttime is also present here as well in addition to that.  No recent treatment has been done at this point.  Patient is here for neck steps and recommendations.  She has been careful with how she is moving her arm as well at this point.    Past medical history:   Past Medical History:   Diagnosis Date    2019 novel coronavirus disease (COVID-19) 2020    Activated protein C resistance (H24)     Acute respiratory failure with hypoxia (H) 2020    Benign lipomatous neoplasm 2011    Right forearm    Calculus of kidney     DVT (deep venous thrombosis) (H) 10/2/2012    Essential (primary) hypertension     Heterozygous factor V Leiden mutation (H24) 2008    History of fatty infiltration of liver     Hyperlipidemia     Major depressive disorder, recurrent, mild (H24)     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, unspecified (CODE)     Left foot acromyxoid fibroblastic, 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    Umbilical hernia     Varicose veins of both legs with edema 2015       Pastsurgical history:  Past Surgical History:   Procedure Laterality Date    ARTHROSCOPY KNEE Left 2013    Dr Jones    BIOPSY BREAST Right     fiboadenoma     SECTION      ,     CHOLECYSTECTOMY  2014    COLONOSCOPY  2011    polyp ( Dr. Sutton)       COLONOSCOPY N/A 5/24/2019    Normal exam, 10 year follow up    LITHOTRIPSY      MAMMOPLASTY REDUCTION  06/30/2003    OTHER SURGICAL HISTORY Right 05/24/2011    Forearm Lipoma Excision    OTHER SURGICAL HISTORY  2008    Left foot soft tissue resection; dr. Lang    TONSILLECTOMY         Current medications:  Current Outpatient Medications   Medication Sig Dispense Refill    traMADol (ULTRAM) 50 MG tablet Take 1 tablet (50 mg) by mouth nightly as needed for severe pain 15 tablet 0    albuterol (PROAIR HFA/PROVENTIL HFA/VENTOLIN HFA) 108 (90 Base) MCG/ACT inhaler Inhale 2 puffs into the lungs every 4 hours as needed for shortness of breath or wheezing 8.5 g 3    buPROPion (WELLBUTRIN XL) 150 MG 24 hr tablet Take 2 tablets (300 mg) by mouth every morning Increase in dose 180 tablet 4    chlorthalidone (HYGROTON) 25 MG tablet Take 1 tablet (25 mg) by mouth daily 90 tablet 4    Cholecalciferol (VITAMIN D3) 2000 UNITS CAPS Take 2,000 Units by mouth daily      clindamycin (CLINDAMAX) 1 % external gel Apply topically 2 times daily 150 g 1    clobetasol (TEMOVATE) 0.05 % external ointment Apply topically 2 times daily 60 g 0    Elastic Bandages & Supports (MEDICAL COMPRESSION STOCKINGS) MISC For personal use. Length: calf Strength: 16-20 mmHg Circumference in cm: For calf: Ankle 15cm, Calf 30cm, Ankle to calf length 40cm.      fish oil-omega-3 fatty acids 1000 MG capsule Take 1 capsule by mouth daily       LORazepam (ATIVAN) 0.5 MG tablet Take 0.5 mg by mouth every 6 hours as needed       metoprolol tartrate (LOPRESSOR) 25 MG tablet Take 1 tablet (25 mg) by mouth 2 times daily 180 tablet 4    nystatin (MYCOSTATIN) 986380 UNIT/GM external cream Apply topically 2 times daily For rash - can use once daily preventatively 30 g 3    potassium chloride ER (K-TAB/KLOR-CON) 10 MEQ CR tablet Take 1 tablet (10 mEq) by mouth daily 90 tablet 88    triamcinolone (KENALOG) 0.1 % external cream Apply topically 2 times daily As needed for  rash 15 g 0    venlafaxine (EFFEXOR XR) 150 MG 24 hr capsule Take 1 capsule (150 mg) by mouth daily with food 90 capsule 3    vitamin B-Complex Take 1 tablet by mouth daily      warfarin ANTICOAGULANT (COUMADIN) 5 MG tablet TAKE 5 MG x 5 days a week and 2.5 mg x 2 days a week OR AS DIRECTED BY THE [facility]. 90 tablet 1       Allergies:  Allergies   Allergen Reactions    Lisinopril Swelling     Angioedema, Edema      Losartan Dizziness     dizziness, tiredness, itching in ears and face, hands and feet    Adhesive Tape Itching and Rash    Amlodipine Swelling     edema    Diflucan [Fluconazole] Rash     Possible allergy    Lissie Itching and Rash    Wound Dressing Adhesive Rash       Family history:  Family History   Problem Relation Age of Onset    Other - See Comments Mother         HX of kidney stones and blood clots/ gallbladder disease    Dementia Mother     Other - See Comments Father         accidental death/ gallbladder disease    Other - See Comments Brother         HX of kidney stones and blood clots    No Known Problems Brother     No Known Problems Brother     No Known Problems Brother     Heart Disease Paternal Grandfather         Heart Disease,ASCHD    No Known Problems Daughter     No Known Problems Daughter     Colon Cancer Maternal Uncle 50        Cancer-colon    Colon Cancer Maternal Uncle 50        Cancer-colon    Other - See Comments Maternal Uncle         HX of kidney stones    Other - See Comments Paternal Aunt         Parkinson's       Social history:  Social History     Socioeconomic History    Marital status:      Spouse name: Not on file    Number of children: Not on file    Years of education: Not on file    Highest education level: Not on file   Occupational History    Not on file   Tobacco Use    Smoking status: Former     Current packs/day: 0.00     Average packs/day: 1 pack/day for 12.0 years (12.0 ttl pk-yrs)     Types: Cigarettes     Start date: 9/4/1978     Quit date:  1990     Years since quittin.6    Smokeless tobacco: Never   Vaping Use    Vaping status: Never Used   Substance and Sexual Activity    Alcohol use: Not Currently     Alcohol/week: 2.0 standard drinks of alcohol     Comment:  rarely if ever    Drug use: No    Sexual activity: Not Currently     Partners: Male   Other Topics Concern    Parent/sibling w/ CABG, MI or angioplasty before 65F 55M? Not Asked   Social History Narrative    , Atif.  Two children, Shadia (Energy) and Deborah (teacher, [employer]); [facility]     Social Determinants of Health     Financial Resource Strain: Low Risk  (2024)    Financial Resource Strain     Within the past 12 months, have you or your family members you live with been unable to get utilities (heat, electricity) when it was really needed?: No   Food Insecurity: Low Risk  (2024)    Food Insecurity     Within the past 12 months, did you worry that your food would run out before you got money to buy more?: No     Within the past 12 months, did the food you bought just not last and you didn t have money to get more?: No   Transportation Needs: Low Risk  (2024)    Transportation Needs     Within the past 12 months, has lack of transportation kept you from medical appointments, getting your medicines, non-medical meetings or appointments, work, or from getting things that you need?: No   Physical Activity: Not on file   Stress: Not on file   Social Connections: Not on file   Interpersonal Safety: Low Risk  (2024)    Interpersonal Safety     Do you feel physically and emotionally safe where you currently live?: Yes     Within the past 12 months, have you been hit, slapped, kicked or otherwise physically hurt by someone?: No     Within the past 12 months, have you been humiliated or emotionally abused in other ways by your partner or ex-partner?: No   Housing Stability: Low Risk  (2024)    Housing Stability     Do you have  housing? : Yes     Are you worried about losing your housing?: No       PROBLEM LIST:  Patient Active Problem List   Diagnosis    Anticoagulation monitoring, INR range 2-3    Depression, major, recurrent, mild (H24)    Hypertension    Morbid obesity (H)    Family history of factor V Leiden mutation    Edema of both lower extremities    Embolism and thrombosis (H)       Review of Systems:  COMPLETE 12 point REVIEW OF SYSTEMS is otherwise negative with the exception of which is stated above.    Physical exam: [facility] 05/14/2013 (Approximate)     General: this is a pleasant female patient in no acute distress.  Patient is awake alert and oriented x3 .   EXAM:  Chest/Respiratory Exam: Normal - Clear to auscultation without rales, rhonchi, or wheezing.  Cardiovascular Exam: normal  Musculoskeletal: Left shoulder examination shows forward ovation 90.  Abduction to 90.  Internal Tatian hip level.  Weakness with abduction as well as external strength testing.  No instability identified at this time.  Patient does have some restriction overall glide of the shoulder here as well.  Deftly has some weakness here with the strength testing as well.    Imaging: 3 views left shoulder shows no significant hematuria arthrosis.  Type I acromion present.    Assessment:   Left shoulder pain likely cuff pathology with component of frozen shoulder.    Plan:    MRI of the shoulders been recommended this to be done in [place] that she needs an open scan.  We also provided her sling here today as well.  She will contact her once the studies are complete for further direction and appropriate management.      Johnnie Grubbs MD

## 2024-04-22 LAB — INR HOME MONITORING: 2 RATIO (ref 2–3)

## 2024-04-23 ENCOUNTER — ANTICOAGULATION THERAPY VISIT (OUTPATIENT)
Dept: ANTICOAGULATION | Facility: OTHER | Age: 64
End: 2024-04-23
Attending: INTERNAL MEDICINE
Payer: COMMERCIAL

## 2024-04-23 DIAGNOSIS — Z79.01 ANTICOAGULATION MONITORING, INR RANGE 2-3: Primary | ICD-10-CM

## 2024-04-23 DIAGNOSIS — I74.9 EMBOLISM AND THROMBOSIS (H): ICD-10-CM

## 2024-04-23 NOTE — PROGRESS NOTES
ANTICOAGULATION MANAGEMENT     Trisha Fernando 63 year old female is on warfarin with therapeutic INR result. (Goal INR 2.0-3.0)    Recent labs: (last 7 days)     04/22/24  1648   INR 2       ASSESSMENT     Source(s): Chart Review  Previous INR was Subtherapeutic  Medication, diet, health changes since last INR chart reviewed; none identified         PLAN     Recommended plan for no diet, medication or health factor changes affecting INR     Dosing Instructions: Continue your current warfarin dose with next INR in 1 week       Summary  As of 4/23/2024      Full warfarin instructions:  2.5 mg every Mon; 5 mg all other days   Next INR check:  4/30/2024               Detailed voice message left for Trisha with dosing instructions and follow up date.     Patient to recheck with home meter    Education provided:   Contact [phone] with any changes, questions or concerns.     Plan made per [facility] anticoagulation protocol    Smitha Nair RN  Anticoagulation Clinic  4/23/2024    _______________________________________________________________________     Anticoagulation Episode Summary       Current INR goal:  2.0-3.0   TTR:  85.1% (1 y)   Target end date:  Indefinite   Send INR reminders to:  [facility]    Indications    Long term (current) use of anticoagulants (Resolved) [Z79.01]  Embolism and thrombosis (H) (Resolved) [I74.9]  Anticoagulation monitoring  INR range 2-3 [Z79.01]  Embolism and thrombosis (H) [I74.9]             Comments:  MDINR needs weekly INR done             Anticoagulation Care Providers       Provider Role Specialty Phone number    Maria Guadalupe Ramon DO Referring Internal Medicine [phone]

## 2024-04-30 ENCOUNTER — ANTICOAGULATION THERAPY VISIT (OUTPATIENT)
Dept: ANTICOAGULATION | Facility: OTHER | Age: 64
End: 2024-04-30
Attending: INTERNAL MEDICINE
Payer: COMMERCIAL

## 2024-04-30 DIAGNOSIS — I74.9 EMBOLISM AND THROMBOSIS (H): ICD-10-CM

## 2024-04-30 DIAGNOSIS — Z79.01 ANTICOAGULATION MONITORING, INR RANGE 2-3: Primary | ICD-10-CM

## 2024-04-30 LAB — INR HOME MONITORING: 2 RATIO (ref 2–3)

## 2024-04-30 NOTE — PROGRESS NOTES
ANTICOAGULATION  MANAGEMENT-Home Monitor Managed by Exception    Trisha NATALIA Henrybeatricebells 63 year old female is on warfarin with therapeutic INR result. (Goal INR 2.0-3.0)    Recent labs: (last 7 days)     04/30/24  0610   INR 2       Previous INR was Therapeutic  Medication, diet, health changes since last INR:chart reviewed; none identified  Contacted within the last 12 weeks by phone on 4/23/24  Last [facility] referral date: 10/10/2023      JOSE JUAN     Trisha was NOT contacted regarding therapeutic result today per home monitoring policy manage by exception agreement.   Current warfarin dose is to be continued:     Summary  As of 4/30/2024      Full warfarin instructions:  2.5 mg every Mon; 5 mg all other days   Next INR check:  5/7/2024             ?   Smitha Nair RN  Anticoagulation Clinic  4/30/2024    _______________________________________________________________________     Anticoagulation Episode Summary       Current INR goal:  2.0-3.0   TTR:  85.2% (1 y)   Target end date:  Indefinite   Send INR reminders to:  [facility]    Indications    Long term (current) use of anticoagulants (Resolved) [Z79.01]  Embolism and thrombosis (H) (Resolved) [I74.9]  Anticoagulation monitoring  INR range 2-3 [Z79.01]  Embolism and thrombosis (H) [I74.9]             Comments:  MDINR needs weekly INR done             Anticoagulation Care Providers       Provider Role Specialty Phone number    Maria Guadalupe Ramon DO Referring Internal Medicine [phone]

## 2024-05-06 LAB — INR HOME MONITORING: 2.4 RATIO (ref 2–3)

## 2024-05-07 ENCOUNTER — ANTICOAGULATION THERAPY VISIT (OUTPATIENT)
Dept: ANTICOAGULATION | Facility: OTHER | Age: 64
End: 2024-05-07
Attending: INTERNAL MEDICINE
Payer: COMMERCIAL

## 2024-05-07 ENCOUNTER — TRANSFERRED RECORDS (OUTPATIENT)
Dept: HEALTH INFORMATION MANAGEMENT | Facility: OTHER | Age: 64
End: 2024-05-07

## 2024-05-07 DIAGNOSIS — Z79.01 ANTICOAGULATION MONITORING, INR RANGE 2-3: Primary | ICD-10-CM

## 2024-05-07 DIAGNOSIS — I74.9 EMBOLISM AND THROMBOSIS (H): ICD-10-CM

## 2024-05-07 NOTE — PROGRESS NOTES
ANTICOAGULATION  MANAGEMENT-Home Monitor Managed by Exception    Trisha FISHER Marlons 63 year old female is on warfarin with therapeutic INR result. (Goal INR 2.0-3.0)    Recent labs: (last 7 days)     05/06/24  1656   INR 2.4       Previous INR was Therapeutic  Medication, diet, health changes since last INR:chart reviewed; none identified  Contacted within the last 12 weeks by phone on 04/23/2024  Last [facility] referral date: 10/10/2023      JOSE JUAN Rico was NOT contacted regarding therapeutic result today per home monitoring policy manage by exception agreement.   Current warfarin dose is to be continued:     Summary  As of 5/7/2024      Full warfarin instructions:  2.5 mg every Mon; 5 mg all other days   Next INR check:  5/14/2024             ?   Mariia Foster RN  Anticoagulation Clinic  5/7/2024    _______________________________________________________________________     Anticoagulation Episode Summary       Current INR goal:  2.0-3.0   TTR:  85.1% (1 y)   Target end date:  Indefinite   Send INR reminders to:  [facility]    Indications    Long term (current) use of anticoagulants (Resolved) [Z79.01]  Embolism and thrombosis (H) (Resolved) [I74.9]  Anticoagulation monitoring  INR range 2-3 [Z79.01]  Embolism and thrombosis (H) [I74.9]             Comments:  MDINR needs weekly INR done             Anticoagulation Care Providers       Provider Role Specialty Phone number    Maria Guadalupe Ramon DO Referring Internal Medicine [phone]

## 2024-05-08 ENCOUNTER — TELEPHONE (OUTPATIENT)
Dept: ORTHOPEDICS | Facility: OTHER | Age: 64
End: 2024-05-08
Payer: COMMERCIAL

## 2024-05-08 ENCOUNTER — HOSPITAL ENCOUNTER (OUTPATIENT)
Facility: OTHER | Age: 64
End: 2024-05-08
Attending: ORTHOPAEDIC SURGERY | Admitting: ORTHOPAEDIC SURGERY
Payer: COMMERCIAL

## 2024-05-08 DIAGNOSIS — G89.29 CHRONIC LEFT SHOULDER PAIN: Primary | ICD-10-CM

## 2024-05-08 DIAGNOSIS — M25.512 CHRONIC LEFT SHOULDER PAIN: Primary | ICD-10-CM

## 2024-05-13 LAB — INR HOME MONITORING: 2.6 RATIO (ref 2–3)

## 2024-05-14 ENCOUNTER — ANTICOAGULATION THERAPY VISIT (OUTPATIENT)
Dept: ANTICOAGULATION | Facility: OTHER | Age: 64
End: 2024-05-14
Attending: INTERNAL MEDICINE
Payer: COMMERCIAL

## 2024-05-14 DIAGNOSIS — Z79.01 ANTICOAGULATION MONITORING, INR RANGE 2-3: Primary | ICD-10-CM

## 2024-05-14 DIAGNOSIS — I74.9 EMBOLISM AND THROMBOSIS (H): ICD-10-CM

## 2024-05-14 NOTE — PROGRESS NOTES
ANTICOAGULATION  MANAGEMENT-Home Monitor Managed by Exception    Trisha Fernando 63 year old female is on warfarin with therapeutic INR result. (Goal INR 2.0-3.0)    Recent labs: (last 7 days)     05/13/24  1720   INR 2.6       Previous INR was Therapeutic  Medication, diet, health changes since last INR: scheduled for Shoulder procedure on 7/2/24  Contacted within the last 12 weeks by phone on 4/23/24  Last [facility] referral date: 10/10/2023      JOSE JUAN     Trisha was NOT contacted regarding therapeutic result today per home monitoring policy manage by exception agreement.   Current warfarin dose is to be continued:     Summary  As of 5/14/2024      Full warfarin instructions:  2.5 mg every Mon; 5 mg all other days   Next INR check:  5/21/2024             ?   Smitha Nair, RN  Anticoagulation Clinic  5/14/2024    _______________________________________________________________________     Anticoagulation Episode Summary       Current INR goal:  2.0-3.0   TTR:  85.1% (1 y)   Target end date:  Indefinite   Send INR reminders to:  [facility]    Indications    Long term (current) use of anticoagulants (Resolved) [Z79.01]  Embolism and thrombosis (H) (Resolved) [I74.9]  Anticoagulation monitoring  INR range 2-3 [Z79.01]  Embolism and thrombosis (H) [I74.9]             Comments:  MDINR needs weekly INR done             Anticoagulation Care Providers       Provider Role Specialty Phone number    Maria Guadalupe Ramon DO Referring Internal Medicine [phone]

## 2024-05-20 LAB — INR HOME MONITORING: 2.6 RATIO (ref 2–3)

## 2024-05-21 ENCOUNTER — ANTICOAGULATION THERAPY VISIT (OUTPATIENT)
Dept: ANTICOAGULATION | Facility: OTHER | Age: 64
End: 2024-05-21
Attending: INTERNAL MEDICINE
Payer: COMMERCIAL

## 2024-05-21 DIAGNOSIS — Z79.01 ANTICOAGULATION MONITORING, INR RANGE 2-3: Primary | ICD-10-CM

## 2024-05-21 DIAGNOSIS — I74.9 EMBOLISM AND THROMBOSIS (H): ICD-10-CM

## 2024-05-21 NOTE — PROGRESS NOTES
ANTICOAGULATION  MANAGEMENT-Home Monitor Managed by Exception    Trisha FISHER Kassie 63 year old female is on warfarin with therapeutic INR result. (Goal INR 2.0-3.0)    Recent labs: (last 7 days)     05/20/24 2125   INR 2.6       Previous INR was Therapeutic  Medication, diet, health changes since last INR:chart reviewed; none identified  Contacted within the last 12 weeks by phone on 4/23/24  Last [facility] referral date: 10/10/2023      JOSE JUAN Rico was NOT contacted regarding therapeutic result today per home monitoring policy manage by exception agreement.   Current warfarin dose is to be continued:     Summary  As of 5/21/2024      Full warfarin instructions:  2.5 mg every Mon; 5 mg all other days   Next INR check:  5/28/2024             ?   Aury Jamison RN  Anticoagulation Clinic  5/21/2024    _______________________________________________________________________     Anticoagulation Episode Summary       Current INR goal:  2.0-3.0   TTR:  85.1% (1 y)   Target end date:  Indefinite   Send INR reminders to:  [facility]    Indications    Long term (current) use of anticoagulants (Resolved) [Z79.01]  Embolism and thrombosis (H) (Resolved) [I74.9]  Anticoagulation monitoring  INR range 2-3 [Z79.01]  Embolism and thrombosis (H) [I74.9]             Comments:  MDINR needs weekly INR done             Anticoagulation Care Providers       Provider Role Specialty Phone number    Maria Guadalupe Ramon DO Referring Internal Medicine [phone]

## 2024-05-27 LAB — INR HOME MONITORING: 2.5 RATIO (ref 2–3)

## 2024-05-28 ENCOUNTER — ANTICOAGULATION THERAPY VISIT (OUTPATIENT)
Dept: ANTICOAGULATION | Facility: OTHER | Age: 64
End: 2024-05-28
Attending: INTERNAL MEDICINE
Payer: COMMERCIAL

## 2024-05-28 DIAGNOSIS — I74.9 EMBOLISM AND THROMBOSIS (H): ICD-10-CM

## 2024-05-28 DIAGNOSIS — Z79.01 ANTICOAGULATION MONITORING, INR RANGE 2-3: Primary | ICD-10-CM

## 2024-05-28 NOTE — PROGRESS NOTES
ANTICOAGULATION  MANAGEMENT-Home Monitor Managed by Exception    Trisha Fernando 63 year old female is on warfarin with therapeutic INR result. (Goal INR 2.0-3.0)    Recent labs: (last 7 days)     05/27/24  1418   INR 2.5       Previous INR was Therapeutic  Medication, diet, health changes since last INR:chart reviewed; none identified scheduled for Shoulder procedure on 7/2/24 preop 6/18  Contacted within the last 12 weeks by phone on 4/23/24  Last [facility] referral date: 10/10/2023      JOSE JUAN Rico was NOT contacted regarding therapeutic result today per home monitoring policy manage by exception agreement.   Current warfarin dose is to be continued:     Summary  As of 5/28/2024      Full warfarin instructions:  2.5 mg every Mon; 5 mg all other days   Next INR check:  6/3/2024             ?   Smitha Nair, RN  Anticoagulation Clinic  5/28/2024    _______________________________________________________________________     Anticoagulation Episode Summary       Current INR goal:  2.0-3.0   TTR:  85.1% (1 y)   Target end date:  Indefinite   Send INR reminders to:  [facility]    Indications    Long term (current) use of anticoagulants (Resolved) [Z79.01]  Embolism and thrombosis (H) (Resolved) [I74.9]  Anticoagulation monitoring  INR range 2-3 [Z79.01]  Embolism and thrombosis (H) [I74.9]             Comments:  MDINR needs weekly INR done             Anticoagulation Care Providers       Provider Role Specialty Phone number    Maria Guadalupe Ramon DO Referring Internal Medicine [phone]

## 2024-06-03 ENCOUNTER — ANTICOAGULATION THERAPY VISIT (OUTPATIENT)
Dept: ANTICOAGULATION | Facility: OTHER | Age: 64
End: 2024-06-03
Attending: INTERNAL MEDICINE
Payer: COMMERCIAL

## 2024-06-03 DIAGNOSIS — I74.9 EMBOLISM AND THROMBOSIS (H): ICD-10-CM

## 2024-06-03 DIAGNOSIS — Z79.01 ANTICOAGULATION MONITORING, INR RANGE 2-3: Primary | ICD-10-CM

## 2024-06-03 LAB — INR HOME MONITORING: 2.4 RATIO (ref 2–3)

## 2024-06-04 NOTE — PROGRESS NOTES
ANTICOAGULATION  MANAGEMENT-Home Monitor Managed by Exception    Trisha Fernando 63 year old female is on warfarin with therapeutic INR result. (Goal INR 2.0-3.0)    Recent labs: (last 7 days)     06/03/24  1858   INR 2.4       Previous INR was Therapeutic  Medication, diet, health changes since last INR:chart reviewed; none identified scheduled for Shoulder procedure on 7/2/24 preop 6/18   Contacted within the last 12 weeks by phone on 4/23/24  Last [facility] referral date: 10/10/2023      JOSE JUAN Rico was NOT contacted regarding therapeutic result today per home monitoring policy manage by exception agreement.   Current warfarin dose is to be continued:     Summary  As of 6/3/2024      Full warfarin instructions:  2.5 mg every Mon; 5 mg all other days   Next INR check:  6/10/2024             ?   Smitha Nair, RN  Anticoagulation Clinic  6/4/2024    _______________________________________________________________________     Anticoagulation Episode Summary       Current INR goal:  2.0-3.0   TTR:  85.1% (1 y)   Target end date:  Indefinite   Send INR reminders to:  [facility]    Indications    Long term (current) use of anticoagulants (Resolved) [Z79.01]  Embolism and thrombosis (H) (Resolved) [I74.9]  Anticoagulation monitoring  INR range 2-3 [Z79.01]  Embolism and thrombosis (H) [I74.9]             Comments:  MDINR needs weekly INR done             Anticoagulation Care Providers       Provider Role Specialty Phone number    Maria Guadalupe Ramon DO Referring Internal Medicine [phone]

## 2024-06-10 ENCOUNTER — ANTICOAGULATION THERAPY VISIT (OUTPATIENT)
Dept: ANTICOAGULATION | Facility: OTHER | Age: 64
End: 2024-06-10
Attending: INTERNAL MEDICINE
Payer: COMMERCIAL

## 2024-06-10 DIAGNOSIS — I74.9 EMBOLISM AND THROMBOSIS (H): ICD-10-CM

## 2024-06-10 DIAGNOSIS — Z79.01 ANTICOAGULATION MONITORING, INR RANGE 2-3: Primary | ICD-10-CM

## 2024-06-10 LAB — INR HOME MONITORING: 2.3 RATIO (ref 2–3)

## 2024-06-10 NOTE — PROGRESS NOTES
ANTICOAGULATION MANAGEMENT     Trisha Fernando 63 year old female is on warfarin with therapeutic INR result. (Goal INR 2.0-3.0)    Recent labs: (last 7 days)     06/10/24  1037   INR 2.3       ASSESSMENT     Source(s): Chart Review and Patient/Caregiver Call     Warfarin doses taken: Warfarin taken as instructed  Diet: No new diet changes identified  Medication/supplement changes: Plaquenil started on 6/5/24   New illness, injury, or hospitalization: No  Signs or symptoms of bleeding or clotting: No  Previous result: Therapeutic last 2(+) visits  Additional findings: Upcoming surgery/procedure having shoulder surgery 7/2 with preop on 6/18       PLAN     Recommended plan for ongoing change(s) affecting INR     Dosing Instructions: Continue your current warfarin dose with next INR in 1 week       Summary  As of 6/10/2024      Full warfarin instructions:  2.5 mg every Mon; 5 mg all other days   Next INR check:  6/17/2024               Telephone call with Trisha who verbalizes understanding and agrees to plan    Patient to recheck with home meter    Education provided:   None required    Plan made per [facility] anticoagulation protocol    Smitha Nair, RN  Anticoagulation Clinic  6/10/2024    _______________________________________________________________________     Anticoagulation Episode Summary       Current INR goal:  2.0-3.0   TTR:  85.2% (1 y)   Target end date:  Indefinite   Send INR reminders to:  [facility]    Indications    Long term (current) use of anticoagulants (Resolved) [Z79.01]  Embolism and thrombosis (H) (Resolved) [I74.9]  Anticoagulation monitoring  INR range 2-3 [Z79.01]  Embolism and thrombosis (H) [I74.9]             Comments:  MDINR needs weekly INR done             Anticoagulation Care Providers       Provider Role Specialty Phone number    Maria Guadalupe Ramon DO Referring Internal Medicine [phone]

## 2024-06-11 ENCOUNTER — OFFICE VISIT (OUTPATIENT)
Dept: PSYCHOLOGY | Facility: OTHER | Age: 64
End: 2024-06-11
Attending: SOCIAL WORKER
Payer: COMMERCIAL

## 2024-06-11 DIAGNOSIS — F33.0 DEPRESSION, MAJOR, RECURRENT, MILD (H): ICD-10-CM

## 2024-06-11 DIAGNOSIS — F40.10 SOCIAL ANXIETY DISORDER: ICD-10-CM

## 2024-06-11 PROCEDURE — 90834 PSYTX W PT 45 MINUTES: CPT | Performed by: SOCIAL WORKER

## 2024-06-11 ASSESSMENT — PATIENT HEALTH QUESTIONNAIRE - PHQ9
10. IF YOU CHECKED OFF ANY PROBLEMS, HOW DIFFICULT HAVE THESE PROBLEMS MADE IT FOR YOU TO DO YOUR WORK, TAKE CARE OF THINGS AT HOME, OR GET ALONG WITH OTHER PEOPLE: SOMEWHAT DIFFICULT
SUM OF ALL RESPONSES TO PHQ QUESTIONS 1-9: 9
SUM OF ALL RESPONSES TO PHQ QUESTIONS 1-9: 9

## 2024-06-11 ASSESSMENT — COLUMBIA-SUICIDE SEVERITY RATING SCALE - C-SSRS
4. HAVE YOU HAD THESE THOUGHTS AND HAD SOME INTENTION OF ACTING ON THEM?: NO
2. HAVE YOU ACTUALLY HAD ANY THOUGHTS OF KILLING YOURSELF?: NO
1. HAVE YOU WISHED YOU WERE DEAD OR WISHED YOU COULD GO TO SLEEP AND NOT WAKE UP?: YES
TOTAL  NUMBER OF ABORTED OR SELF INTERRUPTED ATTEMPTS LIFETIME: NO
TOTAL  NUMBER OF INTERRUPTED ATTEMPTS LIFETIME: NO
6. HAVE YOU EVER DONE ANYTHING, STARTED TO DO ANYTHING, OR PREPARED TO DO ANYTHING TO END YOUR LIFE?: NO
REASONS FOR IDEATION LIFETIME: COMPLETELY TO END OR STOP THE PAIN (YOU COULDN'T GO ON LIVING WITH THE PAIN OR HOW YOU WERE FEELING)
1. IN THE PAST MONTH, HAVE YOU WISHED YOU WERE DEAD OR WISHED YOU COULD GO TO SLEEP AND NOT WAKE UP?: NO
5. HAVE YOU STARTED TO WORK OUT OR WORKED OUT THE DETAILS OF HOW TO KILL YOURSELF? DO YOU INTEND TO CARRY OUT THIS PLAN?: NO
3. HAVE YOU BEEN THINKING ABOUT HOW YOU MIGHT KILL YOURSELF?: YES
2. HAVE YOU ACTUALLY HAD ANY THOUGHTS OF KILLING YOURSELF?: YES
ATTEMPT LIFETIME: NO

## 2024-06-11 NOTE — PROGRESS NOTES
"[facility]   Mental Health & Addiction Services     Progress Note - Initial Visit    Patient  Name:  Trisha Fernando Date: 2024           Service Type: Individual     Visit Start Time: 1400  Visit End Time: 1450    Visit #:  1    Attendees: Client attended alone    Service Modality:  In-person       DATA:   Interactive Complexity: No   Crisis: No     Presenting Concerns/  Current Stressors: Initial information with:   Born in [place], met  and moved to [place] for some years. Returned to teaching and retired. Has encountered big health issues. 7 years ago,  had heart failure, sought care at [facility], he had a transplant plan. Continues to have heart issues, low energy, did not have the transplant. Was started on some medications which mostly helped his condition. Then Trisha's mother , became messy with other family members and changed family dynamics. Then Trisha had Covid was hospitalization time with repeat infections of Covid. 2018 youngest graduated and left for college, struggling with change and loss of daughter Deborah in the home, mostly at [employer]. Other daughter Shadia oldest is in [place]; graduated as a teacher, been applying for full time jobs. Has a grandson Micha with Shadia.     Trisha has some long lasting Covid related symptoms she is treating. Rash, aches, pains being treated via rheumatologist. Has right knee bone on bone, now torn rotator cup. Loneliness, not talking to brother, has 4 brothers, not talking after mothers passing. 2018 mother Safia. Was close with mother more in later years. Father was killed in car accident when Trisha was 12 at the time. Has some good family memories, busy, with extended family, camping. \"3 things happened that summer\". Brothers contracted samanilia, oldest brother was in serious motorcycle accident and dad .     During Trisha's Covid, oldest brother wanted to take a hay ride to see mother's grave, Trisha declined due " "to her health. Younger brother was really struggling with loss. Then New Year's Cynthia niece called which triggered a damaged relationship with brother's Dale.     Oldest daughter  and invited Dale and family to wedding. \"A huge bag of garbage\". Feels very sad, upset a lot more.  is Atif. Atif's been a [employer] during his life. Atif had Lyme disease. Atif continues to work full time.     Daily life has been miserable, worked half days, then rested, went to bed. Functions physically still for about half the day. \"I was so active\" Trisha wanted to continue being active. \"Now I can barely get up the stairs\".     Has an eating pleasure with stress and over eating.     Has agreed to talk to  and daughter if feeling emotionally miserable or having self harm thoughts.     Father in law in [place], spending time with him, he's 84 and needing more care. Atif spends more time with him. Mother in law had cancer and passed away around 2015. She had a controlling way about her. Was not healthy to stay close with he over time.     Get's to thinking she's an awful person.     Taking Effexor for several years, Wellbutrin added.       ASSESSMENT:  Mental Status Assessment:  Appearance:   Appropriate  Weight    Eye Contact:   Good   Psychomotor Behavior: Normal   Attitude:   Cooperative  Attentive  Orientation:   All  Speech   Rate / Production: Normal/ Responsive Emotional   Volume:  Normal   Mood:    Anxious  Depressed  Sad   Affect:    Blunted  Tearful Worrisome   Thought Content:  Clear   Thought Form:  Coherent   Insight:    Good     Assessments completed prior to this visit:  The following assessments were completed by patient for this visit:  PHQ9:       4/20/2023     3:04 PM 6/19/2023    11:27 AM 8/21/2023    11:46 AM 12/11/2023     9:18 AM 12/11/2023     3:44 PM 1/24/2024     3:56 PM 6/11/2024     8:13 AM   PHQ-9 SCORE   PHQ-9 Total Score [facility] 6 (Mild depression) 12 (Moderate depression) 4 (Minimal " depression)  0 13 (Moderate depression) 9 (Mild depression)   PHQ-9 Total Score 6 12 4 0 0 13 9     PROMIS 10-Global Health (only subscores and total score):       6/11/2024     8:45 AM   PROMIS-10 Scores Only   Global Mental Health Score 7   Global Physical Health Score 9   PROMIS TOTAL - SUBSCORES 16         Safety Issues and Plan for Safety and Risk Management:   [place] Suicide Severity Rating Scale (Lifetime/Recent)      3/4/2019     7:22 AM 6/11/2024     2:00 PM   [place] Suicide Severity Rating (Lifetime/Recent)   Q1 Wished to be Dead (Past Month) no    Q2 Suicidal Thoughts (Past Month) no    Q6 Suicide Behavior (Lifetime) no    Q1 Wish to be Dead (Lifetime)  Y   Wish to be Dead Description (Lifetime)  had thoughts about no one caring   1. Wish to be Dead (Past 1 Month)  N   Q2 Non-Specific Active Suicidal Thoughts (Lifetime)  Y   Non-Specific Active Suicidal Thought Description (Lifetime)  about no one caring no hope   2. Non-Specific Active Suicidal Thoughts (Past 1 Month)  N   3. Active Suicidal Ideation with any Methods (Not Plan) Without Intent to Act (Lifetime)  Y   Active Suicidal Ideation with any Methods (Not Plan) Description (Lifetime)  to auto axphixiate   Q3 Active Suicidal Ideation with any Methods (Not Plan) Without Intent to Act (Past 1 Month)  N   Q4 Active Suicidal Ideation with Some Intent to Act, Without Specific Plan (Lifetime)  N   Q5 Active Suicidal Ideation with Specific Plan and Intent (Lifetime)  N   Most Severe Ideation Rating (Lifetime)  4   Description of Most Severe Ideation (Lifetime)  same as above   Frequency (Lifetime)  2   Duration (Lifetime)  2   Controllability (Lifetime)  2   Deterrents (Lifetime)  1   Deterrents (Past 1 Month)  0   Reasons for Ideation (Lifetime)  5   Actual Attempt (Lifetime)  N   Has subject engaged in non-suicidal self-injurious behavior? (Lifetime)  N   Interrupted Attempts (Lifetime)  N   Aborted or Self-Interrupted Attempt (Lifetime)  N    Preparatory Acts or Behavior (Lifetime)  N   Calculated C-SSRS Risk Score (Lifetime/Recent)  No Risk Indicated     Patient denies current fears or concerns for personal safety.  Patient denies current or recent suicidal ideation or behaviors.  Patient denies current or recent homicidal ideation or behaviors.  Patient denies current or recent self injurious behavior or ideation.  Patient denies other safety concerns.  Recommended that patient call 911 or go to the local ED should there be a change in any of these risk factors.  Patient reports there are firearms in the house. The firearms are secured in a locked space.     Diagnostic Criteria:  Major Depressive Disorder  A) Recurrent episode(s) - symptoms have been present during the same 2-week period and represent a change from previous functioning 5 or more symptoms (required for diagnosis)   - Depressed mood. Note: In children and adolescents, can be irritable mood.     - Diminished interest or pleasure in all, or almost all, activities.    - Significant weight gainincrease in appetite.    - Increased sleep.    - Psychomotor activity retardation.    - Fatigue or loss of energy.    - Feelings of worthlessness or inappropriate and excessive guilt.    - Diminished ability to think or concentrate, or indecisiveness.    - Recurrent thoughts of death (not just fear of dying), recurrent suicidal ideation without a specific plan, or a suicide attempt or a specific plan for committing suicide.   B) The symptoms cause clinically significant distress or impairment in social, occupational, or other important areas of functioning  C) The episode is not attributable to the physiological effects of a substance or to another medical condition  D) The occurence of major depressive episode is not better explained by other thought / psychotic disorders  E) There has never been a manic episode or hypomanic episode      DSM5 Diagnoses: (Sustained by DSM5 Criteria Listed  Above)  Diagnoses: 296.31 (F33.0) Major Depressive Disorder, Recurrent Episode, Mild _ and With anxious distress  Psychosocial & Contextual Factors: Significant health issues for both client and spouse. Grief and loss is present. Prior depression Dx. Currently has psychotropic medication provider, history of positive resiliency skills, two daughters, client is seeking support, financially stable and housing stable. Conflict with siblings following mother's death and client's Covid symptoms.   Intervention:   Intake, gathering initial history and data, therapeutic validation with depressive symptoms, grief and loss, plan making with DA and follow-up therapy support.   Collateral Reports Completed:  Not Applicable      PLAN: (Homework, other):  1. Provider will continue Diagnostic Assessment.  Patient was given the following to do until next session: consider current strengths, grandson Micha and her adult daughters as hopeful topics.     2. Provider recommended the following referrals: n/a at this time.      3.  Suicide Risk and Safety Concerns were assessed for Trisha Fernando.    Patient meets the following risk assessment and triage: Patient denied any current/recent/lifetime history of suicidal ideation and/or behaviors.  No safety plan indicated at this time.       Endy Sparks, [facility]  June 11, 2024       Answers submitted by the patient for this visit:  Patient Health Questionnaire (Submitted on 6/11/2024)  If you checked off any problems, how difficult have these problems made it for you to do your work, take care of things at home, or get along with other people?: Somewhat difficult  PHQ9 TOTAL SCORE: 9

## 2024-06-17 ENCOUNTER — ANTICOAGULATION THERAPY VISIT (OUTPATIENT)
Dept: ANTICOAGULATION | Facility: OTHER | Age: 64
End: 2024-06-17
Attending: INTERNAL MEDICINE
Payer: COMMERCIAL

## 2024-06-17 DIAGNOSIS — I74.9 EMBOLISM AND THROMBOSIS (H): ICD-10-CM

## 2024-06-17 DIAGNOSIS — Z79.01 ANTICOAGULATION MONITORING, INR RANGE 2-3: Primary | ICD-10-CM

## 2024-06-17 LAB — INR HOME MONITORING: 2.4 RATIO (ref 2–3)

## 2024-06-17 ASSESSMENT — PATIENT HEALTH QUESTIONNAIRE - PHQ9
SUM OF ALL RESPONSES TO PHQ QUESTIONS 1-9: 6
SUM OF ALL RESPONSES TO PHQ QUESTIONS 1-9: 6
10. IF YOU CHECKED OFF ANY PROBLEMS, HOW DIFFICULT HAVE THESE PROBLEMS MADE IT FOR YOU TO DO YOUR WORK, TAKE CARE OF THINGS AT HOME, OR GET ALONG WITH OTHER PEOPLE: SOMEWHAT DIFFICULT

## 2024-06-17 NOTE — PROGRESS NOTES
ANTICOAGULATION  MANAGEMENT-Home Monitor Managed by Exception    Trisha Fernando 63 year old female is on warfarin with therapeutic INR result. (Goal INR 2.0-3.0)    Recent labs: (last 7 days)     06/17/24  1329   INR 2.4       Previous INR was Therapeutic  Medication, diet, health changes since last INR: Has upcoming surgery for shoulder 7/2 with preop on 6/18  Contacted within the last 12 weeks by phone on 6/10/24  Last [facility] referral date: 10/10/2023      JOSE JUAN Rico was NOT contacted regarding therapeutic result today per home monitoring policy manage by exception agreement.   Current warfarin dose is to be continued:     Summary  As of 6/17/2024      Full warfarin instructions:  2.5 mg every Mon; 5 mg all other days   Next INR check:  6/24/2024             ?   Smitha Nair RN  Anticoagulation Clinic  6/17/2024    _______________________________________________________________________     Anticoagulation Episode Summary       Current INR goal:  2.0-3.0   TTR:  85.2% (1 y)   Target end date:  Indefinite   Send INR reminders to:  [facility]    Indications    Long term (current) use of anticoagulants (Resolved) [Z79.01]  Embolism and thrombosis (H) (Resolved) [I74.9]  Anticoagulation monitoring  INR range 2-3 [Z79.01]  Embolism and thrombosis (H) [I74.9]             Comments:  MDINR needs weekly INR done             Anticoagulation Care Providers       Provider Role Specialty Phone number    Maria Guadalupe Ramon DO Referring Internal Medicine [phone]

## 2024-06-18 ENCOUNTER — OFFICE VISIT (OUTPATIENT)
Dept: INTERNAL MEDICINE | Facility: OTHER | Age: 64
End: 2024-06-18
Payer: COMMERCIAL

## 2024-06-18 ENCOUNTER — ANESTHESIA EVENT (OUTPATIENT)
Dept: ANESTHESIOLOGY | Facility: OTHER | Age: 64
End: 2024-06-18

## 2024-06-18 ENCOUNTER — ANESTHESIA (OUTPATIENT)
Dept: ANESTHESIOLOGY | Facility: OTHER | Age: 64
End: 2024-06-18

## 2024-06-18 VITALS
RESPIRATION RATE: 18 BRPM | WEIGHT: 293 LBS | DIASTOLIC BLOOD PRESSURE: 98 MMHG | BODY MASS INDEX: 44.41 KG/M2 | HEIGHT: 68 IN | HEART RATE: 68 BPM | OXYGEN SATURATION: 94 % | SYSTOLIC BLOOD PRESSURE: 138 MMHG | TEMPERATURE: 97 F

## 2024-06-18 DIAGNOSIS — M05.79 RHEUMATOID ARTHRITIS INVOLVING MULTIPLE SITES WITH POSITIVE RHEUMATOID FACTOR (H): ICD-10-CM

## 2024-06-18 DIAGNOSIS — Z01.818 PREOP GENERAL PHYSICAL EXAM: Primary | ICD-10-CM

## 2024-06-18 DIAGNOSIS — Z83.2 FAMILY HISTORY OF FACTOR V LEIDEN MUTATION: ICD-10-CM

## 2024-06-18 DIAGNOSIS — E78.2 MIXED HYPERLIPIDEMIA: ICD-10-CM

## 2024-06-18 DIAGNOSIS — I10 PRIMARY HYPERTENSION: Chronic | ICD-10-CM

## 2024-06-18 DIAGNOSIS — E66.01 MORBID OBESITY (H): ICD-10-CM

## 2024-06-18 LAB
ALBUMIN SERPL BCG-MCNC: 3.9 G/DL (ref 3.5–5.2)
ALP SERPL-CCNC: 61 U/L (ref 40–150)
ALT SERPL W P-5'-P-CCNC: 32 U/L (ref 0–50)
ANION GAP SERPL CALCULATED.3IONS-SCNC: 9 MMOL/L (ref 7–15)
AST SERPL W P-5'-P-CCNC: 23 U/L (ref 0–45)
BASOPHILS # BLD AUTO: 0.1 10E3/UL (ref 0–0.2)
BASOPHILS NFR BLD AUTO: 1 %
BILIRUB SERPL-MCNC: 0.7 MG/DL
BUN SERPL-MCNC: 19.4 MG/DL (ref 8–23)
CALCIUM SERPL-MCNC: 10.7 MG/DL (ref 8.8–10.2)
CHLORIDE SERPL-SCNC: 100 MMOL/L (ref 98–107)
CHOLEST SERPL-MCNC: 202 MG/DL
CREAT SERPL-MCNC: 0.98 MG/DL (ref 0.51–0.95)
DEPRECATED HCO3 PLAS-SCNC: 30 MMOL/L (ref 22–29)
EGFRCR SERPLBLD CKD-EPI 2021: 64 ML/MIN/1.73M2
EOSINOPHIL # BLD AUTO: 0.1 10E3/UL (ref 0–0.7)
EOSINOPHIL NFR BLD AUTO: 2 %
ERYTHROCYTE [DISTWIDTH] IN BLOOD BY AUTOMATED COUNT: 13.4 % (ref 10–15)
GLUCOSE SERPL-MCNC: 122 MG/DL (ref 70–99)
HBA1C MFR BLD: 5.7 % (ref 4–6.2)
HCT VFR BLD AUTO: 45.8 % (ref 35–47)
HDLC SERPL-MCNC: 66 MG/DL
HGB BLD-MCNC: 15.4 G/DL (ref 11.7–15.7)
IMM GRANULOCYTES # BLD: 0 10E3/UL
IMM GRANULOCYTES NFR BLD: 0 %
LDLC SERPL CALC-MCNC: 106 MG/DL
LYMPHOCYTES # BLD AUTO: 2.5 10E3/UL (ref 0.8–5.3)
LYMPHOCYTES NFR BLD AUTO: 38 %
MCH RBC QN AUTO: 32.3 PG (ref 26.5–33)
MCHC RBC AUTO-ENTMCNC: 33.6 G/DL (ref 31.5–36.5)
MCV RBC AUTO: 96 FL (ref 78–100)
MONOCYTES # BLD AUTO: 0.5 10E3/UL (ref 0–1.3)
MONOCYTES NFR BLD AUTO: 7 %
NEUTROPHILS # BLD AUTO: 3.5 10E3/UL (ref 1.6–8.3)
NEUTROPHILS NFR BLD AUTO: 52 %
NONHDLC SERPL-MCNC: 136 MG/DL
NRBC # BLD AUTO: 0 10E3/UL
NRBC BLD AUTO-RTO: 0 /100
PLATELET # BLD AUTO: 206 10E3/UL (ref 150–450)
POTASSIUM SERPL-SCNC: 3.4 MMOL/L (ref 3.4–5.3)
PROT SERPL-MCNC: 7.1 G/DL (ref 6.4–8.3)
RBC # BLD AUTO: 4.77 10E6/UL (ref 3.8–5.2)
SODIUM SERPL-SCNC: 139 MMOL/L (ref 135–145)
TRIGL SERPL-MCNC: 152 MG/DL
WBC # BLD AUTO: 6.6 10E3/UL (ref 4–11)

## 2024-06-18 PROCEDURE — 82040 ASSAY OF SERUM ALBUMIN: CPT | Mod: ZL

## 2024-06-18 PROCEDURE — 82465 ASSAY BLD/SERUM CHOLESTEROL: CPT | Mod: ZL

## 2024-06-18 PROCEDURE — 99214 OFFICE O/P EST MOD 30 MIN: CPT

## 2024-06-18 PROCEDURE — 36415 COLL VENOUS BLD VENIPUNCTURE: CPT | Mod: ZL

## 2024-06-18 PROCEDURE — 85041 AUTOMATED RBC COUNT: CPT | Mod: ZL

## 2024-06-18 PROCEDURE — 93000 ELECTROCARDIOGRAM COMPLETE: CPT | Performed by: INTERNAL MEDICINE

## 2024-06-18 PROCEDURE — 83036 HEMOGLOBIN GLYCOSYLATED A1C: CPT | Mod: ZL

## 2024-06-18 RX ORDER — HYDROXYCHLOROQUINE SULFATE 200 MG/1
TABLET, FILM COATED ORAL
COMMUNITY
Start: 2024-06-05

## 2024-06-18 ASSESSMENT — ANXIETY QUESTIONNAIRES
GAD7 TOTAL SCORE: 3
GAD7 TOTAL SCORE: 3
6. BECOMING EASILY ANNOYED OR IRRITABLE: SEVERAL DAYS
3. WORRYING TOO MUCH ABOUT DIFFERENT THINGS: SEVERAL DAYS
7. FEELING AFRAID AS IF SOMETHING AWFUL MIGHT HAPPEN: NOT AT ALL
5. BEING SO RESTLESS THAT IT IS HARD TO SIT STILL: NOT AT ALL
2. NOT BEING ABLE TO STOP OR CONTROL WORRYING: NOT AT ALL
4. TROUBLE RELAXING: NOT AT ALL
7. FEELING AFRAID AS IF SOMETHING AWFUL MIGHT HAPPEN: NOT AT ALL
8. IF YOU CHECKED OFF ANY PROBLEMS, HOW DIFFICULT HAVE THESE MADE IT FOR YOU TO DO YOUR WORK, TAKE CARE OF THINGS AT HOME, OR GET ALONG WITH OTHER PEOPLE?: SOMEWHAT DIFFICULT
IF YOU CHECKED OFF ANY PROBLEMS ON THIS QUESTIONNAIRE, HOW DIFFICULT HAVE THESE PROBLEMS MADE IT FOR YOU TO DO YOUR WORK, TAKE CARE OF THINGS AT HOME, OR GET ALONG WITH OTHER PEOPLE: SOMEWHAT DIFFICULT
1. FEELING NERVOUS, ANXIOUS, OR ON EDGE: SEVERAL DAYS

## 2024-06-18 ASSESSMENT — PAIN SCALES - GENERAL: PAINLEVEL: MODERATE PAIN (4)

## 2024-06-18 NOTE — PROGRESS NOTES
Preoperative Evaluation  [facility] AND [facility]  [address]  [place] [address]  Phone: [phone]  Fax: [phone]  Primary Provider: Maria Guadalupe Ramon,   Pre-op Performing Provider: BELLA Braga CNP  Jun 18, 2024 6/17/2024   Surgical Information   What procedure is being done? Pre operation   Facility or Hospital where procedure/surgery will be performed: [facility]   Who is doing the procedure / surgery? Dr. Grubbs   Date of surgery / procedure: July 2, 2024   Time of surgery / procedure: TBD   Where do you plan to recover after surgery? at home with family     Fax number for surgical facility: Note does not need to be faxed, will be available electronically in Epic.    Assessment & Plan     The proposed surgical procedure is considered INTERMEDIATE risk.      ICD-10-CM    1. Preop general physical exam  Z01.818 Comprehensive Metabolic Panel     CBC with Platelets & Differential ([facility] Only)     EKG 12-lead ([facility] and [facility] Clinics Only)     Comprehensive Metabolic Panel     CBC with Platelets & Differential ([facility] Only)      2. Morbid obesity (H)  E66.01 Hemoglobin A1c     Lipid Panel     Lipid Panel     Hemoglobin A1c      3. Family history of factor V Leiden mutation  Z83.2       4. Primary hypertension  I10       5. Rheumatoid arthritis involving multiple sites with positive rheumatoid factor (H)  M05.79       6. Mixed hyperlipidemia  E78.2            Possible Sleep Apnea: Recommended sleep study- discussed health consequences of untreated sleep apnea. Patient declines at this time and would like to think about it.         Risks and Recommendations  The patient has the following additional risks and recommendations for perioperative complications:   - Morbid obesity (BMI >40)    Recommendation  Surgery is not recommended due to morbid obesity. The surgeons office has been notified of this issue.  Patient did have an anesthesia consult regarding her morbid  obesity.  Patient would like to work on weight loss prior to having surgery especially with her shoulder pain improving since recent hydroxychloroquine.  I will reach out to patient to see if she is interested in enrolling in a weight loss program or whether she would like to start weight loss medication.      Bria Rico is a 63 year old, presenting for the following:  Pre-Op Exam (Dr Grubbs July 2, 2024 /[facility] /Left Shoulder Arthroscopy)      HPI related to upcoming procedure: Patient presents to clinic for preoperative evaluation for right shoulder arthroscopy.  She has had ongoing pain in her right shoulder, she was recently started on hydroxychloroquine for RA and has noticed that her shoulder pain is better. Patient denies having any difficulties with anesthesia in the past, and is able to meet > 3 METS.             6/17/2024   Pre-Op Questionnaire   Have you ever had a heart attack or stroke? No   Have you ever had surgery on your heart or blood vessels, such as a stent placement, a coronary artery bypass, or surgery on an artery in your head, neck, heart, or legs? No   Do you have chest pain with activity? No   Do you have a history of heart failure? No   Do you currently have a cold, bronchitis or symptoms of other infection? No   Do you have a cough, shortness of breath, or wheezing? (!) YES: Shortness of breath chronic since COVID- uses an inhaler    Do you or anyone in your family have previous history of blood clots? (!) YES: DVT, factor V Leiden   Do you or does anyone in your family have a serious bleeding problem such as prolonged bleeding following surgeries or cuts? (!) UNKNOWN    Have you ever had problems with anemia or been told to take iron pills? No   Have you had any abnormal blood loss such as black, tarry or bloody stools, or abnormal vaginal bleeding? No   Have you ever had a blood transfusion? No   Are you willing to have a blood transfusion if it is medically needed before,  during, or after your surgery? Yes   Have you or any of your relatives ever had problems with anesthesia? No   Do you have sleep apnea, excessive snoring or daytime drowsiness? (!) YES: Daytime drowsiness   Do you have a CPAP machine? (!) NO    Do you have any artifical heart valves or other implanted medical devices like a pacemaker, defibrillator, or continuous glucose monitor? No   Do you have artificial joints? No   Are you allergic to latex? No     Health Care Directive  Patient does not have a Health Care Directive or Living Will: Patient states has Advance Directive and will bring in a copy to clinic.    Preoperative Review of    reviewed - no record of controlled substances prescribed.        Patient Active Problem List    Diagnosis Date Noted    Rheumatoid arthritis involving multiple sites with positive rheumatoid factor (H) 06/19/2024     Priority: Medium    Mixed hyperlipidemia 06/19/2024     Priority: Medium    Embolism and thrombosis (H) 01/03/2022     Priority: Medium    Edema of both lower extremities 08/08/2021     Priority: Medium    Morbid obesity (H) 11/17/2020     Priority: Medium    Depression, major, recurrent, mild (H24) 02/08/2018     Priority: Medium    Hypertension 05/26/2016     Priority: Medium    Anticoagulation monitoring, INR range 2-3 06/10/2015     Priority: Medium    Family history of factor V Leiden mutation 10/02/2012     Priority: Medium     Formatting of this note might be different from the original.  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    Right forearm    Calculus of kidney     DVT (deep venous thrombosis) (H) 10/2/2012    Essential (primary) hypertension     Heterozygous factor V Leiden mutation (H24) 09/01/2008    History of fatty infiltration of liver      Hyperlipidemia     Major depressive disorder, recurrent, mild (H24)     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, unspecified (CODE)     Left foot acromyxoid fibroblastic, 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    Umbilical hernia     Varicose veins of both legs with edema 2015     Past Surgical History:   Procedure Laterality Date    ARTHROSCOPY KNEE Left 2013    Dr Jones    BIOPSY BREAST Right     fiboadenoma     SECTION      ,     CHOLECYSTECTOMY  2014    COLONOSCOPY  2011    polyp ( Dr. Sutton)      COLONOSCOPY N/A 2019    Normal exam, 10 year follow up    LITHOTRIPSY      MAMMOPLASTY REDUCTION  2003    OTHER SURGICAL HISTORY Right 2011    Forearm Lipoma Excision    OTHER SURGICAL HISTORY      Left foot soft tissue resection; dr. Lang    TONSILLECTOMY       Current Outpatient Medications   Medication Sig Dispense Refill    albuterol (PROAIR HFA/PROVENTIL HFA/VENTOLIN HFA) 108 (90 Base) MCG/ACT inhaler Inhale 2 puffs into the lungs every 4 hours as needed for shortness of breath or wheezing 8.5 g 3    buPROPion (WELLBUTRIN XL) 150 MG 24 hr tablet Take 2 tablets (300 mg) by mouth every morning Increase in dose 180 tablet 4    chlorthalidone (HYGROTON) 25 MG tablet Take 1 tablet (25 mg) by mouth daily 90 tablet 4    Cholecalciferol (VITAMIN D3) 2000 UNITS CAPS Take 2,000 Units by mouth daily      clindamycin (CLINDAMAX) 1 % external gel Apply topically 2 times daily 150 g 1    clobetasol (TEMOVATE) 0.05 % external ointment Apply topically 2 times daily 60 g 0    Elastic Bandages & Supports (MEDICAL COMPRESSION STOCKINGS) MISC For personal use. Length: calf Strength: 16-20 mmHg  Circumference in cm: For calf: Ankle 15cm, Calf 30cm, Ankle to calf length 40cm.      fish oil-omega-3 fatty acids 1000 MG capsule Take 1 capsule by mouth daily       hydroxychloroquine (PLAQUENIL) 200 MG tablet       LORazepam (ATIVAN) 0.5 MG tablet Take 0.5 mg by mouth every 6 hours as needed       metoprolol tartrate (LOPRESSOR) 25 MG tablet Take 1 tablet (25 mg) by mouth 2 times daily 180 tablet 4    nystatin (MYCOSTATIN) 569758 UNIT/GM external cream Apply topically 2 times daily For rash - can use once daily preventatively 30 g 3    potassium chloride ER (K-TAB/KLOR-CON) 10 MEQ CR tablet Take 1 tablet (10 mEq) by mouth daily 90 tablet 88    triamcinolone (KENALOG) 0.1 % external cream Apply topically 2 times daily As needed for rash 15 g 0    venlafaxine (EFFEXOR XR) 150 MG 24 hr capsule Take 1 capsule (150 mg) by mouth daily with food 90 capsule 3    vitamin B-Complex Take 1 tablet by mouth daily      warfarin ANTICOAGULANT (COUMADIN) 5 MG tablet TAKE 5 MG x 5 days a week and 2.5 mg x 2 days a week OR AS [facility]ED [facility]. 90 tablet 1       Allergies   Allergen Reactions    Lisinopril Swelling     Angioedema, Edema      Losartan Dizziness     dizziness, tiredness, itching in ears and face, hands and feet    Adhesive Tape Itching and Rash    Amlodipine Swelling     edema    Diflucan [Fluconazole] Rash     Possible allergy    Bolivar Itching and Rash    Wound Dressing Adhesive Rash        Social History     Tobacco Use    Smoking status: Former     Current packs/day: 0.00     Average packs/day: 1 pack/day for 12.0 years (12.0 ttl pk-yrs)     Types: Cigarettes     Start date: 1978     Quit date: 1990     Years since quittin.8    Smokeless tobacco: Never   Substance Use Topics    Alcohol use: Not Currently     Alcohol/week: 2.0 standard drinks of alcohol     Comment:  rarely if ever       History   Drug Use No             Review of Systems  CONSTITUTIONAL: NEGATIVE for fever, chills,  "change in weight  ENT/MOUTH: NEGATIVE for ear, mouth and throat problems  RESP: NEGATIVE for significant cough or SOB  CV: NEGATIVE for chest pain, palpitations or peripheral edema    Objective    BP (!) 138/98   Pulse 68   Temp 97  F (36.1  C)   Resp 18   Ht 1.715 m (5' 7.5\")   Wt (!) 168 kg (370 lb 6.4 oz)   LMP 05/14/2013 (Approximate)   SpO2 94%   Breastfeeding No   BMI 57.16 kg/m     Estimated body mass index is 57.16 kg/m  as calculated from the following:    Height as of this encounter: 1.715 m (5' 7.5\").    Weight as of this encounter: 168 kg (370 lb 6.4 oz).  Physical Exam  Constitutional:       General: She is not in acute distress.     Appearance: She is well-developed. She is morbidly obese.   HENT:      Head: Normocephalic and atraumatic.      Nose: Nose normal.      Mouth/Throat:      Pharynx: No oropharyngeal exudate.   Eyes:      General: No scleral icterus.     Conjunctiva/sclera: Conjunctivae normal.      Pupils: Pupils are equal, round, and reactive to light.   Neck:      Thyroid: No thyromegaly.   Cardiovascular:      Rate and Rhythm: Normal rate and regular rhythm.      Heart sounds: No murmur heard.  Pulmonary:      Effort: Pulmonary effort is normal. No respiratory distress.      Breath sounds: Normal breath sounds. No wheezing.   Abdominal:      General: Bowel sounds are normal. There is no distension.      Palpations: Abdomen is soft. There is no mass.      Tenderness: There is no abdominal tenderness. There is no guarding.   Musculoskeletal:         General: No tenderness or deformity. Normal range of motion.      Cervical back: Normal range of motion and neck supple.   Lymphadenopathy:      Cervical: No cervical adenopathy.   Skin:     General: Skin is warm and dry.      Findings: No rash.   Neurological:      Mental Status: She is alert and oriented to person, place, and time.      Cranial Nerves: No cranial nerve deficit.      Coordination: Coordination normal.   Psychiatric:    "      Behavior: Behavior normal.         Thought Content: Thought content normal.         Judgment: Judgment normal.           Recent Labs   Lab Test 06/17/24  1329 06/10/24  1037 01/29/24  1828 01/24/24  1726 06/20/23  0800 06/19/23  1221   HGB  --   --   --  15.7  --  16.2*   PLT  --   --   --  210  --  220   INR 2.4 2.3   < >  --    < >  --    NA  --   --   --  138  --  138   POTASSIUM  --   --   --  3.5  --  3.8   CR  --   --   --  0.87  --  0.90   A1C  --   --   --   --   --  5.5    < > = values in this interval not displayed.        Diagnostics  Recent Results (from the past 48 hour(s))   INR (External Result)    Collection Time: 06/17/24  1:29 PM   Result Value Ref Range    INR HOME MONITORING 2.4 2 - 3 RATIO   Comprehensive Metabolic Panel    Collection Time: 06/18/24  9:50 AM   Result Value Ref Range    Sodium 139 135 - 145 mmol/L    Potassium 3.4 3.4 - 5.3 mmol/L    Carbon Dioxide (CO2) 30 (H) 22 - 29 mmol/L    Anion Gap 9 7 - 15 mmol/L    Urea Nitrogen 19.4 8.0 - 23.0 mg/dL    Creatinine 0.98 (H) 0.51 - 0.95 mg/dL    GFR Estimate 64 >60 mL/min/1.73m2    Calcium 10.7 (H) 8.8 - 10.2 mg/dL    Chloride 100 98 - 107 mmol/L    Glucose 122 (H) 70 - 99 mg/dL    Alkaline Phosphatase 61 40 - 150 U/L    AST 23 0 - 45 U/L    ALT 32 0 - 50 U/L    Protein Total 7.1 6.4 - 8.3 g/dL    Albumin 3.9 3.5 - 5.2 g/dL    Bilirubin Total 0.7 <=1.2 mg/dL   CBC with platelets and differential    Collection Time: 06/18/24  9:50 AM   Result Value Ref Range    WBC Count 6.6 4.0 - 11.0 10e3/uL    RBC Count 4.77 3.80 - 5.20 10e6/uL    Hemoglobin 15.4 11.7 - 15.7 g/dL    Hematocrit 45.8 35.0 - 47.0 %    MCV 96 78 - 100 fL    MCH 32.3 26.5 - 33.0 pg    MCHC 33.6 31.5 - 36.5 g/dL    RDW 13.4 10.0 - 15.0 %    Platelet Count 206 150 - 450 10e3/uL    % Neutrophils 52 %    % Lymphocytes 38 %    % Monocytes 7 %    % Eosinophils 2 %    % Basophils 1 %    % Immature Granulocytes 0 %    NRBCs per 100 WBC 0 <1 /100    Absolute Neutrophils 3.5 1.6 -  8.3 10e3/uL    Absolute Lymphocytes 2.5 0.8 - 5.3 10e3/uL    Absolute Monocytes 0.5 0.0 - 1.3 10e3/uL    Absolute Eosinophils 0.1 0.0 - 0.7 10e3/uL    Absolute Basophils 0.1 0.0 - 0.2 10e3/uL    Absolute Immature Granulocytes 0.0 <=0.4 10e3/uL    Absolute NRBCs 0.0 10e3/uL   Lipid Panel    Collection Time: 06/18/24  9:50 AM   Result Value Ref Range    Cholesterol 202 (H) <200 mg/dL    Triglycerides 152 (H) <150 mg/dL    Direct Measure HDL 66 >=50 mg/dL    LDL Cholesterol Calculated 106 (H) <=100 mg/dL    Non HDL Cholesterol 136 (H) <130 mg/dL   Hemoglobin A1c    Collection Time: 06/18/24  9:50 AM   Result Value Ref Range    Hemoglobin A1C 5.7 4.0 - 6.2 %      EKG: Normal Sinus Rhythm, unchanged from previous tracings    Revised Cardiac Risk Index (RCRI)  The patient has the following serious cardiovascular risks for perioperative complications:   - No serious cardiac risks = 0 points     RCRI Interpretation: 0 points: Class I (very low risk - 0.4% complication rate)         Signed Electronically by: BELLA Braga CNP  Copy of this evaluation report is provided to requesting physician.         Answers submitted by the patient for this visit:  Patient Health Questionnaire (Submitted on 6/17/2024)  If you checked off any problems, how difficult have these problems made it for you to do your work, take care of things at home, or get along with other people?: Somewhat difficult  PHQ9 TOTAL SCORE: 6  ELIZABETH-7 (Submitted on 6/18/2024)  ELIZABETH 7 TOTAL SCORE: 3

## 2024-06-18 NOTE — PROGRESS NOTES
Ms Fernando is a very pleasant 63 year old female seen today for pre-operative clearance for her upcoming shoulder surgery.  She has had successful anesthetics in the past and has not had any airway or anesthetic difficulties.  Her airway demonstrates a Mallampati score of I with good neck flexion and extension.  TMD is greater than 3.  She does have a history of possible Rheumatoid arthritis.  Unfortunately her current BMI is 57 which excludes her from receiving her care at [facility] (BMI 55 max).  We discussed her options which included receiving her surgery in [place] or possibly losing 15 pounds in order to bring her BMI under 55.  She recently started hydroxychloroquine for RA and states that her shoulder pain is better.  She wishes to postpone her shoulder surgery until her BMI is under 55.  I encouraged her to visit with her PCP to come up with a diet and exercise plan. She is scheduled to see Dr. Ramon next month.  Pt agrees with this plan.      Thank you for for the opportunity to visit with Ms Fernando today.    BELLA Mckeon CRNA on 6/18/2024 at 10:47 AM

## 2024-06-18 NOTE — PATIENT INSTRUCTIONS
How to Take Your Medication Before Surgery  Preoperative Medication Instructions   Antiplatelet or Anticoagulation Medication Instructions   - warfarin: Bridging therapy will be coordinated by INR clinic    Additional Medication Instructions  Take all scheduled medications on the day of surgery       Patient Education   Preparing for Your Surgery  Getting started  A nurse will call you to review your health history and instructions. They will give you an arrival time based on your scheduled surgery time. Please be ready to share:  Your doctor's clinic name and phone number  Your medical, surgical, and anesthesia history  A list of allergies and sensitivities  A list of medicines, including herbal treatments and over-the-counter drugs  Whether the patient has a legal guardian (ask how to send us the papers in advance)  Please tell us if you're pregnant--or if there's any chance you might be pregnant. Some surgeries may injure a fetus (unborn baby), so they require a pregnancy test. Surgeries that are safe for a fetus don't always need a test, and you can choose whether to have one.   If you have a child who's having surgery, please ask for a copy of Preparing for Your Child's Surgery.    Preparing for surgery  Within 10 to 30 days of surgery: Have a pre-op exam (sometimes called an H&P, or History and Physical). This can be done at a clinic or pre-operative center.  If you're having a , you may not need this exam. Talk to your care team.  At your pre-op exam, talk to your care team about all medicines you take. If you need to stop any medicines before surgery, ask when to start taking them again.  We do this for your safety. Many medicines can make you bleed too much during surgery. Some change how well surgery (anesthesia) drugs work.  Call your insurance company to let them know you're having surgery. (If you don't have insurance, call [phone].)  Call your clinic if there's any change in your health.  This includes signs of a cold or flu (sore throat, runny nose, cough, rash, fever). It also includes a scrape or scratch near the surgery site.  If you have questions on the day of surgery, call your hospital or surgery center.  Eating and drinking guidelines  For your safety: Unless your surgeon tells you otherwise, follow the guidelines below.  Eat and drink as usual until 8 hours before you arrive for surgery. After that, no food or milk.  Drink clear liquids until 2 hours before you arrive. These are liquids you can see through, like water, Gatorade, and Propel Water. They also include plain black coffee and tea (no cream or milk), candy, and breath mints. You can spit out gum when you arrive.  If you drink alcohol: Stop drinking it the night before surgery.  If your care team tells you to take medicine on the morning of surgery, it's okay to take it with a sip of water.  Preventing infection  Shower or bathe the night before and morning of your surgery. Follow the instructions your clinic gave you. (If no instructions, use regular soap.)  Don't shave or clip hair near your surgery site. We'll remove the hair if needed.  Don't smoke or vape the morning of surgery. You may chew nicotine gum up to 2 hours before surgery. A nicotine patch is okay.  Note: Some surgeries require you to completely quit smoking and nicotine. Check with your surgeon.  Your care team will make every effort to keep you safe from infection. We will:  Clean our hands often with soap and water (or an alcohol-based hand rub).  Clean the skin at your surgery site with a special soap that kills germs.  Give you a special gown to keep you warm. (Cold raises the risk of infection.)  Wear special hair covers, masks, gowns and gloves during surgery.  Give antibiotic medicine, if prescribed. Not all surgeries need antibiotics.  What to bring on the day of surgery  Photo ID and insurance card  Copy of your health care directive, if you have  one  Glasses and hearing aids (bring cases)  You can't wear contacts during surgery  Inhaler and eye drops, if you use them (tell us about these when you arrive)  CPAP machine or breathing device, if you use them  A few personal items, if spending the night  If you have . . .  A pacemaker, ICD (cardiac defibrillator) or other implant: Bring the ID card.  An implanted stimulator: Bring the remote control.  A legal guardian: Bring a copy of the certified (court-stamped) guardianship papers.  Please remove any jewelry, including body piercings. Leave jewelry and other valuables at home.  If you're going home the day of surgery  You must have a responsible adult drive you home. They should stay with you overnight as well.  If you don't have someone to stay with you, and you aren't safe to go home alone, we may keep you overnight. Insurance often won't pay for this.  After surgery  If it's hard to control your pain or you need more pain medicine, please call your surgeon's office.  Questions?   If you have any questions for your care team, list them here: _________________________________________________________________________________________________________________________________________________________________________ ____________________________________ ____________________________________ ____________________________________  For informational purposes only. Not to replace the advice of your health care provider. Copyright   2003, 2019 [facility]. All rights reserved. Clinically reviewed by Ayaka Butt MD. [employer] [id number] - REV 12/22.

## 2024-06-18 NOTE — NURSING NOTE
"Chief Complaint   Patient presents with    Pre-Op Exam     Dr Grubbs July 2, 2024   [facility]   Left Shoulder Arthroscopy       Initial BP (!) 138/98   Pulse 68   Temp 97  F (36.1  C)   Resp 18   Ht 1.715 m (5' 7.5\")   Wt (!) 168 kg (370 lb 6.4 oz)   LMP 05/14/2013 (Approximate)   SpO2 94%   Breastfeeding No   BMI 57.16 kg/m   Estimated body mass index is 57.16 kg/m  as calculated from the following:    Height as of this encounter: 1.715 m (5' 7.5\").    Weight as of this encounter: 168 kg (370 lb 6.4 oz).  Medication Review: complete    The next two questions are to help us understand your food security.  If you are feeling you need any assistance in this area, we have resources available to support you today.          1/24/2024   SDOH- Food Insecurity   Within the past 12 months, did you worry that your food would run out before you got money to buy more? N   Within the past 12 months, did the food you bought just not last and you didn t have money to get more? N         Health Care Directive:  Patient does not have a Health Care Directive or Living Will: Discussed advance care planning with patient; however, patient declined at this time.    Danielle Badillo LPN      "

## 2024-06-19 PROBLEM — E78.2 MIXED HYPERLIPIDEMIA: Status: ACTIVE | Noted: 2024-06-19

## 2024-06-19 PROBLEM — M05.79 RHEUMATOID ARTHRITIS INVOLVING MULTIPLE SITES WITH POSITIVE RHEUMATOID FACTOR (H): Status: ACTIVE | Noted: 2024-06-19

## 2024-06-24 ENCOUNTER — ANTICOAGULATION THERAPY VISIT (OUTPATIENT)
Dept: ANTICOAGULATION | Facility: OTHER | Age: 64
End: 2024-06-24
Attending: INTERNAL MEDICINE
Payer: COMMERCIAL

## 2024-06-24 DIAGNOSIS — Z79.01 ANTICOAGULATION MONITORING, INR RANGE 2-3: Primary | ICD-10-CM

## 2024-06-24 DIAGNOSIS — I74.9 EMBOLISM AND THROMBOSIS (H): ICD-10-CM

## 2024-06-24 LAB — INR HOME MONITORING: 2.1 RATIO (ref 2–3)

## 2024-06-24 NOTE — PROGRESS NOTES
ANTICOAGULATION MANAGEMENT     Trisha Fernando 63 year old female is on warfarin with therapeutic INR result. (Goal INR 2.0-3.0)    Recent labs: (last 7 days)     06/24/24  1651   INR 2.1       ASSESSMENT     Source(s): Chart Review     Warfarin doses taken: Reviewed in chart  Diet: No new diet changes identified  Medication/supplement changes: None noted  New illness, injury, or hospitalization: No  Signs or symptoms of bleeding or clotting: No  Previous result: Therapeutic last 2(+) visits  Additional findings: Upcoming surgery/procedure 7/2 shoulder surgery  per Daly Mcfadden NP patient to hold 5 days with bridging but also states surgery not recommended.       PLAN     Recommended plan for temporary change(s) affecting INR     Dosing Instructions: Continue your current warfarin dose with next INR in 1 week       Summary  As of 6/24/2024      Full warfarin instructions:  6/27: 5 mg; 6/28: 5 mg; 6/29: 5 mg; 6/30: 5 mg; 7/1: 2.5 mg; Otherwise 2.5 mg every Mon; 5 mg all other days   Next INR check:  7/1/2024               Detailed voice message left for Trisha with dosing instructions and follow up date. Patient called back 6/25 states she will not be having surgery.     Patient to recheck with home meter    Education provided:   Contact [phone] with any changes, questions or concerns.     Plan made per [facility] anticoagulation protocol    Smitha Nair, RN  Anticoagulation Clinic  6/24/2024    _______________________________________________________________________     Anticoagulation Episode Summary       Current INR goal:  2.0-3.0   TTR:  85.1% (1 y)   Target end date:  Indefinite   Send INR reminders to:  [facility]    Indications    Long term (current) use of anticoagulants (Resolved) [Z79.01]  Embolism and thrombosis (H) (Resolved) [I74.9]  Anticoagulation monitoring  INR range 2-3 [Z79.01]  Embolism and thrombosis (H) [I74.9]             Comments:  [facility] needs weekly INR done              Anticoagulation Care Providers       Provider Role Specialty Phone number    Maria Guadalupe Ramon DO Referring Internal Medicine [phone]

## 2024-07-01 ENCOUNTER — ANTICOAGULATION THERAPY VISIT (OUTPATIENT)
Dept: ANTICOAGULATION | Facility: OTHER | Age: 64
End: 2024-07-01
Attending: INTERNAL MEDICINE
Payer: COMMERCIAL

## 2024-07-01 DIAGNOSIS — I74.9 EMBOLISM AND THROMBOSIS (H): ICD-10-CM

## 2024-07-01 DIAGNOSIS — Z79.01 ANTICOAGULATION MONITORING, INR RANGE 2-3: Primary | ICD-10-CM

## 2024-07-01 LAB
ATRIAL RATE - MUSE: 67 BPM
DIASTOLIC BLOOD PRESSURE - MUSE: NORMAL MMHG
INR HOME MONITORING: 2.1 RATIO (ref 2–3)
INTERPRETATION ECG - MUSE: NORMAL
P AXIS - MUSE: -4 DEGREES
PR INTERVAL - MUSE: 142 MS
QRS DURATION - MUSE: 94 MS
QT - MUSE: 416 MS
QTC - MUSE: 439 MS
R AXIS - MUSE: -30 DEGREES
SYSTOLIC BLOOD PRESSURE - MUSE: NORMAL MMHG
T AXIS - MUSE: 18 DEGREES
VENTRICULAR RATE- MUSE: 67 BPM

## 2024-07-01 NOTE — PROGRESS NOTES
ANTICOAGULATION MANAGEMENT     Trisha Fernando 63 year old female is on warfarin with therapeutic INR result. (Goal INR 2.0-3.0)    Recent labs: (last 7 days)     07/01/24  1318   INR 2.1       ASSESSMENT     Source(s): Patient/ Care giver call     Warfarin doses taken: Warfarin taken as instructed  Diet: No new diet changes identified  New illness, injury, or hospitalization: No  Medication/supplement changes: None noted  Signs or symptoms of bleeding or clotting: No  Previous INR: Therapeutic last 2(+) visits  Additional findings: None     PLAN     Recommended plan for no diet, medication or health factor changes affecting INR     Dosing Instructions: Continue your current warfarin dose with next INR in 1 week       Summary  As of 7/1/2024      Full warfarin instructions:  7/1: 2.5 mg; Otherwise 2.5 mg every Mon; 5 mg all other days   Next INR check:  7/8/2024               Telephone call with Trisha who verbalizes understanding and agrees to plan    Patient to recheck with home meter    Education provided: Please call back if any changes to your diet, medications or how you've been taking warfarin and Resume manage by exception with home monitor. Continue to submit INR results to home monitor company.You will only be called when your result is out of range. Please call and notify [facility] if new medication started, dose missed, signs or symptoms of bleeding or clotting, or a surgery/procedure is scheduled.    Plan made per [facility] anticoagulation protocol    Ghada Granda, RN  Anticoagulation Clinic  7/1/2024    _______________________________________________________________________     Anticoagulation Episode Summary       Current INR goal:  2.0-3.0   TTR:  85.2% (1 y)   Target end date:  Indefinite   Send INR reminders to:  [facility]    Indications    Long term (current) use of anticoagulants (Resolved) [Z79.01]  Embolism and thrombosis (H) (Resolved) [I74.9]  Anticoagulation monitoring  INR range  2-3 [Z79.01]  Embolism and thrombosis (H) [I74.9]             Comments:  [facility]IN[facility] needs weekly INR done             Anticoagulation Care Providers       Provider Role Specialty Phone number    Maria Guadalupe Ramon DO Referring Internal Medicine [phone]

## 2024-07-08 ENCOUNTER — ANTICOAGULATION THERAPY VISIT (OUTPATIENT)
Dept: ANTICOAGULATION | Facility: OTHER | Age: 64
End: 2024-07-08
Attending: INTERNAL MEDICINE
Payer: COMMERCIAL

## 2024-07-08 DIAGNOSIS — Z79.01 ANTICOAGULATION MONITORING, INR RANGE 2-3: Primary | ICD-10-CM

## 2024-07-08 DIAGNOSIS — I74.9 EMBOLISM AND THROMBOSIS (H): ICD-10-CM

## 2024-07-08 LAB — INR HOME MONITORING: 2.2 RATIO (ref 2–3)

## 2024-07-09 NOTE — PROGRESS NOTES
ANTICOAGULATION  MANAGEMENT-Home Monitor Managed by Exception    Trisha FISHER Marlons 63 year old female is on warfarin with therapeutic INR result. (Goal INR 2.0-3.0)    Recent labs: (last 7 days)     07/08/24  1723   INR 2.2       Previous INR was Therapeutic  Medication, diet, health changes since last INR:chart reviewed; none identified  Contacted within the last 12 weeks by phone on 7/1/24  Last [facility] referral date: 10/10/2023      JOSE JUAN Rico was NOT contacted regarding therapeutic result today per home monitoring policy manage by exception agreement.   Current warfarin dose is to be continued:     Summary  As of 7/8/2024      Full warfarin instructions:  2.5 mg every Mon; 5 mg all other days   Next INR check:  7/15/2024             ?   Ghada Granda RN  Anticoagulation Clinic  7/9/2024    _______________________________________________________________________     Anticoagulation Episode Summary       Current INR goal:  2.0-3.0   TTR:  87.0% (1 y)   Target end date:  Indefinite   Send INR reminders to:  [facility]    Indications    Long term (current) use of anticoagulants (Resolved) [Z79.01]  Embolism and thrombosis (H) (Resolved) [I74.9]  Anticoagulation monitoring  INR range 2-3 [Z79.01]  Embolism and thrombosis (H) [I74.9]             Comments:  [facility]IN[facility] needs weekly INR done             Anticoagulation Care Providers       Provider Role Specialty Phone number    Maria Guadalupe aRmon DO Referring Internal Medicine [phone]

## 2024-07-15 ENCOUNTER — ANTICOAGULATION THERAPY VISIT (OUTPATIENT)
Dept: ANTICOAGULATION | Facility: OTHER | Age: 64
End: 2024-07-15
Attending: INTERNAL MEDICINE
Payer: COMMERCIAL

## 2024-07-15 DIAGNOSIS — Z79.01 ANTICOAGULATION MONITORING, INR RANGE 2-3: Primary | ICD-10-CM

## 2024-07-15 DIAGNOSIS — I74.9 EMBOLISM AND THROMBOSIS (H): ICD-10-CM

## 2024-07-15 LAB — INR HOME MONITORING: 1.9 RATIO (ref 2–3)

## 2024-07-16 ASSESSMENT — PAIN SCALES - PAIN ENJOYMENT GENERAL ACTIVITY SCALE (PEG)
INTERFERED_ENJOYMENT_LIFE: 6
PEG_TOTALSCORE: 6.33
INTERFERED_GENERAL_ACTIVITY: 9
AVG_PAIN_PASTWEEK: 4

## 2024-07-16 NOTE — PROGRESS NOTES
ANTICOAGULATION MANAGEMENT     Trisha Fernando 63 year old female is on warfarin with subtherapeutic INR result. (Goal INR 2.0-3.0)    Recent labs: (last 7 days)     07/15/24  1904   INR 1.9*       ASSESSMENT     Source(s): Patient/ Care giver call     Warfarin doses taken: Warfarin taken as instructed  Diet: No new diet changes identified  New illness, injury, or hospitalization: No  Medication/supplement changes: None noted  Signs or symptoms of bleeding or clotting: No  Previous INR: Therapeutic last 2(+) visits  Additional findings: None     PLAN     Recommended plan for no diet, medication or health factor changes affecting INR     Dosing Instructions: Continue your current warfarin dose with next INR in 1 week       Summary  As of 7/15/2024      Full warfarin instructions:  2.5 mg every Mon; 5 mg all other days   Next INR check:  7/22/2024               Telephone call with Trisha who verbalizes understanding and agrees to plan    Patient to recheck with home meter    Education provided: Please call back if any changes to your diet, medications or how you've been taking warfarin    Plan made per [facility] anticoagulation protocol    Ghada Granda RN  Anticoagulation Clinic  7/16/2024    _______________________________________________________________________     Anticoagulation Episode Summary       Current INR goal:  2.0-3.0   TTR:  87.0% (1 y)   Target end date:  Indefinite   Send INR reminders to:  [facility]    Indications    Long term (current) use of anticoagulants (Resolved) [Z79.01]  Embolism and thrombosis (H) (Resolved) [I74.9]  Anticoagulation monitoring  INR range 2-3 [Z79.01]  Embolism and thrombosis (H) [I74.9]             Comments:  [facility]IN[facility] needs weekly INR done             Anticoagulation Care Providers       Provider Role Specialty Phone number    Maria Guadalupe Ramon DO Referring Internal Medicine [phone]

## 2024-07-17 ENCOUNTER — OFFICE VISIT (OUTPATIENT)
Dept: INTERNAL MEDICINE | Facility: OTHER | Age: 64
End: 2024-07-17
Attending: INTERNAL MEDICINE
Payer: COMMERCIAL

## 2024-07-17 VITALS
DIASTOLIC BLOOD PRESSURE: 78 MMHG | HEIGHT: 68 IN | SYSTOLIC BLOOD PRESSURE: 136 MMHG | BODY MASS INDEX: 44.41 KG/M2 | WEIGHT: 293 LBS | RESPIRATION RATE: 18 BRPM | HEART RATE: 74 BPM | OXYGEN SATURATION: 96 %

## 2024-07-17 DIAGNOSIS — Z78.0 ASYMPTOMATIC MENOPAUSAL STATE: ICD-10-CM

## 2024-07-17 DIAGNOSIS — E66.01 MORBID OBESITY (H): ICD-10-CM

## 2024-07-17 DIAGNOSIS — F33.0 DEPRESSION, MAJOR, RECURRENT, MILD (H): ICD-10-CM

## 2024-07-17 DIAGNOSIS — I10 PRIMARY HYPERTENSION: ICD-10-CM

## 2024-07-17 DIAGNOSIS — M05.79 RHEUMATOID ARTHRITIS INVOLVING MULTIPLE SITES WITH POSITIVE RHEUMATOID FACTOR (H): ICD-10-CM

## 2024-07-17 DIAGNOSIS — E83.52 HYPERCALCEMIA: ICD-10-CM

## 2024-07-17 DIAGNOSIS — K14.8 TONGUE LESION: Primary | ICD-10-CM

## 2024-07-17 DIAGNOSIS — E21.0 PRIMARY HYPERPARATHYROIDISM (H): ICD-10-CM

## 2024-07-17 PROCEDURE — G2211 COMPLEX E/M VISIT ADD ON: HCPCS | Performed by: INTERNAL MEDICINE

## 2024-07-17 PROCEDURE — 99215 OFFICE O/P EST HI 40 MIN: CPT | Performed by: INTERNAL MEDICINE

## 2024-07-17 RX ORDER — KETOCONAZOLE 20 MG/G
CREAM TOPICAL
COMMUNITY
Start: 2024-07-03

## 2024-07-17 RX ORDER — HYDROCORTISONE 2.5 %
CREAM (GRAM) TOPICAL
COMMUNITY
Start: 2024-07-03

## 2024-07-17 ASSESSMENT — PAIN SCALES - GENERAL: PAINLEVEL: SEVERE PAIN (6)

## 2024-07-17 ASSESSMENT — PATIENT HEALTH QUESTIONNAIRE - PHQ9: SUM OF ALL RESPONSES TO PHQ QUESTIONS 1-9: 8

## 2024-07-17 NOTE — NURSING NOTE
"Chief Complaint   Patient presents with    Arthritis    Hypertension       Initial /78   Pulse 74   Resp 18   Ht 1.727 m (5' 8\")   Wt (!) 167.6 kg (369 lb 9.6 oz)   LMP 05/14/2013 (Approximate)   SpO2 96%   Breastfeeding No   BMI 56.20 kg/m   Estimated body mass index is 56.2 kg/m  as calculated from the following:    Height as of this encounter: 1.727 m (5' 8\").    Weight as of this encounter: 167.6 kg (369 lb 9.6 oz).  Medication Review: complete    The next two questions are to help us understand your food security.  If you are feeling you need any assistance in this area, we have resources available to support you today.          1/24/2024   SDOH- Food Insecurity   Within the past 12 months, did you worry that your food would run out before you got money to buy more? N   Within the past 12 months, did the food you bought just not last and you didn t have money to get more? N            Health Care Directive:  Patient does not have a Health Care Directive or Living Will: Discussed advance care planning with patient; however, patient declined at this time.    Danielle Badillo LPN      "

## 2024-07-17 NOTE — PROGRESS NOTES
"  Assessment & Plan     Tongue lesion  Referral placed to ENT to evaluate tongue lesion and need for possible biopsy.  - Adult ENT  Referral; Future    Morbid obesity (H)  We discussed multiple options.  She is to let us know if at any point she would like a prescription to try medication.    Rheumatoid arthritis involving multiple sites with positive rheumatoid factor (H)  Improved at this time.  Continue on Plaquenil.  Follow-up with rheumatology as scheduled this fall.    Depression, major, recurrent, mild (H24)  Perhaps slightly improved.  Continue to follow with counselor.  Continue with current medications.    Asymptomatic menopausal state  See below  - DX Bone Density; Future    Hypercalcemia  Repeat calcium overall has remained stable.    Primary hyperparathyroidism (H24)  Given her elevated PTH along with hypercalcemia she does have primary hyperparathyroidism.  At this time we will obtain a bone density study to evaluate for excess bone loss.  Additionally I do question if some of her depression and mood is related to her calcium levels.  I do think she would benefit from being seen by ENT and possibly undergoing nuc med evaluation.  Referrals placed.  - Adult ENT  Referral; Future  - DX Bone Density; Future    Primary hypertension  - Controlled.  Continue current regimen.            BMI  Estimated body mass index is 56.2 kg/m  as calculated from the following:    Height as of this encounter: 1.727 m (5' 8\").    Weight as of this encounter: 167.6 kg (369 lb 9.6 oz).   Weight management plan: Discussed healthy diet and exercise guidelines      Return in about 6 months (around 1/17/2025) for Annual Review with renewal of all medications, and as needed sooner.    Bria Rico is a 63 year old, presenting for the following health issues:  Arthritis and Hypertension      7/17/2024    12:44 PM   Additional Questions   Roomed by Corey Badillo LPN     History of Present Illness "       Hypertension: She presents for follow up of hypertension.  She does not check blood pressure  regularly outside of the clinic. Outpatient blood pressures have not been over 140/90. She does not follow a low salt diet.       Patient was diagnosed with likely rheumatoid disease earlier this year.  Her rheumatoid factor was increased and she was seen by rheumatology.  Due to concern for early rheumatoid disease she was started on Plaquenil.  She has found that her pain has improved overall on this medication.  She continues to have some shoulder pain however does not feel further evaluation is needed at this time.  She had been scheduled to have surgery here at [facility] however due to BMI limits she was unable to.    She did see a counselor for her depression which she felt was beneficial.  She has continued on her same medications including Wellbutrin, Effexor and feels her current dosages are okay.    We have had multiple conversations in the past regarding her weight.  Insurance has not covered medications readily.      She has a history of hypertension.  Her blood pressure is well-controlled today.  She is on chlorthalidone for this.    She has a history of hypercalcemia.  Recent testing has revealed primary hyperparathyroidism.  We discussed this in depth today.  We discussed the need for a DEXA scan and likely need to refer to ENT.    She has noted a lesion on her tongue.  It has been there around a month.  It showed up and then has stayed its current size no significant changes.  She denies any drainage from it.  He has tried some hydrogen peroxide for it but no other topical agents.    She denies any fevers, chills, chest pain.  She continues to have some shortness of breath off and on but feels it has been stable overall.  She has not needed her inhaler.  She has continued to have some dizziness however does not feel that this is changed at all.        Objective    /78   Pulse 74   Resp 18    "Ht 1.727 m (5' 8\")   Wt (!) 167.6 kg (369 lb 9.6 oz)   LMP 05/14/2013 (Approximate)   SpO2 96%   Breastfeeding No   BMI 56.20 kg/m    Body mass index is 56.2 kg/m .  Physical Exam   GEN: Vitals reviewed. Healthy appearing. Patient is in no acute distress. Cooperative with exam.  HEENT: Normocephalic atraumatic.  Eyes grossly normal to inspection.  No discharge or erythema, or obvious scleral/conjunctival abnormalities.   LUNGS: No audible wheeze, cough, or visible cyanosis.  No visible retractions or increased work of breathing.    ABD:  Obese, nondistended  SKIN: Warm and dry to touch.  Visible skin clear. No significant rash, abnormal pigmentation or lesions.    42 minutes spent on the date of the encounter doing chart review, history and exam, documentation and further activities as noted above    Signed Electronically by: Maria Guadalupe Ramon,     "

## 2024-07-22 ENCOUNTER — ANTICOAGULATION THERAPY VISIT (OUTPATIENT)
Dept: ANTICOAGULATION | Facility: OTHER | Age: 64
End: 2024-07-22
Attending: INTERNAL MEDICINE
Payer: COMMERCIAL

## 2024-07-22 DIAGNOSIS — Z79.01 ANTICOAGULATION MONITORING, INR RANGE 2-3: Primary | ICD-10-CM

## 2024-07-22 DIAGNOSIS — I74.9 EMBOLISM AND THROMBOSIS (H): ICD-10-CM

## 2024-07-22 LAB — INR HOME MONITORING: 1.9 RATIO (ref 2–3)

## 2024-07-23 DIAGNOSIS — H91.93 DECREASED HEARING OF BOTH EARS: Primary | ICD-10-CM

## 2024-07-29 ENCOUNTER — ANTICOAGULATION THERAPY VISIT (OUTPATIENT)
Dept: ANTICOAGULATION | Facility: OTHER | Age: 64
End: 2024-07-29
Attending: INTERNAL MEDICINE
Payer: COMMERCIAL

## 2024-07-29 DIAGNOSIS — I74.9 EMBOLISM AND THROMBOSIS (H): ICD-10-CM

## 2024-07-29 DIAGNOSIS — Z79.01 ANTICOAGULATION MONITORING, INR RANGE 2-3: Primary | ICD-10-CM

## 2024-07-29 LAB — INR HOME MONITORING: 2 RATIO (ref 2–3)

## 2024-07-30 ENCOUNTER — OFFICE VISIT (OUTPATIENT)
Dept: AUDIOLOGY | Facility: OTHER | Age: 64
End: 2024-07-30
Attending: AUDIOLOGIST
Payer: COMMERCIAL

## 2024-07-30 DIAGNOSIS — H91.93 DECREASED HEARING OF BOTH EARS: ICD-10-CM

## 2024-07-30 DIAGNOSIS — H93.13 TINNITUS, BILATERAL: ICD-10-CM

## 2024-07-30 DIAGNOSIS — H90.3 SENSORINEURAL HEARING LOSS (SNHL) OF BOTH EARS: Primary | ICD-10-CM

## 2024-07-30 PROCEDURE — 92550 TYMPANOMETRY & REFLEX THRESH: CPT | Performed by: AUDIOLOGIST

## 2024-07-30 PROCEDURE — 92557 COMPREHENSIVE HEARING TEST: CPT | Performed by: AUDIOLOGIST

## 2024-07-30 NOTE — PROGRESS NOTES
ANTICOAGULATION MANAGEMENT     Trisha Fernando 63 year old female is on warfarin with therapeutic INR result. (Goal INR 2.0-3.0)    Recent labs: (last 7 days)     07/29/24  1729   INR 2       ASSESSMENT     Source(s): Patient/ Care giver call     Warfarin doses taken: Less warfarin taken than planned which may be affecting INR  Diet: No new diet changes identified  New illness, injury, or hospitalization: No  Medication/supplement changes: None noted  Signs or symptoms of bleeding or clotting: No  Previous INR: Subtherapeutic  Additional findings: None and Has ENT consult for tongue lesion      PLAN     Recommended plan for no diet, medication or health factor changes affecting INR     Dosing Instructions: Continue your current warfarin dose with next INR in 1 week       Summary  As of 7/29/2024      Full warfarin instructions:  5 mg every day   Next INR check:  8/5/2024               Telephone call with Trisha who agrees to plan and repeated back plan correctly    Patient to recheck with home meter    Education provided: Please call back if any changes to your diet, medications or how you've been taking warfarin, Monitoring for bleeding signs and symptoms, and When to seek medical attention/emergency care    Plan made per [facility] anticoagulation protocol    Snow Schultz RN  Anticoagulation Clinic  7/30/2024    _______________________________________________________________________     Anticoagulation Episode Summary       Current INR goal:  2.0-3.0   TTR:  83.2% (1 y)   Target end date:  Indefinite   Send INR reminders to:  [facility]    Indications    Long term (current) use of anticoagulants (Resolved) [Z79.01]  Embolism and thrombosis (H) (Resolved) [I74.9]  Anticoagulation monitoring  INR range 2-3 [Z79.01]  Embolism and thrombosis (H) [I74.9]             Comments:  [facility] needs weekly INR done             Anticoagulation Care Providers       Provider Role Specialty Phone number    Maria Guadalupe Ramon DO  Referring Internal Medicine [phone]

## 2024-07-30 NOTE — PROGRESS NOTES
Audiology Evaluation Completed. Please refer SCANNED AUDIOGRAM and/or TYMPANOGRAM for BACKGROUND, RESULTS, RECOMMENDATIONS.      Savita PINEDA, [facility]  Audiologist #[id number]

## 2024-08-01 NOTE — PROGRESS NOTES
Otolaryngology Consultation    Patient: Trisha Fernando  : 1960    Patient presents with:  Hearing Problem: Decreased hearing  Mouth Lesions: Tongue lesion, referred by Maria Guadalupe Ramon DO      HPI:  Trisha Fernando is a 63 year old female seen today for Evaluation of a tongue lesion    She has had covid multiple times, initially in , resulting in intubation.  She developed multiple oral lesions and a rash after that time, most have resolved.  There is one right tongue lesion that has persisted.  Hydroxychloroquine has helped the rash.    No known trauma to the area  she denies oral pain  No weight loss fever chills or otalgia      History of rheumatoid arthritis.    12-pack-year history of tobacco use quit in  no significant history of alcohol use     Teacher in [place]      Current Outpatient Rx   Medication Sig Dispense Refill    albuterol (PROAIR HFA/PROVENTIL HFA/VENTOLIN HFA) 108 (90 Base) MCG/ACT inhaler Inhale 2 puffs into the lungs every 4 hours as needed for shortness of breath or wheezing 8.5 g 3    buPROPion (WELLBUTRIN XL) 150 MG 24 hr tablet Take 2 tablets (300 mg) by mouth every morning Increase in dose 180 tablet 4    chlorthalidone (HYGROTON) 25 MG tablet Take 1 tablet (25 mg) by mouth daily 90 tablet 4    Cholecalciferol (VITAMIN D3) 2000 UNITS CAPS Take 2,000 Units by mouth daily      clindamycin (CLINDAMAX) 1 % external gel Apply topically 2 times daily 150 g 1    clobetasol (TEMOVATE) 0.05 % external ointment Apply topically 2 times daily 60 g 0    Elastic Bandages & Supports (MEDICAL COMPRESSION STOCKINGS) MISC For personal use. Length: calf Strength: 16-20 mmHg Circumference in cm: For calf: Ankle 15cm, Calf 30cm, Ankle to calf length 40cm.      fish oil-omega-3 fatty acids 1000 MG capsule Take 1 capsule by mouth daily       hydrocortisone 2.5 % cream PLEASE SEE ATTACHED FOR DETAILED DIRECTIONS      hydroxychloroquine (PLAQUENIL) 200 MG tablet       ketoconazole (NIZORAL) 2  % external cream PLEASE SEE ATTACHED FOR DETAILED DIRECTIONS      LORazepam (ATIVAN) 0.5 MG tablet Take 0.5 mg by mouth every 6 hours as needed       metoprolol tartrate (LOPRESSOR) 25 MG tablet Take 1 tablet (25 mg) by mouth 2 times daily 180 tablet 4    nystatin (MYCOSTATIN) 044533 UNIT/GM external cream Apply topically 2 times daily For rash - can use once daily preventatively 30 g 3    potassium chloride ER (K-TAB/KLOR-CON) 10 MEQ CR tablet Take 1 tablet (10 mEq) by mouth daily 90 tablet 88    venlafaxine (EFFEXOR XR) 150 MG 24 hr capsule Take 1 capsule (150 mg) by mouth daily with food 90 capsule 3    vitamin B-Complex Take 1 tablet by mouth daily      warfarin ANTICOAGULANT (COUMADIN) 5 MG tablet TAKE 5 MG x 5 days a week and 2.5 mg x 2 days a week OR AS DIRECTED BY THE PROTIME CLINIC. 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    Right forearm    Calculus of kidney     DVT (deep venous thrombosis) (H) 10/2/2012    Essential (primary) hypertension     Heterozygous factor V Leiden mutation (H24) 09/01/2008    History of fatty infiltration of liver     Hyperlipidemia     Major depressive disorder, recurrent, mild (H24)     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, unspecified (CODE) 2007    Left foot acromyxoid fibroblastic, 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 Sutton)      COLONOSCOPY N/A 2019    Normal exam, 10 year follow up    LITHOTRIPSY      MAMMOPLASTY REDUCTION  2003    OTHER SURGICAL HISTORY Right 2011    Forearm Lipoma Excision    OTHER SURGICAL HISTORY      Left foot soft tissue resection; dr. Lang    TONSILLECTOMY         ENT family history reviewed    Social History     Tobacco Use    Smoking status: Former     Current packs/day: 0.00     Average packs/day: 1 pack/day for 12.0 years (12.0 ttl pk-yrs)     Types: Cigarettes     Start date: 1978     Quit date: 1990     Years since quittin.9    Smokeless tobacco: Never   Vaping Use    Vaping status: Never Used   Substance Use Topics    Alcohol use: Not Currently     Alcohol/week: 2.0 standard drinks of alcohol     Comment:  rarely if ever    Drug use: No       Review of Systems  ROS: 10 point ROS neg other than the symptoms noted above in the HPI and wheezing shortness of breath hearing loss loss or night sweats weight gain depression    Physical Exam  /82 (BP Location: Left arm, Patient Position: Sitting, Cuff Size: Adult Regular)   Pulse 62   Temp (!) 96.6  F (35.9  C) (Tympanic)   Resp 17   Ht 1.727 m (5' 7.99\")   Wt (!) 167.4 kg (369 lb)   LMP 2013 (Approximate)   SpO2 95%   BMI 56.12 kg/m    General - The patient is well nourished and well developed, and appears to have good nutritional status.  Alert and oriented to person and place, answers questions and cooperates with examination appropriately.   Head and Face - Normocephalic and atraumatic, with no gross asymmetry noted.  The facial nerve is intact, with strong symmetric movements.  Voice and Breathing - The patient was breathing " comfortably without the use of accessory muscles. There was no wheezing, stridor, or stertor.  The patients voice was clear and strong, and had appropriate pitch and quality.  No jacob peripheral digital clubbing or cyanosis   Ears -The external auditory canals are patent, the tympanic membranes are intact without effusion, retraction or mass.  Bony landmarks are intact.  Eyes - Extraocular movements intact, and the pupils were reactive to light.  Sclera were not icteric or injected, conjunctiva were pink and moist.  Mouth -geographic tongue.  Right lateral oral tongue 1 cm raised  fibrous lesion.  Appears consistent with a bite fibroma but there is some superficial ulceration .  Mucosa is moist   no other lesions or ulcerations noted.  The tongue was mobile and midline, and the dentition were in scattered condition   throat - The walls of the oropharynx were smooth, pink, moist, symmetric, and had no lesions or ulcerations.  The tonsillar pillars and soft palate were symmetric.  The uvula was midline on elevation.    Neck - No palpable enlarged fixed cervical lymph nodes.  No neck cysts or unusual tenderness to palpation.   No palpable fixed thyroid nodules or concerning goiter.  The trachea is grossly midline.   Nose - External contour is symmetric, no gross deflection or scars.  Nasal mucosa is pink and moist with no abnormal mucus.  The septum and turbinates were evaluated.  No polyps, masses, or purulence noted on examination.    Procedure - Oral cavity lesion Removal    Informed consent was discussed and signed, and risks of the procedure were discussed, including bleeding, anesthesia, infection, scar formation, numbness, need for additional surgery, injury to salivary tissue.  I then infiltrated the mucosal tissues with 1% lidocaine with 1:100,000 epinephrine.  I then used a 15-blade to create an thin elliptical incision around the right lateral oral tongue lesion lesion.  I then elevated the  ellipse and a  thin cuff of underlying normal appearing mucosa with the scalpel.  I proceeded to use a fine iris scissor to undermine the lesion and excise it.   Hemostasis was achieved with direct pressure and silver nitrate cautery.  The total length of the incision was 1.0 cm.  The specimen(s) was placed in formalin and sent to pathology.  The mucosal edges were then reapproximated using 4-0 chromic, simple interrupted sutures The patient tolerated the procedure without any difficulty.    Impression/Plan    ICD-10-CM    1. 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 Tongue lesion  K14.8 lidocaine 1% with EPINEPHrine 1:100,000 injection 10 mL     Adult ENT  Referral     Surgical Pathology Exam     EXCISION LESION MOUTH W SIMPLE REPAIR      3. Rheumatoid arthritis involving multiple sites with positive rheumatoid factor (H)  M05.79           Differential is broad and squamous cell carcinoma needs to be excluded.  Most consistent with an inflamed bite fibroma but may be a rheumatoid oral nodule.  This was all discussed.  This patient has had removal of an oral cavity lesion today.      I have instructed the patient on wound care with 1/2 strength peroxide rinses for 1 week, and  Diet.  Ibuprofen alternated with tylenol prn pain.  The patient will be called with pathology results.            Charline Ardon D.O.  Otolaryngology/Head and Neck Surgery  Allergy

## 2024-08-02 ENCOUNTER — OFFICE VISIT (OUTPATIENT)
Dept: OTOLARYNGOLOGY | Facility: OTHER | Age: 64
End: 2024-08-02
Attending: INTERNAL MEDICINE
Payer: COMMERCIAL

## 2024-08-02 VITALS
HEART RATE: 62 BPM | RESPIRATION RATE: 17 BRPM | TEMPERATURE: 96.6 F | WEIGHT: 293 LBS | OXYGEN SATURATION: 95 % | SYSTOLIC BLOOD PRESSURE: 125 MMHG | HEIGHT: 68 IN | BODY MASS INDEX: 44.41 KG/M2 | DIASTOLIC BLOOD PRESSURE: 82 MMHG

## 2024-08-02 DIAGNOSIS — K14.1 GEOGRAPHIC TONGUE: Primary | ICD-10-CM

## 2024-08-02 DIAGNOSIS — K14.8 TONGUE LESION: ICD-10-CM

## 2024-08-02 DIAGNOSIS — M05.79 RHEUMATOID ARTHRITIS INVOLVING MULTIPLE SITES WITH POSITIVE RHEUMATOID FACTOR (H): ICD-10-CM

## 2024-08-02 PROCEDURE — 40812 EXCISE/REPAIR MOUTH LESION: CPT | Performed by: OTOLARYNGOLOGY

## 2024-08-02 PROCEDURE — 88312 SPECIAL STAINS GROUP 1: CPT | Mod: 26 | Performed by: PATHOLOGY

## 2024-08-02 PROCEDURE — 88312 SPECIAL STAINS GROUP 1: CPT | Mod: TC | Performed by: OTOLARYNGOLOGY

## 2024-08-02 PROCEDURE — 88305 TISSUE EXAM BY PATHOLOGIST: CPT | Mod: 26 | Performed by: PATHOLOGY

## 2024-08-02 RX ORDER — OXYCODONE HCL 5 MG/5 ML
5 SOLUTION, ORAL ORAL EVERY 6 HOURS PRN
Qty: 40 ML | Refills: 0 | Status: SHIPPED | OUTPATIENT
Start: 2024-08-02 | End: 2024-08-04

## 2024-08-02 RX ORDER — LIDOCAINE HYDROCHLORIDE AND EPINEPHRINE 10; 10 MG/ML; UG/ML
10 INJECTION, SOLUTION INFILTRATION; PERINEURAL ONCE
Status: COMPLETED | OUTPATIENT
Start: 2024-08-02 | End: 2024-08-02

## 2024-08-02 RX ADMIN — LIDOCAINE HYDROCHLORIDE AND EPINEPHRINE 10 ML: 10; 10 INJECTION, SOLUTION INFILTRATION; PERINEURAL at 12:04

## 2024-08-02 ASSESSMENT — PAIN SCALES - GENERAL: PAINLEVEL: NO PAIN (0)

## 2024-08-02 NOTE — PATIENT INSTRUCTIONS
POST PROCEDURE INSTRUCTIONS    For oral lesions, rinse your mouth with a mixture of half water and half hydrogen peroxide 3 times daily, after meals and before bed.     For lip lesions, apply Aquaphor Healing Ointment to the wound 3 times daily after cleaning with above mixture(Unless specified Bacitracin).    You can apply ice to the surgical area to help reduce swelling. (no longer than 20 minutes at a time)     You can use acetaminophen(Tylenol) or the prescription you received for pain.     If you have sutures in place these are dissolvable. They will fall out on their own. DO NOT play with them as this will cause more irritation to the surgical area.     If cautery was used, that is the blackened area you see. This will fade away and can become a white patchy area. This is normal! Continue to clean wound as described above.     SIGNS OF INFECTION ARE:  Redness, swelling, red streaks, pus, drainage, warmth, fever, increased pain, foul smell.   Contact your primary health care provider if you notice any of the warning signs.     FOLLOW - UP  Follow-up as needed in clinic.   Pathology results will be called to you when they are back. This can take approx. 7-10 days.     Thank you for allowing Dr. Ardon and our ENT team to participate in your care.  If your medications are too expensive, please give the nurse a call.  We can possibly change this medication.  If you have a scheduling or an appointment question please contact our Health Unit Coordinator at their direct line [phone].   ALL nursing questions or concerns can be directed to your ENT nurse, Orlin, at: [phone]

## 2024-08-02 NOTE — LETTER
2024      Trisha Fernando  [address] [place] [address]      Dear Colleague,    Thank you for referring your patient, Trisha Fernando, to the [facility]. Please see a copy of my visit note below.    Otolaryngology Consultation    Patient: Trisha Fernando  : 1960    Patient presents with:  Hearing Problem: Decreased hearing  Mouth Lesions: Tongue lesion, referred by Maria Guadalupe Ramon DO      HPI:  Trisha Fernando is a 63 year old female seen today for Evaluation of a tongue lesion    She has had covid multiple times, initially in , resulting in intubation.  She developed multiple oral lesions and a rash after that time, most have resolved.  There is one right tongue lesion that has persisted.  Hydroxychloroquine has helped the rash.    No known trauma to the area  she denies oral pain  No weight loss fever chills or otalgia      History of rheumatoid arthritis.    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   Medication Sig Dispense Refill     albuterol (PROAIR HFA/PROVENTIL HFA/VENTOLIN HFA) 108 (90 Base) MCG/ACT inhaler Inhale 2 puffs into the lungs every 4 hours as needed for shortness of breath or wheezing 8.5 g 3     buPROPion (WELLBUTRIN XL) 150 MG 24 hr tablet Take 2 tablets (300 mg) by mouth every morning Increase in dose 180 tablet 4     chlorthalidone (HYGROTON) 25 MG tablet Take 1 tablet (25 mg) by mouth daily 90 tablet 4     Cholecalciferol (VITAMIN D3) 2000 UNITS CAPS Take 2,000 Units by mouth daily       clindamycin (CLINDAMAX) 1 % external gel Apply topically 2 times daily 150 g 1     clobetasol (TEMOVATE) 0.05 % external ointment Apply topically 2 times daily 60 g 0     Elastic Bandages & Supports (MEDICAL COMPRESSION STOCKINGS) MISC For personal use. Length: calf Strength: 16-20 mmHg Circumference in cm: For calf: Ankle 15cm, Calf 30cm, Ankle to calf  length 40cm.       fish oil-omega-3 fatty acids 1000 MG capsule Take 1 capsule by mouth daily        hydrocortisone 2.5 % cream PLEASE SEE ATTACHED FOR DETAILED DIRECTIONS       hydroxychloroquine (PLAQUENIL) 200 MG tablet        ketoconazole (NIZORAL) 2 % external cream PLEASE SEE ATTACHED FOR DETAILED DIRECTIONS       LORazepam (ATIVAN) 0.5 MG tablet Take 0.5 mg by mouth every 6 hours as needed        metoprolol tartrate (LOPRESSOR) 25 MG tablet Take 1 tablet (25 mg) by mouth 2 times daily 180 tablet 4     nystatin (MYCOSTATIN) 587395 UNIT/GM external cream Apply topically 2 times daily For rash - can use once daily preventatively 30 g 3     potassium chloride ER (K-TAB/KLOR-CON) 10 MEQ CR tablet Take 1 tablet (10 mEq) by mouth daily 90 tablet 88     venlafaxine (EFFEXOR XR) 150 MG 24 hr capsule Take 1 capsule (150 mg) by mouth daily with food 90 capsule 3     vitamin B-Complex Take 1 tablet by mouth daily       warfarin ANTICOAGULANT (COUMADIN) 5 MG tablet TAKE 5 MG x 5 days a week and 2.5 mg x 2 days a week OR AS DIRECTED BY THE PROTIME CLINIC. 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    Right forearm     Calculus of kidney      DVT (deep venous thrombosis) (H) 10/2/2012     Essential (primary) hypertension      Heterozygous factor V Leiden mutation (H24) 09/01/2008     History of fatty infiltration of liver      Hyperlipidemia      Major depressive disorder, recurrent, mild (H24)      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, unspecified (CODE) 2007    Left foot acromyxoid fibroblastic, 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 Sutton)       COLONOSCOPY N/A 2019    Normal exam, 10 year follow up     LITHOTRIPSY       MAMMOPLASTY REDUCTION  2003     OTHER SURGICAL HISTORY Right 2011    Forearm Lipoma Excision     OTHER SURGICAL HISTORY      Left foot soft tissue resection; dr. Lang     TONSILLECTOMY         ENT family history reviewed    Social History     Tobacco Use     Smoking status: Former     Current packs/day: 0.00     Average packs/day: 1 pack/day for 12.0 years (12.0 ttl pk-yrs)     Types: Cigarettes     Start date: 1978     Quit date: 1990     Years since quittin.9     Smokeless tobacco: Never   Vaping Use     Vaping status: Never Used   Substance Use Topics     Alcohol use: Not Currently     Alcohol/week: 2.0 standard drinks of alcohol     Comment:  rarely if ever     Drug use: No       Review of Systems  ROS: 10 point ROS neg other than the symptoms noted above in the HPI and wheezing shortness of breath hearing loss loss or night sweats weight gain depression    Physical Exam  /82 (BP Location: Left arm, Patient Position: Sitting, Cuff Size: Adult Regular)   Pulse 62   Temp (!) 96.6  F (35.9  C) (Tympanic)   Resp 17   Ht 1.727 m (5' 7.99\")   Wt (!) 167.4 kg (369 lb)   LMP 2013 (Approximate)   SpO2 95%   BMI 56.12 kg/m    General - The patient is well nourished and well developed, and appears to have good nutritional status.  " Alert and oriented to person and place, answers questions and cooperates with examination appropriately.   Head and Face - Normocephalic and atraumatic, with no gross asymmetry noted.  The facial nerve is intact, with strong symmetric movements.  Voice and Breathing - The patient was breathing comfortably without the use of accessory muscles. There was no wheezing, stridor, or stertor.  The patients voice was clear and strong, and had appropriate pitch and quality.  No jacob peripheral digital clubbing or cyanosis   Ears -The external auditory canals are patent, the tympanic membranes are intact without effusion, retraction or mass.  Bony landmarks are intact.  Eyes - Extraocular movements intact, and the pupils were reactive to light.  Sclera were not icteric or injected, conjunctiva were pink and moist.  Mouth -geographic tongue.  Right lateral oral tongue 1 cm raised  fibrous lesion.  Appears consistent with a bite fibroma but there is some superficial ulceration .  Mucosa is moist   no other lesions or ulcerations noted.  The tongue was mobile and midline, and the dentition were in scattered condition   throat - The walls of the oropharynx were smooth, pink, moist, symmetric, and had no lesions or ulcerations.  The tonsillar pillars and soft palate were symmetric.  The uvula was midline on elevation.    Neck - No palpable enlarged fixed cervical lymph nodes.  No neck cysts or unusual tenderness to palpation.   No palpable fixed thyroid nodules or concerning goiter.  The trachea is grossly midline.   Nose - External contour is symmetric, no gross deflection or scars.  Nasal mucosa is pink and moist with no abnormal mucus.  The septum and turbinates were evaluated.  No polyps, masses, or purulence noted on examination.    Procedure - Oral cavity lesion Removal    Informed consent was discussed and signed, and risks of the procedure were discussed, including bleeding, anesthesia, infection, scar formation,  numbness, need for additional surgery, injury to salivary tissue.  I then infiltrated the mucosal tissues with 1% lidocaine with 1:100,000 epinephrine.  I then used a 15-blade to create an thin elliptical incision around the right lateral oral tongue lesion lesion.  I then elevated the  ellipse and a thin cuff of underlying normal appearing mucosa with the scalpel.  I proceeded to use a fine iris scissor to undermine the lesion and excise it.   Hemostasis was achieved with direct pressure and silver nitrate cautery.  The total length of the incision was 1.0 cm.  The specimen(s) was placed in formalin and sent to pathology.  The mucosal edges were then reapproximated using 4-0 chromic, simple interrupted sutures The patient tolerated the procedure without any difficulty.    Impression/Plan    ICD-10-CM    1. 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 Tongue lesion  K14.8 lidocaine 1% with EPINEPHrine 1:100,000 injection 10 mL     Adult ENT [facility] Referral     Surgical Pathology Exam     EXCISION LESION MOUTH W SIMPLE REPAIR      3. Rheumatoid arthritis involving multiple sites with positive rheumatoid factor (H)  M05.79           Differential is broad and squamous cell carcinoma needs to be excluded.  Most consistent with an inflamed bite fibroma but may be a rheumatoid oral nodule.  This was all discussed.  This patient has had removal of an oral cavity lesion today.      I have instructed the patient on wound care with 1/2 strength peroxide rinses for 1 week, and  Diet.  Ibuprofen alternated with tylenol prn pain.  The patient will be called with pathology results.            Charline Ardon D.O.  Otolaryngology/Head and Neck Surgery  Allergy      Again, thank you for allowing me to participate in the care of your patient.        Sincerely,        Charline Ardon MD

## 2024-08-05 ENCOUNTER — ANTICOAGULATION THERAPY VISIT (OUTPATIENT)
Dept: ANTICOAGULATION | Facility: OTHER | Age: 64
End: 2024-08-05
Attending: INTERNAL MEDICINE
Payer: COMMERCIAL

## 2024-08-05 DIAGNOSIS — I74.9 EMBOLISM AND THROMBOSIS (H): ICD-10-CM

## 2024-08-05 DIAGNOSIS — Z79.01 ANTICOAGULATION MONITORING, INR RANGE 2-3: Primary | ICD-10-CM

## 2024-08-05 LAB — INR HOME MONITORING: 2.1 RATIO (ref 2–3)

## 2024-08-05 NOTE — PROGRESS NOTES
ANTICOAGULATION MANAGEMENT     Trisha Fernando 63 year old female is on warfarin with therapeutic INR result. (Goal INR 2.0-3.0)    Recent labs: (last 7 days)     08/05/24  1504   INR 2.1       ASSESSMENT     Source(s): Patient/ Care giver call     Warfarin doses taken: Warfarin taken as instructed  Diet: No new diet changes identified  New illness, injury, or hospitalization: No  Medication/supplement changes: None noted  Signs or symptoms of bleeding or clotting: No  Previous INR: Therapeutic last visit; previously outside of goal range  Additional findings: None     PLAN     Recommended plan for no diet, medication or health factor changes affecting INR     Dosing Instructions: Continue your current warfarin dose with next INR in 1 week       Summary  As of 8/5/2024      Full warfarin instructions:  5 mg every day   Next INR check:  8/12/2024               Telephone call with Trisha who verbalizes understanding and agrees to plan    Patient to recheck with home meter    Education provided: Please call back if any changes to your diet, medications or how you've been taking warfarin and Resume manage by exception with home monitor. Continue to submit INR results to home monitor company.You will only be called when your result is out of range. Please call and notify [facility] if new medication started, dose missed, signs or symptoms of bleeding or clotting, or a surgery/procedure is scheduled.    Plan made per [facility] anticoagulation protocol    Ghada Granda RN  Anticoagulation Clinic  8/5/2024    _______________________________________________________________________     Anticoagulation Episode Summary       Current INR goal:  2.0-3.0   TTR:  83.2% (1 y)   Target end date:  Indefinite   Send INR reminders to:  [facility]    Indications    Long term (current) use of anticoagulants (Resolved) [Z79.01]  Embolism and thrombosis (H) (Resolved) [I74.9]  Anticoagulation monitoring  INR range 2-3  [Z79.01]  Embolism and thrombosis (H) [I74.9]             Comments:  MDINR needs weekly INR done             Anticoagulation Care Providers       Provider Role Specialty Phone number    Maria Guadalupe Ramon DO Referring Internal Medicine [phone]

## 2024-08-07 LAB
PATH REPORT.COMMENTS IMP SPEC: NORMAL
PATH REPORT.COMMENTS IMP SPEC: NORMAL
PATH REPORT.FINAL DX SPEC: NORMAL
PATH REPORT.GROSS SPEC: NORMAL
PATH REPORT.MICROSCOPIC SPEC OTHER STN: NORMAL
PATH REPORT.RELEVANT HX SPEC: NORMAL
PHOTO IMAGE: NORMAL

## 2024-08-08 DIAGNOSIS — B37.0 CANDIDIASIS OF MOUTH: Primary | ICD-10-CM

## 2024-08-08 RX ORDER — NYSTATIN 100000/ML
500000 SUSPENSION, ORAL (FINAL DOSE FORM) ORAL 4 TIMES DAILY
Qty: 200 ML | Refills: 1 | Status: SHIPPED | OUTPATIENT
Start: 2024-08-08 | End: 2024-08-18

## 2024-08-12 ENCOUNTER — ANTICOAGULATION THERAPY VISIT (OUTPATIENT)
Dept: ANTICOAGULATION | Facility: OTHER | Age: 64
End: 2024-08-12
Attending: INTERNAL MEDICINE
Payer: COMMERCIAL

## 2024-08-12 DIAGNOSIS — I74.9 EMBOLISM AND THROMBOSIS (H): ICD-10-CM

## 2024-08-12 DIAGNOSIS — Z79.01 ANTICOAGULATION MONITORING, INR RANGE 2-3: Primary | ICD-10-CM

## 2024-08-12 LAB — INR HOME MONITORING: 2 RATIO (ref 2–3)

## 2024-08-13 NOTE — PROGRESS NOTES
ANTICOAGULATION  MANAGEMENT-Home Monitor Managed by Exception    Trisha FISHER Marlons 63 year old female is on warfarin with therapeutic INR result. (Goal INR 2.0-3.0)    Recent labs: (last 7 days)     08/12/24 2042   INR 2       Previous INR was Therapeutic  Medication, diet, health changes since last INR:chart reviewed; none identified  Contacted within the last 12 weeks by phone on 8/5/24  Last [facility] referral date: 10/10/2023      JOSE JUAN Honda was NOT contacted regarding therapeutic result today per home monitoring policy manage by exception agreement.   Current warfarin dose is to be continued:     Summary  As of 8/12/2024      Full warfarin instructions:  5 mg every day   Next INR check:  8/19/2024             ?   Ghada Granda RN  Anticoagulation Clinic  8/13/2024    _______________________________________________________________________     Anticoagulation Episode Summary       Current INR goal:  2.0-3.0   TTR:  83.2% (1 y)   Target end date:  Indefinite   Send INR reminders to:  [facility]    Indications    Long term (current) use of anticoagulants (Resolved) [Z79.01]  Embolism and thrombosis (H) (Resolved) [I74.9]  Anticoagulation monitoring  INR range 2-3 [Z79.01]  Embolism and thrombosis (H) [I74.9]             Comments:  MDINR needs weekly INR done             Anticoagulation Care Providers       Provider Role Specialty Phone number    Maria Guadalupe Ramon DO Referring Internal Medicine [phone]

## 2024-08-19 ENCOUNTER — ANTICOAGULATION THERAPY VISIT (OUTPATIENT)
Dept: ANTICOAGULATION | Facility: OTHER | Age: 64
End: 2024-08-19
Attending: INTERNAL MEDICINE
Payer: COMMERCIAL

## 2024-08-19 DIAGNOSIS — Z79.01 ANTICOAGULATION MONITORING, INR RANGE 2-3: Primary | ICD-10-CM

## 2024-08-19 DIAGNOSIS — I74.9 EMBOLISM AND THROMBOSIS (H): ICD-10-CM

## 2024-08-19 LAB — INR HOME MONITORING: 2.1 RATIO (ref 2–3)

## 2024-08-20 ENCOUNTER — HOSPITAL ENCOUNTER (OUTPATIENT)
Dept: BONE DENSITY | Facility: OTHER | Age: 64
Discharge: HOME | End: 2024-08-20
Attending: INTERNAL MEDICINE | Admitting: INTERNAL MEDICINE
Payer: COMMERCIAL

## 2024-08-20 DIAGNOSIS — E21.0 PRIMARY HYPERPARATHYROIDISM (H): ICD-10-CM

## 2024-08-20 DIAGNOSIS — Z78.0 ASYMPTOMATIC MENOPAUSAL STATE: ICD-10-CM

## 2024-08-20 PROCEDURE — 77081 DXA BONE DENSITY APPENDICULR: CPT

## 2024-08-20 NOTE — PROGRESS NOTES
ANTICOAGULATION  MANAGEMENT-Home Monitor Managed by Exception    Trisha FISHER Marlons 63 year old female is on warfarin with therapeutic INR result. (Goal INR 2.0-3.0)    Recent labs: (last 7 days)     08/19/24  1750   INR 2.1       Previous INR was Therapeutic  Medication, diet, health changes since last INR:chart reviewed; none identified  Contacted within the last 12 weeks by phone on 8/5/24  Last [facility] referral date: 10/10/2023      JOSE JUAN Rico was NOT contacted regarding therapeutic result today per home monitoring policy manage by exception agreement.   Current warfarin dose is to be continued:     Summary  As of 8/19/2024      Full warfarin instructions:  5 mg every day   Next INR check:  8/26/2024             ?   Ghada Granda RN  Anticoagulation Clinic  8/20/2024    _______________________________________________________________________     Anticoagulation Episode Summary       Current INR goal:  2.0-3.0   TTR:  83.1% (1 y)   Target end date:  Indefinite   Send INR reminders to:  [facility]    Indications    Long term (current) use of anticoagulants (Resolved) [Z79.01]  Embolism and thrombosis (H) (Resolved) [I74.9]  Anticoagulation monitoring  INR range 2-3 [Z79.01]  Embolism and thrombosis (H) [I74.9]             Comments:  MDINR needs weekly INR done             Anticoagulation Care Providers       Provider Role Specialty Phone number    Maria Guadalupe Ramon DO Referring Internal Medicine [phone]              
Xray Chest 1 View- PORTABLE-Urgent

## 2024-08-26 ENCOUNTER — ANTICOAGULATION THERAPY VISIT (OUTPATIENT)
Dept: ANTICOAGULATION | Facility: OTHER | Age: 64
End: 2024-08-26
Attending: INTERNAL MEDICINE
Payer: COMMERCIAL

## 2024-08-26 DIAGNOSIS — Z79.01 ANTICOAGULATION MONITORING, INR RANGE 2-3: Primary | ICD-10-CM

## 2024-08-26 DIAGNOSIS — Z79.01 ANTICOAGULATION MONITORING, INR RANGE 2-3: ICD-10-CM

## 2024-08-26 DIAGNOSIS — I74.9 EMBOLISM AND THROMBOSIS (H): ICD-10-CM

## 2024-08-26 RX ORDER — WARFARIN SODIUM 5 MG/1
TABLET ORAL
Qty: 90 TABLET | Refills: 1 | Status: SHIPPED | OUTPATIENT
Start: 2024-08-26

## 2024-08-26 NOTE — TELEPHONE ENCOUNTER
ANTICOAGULATION MANAGEMENT:  Medication Refill    Anticoagulation Summary  As of 8/19/2024      Warfarin maintenance plan:  5 mg (5 mg x 1) every day   Next INR check:  8/26/2024   Target end date:  Indefinite    Indications    Long term (current) use of anticoagulants (Resolved) [Z79.01]  Embolism and thrombosis (H) (Resolved) [I74.9]  Anticoagulation monitoring  INR range 2-3 [Z79.01]  Embolism and thrombosis (H) [I74.9]                 Anticoagulation Care Providers       Provider Role Specialty Phone number    Maria Guadalupe Ramon DO Referring Internal Medicine [phone]            Visit with referring provider/group within last year: Yes 7/17/24    [facility] referral signed within last year: Yes    Trisha meets all criteria for refill (current [facility] referral, visit with referring provider/group in last 15 months unless directed to return in 2 years in last referring provider visit note, lab monitoring up to date or not exceeding 2 weeks overdue). Rx instructions and quantity supplied updated to match patient's current dosing plan. Warfarin 90 day supply with 1 refill granted per [facility] protocol     Ghada Granda, RN  Anticoagulation Clinic

## 2024-08-27 LAB — INR HOME MONITORING: 1.9 RATIO (ref 2–3)

## 2024-08-27 NOTE — PROGRESS NOTES
ANTICOAGULATION MANAGEMENT     Trisha Fernando 63 year old female is on warfarin with subtherapeutic INR result. (Goal INR 2.0-3.0)    Recent labs: (last 7 days)     08/27/24  0835   INR 1.9*       ASSESSMENT     Source(s): Patient/ Care giver call     Warfarin doses taken: Warfarin taken as instructed  Diet: No new diet changes identified  New illness, injury, or hospitalization: No  Medication/supplement changes: None noted  Signs or symptoms of bleeding or clotting: No  Previous INR: Therapeutic last 2(+) visits  Additional findings: None     PLAN     Recommended plan for no diet, medication or health factor changes affecting INR     Dosing Instructions: Continue your current warfarin dose with next INR in 1 week       Summary  As of 8/26/2024      Full warfarin instructions:  5 mg every day   Next INR check:  9/3/2024               Telephone call with Trisha who verbalizes understanding and agrees to plan    Patient to recheck with home meter    Education provided: Please call back if any changes to your diet, medications or how you've been taking warfarin    Plan made per [facility] anticoagulation protocol    Ghada Granda RN  Anticoagulation Clinic  8/27/2024    _______________________________________________________________________     Anticoagulation Episode Summary       Current INR goal:  2.0-3.0   TTR:  83.3% (1 y)   Target end date:  Indefinite   Send INR reminders to:  [facility]    Indications    Long term (current) use of anticoagulants (Resolved) [Z79.01]  Embolism and thrombosis (H) (Resolved) [I74.9]  Anticoagulation monitoring  INR range 2-3 [Z79.01]  Embolism and thrombosis (H) [I74.9]             Comments:  MDINR needs weekly INR done             Anticoagulation Care Providers       Provider Role Specialty Phone number    Maria Guadalupe Ramon DO Referring Internal Medicine [phone]

## 2024-09-03 ENCOUNTER — ANTICOAGULATION THERAPY VISIT (OUTPATIENT)
Dept: ANTICOAGULATION | Facility: OTHER | Age: 64
End: 2024-09-03
Attending: INTERNAL MEDICINE
Payer: COMMERCIAL

## 2024-09-03 DIAGNOSIS — Z79.01 ANTICOAGULATION MONITORING, INR RANGE 2-3: Primary | ICD-10-CM

## 2024-09-03 DIAGNOSIS — I74.9 EMBOLISM AND THROMBOSIS (H): ICD-10-CM

## 2024-09-03 LAB — INR HOME MONITORING: 2 RATIO (ref 2–3)

## 2024-09-03 NOTE — PROGRESS NOTES
ANTICOAGULATION MANAGEMENT     Trisha Fernando 63 year old female is on warfarin with therapeutic INR result. (Goal INR 2.0-3.0)    Recent labs: (last 7 days)     09/03/24  0845   INR 2       ASSESSMENT     Source(s): Chart Review  Previous INR was Subtherapeutic  Medication, diet, health changes since last INR chart reviewed; none identified         PLAN     Recommended plan for no diet, medication or health factor changes affecting INR     Dosing Instructions: Increase your warfarin dose (7.1% change) with next INR in 1 week       Summary  As of 9/3/2024      Full warfarin instructions:  7.5 mg every Tue; 5 mg all other days   Next INR check:  9/9/2024               Detailed voice message left for Trisha with dosing instructions and follow up date.     Patient to recheck with home meter    Education provided: Please call back if any changes to your diet, medications or how you've been taking warfarin  [phone]    Plan made per [facility] anticoagulation protocol    Smitha Nair RN  Anticoagulation Clinic  9/3/2024    _______________________________________________________________________     Anticoagulation Episode Summary       Current INR goal:  2.0-3.0   TTR:  82.1% (1 y)   Target end date:  Indefinite   Send INR reminders to:  [facility]    Indications    Long term (current) use of anticoagulants (Resolved) [Z79.01]  Embolism and thrombosis (H) (Resolved) [I74.9]  Anticoagulation monitoring  INR range 2-3 [Z79.01]  Embolism and thrombosis (H) [I74.9]             Comments:  MDINR needs weekly INR done             Anticoagulation Care Providers       Provider Role Specialty Phone number    Maria Guadalupe Ramon DO Referring Internal Medicine [phone]

## 2024-09-09 ENCOUNTER — ANTICOAGULATION THERAPY VISIT (OUTPATIENT)
Dept: ANTICOAGULATION | Facility: OTHER | Age: 64
End: 2024-09-09
Attending: INTERNAL MEDICINE
Payer: COMMERCIAL

## 2024-09-09 DIAGNOSIS — I74.9 EMBOLISM AND THROMBOSIS (H): ICD-10-CM

## 2024-09-09 DIAGNOSIS — Z79.01 ANTICOAGULATION MONITORING, INR RANGE 2-3: Primary | ICD-10-CM

## 2024-09-09 LAB — INR HOME MONITORING: 2.7 RATIO (ref 2–3)

## 2024-09-09 NOTE — PROGRESS NOTES
ANTICOAGULATION MANAGEMENT     Trisha Fernando 63 year old female is on warfarin with therapeutic INR result. (Goal INR 2.0-3.0)    Recent labs: (last 7 days)     09/09/24  1216   INR 2.7       ASSESSMENT     Source(s): Chart Review and Patient/Caregiver Call     Warfarin doses taken: Warfarin taken as instructed  Diet: No new diet changes identified  Medication/supplement changes: None noted  New illness, injury, or hospitalization: No  Signs or symptoms of bleeding or clotting: No  Previous result: Subtherapeutic  Additional findings: None       PLAN     Recommended plan for no diet, medication or health factor changes affecting INR     Dosing Instructions: Continue your current warfarin dose with next INR in 1 week       Summary  As of 9/9/2024      Full warfarin instructions:  7.5 mg every Tue; 5 mg all other days   Next INR check:  9/16/2024               Telephone call with Trisha who verbalizes understanding and agrees to plan    Patient to recheck with home meter    Education provided: Resume manage by exception with home monitor. Continue to submit INR results to home monitor company.You will only be called when your result is out of range. Please call and notify [facility] if new medication started, dose missed, signs or symptoms of bleeding or clotting, or a surgery/procedure is scheduled. Due for next call no later than: 12/8/24.    Plan made per [facility] anticoagulation protocol    Smitha Nair RN  Anticoagulation Clinic  9/9/2024    _______________________________________________________________________     Anticoagulation Episode Summary       Current INR goal:  2.0-3.0   TTR:  82.2% (1 y)   Target end date:  Indefinite   Send INR reminders to:  [facility]    Indications    Long term (current) use of anticoagulants (Resolved) [Z79.01]  Embolism and thrombosis (H) (Resolved) [I74.9]  Anticoagulation monitoring  INR range 2-3 [Z79.01]  Embolism and thrombosis (H) [I74.9]             Comments:  ELISABETH  needs weekly INR done             Anticoagulation Care Providers       Provider Role Specialty Phone number    Maria Guadalupe Ramon,  Referring Internal Medicine [phone]

## 2024-09-16 ENCOUNTER — ANTICOAGULATION THERAPY VISIT (OUTPATIENT)
Dept: ANTICOAGULATION | Facility: OTHER | Age: 64
End: 2024-09-16
Attending: INTERNAL MEDICINE
Payer: COMMERCIAL

## 2024-09-16 DIAGNOSIS — Z79.01 ANTICOAGULATION MONITORING, INR RANGE 2-3: Primary | ICD-10-CM

## 2024-09-16 DIAGNOSIS — I74.9 EMBOLISM AND THROMBOSIS (H): ICD-10-CM

## 2024-09-16 LAB — INR HOME MONITORING: 2.5 RATIO (ref 2–3)

## 2024-09-17 NOTE — PROGRESS NOTES
ANTICOAGULATION  MANAGEMENT-Home Monitor Managed by Exception    Trisha FISHER Marlons 63 year old female is on warfarin with therapeutic INR result. (Goal INR 2.0-3.0)    Recent labs: (last 7 days)     09/16/24 1934   INR 2.5       Previous INR was Therapeutic  Medication, diet, health changes since last INR:chart reviewed; none identified  Contacted within the last 12 weeks by phone on 9/9/24  Last [facility] referral date: 08/27/2024      JOSE JUAN Rico was NOT contacted regarding therapeutic result today per home monitoring policy manage by exception agreement.   Current warfarin dose is to be continued:     Summary  As of 9/16/2024      Full warfarin instructions:  7.5 mg every Tue; 5 mg all other days   Next INR check:  9/23/2024             ?   Ghada Granda RN  Anticoagulation Clinic  9/17/2024    _______________________________________________________________________     Anticoagulation Episode Summary       Current INR goal:  2.0-3.0   TTR:  83.5% (1 y)   Target end date:  Indefinite   Send INR reminders to:  [facility]    Indications    Long term (current) use of anticoagulants (Resolved) [Z79.01]  Embolism and thrombosis (H) (Resolved) [I74.9]  Anticoagulation monitoring  INR range 2-3 [Z79.01]  Embolism and thrombosis (H) [I74.9]             Comments:  MDINR needs weekly INR done             Anticoagulation Care Providers       Provider Role Specialty Phone number    Maria Guadalupe Ramon DO Referring Internal Medicine [phone]

## 2024-09-23 ENCOUNTER — ANTICOAGULATION THERAPY VISIT (OUTPATIENT)
Dept: ANTICOAGULATION | Facility: OTHER | Age: 64
End: 2024-09-23
Attending: INTERNAL MEDICINE
Payer: COMMERCIAL

## 2024-09-23 DIAGNOSIS — Z79.01 ANTICOAGULATION MONITORING, INR RANGE 2-3: Primary | ICD-10-CM

## 2024-09-23 DIAGNOSIS — I74.9 EMBOLISM AND THROMBOSIS (H): ICD-10-CM

## 2024-09-23 LAB — INR HOME MONITORING: 2.5 RATIO (ref 2–3)

## 2024-09-23 NOTE — PROGRESS NOTES
ANTICOAGULATION  MANAGEMENT-Home Monitor Managed by Exception    Trisha FISHER Marlons 63 year old female is on warfarin with therapeutic INR result. (Goal INR 2.0-3.0)    Recent labs: (last 7 days)     09/23/24  1635   INR 2.5       Previous INR was Therapeutic  Medication, diet, health changes since last INR:chart reviewed; none identified  Contacted within the last 12 weeks by phone on 9/9/24  Last [facility] referral date: 08/27/2024      JOSE JUAN Rico was NOT contacted regarding therapeutic result today per home monitoring policy manage by exception agreement.   Current warfarin dose is to be continued:     Summary  As of 9/23/2024      Full warfarin instructions:  7.5 mg every Tue; 5 mg all other days   Next INR check:  9/30/2024             ?   Ghada Granda RN  Anticoagulation Clinic  9/23/2024    _______________________________________________________________________     Anticoagulation Episode Summary       Current INR goal:  2.0-3.0   TTR:  83.6% (1 y)   Target end date:  Indefinite   Send INR reminders to:  [facility]    Indications    Long term (current) use of anticoagulants (Resolved) [Z79.01]  Embolism and thrombosis (H) (Resolved) [I74.9]  Anticoagulation monitoring  INR range 2-3 [Z79.01]  Embolism and thrombosis (H) [I74.9]             Comments:  MDINR needs weekly INR done             Anticoagulation Care Providers       Provider Role Specialty Phone number    Maria Guadalupe Ramon DO Referring Internal Medicine [phone]

## 2024-09-25 ASSESSMENT — PATIENT HEALTH QUESTIONNAIRE - PHQ9
10. IF YOU CHECKED OFF ANY PROBLEMS, HOW DIFFICULT HAVE THESE PROBLEMS MADE IT FOR YOU TO DO YOUR WORK, TAKE CARE OF THINGS AT HOME, OR GET ALONG WITH OTHER PEOPLE: SOMEWHAT DIFFICULT
SUM OF ALL RESPONSES TO PHQ QUESTIONS 1-9: 7
SUM OF ALL RESPONSES TO PHQ QUESTIONS 1-9: 7

## 2024-09-26 ENCOUNTER — OFFICE VISIT (OUTPATIENT)
Dept: PSYCHOLOGY | Facility: OTHER | Age: 64
End: 2024-09-26
Attending: SOCIAL WORKER
Payer: COMMERCIAL

## 2024-09-26 DIAGNOSIS — F40.10 SOCIAL ANXIETY DISORDER: ICD-10-CM

## 2024-09-26 DIAGNOSIS — F33.0 DEPRESSION, MAJOR, RECURRENT, MILD (H): Primary | ICD-10-CM

## 2024-09-26 PROCEDURE — 90834 PSYTX W PT 45 MINUTES: CPT | Performed by: SOCIAL WORKER

## 2024-09-26 NOTE — PROGRESS NOTES
[facility]   Mental Health & Addiction Services     Progress Note     Patient  Name:  Trisha Fernando Date: 9/26/2024           Service Type: Individual     Visit Start Time: 0800  Visit End Time: 0850    Visit #:  2    Attendees: Client attended alone    Service Modality:  In-person       DATA:   Interactive Complexity: No   Crisis: No     Presenting Concerns/  Current Stressors: Initial information with:     Today's focus was working on DA. Trisha presented as engaging, helpful with sharing history and perceived issues. Noted feeling in low moods, sadness with health, grief and loss issues during life.         ASSESSMENT:  Mental Status Assessment:  Appearance:   Appropriate  Weight    Eye Contact:   Good   Psychomotor Behavior: Normal   Attitude:   Cooperative  Attentive  Orientation:   All  Speech   Rate / Production: Normal/ Responsive   Volume:  Normal   Mood:    Anxious  Depressed  Sad   Affect:    Blunted   Thought Content:  Clear   Thought Form:  Coherent   Insight:    Good     Assessments completed prior to this visit:  The following assessments were completed by patient for this visit:  PHQ9:       12/11/2023     9:18 AM 12/11/2023     3:44 PM 1/24/2024     3:56 PM 6/11/2024     8:13 AM 6/17/2024    12:02 PM 7/17/2024     1:38 PM 9/25/2024     6:24 PM   PHQ-9 SCORE   PHQ-9 Total Score MyChart  0 13 (Moderate depression) 9 (Mild depression) 6 (Mild depression)  7 (Mild depression)   PHQ-9 Total Score 0 0 13 9 6 8 7     PROMIS 10-Global Health (only subscores and total score):       6/11/2024     8:45 AM 9/25/2024     6:28 PM   PROMIS-10 Scores Only   Global Mental Health Score 7 7   Global Physical Health Score 9 10   PROMIS TOTAL - SUBSCORES 16 17         Safety Issues and Plan for Safety and Risk Management:   [place] Suicide Severity Rating Scale (Lifetime/Recent)      3/4/2019     7:22 AM 6/11/2024     2:00 PM   [place] Suicide Severity Rating (Lifetime/Recent)   Q1 Wished  to be Dead (Past Month) no    Q2 Suicidal Thoughts (Past Month) no    Q6 Suicide Behavior (Lifetime) no    Q1 Wish to be Dead (Lifetime)  Y   Wish to be Dead Description (Lifetime)  had thoughts about no one caring   1. Wish to be Dead (Past 1 Month)  N   Q2 Non-Specific Active Suicidal Thoughts (Lifetime)  Y   Non-Specific Active Suicidal Thought Description (Lifetime)  about no one caring no hope   2. Non-Specific Active Suicidal Thoughts (Past 1 Month)  N   3. Active Suicidal Ideation with any Methods (Not Plan) Without Intent to Act (Lifetime)  Y   Active Suicidal Ideation with any Methods (Not Plan) Description (Lifetime)  to auto axphixiate   Q3 Active Suicidal Ideation with any Methods (Not Plan) Without Intent to Act (Past 1 Month)  N   Q4 Active Suicidal Ideation with Some Intent to Act, Without Specific Plan (Lifetime)  N   Q5 Active Suicidal Ideation with Specific Plan and Intent (Lifetime)  N   Most Severe Ideation Rating (Lifetime)  4   Description of Most Severe Ideation (Lifetime)  same as above   Frequency (Lifetime)  2   Duration (Lifetime)  2   Controllability (Lifetime)  2   Deterrents (Lifetime)  1   Deterrents (Past 1 Month)  0   Reasons for Ideation (Lifetime)  5   Actual Attempt (Lifetime)  N   Has subject engaged in non-suicidal self-injurious behavior? (Lifetime)  N   Interrupted Attempts (Lifetime)  N   Aborted or Self-Interrupted Attempt (Lifetime)  N   Preparatory Acts or Behavior (Lifetime)  N   Calculated C-SSRS Risk Score (Lifetime/Recent)  No Risk Indicated     Patient denies current fears or concerns for personal safety.  Patient denies current or recent suicidal ideation or behaviors.  Patient denies current or recent homicidal ideation or behaviors.  Patient denies current or recent self injurious behavior or ideation.  Patient denies other safety concerns.  Recommended that patient call 911 or go to the local ED should there be a change in any of these risk factors.  Patient  reports firearms in the home are maintained in a safe manner.      Diagnostic Criteria:  Major Depressive Disorder  A) Recurrent episode(s) - symptoms have been present during the same 2-week period and represent a change from previous functioning 5 or more symptoms (required for diagnosis)   - Depressed mood. Note: In children and adolescents, can be irritable mood.     - Diminished interest or pleasure in all, or almost all, activities.    - Significant weight gainincrease in appetite.    - Increased sleep.    - Psychomotor activity retardation.    - Fatigue or loss of energy.    - Feelings of worthlessness or inappropriate and excessive guilt.    - Diminished ability to think or concentrate, or indecisiveness.    - Recurrent thoughts of death (not just fear of dying), recurrent suicidal ideation without a specific plan, or a suicide attempt or a specific plan for committing suicide.   B) The symptoms cause clinically significant distress or impairment in social, occupational, or other important areas of functioning  C) The episode is not attributable to the physiological effects of a substance or to another medical condition  D) The occurence of major depressive episode is not better explained by other thought / psychotic disorders  E) There has never been a manic episode or hypomanic episode      DSM5 Diagnoses: (Sustained by DSM5 Criteria Listed Above)  Diagnoses: 296.31 (F33.0) Major Depressive Disorder, Recurrent Episode, Mild _ and With anxious distress  Psychosocial & Contextual Factors: Significant health issues for both client and spouse. Grief and loss is present. Prior depression Dx. Currently has psychotropic medication provider, history of positive resiliency skills, two daughters, client is seeking support, financially stable and housing stable. Conflict with siblings following mother's death and client's Covid symptoms.   Intervention:   Diagnostic assessment work gathering more history with  psychopathology data, therapeutic validation with depressive symptoms, grief and loss, plan making with follow-up therapy support.   Collateral Reports Completed:  Not Applicable      PLAN: (Homework, other):  1. Provider will complete the Diagnostic Assessment.  Patient was given the following to do until next session: consider current strengths, grandyojana Muse and her adult daughters as hopeful topics.     2. Provider recommended the following referrals: n/a at this time.      3.  Suicide Risk and Safety Concerns were assessed for Trisha Fernando.    Patient meets the following risk assessment and triage: Patient denied any current/recent/lifetime history of suicidal ideation and/or behaviors.  No safety plan indicated at this time.       Endy Sparks, [facility]  9/26/2024

## 2024-09-30 ENCOUNTER — ANTICOAGULATION THERAPY VISIT (OUTPATIENT)
Dept: ANTICOAGULATION | Facility: OTHER | Age: 64
End: 2024-09-30
Attending: INTERNAL MEDICINE
Payer: COMMERCIAL

## 2024-09-30 DIAGNOSIS — Z79.01 ANTICOAGULATION MONITORING, INR RANGE 2-3: Primary | ICD-10-CM

## 2024-09-30 DIAGNOSIS — I74.9 EMBOLISM AND THROMBOSIS (H): ICD-10-CM

## 2024-09-30 LAB — INR HOME MONITORING: 2.3 RATIO (ref 2–3)

## 2024-10-01 NOTE — PROGRESS NOTES
ANTICOAGULATION  MANAGEMENT-Home Monitor Managed by Exception    Trisha FISHER Marlons 63 year old female is on warfarin with therapeutic INR result. (Goal INR 2.0-3.0)    Recent labs: (last 7 days)     09/30/24  1731   INR 2.3       Previous INR was Therapeutic  Medication, diet, health changes since last INR:chart reviewed; none identified  Contacted within the last 12 weeks by phone on 9/9/24  Last [facility] referral date: 08/27/2024      JOSE JUAN Rico was NOT contacted regarding therapeutic result today per home monitoring policy manage by exception agreement.   Current warfarin dose is to be continued:     Summary  As of 9/30/2024      Full warfarin instructions:  7.5 mg every Tue; 5 mg all other days   Next INR check:  10/7/2024             ?   Ghada Granda RN  Anticoagulation Clinic  10/1/2024    _______________________________________________________________________     Anticoagulation Episode Summary       Current INR goal:  2.0-3.0   TTR:  83.4% (1 y)   Target end date:  Indefinite   Send INR reminders to:  [facility]    Indications    Long term (current) use of anticoagulants (Resolved) [Z79.01]  Embolism and thrombosis (H) (Resolved) [I74.9]  Anticoagulation monitoring  INR range 2-3 [Z79.01]  Embolism and thrombosis (H) [I74.9]             Comments:  MDINR needs weekly INR done             Anticoagulation Care Providers       Provider Role Specialty Phone number    Maria Guadalupe Ramon DO Referring Internal Medicine [phone]

## 2024-10-07 ENCOUNTER — ANTICOAGULATION THERAPY VISIT (OUTPATIENT)
Dept: ANTICOAGULATION | Facility: OTHER | Age: 64
End: 2024-10-07
Attending: INTERNAL MEDICINE
Payer: COMMERCIAL

## 2024-10-07 DIAGNOSIS — Z79.01 ANTICOAGULATION MONITORING, INR RANGE 2-3: Primary | ICD-10-CM

## 2024-10-07 DIAGNOSIS — I74.9 EMBOLISM AND THROMBOSIS (H): ICD-10-CM

## 2024-10-08 ENCOUNTER — ANTICOAGULATION THERAPY VISIT (OUTPATIENT)
Dept: ANTICOAGULATION | Facility: OTHER | Age: 64
End: 2024-10-08
Attending: INTERNAL MEDICINE
Payer: COMMERCIAL

## 2024-10-08 DIAGNOSIS — I74.9 EMBOLISM AND THROMBOSIS (H): ICD-10-CM

## 2024-10-08 DIAGNOSIS — Z79.01 ANTICOAGULATION MONITORING, INR RANGE 2-3: Primary | ICD-10-CM

## 2024-10-08 LAB — INR HOME MONITORING: 2.5 RATIO (ref 2–3)

## 2024-10-08 NOTE — PROGRESS NOTES
ANTICOAGULATION  MANAGEMENT-Home Monitor Managed by Exception    Trisha FISHER Marlons 63 year old female is on warfarin with therapeutic INR result. (Goal INR 2.0-3.0)    Recent labs: (last 7 days)     10/08/24  0914   INR 2.5       Previous INR was Therapeutic  Medication, diet, health changes since last INR:chart reviewed; none identified  Contacted within the last 12 weeks by phone on 9/9/24  Last [facility] referral date: 08/27/2024      JOSE JUAN Rico was NOT contacted regarding therapeutic result today per home monitoring policy manage by exception agreement.   Current warfarin dose is to be continued:     Summary  As of 10/8/2024      Full warfarin instructions:  7.5 mg every Tue; 5 mg all other days   Next INR check:  10/14/2024             ?   Ghada Granda RN  Anticoagulation Clinic  10/8/2024    _______________________________________________________________________     Anticoagulation Episode Summary       Current INR goal:  2.0-3.0   TTR:  83.9% (1 y)   Target end date:  Indefinite   Send INR reminders to:  [facility]    Indications    Long term (current) use of anticoagulants (Resolved) [Z79.01]  Embolism and thrombosis (H) (Resolved) [I74.9]  Anticoagulation monitoring  INR range 2-3 [Z79.01]  Embolism and thrombosis (H) [I74.9]             Comments:  MDINR needs weekly INR done             Anticoagulation Care Providers       Provider Role Specialty Phone number    Maria Guadalupe Ramon DO Referring Internal Medicine [phone]

## 2024-10-09 ENCOUNTER — OFFICE VISIT (OUTPATIENT)
Dept: PSYCHOLOGY | Facility: OTHER | Age: 64
End: 2024-10-09
Attending: SOCIAL WORKER
Payer: COMMERCIAL

## 2024-10-09 DIAGNOSIS — F33.0 DEPRESSION, MAJOR, RECURRENT, MILD (H): Primary | ICD-10-CM

## 2024-10-09 DIAGNOSIS — F40.10 SOCIAL ANXIETY DISORDER: ICD-10-CM

## 2024-10-09 PROCEDURE — 90791 PSYCH DIAGNOSTIC EVALUATION: CPT | Performed by: SOCIAL WORKER

## 2024-10-09 NOTE — PROGRESS NOTES
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 Diagnosis: none at this time.   5. Prognosis: Expect Improvement.  6. Likely consequences of symptoms if not treated: decompensation with functional capacities, increased level of care, breakdown of supportive relationships and/or employment loss, higher level with cost of care, loss of supportive relationships.  7. Client strengths include:  caring, empathetic, good listener, insightful, intelligent, open to learning, open to suggestions / feedback, wants to learn, willing to ask questions, willing to relate to others, and work history .    Recommendations:     1. Plan for Safety and Risk Management:   Safety and Risk: Recommended that patient call 911 or go to the local ED should there be a change in any of these risk factors..          Report to child / adult protection services was NA.     2. Patient's identified  distress with weight gain will be addressed with initial treatment plan .     3. Initial Treatment will focus on:    Depressed Mood   Anxiety   Adjustment Difficulties related to: family concerns  Relational Problems related to: Conflict or difficulties.  Functional Impairment at: home and  socially.  Mood Instability  Grief / Loss      4. Resources/Service Plan:    services are not indicated.   Modifications to assist communication are not indicated.   Additional disability accommodations are not indicated.      5. Collaboration:   Collaboration / coordination of treatment will be initiated with the following  support professionals: primary care physician or psychiatry.      6.  Referrals:   The following referral(s) will be initiated: Case Management  Outpatient Mental Cj Therapy.       A Release of Information has been obtained for the following:  n/a at this time .     Clinical Substantiation/medical necessity for the above recommendations: to provide support and best level of care that is intended to improve symptoms long-term.      7. BRYNN: no areas of need at this time, other than nicotine. Recommendations: monitor and provide support as needed.     8. Records:   These were reviewed at time of assessment. Information in this assessment was obtained from the medical record and provided by patient who is a good historian. Patient will have open access to their mental health medical record.    9.   Interactive Complexity: No    10. Safety Plan: utilize crisis resources 911, 211 or emergency department.     Provider Name/ Credentials:  GASTON Felder  10/9/2024

## 2024-10-14 ENCOUNTER — ANTICOAGULATION THERAPY VISIT (OUTPATIENT)
Dept: ANTICOAGULATION | Facility: OTHER | Age: 64
End: 2024-10-14
Attending: INTERNAL MEDICINE
Payer: COMMERCIAL

## 2024-10-14 DIAGNOSIS — I74.9 EMBOLISM AND THROMBOSIS (H): ICD-10-CM

## 2024-10-14 DIAGNOSIS — Z79.01 ANTICOAGULATION MONITORING, INR RANGE 2-3: Primary | ICD-10-CM

## 2024-10-14 LAB — INR HOME MONITORING: 2.7 RATIO (ref 2–3)

## 2024-10-14 NOTE — PROGRESS NOTES
ANTICOAGULATION  MANAGEMENT-Home Monitor Managed by Exception    Trisha FISHER Marlons 63 year old female is on warfarin with therapeutic INR result. (Goal INR 2.0-3.0)    Recent labs: (last 7 days)     10/14/24  1718   INR 2.7       Previous INR was Therapeutic  Medication, diet, health changes since last INR:chart reviewed; none identified  Contacted within the last 12 weeks by phone on 9/9/24  Last [facility] referral date: 08/27/2024      JOSE JUAN Honda was NOT contacted regarding therapeutic result today per home monitoring policy manage by exception agreement.   Current warfarin dose is to be continued:     Summary  As of 10/14/2024      Full warfarin instructions:  7.5 mg every Tue; 5 mg all other days   Next INR check:  10/21/2024             ?   Carlos Alberto Rodriguez RN  Anticoagulation Clinic  10/14/2024    _______________________________________________________________________     Anticoagulation Episode Summary       Current INR goal:  2.0-3.0   TTR:  84.6% (1 y)   Target end date:  Indefinite   Send INR reminders to:  [facility]    Indications    Long term (current) use of anticoagulants (Resolved) [Z79.01]  Embolism and thrombosis (H) (Resolved) [I74.9]  Anticoagulation monitoring  INR range 2-3 [Z79.01]  Embolism and thrombosis (H) [I74.9]             Comments:  MDINR needs weekly INR done             Anticoagulation Care Providers       Provider Role Specialty Phone number    Maria Guadalupe Ramon DO Referring Internal Medicine [phone]

## 2024-10-21 ENCOUNTER — ANTICOAGULATION THERAPY VISIT (OUTPATIENT)
Dept: ANTICOAGULATION | Facility: OTHER | Age: 64
End: 2024-10-21
Attending: INTERNAL MEDICINE
Payer: COMMERCIAL

## 2024-10-21 DIAGNOSIS — I74.9 EMBOLISM AND THROMBOSIS (H): ICD-10-CM

## 2024-10-21 DIAGNOSIS — Z79.01 ANTICOAGULATION MONITORING, INR RANGE 2-3: Primary | ICD-10-CM

## 2024-10-21 LAB — INR HOME MONITORING: 2.4 RATIO (ref 2–3)

## 2024-10-22 NOTE — PROGRESS NOTES
ANTICOAGULATION  MANAGEMENT-Home Monitor Managed by Exception    Trisha FISHER Marlons 63 year old female is on warfarin with therapeutic INR result. (Goal INR 2.0-3.0)    Recent labs: (last 7 days)     10/21/24  2336   INR 2.4       Previous INR was Therapeutic  Medication, diet, health changes since last INR:chart reviewed; none identified  Contacted within the last 12 weeks by phone on 9/9/24  Last [facility] referral date: 08/27/2024      JOSE JUAN Rico was NOT contacted regarding therapeutic result today per home monitoring policy manage by exception agreement.   Current warfarin dose is to be continued:     Summary  As of 10/21/2024      Full warfarin instructions:  7.5 mg every Tue; 5 mg all other days   Next INR check:  10/28/2024             ?   Ghada Granda RN  Anticoagulation Clinic  10/22/2024    _______________________________________________________________________     Anticoagulation Episode Summary       Current INR goal:  2.0-3.0   TTR:  84.6% (1 y)   Target end date:  Indefinite   Send INR reminders to:  [facility]    Indications    Long term (current) use of anticoagulants (Resolved) [Z79.01]  Embolism and thrombosis (H) (Resolved) [I74.9]  Anticoagulation monitoring  INR range 2-3 [Z79.01]  Embolism and thrombosis (H) [I74.9]             Comments:  MDINR needs weekly INR done             Anticoagulation Care Providers       Provider Role Specialty Phone number    Maria Guadalupe Ramon DO Referring Internal Medicine [phone]

## 2024-10-28 ENCOUNTER — ANTICOAGULATION THERAPY VISIT (OUTPATIENT)
Dept: ANTICOAGULATION | Facility: OTHER | Age: 64
End: 2024-10-28
Attending: INTERNAL MEDICINE
Payer: COMMERCIAL

## 2024-10-28 DIAGNOSIS — I74.9 EMBOLISM AND THROMBOSIS (H): ICD-10-CM

## 2024-10-28 DIAGNOSIS — Z79.01 ANTICOAGULATION MONITORING, INR RANGE 2-3: Primary | ICD-10-CM

## 2024-10-28 LAB — INR HOME MONITORING: 2.1 RATIO (ref 2–3)

## 2024-10-29 NOTE — PROGRESS NOTES
ANTICOAGULATION  MANAGEMENT-Home Monitor Managed by Exception    Trisha FISHER Marlons 63 year old female is on warfarin with therapeutic INR result. (Goal INR 2.0-3.0)    Recent labs: (last 7 days)     10/28/24  2218   INR 2.1       Previous INR was Therapeutic  Medication, diet, health changes since last INR:chart reviewed; none identified  Contacted within the last 12 weeks by phone on 9/9/24  Last [facility] referral date: 08/27/2024      JOSE JUAN Honda was NOT contacted regarding therapeutic result today per home monitoring policy manage by exception agreement.   Current warfarin dose is to be continued:     Summary  As of 10/28/2024      Full warfarin instructions:  7.5 mg every Tue; 5 mg all other days   Next INR check:  11/4/2024             ?   Ghada Granda RN  Anticoagulation Clinic  10/29/2024    _______________________________________________________________________     Anticoagulation Episode Summary       Current INR goal:  2.0-3.0   TTR:  84.6% (1 y)   Target end date:  Indefinite   Send INR reminders to:  [facility]    Indications    Long term (current) use of anticoagulants (Resolved) [Z79.01]  Embolism and thrombosis (H) (Resolved) [I74.9]  Anticoagulation monitoring  INR range 2-3 [Z79.01]  Embolism and thrombosis (H) [I74.9]             Comments:  MDINR needs weekly INR done             Anticoagulation Care Providers       Provider Role Specialty Phone number    Maria Guadalupe Ramon DO Referring Internal Medicine [phone]

## 2024-10-30 ENCOUNTER — OFFICE VISIT (OUTPATIENT)
Dept: PSYCHOLOGY | Facility: OTHER | Age: 64
End: 2024-10-30
Attending: SOCIAL WORKER
Payer: COMMERCIAL

## 2024-10-30 DIAGNOSIS — F33.0 DEPRESSION, MAJOR, RECURRENT, MILD (H): Primary | ICD-10-CM

## 2024-10-30 DIAGNOSIS — F40.10 SOCIAL ANXIETY DISORDER: ICD-10-CM

## 2024-10-30 PROCEDURE — 90834 PSYTX W PT 45 MINUTES: CPT | Performed by: SOCIAL WORKER

## 2024-10-30 ASSESSMENT — PATIENT HEALTH QUESTIONNAIRE - PHQ9
10. IF YOU CHECKED OFF ANY PROBLEMS, HOW DIFFICULT HAVE THESE PROBLEMS MADE IT FOR YOU TO DO YOUR WORK, TAKE CARE OF THINGS AT HOME, OR GET ALONG WITH OTHER PEOPLE: NOT DIFFICULT AT ALL
SUM OF ALL RESPONSES TO PHQ QUESTIONS 1-9: 7
SUM OF ALL RESPONSES TO PHQ QUESTIONS 1-9: 7

## 2024-10-30 NOTE — PROGRESS NOTES
[facility]                                     Progress Note    Patient Name: Trisha Fernando  Date: 10/30/2024           Service Type: Individual      Session Start Time: 1600  Session End Time: 1645     Session Length: 45    Session #: 4    Attendees: Client attended alone    Service Modality:  In-person    DATA  Interactive Complexity: No  Crisis: No        Progress Since Last Session (Related to Symptoms / Goals / Homework):  Trisha has been talking to daughters, one is coming to help this weekend with chores, seeing her neighbor when she can. Also recognizing her limits with daily sources of stress. Taking action to self-care.      Symptoms: No change stress with husbands health needs, in a lot of pain. Having to care for him directly throughout the day. Tired. Very little relief with the care.     Homework: Completed in session      Episode of Care Goals: Satisfactory progress - PREPARATION (Decided to change - considering how); Intervened by negotiating a change plan and determining options / strategies for behavior change, identifying triggers, exploring social supports, and working towards setting a date to begin behavior change     Current / Ongoing Stressors and Concerns:   Home, health, family.      Treatment Objective(s) Addressed in This Session:   Increase interest, engagement, and pleasure in doing things  Decrease frequency and intensity of feeling down, depressed, hopeless       Intervention:   CBT: with recognizing thoughts and behavior patterns that lend to increased distress.     Assessments completed prior to visit:  The following assessments were completed by patient for this visit:  PHQ9:       12/11/2023     3:44 PM 1/24/2024     3:56 PM 6/11/2024     8:13 AM 6/17/2024    12:02 PM 7/17/2024     1:38 PM 9/25/2024     6:24 PM 10/30/2024    11:47 AM   PHQ-9 SCORE   PHQ-9 Total Score MyChart 0 13 (Moderate depression) 9 (Mild depression) 6 (Mild depression)  7 (Mild  depression) 7 (Mild depression)   PHQ-9 Total Score 0 13 9 6 8 7 7        Patient-reported     GAD7:       10/26/2022     1:45 PM 4/20/2023     3:05 PM 6/19/2023    11:28 AM 8/21/2023    11:54 AM 12/11/2023     9:18 AM 1/24/2024     3:57 PM 6/18/2024     9:14 AM   ELIZABETH-7 SCORE   Total Score 11 (moderate anxiety) 4 (minimal anxiety) 5 (mild anxiety) 3 (minimal anxiety) 0 (minimal anxiety) 5 (mild anxiety) 3 (minimal anxiety)   Total Score 11 4 5 3 0 5 3     PROMIS 10-Global Health (only subscores and total score):       6/11/2024     8:45 AM 9/25/2024     6:28 PM   PROMIS-10 Scores Only   Global Mental Health Score 7 7   Global Physical Health Score 9 10   PROMIS TOTAL - SUBSCORES 16 17         ASSESSMENT: Current Emotional / Mental Status (status of significant symptoms):   Risk status (Self / Other harm or suicidal ideation)   Patient denies current fears or concerns for personal safety.   Patient denies current or recent suicidal ideation or behaviors.   Patient denies current or recent homicidal ideation or behaviors.   Patient denies current or recent self injurious behavior or ideation.   Patient denies other safety concerns.   Patient reports there has been no change in risk factors since their last session.     Patient reports there has been no change in protective factors since their last session.     Recommended that patient call 911 or go to the local ED should there be a change in any of these risk factors     Appearance:   Appropriate    Eye Contact:   Good    Psychomotor Behavior: Normal    Attitude:   Cooperative    Orientation:   All   Speech    Rate / Production: Normal     Volume:  Normal    Mood:    Anxious  Normal   Affect:    Appropriate  Blunted    Thought Content:  Clear    Thought Form:  Coherent  Logical    Insight:    Good      Medication Review:   No changes to current psychiatric medication(s)     Medication Compliance:   Yes     Changes in Health Issues:   None reported     Chemical Use  Review:   Substance Use: Chemical use reviewed, no active concerns identified      Tobacco Use: No change in amount of tobacco use since last session.  Pre-contemplation    Diagnosis: Major Depression and Social Anxiety      Collateral Reports Completed:   Not Applicable    PLAN: (Patient Tasks: continue work with below goals. / Therapist Tasks / Other)            Endy Clare, [facility]   10/31/2024                                                           ______________________________________________________________________    Individual Treatment Plan    Patient's Name: Trisha Fernando  YOB: 1960    Date of Creation: 10/30/2024    Date Treatment Plan Last Reviewed/Revised: initial plan    DSM5 Diagnoses: 296.32 (F33.1) Major Depressive Disorder, Recurrent Episode, Moderate _ and With anxious distress  Psychosocial / Contextual Factors: Coping with life changes. Primary care provider at home to ; currently has high medical needs.     PROMIS (reviewed every 90 days): yes.     Referral / Collaboration:  Referral to another professional/service is not indicated at this time..    Anticipated number of session for this episode of care:  12+  Anticipation frequency of session: Biweekly  Anticipated Duration of each session: 38-52 minutes  Treatment plan will be reviewed in 90 days or when goals have been changed.       MeasurableTreatment Goal(s) related to diagnosis / functional impairment(s)  Goal 1: Patient will have some relief with daily routine and her own self care.       I will know I've met my goal when I feel it.  When looking forward to her days.     Objective #A (Patient Action)    Patient will Increase interest, engagement, and pleasure in doing things. Visit neighbor friend as possible, talk on the phone. Time with daughter Deborah helping out around the house.     Status:  10/30/2025.       Intervention(s)  Therapist will teach about healthy boundaries. In session with self-care  routine .    Objective #B    Patient will Increase interest, engagement, and pleasure in doing things.  Status:  10/30/2025      Intervention(s)  Therapist will provide solution focused therapy. .    Objective #C  Patient will  when Atif is resting or sleeping take time to tend to her own needs, priorities .  Status: New - Date: 10/30/2024      Intervention(s)  Therapist will assign homework and follow up with progress .      Goal 2: Patient will practice daily healthy coping skills. Goal of getting bathroom completed with new tub for knee pain relief.       I will know I've met my goal when I'm having time to rest and relax.      Objective #A (Patient Action): use new tub for knee pain relief.       Status:  10/30/2025      Patient will  take opportunities and plan activities outside of home and caring for Atif .    Intervention(s)  Therapist will assign homework of self-care more often .      Goal 3: Patiient will access more support with caring for house chores, care for Atif, and other help. Consider options for support.       I will know I've met my goal when feeling some relief from daily responsibilities.      Objective #A (Patient Action)    Status: New - Date: 10/24/2024      Patient will  practice setting daily limits with adding in self-care time as a routine .    Intervention(s)  Therapist will assign homework that monitor and promote self-care .        Patient has reviewed and agreed to the above plan.      GASTON Felder  October 30, 2024

## 2024-11-04 ENCOUNTER — ANTICOAGULATION THERAPY VISIT (OUTPATIENT)
Dept: ANTICOAGULATION | Facility: OTHER | Age: 64
End: 2024-11-04
Attending: INTERNAL MEDICINE
Payer: COMMERCIAL

## 2024-11-04 DIAGNOSIS — Z79.01 ANTICOAGULATION MONITORING, INR RANGE 2-3: Primary | ICD-10-CM

## 2024-11-04 DIAGNOSIS — I74.9 EMBOLISM AND THROMBOSIS (H): ICD-10-CM

## 2024-11-04 LAB — INR HOME MONITORING: 2.3 RATIO (ref 2–3)

## 2024-11-05 NOTE — PROGRESS NOTES
ANTICOAGULATION  MANAGEMENT-Home Monitor Managed by Exception    Trisha NATALIA Masons 63 year old female is on warfarin with therapeutic INR result. (Goal INR 2.0-3.0)    Recent labs: (last 7 days)     11/04/24  2245   INR 2.3       Previous INR was Therapeutic  Medication, diet, health changes since last INR:chart reviewed; none identified  Contacted within the last 12 weeks by phone on 9/9/24  Last [facility] referral date: 08/27/2024      JOSE JUAN     Trisha was NOT contacted regarding therapeutic result today per home monitoring policy manage by exception agreement.   Current warfarin dose is to be continued:     Summary  As of 11/4/2024      Full warfarin instructions:  7.5 mg every Tue; 5 mg all other days   Next INR check:  11/11/2024             ?   Smitha Nair RN  Anticoagulation Clinic  11/5/2024    _______________________________________________________________________     Anticoagulation Episode Summary       Current INR goal:  2.0-3.0   TTR:  84.6% (1 y)   Target end date:  Indefinite   Send INR reminders to:  [facility]    Indications    Long term (current) use of anticoagulants (Resolved) [Z79.01]  Embolism and thrombosis (H) (Resolved) [I74.9]  Anticoagulation monitoring  INR range 2-3 [Z79.01]  Embolism and thrombosis (H) [I74.9]             Comments:  MDINR needs weekly INR done             Anticoagulation Care Providers       Provider Role Specialty Phone number    Maria Guadalupe Ramon DO Referring Internal Medicine [phone]

## 2024-11-11 ENCOUNTER — ANTICOAGULATION THERAPY VISIT (OUTPATIENT)
Dept: ANTICOAGULATION | Facility: OTHER | Age: 64
End: 2024-11-11
Attending: INTERNAL MEDICINE
Payer: COMMERCIAL

## 2024-11-11 DIAGNOSIS — Z79.01 ANTICOAGULATION MONITORING, INR RANGE 2-3: Primary | ICD-10-CM

## 2024-11-11 DIAGNOSIS — I74.9 EMBOLISM AND THROMBOSIS (H): ICD-10-CM

## 2024-11-12 LAB — INR HOME MONITORING: 2 RATIO (ref 2–3)

## 2024-11-12 NOTE — PROGRESS NOTES
ANTICOAGULATION  MANAGEMENT-Home Monitor Managed by Exception    Trisha FISHER Marlons 63 year old female is on warfarin with therapeutic INR result. (Goal INR 2.0-3.0)    Recent labs: (last 7 days)     11/12/24  0811   INR 2       Previous INR was Therapeutic  Medication, diet, health changes since last INR:chart reviewed; none identified  Contacted within the last 12 weeks by phone on 9/9/24  Last [facility] referral date: 08/27/2024      JOSE JUAN Honda was NOT contacted regarding therapeutic result today per home monitoring policy manage by exception agreement.   Current warfarin dose is to be continued:     Summary  As of 11/11/2024      Full warfarin instructions:  7.5 mg every Tue; 5 mg all other days   Next INR check:  11/18/2024             ?   Ghada Granda RN  Anticoagulation Clinic  11/12/2024    _______________________________________________________________________     Anticoagulation Episode Summary       Current INR goal:  2.0-3.0   TTR:  84.6% (1 y)   Target end date:  Indefinite   Send INR reminders to:  [facility]    Indications    Long term (current) use of anticoagulants (Resolved) [Z79.01]  Embolism and thrombosis (H) (Resolved) [I74.9]  Anticoagulation monitoring  INR range 2-3 [Z79.01]  Embolism and thrombosis (H) [I74.9]             Comments:  MDINR needs weekly INR done             Anticoagulation Care Providers       Provider Role Specialty Phone number    Maria Guadalupe Ramon DO Referring Internal Medicine [phone]

## 2024-11-18 ENCOUNTER — ANTICOAGULATION THERAPY VISIT (OUTPATIENT)
Dept: ANTICOAGULATION | Facility: OTHER | Age: 64
End: 2024-11-18
Attending: INTERNAL MEDICINE
Payer: COMMERCIAL

## 2024-11-18 DIAGNOSIS — Z79.01 ANTICOAGULATION MONITORING, INR RANGE 2-3: Primary | ICD-10-CM

## 2024-11-18 DIAGNOSIS — I74.9 EMBOLISM AND THROMBOSIS (H): ICD-10-CM

## 2024-11-19 LAB — INR HOME MONITORING: 2.3 RATIO (ref 2–3)

## 2024-11-19 NOTE — PROGRESS NOTES
ANTICOAGULATION  MANAGEMENT-Home Monitor Managed by Exception    Trisha FISHER Marlons 63 year old female is on warfarin with therapeutic INR result. (Goal INR 2.0-3.0)    Recent labs: (last 7 days)     11/19/24  0830   INR 2.3       Previous INR was Therapeutic  Medication, diet, health changes since last INR:chart reviewed; none identified  Contacted within the last 12 weeks by phone on 9/9/24  Last [facility] referral date: 08/27/2024      JOSE JUAN Rico was NOT contacted regarding therapeutic result today per home monitoring policy manage by exception agreement.   Current warfarin dose is to be continued:     Summary  As of 11/18/2024      Full warfarin instructions:  7.5 mg every Tue; 5 mg all other days   Next INR check:  11/26/2024             ?   Ghada Granda RN  Anticoagulation Clinic  11/19/2024    _______________________________________________________________________     Anticoagulation Episode Summary       Current INR goal:  2.0-3.0   TTR:  84.6% (1 y)   Target end date:  Indefinite   Send INR reminders to:  [facility]    Indications    Long term (current) use of anticoagulants (Resolved) [Z79.01]  Embolism and thrombosis (H) (Resolved) [I74.9]  Anticoagulation monitoring  INR range 2-3 [Z79.01]  Embolism and thrombosis (H) [I74.9]             Comments:  MDINR needs weekly INR done             Anticoagulation Care Providers       Provider Role Specialty Phone number    Maria Guadalupe Ramon DO Referring Internal Medicine [phone]

## 2024-11-24 DIAGNOSIS — F33.0 DEPRESSION, MAJOR, RECURRENT, MILD (H): ICD-10-CM

## 2024-11-26 ENCOUNTER — ANTICOAGULATION THERAPY VISIT (OUTPATIENT)
Dept: ANTICOAGULATION | Facility: OTHER | Age: 64
End: 2024-11-26
Attending: INTERNAL MEDICINE
Payer: COMMERCIAL

## 2024-11-26 DIAGNOSIS — I74.9 EMBOLISM AND THROMBOSIS (H): ICD-10-CM

## 2024-11-26 DIAGNOSIS — Z79.01 ANTICOAGULATION MONITORING, INR RANGE 2-3: Primary | ICD-10-CM

## 2024-11-26 LAB — INR (EXTERNAL): 2.4 (ref 0.9–1.1)

## 2024-11-26 RX ORDER — VENLAFAXINE HYDROCHLORIDE 150 MG/1
150 CAPSULE, EXTENDED RELEASE ORAL
Qty: 90 CAPSULE | Refills: 4 | Status: SHIPPED | OUTPATIENT
Start: 2024-11-26

## 2024-11-26 NOTE — PROGRESS NOTES
ANTICOAGULATION MANAGEMENT     Trisha Fernando 64 year old female is on warfarin with therapeutic INR result. (Goal INR 2.0-3.0)    Recent labs: (last 7 days)     11/26/24  0810   INR 2.4*       ASSESSMENT     Source(s): Chart Review and Patient/Caregiver Call patient called INR in she could not get a hold of monitor company to report INR    Warfarin doses taken: Warfarin taken as instructed  Diet: No new diet changes identified  Medication/supplement changes: None noted  New illness, injury, or hospitalization: No  Signs or symptoms of bleeding or clotting: No  Previous result: Therapeutic last 2(+) visits  Additional findings: None       PLAN     Recommended plan for no diet, medication or health factor changes affecting INR     Dosing Instructions: Continue your current warfarin dose with next INR in 1 week       Summary  As of 11/26/2024      Full warfarin instructions:  7.5 mg every Tue; 5 mg all other days   Next INR check:  12/3/2024               Telephone call with Trisha who verbalizes understanding and agrees to plan    Patient to recheck with home meter    Education provided: Resume manage by exception with home monitor. Continue to submit INR results to home monitor company.You will only be called when your result is out of range and at 90 da check in. Please call and notify [facility] if new medication started, dose missed, signs or symptoms of bleeding or clotting, or a surgery/procedure is scheduled. Due for next call no later than: 2/24/25.    Plan made per [facility] anticoagulation protocol    Smitha Nair RN  11/26/2024  Anticoagulation Clinic  [facility] for routing messages: p [facility]  [facility] patient phone line: [phone]        _______________________________________________________________________     Anticoagulation Episode Summary       Current INR goal:  2.0-3.0   TTR:  84.6% (1 y)   Target end date:  Indefinite   Send INR reminders to:  [facility]    Indications    Long  term (current) use of anticoagulants (Resolved) [Z79.01]  Embolism and thrombosis (H) (Resolved) [I74.9]  Anticoagulation monitoring  INR range 2-3 [Z79.01]  Embolism and thrombosis (H) [I74.9]             Comments:  ELISABETH needs weekly INR done             Anticoagulation Care Providers       Provider Role Specialty Phone number    Maria Guadalupe Ramon DO Referring Internal Medicine [phone]

## 2024-11-26 NOTE — TELEPHONE ENCOUNTER
[facility] sent Rx request for the following:      Requested Prescriptions   Pending Prescriptions Disp Refills    venlafaxine (EFFEXOR XR) 150 MG 24 hr capsule [Pharmacy Med Name: VENLAFAXINE HCL  MG CAP] 90 capsule 3     Sig: TAKE 1 CAPSULE BY MOUTH EVERY DAY WITH FOOD       Serotonin-Norepinephrine Reuptake Inhibitors  Failed - 11/26/2024  9:20 AM        Failed - PHQ-9 score of less than 5 in past 6 months     Please review last PHQ-9 score.           Passed - Most recent blood pressure under 140/90 in past 12 months     BP Readings from Last 3 Encounters:   08/02/24 125/82   07/17/24 136/78   06/18/24 (!) 138/98       No data recorded            Passed - Medication is active on med list        Passed - Recent (12 mo) or future (90 days) visit within the authorizing provider's specialty     The patient must have completed an in-person or virtual visit within the past 12 months or has a future visit scheduled within the next 90 days with the authorizing provider s specialty.  Urgent care and e-visits do not qualify as an office visit for this protocol.          Passed - Medication indicated for associated diagnosis     Medication is associated with one or more of the following diagnoses:  Anxiety  Bipolar  Chronic musculoskeletal pain  Depression  Fibromyalgia  Headache  Migraine  Neuropathy  Obsessive compulsive disorder  Panic disorder  Social phobia  Mood disorder  Menopause  Hot flashes/Menopausal flushing  Fibromyitis  Flushing          Passed - Patient is age 18 or older        Passed - No active pregnancy on record        Passed - No positive pregnancy test in past 12 months             Last Prescription Date:   11/24/23  Last Fill Qty/Refills:         90, R-3    Last Office Visit:              7/17/24   Future Office visit:             Next 5 appointments (look out 90 days)      Jan 20, 2025 10:20 AM  (Arrive by 10:05 AM)  Welcome to Medicare Visit with Maria Guadalupe Ramon DO  WELLNESS NURSE  [facility] ([facility]) [address]  [facility] [id number] [phone]           Per LOV note:  Return in about 6 months (around 1/17/2025) for Annual Review with renewal of all medications, and as needed sooner.     Unable to complete prescription refill per RN Medication Refill Policy.     Carlos Alberto Rodriguez RN on 11/26/2024 at 9:22 AM

## 2024-12-02 ENCOUNTER — ANTICOAGULATION THERAPY VISIT (OUTPATIENT)
Dept: ANTICOAGULATION | Facility: OTHER | Age: 64
End: 2024-12-02
Attending: INTERNAL MEDICINE
Payer: COMMERCIAL

## 2024-12-02 DIAGNOSIS — I74.9 EMBOLISM AND THROMBOSIS (H): ICD-10-CM

## 2024-12-02 DIAGNOSIS — Z79.01 ANTICOAGULATION MONITORING, INR RANGE 2-3: Primary | ICD-10-CM

## 2024-12-02 LAB — INR HOME MONITORING: 2.2 RATIO (ref 2–3)

## 2024-12-02 NOTE — PROGRESS NOTES
ANTICOAGULATION  MANAGEMENT-Home Monitor Managed by Exception    Trisha NATALIA Fernando 64 year old female is on warfarin with therapeutic INR result. (Goal INR 2.0-3.0)    Recent labs: (last 7 days)     12/02/24  1240   INR 2.2       Previous INR was Therapeutic  Medication, diet, health changes since last INR:chart reviewed; none identified  Contacted within the last 12 weeks by phone on 11/26/24  Last [facility] referral date: 08/27/2024      JOSE JUAN     Trisha was NOT contacted regarding therapeutic result today per home monitoring policy manage by exception agreement.   Current warfarin dose is to be continued:     Summary  As of 12/2/2024      Full warfarin instructions:  7.5 mg every Tue; 5 mg all other days   Next INR check:  12/10/2024             ?   Smitha Nair RN  Anticoagulation Clinic  12/2/2024    _______________________________________________________________________     Anticoagulation Episode Summary       Current INR goal:  2.0-3.0   TTR:  84.6% (1 y)   Target end date:  Indefinite   Send INR reminders to:  [facility]    Indications    Long term (current) use of anticoagulants (Resolved) [Z79.01]  Embolism and thrombosis (H) (Resolved) [I74.9]  Anticoagulation monitoring  INR range 2-3 [Z79.01]  Embolism and thrombosis (H) [I74.9]             Comments:  MDINR needs weekly INR done             Anticoagulation Care Providers       Provider Role Specialty Phone number    Maria Guadalupe Ramon DO Referring Internal Medicine [phone]

## 2024-12-09 ENCOUNTER — ANTICOAGULATION THERAPY VISIT (OUTPATIENT)
Dept: ANTICOAGULATION | Facility: OTHER | Age: 64
End: 2024-12-09
Payer: COMMERCIAL

## 2024-12-09 DIAGNOSIS — Z79.01 ANTICOAGULATION MONITORING, INR RANGE 2-3: Primary | ICD-10-CM

## 2024-12-09 DIAGNOSIS — I74.9 EMBOLISM AND THROMBOSIS (H): ICD-10-CM

## 2024-12-09 LAB — INR HOME MONITORING: 1.8 RATIO (ref 2–3)

## 2024-12-09 NOTE — PROGRESS NOTES
ANTICOAGULATION MANAGEMENT     Trisha Fernando 64 year old female is on warfarin with subtherapeutic INR result. (Goal INR 2.0-3.0)    Recent labs: (last 7 days)     12/09/24  1654   INR 1.8*       ASSESSMENT     Source(s): Chart Review and Patient/Caregiver Call     Warfarin doses taken: Warfarin taken as instructed  Diet: No new diet changes identified  Medication/supplement changes: None noted  New illness, injury, or hospitalization: No  Signs or symptoms of bleeding or clotting: No  Previous result: Therapeutic last 2(+) visits  Additional findings: None       PLAN     Recommended plan for no diet, medication or health factor changes and previous 2 INR results within goal affecting INR     Dosing Instructions: Continue your current warfarin dose with next INR in 1 week       Summary  As of 12/9/2024      Full warfarin instructions:  7.5 mg every Tue; 5 mg all other days   Next INR check:  12/16/2024               Telephone call with Trisha who verbalizes understanding and agrees to plan    Patient to recheck with home meter    Education provided: None required    Plan made per [facility] anticoagulation protocol    Smitha Nair RN  12/9/2024  Anticoagulation Clinic  [employer] for routing messages: [facility]  [facility] patient phone line: [phone]        _______________________________________________________________________     Anticoagulation Episode Summary       Current INR goal:  2.0-3.0   TTR:  83.6% (1 y)   Target end date:  Indefinite   Send INR reminders to:  [facility]    Indications    Long term (current) use of anticoagulants (Resolved) [Z79.01]  Embolism and thrombosis (H) (Resolved) [I74.9]  Anticoagulation monitoring  INR range 2-3 [Z79.01]  Embolism and thrombosis (H) [I74.9]             Comments:  MDINR needs weekly INR done             Anticoagulation Care Providers       Provider Role Specialty Phone number    Maria Guadalupe Ramon DO Referring Internal Medicine [phone]

## 2024-12-16 LAB — INR HOME MONITORING: 2 RATIO (ref 2–3)

## 2024-12-17 ENCOUNTER — ANTICOAGULATION THERAPY VISIT (OUTPATIENT)
Dept: ANTICOAGULATION | Facility: OTHER | Age: 64
End: 2024-12-17
Attending: INTERNAL MEDICINE
Payer: COMMERCIAL

## 2024-12-17 DIAGNOSIS — I74.9 EMBOLISM AND THROMBOSIS (H): ICD-10-CM

## 2024-12-17 DIAGNOSIS — Z79.01 ANTICOAGULATION MONITORING, INR RANGE 2-3: Primary | ICD-10-CM

## 2024-12-17 NOTE — PROGRESS NOTES
ANTICOAGULATION MANAGEMENT     Trisha Fernando 64 year old female is on warfarin with therapeutic INR result. (Goal INR 2.0-3.0)    Recent labs: (last 7 days)     12/16/24  1715   INR 2       ASSESSMENT     Source(s): Chart Review  Previous INR was Subtherapeutic  Medication, diet, health changes since last INR chart reviewed; none identified         PLAN     Recommended plan for no diet, medication or health factor changes affecting INR     Dosing Instructions: Continue your current warfarin dose with next INR in 1 week       Summary  As of 12/17/2024      Full warfarin instructions:  7.5 mg every Tue; 5 mg all other days   Next INR check:  12/24/2024               Detailed voice message left for Trisha with dosing instructions and follow up date.     Patient to recheck with home meter    Education provided: Please call back if any changes to your diet, medications or how you've been taking warfarin  Resume manage by exception with home monitor. Continue to submit INR results to home monitor company.You will only be called when your result is out of range and at 90 day check in. Please call and notify [facility] if new medication started, dose missed, signs or symptoms of bleeding or clotting, or a surgery/procedure is scheduled. Due for next call no later than: 3/17/25.    Plan made per [facility] anticoagulation protocol    Smitha Nair RN  12/17/2024  Anticoagulation Clinic  [facility] for routing messages: p [facility]  [facility] patient phone line: [phone]        _______________________________________________________________________     Anticoagulation Episode Summary       Current INR goal:  2.0-3.0   TTR:  81.6% (1 y)   Target end date:  Indefinite   Send INR reminders to:  [facility]    Indications    Long term (current) use of anticoagulants (Resolved) [Z79.01]  Embolism and thrombosis (H) (Resolved) [I74.9]  Anticoagulation monitoring  INR range 2-3 [Z79.01]  Embolism and thrombosis (H)  [I74.9]             Comments:  MDINR needs weekly INR done             Anticoagulation Care Providers       Provider Role Specialty Phone number    Maria Guadalupe Ramon DO Referring Internal Medicine [phone]

## 2024-12-24 ENCOUNTER — ANTICOAGULATION THERAPY VISIT (OUTPATIENT)
Dept: ANTICOAGULATION | Facility: OTHER | Age: 64
End: 2024-12-24
Attending: INTERNAL MEDICINE
Payer: MEDICARE

## 2024-12-24 DIAGNOSIS — Z79.01 ANTICOAGULATION MONITORING, INR RANGE 2-3: Primary | ICD-10-CM

## 2024-12-24 DIAGNOSIS — I74.9 EMBOLISM AND THROMBOSIS (H): ICD-10-CM

## 2024-12-24 LAB — INR HOME MONITORING: 1.8 RATIO (ref 2–3)

## 2024-12-24 NOTE — PROGRESS NOTES
ANTICOAGULATION MANAGEMENT     Trisha Fernando 64 year old female is on warfarin with subtherapeutic INR result. (Goal INR 2.0-3.0)    Recent labs: (last 7 days)     12/24/24  0759   INR 1.8*       ASSESSMENT     Source(s): Chart Review and Patient/Caregiver Call     Warfarin doses taken: Warfarin taken as instructed  Diet: No new diet changes identified  New illness, injury, or hospitalization: No  Medication/supplement changes:  Increased tylenol ibuprofen   Signs or symptoms of bleeding or clotting: No  Previous INR: Therapeutic last visit; previously outside of goal range  Additional findings: None       PLAN     Recommended plan for no diet, medication or health factor changes affecting INR     Dosing Instructions: Increase your warfarin dose (6.7% change) with next INR in 1 week       Summary  As of 12/24/2024      Full warfarin instructions:  7.5 mg every Tue, Fri; 5 mg all other days   Next INR check:  12/31/2024               Telephone call with Trisha who verbalizes understanding and agrees to plan    Patient to recheck with home meter    Education provided: Please call back if any changes to your diet, medications or how you've been taking warfarin    Plan made per [facility] anticoagulation protocol    Ghada Granda, RN  Anticoagulation Clinic  12/24/2024    _______________________________________________________________________     Anticoagulation Episode Summary       Current INR goal:  2.0-3.0   TTR:  79.6% (1 y)   Target end date:  Indefinite   Send INR reminders to:  [facility]    Indications    Long term (current) use of anticoagulants (Resolved) [Z79.01]  Embolism and thrombosis (H) (Resolved) [I74.9]  Anticoagulation monitoring  INR range 2-3 [Z79.01]  Embolism and thrombosis (H) [I74.9]             Comments:  MDINR needs weekly INR done             Anticoagulation Care Providers       Provider Role Specialty Phone number    Maria Guadalupe Ramon DO Referring Internal Medicine [phone] 
(E4) spontaneous

## 2024-12-30 LAB — INR HOME MONITORING: 2.6 RATIO (ref 2–3)

## 2024-12-31 ENCOUNTER — ANTICOAGULATION THERAPY VISIT (OUTPATIENT)
Dept: ANTICOAGULATION | Facility: OTHER | Age: 64
End: 2024-12-31
Attending: INTERNAL MEDICINE
Payer: MEDICARE

## 2024-12-31 DIAGNOSIS — I74.9 EMBOLISM AND THROMBOSIS (H): ICD-10-CM

## 2024-12-31 DIAGNOSIS — Z79.01 ANTICOAGULATION MONITORING, INR RANGE 2-3: Primary | ICD-10-CM

## 2024-12-31 NOTE — PROGRESS NOTES
ANTICOAGULATION MANAGEMENT     Trisha Fernando 64 year old female is on warfarin with therapeutic INR result. (Goal INR 2.0-3.0)    Recent labs: (last 7 days)     12/30/24  1736   INR 2.6       ASSESSMENT     Source(s): Patient/Caregiver Call     Warfarin doses taken: Warfarin taken as instructed  Diet: No new diet changes identified  Medication/supplement changes: None noted  New illness, injury, or hospitalization: No  Signs or symptoms of bleeding or clotting: No  Previous result: Therapeutic last visit; previously outside of goal range  Additional findings: None       PLAN     Recommended plan for no diet, medication or health factor changes affecting INR     Dosing Instructions: Continue your current warfarin dose with next INR in 1 week       Summary  As of 12/31/2024      Full warfarin instructions:  7.5 mg every Tue, Fri; 5 mg all other days   Next INR check:  1/7/2025               In person    Patient to recheck with home meter    Education provided: Please call back if any changes to your diet, medications or how you've been taking warfarin  Resume manage by exception with home monitor. Continue to submit INR results to home monitor company.You will only be called when your result is out of range and at 90 day check in. Please call and notify [facility] if new medication started, dose missed, signs or symptoms of bleeding or clotting, or a surgery/procedure is scheduled. Due for next call no later than: 3/31/25.    Plan made per [facility] anticoagulation protocol    Carlos Alberto Rodriguez RN  12/31/2024  Anticoagulation Clinic  [facility] meets for routing messages: p [facility]          _______________________________________________________________________     Anticoagulation Episode Summary       Current INR goal:  2.0-3.0   TTR:  79.1% (1 y)   Target end date:  Indefinite   Send INR reminders to:  [facility]    Indications    Long term (current) use of anticoagulants (Resolved) [Z79.01]  Embolism and  thrombosis (H) (Resolved) [I74.9]  Anticoagulation monitoring  INR range 2-3 [Z79.01]  Embolism and thrombosis (H) [I74.9]             Comments:  MDINR needs weekly INR done             Anticoagulation Care Providers       Provider Role Specialty Phone number    Maria Guadalupe Ramon DO Referring Internal Medicine [phone]

## 2025-01-02 ENCOUNTER — OFFICE VISIT (OUTPATIENT)
Dept: PSYCHOLOGY | Facility: OTHER | Age: 65
End: 2025-01-02
Attending: SOCIAL WORKER
Payer: MEDICARE

## 2025-01-02 DIAGNOSIS — F33.0 DEPRESSION, MAJOR, RECURRENT, MILD (H): Primary | ICD-10-CM

## 2025-01-02 PROCEDURE — 90834 PSYTX W PT 45 MINUTES: CPT | Performed by: SOCIAL WORKER

## 2025-01-02 NOTE — PROGRESS NOTES
"[facility]                                     Progress Note    Patient Name: Trisha Fernando  Date: 1/2/2025           Service Type: Individual      Session Start Time: 1600  Session End Time: 1645     Session Length: 45    Session #: 4    Attendees: Client attended alone    Service Modality:  In-person    DATA  Interactive Complexity: No  Crisis: No        Progress Since Last Session (Related to Symptoms / Goals / Homework):       Symptoms:  was stressed and anxious with Atif's upcoming surgery issue and insurance. Trisha will being doing wound care for Atif. Daughter Deborah is coming along to help.  Has a puzzle skill to use, will get some sun. Hopeful of going [place] a trip to visit each Spring. Considering recumbent bike at home as a way to get at weight loss. Has ridden it in the past.     Homework: Completed in session      Episode of Care Goals: Satisfactory progress - ACTION (Actively working towards change); Intervened by reinforcing change plan / affirming steps taken     Current / Ongoing Stressors and Concerns:   Mood \"not the best\", tired from holiday times and demands. Stress.      Treatment Objective(s) Addressed in This Session:   Increase interest, engagement, and pleasure in doing things  Decrease frequency and intensity of feeling down, depressed, hopeless       Intervention:   CBT: with recognizing thoughts and behavior patterns that lend to increased distress.     I want to be stronger.     I want to use soaking tub.     I want to do it to be more independent.    We to have to bathroom done and to use.     Could lose weight.     Get around better.    Improve mood.     Be good to have a daily with biking.     Bike before noon, best time of the for it.     Think of it as a step by step, building up to 15 minutes on the bike.     Assessments completed prior to visit:  The following assessments were completed by patient for this visit:  PHQ9:       1/24/2024     3:56 " PM 6/11/2024     8:13 AM 6/17/2024    12:02 PM 7/17/2024     1:38 PM 9/25/2024     6:24 PM 10/30/2024    11:47 AM 1/1/2025    10:57 AM   PHQ-9 SCORE   PHQ-9 Total Score Terrihart 13 (Moderate depression) 9 (Mild depression) 6 (Mild depression)  7 (Mild depression) 7 (Mild depression) 8 (Mild depression)   PHQ-9 Total Score 13 9 6 8 7 7  8        Patient-reported     GAD7:       10/26/2022     1:45 PM 4/20/2023     3:05 PM 6/19/2023    11:28 AM 8/21/2023    11:54 AM 12/11/2023     9:18 AM 1/24/2024     3:57 PM 6/18/2024     9:14 AM   ELIZABETH-7 SCORE   Total Score 11 (moderate anxiety) 4 (minimal anxiety) 5 (mild anxiety) 3 (minimal anxiety) 0 (minimal anxiety) 5 (mild anxiety) 3 (minimal anxiety)   Total Score 11 4 5 3 0 5 3     PROMIS 10-Global Health (only subscores and total score):       6/11/2024     8:45 AM 9/25/2024     6:28 PM 1/1/2025    11:01 AM   PROMIS-10 Scores Only   Global Mental Health Score 7 7 6    Global Physical Health Score 9 10 9    PROMIS TOTAL - SUBSCORES 16 17 15        Patient-reported         ASSESSMENT: Current Emotional / Mental Status (status of significant symptoms):   Risk status (Self / Other harm or suicidal ideation)   Patient denies current fears or concerns for personal safety.   Patient denies current or recent suicidal ideation or behaviors.   Patient denies current or recent homicidal ideation or behaviors.   Patient denies current or recent self injurious behavior or ideation.   Patient denies other safety concerns.   Patient reports there has been no change in risk factors since their last session.     Patient reports there has been no change in protective factors since their last session.     Recommended that patient call 911 or go to the local ED should there be a change in any of these risk factors     Appearance:   Appropriate    Eye Contact:   Good    Psychomotor Behavior: Normal    Attitude:   Cooperative    Orientation:   All   Speech    Rate / Production: Normal      Volume:  Normal    Mood:    Anxious  Normal   Affect:    Appropriate    Thought Content:  Clear    Thought Form:  Coherent  Logical    Insight:    Good      Medication Review:   No changes to current psychiatric medication(s)     Medication Compliance:   Yes     Changes in Health Issues:   None reported     Chemical Use Review:   Substance Use: Chemical use reviewed, no active concerns identified      Tobacco Use: No change in amount of tobacco use since last session.  Pre-contemplation    Diagnosis: Major Depression and Social Anxiety      Collateral Reports Completed:   Not Applicable    PLAN: (Patient Tasks: continue work with below goals. / Therapist Tasks / Other)            Endy Sparks, [facility]   1/2/2025                                                             ______________________________________________________________________    Individual Treatment Plan    Patient's Name: Trisha Fernando  YOB: 1960    Date of Creation: 10/30/2024    Date Treatment Plan Last Reviewed/Revised: initial plan    DSM5 Diagnoses: 296.32 (F33.1) Major Depressive Disorder, Recurrent Episode, Moderate _ and With anxious distress  Psychosocial / Contextual Factors: Coping with life changes. Primary care provider at home to ; currently has high medical needs.     PROMIS (reviewed every 90 days): yes.     Referral / Collaboration:  Referral to another professional/service is not indicated at this time..    Anticipated number of session for this episode of care:  12+  Anticipation frequency of session: Biweekly  Anticipated Duration of each session: 38-52 minutes  Treatment plan will be reviewed in 90 days or when goals have been changed.       MeasurableTreatment Goal(s) related to diagnosis / functional impairment(s)  Goal 1: Patient will have some relief with daily routine and her own self care.       I will know I've met my goal when I feel it.  When looking forward to her days.     Objective #A  (Patient Action)    Patient will Increase interest, engagement, and pleasure in doing things. Visit neighbor friend as possible, talk on the phone. Time with daughter Deborah helping out around the house.     Status:  10/30/2025.       Intervention(s)  Therapist will teach about healthy boundaries. In session with self-care routine .    Objective #B    Patient will Increase interest, engagement, and pleasure in doing things.  Status:  10/30/2025      Intervention(s)  Therapist will provide solution focused therapy. .    Objective #C  Patient will  when Atif is resting or sleeping take time to tend to her own needs, priorities .  Status: New - Date: 10/30/2024      Intervention(s)  Therapist will assign homework and follow up with progress .      Goal 2: Patient will practice daily healthy coping skills. Goal of getting bathroom completed with new tub for knee pain relief.       I will know I've met my goal when I'm having time to rest and relax.      Objective #A (Patient Action): use new tub for knee pain relief.       Status:  10/30/2025      Patient will  take opportunities and plan activities outside of home and caring for Atif .    Intervention(s)  Therapist will assign homework of self-care more often .      Goal 3: Patiient will access more support with caring for house chores, care for Atif, and other help. Consider options for support.       I will know I've met my goal when feeling some relief from daily responsibilities.      Objective #A (Patient Action)    Status: New - Date: 10/24/2024      Patient will  practice setting daily limits with adding in self-care time as a routine .    Intervention(s)  Therapist will assign homework that monitor and promote self-care .        Patient has reviewed and agreed to the above plan.      GASTON Felder  October 30, 2024            Answers submitted by the patient for this visit:  Patient Health Questionnaire (Submitted on 1/1/2025)  If you checked off any  problems, how difficult have these problems made it for you to do your work, take care of things at home, or get along with other people?: Somewhat difficult  PHQ9 TOTAL SCORE: 8

## 2025-01-06 ENCOUNTER — ANTICOAGULATION THERAPY VISIT (OUTPATIENT)
Dept: ANTICOAGULATION | Facility: OTHER | Age: 65
End: 2025-01-06
Payer: COMMERCIAL

## 2025-01-06 DIAGNOSIS — I74.9 EMBOLISM AND THROMBOSIS (H): ICD-10-CM

## 2025-01-06 DIAGNOSIS — Z79.01 ANTICOAGULATION MONITORING, INR RANGE 2-3: Primary | ICD-10-CM

## 2025-01-06 LAB — INR HOME MONITORING: 2.4 RATIO (ref 2–3)

## 2025-01-06 NOTE — PROGRESS NOTES
ANTICOAGULATION  MANAGEMENT-Home Monitor Managed by Exception    Trisha FISHER Kassie 64 year old female is on warfarin with therapeutic INR result. (Goal INR 2.0-3.0)    Recent labs: (last 7 days)     01/06/25  1342   INR 2.4       Previous INR was Therapeutic  Medication, diet, health changes since last INR:chart reviewed; none identified  Contacted within the last 12 weeks by phone on 12/31/24  Last [facility] referral date: 08/27/2024      JOSE JUAN Rico was NOT contacted regarding therapeutic result today per home monitoring policy manage by exception agreement.   Current warfarin dose is to be continued:     Summary  As of 1/6/2025      Full warfarin instructions:  7.5 mg every Tue, Fri; 5 mg all other days   Next INR check:  1/14/2025             ?   Ghada Granda RN  Anticoagulation Clinic  1/6/2025    _______________________________________________________________________     Anticoagulation Episode Summary       Current INR goal:  2.0-3.0   TTR:  79.2% (1 y)   Target end date:  Indefinite   Send INR reminders to:  [facility]    Indications    Long term (current) use of anticoagulants (Resolved) [Z79.01]  Embolism and thrombosis (H) (Resolved) [I74.9]  Anticoagulation monitoring  INR range 2-3 [Z79.01]  Embolism and thrombosis (H) [I74.9]             Comments:  MDINR needs weekly INR done             Anticoagulation Care Providers       Provider Role Specialty Phone number    Maria Guadalupe Ramon DO Referring Internal Medicine [phone]

## 2025-01-13 ENCOUNTER — ANTICOAGULATION THERAPY VISIT (OUTPATIENT)
Dept: ANTICOAGULATION | Facility: OTHER | Age: 65
End: 2025-01-13
Attending: INTERNAL MEDICINE
Payer: COMMERCIAL

## 2025-01-13 DIAGNOSIS — Z79.01 ANTICOAGULATION MONITORING, INR RANGE 2-3: Primary | ICD-10-CM

## 2025-01-13 DIAGNOSIS — I74.9 EMBOLISM AND THROMBOSIS (H): ICD-10-CM

## 2025-01-13 PROBLEM — R42 VERTIGO: Status: ACTIVE | Noted: 2025-01-08

## 2025-01-13 LAB — INR HOME MONITORING: 1.3 RATIO (ref 2–3)

## 2025-01-13 RX ORDER — HYDROXYCHLOROQUINE SULFATE 200 MG/1
TABLET, FILM COATED ORAL
COMMUNITY
Start: 2024-06-06 | End: 2025-01-20

## 2025-01-13 RX ORDER — BUPROPION HYDROCHLORIDE 300 MG/1
300 TABLET ORAL EVERY MORNING
COMMUNITY
Start: 2024-11-15 | End: 2025-01-20

## 2025-01-13 RX ORDER — VITAMIN B COMPLEX/FOLIC ACID 0.4 MG
1 TABLET ORAL DAILY
COMMUNITY
End: 2025-01-20

## 2025-01-13 RX ORDER — MECLIZINE HYDROCHLORIDE 25 MG/1
25 TABLET ORAL
COMMUNITY
Start: 2025-01-10 | End: 2025-01-20

## 2025-01-13 NOTE — PROGRESS NOTES
ANTICOAGULATION MANAGEMENT     Tirsha FISHER Denisbells 64 year old female is on warfarin with subtherapeutic INR result. (Goal INR 2.0-3.0)    Recent labs: (last 7 days)     01/13/25  1223   INR 1.3*       ASSESSMENT     Source(s): Chart Review and Patient/Caregiver Call     Warfarin doses taken: While hospitalized on 1/8-1/10: anticoagulation calendar updated with inpatient dosing.  Discharged on: home regimen continued  Diet:  decreased appetite while in hospital may be affecting diet and INR  Medication/supplement changes:  meclizine prn  New illness, injury, or hospitalization: Yes: dizziness, weakness  Signs or symptoms of bleeding or clotting: No  Previous result: Supratherapeutic  Additional findings: None       PLAN     Recommended plan for temporary change(s) affecting INR     Dosing Instructions: booster dose then continue your current warfarin dose with next INR in 1 week       Summary  As of 1/13/2025      Full warfarin instructions:  1/13: 10 mg; Otherwise 7.5 mg every Tue, Fri; 5 mg all other days   Next INR check:  1/20/2025               Telephone call with Trisha who verbalizes understanding and agrees to plan    Patient to recheck with home meter    Education provided: None required    Plan made per [facility] anticoagulation protocol    Smitha Nair RN  1/13/2025  Anticoagulation Clinic  [employer] for routing messages: p [facility]  [facility] patient phone line: [phone]        _______________________________________________________________________     Anticoagulation Episode Summary       Current INR goal:  2.0-3.0   TTR:  79.6% (1 y)   Target end date:  Indefinite   Send INR reminders to:  [facility]    Indications    Long term (current) use of anticoagulants (Resolved) [Z79.01]  Embolism and thrombosis (H) (Resolved) [I74.9]  Anticoagulation monitoring  INR range 2-3 [Z79.01]  Embolism and thrombosis (H) [I74.9]             Comments:  MDINR needs weekly INR done              Anticoagulation Care Providers       Provider Role Specialty Phone number    Maria Guadalupe Ramon DO Referring Internal Medicine [phone]

## 2025-01-17 SDOH — HEALTH STABILITY: PHYSICAL HEALTH: ON AVERAGE, HOW MANY DAYS PER WEEK DO YOU ENGAGE IN MODERATE TO STRENUOUS EXERCISE (LIKE A BRISK WALK)?: 0 DAYS

## 2025-01-17 SDOH — HEALTH STABILITY: PHYSICAL HEALTH: ON AVERAGE, HOW MANY MINUTES DO YOU ENGAGE IN EXERCISE AT THIS LEVEL?: 0 MIN

## 2025-01-17 ASSESSMENT — SOCIAL DETERMINANTS OF HEALTH (SDOH): HOW OFTEN DO YOU GET TOGETHER WITH FRIENDS OR RELATIVES?: NEVER

## 2025-01-20 ENCOUNTER — OFFICE VISIT (OUTPATIENT)
Dept: INTERNAL MEDICINE | Facility: OTHER | Age: 65
End: 2025-01-20
Attending: INTERNAL MEDICINE
Payer: MEDICARE

## 2025-01-20 VITALS
HEIGHT: 68 IN | WEIGHT: 293 LBS | BODY MASS INDEX: 44.41 KG/M2 | TEMPERATURE: 97.7 F | SYSTOLIC BLOOD PRESSURE: 132 MMHG | DIASTOLIC BLOOD PRESSURE: 96 MMHG | OXYGEN SATURATION: 97 % | HEART RATE: 65 BPM | RESPIRATION RATE: 18 BRPM

## 2025-01-20 DIAGNOSIS — E87.6 HYPOKALEMIA: ICD-10-CM

## 2025-01-20 DIAGNOSIS — E66.01 MORBID OBESITY (H): ICD-10-CM

## 2025-01-20 DIAGNOSIS — Z00.00 ENCOUNTER FOR MEDICARE ANNUAL WELLNESS EXAM: Primary | ICD-10-CM

## 2025-01-20 DIAGNOSIS — Z79.01 ANTICOAGULATION MONITORING, INR RANGE 2-3: ICD-10-CM

## 2025-01-20 DIAGNOSIS — F33.0 DEPRESSION, MAJOR, RECURRENT, MILD: ICD-10-CM

## 2025-01-20 DIAGNOSIS — I10 ESSENTIAL HYPERTENSION: ICD-10-CM

## 2025-01-20 DIAGNOSIS — B37.2 YEAST INFECTION OF THE SKIN: ICD-10-CM

## 2025-01-20 DIAGNOSIS — R09.81 SINUS CONGESTION: ICD-10-CM

## 2025-01-20 DIAGNOSIS — R06.02 SOB (SHORTNESS OF BREATH): ICD-10-CM

## 2025-01-20 DIAGNOSIS — B37.2 CANDIDIASIS OF SKIN: ICD-10-CM

## 2025-01-20 DIAGNOSIS — D68.51 FACTOR V LEIDEN: ICD-10-CM

## 2025-01-20 DIAGNOSIS — Z23 ENCOUNTER FOR IMMUNIZATION: ICD-10-CM

## 2025-01-20 DIAGNOSIS — R42 VERTIGO: ICD-10-CM

## 2025-01-20 DIAGNOSIS — M05.79 RHEUMATOID ARTHRITIS INVOLVING MULTIPLE SITES WITH POSITIVE RHEUMATOID FACTOR (H): ICD-10-CM

## 2025-01-20 DIAGNOSIS — I74.9 EMBOLISM AND THROMBOSIS (H): ICD-10-CM

## 2025-01-20 DIAGNOSIS — F33.1 MODERATE RECURRENT MAJOR DEPRESSION (H): ICD-10-CM

## 2025-01-20 DIAGNOSIS — Z71.85 VACCINE COUNSELING: ICD-10-CM

## 2025-01-20 PROCEDURE — G0402 INITIAL PREVENTIVE EXAM: HCPCS | Performed by: INTERNAL MEDICINE

## 2025-01-20 PROCEDURE — 90677 PCV20 VACCINE IM: CPT

## 2025-01-20 PROCEDURE — G0008 ADMIN INFLUENZA VIRUS VAC: HCPCS

## 2025-01-20 PROCEDURE — G0402 INITIAL PREVENTIVE EXAM: HCPCS | Mod: 25 | Performed by: INTERNAL MEDICINE

## 2025-01-20 PROCEDURE — 99214 OFFICE O/P EST MOD 30 MIN: CPT | Mod: 25 | Performed by: INTERNAL MEDICINE

## 2025-01-20 PROCEDURE — G0009 ADMIN PNEUMOCOCCAL VACCINE: HCPCS

## 2025-01-20 PROCEDURE — G2211 COMPLEX E/M VISIT ADD ON: HCPCS | Performed by: INTERNAL MEDICINE

## 2025-01-20 PROCEDURE — G0463 HOSPITAL OUTPT CLINIC VISIT: HCPCS | Mod: 25 | Performed by: INTERNAL MEDICINE

## 2025-01-20 RX ORDER — MECLIZINE HYDROCHLORIDE 25 MG/1
25 TABLET ORAL 3 TIMES DAILY PRN
Qty: 90 TABLET | Refills: 11 | Status: SHIPPED | OUTPATIENT
Start: 2025-01-20

## 2025-01-20 RX ORDER — ALBUTEROL SULFATE 90 UG/1
2 INHALANT RESPIRATORY (INHALATION) EVERY 4 HOURS PRN
Qty: 8.5 G | Refills: 11 | Status: SHIPPED | OUTPATIENT
Start: 2025-01-20

## 2025-01-20 RX ORDER — WARFARIN SODIUM 5 MG/1
TABLET ORAL
Qty: 90 TABLET | Refills: 1 | Status: SHIPPED | OUTPATIENT
Start: 2025-01-20

## 2025-01-20 RX ORDER — NYSTATIN 100000 U/G
CREAM TOPICAL 2 TIMES DAILY
Qty: 30 G | Refills: 3 | Status: SHIPPED | OUTPATIENT
Start: 2025-01-20

## 2025-01-20 RX ORDER — HYDROXYCHLOROQUINE SULFATE 200 MG/1
400 TABLET, FILM COATED ORAL DAILY
COMMUNITY
Start: 2025-01-20

## 2025-01-20 RX ORDER — POTASSIUM CHLORIDE 750 MG/1
10 TABLET, EXTENDED RELEASE ORAL DAILY
Qty: 90 TABLET | Refills: 88 | Status: SHIPPED | OUTPATIENT
Start: 2025-01-20

## 2025-01-20 RX ORDER — VENLAFAXINE HYDROCHLORIDE 150 MG/1
150 CAPSULE, EXTENDED RELEASE ORAL
Qty: 90 CAPSULE | Refills: 4 | Status: SHIPPED | OUTPATIENT
Start: 2025-01-20

## 2025-01-20 RX ORDER — CHLORTHALIDONE 25 MG/1
25 TABLET ORAL DAILY
Qty: 90 TABLET | Refills: 4 | Status: SHIPPED | OUTPATIENT
Start: 2025-01-20

## 2025-01-20 RX ORDER — BUPROPION HYDROCHLORIDE 150 MG/1
300 TABLET ORAL EVERY MORNING
Qty: 180 TABLET | Refills: 4 | Status: SHIPPED | OUTPATIENT
Start: 2025-01-20

## 2025-01-20 RX ORDER — METOPROLOL TARTRATE 25 MG/1
25 TABLET, FILM COATED ORAL 2 TIMES DAILY
Qty: 180 TABLET | Refills: 4 | Status: SHIPPED | OUTPATIENT
Start: 2025-01-20

## 2025-01-20 RX ORDER — DOXYCYCLINE HYCLATE 100 MG
100 TABLET ORAL 2 TIMES DAILY
Qty: 14 TABLET | Refills: 0 | Status: SHIPPED | OUTPATIENT
Start: 2025-01-20 | End: 2025-01-27

## 2025-01-20 RX ORDER — CLOBETASOL PROPIONATE 0.5 MG/G
OINTMENT TOPICAL 2 TIMES DAILY
Qty: 60 G | Refills: 4 | Status: SHIPPED | OUTPATIENT
Start: 2025-01-20

## 2025-01-20 ASSESSMENT — PATIENT HEALTH QUESTIONNAIRE - PHQ9
SUM OF ALL RESPONSES TO PHQ QUESTIONS 1-9: 8
SUM OF ALL RESPONSES TO PHQ QUESTIONS 1-9: 8
10. IF YOU CHECKED OFF ANY PROBLEMS, HOW DIFFICULT HAVE THESE PROBLEMS MADE IT FOR YOU TO DO YOUR WORK, TAKE CARE OF THINGS AT HOME, OR GET ALONG WITH OTHER PEOPLE: SOMEWHAT DIFFICULT

## 2025-01-20 ASSESSMENT — ANXIETY QUESTIONNAIRES
3. WORRYING TOO MUCH ABOUT DIFFERENT THINGS: SEVERAL DAYS
GAD7 TOTAL SCORE: 7
1. FEELING NERVOUS, ANXIOUS, OR ON EDGE: SEVERAL DAYS
4. TROUBLE RELAXING: SEVERAL DAYS
2. NOT BEING ABLE TO STOP OR CONTROL WORRYING: SEVERAL DAYS
8. IF YOU CHECKED OFF ANY PROBLEMS, HOW DIFFICULT HAVE THESE MADE IT FOR YOU TO DO YOUR WORK, TAKE CARE OF THINGS AT HOME, OR GET ALONG WITH OTHER PEOPLE?: SOMEWHAT DIFFICULT
7. FEELING AFRAID AS IF SOMETHING AWFUL MIGHT HAPPEN: SEVERAL DAYS
IF YOU CHECKED OFF ANY PROBLEMS ON THIS QUESTIONNAIRE, HOW DIFFICULT HAVE THESE PROBLEMS MADE IT FOR YOU TO DO YOUR WORK, TAKE CARE OF THINGS AT HOME, OR GET ALONG WITH OTHER PEOPLE: SOMEWHAT DIFFICULT
5. BEING SO RESTLESS THAT IT IS HARD TO SIT STILL: SEVERAL DAYS
6. BECOMING EASILY ANNOYED OR IRRITABLE: SEVERAL DAYS
7. FEELING AFRAID AS IF SOMETHING AWFUL MIGHT HAPPEN: SEVERAL DAYS
GAD7 TOTAL SCORE: 7
GAD7 TOTAL SCORE: 7

## 2025-01-20 ASSESSMENT — PAIN SCALES - GENERAL: PAINLEVEL_OUTOF10: NO PAIN (0)

## 2025-01-20 NOTE — PROGRESS NOTES
"  {PROVIDER CHARTING PREFERENCE:934743}    Bria Rico is a 64 year old, presenting for the following health issues:  Medicare Visit      1/20/2025    10:09 AM   Additional Questions   Roomed by SINDHU Bolivar     HPI     ***    ROS:  CONSTITUTIONAL: Negative for fever, chills, night sweats, significant change in weight  INTEGUMENTARY/SKIN/LYMPH: Negative for worrisome rashes, moles or lesions; swollen lymph nodes - sees Dermatology  EYES: Negative for significant vision changes or irritation  ENT: Negative for additional ear, mouth and throat problems  RESP: Negative for significant cough or SOB  CV: Negative for chest pain, palpitations or increased peripheral edema  GI: Negative for abdominal pain, or change in bowel habits or blood in the stools/black stools  : Negative for unusual urinary or vaginal symptoms. No vaginal bleeding.  MUSCULOSKELETAL: Negative for new or changing joint pain or significant swelling - joints better with Plaquenil  NEURO: Negative for new headaches, weakness or paraesthesias  PSYCHIATRIC: Negative for changes in mood or affect; significant anxiety or depression        Objective    BP (!) 138/102   Pulse 65   Temp 97.7  F (36.5  C)   Resp 18   Ht 1.727 m (5' 8\")   Wt (!) 166 kg (366 lb)   LMP 05/14/2013 (Approximate)   SpO2 97%   BMI 55.65 kg/m    Body mass index is 55.65 kg/m .  Physical Exam   GEN: Vitals reviewed. Healthy appearing. Patient is in no acute distress. Cooperative with exam.  HEENT: Normocephalic atraumatic.  Eyes grossly normal to inspection.  No discharge or erythema, or obvious scleral/conjunctival abnormalities. Oropharynx with no significant erythema or exudates. Dentition adequate.  EACs clear bilaterally, TM gray with normal landmarks.  NECK: Supple; no thyromegaly or masses noted.  No cervical or supraclavicular lymphadenopathy.  CV: Heart regular in rate and rhythm with no murmur.    LUNGS: No audible wheeze, cough, or visible cyanosis.  No visible " retractions or increased work of breathing.  Lungs clear to auscultation bilaterally.    ABD:  Obese, nondistended  SKIN: Warm and dry to touch.  Visible skin clear. No significant rash, abnormal pigmentation or lesions.  EXT: No clubbing or cyanosis. Trace to 1+ peripheral edema.  NEURO: Alert and oriented to person, place, and time.  Cranial nerves II-XII grossly intact with no focal or lateralizing deficits.  Muscle tone normal.  Gait normal. No tremor.   MSK: ROM of upper and lower ext symmetric and full.  PSYCH: Mood is good.  Mentation appears normal, affect normal/bright, judgement and insight intact, normal speech and appearance well-groomed.    The longitudinal plan of care for the diagnosis(es)/condition(s) as documented were addressed during this visit. Due to the added complexity in care, I will continue to support Trisha in the subsequent management and with ongoing continuity of care.      Signed Electronically by: Maria Guadalupe Ramon DO  {Email feedback regarding this note to [email or web address]   :[id number]}  Answers submitted by the patient for this visit:  Patient Health Questionnaire (Submitted on 1/20/2025)  If you checked off any problems, how difficult have these problems made it for you to do your work, take care of things at home, or get along with other people?: Somewhat difficult  PHQ9 TOTAL SCORE: 8  Patient Health Questionnaire (G7) (Submitted on 1/20/2025)  ELIZABETH 7 TOTAL SCORE: 7     understanding: Yes        Counseling  Appropriate preventive services were addressed with this patient via screening, questionnaire, or discussion as appropriate for fall prevention, nutrition, physical activity, Tobacco-use cessation, social engagement, weight loss and cognition.  Checklist reviewing preventive services available has been given to the patient.  The patient's PHQ-9 score is consistent with mild depression. She was provided with information regarding depression.       Return in about 6 months (around 7/20/2025) for BP Recheck, and as needed sooner.    Bria Rico is a 64 year old, presenting for the following health issues:  Medicare Visit        1/20/2025    10:09 AM   Additional Questions   Roomed by SINDHU Bolivar     HPI   Patient presents for annual review of her medications.  She has had several new issues.    She was recently hospitalized for vertigo.  She was discharged on January 10.  She has continued to have some protocol and has been using some meclizine.  She does need a refill.  She denies any recent spinning however feels lethargic today.  She has been set up with physical therapy.  She has had to use a walker off and on.    She has continued to have some ringing in her ears.  It is worse on the right than the left.  She did see audiology last summer.  She has had a sore throat and tongue.  She does feel that she has some sinus congestion.    She does need a refill of her regular medications.  She has been using albuterol off-and-on for reactive airway and shortness of breath at times.  She only uses this as needed.  She continues on bupropion and venlafaxine for mood.  She denies any obvious side effects. She has been following with mental health which has been beneficial.    She continues on chlorthalidone and metoprolol.  Her blood pressure is slightly elevated today.  She does use potassium for replacement.    She continues on warfarin for for a history of clotting.  She denies  "any side effects.  She is interested in possibly switching over to rivaroxaban.  She would like to look into the cost of this.    She continues on hydroxychloroquine which has helped her joint pain.  She was diagnosed with rheumatoid arthritis.  She has had an eye exam within the year.    She has been using some compression stockings.  She is interested in her Prevnar vaccine.  She is also interested in her flu vaccine.    ROS:  CONSTITUTIONAL: Negative for fever, chills, night sweats, significant change in weight  INTEGUMENTARY/SKIN/LYMPH: Negative for worrisome rashes, moles or lesions; swollen lymph nodes - sees Dermatology  EYES: Negative for significant vision changes or irritation  ENT: Negative for additional ear, mouth and throat problems  RESP: Negative for significant cough or SOB  CV: Negative for chest pain, palpitations or increased peripheral edema  GI: Negative for abdominal pain, or change in bowel habits or blood in the stools/black stools  : Negative for unusual urinary or vaginal symptoms. No vaginal bleeding.  MUSCULOSKELETAL: Negative for new or changing joint pain or significant swelling - joints better with Plaquenil  NEURO: Negative for new headaches, weakness or paraesthesias  PSYCHIATRIC: Negative for changes in mood or affect; significant anxiety or depression        Objective    BP (!) 132/96   Pulse 65   Temp 97.7  F (36.5  C)   Resp 18   Ht 1.727 m (5' 8\")   Wt (!) 166 kg (366 lb)   LMP 05/14/2013 (Approximate)   SpO2 97%   BMI 55.65 kg/m    Body mass index is 55.65 kg/m .  Physical Exam   GEN: Vitals reviewed. Healthy appearing. Patient is in no acute distress. Cooperative with exam.  HEENT: Normocephalic atraumatic.  Eyes grossly normal to inspection.  No discharge or erythema, or obvious scleral/conjunctival abnormalities. Oropharynx with no significant erythema or exudates. Dentition adequate.  EACs clear bilaterally, TM gray with normal landmarks.  NECK: Supple; no " thyromegaly or masses noted.  No cervical or supraclavicular lymphadenopathy.  CV: Heart regular in rate and rhythm with no murmur.    LUNGS: No audible wheeze, cough, or visible cyanosis.  No visible retractions or increased work of breathing.  Lungs clear to auscultation bilaterally.    ABD:  Obese, nondistended  SKIN: Warm and dry to touch.  Visible skin clear. No significant rash, abnormal pigmentation or lesions.  EXT: No clubbing or cyanosis. Trace to 1+ peripheral edema.  NEURO: Alert and oriented to person, place, and time.  Cranial nerves II-XII grossly intact with no focal or lateralizing deficits.  Muscle tone normal.  Gait normal. No tremor.   MSK: ROM of upper and lower ext symmetric and full.  PSYCH: Mood is good.  Mentation appears normal, affect normal/bright, judgement and insight intact, normal speech and appearance well-groomed.    The longitudinal plan of care for the diagnosis(es)/condition(s) as documented were addressed during this visit. Due to the added complexity in care, I will continue to support Trisha in the subsequent management and with ongoing continuity of care.      Signed Electronically by: Maria Guadalupe Ramon, DO    Answers submitted by the patient for this visit:  Patient Health Questionnaire (Submitted on 1/20/2025)  If you checked off any problems, how difficult have these problems made it for you to do your work, take care of things at home, or get along with other people?: Somewhat difficult  PHQ9 TOTAL SCORE: 8  Patient Health Questionnaire (G7) (Submitted on 1/20/2025)  ELIZABETH 7 TOTAL SCORE: 7

## 2025-01-20 NOTE — PROGRESS NOTES
"Preventive Care Visit  [facility]  Maria Guadalupe Ramon DO, Internal Medicine  Jan 20, 2025  {Provider  Link to [facility] :[id number]}    Encounter for Medicare annual wellness exam    -Mammo done 1/24/23 (impression: negative). Follow up annually. Due now. Declines for now.    -DEXA done 8/21/24 (impression: normal bone density).     -Colon cancer screening done via colonoscopy on 5/24/19 (impression: WNL). Follow up 10 years. Due 5/24/29.    -Immunizations: Patient is due for the following: Covid, RSV, and Influenza. Declines vaccinations.    -Derm: Does patient regularly see dermatologist? Yes, yearly.    -Refills pended for requested medications.    BMI  Estimated body mass index is 55.65 kg/m  as calculated from the following:    Height as of this encounter: 1.727 m (5' 8\").    Weight as of this encounter: 166 kg (366 lb).   Weight management plan: Discussed healthy diet and exercise guidelines    Counseling  Appropriate preventive services were addressed with this patient via screening, questionnaire, or discussion as appropriate for fall prevention, nutrition, physical activity, Tobacco-use cessation, social engagement, weight loss and cognition.  Checklist reviewing preventive services available has been given to the patient.  Reviewed patient's diet, addressing concerns and/or questions.   Patient is at risk for social isolation and has been provided with information about the benefit of social connection.   Discussed possible causes of fatigue. Addressed any concerns about safety while driving.  The patient was provided with written information regarding signs of hearing loss.   The patient's PHQ-9 score is consistent with mild depression. She was provided with information regarding depression.     No follow-ups on file.      Bria Rico is a 64 year old, presenting for the following:  Medicare Visit        1/20/2025    10:09 AM   Additional Questions   Roomed by SINDHU Bolivar   {ROOMER " positive Fall Risk- Gait Speed Test is required click here to document the Gait Speed Test and then refresh the note to pull in results  :[id number]}        Health Care Directive  Patient does not have a Health Care Directive: Patient states has Advance Directive and will bring in a copy to clinic.        1/17/2025   General Health   How would you rate your overall physical health? (!) POOR   Feel stress (tense, anxious, or unable to sleep) Only a little   (!) STRESS CONCERN      1/17/2025   Nutrition   Diet: Regular (no restrictions)         1/17/2025   Exercise   Days per week of moderate/strenous exercise 0 days   Average minutes spent exercising at this level 0 min   (!) EXERCISE CONCERN      1/17/2025   Social Factors   Frequency of gathering with friends or relatives Never   Worry food won't last until get money to buy more No   Food not last or not have enough money for food? No   Do you have housing? (Housing is defined as stable permanent housing and does not include staying ouside in a car, in a tent, in an abandoned building, in an overnight shelter, or couch-surfing.) Yes   Are you worried about losing your housing? No   Lack of transportation? No   Unable to get utilities (heat,electricity)? No   (!) SOCIAL CONNECTIONS CONCERN      1/20/2025   Fall Risk   Gait Speed Test Interpretation Less than or equal to 5.00 seconds - PASS     {Positive Fall Risk- Gait Speed Test is required and was not documented before note was started.  If results do not appear, ask staff to complete.  Once completed, refresh note to pull in results Click here to link Gait Speed Test  :[id number]}      1/17/2025   Activities of Daily Living- Home Safety   Needs help with the following daily activites None of the above   Safety concerns in the home None of the above         1/17/2025   Dental   Dentist two times every year? (!) DECLINE         1/17/2025   Hearing Screening   Hearing concerns? (!) I NEED TO ASK PEOPLE TO SPEAK UP OR REPEAT  THEMSELVES.    (!) IT'S HARD TO FOLLOW A CONVERSATION IN A NOISY RESTAURANT OR CROWDED ROOM.    (!) TROUBLE UNDERSTANDING SOFT OR WHISPERED SPEECH.       Multiple values from one day are sorted in reverse-chronological order         2025   Driving Risk Screening   Patient/family members have concerns about driving (!) YES Vertigo concerns         2025   General Alertness/Fatigue Screening   Have you been more tired than usual lately? (!) YES         2025   Urinary Incontinence Screening   Bothered by leaking urine in past 6 months No         2025   TB Screening   Were you born outside of the [place]? No       Today's PHQ-9 Score:       2025     9:28 AM   PHQ-9 SCORE   PHQ-9 Total Score MyChart 8 (Mild depression)   PHQ-9 Total Score 8        Patient-reported         2025   Substance Use   Alcohol more than 3/day or more than 7/wk No   Do you have a current opioid prescription? No   How severe/bad is pain from 1 to 10? 0/10 (No Pain)   Do you use any other substances recreationally? No     Social History     Tobacco Use    Smoking status: Former     Current packs/day: 0.00     Average packs/day: 1 pack/day for 12.0 years (12.0 ttl pk-yrs)     Types: Cigarettes     Start date: 1978     Quit date: 1990     Years since quittin.4    Smokeless tobacco: Never   Vaping Use    Vaping status: Never Used   Substance Use Topics    Alcohol use: Not Currently     Alcohol/week: 2.0 standard drinks of alcohol     Comment:  rarely if ever    Drug use: No     {Provider  If there are gaps in the social history shown above, please follow the link to update and then refresh the note Link to Social and Substance History :[id number]}   {Provider  REQUIRED FOR AWV Use the storyboard to review patient history, after sections have been marked as reviewed, refresh note to capture documentation:[id number]}  {Provider   REQUIRED AWV use this link to review and update sexual activity history  after section has  "been marked as reviewed, refresh note to capture documentation:114148}      Reviewed and updated as needed this visit by Provider                                 Current providers sharing in care for this patient include:  Patient Care Team:  Maria Guadalupe Ramon DO as PCP - General  Maria Guadalupe Ramon DO as Assigned PCP  Johnnie Grubbs MD as Assigned Musculoskeletal Provider  Daly Mcfadden APRN CNP as Nurse Practitioner (Nurse Practitioner Primary Care)  Johnnie Grbubs MD as MD (Orthopaedic Surgery)  Endy Sparks LICSW as Assigned Behavioral Health Provider  Charline Ardon MD as Assigned Surgical Provider    The following health maintenance items are reviewed in Epic and correct as of today:  Health Maintenance   Topic Date Due    Pneumococcal Vaccine: 50+ Years (2 of 2 - PCV) 12/03/2014    RSV VACCINE (1 - Risk 60-74 years 1-dose series) Never done    INFLUENZA VACCINE (1) 09/01/2024    COVID-19 Vaccine (4 - 2024-25 season) 09/01/2024    MAMMO SCREENING  01/24/2025    DEPRESSION 12 MO INDEX REPEAT PHQ-9  02/03/2025    ELIZABETH ASSESSMENT  04/20/2025    PHQ-9  04/20/2025    BMP  06/18/2025    LIPID  06/18/2025    MEDICARE ANNUAL WELLNESS VISIT  01/20/2026    FALL RISK ASSESSMENT  01/20/2026    GLUCOSE  06/18/2027    COLORECTAL CANCER SCREENING  05/24/2029    ADVANCE CARE PLANNING  07/17/2029    DTAP/TDAP/TD IMMUNIZATION (4 - Td or Tdap) 01/24/2034    DEXA  08/21/2039    HEPATITIS C SCREENING  Completed    DEPRESSION ACTION PLAN  Completed    ZOSTER IMMUNIZATION  Completed    HIV SCREENING  Addressed    HPV IMMUNIZATION  Aged Out    MENINGITIS IMMUNIZATION  Aged Out    RSV MONOCLONAL ANTIBODY  Aged Out    PAP  Discontinued            Objective      Exam  BP (!) 138/102   Pulse 65   Temp 97.7  F (36.5  C)   Resp 18   Ht 1.727 m (5' 8\")   Wt (!) 166 kg (366 lb)   LMP 05/14/2013 (Approximate)   SpO2 97%   BMI 55.65 kg/m             1/20/2025   Mini Cog   Clock Draw Score 2 Normal   3 Item Recall 3 objects " recalled   Mini Cog Total Score 5     {A Mini-Cog total score of 0-2 suggests the possibility of dementia, score of 3-5 suggests no dementia:[id number]}    Vision Screen  Reason Vision Screen Not Completed: Screening Recommend: Patient/Guardian Declined  {Provider  Link to Vision and Hearing Results :[id number]}    Signed Electronically by: Maria Guadalupe Ramon DO  {Email feedback regarding this note to [email or web address]   :[id number]}

## 2025-01-20 NOTE — PATIENT INSTRUCTIONS
Patient Education   Preventive Care Advice   This is general advice given by our system to help you stay healthy. However, your care team may have specific advice just for you. Please talk to your care team about your preventive care needs.  Nutrition  Eat 5 or more servings of fruits and vegetables each day.  Try wheat bread, brown rice and whole grain pasta (instead of white bread, rice, and pasta).  Get enough calcium and vitamin D. Check the label on foods and aim for 100% of the RDA (recommended daily allowance).  Lifestyle  Exercise at least 150 minutes each week  (30 minutes a day, 5 days a week).  Do muscle strengthening activities 2 days a week. These help control your weight and prevent disease.  No smoking.  Wear sunscreen to prevent skin cancer.  Have a dental exam and cleaning every 6 months.  Yearly exams  See your health care team every year to talk about:  Any changes in your health.  Any medicines your care team has prescribed.  Preventive care, family planning, and ways to prevent chronic diseases.  Shots (vaccines)   HPV shots (up to age 26), if you've never had them before.  Hepatitis B shots (up to age 59), if you've never had them before.  COVID-19 shot: Get this shot when it's due.  Flu shot: Get a flu shot every year.  Tetanus shot: Get a tetanus shot every 10 years.  Pneumococcal, hepatitis A, and RSV shots: Ask your care team if you need these based on your risk.  Shingles shot (for age 50 and up)  General health tests  Diabetes screening:  Starting at age 35, Get screened for diabetes at least every 3 years.  If you are younger than age 35, ask your care team if you should be screened for diabetes.  Cholesterol test: At age 39, start having a cholesterol test every 5 years, or more often if advised.  Bone density scan (DEXA): At age 50, ask your care team if you should have this scan for osteoporosis (brittle bones).  Hepatitis C: Get tested at least once in your life.  STIs (sexually  transmitted infections)  Before age 24: Ask your care team if you should be screened for STIs.  After age 24: Get screened for STIs if you're at risk. You are at risk for STIs (including HIV) if:  You are sexually active with more than one person.  You don't use condoms every time.  You or a partner was diagnosed with a sexually transmitted infection.  If you are at risk for HIV, ask about PrEP medicine to prevent HIV.  Get tested for HIV at least once in your life, whether you are at risk for HIV or not.  Cancer screening tests  Cervical cancer screening: If you have a cervix, begin getting regular cervical cancer screening tests starting at age 21.  Breast cancer scan (mammogram): If you've ever had breasts, begin having regular mammograms starting at age 40. This is a scan to check for breast cancer.  Colon cancer screening: It is important to start screening for colon cancer at age 45.  Have a colonoscopy test every 10 years (or more often if you're at risk) Or, ask your provider about stool tests like a FIT test every year or Cologuard test every 3 years.  To learn more about your testing options, visit:   .  For help making a decision, visit:   https://bit.ly/xq74014.  Prostate cancer screening test: If you have a prostate, ask your care team if a prostate cancer screening test (PSA) at age 55 is right for you.  Lung cancer screening: If you are a current or former smoker ages 50 to 80, ask your care team if ongoing lung cancer screenings are right for you.  For informational purposes only. Not to replace the advice of your health care provider. Copyright   2023 [facility]. All rights reserved. Clinically reviewed by the [facility] Transitions Program. [employer] [id number] - REV 01/24.  Preventing Falls: Care Instructions  Injuries and health problems such as trouble walking or poor eyesight can increase your risk of falling. So can some medicines. But there are things you can do to help  "prevent falls. You can exercise to get stronger. You can also arrange your home to make it safer.    Talk to your doctor about the medicines you take. Ask if any of them increase the risk of falls and whether they can be changed or stopped.   Try to exercise regularly. It can help improve your strength and balance. This can help lower your risk of falling.         Practice fall safety and prevention.   Wear low-heeled shoes that fit well and give your feet good support. Talk to your doctor if you have foot problems that make this hard.  Carry a cellphone or wear a medical alert device that you can use to call for help.  Use stepladders instead of chairs to reach high objects. Don't climb if you're at risk for falls. Ask for help, if needed.  Wear the correct eyeglasses, if you need them.        Make your home safer.   Remove rugs, cords, clutter, and furniture from walkways.  Keep your house well lit. Use night-lights in hallways and bathrooms.  Install and use sturdy handrails on stairways.  Wear nonskid footwear, even inside. Don't walk barefoot or in socks without shoes.        Be safe outside.   Use handrails, curb cuts, and ramps whenever possible.  Keep your hands free by using a shoulder bag or backpack.  Try to walk in well-lit areas. Watch out for uneven ground, changes in pavement, and debris.  Be careful in the winter. Walk on the grass or gravel when sidewalks are slippery. Use de-icer on steps and walkways. Add non-slip devices to shoes.    Put grab bars and nonskid mats in your shower or tub and near the toilet. Try to use a shower chair or bath bench when bathing.   Get into a tub or shower by putting in your weaker leg first. Get out with your strong side first. Have a phone or medical alert device in the bathroom with you.   Where can you learn more?  Go to https://www.Databanqwise.net/patiented  Enter G117 in the search box to learn more about \"Preventing Falls: Care Instructions.\"  Current as of: " July 31, 2024  Content Version: 14.3    2024 [employer].   Care instructions adapted under license by your healthcare professional. If you have questions about a medical condition or this instruction, always ask your healthcare professional. [employer] disclaims any warranty or liability for your use of this information.    Hearing Loss: Care Instructions  Overview     Hearing loss is a sudden or slow decrease in how well you hear. It can range from slight to profound. Permanent hearing loss can occur with aging. It also can happen when you are exposed long-term to loud noise. Examples include listening to loud music, riding motorcycles, or being around other loud machines.  Hearing loss can affect your work and home life. It can make you feel lonely or depressed. You may feel that you have lost your independence. But hearing aids and other devices can help you hear better and feel connected to others.  Follow-up care is a key part of your treatment and safety. Be sure to make and go to all appointments, and call your doctor if you are having problems. It's also a good idea to know your test results and keep a list of the medicines you take.  How can you care for yourself at home?  Avoid loud noises whenever possible. This helps keep your hearing from getting worse.  Always wear hearing protection around loud noises.  Wear a hearing aid as directed.  A professional can help you pick a hearing aid that will work best for you.  You can also get hearing aids over the counter for mild to moderate hearing loss.  Have hearing tests as your doctor suggests. They can show whether your hearing has changed. Your hearing aid may need to be adjusted.  Use other devices as needed. These may include:  Telephone amplifiers and hearing aids that can connect to a television, stereo, radio, or microphone.  Devices that use lights or vibrations. These alert you to the doorbell, a ringing telephone, or a baby  "monitor.  Television closed-captioning. This shows the words at the bottom of the screen. Most new TVs can do this.  TTY (text telephone). This lets you type messages back and forth on the telephone instead of talking or listening. These devices are also called TDD. When messages are typed on the keyboard, they are sent over the phone line to a receiving TTY. The message is shown on a monitor.  Use text messaging, social media, and email if it is hard for you to communicate by telephone.  Try to learn a listening technique called speechreading. It is not lipreading. You pay attention to people's gestures, expressions, posture, and tone of voice. These clues can help you understand what a person is saying. Face the person you are talking to, and have them face you. Make sure the lighting is good. You need to see the other person's face clearly.  Think about counseling if you need help to adjust to your hearing loss.  When should you call for help?  Watch closely for changes in your health, and be sure to contact your doctor if:    You think your hearing is getting worse.     You have new symptoms, such as dizziness or nausea.   Where can you learn more?  Go to https://www.Adzerk.net/patiented  Enter R798 in the search box to learn more about \"Hearing Loss: Care Instructions.\"  Current as of: September 27, 2023  Content Version: 14.3    2024 [employer]   Care instructions adapted under license by your healthcare professional. If you have questions about a medical condition or this instruction, always ask your healthcare professional. [employer] disclaims any warranty or liability for your use of this information.    Learning About Sleeping Well  What does sleeping well mean?     Sleeping well means getting enough sleep to feel good and stay healthy. How much sleep is enough varies among people.  The number of hours you sleep and how you feel when you wake up are both important. If you do " not feel refreshed, you probably need more sleep. Another sign of not getting enough sleep is feeling tired during the day.  Experts recommend that adults get at least 7 or more hours of sleep per day. Children and older adults need more sleep.  Why is getting enough sleep important?  Getting enough quality sleep is a basic part of good health. When your sleep suffers, your physical health, mood, and your thoughts can suffer too. You may find yourself feeling more grumpy or stressed. Not getting enough sleep also can lead to serious problems, including injury, accidents, anxiety, and depression.  What might cause poor sleeping?  Many things can cause sleep problems, including:  Changes to your sleep schedule.  Stress. Stress can be caused by fear about a single event, such as giving a speech. Or you may have ongoing stress, such as worry about work or school.  Depression, anxiety, and other mental or emotional conditions.  Changes in your sleep habits or surroundings. This includes changes that happen where you sleep, such as noise, light, or sleeping in a different bed. It also includes changes in your sleep pattern, such as having jet lag or working a late shift.  Health problems, such as pain, breathing problems, and restless legs syndrome.  Lack of regular exercise.  Using alcohol, nicotine, or caffeine before bed.  How can you help yourself?  Here are some tips that may help you sleep more soundly and wake up feeling more refreshed.  Your sleeping area   Use your bedroom only for sleeping and sex. A bit of light reading may help you fall asleep. But if it doesn't, do your reading elsewhere in the house. Try not to use your TV, computer, smartphone, or tablet while you are in bed.  Be sure your bed is big enough to stretch out comfortably, especially if you have a sleep partner.  Keep your bedroom quiet, dark, and cool. Use curtains, blinds, or a sleep mask to block out light. To block out noise, use earplugs,  "soothing music, or a \"white noise\" machine.  Your evening and bedtime routine   Create a relaxing bedtime routine. You might want to take a warm shower or bath, or listen to soothing music.  Go to bed at the same time every night. And get up at the same time every morning, even if you feel tired.  What to avoid   Limit caffeine (coffee, tea, caffeinated sodas) during the day, and don't have any for at least 6 hours before bedtime.  Avoid drinking alcohol before bedtime. Alcohol can cause you to wake up more often during the night.  Try not to smoke or use tobacco, especially in the evening. Nicotine can keep you awake.  Limit naps during the day, especially close to bedtime.  Avoid lying in bed awake for too long. If you can't fall asleep or if you wake up in the middle of the night and can't get back to sleep within about 20 minutes, get out of bed and go to another room until you feel sleepy.  Avoid taking medicine right before bed that may keep you awake or make you feel hyper or energized. Your doctor can tell you if your medicine may do this and if you can take it earlier in the day.  If you can't sleep   Imagine yourself in a peaceful, pleasant scene. Focus on the details and feelings of being in a place that is relaxing.  Get up and do a quiet or boring activity until you feel sleepy.  Avoid drinking any liquids before going to bed to help prevent waking up often to use the bathroom.  Where can you learn more?  Go to https://www.Vettro.net/patiented  Enter J942 in the search box to learn more about \"Learning About Sleeping Well.\"  Current as of: July 31, 2024  Content Version: 14.3    2024 [employer].   Care instructions adapted under license by your healthcare professional. If you have questions about a medical condition or this instruction, always ask your healthcare professional. [employer] disclaims any warranty or liability for your use of this information.    Learning About " Depression Screening  What is depression screening?  Depression screening is a way to see if you have depression symptoms. It may be done by a doctor or counselor. It's often part of a routine checkup. That's because your mental health is just as important as your physical health.  Depression is a mental health condition that affects how you feel, think, and act. You may:  Have less energy.  Lose interest in your daily activities.  Feel sad and grouchy for a long time.  Depression is very common. It affects people of all ages.  Many things can lead to depression. Some people become depressed after they have a stroke or find out they have a major illness like cancer or heart disease. The death of a loved one or a breakup may lead to depression. It can run in families. Most experts believe that a combination of inherited genes and stressful life events can cause it.  What happens during screening?  You may be asked to fill out a form about your depression symptoms. You and the doctor will discuss your answers. The doctor may ask you more questions to learn more about how you think, act, and feel.  What happens after screening?  If you have symptoms of depression, your doctor will talk to you about your options.  Doctors usually treat depression with medicines or counseling. Often, combining the two works best. Many people don't get help because they think that they'll get over the depression on their own. But people with depression may not get better unless they get treatment.  The cause of depression is not well understood. There may be many factors involved. But if you have depression, it's not your fault.  A serious symptom of depression is thinking about death or suicide. If you or someone you care about talks about this or about feeling hopeless, get help right away.  It's important to know that depression can be treated. Medicine, counseling, and self-care may help.  Where can you learn more?  Go to  "https://www.FreeDrive.net/patiented  Enter T185 in the search box to learn more about \"Learning About Depression Screening.\"  Current as of: July 31, 2024  Content Version: 14.3    2024 [employer].   Care instructions adapted under license by your healthcare professional. If you have questions about a medical condition or this instruction, always ask your healthcare professional. [employer] disclaims any warranty or liability for your use of this information.       "

## 2025-01-21 ENCOUNTER — APPOINTMENT (OUTPATIENT)
Dept: ANTICOAGULATION | Facility: OTHER | Age: 65
End: 2025-01-21
Payer: MEDICARE

## 2025-01-28 ENCOUNTER — ANTICOAGULATION THERAPY VISIT (OUTPATIENT)
Dept: ANTICOAGULATION | Facility: OTHER | Age: 65
End: 2025-01-28
Payer: COMMERCIAL

## 2025-01-28 DIAGNOSIS — Z79.01 ANTICOAGULATION MONITORING, INR RANGE 2-3: Primary | ICD-10-CM

## 2025-01-28 DIAGNOSIS — I74.9 EMBOLISM AND THROMBOSIS (H): ICD-10-CM

## 2025-01-28 LAB — INR HOME MONITORING: 2.5 RATIO (ref 2–3)

## 2025-01-28 NOTE — PROGRESS NOTES
ANTICOAGULATION MANAGEMENT     Trisha Fernando 64 year old female is on warfarin with therapeutic INR result. (Goal INR 2.0-3.0)    Recent labs: (last 7 days)     01/28/25  1140   INR 2.5       ASSESSMENT     Source(s): Chart Review and Patient/Caregiver Call     Warfarin doses taken: Warfarin taken as instructed  Diet: No new diet changes identified  Medication/supplement changes:  will finish with doxy tomorrow, will be checking on insurance coverage for xarelto   New illness, injury, or hospitalization: No  Signs or symptoms of bleeding or clotting: No  Previous result: Therapeutic last visit; previously outside of goal range  Additional findings: None       PLAN     Recommended plan for temporary change(s) affecting INR     Dosing Instructions: Continue your current warfarin dose with next INR in 1 week       Summary  As of 1/28/2025      Full warfarin instructions:  7.5 mg every Tue, Fri; 5 mg all other days   Next INR check:  2/4/2025               Telephone call with Trisha who verbalizes understanding and agrees to plan    Patient to recheck with home meter    Education provided: None required    Plan made per [facility] anticoagulation protocol    Smitha Nair RN  1/28/2025  Anticoagulation Clinic  [place] for routing messages: [facility]  [facility] patient phone line: [phone]        _______________________________________________________________________     Anticoagulation Episode Summary       Current INR goal:  2.0-3.0   TTR:  79.6% (1 y)   Target end date:  Indefinite   Send INR reminders to:  [facility]    Indications    Long term (current) use of anticoagulants (Resolved) [Z79.01]  Embolism and thrombosis (H) (Resolved) [I74.9]  Anticoagulation monitoring  INR range 2-3 [Z79.01]  Embolism and thrombosis (H) [I74.9]             Comments:  MDINR needs weekly INR done             Anticoagulation Care Providers       Provider Role Specialty Phone number    Maria Guadalupe Ramon DO  Referring Internal Medicine [phone]

## 2025-02-04 ENCOUNTER — THERAPY VISIT (OUTPATIENT)
Dept: PHYSICAL THERAPY | Facility: OTHER | Age: 65
End: 2025-02-04
Attending: INTERNAL MEDICINE
Payer: MEDICARE

## 2025-02-04 ENCOUNTER — ANTICOAGULATION THERAPY VISIT (OUTPATIENT)
Dept: ANTICOAGULATION | Facility: OTHER | Age: 65
End: 2025-02-04
Payer: MEDICARE

## 2025-02-04 DIAGNOSIS — I74.9 EMBOLISM AND THROMBOSIS (H): ICD-10-CM

## 2025-02-04 DIAGNOSIS — R42 VERTIGO: ICD-10-CM

## 2025-02-04 DIAGNOSIS — Z79.01 ANTICOAGULATION MONITORING, INR RANGE 2-3: Primary | ICD-10-CM

## 2025-02-04 LAB — INR HOME MONITORING: 2.3 RATIO (ref 2–3)

## 2025-02-04 NOTE — PROGRESS NOTES
ANTICOAGULATION  MANAGEMENT-Home Monitor Managed by Exception    Trisha NATALIA Fernando 64 year old female is on warfarin with therapeutic INR result. (Goal INR 2.0-3.0)    Recent labs: (last 7 days)     02/04/25  1114   INR 2.3       Previous INR was Therapeutic  Medication, diet, health changes since last INR:chart reviewed; none identified  Contacted within the last 12 weeks by phone on 1/28/25  Last [facility] referral date: 08/27/2024      JOSE JUAN     Trisha was NOT contacted regarding therapeutic result today per home monitoring policy manage by exception agreement.   Current warfarin dose is to be continued:     Summary  As of 2/4/2025      Full warfarin instructions:  7.5 mg every Tue, Fri; 5 mg all other days   Next INR check:  2/11/2025             ?   Smitha Nair RN  Anticoagulation Clinic  2/4/2025    _______________________________________________________________________     Anticoagulation Episode Summary       Current INR goal:  2.0-3.0   TTR:  80.0% (1 y)   Target end date:  Indefinite   Send INR reminders to:  [facility]    Indications    Long term (current) use of anticoagulants (Resolved) [Z79.01]  Embolism and thrombosis (H) (Resolved) [I74.9]  Anticoagulation monitoring  INR range 2-3 [Z79.01]  Embolism and thrombosis (H) [I74.9]             Comments:  MDINR needs weekly INR done             Anticoagulation Care Providers       Provider Role Specialty Phone number    Maria Guadalupe Ramon DO Referring Internal Medicine [phone]

## 2025-02-04 NOTE — PROGRESS NOTES
PHYSICAL THERAPY EVALUATION  Type of Visit: Evaluation       Fall Risk Screen:  Fall screen completed by: PT  Have you fallen 2 or more times in the past year?: No  Have you fallen and had an injury in the past year?: No  Is patient a fall risk?: Yes; Department fall risk interventions implemented    Subjective         Presenting condition or subjective complaint: Pt is a 64 year old female referred to [facility] PT services following vertigo that came on 1/7 during the middle of the night and had severe room spinning and did go to the hospital and did have manuevers completed and did feel better after. pt reports she does have Tinnitus worse on the R but in both ears and is getting worse and got it after COVID. pt reports she is still getting room spinning dizzy with her head back and supine<>sit and does use a walker in the morning due to instability but that is also getting better.  Date of onset: 01/08/25    Relevant medical history: Arthritis; Depression; Dizziness; DVT (blood clot); Hearing problems; High blood pressure; Menopause; Overweight; Rheumatoid arthritis; Severe dizziness   Dates & types of surgery: Tonsillectomy - 12/27/1977, C-Sections - 7/7/1990 and 5/2/2000, Gall Bladder removal - 2020    Prior diagnostic imaging/testing results: CT scan     Prior therapy history for the same diagnosis, illness or injury: Yes PT in hospital    Prior Level of Function  Transfers: Independent  Ambulation: Independent  ADL: Independent  IADL: Driving, Finances, Housekeeping, Laundry, Meal preparation, Medication management, Yard work    Living Environment  Social support: With a significant other or spouse   Type of home: House   Stairs to enter the home: Yes 16 Is there a railing: Yes     Ramp:     Stairs inside the home: No       Help at home: None  Equipment owned: Walker     Employment: No    Hobbies/Interests: Retired    Patient goals for therapy:      Pain assessment: Pain denied     Objective      Cognitive  Status Examination  Orientation: Oriented to person, place and time   Level of Consciousness: Alert  Follows Commands and Answers Questions: 100% of the time  Personal Safety and Judgement: Intact  Memory: Intact    GAIT:   Level of Har[place]: WFL  Assistive Device(s): None  Gait Deviations: WFL  Gait Distance: 50 feet       VESTIBULAR EVALUATION  ADDITIONAL HISTORY:  Description of symptoms: I feel like I am spinning; Attacks of dizziness  Dizzy attacks:   Start: about a month ago   Last attack: daily with supine<>sit   Frequency of occurrences: daily   Length of attack: seconds  Difficulty hearing: -- (no)  Noise in ears? Yes tinnitus  Alleviates symptoms: stop moving  Worsens symptoms: rolling in bed and supine<>sit  Activities that bring on symptoms: Bending over  supine<>sit    Pertinent visual history: pt wears bifocals  Pertinent history of current vestibular problem: Anxiety, tinnitus  DHI:      Cervicogenic Screen    Neck ROM Normal     Oculomotor Screen    Ocular ROM Normal   Smooth Pursuit Normal   Saccades Normal   VOR Normal   VOR Cancellation Normal   Head Impulse Test Corrective saccade to the R   Convergence Testing Normal        Infrared Goggle Exam Vestibular Suppressant in Last 24 Hours? No  Exam Completed With:  frenzel lenses   Spontaneous Nystagmus Negative   Gaze Evoked Nystagmus Negative   Head Shake Horizontal Nystagmus Negative    Left Right   Canton-Hallpike Negative Upbeating R torsional immediate onset with significant amplitude and lasting for 10 seconds        HSCC Supine Roll Test Negative Upbeating R torsional   BPPV Canal(s): R Posterior  BPPV Type: Canalithasis    Dynamic Visual Acuity (DVA)    Static Acuity (LogMar) positive   Horizontal Head Movement at 1 Hz (LogMar)    Horizontal Head Movement at 2 Hz (LogMar)           Assessment & Plan   CLINICAL IMPRESSIONS  Medical Diagnosis: vertigo    Treatment Diagnosis: R posterior canal BPPV   Impression/Assessment: Patient is a 64 year  old female with R posterior canal BPPV complaints.  The following significant findings have been identified: Instability, Dizziness, and Disequilibrium . These impairments interfere with their ability to perform self care tasks and household chores as compared to previous level of function.     Clinical Decision Making (Complexity):  Clinical Presentation: Evolving/Changing  Clinical Presentation Rationale: based on medical and personal factors listed in PT evaluation  Clinical Decision Making (Complexity): Moderate complexity    PLAN OF CARE  Treatment Interventions:  Interventions: Gait Training, Manual Therapy, Neuromuscular Re-education, Therapeutic Activity, Therapeutic Exercise, Canalith Repositioning    Long Term Goals     PT Goal 1  Goal Identifier: dizziness  Goal Description: Pt will report a 50% reducation in dizziness over 3 days to increase stability during daily mobility  Target Date: 04/01/25  PT Goal 2  Goal Identifier: transfers  Goal Description: pt will complete supine<>sit on mat table without an increase in dizziness to increase tolerance to bed mobility in the morning.  Target Date: 03/04/25      Frequency of Treatment: 6 times  Duration of Treatment: 12 weeks    Education Assessment:   Learner/Method: Patient    Risks and benefits of evaluation/treatment have been explained.   Patient/Family/caregiver agrees with Plan of Care.     Evaluation Time:     PT Eval, Moderate Complexity Minutes (88392): 30     Signing Clinician: Peace Turk DPT        [facility]                                                                                   OUTPATIENT PHYSICAL THERAPY      PLAN OF TREATMENT FOR OUTPATIENT REHABILITATION   Patient's Last Name, First Name, Trisha Alvarado YOB: 1960   Provider's Name   [facility]   Medical Record No.  [id number]     Onset Date: 01/08/25  Start of Care Date: 02/04/25      Medical Diagnosis:  vertigo      PT Treatment Diagnosis:  R posterior canal BPPV Plan of Treatment  Frequency/Duration: 6 times/ 12 weeks    Certification date from 02/04/25 to 04/29/25         See note for plan of treatment details and functional goals     Peace Turk DPT                         I CERTIFY THE NEED FOR THESE SERVICES FURNISHED UNDER        THIS PLAN OF TREATMENT AND WHILE UNDER MY CARE     (Physician attestation of this document indicates review and certification of the therapy plan).              Referring Provider:  Maria Guadalupe Ramon    Initial Assessment  See Epic Evaluation- Start of Care Date: 02/04/25

## 2025-02-09 DIAGNOSIS — F33.0 DEPRESSION, MAJOR, RECURRENT, MILD: ICD-10-CM

## 2025-02-11 ENCOUNTER — ANTICOAGULATION THERAPY VISIT (OUTPATIENT)
Dept: ANTICOAGULATION | Facility: OTHER | Age: 65
End: 2025-02-11
Payer: COMMERCIAL

## 2025-02-11 ENCOUNTER — THERAPY VISIT (OUTPATIENT)
Dept: PHYSICAL THERAPY | Facility: OTHER | Age: 65
End: 2025-02-11
Attending: INTERNAL MEDICINE
Payer: MEDICARE

## 2025-02-11 DIAGNOSIS — Z79.01 ANTICOAGULATION MONITORING, INR RANGE 2-3: Primary | ICD-10-CM

## 2025-02-11 DIAGNOSIS — I74.9 EMBOLISM AND THROMBOSIS (H): ICD-10-CM

## 2025-02-11 DIAGNOSIS — R42 VERTIGO: Primary | ICD-10-CM

## 2025-02-11 LAB — INR HOME MONITORING: 2 RATIO (ref 2–3)

## 2025-02-11 NOTE — PROGRESS NOTES
ANTICOAGULATION  MANAGEMENT-Home Monitor Managed by Exception    Trisha FISHER Kassie 64 year old female is on warfarin with therapeutic INR result. (Goal INR 2.0-3.0)    Recent labs: (last 7 days)     02/11/25  1122   INR 2       Previous INR was Therapeutic  Medication, diet, health changes since last INR:chart reviewed; none identified  Contacted within the last 12 weeks by phone on 1/28/25  Last [facility] referral date: 08/27/2024      JOSE JUAN Honda was NOT contacted regarding therapeutic result today per home monitoring policy manage by exception agreement.   Current warfarin dose is to be continued:     Summary  As of 2/11/2025      Full warfarin instructions:  7.5 mg every Tue, Fri; 5 mg all other days   Next INR check:  2/18/2025             ?   Ghada Granda RN  Anticoagulation Clinic  2/11/2025    _______________________________________________________________________     Anticoagulation Episode Summary       Current INR goal:  2.0-3.0   TTR:  80.0% (1 y)   Target end date:  Indefinite   Send INR reminders to:  [facility]    Indications    Long term (current) use of anticoagulants (Resolved) [Z79.01]  Embolism and thrombosis (H) (Resolved) [I74.9]  Anticoagulation monitoring  INR range 2-3 [Z79.01]  Embolism and thrombosis (H) [I74.9]             Comments:  MDINR needs weekly INR done             Anticoagulation Care Providers       Provider Role Specialty Phone number    Maria Guadalupe Ramon DO Referring Internal Medicine [phone]               [address] [place] [address]  Phone: [phone]  Fax: [phone]    Mau Tan MD        March 28, 2018     [facility] [address]    Patient: Jer Rivera  MR Number: [id number]  YOB: 1962  Date of Visit: 3/27/2018    Dear Dr. Stacie Ramos:                Lalitha Given  1962 March 27, 2018    CC: \" I was in the ED\"    HPI:  The patient is 63 y.o. female with a past medical history significant for CAD, HTN and hyperlipidemia. Last seen by Oral Prude CNP in 5/2017. Today, she is here in follow up for her CAD. She states she was in the ED on 3/22/18 after an episode of lightheadedness and shortness of breath after showering. She stated it was similar to when she had her heart attack in 2014 but she did not have the chest pain during this episode. Her workup was negative in the ED. She has had no further chest pain and continues to work full time. She is compliant with her medications and tolerating them well. She denies CP, pressure, tightness, edema, SOB, heart racing, palpitations, lightheaded, dizziness, PND or orthopnea. Review of Systems:  Constitutional: Denies  fatigue, weakness, night sweats or fever. HEENT: Denies new visual changes, ringing in ears, nosebleeds, nasal congestion  Respiratory: Denies new or change in SOB, PND, orthopnea or cough. Cardiovascular: see HPI  GI: Denies N/V, diarrhea, constipation, abdominal pain, change in bowel habits, melena or hematochezia  : Denies urinary frequency, urgency, incontinence, hematuria or dysuria. Skin: Denies rash, hives, or cyanosis  Musculoskeletal: + generalized arthritic pain   Neurological: Denies syncope or TIA-like symptoms.   Psychiatric: Denies anxiety, insomnia or depression     Past Medical History:   Diagnosis Date  Blood pressure elevated without history of HTN     Bronchitis     CAD (coronary artery disease) 6/10/14    YKLE to RCA    Carpal tunnel syndrome     DJD (degenerative joint disease)     fingers    Hematuria, microscopic     Hypertension     Hypothyroidism     Lumbar strain     Obesity     Sacroiliitis ([address]     Sinusitis acute     Tooth abscess      Past Surgical History:   Procedure Laterality Date     SECTION      3 times    COLONOSCOPY  2013    diverticulosis and normal otherwise-DR. Oneil.  MOUTH SURGERY  3/7/18 & 3/9/18    2 route canals Dr. Mani Reid  2013    DR. Pricila Harrison for diverticulitis    TOOTH EXTRACTION       Family History   Problem Relation Age of Onset    Heart Disease Mother      angina    Parkinsonism Father     Heart Disease Brother      cad ,cabg at 36.  Cancer Paternal Grandmother      breast cancer     Social History   Substance Use Topics    Smoking status: Former Smoker     Packs/day: 0.50     Types: Cigarettes     Quit date: 3/22/2018    Smokeless tobacco: Former User     Quit date:     Alcohol use Yes      Comment: social       No Known Allergies  Current Outpatient Prescriptions   Medication Sig Dispense Refill    clindamycin (CLEOCIN) 300 MG capsule Take 300 mg by mouth 4 times daily      ranitidine (ZANTAC) 150 MG tablet Take 1 tablet by mouth 2 times daily 180 tablet 1    metoprolol tartrate (LOPRESSOR) 25 MG tablet Take 1 tablet by mouth 2 times daily 180 tablet 2    atorvastatin (LIPITOR) 10 MG tablet TAKE ONE TABLET, BY MOUTH, DAILY.  30 tablet 6    levothyroxine (SYNTHROID) 88 MCG tablet TAKE 1 TABLET BY MOUTH EVERY DAY 90 tablet 1    clopidogrel (PLAVIX) 75 MG tablet Take 1 tablet by mouth daily 90 tablet 3    celecoxib (CELEBREX) 200 MG capsule Take 1 capsule by mouth daily 60 capsule 5    losartan (COZAAR) 50 MG tablet TAKE 1 TABLET BY MOUTH DAILY 90 tablet 3 defer further workup to Dr. Anguiano Record. Patient to follow up in 6 months. [address], [address], [address], [place] [address]  Office: [phone]  Fax: [phone]          If you have questions, please do not hesitate to call me. I look forward to following Joni Craig along with you.     Sincerely,        Elvie Hanley MD

## 2025-02-12 RX ORDER — BUPROPION HYDROCHLORIDE 300 MG/1
300 TABLET ORAL EVERY MORNING
Qty: 180 TABLET | Refills: 2 | OUTPATIENT
Start: 2025-02-12

## 2025-02-12 NOTE — TELEPHONE ENCOUNTER
[id number] [place] sent Rx request for the followin/20/25 1054 Discontinue Maria Guadalupe Ramon, DO Reason:Duplicate Therapy (No AVS / No eCancel)     Outpatient Medication Detail     Disp Refills Start End EDDA   buPROPion (WELLBUTRIN XL) 300 MG 24 hr tablet (Discontinued) -- -- 11/15/2024 2025 --   Sig - Route: Take 300 mg by mouth every morning. - Oral     Pharmacy notified. Teagan Wharton RN .............. 2025  4:51 PM

## 2025-02-18 ENCOUNTER — ANTICOAGULATION THERAPY VISIT (OUTPATIENT)
Dept: ANTICOAGULATION | Facility: OTHER | Age: 65
End: 2025-02-18
Payer: COMMERCIAL

## 2025-02-18 DIAGNOSIS — I74.9 EMBOLISM AND THROMBOSIS (H): ICD-10-CM

## 2025-02-18 DIAGNOSIS — Z79.01 ANTICOAGULATION MONITORING, INR RANGE 2-3: Primary | ICD-10-CM

## 2025-02-18 LAB — INR HOME MONITORING: 2.5 RATIO (ref 2–3)

## 2025-02-18 NOTE — PROGRESS NOTES
ANTICOAGULATION  MANAGEMENT-Home Monitor Managed by Exception    Trisha NATALIA Fernando 64 year old female is on warfarin with therapeutic INR result. (Goal INR 2.0-3.0)    Recent labs: (last 7 days)     02/18/25  1538   INR 2.5       Previous INR was Therapeutic  Medication, diet, health changes since last INR:chart reviewed; none identified  Contacted within the last 12 weeks by phone on 1/28/25  Last [facility] referral date: 08/27/2024      JOSE JUAN     Trisha was NOT contacted regarding therapeutic result today per home monitoring policy manage by exception agreement.   Current warfarin dose is to be continued:     Summary  As of 2/18/2025      Full warfarin instructions:  7.5 mg every Tue, Fri; 5 mg all other days   Next INR check:  2/25/2025             ?   Smitha Nair RN  Anticoagulation Clinic  2/18/2025    _______________________________________________________________________     Anticoagulation Episode Summary       Current INR goal:  2.0-3.0   TTR:  80.0% (1 y)   Target end date:  Indefinite   Send INR reminders to:  [facility]    Indications    Long term (current) use of anticoagulants (Resolved) [Z79.01]  Embolism and thrombosis (H) (Resolved) [I74.9]  Anticoagulation monitoring  INR range 2-3 [Z79.01]  Embolism and thrombosis (H) [I74.9]             Comments:  MDINR needs weekly INR done             Anticoagulation Care Providers       Provider Role Specialty Phone number    Maria Guadalupe Ramon DO Referring Internal Medicine [phone]

## 2025-02-25 ENCOUNTER — ANTICOAGULATION THERAPY VISIT (OUTPATIENT)
Dept: ANTICOAGULATION | Facility: OTHER | Age: 65
End: 2025-02-25
Payer: COMMERCIAL

## 2025-02-25 DIAGNOSIS — I74.9 EMBOLISM AND THROMBOSIS (H): ICD-10-CM

## 2025-02-25 DIAGNOSIS — Z79.01 ANTICOAGULATION MONITORING, INR RANGE 2-3: Primary | ICD-10-CM

## 2025-02-25 LAB — INR HOME MONITORING: 2.4 RATIO (ref 2–3)

## 2025-02-25 NOTE — PROGRESS NOTES
ANTICOAGULATION  MANAGEMENT-Home Monitor Managed by Exception    Trisha FISHER Kassie 64 year old female is on warfarin with therapeutic INR result. (Goal INR 2.0-3.0)    Recent labs: (last 7 days)     02/25/25  0959   INR 2.4       Previous INR was Therapeutic  Medication, diet, health changes since last INR:chart reviewed; none identified  Contacted within the last 12 weeks by phone on 1/28/25  Last [facility] referral date: 08/27/2024      JOSE JUAN Honda was NOT contacted regarding therapeutic result today per home monitoring policy manage by exception agreement.   Current warfarin dose is to be continued:     Summary  As of 2/25/2025      Full warfarin instructions:  7.5 mg every Tue, Fri; 5 mg all other days   Next INR check:  3/4/2025             ?   Ghada Granda RN  Anticoagulation Clinic  2/25/2025    _______________________________________________________________________     Anticoagulation Episode Summary       Current INR goal:  2.0-3.0   TTR:  80.0% (1 y)   Target end date:  Indefinite   Send INR reminders to:  [facility]    Indications    Long term (current) use of anticoagulants (Resolved) [Z79.01]  Embolism and thrombosis (H) (Resolved) [I74.9]  Anticoagulation monitoring  INR range 2-3 [Z79.01]  Embolism and thrombosis (H) [I74.9]             Comments:  MDINR needs weekly INR done             Anticoagulation Care Providers       Provider Role Specialty Phone number    Maria Guadalupe Ramon DO Referring Internal Medicine [phone]               Tobacco cessation: spent 3-10 min discussing tobacco cessation, pt is not ready to quit

## 2025-03-04 ENCOUNTER — ANTICOAGULATION THERAPY VISIT (OUTPATIENT)
Dept: ANTICOAGULATION | Facility: OTHER | Age: 65
End: 2025-03-04
Attending: INTERNAL MEDICINE
Payer: MEDICARE

## 2025-03-04 DIAGNOSIS — Z79.01 ANTICOAGULATION MONITORING, INR RANGE 2-3: Primary | ICD-10-CM

## 2025-03-04 DIAGNOSIS — I74.9 EMBOLISM AND THROMBOSIS (H): ICD-10-CM

## 2025-03-04 LAB — INR HOME MONITORING: 2.2 RATIO (ref 2–3)

## 2025-03-04 NOTE — PROGRESS NOTES
ANTICOAGULATION  MANAGEMENT-Home Monitor Managed by Exception    Trisha NATALIA Fernando 64 year old female is on warfarin with therapeutic INR result. (Goal INR 2.0-3.0)    Recent labs: (last 7 days)     03/04/25  1712   INR 2.2       Previous INR was Therapeutic  Medication, diet, health changes since last INR:chart reviewed; none identified  Contacted within the last 12 weeks by phone on 1/28/25  Last [facility] referral date: 08/27/2024      JOSE JUAN     Trisha was NOT contacted regarding therapeutic result today per home monitoring policy manage by exception agreement.   Current warfarin dose is to be continued:     Summary  As of 3/4/2025      Full warfarin instructions:  7.5 mg every Tue, Fri; 5 mg all other days   Next INR check:  3/11/2025             ?   Smitha Nair RN  Anticoagulation Clinic  3/4/2025    _______________________________________________________________________     Anticoagulation Episode Summary       Current INR goal:  2.0-3.0   TTR:  80.0% (1 y)   Target end date:  Indefinite   Send INR reminders to:  [facility]    Indications    Long term (current) use of anticoagulants (Resolved) [Z79.01]  Embolism and thrombosis (H) (Resolved) [I74.9]  Anticoagulation monitoring  INR range 2-3 [Z79.01]  Embolism and thrombosis (H) [I74.9]             Comments:  MDINR needs weekly INR done             Anticoagulation Care Providers       Provider Role Specialty Phone number    Maria Guadalupe Ramon DO Referring Internal Medicine [phone]

## 2025-03-11 ENCOUNTER — ANTICOAGULATION THERAPY VISIT (OUTPATIENT)
Dept: ANTICOAGULATION | Facility: OTHER | Age: 65
End: 2025-03-11
Attending: INTERNAL MEDICINE
Payer: COMMERCIAL

## 2025-03-11 DIAGNOSIS — I74.9 EMBOLISM AND THROMBOSIS (H): ICD-10-CM

## 2025-03-11 DIAGNOSIS — Z79.01 ANTICOAGULATION MONITORING, INR RANGE 2-3: Primary | ICD-10-CM

## 2025-03-11 LAB — INR HOME MONITORING: 2.2 RATIO (ref 2–3)

## 2025-03-11 NOTE — PROGRESS NOTES
ANTICOAGULATION  MANAGEMENT-Home Monitor Managed by Exception    Trisha NATALIA Fernando 64 year old female is on warfarin with therapeutic INR result. (Goal INR 2.0-3.0)    Recent labs: (last 7 days)     03/11/25  1256   INR 2.2       Previous INR was Therapeutic  Medication, diet, health changes since last INR:chart reviewed; none identified  Contacted within the last 12 weeks by phone on 1/28/25  Last [facility] referral date: 08/27/2024      JOSE JUAN     Trisha was NOT contacted regarding therapeutic result today per home monitoring policy manage by exception agreement.   Current warfarin dose is to be continued:     Summary  As of 3/11/2025      Full warfarin instructions:  7.5 mg every Tue, Fri; 5 mg all other days   Next INR check:  3/18/2025             ?   Smitha Nair RN  Anticoagulation Clinic  3/11/2025    _______________________________________________________________________     Anticoagulation Episode Summary       Current INR goal:  2.0-3.0   TTR:  80.0% (1 y)   Target end date:  Indefinite   Send INR reminders to:  [facility]    Indications    Long term (current) use of anticoagulants (Resolved) [Z79.01]  Embolism and thrombosis (H) (Resolved) [I74.9]  Anticoagulation monitoring  INR range 2-3 [Z79.01]  Embolism and thrombosis (H) [I74.9]             Comments:  MDINR needs weekly INR done             Anticoagulation Care Providers       Provider Role Specialty Phone number    Maria Guadalupe Ramon DO Referring Internal Medicine [phone]

## 2025-03-18 ENCOUNTER — ANTICOAGULATION THERAPY VISIT (OUTPATIENT)
Dept: ANTICOAGULATION | Facility: OTHER | Age: 65
End: 2025-03-18
Attending: INTERNAL MEDICINE
Payer: COMMERCIAL

## 2025-03-18 DIAGNOSIS — Z79.01 ANTICOAGULATION MONITORING, INR RANGE 2-3: Primary | ICD-10-CM

## 2025-03-18 DIAGNOSIS — I74.9 EMBOLISM AND THROMBOSIS (H): ICD-10-CM

## 2025-03-18 LAB — INR HOME MONITORING: 2.5 RATIO (ref 2–3)

## 2025-03-18 NOTE — PROGRESS NOTES
ANTICOAGULATION  MANAGEMENT-Home Monitor Managed by Exception    Trisha FISHER Kassie 64 year old female is on warfarin with therapeutic INR result. (Goal INR 2.0-3.0)    Recent labs: (last 7 days)     03/18/25  1508   INR 2.5       Previous INR was Therapeutic  Medication, diet, health changes since last INR:chart reviewed; none identified  Contacted within the last 12 weeks by phone on 1/28/25  Last [facility] referral date: 08/27/2024      JOSE JUAN Honda was NOT contacted regarding therapeutic result today per home monitoring policy manage by exception agreement.   Current warfarin dose is to be continued:     Summary  As of 3/18/2025      Full warfarin instructions:  7.5 mg every Tue, Fri; 5 mg all other days   Next INR check:  3/25/2025             ?   Ghada Granda RN  Anticoagulation Clinic  3/18/2025    _______________________________________________________________________     Anticoagulation Episode Summary       Current INR goal:  2.0-3.0   TTR:  80.0% (1 y)   Target end date:  Indefinite   Send INR reminders to:  [facility]    Indications    Long term (current) use of anticoagulants (Resolved) [Z79.01]  Embolism and thrombosis (H) (Resolved) [I74.9]  Anticoagulation monitoring  INR range 2-3 [Z79.01]  Embolism and thrombosis (H) [I74.9]             Comments:  MDINR needs weekly INR done             Anticoagulation Care Providers       Provider Role Specialty Phone number    Maria Guadalupe Ramon DO Referring Internal Medicine [phone]

## 2025-03-25 ENCOUNTER — ANTICOAGULATION THERAPY VISIT (OUTPATIENT)
Dept: ANTICOAGULATION | Facility: OTHER | Age: 65
End: 2025-03-25
Attending: INTERNAL MEDICINE
Payer: COMMERCIAL

## 2025-03-25 DIAGNOSIS — Z79.01 ANTICOAGULATION MONITORING, INR RANGE 2-3: Primary | ICD-10-CM

## 2025-03-25 DIAGNOSIS — I74.9 EMBOLISM AND THROMBOSIS (H): ICD-10-CM

## 2025-03-25 LAB — INR HOME MONITORING: 2.3 RATIO (ref 2–3)

## 2025-03-25 NOTE — PROGRESS NOTES
ANTICOAGULATION  MANAGEMENT-Home Monitor Managed by Exception    Trisha NATALIA Fernando 64 year old female is on warfarin with therapeutic INR result. (Goal INR 2.0-3.0)    Recent labs: (last 7 days)     03/25/25  1205   INR 2.3       Previous INR was Therapeutic  Medication, diet, health changes since last INR:chart reviewed; none identified  Contacted within the last 12 weeks by phone on 1/28/25  Last [facility] referral date: 08/27/2024      JOSE JUAN     Trisha was NOT contacted regarding therapeutic result today per home monitoring policy manage by exception agreement.   Current warfarin dose is to be continued:     Summary  As of 3/25/2025      Full warfarin instructions:  7.5 mg every Tue, Fri; 5 mg all other days   Next INR check:  4/1/2025             ?   Smitha Nair RN  Anticoagulation Clinic  3/25/2025    _______________________________________________________________________     Anticoagulation Episode Summary       Current INR goal:  2.0-3.0   TTR:  80.0% (1 y)   Target end date:  Indefinite   Send INR reminders to:  [facility]    Indications    Long term (current) use of anticoagulants (Resolved) [Z79.01]  Embolism and thrombosis (H) (Resolved) [I74.9]  Anticoagulation monitoring  INR range 2-3 [Z79.01]  Embolism and thrombosis (H) [I74.9]             Comments:  MDINR needs weekly INR done             Anticoagulation Care Providers       Provider Role Specialty Phone number    Maria Guadalupe Ramon DO Referring Internal Medicine [phone]

## 2025-03-30 ENCOUNTER — HEALTH MAINTENANCE LETTER (OUTPATIENT)
Age: 65
End: 2025-03-30

## 2025-04-01 ENCOUNTER — ANTICOAGULATION THERAPY VISIT (OUTPATIENT)
Dept: ANTICOAGULATION | Facility: OTHER | Age: 65
End: 2025-04-01
Payer: COMMERCIAL

## 2025-04-01 DIAGNOSIS — Z79.01 ANTICOAGULATION MONITORING, INR RANGE 2-3: Primary | ICD-10-CM

## 2025-04-01 DIAGNOSIS — I74.9 EMBOLISM AND THROMBOSIS (H): ICD-10-CM

## 2025-04-01 LAB — INR HOME MONITORING: 2.5 RATIO (ref 2–3)

## 2025-04-01 NOTE — PROGRESS NOTES
ANTICOAGULATION  MANAGEMENT-Home Monitor Managed by Exception    Trisha NATALIA Fernando 64 year old female is on warfarin with therapeutic INR result. (Goal INR 2.0-3.0)    Recent labs: (last 7 days)     04/01/25  1137   INR 2.5       Previous INR was Therapeutic  Medication, diet, health changes since last INR:chart reviewed; none identified  Contacted within the last 12 weeks by phone on 1/28/25  Last [facility] referral date: 08/27/2024      JOSE JUAN     Trisha was NOT contacted regarding therapeutic result today per home monitoring policy manage by exception agreement.   Current warfarin dose is to be continued:     Summary  As of 4/1/2025      Full warfarin instructions:  7.5 mg every Tue, Fri; 5 mg all other days   Next INR check:  4/8/2025             ?   Smitha Nair RN  Anticoagulation Clinic  4/1/2025    _______________________________________________________________________     Anticoagulation Episode Summary       Current INR goal:  2.0-3.0   TTR:  79.9% (1 y)   Target end date:  Indefinite   Send INR reminders to:  [facility]    Indications    Long term (current) use of anticoagulants (Resolved) [Z79.01]  Embolism and thrombosis (H) (Resolved) [I74.9]  Anticoagulation monitoring  INR range 2-3 [Z79.01]  Embolism and thrombosis (H) [I74.9]             Comments:  MDINR needs weekly INR done             Anticoagulation Care Providers       Provider Role Specialty Phone number    Maria Guadalupe Ramon DO Referring Internal Medicine [phone]

## 2025-04-08 ENCOUNTER — ANTICOAGULATION THERAPY VISIT (OUTPATIENT)
Dept: ANTICOAGULATION | Facility: OTHER | Age: 65
End: 2025-04-08
Payer: COMMERCIAL

## 2025-04-08 DIAGNOSIS — I74.9 EMBOLISM AND THROMBOSIS (H): ICD-10-CM

## 2025-04-08 DIAGNOSIS — Z79.01 ANTICOAGULATION MONITORING, INR RANGE 2-3: Primary | ICD-10-CM

## 2025-04-08 LAB — INR HOME MONITORING: 2.6 RATIO (ref 2–3)

## 2025-04-08 NOTE — PROGRESS NOTES
ANTICOAGULATION  MANAGEMENT-Home Monitor Managed by Exception    Trisha NATALIA Fernando 64 year old female is on warfarin with therapeutic INR result. (Goal INR 2.0-3.0)    Recent labs: (last 7 days)     04/08/25  1657   INR 2.6       Previous INR was Therapeutic  Medication, diet, health changes since last INR:chart reviewed; none identified  Contacted within the last 12 weeks by phone on 1/28/25  Last [facility] referral date: 08/27/2024      JOSE JUAN     Trisha was NOT contacted regarding therapeutic result today per home monitoring policy manage by exception agreement.   Current warfarin dose is to be continued:     Summary  As of 4/8/2025      Full warfarin instructions:  7.5 mg every Tue, Fri; 5 mg all other days   Next INR check:  4/15/2025             ?   Smitha Nair RN  Anticoagulation Clinic  4/8/2025    _______________________________________________________________________     Anticoagulation Episode Summary       Current INR goal:  2.0-3.0   TTR:  80.5% (1 y)   Target end date:  Indefinite   Send INR reminders to:  [facility]    Indications    Long term (current) use of anticoagulants (Resolved) [Z79.01]  Embolism and thrombosis (H) (Resolved) [I74.9]  Anticoagulation monitoring  INR range 2-3 [Z79.01]  Embolism and thrombosis (H) [I74.9]             Comments:  MDINR needs weekly INR done             Anticoagulation Care Providers       Provider Role Specialty Phone number    Maria Guadalupe Ramon DO Referring Internal Medicine [phone]

## 2025-04-15 ENCOUNTER — ANTICOAGULATION THERAPY VISIT (OUTPATIENT)
Dept: ANTICOAGULATION | Facility: OTHER | Age: 65
End: 2025-04-15
Payer: COMMERCIAL

## 2025-04-15 DIAGNOSIS — I74.9 EMBOLISM AND THROMBOSIS (H): ICD-10-CM

## 2025-04-15 DIAGNOSIS — Z79.01 ANTICOAGULATION MONITORING, INR RANGE 2-3: Primary | ICD-10-CM

## 2025-04-15 LAB — INR HOME MONITORING: 2.3 RATIO (ref 2–3)

## 2025-04-15 NOTE — PROGRESS NOTES
ANTICOAGULATION  MANAGEMENT-Home Monitor Managed by Exception    Trisha FISHER Kassie 64 year old female is on warfarin with therapeutic INR result. (Goal INR 2.0-3.0)    Recent labs: (last 7 days)     04/15/25  1705   INR 2.3       Previous INR was Therapeutic  Medication, diet, health changes since last INR:chart reviewed; none identified  Contacted within the last 12 weeks by phone on 1/28/25  Last [facility] referral date: 08/27/2024      JOSE JUAN Honda was NOT contacted regarding therapeutic result today per home monitoring policy manage by exception agreement.   Current warfarin dose is to be continued:     Summary  As of 4/15/2025      Full warfarin instructions:  7.5 mg every Tue, Fri; 5 mg all other days   Next INR check:  4/22/2025             ?   Ghada Granda RN  Anticoagulation Clinic  4/15/2025    _______________________________________________________________________     Anticoagulation Episode Summary       Current INR goal:  2.0-3.0   TTR:  82.4% (1 y)   Target end date:  Indefinite   Send INR reminders to:  [facility]    Indications    Long term (current) use of anticoagulants (Resolved) [Z79.01]  Embolism and thrombosis (H) (Resolved) [I74.9]  Anticoagulation monitoring  INR range 2-3 [Z79.01]  Embolism and thrombosis (H) [I74.9]             Comments:  MDINR needs weekly INR done             Anticoagulation Care Providers       Provider Role Specialty Phone number    Maria Guadalupe Ramon DO Referring Internal Medicine [phone]

## 2025-04-22 ENCOUNTER — ANTICOAGULATION THERAPY VISIT (OUTPATIENT)
Dept: ANTICOAGULATION | Facility: OTHER | Age: 65
End: 2025-04-22
Payer: COMMERCIAL

## 2025-04-22 DIAGNOSIS — I74.9 EMBOLISM AND THROMBOSIS (H): ICD-10-CM

## 2025-04-22 DIAGNOSIS — Z79.01 ANTICOAGULATION MONITORING, INR RANGE 2-3: Primary | ICD-10-CM

## 2025-04-22 LAB — INR HOME MONITORING: 3.1 RATIO (ref 2–3)

## 2025-04-23 NOTE — PROGRESS NOTES
ANTICOAGULATION MANAGEMENT     Trisha Fernando 64 year old female is on warfarin with supratherapeutic INR result. (Goal INR 2.0-3.0)    Recent labs: (last 7 days)     04/22/25  1731   INR 3.1*       ASSESSMENT     Source(s): Chart Review  Previous INR was Therapeutic last 2(+) visits  Medication, diet, health changes since last INR chart reviewed; none identified         PLAN     Recommended plan for no diet, medication or health factor changes affecting INR     Dosing Instructions: Continue your current warfarin dose with next INR in 1 week       Summary  As of 4/22/2025      Full warfarin instructions:  7.5 mg every Tue, Fri; 5 mg all other days   Next INR check:  4/29/2025               Detailed voice message left for Trisha with dosing instructions and follow up date.     Patient to recheck with home meter    Education provided: Please call back if any changes to your diet, medications or how you've been taking warfarin    Plan made per [facility] anticoagulation protocol    Smitha Nair RN  4/23/2025  Anticoagulation Clinic  [facility] for routing messages: p [facility]  [facility] patient phone line: [phone]        _______________________________________________________________________     Anticoagulation Episode Summary       Current INR goal:  2.0-3.0   TTR:  84.3% (1 y)   Target end date:  Indefinite   Send INR reminders to:  [facility]    Indications    Long term (current) use of anticoagulants (Resolved) [Z79.01]  Embolism and thrombosis (H) (Resolved) [I74.9]  Anticoagulation monitoring  INR range 2-3 [Z79.01]  Embolism and thrombosis (H) [I74.9]             Comments:  MDINR needs weekly INR done             Anticoagulation Care Providers       Provider Role Specialty Phone number    Maria Guadalupe Ramon DO Referring Internal Medicine [phone]

## 2025-04-29 ENCOUNTER — ANTICOAGULATION THERAPY VISIT (OUTPATIENT)
Dept: ANTICOAGULATION | Facility: OTHER | Age: 65
End: 2025-04-29
Payer: MEDICARE

## 2025-04-29 DIAGNOSIS — I74.9 EMBOLISM AND THROMBOSIS (H): ICD-10-CM

## 2025-04-29 DIAGNOSIS — Z79.01 ANTICOAGULATION MONITORING, INR RANGE 2-3: Primary | ICD-10-CM

## 2025-04-29 LAB — INR HOME MONITORING: 2.8 RATIO (ref 2–3)

## 2025-04-29 NOTE — PROGRESS NOTES
ANTICOAGULATION MANAGEMENT     Trisha Fernando 64 year old female is on warfarin with therapeutic INR result. (Goal INR 2.0-3.0)    Recent labs: (last 7 days)     04/29/25  1226   INR 2.8       ASSESSMENT     Source(s): Chart Review  Previous INR was Supratherapeutic  Medication, diet, health changes since last INR chart reviewed; none identified         PLAN     Recommended plan for no diet, medication or health factor changes affecting INR     Dosing Instructions: Continue your current warfarin dose with next INR in 1 week       Summary  As of 4/29/2025      Full warfarin instructions:  7.5 mg every Tue, Fri; 5 mg all other days   Next INR check:  5/6/2025               Detailed voice message left for Trisha with dosing instructions and follow up date.     Patient to recheck with home meter    Education provided: Resume manage by exception with home monitor. Continue to submit INR results to home monitor company.You will only be called when your result is out of range and at 90 day check in. Please call and notify [facility] if new medication started, dose missed, signs or symptoms of bleeding or clotting, or a surgery/procedure is scheduled. Due for next call no later than: 7/28/25.    Plan made per [facility] anticoagulation protocol    Smitha Nair RN  4/29/2025  Anticoagulation Clinic  [facility] for routing messages: p [facility]  [facility] patient phone line: [phone]        _______________________________________________________________________     Anticoagulation Episode Summary       Current INR goal:  2.0-3.0   TTR:  83.7% (1 y)   Target end date:  Indefinite   Send INR reminders to:  [facility]    Indications    Long term (current) use of anticoagulants (Resolved) [Z79.01]  Embolism and thrombosis (H) (Resolved) [I74.9]  Anticoagulation monitoring  INR range 2-3 [Z79.01]  Embolism and thrombosis (H) [I74.9]             Comments:  MDINR needs weekly INR done             Anticoagulation Care  Providers       Provider Role Specialty Phone number    Maria Guadalupe Ramon,  Referring Internal Medicine [phone]

## 2025-05-06 ENCOUNTER — ANTICOAGULATION THERAPY VISIT (OUTPATIENT)
Dept: ANTICOAGULATION | Facility: OTHER | Age: 65
End: 2025-05-06
Payer: COMMERCIAL

## 2025-05-06 DIAGNOSIS — Z79.01 ANTICOAGULATION MONITORING, INR RANGE 2-3: Primary | ICD-10-CM

## 2025-05-06 DIAGNOSIS — I74.9 EMBOLISM AND THROMBOSIS (H): ICD-10-CM

## 2025-05-06 LAB — INR HOME MONITORING: 2.6 RATIO (ref 2–3)

## 2025-05-07 NOTE — PROGRESS NOTES
ANTICOAGULATION  MANAGEMENT-Home Monitor Managed by Exception    Trisha FISHER Kassie 64 year old female is on warfarin with therapeutic INR result. (Goal INR 2.0-3.0)    Recent labs: (last 7 days)     05/06/25  1749   INR 2.6       Previous INR was Therapeutic  Medication, diet, health changes since last INR:chart reviewed; none identified  Contacted within the last 12 weeks by phone on 4/29/25  Last [facility] referral date: 08/27/2024      JOSE JUAN Honda was NOT contacted regarding therapeutic result today per home monitoring policy manage by exception agreement.   Current warfarin dose is to be continued:     Summary  As of 5/6/2025      Full warfarin instructions:  7.5 mg every Tue, Fri; 5 mg all other days   Next INR check:  5/13/2025             ?   Ghada Granda RN  Anticoagulation Clinic  5/7/2025    _______________________________________________________________________     Anticoagulation Episode Summary       Current INR goal:  2.0-3.0   TTR:  83.6% (1 y)   Target end date:  Indefinite   Send INR reminders to:   ANTICOAGULATION NURSE    Indications    Long term (current) use of anticoagulants (Resolved) [Z79.01]  Embolism and thrombosis (H) (Resolved) [I74.9]  Anticoagulation monitoring  INR range 2-3 [Z79.01]  Embolism and thrombosis (H) [I74.9]             Comments:  MDINR needs weekly INR done             Anticoagulation Care Providers       Provider Role Specialty Phone number    Maria Guadalupe Ramon DO Referring Internal Medicine [phone]

## 2025-05-13 ENCOUNTER — ANTICOAGULATION THERAPY VISIT (OUTPATIENT)
Dept: ANTICOAGULATION | Facility: OTHER | Age: 65
End: 2025-05-13
Payer: COMMERCIAL

## 2025-05-13 DIAGNOSIS — Z79.01 ANTICOAGULATION MONITORING, INR RANGE 2-3: Primary | ICD-10-CM

## 2025-05-13 DIAGNOSIS — I74.9 EMBOLISM AND THROMBOSIS (H): ICD-10-CM

## 2025-05-13 LAB — INR HOME MONITORING: 2.8 RATIO (ref 2–3)

## 2025-05-13 NOTE — PROGRESS NOTES
ANTICOAGULATION  MANAGEMENT-Home Monitor Managed by Exception    Trisha NATALIA Fernando 64 year old female is on warfarin with therapeutic INR result. (Goal INR 2.0-3.0)    Recent labs: (last 7 days)     05/13/25  1652   INR 2.8       Previous INR was Therapeutic  Medication, diet, health changes since last INR:chart reviewed; none identified  Contacted within the last 12 weeks by phone on 4/22/25  Last [facility] referral date: 08/27/2024      JOSE JUAN Honda was NOT contacted regarding therapeutic result today per home monitoring policy manage by exception agreement.   Current warfarin dose is to be continued:     Summary  As of 5/13/2025      Full warfarin instructions:  7.5 mg every Tue, Fri; 5 mg all other days   Next INR check:  5/20/2025             ?   Carlos Alberto Rodriguez RN  Anticoagulation Clinic  5/13/2025    _______________________________________________________________________     Anticoagulation Episode Summary       Current INR goal:  2.0-3.0   TTR:  83.7% (1 y)   Target end date:  Indefinite   Send INR reminders to:   ANTICOAGULATION NURSE    Indications    Long term (current) use of anticoagulants (Resolved) [Z79.01]  Embolism and thrombosis (H) (Resolved) [I74.9]  Anticoagulation monitoring  INR range 2-3 [Z79.01]  Embolism and thrombosis (H) [I74.9]             Comments:  MDINR needs weekly INR done             Anticoagulation Care Providers       Provider Role Specialty Phone number    Maria Guadalupe Ramon DO Referring Internal Medicine [phone]

## 2025-05-20 ENCOUNTER — ANTICOAGULATION THERAPY VISIT (OUTPATIENT)
Dept: ANTICOAGULATION | Facility: OTHER | Age: 65
End: 2025-05-20
Payer: COMMERCIAL

## 2025-05-20 DIAGNOSIS — I74.9 EMBOLISM AND THROMBOSIS (H): ICD-10-CM

## 2025-05-20 DIAGNOSIS — Z79.01 ANTICOAGULATION MONITORING, INR RANGE 2-3: Primary | ICD-10-CM

## 2025-05-20 LAB — INR HOME MONITORING: 2.8 RATIO (ref 2–3)

## 2025-05-21 NOTE — PROGRESS NOTES
ANTICOAGULATION  MANAGEMENT-Home Monitor Managed by Exception    Trisha NATALIA Fernando 64 year old female is on warfarin with therapeutic INR result. (Goal INR 2.0-3.0)    Recent labs: (last 7 days)     05/20/25  1733   INR 2.8       Previous INR was Therapeutic  Medication, diet, health changes since last INR:chart reviewed; none identified  Contacted within the last 12 weeks by phone on 4/22/25  Last [facility] referral date: 08/27/2024      JOSE JUAN [employer] was NOT contacted regarding therapeutic result today per home monitoring policy manage by exception agreement.   Current warfarin dose is to be continued:     Summary  As of 5/20/2025      Full warfarin instructions:  7.5 mg every Tue, Fri; 5 mg all other days   Next INR check:  5/27/2025             ?   Ghada Granda RN  Anticoagulation Clinic  5/21/2025    _______________________________________________________________________     Anticoagulation Episode Summary       Current INR goal:  2.0-3.0   TTR:  83.6% (1 y)   Target end date:  Indefinite   Send INR reminders to:   ANTICOAGULATION NURSE    Indications    Long term (current) use of anticoagulants (Resolved) [Z79.01]  Embolism and thrombosis (H) (Resolved) [I74.9]  Anticoagulation monitoring  INR range 2-3 [Z79.01]  Embolism and thrombosis (H) [I74.9]             Comments:  MDINR needs weekly INR done             Anticoagulation Care Providers       Provider Role Specialty Phone number    Maria Guadalupe Ramon DO Referring Internal Medicine [phone]

## 2025-05-27 ENCOUNTER — ANTICOAGULATION THERAPY VISIT (OUTPATIENT)
Dept: ANTICOAGULATION | Facility: OTHER | Age: 65
End: 2025-05-27
Payer: COMMERCIAL

## 2025-05-27 DIAGNOSIS — I74.9 EMBOLISM AND THROMBOSIS (H): ICD-10-CM

## 2025-05-27 DIAGNOSIS — Z79.01 ANTICOAGULATION MONITORING, INR RANGE 2-3: Primary | ICD-10-CM

## 2025-05-27 LAB — INR HOME MONITORING: 2.2 RATIO (ref 2–3)

## 2025-05-28 NOTE — PROGRESS NOTES
ANTICOAGULATION  MANAGEMENT-Home Monitor Managed by Exception    Trisha FISHER Kassie 64 year old female is on warfarin with therapeutic INR result. (Goal INR 2.0-3.0)    Recent labs: (last 7 days)     05/27/25  2313   INR 2.2       Previous INR was Therapeutic  Medication, diet, health changes since last INR:chart reviewed; none identified  Contacted within the last 12 weeks by phone on 4/22/25  Last [facility] referral date: 08/27/2024      JOSE JUAN Honda was NOT contacted regarding therapeutic result today per home monitoring policy manage by exception agreement.   Current warfarin dose is to be continued:     Summary  As of 5/27/2025      Full warfarin instructions:  7.5 mg every Tue, Fri; 5 mg all other days   Next INR check:  6/3/2025             ?   Ghada Granda RN  Anticoagulation Clinic  5/28/2025    _______________________________________________________________________     Anticoagulation Episode Summary       Current INR goal:  2.0-3.0   TTR:  83.7% (1 y)   Target end date:  Indefinite   Send INR reminders to:   ANTICOAGULATION NURSE    Indications    Long term (current) use of anticoagulants (Resolved) [Z79.01]  Embolism and thrombosis (H) (Resolved) [I74.9]  Anticoagulation monitoring  INR range 2-3 [Z79.01]  Embolism and thrombosis (H) [I74.9]             Comments:  MDINR needs weekly INR done             Anticoagulation Care Providers       Provider Role Specialty Phone number    Maria Guadalupe Ramon DO Referring Internal Medicine [phone]

## 2025-06-03 ENCOUNTER — ANTICOAGULATION THERAPY VISIT (OUTPATIENT)
Dept: ANTICOAGULATION | Facility: OTHER | Age: 65
End: 2025-06-03
Attending: INTERNAL MEDICINE
Payer: COMMERCIAL

## 2025-06-03 DIAGNOSIS — I74.9 EMBOLISM AND THROMBOSIS (H): ICD-10-CM

## 2025-06-03 DIAGNOSIS — Z79.01 ANTICOAGULATION MONITORING, INR RANGE 2-3: Primary | ICD-10-CM

## 2025-06-03 LAB — INR HOME MONITORING: 2.2 RATIO (ref 2–3)

## 2025-06-03 NOTE — PROGRESS NOTES
ANTICOAGULATION  MANAGEMENT-Home Monitor Managed by Exception    Trisha FISHER Kassie 64 year old female is on warfarin with therapeutic INR result. (Goal INR 2.0-3.0)    Recent labs: (last 7 days)     06/03/25  1241   INR 2.2       Previous INR was Therapeutic  Medication, diet, health changes since last INR:chart reviewed; none identified  Contacted within the last 12 weeks by phone on 4/22/25  Last [facility] referral date: 08/27/2024      JOSE JUAN Honda was NOT contacted regarding therapeutic result today per home monitoring policy manage by exception agreement.   Current warfarin dose is to be continued:     Summary  As of 6/3/2025      Full warfarin instructions:  7.5 mg every Tue, Fri; 5 mg all other days   Next INR check:  6/10/2025             ?   Ghada Granda RN  Anticoagulation Clinic  6/3/2025    _______________________________________________________________________     Anticoagulation Episode Summary       Current INR goal:  2.0-3.0   TTR:  83.7% (1 y)   Target end date:  Indefinite   Send INR reminders to:   ANTICOAGULATION NURSE    Indications    Long term (current) use of anticoagulants (Resolved) [Z79.01]  Embolism and thrombosis (H) (Resolved) [I74.9]  Anticoagulation monitoring  INR range 2-3 [Z79.01]  Embolism and thrombosis (H) [I74.9]             Comments:  MDINR needs weekly INR done             Anticoagulation Care Providers       Provider Role Specialty Phone number    Maria Guadalupe Ramon DO Referring Internal Medicine [phone]

## 2025-06-10 ENCOUNTER — ANTICOAGULATION THERAPY VISIT (OUTPATIENT)
Dept: ANTICOAGULATION | Facility: OTHER | Age: 65
End: 2025-06-10
Attending: INTERNAL MEDICINE
Payer: COMMERCIAL

## 2025-06-10 DIAGNOSIS — I74.9 EMBOLISM AND THROMBOSIS (H): ICD-10-CM

## 2025-06-10 DIAGNOSIS — Z79.01 ANTICOAGULATION MONITORING, INR RANGE 2-3: Primary | ICD-10-CM

## 2025-06-10 LAB — INR HOME MONITORING: 2.3 RATIO (ref 2–3)

## 2025-06-11 NOTE — PROGRESS NOTES
ANTICOAGULATION  MANAGEMENT-Home Monitor Managed by Exception    Trisha NATALIA Fernando 64 year old female is on warfarin with therapeutic INR result. (Goal INR 2.0-3.0)    Recent labs: (last 7 days)     06/10/25  2128   INR 2.3       Previous INR was Therapeutic  Medication, diet, health changes since last INR:chart reviewed; none identified  Contacted within the last 12 weeks by phone on 4/22/25  Last [facility] referral date: 08/27/2024      JOSE JUAN Honda was NOT contacted regarding therapeutic result today per home monitoring policy manage by exception agreement.   Current warfarin dose is to be continued:     Summary  As of 6/10/2025      Full warfarin instructions:  7.5 mg every Tue, Fri; 5 mg all other days   Next INR check:  6/17/2025             ?   Ghada Granda RN  Anticoagulation Clinic  6/11/2025    _______________________________________________________________________     Anticoagulation Episode Summary       Current INR goal:  2.0-3.0   TTR:  83.6% (1 y)   Target end date:  Indefinite   Send INR reminders to:   ANTICOAGULATION NURSE    Indications    Long term (current) use of anticoagulants (Resolved) [Z79.01]  Embolism and thrombosis (H) (Resolved) [I74.9]  Anticoagulation monitoring  INR range 2-3 [Z79.01]  Embolism and thrombosis (H) [I74.9]             Comments:  MDINR needs weekly INR done             Anticoagulation Care Providers       Provider Role Specialty Phone number    Maria Guadalupe Ramon DO Referring Internal Medicine [phone]

## 2025-06-17 ENCOUNTER — ANTICOAGULATION THERAPY VISIT (OUTPATIENT)
Dept: ANTICOAGULATION | Facility: OTHER | Age: 65
End: 2025-06-17
Attending: INTERNAL MEDICINE
Payer: MEDICARE

## 2025-06-17 DIAGNOSIS — I74.9 EMBOLISM AND THROMBOSIS (H): ICD-10-CM

## 2025-06-17 DIAGNOSIS — Z79.01 ANTICOAGULATION MONITORING, INR RANGE 2-3: Primary | ICD-10-CM

## 2025-06-17 LAB — INR HOME MONITORING: 2.2 RATIO (ref 2–3)

## 2025-06-18 ENCOUNTER — HOSPITAL ENCOUNTER (OUTPATIENT)
Dept: MAMMOGRAPHY | Facility: OTHER | Age: 65
Discharge: HOME | End: 2025-06-18
Attending: INTERNAL MEDICINE
Payer: MEDICARE

## 2025-06-18 DIAGNOSIS — Z12.31 VISIT FOR SCREENING MAMMOGRAM: ICD-10-CM

## 2025-06-18 PROCEDURE — 77063 BREAST TOMOSYNTHESIS BI: CPT | Mod: 26 | Performed by: RADIOLOGY

## 2025-06-18 PROCEDURE — 77067 SCR MAMMO BI INCL CAD: CPT | Mod: 26 | Performed by: RADIOLOGY

## 2025-06-18 PROCEDURE — 77063 BREAST TOMOSYNTHESIS BI: CPT

## 2025-06-18 NOTE — PROGRESS NOTES
ANTICOAGULATION  MANAGEMENT-Home Monitor Managed by Exception    Trisha FISHER Kassie 64 year old female is on warfarin with therapeutic INR result. (Goal INR 2.0-3.0)    Recent labs: (last 7 days)     06/17/25  1724   INR 2.2       Previous INR was Therapeutic  Medication, diet, health changes since last INR:chart reviewed; none identified  Contacted within the last 12 weeks by phone on 4/22/25  Last [facility] referral date: 08/27/2024      JOSE JUAN [employer] was NOT contacted regarding therapeutic result today per home monitoring policy manage by exception agreement.   Current warfarin dose is to be continued:     Summary  As of 6/17/2025      Full warfarin instructions:  7.5 mg every Tue, Fri; 5 mg all other days   Next INR check:  6/24/2025             ?   Ghada Granda RN  Anticoagulation Clinic  6/18/2025    _______________________________________________________________________     Anticoagulation Episode Summary       Current INR goal:  2.0-3.0   TTR:  83.6% (1 y)   Target end date:  Indefinite   Send INR reminders to:   ANTICOAGULATION NURSE    Indications    Long term (current) use of anticoagulants (Resolved) [Z79.01]  Embolism and thrombosis (H) (Resolved) [I74.9]  Anticoagulation monitoring  INR range 2-3 [Z79.01]  Embolism and thrombosis (H) [I74.9]             Comments:  MDINR needs weekly INR done             Anticoagulation Care Providers       Provider Role Specialty Phone number    Maria Guadalupe Ramon DO Referring Internal Medicine [phone]

## 2025-06-24 ENCOUNTER — ANTICOAGULATION THERAPY VISIT (OUTPATIENT)
Dept: ANTICOAGULATION | Facility: OTHER | Age: 65
End: 2025-06-24
Attending: INTERNAL MEDICINE
Payer: MEDICARE

## 2025-06-24 DIAGNOSIS — Z79.01 ANTICOAGULATION MONITORING, INR RANGE 2-3: Primary | ICD-10-CM

## 2025-06-24 DIAGNOSIS — I74.9 EMBOLISM AND THROMBOSIS (H): ICD-10-CM

## 2025-06-24 LAB — INR HOME MONITORING: 2.4 RATIO (ref 2–3)

## 2025-06-24 NOTE — PROGRESS NOTES
ANTICOAGULATION  MANAGEMENT-Home Monitor Managed by Exception    Trisha NATALIA Fernando 64 year old female is on warfarin with therapeutic INR result. (Goal INR 2.0-3.0)    Recent labs: (last 7 days)     06/24/25  1712   INR 2.4       Previous INR was Therapeutic  Medication, diet, health changes since last INR:chart reviewed; none identified  Contacted within the last 12 weeks by phone on 4/22/25  Last [facility] referral date: 08/27/2024      JOSE JUAN     Trisha was NOT contacted regarding therapeutic result today per home monitoring policy manage by exception agreement.   Current warfarin dose is to be continued:     Summary  As of 6/24/2025      Full warfarin instructions:  7.5 mg every Tue, Fri; 5 mg all other days   Next INR check:  7/1/2025             ?   Smitha Nair RN  Anticoagulation Clinic  6/24/2025    _______________________________________________________________________     Anticoagulation Episode Summary       Current INR goal:  2.0-3.0   TTR:  83.7% (1 y)   Target end date:  Indefinite   Send INR reminders to:   ANTICOAGULATION NURSE    Indications    Long term (current) use of anticoagulants (Resolved) [Z79.01]  Embolism and thrombosis (H) (Resolved) [I74.9]  Anticoagulation monitoring  INR range 2-3 [Z79.01]  Embolism and thrombosis (H) [I74.9]             Comments:  MDINR needs weekly INR done             Anticoagulation Care Providers       Provider Role Specialty Phone number    Maria Guadalupe Ramon DO Referring Internal Medicine [phone]

## 2025-07-01 ENCOUNTER — ANTICOAGULATION THERAPY VISIT (OUTPATIENT)
Dept: ANTICOAGULATION | Facility: OTHER | Age: 65
End: 2025-07-01
Payer: MEDICARE

## 2025-07-01 DIAGNOSIS — I74.9 EMBOLISM AND THROMBOSIS (H): ICD-10-CM

## 2025-07-01 DIAGNOSIS — Z79.01 ANTICOAGULATION MONITORING, INR RANGE 2-3: Primary | ICD-10-CM

## 2025-07-01 LAB — INR HOME MONITORING: 2.6 RATIO (ref 2–3)

## 2025-07-01 NOTE — PROGRESS NOTES
ANTICOAGULATION  MANAGEMENT-Home Monitor Managed by Exception    Trisha NATALIA Fernando 64 year old female is on warfarin with therapeutic INR result. (Goal INR 2.0-3.0)    Recent labs: (last 7 days)     07/01/25  1242   INR 2.6       Previous INR was Therapeutic  Medication, diet, health changes since last INR:chart reviewed; none identified  Contacted within the last 12 weeks by phone on 4/22/25  Last [facility] referral date: 08/27/2024      JOSE JUAN     Trisha was NOT contacted regarding therapeutic result today per home monitoring policy manage by exception agreement.   Current warfarin dose is to be continued:     Summary  As of 7/1/2025      Full warfarin instructions:  7.5 mg every Tue, Fri; 5 mg all other days   Next INR check:  7/8/2025             ?   Smitha Nair RN  Anticoagulation Clinic  7/1/2025    _______________________________________________________________________     Anticoagulation Episode Summary       Current INR goal:  2.0-3.0   TTR:  83.7% (1 y)   Target end date:  Indefinite   Send INR reminders to:   ANTICOAGULATION NURSE    Indications    Long term (current) use of anticoagulants (Resolved) [Z79.01]  Embolism and thrombosis (H) (Resolved) [I74.9]  Anticoagulation monitoring  INR range 2-3 [Z79.01]  Embolism and thrombosis (H) [I74.9]             Comments:  MDINR needs weekly INR done             Anticoagulation Care Providers       Provider Role Specialty Phone number    Maria Guadalupe Ramon DO Referring Internal Medicine [phone]

## 2025-07-08 ENCOUNTER — ANTICOAGULATION THERAPY VISIT (OUTPATIENT)
Dept: ANTICOAGULATION | Facility: OTHER | Age: 65
End: 2025-07-08
Payer: COMMERCIAL

## 2025-07-08 DIAGNOSIS — Z79.01 ANTICOAGULATION MONITORING, INR RANGE 2-3: Primary | ICD-10-CM

## 2025-07-08 DIAGNOSIS — I74.9 EMBOLISM AND THROMBOSIS (H): ICD-10-CM

## 2025-07-08 LAB — INR HOME MONITORING: 2.6 RATIO (ref 2–3)

## 2025-07-09 NOTE — PROGRESS NOTES
ANTICOAGULATION  MANAGEMENT-Home Monitor Managed by Exception    Trisha FISHER Kassie 64 year old female is on warfarin with therapeutic INR result. (Goal INR 2.0-3.0)    Recent labs: (last 7 days)     07/08/25 2038   INR 2.6       Previous INR was Therapeutic  Medication, diet, health changes since last INR:chart reviewed; none identified  Contacted within the last 12 weeks by phone on 4/22/25  Last [facility] referral date: 08/27/2024      JOSE JUAN Honda was NOT contacted regarding therapeutic result today per home monitoring policy manage by exception agreement.   Current warfarin dose is to be continued:     Summary  As of 7/8/2025      Full warfarin instructions:  7.5 mg every Tue, Fri; 5 mg all other days   Next INR check:  7/15/2025             ?   Ghada Granda RN  Anticoagulation Clinic  7/9/2025    _______________________________________________________________________     Anticoagulation Episode Summary       Current INR goal:  2.0-3.0   TTR:  83.6% (1 y)   Target end date:  Indefinite   Send INR reminders to:   ANTICOAGULATION NURSE    Indications    Long term (current) use of anticoagulants (Resolved) [Z79.01]  Embolism and thrombosis (H) (Resolved) [I74.9]  Anticoagulation monitoring  INR range 2-3 [Z79.01]  Embolism and thrombosis (H) [I74.9]             Comments:  MDINR needs weekly INR done             Anticoagulation Care Providers       Provider Role Specialty Phone number    Maria Guadalupe Ramon DO Referring Internal Medicine [phone]

## 2025-07-15 ENCOUNTER — ANTICOAGULATION THERAPY VISIT (OUTPATIENT)
Dept: ANTICOAGULATION | Facility: OTHER | Age: 65
End: 2025-07-15
Payer: COMMERCIAL

## 2025-07-15 DIAGNOSIS — I74.9 EMBOLISM AND THROMBOSIS (H): ICD-10-CM

## 2025-07-15 DIAGNOSIS — Z79.01 ANTICOAGULATION MONITORING, INR RANGE 2-3: Primary | ICD-10-CM

## 2025-07-15 LAB — INR HOME MONITORING: 2.9 RATIO (ref 2–3)

## 2025-07-16 ENCOUNTER — TELEPHONE (OUTPATIENT)
Dept: INTERNAL MEDICINE | Facility: OTHER | Age: 65
End: 2025-07-16
Payer: COMMERCIAL

## 2025-07-16 DIAGNOSIS — I74.9 EMBOLISM AND THROMBOSIS (H): ICD-10-CM

## 2025-07-16 DIAGNOSIS — Z79.01 ANTICOAGULATION MONITORING, INR RANGE 2-3: Primary | ICD-10-CM

## 2025-07-16 NOTE — TELEPHONE ENCOUNTER
ANTICOAGULATION CLINIC REFERRAL RENEWAL REQUEST       An annual renewal order is required for all patients referred to [facility] Anticoagulation Clinic.?  Please review and sign the pended referral order for Trisha Fernando.       ANTICOAGULATION SUMMARY      Warfarin indication(s)   Long term (current) use of anticoagulants (Resolved) [Z79.01]Embolism and thrombosis (H) (Resolved) [I74.9]Anticoagulation monitoringINR range 2-3 [Z79.01]Embolism and thrombosis (H) [I74.9]     Mechanical heart valve present?  NO       Current goal range   INR: 2.0-3.0     Goal appropriate for indication? Goal INR 2-3, standard for indication(s) above     Time in Therapeutic Range (TTR)  (Goal > 60%) %       Office visit with referring provider's group within last year yes on 1/20/25   Additional standing orders None       Carlos Alberto Rodriguez, SINDHU  [facility] Anticoagulation Clinic

## 2025-07-16 NOTE — PROGRESS NOTES
ANTICOAGULATION  MANAGEMENT-Home Monitor Managed by Exception    Trisha NATALIA Fernando 64 year old female is on warfarin with therapeutic INR result. (Goal INR 2.0-3.0)    Recent labs: (last 7 days)     07/15/25  1903   INR 2.9       Previous INR was Therapeutic  Medication, diet, health changes since last INR:chart reviewed; none identified  Contacted within the last 12 weeks by phone on 4/22/25  Last [facility] referral date: 07/16/2025      JOSE JUAN Honda was NOT contacted regarding therapeutic result today per home monitoring policy manage by exception agreement.   Current warfarin dose is to be continued:     Summary  As of 7/15/2025      Full warfarin instructions:  7.5 mg every Tue, Fri; 5 mg all other days   Next INR check:  7/22/2025             ?   Carlos Alberto Rodriguez RN  Anticoagulation Clinic  7/16/2025    _______________________________________________________________________     Anticoagulation Episode Summary       Current INR goal:  2.0-3.0   TTR:  84.3% (1 y)   Target end date:  Indefinite   Send INR reminders to:   ANTICOAGULATION NURSE    Indications    Long term (current) use of anticoagulants (Resolved) [Z79.01]  Embolism and thrombosis (H) (Resolved) [I74.9]  Anticoagulation monitoring  INR range 2-3 [Z79.01]  Embolism and thrombosis (H) [I74.9]             Comments:  MDIN[facility] needs weekly INR done             Anticoagulation Care Providers       Provider Role Specialty Phone number    Maria Guadalupe Ramon DO Referring Internal Medicine [phone]

## 2025-07-22 ENCOUNTER — ANTICOAGULATION THERAPY VISIT (OUTPATIENT)
Dept: ANTICOAGULATION | Facility: OTHER | Age: 65
End: 2025-07-22
Payer: MEDICARE

## 2025-07-22 DIAGNOSIS — I74.9 EMBOLISM AND THROMBOSIS (H): ICD-10-CM

## 2025-07-22 DIAGNOSIS — Z79.01 ANTICOAGULATION MONITORING, INR RANGE 2-3: Primary | ICD-10-CM

## 2025-07-22 LAB — INR HOME MONITORING: 2.7 RATIO (ref 2–3)

## 2025-07-23 NOTE — PROGRESS NOTES
ANTICOAGULATION MANAGEMENT     Trisha Fernando 64 year old female is on warfarin with therapeutic INR result. (Goal INR 2.0-3.0)    Recent labs: (last 7 days)     07/22/25 2004   INR 2.7       ASSESSMENT     Source(s): Chart Review and Patient/Caregiver Call     Warfarin doses taken: Warfarin taken as instructed  Diet: No new diet changes identified  Medication/supplement changes: None noted  New illness, injury, or hospitalization: No  Signs or symptoms of bleeding or clotting: No  Previous result: Therapeutic last 2(+) visits  Additional findings: None       PLAN     Recommended plan for no diet, medication or health factor changes affecting INR     Dosing Instructions: Continue your current warfarin dose with next INR in 1 week       Summary  As of 7/22/2025      Full warfarin instructions:  7.5 mg every Tue, Fri; 5 mg all other days   Next INR check:  7/29/2025               Telephone call with Trisha who verbalizes understanding and agrees to plan    Patient to recheck with home meter    Education provided: Please call back if any changes to your diet, medications or how you've been taking warfarin  Resume manage by exception with home monitor. Continue to submit INR results to home monitor company.You will only be called when your result is out of range and at 90 day check in. Please call and notify [facility] if new medication started, dose missed, signs or symptoms of bleeding or clotting, or a surgery/procedure is scheduled. Due for next call no later than: 10/21/25.    Plan made per [facility] anticoagulation protocol    Ghada Granda, RN  7/23/2025  Anticoagulation Clinic  [facility] for routing messages: [email or web address]  ANTICOAGULATION NURSE  [facility] patient phone line: [phone]        _______________________________________________________________________     Anticoagulation Episode Summary       Current INR goal:  2.0-3.0   TTR:  86.3% (1 y)   Target end date:  Indefinite   Send INR reminders to:   ANTICOAGULATION  NURSE    Indications    Long term (current) use of anticoagulants (Resolved) [Z79.01]  Embolism and thrombosis (H) (Resolved) [I74.9]  Anticoagulation monitoring  INR range 2-3 [Z79.01]  Embolism and thrombosis (H) [I74.9]             Comments:  MDINR needs weekly INR done             Anticoagulation Care Providers       Provider Role Specialty Phone number    Maria Guadalupe Ramon DO Referring Internal Medicine [phone]

## 2025-07-29 ENCOUNTER — ANTICOAGULATION THERAPY VISIT (OUTPATIENT)
Dept: ANTICOAGULATION | Facility: OTHER | Age: 65
End: 2025-07-29
Payer: MEDICARE

## 2025-07-29 DIAGNOSIS — Z79.01 ANTICOAGULATION MONITORING, INR RANGE 2-3: Primary | ICD-10-CM

## 2025-07-29 DIAGNOSIS — I74.9 EMBOLISM AND THROMBOSIS (H): ICD-10-CM

## 2025-07-29 LAB — INR HOME MONITORING: 2.6 RATIO (ref 2–3)

## 2025-07-30 ENCOUNTER — OFFICE VISIT (OUTPATIENT)
Dept: INTERNAL MEDICINE | Facility: OTHER | Age: 65
End: 2025-07-30
Attending: INTERNAL MEDICINE
Payer: MEDICARE

## 2025-07-30 VITALS
HEIGHT: 68 IN | WEIGHT: 293 LBS | OXYGEN SATURATION: 98 % | DIASTOLIC BLOOD PRESSURE: 92 MMHG | RESPIRATION RATE: 16 BRPM | HEART RATE: 74 BPM | BODY MASS INDEX: 44.41 KG/M2 | SYSTOLIC BLOOD PRESSURE: 142 MMHG

## 2025-07-30 DIAGNOSIS — I74.9 EMBOLISM AND THROMBOSIS (H): ICD-10-CM

## 2025-07-30 DIAGNOSIS — E66.01 MORBID OBESITY (H): ICD-10-CM

## 2025-07-30 DIAGNOSIS — Z83.2 FAMILY HISTORY OF FACTOR V LEIDEN MUTATION: ICD-10-CM

## 2025-07-30 DIAGNOSIS — I10 ESSENTIAL HYPERTENSION: ICD-10-CM

## 2025-07-30 DIAGNOSIS — F33.0 DEPRESSION, MAJOR, RECURRENT, MILD: ICD-10-CM

## 2025-07-30 DIAGNOSIS — M25.561 ACUTE PAIN OF RIGHT KNEE: Primary | ICD-10-CM

## 2025-07-30 PROCEDURE — G0463 HOSPITAL OUTPT CLINIC VISIT: HCPCS

## 2025-07-30 ASSESSMENT — ANXIETY QUESTIONNAIRES
3. WORRYING TOO MUCH ABOUT DIFFERENT THINGS: NOT AT ALL
5. BEING SO RESTLESS THAT IT IS HARD TO SIT STILL: NOT AT ALL
GAD7 TOTAL SCORE: 1
GAD7 TOTAL SCORE: 1
2. NOT BEING ABLE TO STOP OR CONTROL WORRYING: NOT AT ALL
6. BECOMING EASILY ANNOYED OR IRRITABLE: SEVERAL DAYS
8. IF YOU CHECKED OFF ANY PROBLEMS, HOW DIFFICULT HAVE THESE MADE IT FOR YOU TO DO YOUR WORK, TAKE CARE OF THINGS AT HOME, OR GET ALONG WITH OTHER PEOPLE?: NOT DIFFICULT AT ALL
7. FEELING AFRAID AS IF SOMETHING AWFUL MIGHT HAPPEN: NOT AT ALL
1. FEELING NERVOUS, ANXIOUS, OR ON EDGE: NOT AT ALL
4. TROUBLE RELAXING: NOT AT ALL
GAD7 TOTAL SCORE: 1
IF YOU CHECKED OFF ANY PROBLEMS ON THIS QUESTIONNAIRE, HOW DIFFICULT HAVE THESE PROBLEMS MADE IT FOR YOU TO DO YOUR WORK, TAKE CARE OF THINGS AT HOME, OR GET ALONG WITH OTHER PEOPLE: NOT DIFFICULT AT ALL
7. FEELING AFRAID AS IF SOMETHING AWFUL MIGHT HAPPEN: NOT AT ALL

## 2025-07-30 ASSESSMENT — PATIENT HEALTH QUESTIONNAIRE - PHQ9
SUM OF ALL RESPONSES TO PHQ QUESTIONS 1-9: 2
10. IF YOU CHECKED OFF ANY PROBLEMS, HOW DIFFICULT HAVE THESE PROBLEMS MADE IT FOR YOU TO DO YOUR WORK, TAKE CARE OF THINGS AT HOME, OR GET ALONG WITH OTHER PEOPLE: NOT DIFFICULT AT ALL
SUM OF ALL RESPONSES TO PHQ QUESTIONS 1-9: 2

## 2025-07-30 NOTE — PROGRESS NOTES
ANTICOAGULATION  MANAGEMENT-Home Monitor Managed by Exception    Trisha Fernando 64 year old female is on warfarin with therapeutic INR result. (Goal INR 2.0-3.0)    Recent labs: (last 7 days)     07/29/25  2146   INR 2.6       Care Everywhere updated: yes    Previous INR was Therapeutic  Medication, diet, health changes since last INR:chart reviewed; none identified  Contacted within the last 12 weeks by phone on 7/22/25  Last [facility] referral date: 07/16/2025      JOSE JUAN Rico was NOT contacted regarding therapeutic result today per home monitoring policy manage by exception agreement.   Current warfarin dose is to be continued:     Summary  As of 7/29/2025      Full warfarin instructions:  7.5 mg every Tue, Fri; 5 mg all other days   Next INR check:  8/5/2025             ?   Ghada Granda RN  Anticoagulation Clinic  7/30/2025    _______________________________________________________________________     Anticoagulation Episode Summary       Current INR goal:  2.0-3.0   TTR:  88.1% (1 y)   Target end date:  Indefinite   Send INR reminders to:   ANTICOAGULATION NURSE    Indications    Long term (current) use of anticoagulants (Resolved) [Z79.01]  Embolism and thrombosis (H) (Resolved) [I74.9]  Anticoagulation monitoring  INR range 2-3 [Z79.01]  Embolism and thrombosis (H) [I74.9]             Comments:  MDIN[facility] needs weekly INR done             Anticoagulation Care Providers       Provider Role Specialty Phone number    Maria Guadalupe Ramon DO Referring Internal Medicine [phone]

## 2025-08-05 ENCOUNTER — ANTICOAGULATION THERAPY VISIT (OUTPATIENT)
Dept: ANTICOAGULATION | Facility: OTHER | Age: 65
End: 2025-08-05
Attending: INTERNAL MEDICINE
Payer: MEDICARE

## 2025-08-05 DIAGNOSIS — I74.9 EMBOLISM AND THROMBOSIS (H): ICD-10-CM

## 2025-08-05 DIAGNOSIS — Z79.01 ANTICOAGULATION MONITORING, INR RANGE 2-3: Primary | ICD-10-CM

## 2025-08-06 LAB — INR HOME MONITORING: 2.7 RATIO (ref 2–3)

## 2025-08-12 ENCOUNTER — ANTICOAGULATION THERAPY VISIT (OUTPATIENT)
Dept: ANTICOAGULATION | Facility: OTHER | Age: 65
End: 2025-08-12
Attending: INTERNAL MEDICINE
Payer: MEDICARE

## 2025-08-12 DIAGNOSIS — I74.9 EMBOLISM AND THROMBOSIS (H): ICD-10-CM

## 2025-08-12 DIAGNOSIS — Z79.01 ANTICOAGULATION MONITORING, INR RANGE 2-3: Primary | ICD-10-CM

## 2025-08-12 LAB — INR HOME MONITORING: 2.9 RATIO (ref 2–3)

## 2025-08-19 ENCOUNTER — ANTICOAGULATION THERAPY VISIT (OUTPATIENT)
Dept: ANTICOAGULATION | Facility: OTHER | Age: 65
End: 2025-08-19
Attending: INTERNAL MEDICINE
Payer: MEDICARE

## 2025-08-19 DIAGNOSIS — I74.9 EMBOLISM AND THROMBOSIS (H): ICD-10-CM

## 2025-08-19 DIAGNOSIS — Z79.01 ANTICOAGULATION MONITORING, INR RANGE 2-3: Primary | ICD-10-CM

## 2025-08-19 LAB — INR HOME MONITORING: 2.6 RATIO (ref 2–3)

## 2025-08-26 ENCOUNTER — ANTICOAGULATION THERAPY VISIT (OUTPATIENT)
Dept: ANTICOAGULATION | Facility: OTHER | Age: 65
End: 2025-08-26
Attending: INTERNAL MEDICINE
Payer: COMMERCIAL

## 2025-08-26 DIAGNOSIS — I74.9 EMBOLISM AND THROMBOSIS (H): ICD-10-CM

## 2025-08-26 DIAGNOSIS — Z79.01 ANTICOAGULATION MONITORING, INR RANGE 2-3: Primary | ICD-10-CM

## 2025-08-26 LAB — INR HOME MONITORING: 2.3 RATIO (ref 2–3)

## 2025-08-27 DIAGNOSIS — I74.9 EMBOLISM AND THROMBOSIS (H): ICD-10-CM

## 2025-08-27 DIAGNOSIS — Z79.01 ANTICOAGULATION MONITORING, INR RANGE 2-3: ICD-10-CM

## 2025-08-27 RX ORDER — WARFARIN SODIUM 5 MG/1
TABLET ORAL
Qty: 100 TABLET | Refills: 1 | Status: SHIPPED | OUTPATIENT
Start: 2025-08-27

## 2025-09-02 ENCOUNTER — ANTICOAGULATION THERAPY VISIT (OUTPATIENT)
Dept: ANTICOAGULATION | Facility: OTHER | Age: 65
End: 2025-09-02
Payer: COMMERCIAL

## 2025-09-02 DIAGNOSIS — Z79.01 ANTICOAGULATION MONITORING, INR RANGE 2-3: Primary | ICD-10-CM

## 2025-09-02 DIAGNOSIS — I74.9 EMBOLISM AND THROMBOSIS (H): ICD-10-CM

## 2025-09-02 LAB — INR HOME MONITORING: 2.6 RATIO (ref 2–3)

## (undated) DEVICE — TUBING SUCTION 10'X3/16" N510

## (undated) DEVICE — ENDO KIT COMPLIANCE DYKENDOCMPLY

## (undated) DEVICE — ENDO BRUSH CHANNEL MASTER CLEANING 2-4.2MM BW-412T

## (undated) DEVICE — SUCTION MANIFOLD NEPTUNE 2 SYS 4 PORT 0702-020-000

## (undated) DEVICE — SOL WATER 1500ML

## (undated) RX ORDER — PROPOFOL 10 MG/ML
INJECTION, EMULSION INTRAVENOUS
Status: DISPENSED
Start: 2019-05-24

## (undated) RX ORDER — SODIUM CHLORIDE, SODIUM LACTATE, POTASSIUM CHLORIDE, CALCIUM CHLORIDE 600; 310; 30; 20 MG/100ML; MG/100ML; MG/100ML; MG/100ML
INJECTION, SOLUTION INTRAVENOUS
Status: DISPENSED
Start: 2019-05-24

## (undated) RX ORDER — CEFTRIAXONE SODIUM 1 G/50ML
INJECTION, SOLUTION INTRAVENOUS
Status: DISPENSED
Start: 2020-11-06

## (undated) RX ORDER — DEXAMETHASONE SODIUM PHOSPHATE 10 MG/ML
INJECTION, SOLUTION INTRAMUSCULAR; INTRAVENOUS
Status: DISPENSED
Start: 2020-11-05

## (undated) RX ORDER — LIDOCAINE HYDROCHLORIDE 20 MG/ML
INJECTION, SOLUTION EPIDURAL; INFILTRATION; INTRACAUDAL; PERINEURAL
Status: DISPENSED
Start: 2019-05-24

## (undated) RX ORDER — AZITHROMYCIN 500 MG/5ML
INJECTION, POWDER, LYOPHILIZED, FOR SOLUTION INTRAVENOUS
Status: DISPENSED
Start: 2020-11-05

## (undated) RX ORDER — LIDOCAINE HYDROCHLORIDE 10 MG/ML
INJECTION, SOLUTION INFILTRATION; PERINEURAL
Status: DISPENSED
Start: 2023-07-12

## (undated) RX ORDER — TRIAMCINOLONE ACETONIDE 40 MG/ML
INJECTION, SUSPENSION INTRA-ARTICULAR; INTRAMUSCULAR
Status: DISPENSED
Start: 2023-07-12